# Patient Record
Sex: MALE | Race: WHITE | Employment: OTHER | URBAN - METROPOLITAN AREA
[De-identification: names, ages, dates, MRNs, and addresses within clinical notes are randomized per-mention and may not be internally consistent; named-entity substitution may affect disease eponyms.]

---

## 2018-07-11 ENCOUNTER — HOSPITAL ENCOUNTER (OUTPATIENT)
Dept: NUCLEAR MEDICINE | Age: 83
Discharge: HOME | End: 2018-07-13
Payer: MEDICARE

## 2018-07-11 ENCOUNTER — HOSPITAL ENCOUNTER (OUTPATIENT)
Dept: NON INVASIVE DIAGNOSTICS | Age: 83
Discharge: HOME | End: 2018-07-11
Payer: MEDICARE

## 2018-07-11 LAB
LV EF: 63 %
LVEF MODALITY: NORMAL

## 2018-07-11 PROCEDURE — 3430000000 HC RX DIAGNOSTIC RADIOPHARMACEUTICAL: Performed by: RADIOLOGY

## 2018-07-11 PROCEDURE — 78452 HT MUSCLE IMAGE SPECT MULT: CPT

## 2018-07-11 PROCEDURE — 93017 CV STRESS TEST TRACING ONLY: CPT

## 2018-07-11 PROCEDURE — A9500 TC99M SESTAMIBI: HCPCS | Performed by: RADIOLOGY

## 2018-07-11 PROCEDURE — 6360000002 HC RX W HCPCS: Performed by: INTERNAL MEDICINE

## 2018-07-11 RX ADMIN — Medication 10 MILLICURIE: at 12:09

## 2018-07-11 RX ADMIN — REGADENOSON 0.4 MG: 0.08 INJECTION, SOLUTION INTRAVENOUS at 13:15

## 2018-07-11 RX ADMIN — Medication 35 MILLICURIE: at 14:23

## 2018-08-30 ENCOUNTER — HOSPITAL ENCOUNTER (OUTPATIENT)
Age: 83
Discharge: HOME | End: 2018-08-30
Payer: MEDICARE

## 2018-08-30 LAB — PROSTATE SPECIFIC ANTIGEN: 3.44 NG/ML (ref 0–4)

## 2018-08-30 PROCEDURE — 36415 COLL VENOUS BLD VENIPUNCTURE: CPT

## 2018-08-30 PROCEDURE — G0103 PSA SCREENING: HCPCS

## 2018-09-10 ENCOUNTER — HOSPITAL ENCOUNTER (OUTPATIENT)
Age: 83
Discharge: HOME | End: 2018-09-12
Payer: MEDICARE

## 2018-09-10 PROCEDURE — 88112 CYTOPATH CELL ENHANCE TECH: CPT

## 2018-12-20 ENCOUNTER — HOSPITAL ENCOUNTER (OUTPATIENT)
Age: 83
Discharge: HOME | End: 2018-12-20
Payer: MEDICARE

## 2018-12-20 LAB — PROSTATE SPECIFIC ANTIGEN: 3.91 NG/ML (ref 0–4)

## 2018-12-20 PROCEDURE — 84403 ASSAY OF TOTAL TESTOSTERONE: CPT

## 2018-12-20 PROCEDURE — G0103 PSA SCREENING: HCPCS

## 2018-12-20 PROCEDURE — 36415 COLL VENOUS BLD VENIPUNCTURE: CPT

## 2018-12-20 PROCEDURE — 84270 ASSAY OF SEX HORMONE GLOBUL: CPT

## 2018-12-22 LAB
SEX HORMONE BINDING GLOBULIN: 52 NMOL/L (ref 11–80)
TESTOSTERONE FREE-NONMALE: 81.1 PG/ML (ref 47–244)
TESTOSTERONE TOTAL: 519 NG/DL (ref 220–1000)

## 2019-02-27 ENCOUNTER — HOSPITAL ENCOUNTER (OUTPATIENT)
Age: 84
Discharge: HOME | End: 2019-02-27
Payer: MEDICARE

## 2019-02-27 PROCEDURE — 84154 ASSAY OF PSA FREE: CPT

## 2019-02-27 PROCEDURE — 84153 ASSAY OF PSA TOTAL: CPT

## 2019-03-01 LAB
PROSTATE SPECIFIC ANTIGEN FREE: 1.2 NG/ML
PROSTATE SPECIFIC ANTIGEN PERCENT FREE: 31 %
PROSTATE SPECIFIC ANTIGEN: 3.9 NG/ML (ref 0–4)

## 2019-03-11 ENCOUNTER — HOSPITAL ENCOUNTER (OUTPATIENT)
Age: 84
Discharge: HOME | End: 2019-03-13
Payer: MEDICARE

## 2019-03-11 PROCEDURE — 88112 CYTOPATH CELL ENHANCE TECH: CPT

## 2019-09-12 ENCOUNTER — HOSPITAL ENCOUNTER (OUTPATIENT)
Age: 84
Discharge: HOME | End: 2019-09-12
Payer: MEDICARE

## 2019-09-12 LAB — PROSTATE SPECIFIC ANTIGEN: 3.43 NG/ML (ref 0–4)

## 2019-09-12 PROCEDURE — 88112 CYTOPATH CELL ENHANCE TECH: CPT

## 2019-09-12 PROCEDURE — 36415 COLL VENOUS BLD VENIPUNCTURE: CPT

## 2019-09-12 PROCEDURE — G0103 PSA SCREENING: HCPCS

## 2020-05-27 ENCOUNTER — PREP FOR PROCEDURE (OUTPATIENT)
Dept: UROLOGY | Age: 85
End: 2020-05-27

## 2020-05-27 RX ORDER — SODIUM CHLORIDE 0.9 % (FLUSH) 0.9 %
10 SYRINGE (ML) INJECTION PRN
Status: CANCELLED | OUTPATIENT
Start: 2020-05-27

## 2020-05-27 RX ORDER — SODIUM CHLORIDE 0.9 % (FLUSH) 0.9 %
10 SYRINGE (ML) INJECTION EVERY 12 HOURS SCHEDULED
Status: CANCELLED | OUTPATIENT
Start: 2020-05-27

## 2020-05-27 RX ORDER — SODIUM CHLORIDE 9 MG/ML
INJECTION, SOLUTION INTRAVENOUS CONTINUOUS
Status: CANCELLED | OUTPATIENT
Start: 2020-05-27

## 2020-05-29 ENCOUNTER — HOSPITAL ENCOUNTER (OUTPATIENT)
Age: 85
Discharge: HOME | End: 2020-05-31
Payer: MEDICARE

## 2020-05-29 ENCOUNTER — HOSPITAL ENCOUNTER (OUTPATIENT)
Dept: PREADMISSION TESTING | Age: 85
Discharge: HOME | End: 2020-05-29
Payer: MEDICARE

## 2020-05-29 VITALS
OXYGEN SATURATION: 95 % | RESPIRATION RATE: 18 BRPM | WEIGHT: 193.44 LBS | HEART RATE: 68 BPM | TEMPERATURE: 98.8 F | DIASTOLIC BLOOD PRESSURE: 72 MMHG | HEIGHT: 69 IN | SYSTOLIC BLOOD PRESSURE: 149 MMHG | BODY MASS INDEX: 28.65 KG/M2

## 2020-05-29 LAB
ANION GAP SERPL CALCULATED.3IONS-SCNC: 15 MMOL/L (ref 7–16)
BUN BLDV-MCNC: 28 MG/DL (ref 8–23)
CALCIUM SERPL-MCNC: 9.3 MG/DL (ref 8.6–10.2)
CHLORIDE BLD-SCNC: 97 MMOL/L (ref 98–107)
CO2: 24 MMOL/L (ref 22–29)
CREAT SERPL-MCNC: 1.8 MG/DL (ref 0.7–1.2)
EKG ATRIAL RATE: 58 BPM
EKG Q-T INTERVAL: 434 MS
EKG QRS DURATION: 132 MS
EKG QTC CALCULATION (BAZETT): 426 MS
EKG R AXIS: -62 DEGREES
EKG T AXIS: -11 DEGREES
EKG VENTRICULAR RATE: 58 BPM
GFR AFRICAN AMERICAN: 43
GFR NON-AFRICAN AMERICAN: 36 ML/MIN/1.73
GLUCOSE BLD-MCNC: 195 MG/DL (ref 74–99)
HCT VFR BLD CALC: 33.1 % (ref 37–54)
HEMOGLOBIN: 11.5 G/DL (ref 12.5–16.5)
MCH RBC QN AUTO: 29 PG (ref 26–35)
MCHC RBC AUTO-ENTMCNC: 34.7 % (ref 32–34.5)
MCV RBC AUTO: 83.4 FL (ref 80–99.9)
PDW BLD-RTO: 13 FL (ref 11.5–15)
PLATELET # BLD: 232 E9/L (ref 130–450)
PMV BLD AUTO: 9 FL (ref 7–12)
POTASSIUM REFLEX MAGNESIUM: 4 MMOL/L (ref 3.5–5)
RBC # BLD: 3.97 E12/L (ref 3.8–5.8)
SODIUM BLD-SCNC: 136 MMOL/L (ref 132–146)
WBC # BLD: 7.3 E9/L (ref 4.5–11.5)

## 2020-05-29 PROCEDURE — 80048 BASIC METABOLIC PNL TOTAL CA: CPT

## 2020-05-29 PROCEDURE — U0003 INFECTIOUS AGENT DETECTION BY NUCLEIC ACID (DNA OR RNA); SEVERE ACUTE RESPIRATORY SYNDROME CORONAVIRUS 2 (SARS-COV-2) (CORONAVIRUS DISEASE [COVID-19]), AMPLIFIED PROBE TECHNIQUE, MAKING USE OF HIGH THROUGHPUT TECHNOLOGIES AS DESCRIBED BY CMS-2020-01-R: HCPCS

## 2020-05-29 PROCEDURE — 36415 COLL VENOUS BLD VENIPUNCTURE: CPT

## 2020-05-29 PROCEDURE — 93005 ELECTROCARDIOGRAM TRACING: CPT | Performed by: ANESTHESIOLOGY

## 2020-05-29 PROCEDURE — 93010 ELECTROCARDIOGRAM REPORT: CPT | Performed by: INTERNAL MEDICINE

## 2020-05-29 PROCEDURE — 85027 COMPLETE CBC AUTOMATED: CPT

## 2020-05-29 RX ORDER — M-VIT,TX,IRON,MINS/CALC/FOLIC 27MG-0.4MG
1 TABLET ORAL DAILY
COMMUNITY

## 2020-05-29 RX ORDER — ASPIRIN 81 MG/1
81 TABLET ORAL DAILY
COMMUNITY
End: 2020-08-07

## 2020-05-29 RX ORDER — AMLODIPINE BESYLATE 5 MG/1
2.5 TABLET ORAL DAILY
Status: ON HOLD | COMMUNITY
End: 2022-08-09 | Stop reason: HOSPADM

## 2020-05-29 RX ORDER — TORSEMIDE 10 MG/1
10 TABLET ORAL DAILY
COMMUNITY
End: 2022-08-09

## 2020-05-29 RX ORDER — GABAPENTIN 100 MG/1
100 CAPSULE ORAL EVERY OTHER DAY
Status: ON HOLD | COMMUNITY
End: 2022-08-09 | Stop reason: HOSPADM

## 2020-05-29 NOTE — PROGRESS NOTES
Prelim EKG reviewed with Dr. Maribel Jacobs, no further orders received. BMP faxed to Dr.Dan Joshua Blinks, confirmation received.

## 2020-05-31 LAB
SARS-COV-2: NOT DETECTED
SOURCE: NORMAL

## 2020-06-02 ENCOUNTER — ANESTHESIA EVENT (OUTPATIENT)
Dept: OPERATING ROOM | Age: 85
End: 2020-06-02
Payer: MEDICARE

## 2020-06-02 ENCOUNTER — ANESTHESIA (OUTPATIENT)
Dept: OPERATING ROOM | Age: 85
End: 2020-06-02
Payer: MEDICARE

## 2020-06-02 ENCOUNTER — HOSPITAL ENCOUNTER (OUTPATIENT)
Dept: GENERAL RADIOLOGY | Age: 85
Discharge: HOME | End: 2020-06-04
Payer: MEDICARE

## 2020-06-02 ENCOUNTER — HOSPITAL ENCOUNTER (OUTPATIENT)
Age: 85
Setting detail: OUTPATIENT SURGERY
Discharge: HOME | End: 2020-06-02
Attending: UROLOGY | Admitting: UROLOGY
Payer: MEDICARE

## 2020-06-02 VITALS
DIASTOLIC BLOOD PRESSURE: 73 MMHG | SYSTOLIC BLOOD PRESSURE: 150 MMHG | TEMPERATURE: 98.5 F | HEART RATE: 76 BPM | RESPIRATION RATE: 16 BRPM | OXYGEN SATURATION: 95 %

## 2020-06-02 VITALS
DIASTOLIC BLOOD PRESSURE: 70 MMHG | OXYGEN SATURATION: 97 % | SYSTOLIC BLOOD PRESSURE: 127 MMHG | RESPIRATION RATE: 16 BRPM | TEMPERATURE: 97.7 F

## 2020-06-02 LAB — METER GLUCOSE: 72 MG/DL (ref 74–99)

## 2020-06-02 PROCEDURE — 2709999900 HC NON-CHARGEABLE SUPPLY: Performed by: UROLOGY

## 2020-06-02 PROCEDURE — C1758 CATHETER, URETERAL: HCPCS | Performed by: UROLOGY

## 2020-06-02 PROCEDURE — 6370000000 HC RX 637 (ALT 250 FOR IP): Performed by: UROLOGY

## 2020-06-02 PROCEDURE — 74400 UROGRAPHY IV +-KUB TOMOG: CPT

## 2020-06-02 PROCEDURE — 82962 GLUCOSE BLOOD TEST: CPT

## 2020-06-02 PROCEDURE — 6360000002 HC RX W HCPCS: Performed by: NURSE PRACTITIONER

## 2020-06-02 PROCEDURE — 3700000000 HC ANESTHESIA ATTENDED CARE: Performed by: UROLOGY

## 2020-06-02 PROCEDURE — 51798 US URINE CAPACITY MEASURE: CPT

## 2020-06-02 PROCEDURE — 3700000001 HC ADD 15 MINUTES (ANESTHESIA): Performed by: UROLOGY

## 2020-06-02 PROCEDURE — 88305 TISSUE EXAM BY PATHOLOGIST: CPT

## 2020-06-02 PROCEDURE — 2580000003 HC RX 258: Performed by: NURSE ANESTHETIST, CERTIFIED REGISTERED

## 2020-06-02 PROCEDURE — 7100000010 HC PHASE II RECOVERY - FIRST 15 MIN: Performed by: UROLOGY

## 2020-06-02 PROCEDURE — 51702 INSERT TEMP BLADDER CATH: CPT

## 2020-06-02 PROCEDURE — 6360000002 HC RX W HCPCS: Performed by: NURSE ANESTHETIST, CERTIFIED REGISTERED

## 2020-06-02 PROCEDURE — 6360000004 HC RX CONTRAST MEDICATION: Performed by: UROLOGY

## 2020-06-02 PROCEDURE — 7100000011 HC PHASE II RECOVERY - ADDTL 15 MIN: Performed by: UROLOGY

## 2020-06-02 PROCEDURE — 3600000002 HC SURGERY LEVEL 2 BASE: Performed by: UROLOGY

## 2020-06-02 PROCEDURE — 3600000012 HC SURGERY LEVEL 2 ADDTL 15MIN: Performed by: UROLOGY

## 2020-06-02 RX ORDER — SODIUM CHLORIDE 9 MG/ML
INJECTION, SOLUTION INTRAVENOUS CONTINUOUS PRN
Status: DISCONTINUED | OUTPATIENT
Start: 2020-06-02 | End: 2020-06-02 | Stop reason: SDUPTHER

## 2020-06-02 RX ORDER — PHENAZOPYRIDINE HYDROCHLORIDE 100 MG/1
100 TABLET, FILM COATED ORAL 3 TIMES DAILY PRN
Qty: 30 TABLET | Refills: 0 | Status: SHIPPED | OUTPATIENT
Start: 2020-06-02 | End: 2020-06-12

## 2020-06-02 RX ORDER — PROPOFOL 10 MG/ML
INJECTION, EMULSION INTRAVENOUS CONTINUOUS PRN
Status: DISCONTINUED | OUTPATIENT
Start: 2020-06-02 | End: 2020-06-02 | Stop reason: SDUPTHER

## 2020-06-02 RX ORDER — SODIUM CHLORIDE 0.9 % (FLUSH) 0.9 %
10 SYRINGE (ML) INJECTION EVERY 12 HOURS SCHEDULED
Status: DISCONTINUED | OUTPATIENT
Start: 2020-06-02 | End: 2020-06-02 | Stop reason: HOSPADM

## 2020-06-02 RX ORDER — PROPOFOL 10 MG/ML
INJECTION, EMULSION INTRAVENOUS PRN
Status: DISCONTINUED | OUTPATIENT
Start: 2020-06-02 | End: 2020-06-02 | Stop reason: SDUPTHER

## 2020-06-02 RX ORDER — CEFAZOLIN SODIUM 2 G/50ML
2 SOLUTION INTRAVENOUS
Status: COMPLETED | OUTPATIENT
Start: 2020-06-02 | End: 2020-06-02

## 2020-06-02 RX ORDER — IBUPROFEN 600 MG/1
600 TABLET ORAL EVERY 8 HOURS PRN
Status: DISCONTINUED | OUTPATIENT
Start: 2020-06-02 | End: 2020-06-02 | Stop reason: HOSPADM

## 2020-06-02 RX ORDER — SODIUM CHLORIDE 9 MG/ML
INJECTION, SOLUTION INTRAVENOUS CONTINUOUS
Status: DISCONTINUED | OUTPATIENT
Start: 2020-06-02 | End: 2020-06-02 | Stop reason: HOSPADM

## 2020-06-02 RX ORDER — ONDANSETRON 2 MG/ML
INJECTION INTRAMUSCULAR; INTRAVENOUS PRN
Status: DISCONTINUED | OUTPATIENT
Start: 2020-06-02 | End: 2020-06-02 | Stop reason: SDUPTHER

## 2020-06-02 RX ORDER — IBUPROFEN 600 MG/1
600 TABLET ORAL EVERY 8 HOURS PRN
Qty: 12 TABLET | Refills: 0 | Status: SHIPPED | OUTPATIENT
Start: 2020-06-02 | End: 2020-08-07

## 2020-06-02 RX ORDER — SODIUM CHLORIDE 0.9 % (FLUSH) 0.9 %
10 SYRINGE (ML) INJECTION PRN
Status: DISCONTINUED | OUTPATIENT
Start: 2020-06-02 | End: 2020-06-02 | Stop reason: HOSPADM

## 2020-06-02 RX ORDER — PROMETHAZINE HYDROCHLORIDE 25 MG/ML
INJECTION, SOLUTION INTRAMUSCULAR; INTRAVENOUS PRN
Status: DISCONTINUED | OUTPATIENT
Start: 2020-06-02 | End: 2020-06-02 | Stop reason: SDUPTHER

## 2020-06-02 RX ORDER — CEFAZOLIN SODIUM 2 G/50ML
SOLUTION INTRAVENOUS
Status: DISCONTINUED
Start: 2020-06-02 | End: 2020-06-02 | Stop reason: HOSPADM

## 2020-06-02 RX ORDER — DEXAMETHASONE SODIUM PHOSPHATE 10 MG/ML
INJECTION, SOLUTION INTRAMUSCULAR; INTRAVENOUS PRN
Status: DISCONTINUED | OUTPATIENT
Start: 2020-06-02 | End: 2020-06-02 | Stop reason: SDUPTHER

## 2020-06-02 RX ORDER — PHENAZOPYRIDINE HYDROCHLORIDE 100 MG/1
100 TABLET, FILM COATED ORAL 3 TIMES DAILY PRN
Status: DISCONTINUED | OUTPATIENT
Start: 2020-06-02 | End: 2020-06-02 | Stop reason: HOSPADM

## 2020-06-02 RX ORDER — ONDANSETRON 2 MG/ML
4 INJECTION INTRAMUSCULAR; INTRAVENOUS EVERY 6 HOURS PRN
Status: DISCONTINUED | OUTPATIENT
Start: 2020-06-02 | End: 2020-06-02 | Stop reason: HOSPADM

## 2020-06-02 RX ORDER — LIDOCAINE HYDROCHLORIDE 20 MG/ML
INJECTION, SOLUTION INTRAVENOUS PRN
Status: DISCONTINUED | OUTPATIENT
Start: 2020-06-02 | End: 2020-06-02 | Stop reason: SDUPTHER

## 2020-06-02 RX ADMIN — PROPOFOL 20 MG: 10 INJECTION, EMULSION INTRAVENOUS at 13:41

## 2020-06-02 RX ADMIN — DEXAMETHASONE SODIUM PHOSPHATE 10 MG: 10 INJECTION, SOLUTION INTRAMUSCULAR; INTRAVENOUS at 13:40

## 2020-06-02 RX ADMIN — PROMETHAZINE HYDROCHLORIDE 12.5 MG: 25 INJECTION INTRAMUSCULAR; INTRAVENOUS at 13:30

## 2020-06-02 RX ADMIN — PROPOFOL 30 MG: 10 INJECTION, EMULSION INTRAVENOUS at 13:38

## 2020-06-02 RX ADMIN — SODIUM CHLORIDE: 9 INJECTION, SOLUTION INTRAVENOUS at 12:55

## 2020-06-02 RX ADMIN — PHENAZOPYRIDINE HYDROCHLORIDE 100 MG: 100 TABLET ORAL at 16:17

## 2020-06-02 RX ADMIN — LIDOCAINE HYDROCHLORIDE 100 MG: 20 INJECTION, SOLUTION INTRAVENOUS at 13:38

## 2020-06-02 RX ADMIN — CEFAZOLIN SODIUM 2 G: 2 SOLUTION INTRAVENOUS at 13:35

## 2020-06-02 RX ADMIN — PROPOFOL 180 MCG/KG/MIN: 10 INJECTION, EMULSION INTRAVENOUS at 13:38

## 2020-06-02 RX ADMIN — PROPOFOL 150 MCG/KG/MIN: 10 INJECTION, EMULSION INTRAVENOUS at 13:41

## 2020-06-02 RX ADMIN — PROPOFOL 30 MG: 10 INJECTION, EMULSION INTRAVENOUS at 13:39

## 2020-06-02 RX ADMIN — ONDANSETRON 4 MG: 2 INJECTION INTRAMUSCULAR; INTRAVENOUS at 13:51

## 2020-06-02 RX ADMIN — PROPOFOL 20 MG: 10 INJECTION, EMULSION INTRAVENOUS at 13:40

## 2020-06-02 ASSESSMENT — PULMONARY FUNCTION TESTS
PIF_VALUE: 1
PIF_VALUE: 0
PIF_VALUE: 0
PIF_VALUE: 1
PIF_VALUE: 0
PIF_VALUE: 1
PIF_VALUE: 0
PIF_VALUE: 1
PIF_VALUE: 0
PIF_VALUE: 1
PIF_VALUE: 0
PIF_VALUE: 1
PIF_VALUE: 1
PIF_VALUE: 0
PIF_VALUE: 1
PIF_VALUE: 0
PIF_VALUE: 1
PIF_VALUE: 1

## 2020-06-02 ASSESSMENT — PAIN - FUNCTIONAL ASSESSMENT: PAIN_FUNCTIONAL_ASSESSMENT: 0-10

## 2020-06-02 NOTE — PROGRESS NOTES
6/2/20 [facility] dr joiner paged regarding pt unable to urinate and is uncomfortable. kmo rn

## 2020-06-02 NOTE — ANESTHESIA PRE PROCEDURE
this encounter. Current Outpatient Medications   Medication Sig Dispense Refill    amLODIPine (NORVASC) 5 MG tablet Take 5 mg by mouth daily      torsemide (DEMADEX) 10 MG tablet Take 10 mg by mouth daily      METFORMIN HCL ER PO Take 1,000 mg by mouth 2 times daily      gabapentin (NEURONTIN) 100 MG capsule Take 100 mg by mouth 2 times daily.  aspirin 81 MG EC tablet Take 81 mg by mouth daily      SITagliptin (JANUVIA) 100 MG tablet Take 100 mg by mouth daily      Cyanocobalamin (VITAMIN B12 PO) Take by mouth daily      zinc 50 MG CAPS Take by mouth daily      Multiple Vitamins-Minerals (THERAPEUTIC MULTIVITAMIN-MINERALS) tablet Take 1 tablet by mouth daily      Cholecalciferol (VITAMIN D3 PO) Take by mouth daily      Omega-3 Fatty Acids (FISH OIL PO) Take by mouth daily      Bitter Melon 10 % POWD by Does not apply route daily      labetalol (NORMODYNE) 200 MG tablet Take 1 tablet by mouth every 12 hours for 90 days. 180 tablet 3    tamsulosin (FLOMAX) 0.4 MG capsule Take 0.4 mg by mouth 2 times daily       glimepiride (AMARYL) 4 MG tablet Take 4 mg by mouth every morning (before breakfast).          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       Past Medical History:        Diagnosis Date    Diabetes mellitus ([address]     H pylori ulcer 8/22/2012    Hyperlipidemia     Hypertension     PONV (postoperative nausea and vomiting)     PUD (peptic ulcer disease) 7/26/2012    Urinary frequency        Past Surgical History:        Procedure Laterality Date    APPENDECTOMY      CATARACT REMOVAL WITH IMPLANT Left 12/17/15    COLONOSCOPY  7/206/2012    CYSTOSCOPY [id number], [id number], BEART, [id number]     Type & Screen (If Applicable):  No results found for: LABABO, LABRH    Drug/Infectious Status (If Applicable):  No results found for: HIV, HEPCAB    COVID-19 Screening (If Applicable):   Lab Results   Component Value Date    COVID19 Not Detected 05/29/2020         Anesthesia Evaluation  Patient summary reviewed   history of anesthetic complications: PONV. Airway: Mallampati: IV  TM distance: >3 FB   Neck ROM: full  Mouth opening: > = 3 FB Dental:          Pulmonary: breath sounds clear to auscultation                            ROS comment: Former Smoker. Cardiovascular:    (+) hypertension:, angina:, hyperlipidemia        Rhythm: regular  Rate: normal                    Neuro/Psych:   Negative Neuro/Psych ROS              GI/Hepatic/Renal:   (+) PUD (H Pylori.),          ROS comment: Urinary Frequency. .   Endo/Other:    (+) DiabetesType II DM, , .                 Abdominal:           Vascular: negative vascular ROS. Anesthesia Plan      MAC     ASA 3       Induction: intravenous. BIS  MIPS: Postoperative opioids intended. Anesthetic plan and risks discussed with patient. Plan discussed with CRNA.     Attending anesthesiologist reviewed and agrees with Shannan Maynard MD   6/2/2020

## 2020-06-02 NOTE — ANESTHESIA POSTPROCEDURE EVALUATION
Department of Anesthesiology  Postprocedure Note    Patient: Rik Krishnan  MRN: [id number]  YOB: 1933  Date of evaluation: 6/2/2020  Time:  2:45 PM     Procedure Summary     Date:  06/02/20 Room / Location:  [facility]  [address] [facility], operated by [facility]    Anesthesia Start:  1330 Anesthesia Stop:  9022    Procedure:  CYSTOSCOPY RETROGRADE PYELOGRAM BOTOX  INJECTION (100 UNITS) (N/A Perineum) Diagnosis:  (OVERACTIVE BLADDER)    Surgeon:  Flo Sanchez MD Responsible Provider:  Dereje Da Silva MD    Anesthesia Type:  MAC ASA Status:  3          Anesthesia Type: MAC    Gus Phase I: Gus Score: 10    Gus Phase II:      Last vitals: Reviewed and per EMR flowsheets.        Anesthesia Post Evaluation    Patient location during evaluation: PACU  Patient participation: complete - patient participated  Level of consciousness: awake  Pain score: 0  Airway patency: patent  Nausea & Vomiting: no nausea  Complications: no  Cardiovascular status: blood pressure returned to baseline  Respiratory status: acceptable  Hydration status: euvolemic

## 2020-06-02 NOTE — PROGRESS NOTES
6/2/20 4365 reviewed discharge instructions with pt. Pt verbalized understanding, signed in agreement and given copy.  Updated pt daughter erika on toure instructions and follow up for toure removal. kmo rn

## 2020-06-02 NOTE — BRIEF OP NOTE
Brief Postoperative Note      Patient: Daniel Baptiste  YOB: 1933  MRN: [id number]    Date of Procedure: 6/2/2020    Pre-Op Diagnosis: OVERACTIVE BLADDER, bladder cancer    Post-Op Diagnosis: recurrent bladder cancer       Procedure(s):  CYSTOSCOPY RETROGRADE PYELOGRAM BOTOX  INJECTION (100 UNITS), maribel    Surgeon(s):  Gianni Felix MD    Assistant:  * No surgical staff found *    Anesthesia: Monitor Anesthesia Care    Estimated Blood Loss (mL): Minimal    Complications: None    Specimens:   ID Type Source Tests Collected by Time Destination   A : BLADDER TUMOR BIOPSY Tissue Tissue SURGICAL PATHOLOGY, AFB STAIN Gianni Felix MD 6/2/2020 1402        Implants:  * No implants in log *      Drains: * No LDAs found *    Findings: tumor    Electronically signed by Gianni Felix MD on 6/2/2020 at 2:37 PM

## 2020-06-02 NOTE — H&P
or injury, retention, persistent overactive voiding habits, possible need for subsequent Botox injections etc. were discussed in detail and he elected to proceed.   His PSA is due to be repeated in the fall

## 2020-06-06 ENCOUNTER — HOSPITAL ENCOUNTER (EMERGENCY)
Age: 85
Discharge: HOME | End: 2020-06-06
Attending: EMERGENCY MEDICINE
Payer: MEDICARE

## 2020-06-06 VITALS
HEART RATE: 70 BPM | RESPIRATION RATE: 16 BRPM | WEIGHT: 190 LBS | BODY MASS INDEX: 28.14 KG/M2 | TEMPERATURE: 96.9 F | HEIGHT: 69 IN | OXYGEN SATURATION: 97 %

## 2020-06-06 LAB
BACTERIA: NORMAL /HPF
BILIRUBIN URINE: ABNORMAL
BLOOD, URINE: ABNORMAL
CLARITY: ABNORMAL
COLOR: ABNORMAL
EPITHELIAL CELLS, UA: NORMAL /HPF
GLUCOSE URINE: NEGATIVE MG/DL
KETONES, URINE: NEGATIVE MG/DL
LEUKOCYTE ESTERASE, URINE: NEGATIVE
NITRITE, URINE: POSITIVE
PH UA: 7 (ref 5–9)
PROTEIN UA: 100 MG/DL
RBC UA: >20 /HPF (ref 0–2)
SPECIFIC GRAVITY UA: 1.01 (ref 1–1.03)
UROBILINOGEN, URINE: 1 E.U./DL
WBC UA: NORMAL /HPF (ref 0–5)

## 2020-06-06 PROCEDURE — 87088 URINE BACTERIA CULTURE: CPT

## 2020-06-06 PROCEDURE — 81001 URINALYSIS AUTO W/SCOPE: CPT

## 2020-06-06 PROCEDURE — 99283 EMERGENCY DEPT VISIT LOW MDM: CPT

## 2020-06-06 ASSESSMENT — PAIN DESCRIPTION - PAIN TYPE: TYPE: ACUTE PAIN

## 2020-06-06 ASSESSMENT — PAIN DESCRIPTION - LOCATION: LOCATION: FLANK

## 2020-06-06 ASSESSMENT — PAIN SCALES - GENERAL: PAINLEVEL_OUTOF10: 10

## 2020-06-06 ASSESSMENT — PAIN DESCRIPTION - FREQUENCY: FREQUENCY: CONTINUOUS

## 2020-06-06 ASSESSMENT — PAIN DESCRIPTION - PROGRESSION: CLINICAL_PROGRESSION: NOT CHANGED

## 2020-06-06 ASSESSMENT — PAIN DESCRIPTION - DESCRIPTORS: DESCRIPTORS: ACHING;BURNING

## 2020-06-07 ASSESSMENT — ENCOUNTER SYMPTOMS: ABDOMINAL PAIN: 1

## 2020-06-07 NOTE — ED PROVIDER NOTES
/HPF    Epithelial Cells, UA NONE SEEN /HPF    Bacteria, UA NONE SEEN None Seen /HPF       RADIOLOGY:  Interpreted by Radiologist.  No orders to display             ------------------------- NURSING NOTES AND VITALS REVIEWED ---------------------------   The nursing notes within the ED encounter and vital signs as below have been reviewed by myself. Pulse 70   Temp 96.9 °F (36.1 °C) (Temporal)   Resp 16   Ht 5' 9\" (1.753 m)   Wt 190 lb (86.2 kg)   SpO2 97%   BMI 28.06 kg/m²   Oxygen Saturation Interpretation: Normal    The patients available past medical records and past encounters were reviewed. ------------------------------ ED COURSE/MEDICAL DECISION MAKING----------------------  Medications - No data to display          Medical Decision Making:   The patient is a 77-year-old male presenting to the emergency department with a chief complaint of urinary retention. The patient's son did obtain  urology consultation prior to my evaluation of the patient. Urology consultation with Dr. Ani Novak on the patient's son private cell phone. Itz Lao He did verbal order for Stephens placement prior to discharge. The Stephens was placed prior urology request and the patient was discharged. Urine was discontinued but was sent to the lab, no WBCs, no bacteria, no systemic signs of infection. Patient will follow-up with urology. Critical Care: Please note that the withdrawal or failure to initiate urgent interventions for this patient would likely result in a life threatening deterioration or permanent disability. Accordingly this patient received 0 minutes of critical care time, excluding separately billable procedures. This patient's ED course included: History, physical examination, reevaluation prior to disposition,     This patient has remained hemodynamically stable during their ED course. Counseling:    The emergency provider has spoken with the patient and patients son and discussed todays

## 2020-06-07 NOTE — ED NOTES
Dr Ifeanyi Fernandez asked who the Dr was and insisted a toure be inserted into the pt, explained to him that Dr Matt Casey was the attending and she was the only doctor at this time for the entire dept. Dr Ifeanyi Fernandez then called urologist on cell phone and handed this nurse personal cell phone to take verbal order for toure insert. Dr Karla Cunha gave verbal order for 25 F coude catheter to be inserted.      Negrito Franklin RN  06/06/20 1355

## 2020-06-09 LAB — URINE CULTURE, ROUTINE: NORMAL

## 2020-06-15 ENCOUNTER — HOSPITAL ENCOUNTER (OUTPATIENT)
Age: 85
Discharge: HOME | End: 2020-06-17
Payer: MEDICARE

## 2020-06-15 PROCEDURE — 87088 URINE BACTERIA CULTURE: CPT

## 2020-06-15 PROCEDURE — 87186 SC STD MICRODIL/AGAR DIL: CPT

## 2020-06-18 LAB
ORGANISM: ABNORMAL
URINE CULTURE, ROUTINE: ABNORMAL

## 2020-08-05 ENCOUNTER — HOSPITAL ENCOUNTER (OUTPATIENT)
Age: 85
Discharge: HOME | End: 2020-08-07
Payer: MEDICARE

## 2020-08-05 PROCEDURE — U0003 INFECTIOUS AGENT DETECTION BY NUCLEIC ACID (DNA OR RNA); SEVERE ACUTE RESPIRATORY SYNDROME CORONAVIRUS 2 (SARS-COV-2) (CORONAVIRUS DISEASE [COVID-19]), AMPLIFIED PROBE TECHNIQUE, MAKING USE OF HIGH THROUGHPUT TECHNOLOGIES AS DESCRIBED BY CMS-2020-01-R: HCPCS

## 2020-08-07 ENCOUNTER — HOSPITAL ENCOUNTER (OUTPATIENT)
Dept: CARDIAC CATH/INVASIVE PROCEDURES | Age: 85
Discharge: HOME | End: 2020-08-07
Payer: MEDICARE

## 2020-08-07 VITALS
SYSTOLIC BLOOD PRESSURE: 156 MMHG | WEIGHT: 186 LBS | RESPIRATION RATE: 16 BRPM | HEART RATE: 62 BPM | TEMPERATURE: 98.5 F | BODY MASS INDEX: 25.19 KG/M2 | HEIGHT: 72 IN | DIASTOLIC BLOOD PRESSURE: 82 MMHG

## 2020-08-07 LAB
ABO/RH: NORMAL
ANION GAP SERPL CALCULATED.3IONS-SCNC: 14 MMOL/L (ref 7–16)
ANTIBODY SCREEN: NORMAL
BASOPHILS ABSOLUTE: 0.05 E9/L (ref 0–0.2)
BASOPHILS RELATIVE PERCENT: 0.6 % (ref 0–2)
BUN BLDV-MCNC: 22 MG/DL (ref 8–23)
CALCIUM SERPL-MCNC: 10 MG/DL (ref 8.6–10.2)
CHLORIDE BLD-SCNC: 94 MMOL/L (ref 98–107)
CO2: 28 MMOL/L (ref 22–29)
CREAT SERPL-MCNC: 1.5 MG/DL (ref 0.7–1.2)
EOSINOPHILS ABSOLUTE: 0.13 E9/L (ref 0.05–0.5)
EOSINOPHILS RELATIVE PERCENT: 1.6 % (ref 0–6)
GFR AFRICAN AMERICAN: 54
GFR NON-AFRICAN AMERICAN: 44 ML/MIN/1.73
GLUCOSE BLD-MCNC: 165 MG/DL (ref 74–99)
HCT VFR BLD CALC: 34.4 % (ref 37–54)
HEMOGLOBIN: 11.7 G/DL (ref 12.5–16.5)
IMMATURE GRANULOCYTES #: 0.04 E9/L
IMMATURE GRANULOCYTES %: 0.5 % (ref 0–5)
INR BLD: 1.1
LYMPHOCYTES ABSOLUTE: 1.43 E9/L (ref 1.5–4)
LYMPHOCYTES RELATIVE PERCENT: 17.8 % (ref 20–42)
MCH RBC QN AUTO: 27.9 PG (ref 26–35)
MCHC RBC AUTO-ENTMCNC: 34 % (ref 32–34.5)
MCV RBC AUTO: 82.1 FL (ref 80–99.9)
MONOCYTES ABSOLUTE: 0.64 E9/L (ref 0.1–0.95)
MONOCYTES RELATIVE PERCENT: 8 % (ref 2–12)
NEUTROPHILS ABSOLUTE: 5.74 E9/L (ref 1.8–7.3)
NEUTROPHILS RELATIVE PERCENT: 71.5 % (ref 43–80)
PDW BLD-RTO: 14.1 FL (ref 11.5–15)
PLATELET # BLD: 253 E9/L (ref 130–450)
PMV BLD AUTO: 8.8 FL (ref 7–12)
POTASSIUM SERPL-SCNC: 4 MMOL/L (ref 3.5–5)
PROTHROMBIN TIME: 12 SEC (ref 9.3–12.4)
RBC # BLD: 4.19 E12/L (ref 3.8–5.8)
SARS-COV-2: NOT DETECTED
SODIUM BLD-SCNC: 136 MMOL/L (ref 132–146)
SOURCE: NORMAL
WBC # BLD: 8 E9/L (ref 4.5–11.5)

## 2020-08-07 PROCEDURE — C1894 INTRO/SHEATH, NON-LASER: HCPCS

## 2020-08-07 PROCEDURE — 93458 L HRT ARTERY/VENTRICLE ANGIO: CPT | Performed by: INTERNAL MEDICINE

## 2020-08-07 PROCEDURE — 6360000002 HC RX W HCPCS

## 2020-08-07 PROCEDURE — 86850 RBC ANTIBODY SCREEN: CPT

## 2020-08-07 PROCEDURE — 86901 BLOOD TYPING SEROLOGIC RH(D): CPT

## 2020-08-07 PROCEDURE — 2709999900 HC NON-CHARGEABLE SUPPLY

## 2020-08-07 PROCEDURE — C1769 GUIDE WIRE: HCPCS

## 2020-08-07 PROCEDURE — 86900 BLOOD TYPING SEROLOGIC ABO: CPT

## 2020-08-07 PROCEDURE — 80048 BASIC METABOLIC PNL TOTAL CA: CPT

## 2020-08-07 PROCEDURE — 36415 COLL VENOUS BLD VENIPUNCTURE: CPT

## 2020-08-07 PROCEDURE — 85025 COMPLETE CBC W/AUTO DIFF WBC: CPT

## 2020-08-07 PROCEDURE — 2500000003 HC RX 250 WO HCPCS

## 2020-08-07 PROCEDURE — 2500000003 HC RX 250 WO HCPCS: Performed by: INTERNAL MEDICINE

## 2020-08-07 PROCEDURE — 85610 PROTHROMBIN TIME: CPT

## 2020-08-07 RX ORDER — LABETALOL HYDROCHLORIDE 5 MG/ML
5 INJECTION, SOLUTION INTRAVENOUS ONCE
Status: COMPLETED | OUTPATIENT
Start: 2020-08-07 | End: 2020-08-07

## 2020-08-07 RX ORDER — BETHANECHOL CHLORIDE 25 MG/1
25 TABLET ORAL 3 TIMES DAILY
COMMUNITY
End: 2021-05-12 | Stop reason: ALTCHOICE

## 2020-08-07 RX ORDER — MAGNESIUM GLUCONATE 27 MG(500)
500 TABLET ORAL DAILY
COMMUNITY
End: 2022-08-09

## 2020-08-07 RX ORDER — LISINOPRIL AND HYDROCHLOROTHIAZIDE 20; 12.5 MG/1; MG/1
1 TABLET ORAL DAILY
Status: ON HOLD | COMMUNITY
End: 2022-08-09 | Stop reason: HOSPADM

## 2020-08-07 RX ADMIN — LABETALOL HYDROCHLORIDE 5 MG: 5 INJECTION INTRAVENOUS at 17:20

## 2020-08-07 RX ADMIN — LABETALOL HYDROCHLORIDE 5 MG: 5 INJECTION INTRAVENOUS at 16:25

## 2020-08-07 NOTE — H&P
History and Physical      CHIEF COMPLAINT:  Here for cardiac catheterization / PCI    History of Present Illness:  Misty Holstein is a 80y.o. year-old male presents for cardiac catheterization / PCI following a history of recurrent chest discomfort increasing in frequency over past 2 weeks despite negative stress test approximately 1 moth ago        Past Medical History:   Diagnosis Date    [address] mellitus ([facility] [address]     H pylori ulcer 8/22/2012    Hyperlipidemia     Hypertension     PONV (postoperative nausea and vomiting)     PUD (peptic ulcer disease) 7/26/2012    Urinary frequency          Past Surgical History:   Procedure Laterality Date    APPENDECTOMY      CATARACT REMOVAL WITH IMPLANT Left 12/17/15    COLONOSCOPY  7/206/2012    CYSTOSCOPY      multiple    CYSTOSCOPY N/A 6/2/2020    CYSTOSCOPY RETROGRADE PYELOGRAM BOTOX  INJECTION (100 UNITS) performed by Bridgett Closs, MD at [facility]  7/26/2012    peptic ulcer disease    EYE SURGERY Bilateral     cataracts    HERNIA REPAIR  more 10 yrs    left     WI COLONOSCOPY FLX DX W/COLLJ SPEC WHEN PFRMD  7/26/2012         WI EGD TRANSORAL BIOPSY SINGLE/MULTIPLE  7/26/2012            Medications Prior to Admission:    Prior to Admission medications    Medication Sig Start Date End Date Taking? Authorizing Provider   ibuprofen (ADVIL;MOTRIN) 600 MG tablet Take 1 tablet by mouth every 8 hours as needed for Pain 6/2/20   Bridgett Closs, MD   amLODIPine (NORVASC) 5 MG tablet Take 5 mg by mouth daily    Historical Provider, MD   torsemide (DEMADEX) 10 MG tablet Take 10 mg by mouth daily    Historical Provider, MD   METFORMIN HCL ER PO Take 1,000 mg by mouth 2 times daily    Historical Provider, MD   gabapentin (NEURONTIN) 100 MG capsule Take 100 mg by mouth 2 times daily.     Historical Provider, MD   aspirin 81 MG EC tablet Take 81 mg by mouth daily    Historical Provider, MD   SITagliptin (JANUVIA) 100 MG tablet Take 100 mg by mouth daily    Historical Provider, MD   Cyanocobalamin (VITAMIN B12 PO) Take by mouth daily    Historical Provider, MD   zinc 50 MG CAPS Take by mouth daily    Historical Provider, MD   Multiple Vitamins-Minerals (THERAPEUTIC MULTIVITAMIN-MINERALS) tablet Take 1 tablet by mouth daily    Historical Provider, MD   Cholecalciferol (VITAMIN D3 PO) Take by mouth daily    Historical Provider, MD   Omega-3 Fatty Acids (FISH OIL PO) Take by mouth daily    Historical Provider, MD   Bitter Melon 10 % POWD by Does not apply route daily    Historical Provider, MD   labetalol (NORMODYNE) 200 MG tablet Take 1 tablet by mouth every 12 hours for 90 days. 1/27/15 6/2/20  Nick Carroll DO   tamsulosin (FLOMAX) 0.4 MG capsule Take 0.4 mg by mouth 2 times daily     Historical Provider, MD   glimepiride (AMARYL) 4 MG tablet Take 4 mg by mouth every morning (before breakfast). Historical Provider, MD       Allergies:    Patient has no known allergies. Social History:    reports that he quit smoking about 10 years ago. He has never used smokeless tobacco. He reports current alcohol use. He reports that he does not use drugs. Family History:   family history is not on file. REVIEW OF SYSTEMS:  As above in the HPI, otherwise negative    PHYSICAL EXAM:    Vitals: There were no vitals taken for this visit. General:  Awake, alert, oriented X 3. Well developed, well nourished, well groomed. No apparent distress. HEENT:  Normocephalic, atraumatic. Pupils equal, round, reactive to light. No scleral icterus. No conjunctival injection. Normal lips, teeth, and gums. No nasal discharge. Neck:  Supple No palpable cervical or supraclavicular nodes. Carotids brisk in upstroke , without carotid bruits. No abnormal JVP at 45°. Thyroid not palpable. Chest: Even excursion. Heart:  Regular rate Regular rhythm, S1> S2 no murmurs, gallops, or rubs. PMI 5th intercostal space midclavicular line.   Lungs: CTA bilaterally, bilat symmetrical expansion, no wheeze, rales, or rhonchi. No decreased tactile fremitus. Abdomen: Bowel sounds present, soft, nontender, no masses, no organomegaly, no peritoneal signs  Extremities:  No clubbing, cyanosis, No edema PT present 1+  , DP present 1+   R  present 2+ bilaterally. Skin: Warm and dry, no open lesions or rash  Neuro:  Cranial nerves 2-12 intact, no focal sensory or motor deficits  Breast: deferred  Rectal: deferred  Genitalia:  deferred    LABS:  CBC:   Lab Results   Component Value Date    WBC 7.3 05/29/2020    RBC 3.97 05/29/2020    HGB 11.5 05/29/2020    HCT 33.1 05/29/2020    MCV 83.4 05/29/2020    MCH 29.0 05/29/2020    MCHC 34.7 05/29/2020    RDW 13.0 05/29/2020     05/29/2020    MPV 9.0 05/29/2020     BMP:    Lab Results   Component Value Date     05/29/2020    K 4.0 05/29/2020    CL 97 05/29/2020    CO2 24 05/29/2020    BUN 28 05/29/2020    CREATININE 1.8 05/29/2020    CALCIUM 9.3 05/29/2020    GFRAA 43 05/29/2020    LABGLOM 36 05/29/2020    GLUCOSE 195 05/29/2020     PT/INR:    Lab Results   Component Value Date    PROTIME 12.7 02/05/2013    INR 1.2 02/05/2013         ASSESSMENT:      Patient Active Problem List   Diagnosis    PUD (peptic ulcer disease)    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 I have answered all questions posed to me by the patient. SCAI-AUC  Indication 3; Score 8.     Raquel Engel, DO FACP,FACC,FSCAI  8/7/2020

## 2020-08-10 NOTE — PROCEDURES
[address]                  [address] [facility]  [facility]                            CARDIAC CATHETERIZATION    PATIENT NAME: Ferny Lyle             :        10/27/1933  MED REC NO:   [id number]                            ROOM:  ACCOUNT NO:   [de-identified]                           ADMIT DATE: 2020  PROVIDER:     Ashley Fan DO    DATE OF PROCEDURE:  2020    SCAI INDICATION:  3, score of 8. PROCEDURE:  Antony left heart catheterization. PRESSURES:  /75, mean 107; /18. SUMMARY OF INJECTIONS:  II.  Selective coronary arteriogram.  a. Right coronary artery - preponderant. A large dominant vessel with  mild luminal wall irregularities noted. The distal vessel was large and  widely patent. b.  Left coronary artery. 1.  Left main trunk - normal.  2.  Left anterior descending, 10% to 15% narrowing noted immediately  beyond the bifurcation of the first large diagonal branch. The distal  vessel was a moderate luminal caliber and widely patent. 3.  Circumflex, nondominant. A slender small caliber vessel without  areas of critical stenosis or spontaneous coronary artery spasm. III. Left ventricular angiogram:  Left ventricular cavity size is upper  limits of normal with normal global systolic wall motion. Estimated  left ventricular ejection fraction 60% - 65%. Note is made of mild  mitral regurgitation. CONCLUSIONS:  1. Noncritical coronary atherosclerosis. 2.  Normal left ventricular systolic function with mildly abnormal  diastolic dysfunction. 3.  Mild mitral regurgitation. CONSCIOUS SEDATION:  Initiation 1518, completion 1532. FLUOROSCOPY TIME:  4.1 minutes. CONTRAST UTILIZED:  60 mL.     EBL: <10 cc    KARINA REEDER DO [facility]    D: 2020 19:49:16       T: 2020 20:21:53     [email or web address]  Job#: [id number]     Doc#: [id number]    CC:

## 2020-08-21 ENCOUNTER — HOSPITAL ENCOUNTER (OUTPATIENT)
Age: 85
Discharge: HOME | End: 2020-08-23
Payer: MEDICARE

## 2020-08-21 ENCOUNTER — HOSPITAL ENCOUNTER (OUTPATIENT)
Dept: GENERAL RADIOLOGY | Age: 85
Discharge: HOME | End: 2020-08-23
Payer: MEDICARE

## 2020-08-21 PROCEDURE — 71046 X-RAY EXAM CHEST 2 VIEWS: CPT

## 2020-09-15 ENCOUNTER — HOSPITAL ENCOUNTER (OUTPATIENT)
Age: 85
Discharge: HOME | End: 2020-09-15
Payer: MEDICARE

## 2020-09-15 LAB — PROSTATE SPECIFIC ANTIGEN: 2.99 NG/ML (ref 0–4)

## 2020-09-15 PROCEDURE — 36415 COLL VENOUS BLD VENIPUNCTURE: CPT

## 2020-09-15 PROCEDURE — G0103 PSA SCREENING: HCPCS

## 2020-09-28 ENCOUNTER — HOSPITAL ENCOUNTER (OUTPATIENT)
Age: 85
Discharge: HOME | End: 2020-09-30
Payer: MEDICARE

## 2020-09-28 PROCEDURE — 88112 CYTOPATH CELL ENHANCE TECH: CPT

## 2020-10-19 ENCOUNTER — HOSPITAL ENCOUNTER (OUTPATIENT)
Age: 85
Discharge: HOME | End: 2020-10-21

## 2020-10-19 PROCEDURE — 88342 IMHCHEM/IMCYTCHM 1ST ANTB: CPT

## 2020-10-19 PROCEDURE — 88305 TISSUE EXAM BY PATHOLOGIST: CPT

## 2021-01-21 ENCOUNTER — IMMUNIZATION (OUTPATIENT)
Dept: PRIMARY CARE CLINIC | Age: 86
End: 2021-01-21
Payer: MEDICARE

## 2021-01-21 PROCEDURE — 0011A COVID-19, MODERNA VACCINE 100MCG/0.5ML DOSE: CPT | Performed by: NURSE PRACTITIONER

## 2021-01-21 PROCEDURE — 91301 COVID-19, MODERNA VACCINE 100MCG/0.5ML DOSE: CPT | Performed by: NURSE PRACTITIONER

## 2021-02-18 ENCOUNTER — IMMUNIZATION (OUTPATIENT)
Dept: PRIMARY CARE CLINIC | Age: 86
End: 2021-02-18
Payer: MEDICARE

## 2021-02-18 PROCEDURE — 0012A COVID-19, MODERNA VACCINE 100MCG/0.5ML DOSE: CPT | Performed by: NURSE PRACTITIONER

## 2021-02-18 PROCEDURE — 91301 COVID-19, MODERNA VACCINE 100MCG/0.5ML DOSE: CPT | Performed by: NURSE PRACTITIONER

## 2021-04-19 ENCOUNTER — TELEPHONE (OUTPATIENT)
Dept: CARDIAC REHAB | Age: 86
End: 2021-04-19

## 2021-04-19 NOTE — TELEPHONE ENCOUNTER
Left voicemail with patient's daughter in regards to scheduling his Initial Pulmonary Rehab Phase III consult. Left department contact number ([phone]) for her to reach back at her earliest convenience.

## 2021-04-21 ENCOUNTER — HOSPITAL ENCOUNTER (OUTPATIENT)
Dept: CARDIAC REHAB | Age: 86
Discharge: HOME | End: 2021-04-21

## 2021-04-21 ASSESSMENT — PATIENT HEALTH QUESTIONNAIRE - PHQ9: SUM OF ALL RESPONSES TO PHQ QUESTIONS 1-9: 3

## 2021-04-27 VITALS
BODY MASS INDEX: 27.4 KG/M2 | HEART RATE: 68 BPM | DIASTOLIC BLOOD PRESSURE: 68 MMHG | HEIGHT: 69 IN | SYSTOLIC BLOOD PRESSURE: 142 MMHG | RESPIRATION RATE: 22 BRPM | OXYGEN SATURATION: 97 % | WEIGHT: 185 LBS

## 2021-05-24 ENCOUNTER — HOSPITAL ENCOUNTER (OUTPATIENT)
Age: 86
Discharge: HOME | End: 2021-05-24

## 2021-05-24 PROCEDURE — 9900000038 HC CARDIAC REHAB PHASE 3 - MONTHLY

## 2021-05-26 ENCOUNTER — TELEPHONE (OUTPATIENT)
Dept: CARDIAC REHAB | Age: 86
End: 2021-05-26

## 2021-05-26 NOTE — TELEPHONE ENCOUNTER
5/26/2021 1602 Nutrition: Spoke with patient on this date by phone regarding appointment for 5/27/2021 at 10:00 am. Will remain available.  Electronically signed by Ethan Mario RD, LD on 5/26/21 at 4:03 PM EDT

## 2021-05-27 ENCOUNTER — HOSPITAL ENCOUNTER (OUTPATIENT)
Dept: CARDIAC REHAB | Age: 86
Discharge: HOME | End: 2021-05-27

## 2021-05-27 VITALS — WEIGHT: 189 LBS | HEIGHT: 69 IN | BODY MASS INDEX: 27.99 KG/M2

## 2021-05-27 NOTE — PROGRESS NOTES
grey tea ( Alternate:frozen  pancakes with lite syrup with blackberries )  AM snack: snack  Lunch: 12:30 - 1:00 pm, take out, few roast beef sandwiches from [employer] and 1 regular beer   PM snack: time? At home, 1 fruit serving  Dinner: time?, at home,  1 cup homemade beef or chicken soup * , beverage, has nightly  Evening snack: none    Likes fish- eats very little meat, likes  chicken , fish, fruits, vegetables, grain, eats eggs    Frequency of Eating at [employer] during week: once a month    Frequency of Eating at [place] during week: special occasion- likes [employer] and [employer]     Frequency and Type of Caffeine Consumption on a daily basis: 1 - 2 cups regular coffee     Water consumption on a daily basis: 5 to 7 glasses( approximately 40 to 56 ounces)    Frequency of Pop/Soda consumed:none    Ht Readings from Last 1 Encounters:   05/27/21 5' 9\" (1.753 m)     Wt Readings from Last 3 Encounters:   05/27/21 189 lb (85.7 kg)   05/12/21 190 lb (86.2 kg)   04/27/21 185 lb (83.9 kg)     Body mass index is 27.91 kg/m². Waist circumference:deferred  Body Fat %:deferred    Diagnosis:  Overweight related to cardiac dysfunction and insufficient PO intake as evident by patient comments and BMI 27.91. Intervention:    RD 1:1 initial assessment and education. Meet estimated needs. Lose weight. Adequate hydration. Increased knowledge. Improved compliance. Follow individualized diet plan. Education provided:Medical Nutrition Therapy Heart Healthy Consistent Carbohydrate information, My Plate, 3 day food log, making healthy food choices, serving size, dietary resolutions ( rate your plate not available)     Interdisciplinary Treatment Plan (ITP) note: Patient is a phase III participant and ITP not applicable. Monitoring/Evaluation:  RD will monitor PO intake and weight data as available.      The goals set by the patient are to try to lose 1 to 2 pounds every 2 weeks to achieve usual body weight, have protein serving at breakfast, eat breakfast, lunch, dinner and evening snack, eat more balanced meals and complete 3 day food log. The patient will be followed on June 24, 2021      Debby Cedeno M.A.,R.TRINA., L.D. Contact information: [phone].

## 2021-06-04 ENCOUNTER — HOSPITAL ENCOUNTER (OUTPATIENT)
Age: 86
Discharge: HOME | End: 2021-06-04

## 2021-06-04 PROCEDURE — 9900000038 HC CARDIAC REHAB PHASE 3 - MONTHLY

## 2021-06-23 ENCOUNTER — TELEPHONE (OUTPATIENT)
Dept: CARDIAC REHAB | Age: 86
End: 2021-06-23

## 2021-06-23 NOTE — TELEPHONE ENCOUNTER
6/23/2021 12:40 pm Nutrition: Spoke with patient on this date by phone regarding appointment for 6/24/2021 at 10:00 am. Will remain available.  Electronically signed by Monserrat Gaines RD, LD on 6/23/21 at 12:41 PM EDT

## 2021-09-13 ENCOUNTER — HOSPITAL ENCOUNTER (OUTPATIENT)
Age: 86
Discharge: HOME | End: 2021-09-13
Payer: MEDICARE

## 2021-09-13 LAB — PROSTATE SPECIFIC ANTIGEN: 2.76 NG/ML (ref 0–4)

## 2021-09-13 PROCEDURE — 36415 COLL VENOUS BLD VENIPUNCTURE: CPT

## 2021-09-13 PROCEDURE — 84153 ASSAY OF PSA TOTAL: CPT

## 2022-01-01 ENCOUNTER — APPOINTMENT (OUTPATIENT)
Dept: NEUROLOGY | Age: 87
DRG: 242 | End: 2022-01-01
Attending: INTERNAL MEDICINE
Payer: MEDICARE

## 2022-01-01 ENCOUNTER — APPOINTMENT (OUTPATIENT)
Dept: CT IMAGING | Age: 87
DRG: 242 | End: 2022-01-01
Attending: INTERNAL MEDICINE
Payer: MEDICARE

## 2022-01-01 ENCOUNTER — APPOINTMENT (OUTPATIENT)
Dept: GENERAL RADIOLOGY | Age: 87
DRG: 242 | End: 2022-01-01
Attending: INTERNAL MEDICINE
Payer: MEDICARE

## 2022-01-01 ENCOUNTER — HOSPITAL ENCOUNTER (INPATIENT)
Age: 87
LOS: 18 days | Discharge: HOSPICE | DRG: 242 | End: 2022-12-12
Attending: INTERNAL MEDICINE | Admitting: FAMILY MEDICINE
Payer: MEDICARE

## 2022-01-01 ENCOUNTER — APPOINTMENT (OUTPATIENT)
Dept: CARDIAC CATH/INVASIVE PROCEDURES | Age: 87
DRG: 242 | End: 2022-01-01
Attending: INTERNAL MEDICINE
Payer: MEDICARE

## 2022-01-01 VITALS
WEIGHT: 186.29 LBS | RESPIRATION RATE: 18 BRPM | HEART RATE: 89 BPM | BODY MASS INDEX: 27.59 KG/M2 | SYSTOLIC BLOOD PRESSURE: 125 MMHG | DIASTOLIC BLOOD PRESSURE: 74 MMHG | HEIGHT: 69 IN | OXYGEN SATURATION: 81 % | TEMPERATURE: 99.7 F

## 2022-01-01 LAB
ADENOVIRUS BY PCR: NOT DETECTED
ADENOVIRUS BY PCR: NOT DETECTED
ALBUMIN SERPL-MCNC: 3 G/DL (ref 3.5–5.2)
ALBUMIN SERPL-MCNC: 3.1 G/DL (ref 3.5–5.2)
ALBUMIN SERPL-MCNC: 3.1 G/DL (ref 3.5–5.2)
ALBUMIN SERPL-MCNC: 3.2 G/DL (ref 3.5–5.2)
ALBUMIN SERPL-MCNC: 3.2 G/DL (ref 3.5–5.2)
ALP BLD-CCNC: 108 U/L (ref 40–129)
ALP BLD-CCNC: 109 U/L (ref 40–129)
ALP BLD-CCNC: 115 U/L (ref 40–129)
ALP BLD-CCNC: 119 U/L (ref 40–129)
ALP BLD-CCNC: 91 U/L (ref 40–129)
ALP BLD-CCNC: 91 U/L (ref 40–129)
ALP BLD-CCNC: 97 U/L (ref 40–129)
ALP BLD-CCNC: 99 U/L (ref 40–129)
ALT SERPL-CCNC: 13 U/L (ref 0–40)
ALT SERPL-CCNC: 5 U/L (ref 0–40)
ALT SERPL-CCNC: 5 U/L (ref 0–40)
ALT SERPL-CCNC: 6 U/L (ref 0–40)
ALT SERPL-CCNC: <5 U/L (ref 0–40)
AMMONIA: 24 UMOL/L (ref 16–60)
AMMONIA: <10 UMOL/L (ref 16–60)
ANION GAP SERPL CALCULATED.3IONS-SCNC: 11 MMOL/L (ref 7–16)
ANION GAP SERPL CALCULATED.3IONS-SCNC: 11 MMOL/L (ref 7–16)
ANION GAP SERPL CALCULATED.3IONS-SCNC: 12 MMOL/L (ref 7–16)
ANION GAP SERPL CALCULATED.3IONS-SCNC: 13 MMOL/L (ref 7–16)
ANION GAP SERPL CALCULATED.3IONS-SCNC: 14 MMOL/L (ref 7–16)
ANION GAP SERPL CALCULATED.3IONS-SCNC: 15 MMOL/L (ref 7–16)
ANION GAP SERPL CALCULATED.3IONS-SCNC: 15 MMOL/L (ref 7–16)
ANION GAP SERPL CALCULATED.3IONS-SCNC: 16 MMOL/L (ref 7–16)
ANION GAP SERPL CALCULATED.3IONS-SCNC: 18 MMOL/L (ref 7–16)
ANISOCYTOSIS: ABNORMAL
APTT: 24.6 SEC (ref 24.5–35.1)
APTT: 25.4 SEC (ref 24.5–35.1)
APTT: 26.3 SEC (ref 24.5–35.1)
APTT: 28 SEC (ref 24.5–35.1)
APTT: 29.5 SEC (ref 24.5–35.1)
APTT: 29.6 SEC (ref 24.5–35.1)
APTT: 31.4 SEC (ref 24.5–35.1)
AST SERPL-CCNC: 14 U/L (ref 0–39)
AST SERPL-CCNC: 15 U/L (ref 0–39)
AST SERPL-CCNC: 16 U/L (ref 0–39)
AST SERPL-CCNC: 16 U/L (ref 0–39)
AST SERPL-CCNC: 21 U/L (ref 0–39)
AST SERPL-CCNC: 23 U/L (ref 0–39)
AST SERPL-CCNC: 25 U/L (ref 0–39)
AST SERPL-CCNC: 32 U/L (ref 0–39)
B.E.: -1.7 MMOL/L (ref -3–3)
BACTERIA: ABNORMAL /HPF
BASOPHILS ABSOLUTE: 0.02 E9/L (ref 0–0.2)
BASOPHILS ABSOLUTE: 0.03 E9/L (ref 0–0.2)
BASOPHILS ABSOLUTE: 0.04 E9/L (ref 0–0.2)
BASOPHILS ABSOLUTE: 0.05 E9/L (ref 0–0.2)
BASOPHILS ABSOLUTE: 0.07 E9/L (ref 0–0.2)
BASOPHILS ABSOLUTE: 0.07 E9/L (ref 0–0.2)
BASOPHILS ABSOLUTE: 0.08 E9/L (ref 0–0.2)
BASOPHILS ABSOLUTE: 0.09 E9/L (ref 0–0.2)
BASOPHILS ABSOLUTE: 0.11 E9/L (ref 0–0.2)
BASOPHILS ABSOLUTE: 0.24 E9/L (ref 0–0.2)
BASOPHILS RELATIVE PERCENT: 0.2 % (ref 0–2)
BASOPHILS RELATIVE PERCENT: 0.3 % (ref 0–2)
BASOPHILS RELATIVE PERCENT: 0.3 % (ref 0–2)
BASOPHILS RELATIVE PERCENT: 0.4 % (ref 0–2)
BASOPHILS RELATIVE PERCENT: 0.5 % (ref 0–2)
BASOPHILS RELATIVE PERCENT: 0.6 % (ref 0–2)
BASOPHILS RELATIVE PERCENT: 0.6 % (ref 0–2)
BASOPHILS RELATIVE PERCENT: 0.7 % (ref 0–2)
BASOPHILS RELATIVE PERCENT: 1.8 % (ref 0–2)
BILIRUB SERPL-MCNC: 0.2 MG/DL (ref 0–1.2)
BILIRUB SERPL-MCNC: 0.3 MG/DL (ref 0–1.2)
BILIRUB SERPL-MCNC: 0.3 MG/DL (ref 0–1.2)
BILIRUB SERPL-MCNC: 0.4 MG/DL (ref 0–1.2)
BILIRUB SERPL-MCNC: <0.2 MG/DL (ref 0–1.2)
BILIRUB SERPL-MCNC: <0.2 MG/DL (ref 0–1.2)
BILIRUBIN URINE: NEGATIVE
BLOOD CULTURE, ROUTINE: NORMAL
BLOOD CULTURE, ROUTINE: NORMAL
BLOOD, URINE: ABNORMAL
BORDETELLA PARAPERTUSSIS BY PCR: NOT DETECTED
BORDETELLA PARAPERTUSSIS BY PCR: NOT DETECTED
BORDETELLA PERTUSSIS BY PCR: NOT DETECTED
BORDETELLA PERTUSSIS BY PCR: NOT DETECTED
BUN BLDV-MCNC: 26 MG/DL (ref 6–23)
BUN BLDV-MCNC: 29 MG/DL (ref 6–23)
BUN BLDV-MCNC: 30 MG/DL (ref 6–23)
BUN BLDV-MCNC: 31 MG/DL (ref 6–23)
BUN BLDV-MCNC: 33 MG/DL (ref 6–23)
BUN BLDV-MCNC: 33 MG/DL (ref 6–23)
BUN BLDV-MCNC: 34 MG/DL (ref 6–23)
BUN BLDV-MCNC: 43 MG/DL (ref 6–23)
BUN BLDV-MCNC: 44 MG/DL (ref 6–23)
BUN BLDV-MCNC: 45 MG/DL (ref 6–23)
BUN BLDV-MCNC: 46 MG/DL (ref 6–23)
BUN BLDV-MCNC: 48 MG/DL (ref 6–23)
BUN BLDV-MCNC: 50 MG/DL (ref 6–23)
BUN BLDV-MCNC: 52 MG/DL (ref 6–23)
BUN BLDV-MCNC: 52 MG/DL (ref 6–23)
BUN BLDV-MCNC: 54 MG/DL (ref 6–23)
BUN BLDV-MCNC: 58 MG/DL (ref 6–23)
BUN BLDV-MCNC: 59 MG/DL (ref 6–23)
BUN BLDV-MCNC: 60 MG/DL (ref 6–23)
BUN BLDV-MCNC: 61 MG/DL (ref 6–23)
BUN BLDV-MCNC: 66 MG/DL (ref 6–23)
C-REACTIVE PROTEIN: 0.9 MG/DL (ref 0–0.4)
CALCIUM IONIZED: 1.25 MMOL/L (ref 1.15–1.33)
CALCIUM IONIZED: 1.28 MMOL/L (ref 1.15–1.33)
CALCIUM IONIZED: 1.29 MMOL/L (ref 1.15–1.33)
CALCIUM IONIZED: 1.29 MMOL/L (ref 1.15–1.33)
CALCIUM IONIZED: 1.33 MMOL/L (ref 1.15–1.33)
CALCIUM IONIZED: 1.34 MMOL/L (ref 1.15–1.33)
CALCIUM IONIZED: 1.38 MMOL/L (ref 1.15–1.33)
CALCIUM IONIZED: 1.39 MMOL/L (ref 1.15–1.33)
CALCIUM SERPL-MCNC: 10 MG/DL (ref 8.6–10.2)
CALCIUM SERPL-MCNC: 10.1 MG/DL (ref 8.6–10.2)
CALCIUM SERPL-MCNC: 8.9 MG/DL (ref 8.6–10.2)
CALCIUM SERPL-MCNC: 8.9 MG/DL (ref 8.6–10.2)
CALCIUM SERPL-MCNC: 9 MG/DL (ref 8.6–10.2)
CALCIUM SERPL-MCNC: 9 MG/DL (ref 8.6–10.2)
CALCIUM SERPL-MCNC: 9.1 MG/DL (ref 8.6–10.2)
CALCIUM SERPL-MCNC: 9.2 MG/DL (ref 8.6–10.2)
CALCIUM SERPL-MCNC: 9.2 MG/DL (ref 8.6–10.2)
CALCIUM SERPL-MCNC: 9.3 MG/DL (ref 8.6–10.2)
CALCIUM SERPL-MCNC: 9.5 MG/DL (ref 8.6–10.2)
CALCIUM SERPL-MCNC: 9.5 MG/DL (ref 8.6–10.2)
CALCIUM SERPL-MCNC: 9.6 MG/DL (ref 8.6–10.2)
CALCIUM SERPL-MCNC: 9.8 MG/DL (ref 8.6–10.2)
CHLAMYDOPHILIA PNEUMONIAE BY PCR: NOT DETECTED
CHLAMYDOPHILIA PNEUMONIAE BY PCR: NOT DETECTED
CHLORIDE BLD-SCNC: 100 MMOL/L (ref 98–107)
CHLORIDE BLD-SCNC: 100 MMOL/L (ref 98–107)
CHLORIDE BLD-SCNC: 101 MMOL/L (ref 98–107)
CHLORIDE BLD-SCNC: 101 MMOL/L (ref 98–107)
CHLORIDE BLD-SCNC: 103 MMOL/L (ref 98–107)
CHLORIDE BLD-SCNC: 104 MMOL/L (ref 98–107)
CHLORIDE BLD-SCNC: 105 MMOL/L (ref 98–107)
CHLORIDE BLD-SCNC: 105 MMOL/L (ref 98–107)
CHLORIDE BLD-SCNC: 106 MMOL/L (ref 98–107)
CHLORIDE BLD-SCNC: 108 MMOL/L (ref 98–107)
CHLORIDE BLD-SCNC: 110 MMOL/L (ref 98–107)
CHLORIDE BLD-SCNC: 112 MMOL/L (ref 98–107)
CHLORIDE BLD-SCNC: 113 MMOL/L (ref 98–107)
CHLORIDE BLD-SCNC: 91 MMOL/L (ref 98–107)
CHLORIDE BLD-SCNC: 97 MMOL/L (ref 98–107)
CHLORIDE BLD-SCNC: 98 MMOL/L (ref 98–107)
CHLORIDE BLD-SCNC: 99 MMOL/L (ref 98–107)
CHLORIDE URINE RANDOM: 76 MMOL/L
CLARITY: ABNORMAL
CLARITY: CLEAR
CLARITY: CLEAR
CO2: 17 MMOL/L (ref 22–29)
CO2: 19 MMOL/L (ref 22–29)
CO2: 20 MMOL/L (ref 22–29)
CO2: 21 MMOL/L (ref 22–29)
CO2: 21 MMOL/L (ref 22–29)
CO2: 22 MMOL/L (ref 22–29)
CO2: 23 MMOL/L (ref 22–29)
CO2: 25 MMOL/L (ref 22–29)
CO2: 25 MMOL/L (ref 22–29)
COHB: 0.5 % (ref 0–1.5)
COLOR: YELLOW
CORONAVIRUS 229E BY PCR: NOT DETECTED
CORONAVIRUS 229E BY PCR: NOT DETECTED
CORONAVIRUS HKU1 BY PCR: NOT DETECTED
CORONAVIRUS HKU1 BY PCR: NOT DETECTED
CORONAVIRUS NL63 BY PCR: NOT DETECTED
CORONAVIRUS NL63 BY PCR: NOT DETECTED
CORONAVIRUS OC43 BY PCR: NOT DETECTED
CORONAVIRUS OC43 BY PCR: NOT DETECTED
CREAT SERPL-MCNC: 1.7 MG/DL (ref 0.7–1.2)
CREAT SERPL-MCNC: 1.8 MG/DL (ref 0.7–1.2)
CREAT SERPL-MCNC: 1.8 MG/DL (ref 0.7–1.2)
CREAT SERPL-MCNC: 1.9 MG/DL (ref 0.7–1.2)
CREAT SERPL-MCNC: 1.9 MG/DL (ref 0.7–1.2)
CREAT SERPL-MCNC: 2 MG/DL (ref 0.7–1.2)
CREAT SERPL-MCNC: 2.1 MG/DL (ref 0.7–1.2)
CREAT SERPL-MCNC: 2.2 MG/DL (ref 0.7–1.2)
CREAT SERPL-MCNC: 2.2 MG/DL (ref 0.7–1.2)
CREAT SERPL-MCNC: 2.3 MG/DL (ref 0.7–1.2)
CREAT SERPL-MCNC: 2.3 MG/DL (ref 0.7–1.2)
CREATININE URINE: 62 MG/DL (ref 40–278)
CRITICAL: ABNORMAL
CULTURE, BLOOD 2: NORMAL
CULTURE, BLOOD 2: NORMAL
DATE ANALYZED: ABNORMAL
DATE OF COLLECTION: ABNORMAL
EKG ATRIAL RATE: 100 BPM
EKG ATRIAL RATE: 110 BPM
EKG ATRIAL RATE: 202 BPM
EKG ATRIAL RATE: 38 BPM
EKG ATRIAL RATE: 70 BPM
EKG ATRIAL RATE: 83 BPM
EKG Q-T INTERVAL: 428 MS
EKG Q-T INTERVAL: 430 MS
EKG Q-T INTERVAL: 444 MS
EKG Q-T INTERVAL: 450 MS
EKG Q-T INTERVAL: 472 MS
EKG Q-T INTERVAL: 480 MS
EKG QRS DURATION: 124 MS
EKG QRS DURATION: 132 MS
EKG QRS DURATION: 134 MS
EKG QRS DURATION: 148 MS
EKG QRS DURATION: 150 MS
EKG QRS DURATION: 160 MS
EKG QTC CALCULATION (BAZETT): 450 MS
EKG QTC CALCULATION (BAZETT): 479 MS
EKG QTC CALCULATION (BAZETT): 493 MS
EKG QTC CALCULATION (BAZETT): 509 MS
EKG QTC CALCULATION (BAZETT): 514 MS
EKG QTC CALCULATION (BAZETT): 518 MS
EKG R AXIS: -59 DEGREES
EKG R AXIS: -67 DEGREES
EKG R AXIS: -72 DEGREES
EKG R AXIS: -74 DEGREES
EKG R AXIS: -75 DEGREES
EKG R AXIS: -81 DEGREES
EKG T AXIS: 131 DEGREES
EKG T AXIS: 135 DEGREES
EKG T AXIS: 57 DEGREES
EKG T AXIS: 66 DEGREES
EKG T AXIS: 71 DEGREES
EKG T AXIS: 75 DEGREES
EKG VENTRICULAR RATE: 60 BPM
EKG VENTRICULAR RATE: 70 BPM
EKG VENTRICULAR RATE: 80 BPM
EKG VENTRICULAR RATE: 86 BPM
EOSINOPHILS ABSOLUTE: 0 E9/L (ref 0.05–0.5)
EOSINOPHILS ABSOLUTE: 0.01 E9/L (ref 0.05–0.5)
EOSINOPHILS ABSOLUTE: 0.02 E9/L (ref 0.05–0.5)
EOSINOPHILS ABSOLUTE: 0.03 E9/L (ref 0.05–0.5)
EOSINOPHILS ABSOLUTE: 0.04 E9/L (ref 0.05–0.5)
EOSINOPHILS ABSOLUTE: 0.12 E9/L (ref 0.05–0.5)
EOSINOPHILS RELATIVE PERCENT: 0 % (ref 0–6)
EOSINOPHILS RELATIVE PERCENT: 0.1 % (ref 0–6)
EOSINOPHILS RELATIVE PERCENT: 0.2 % (ref 0–6)
EOSINOPHILS RELATIVE PERCENT: 0.3 % (ref 0–6)
EOSINOPHILS RELATIVE PERCENT: 0.4 % (ref 0–6)
EOSINOPHILS RELATIVE PERCENT: 0.9 % (ref 0–6)
GFR SERPL CREATININE-BSD FRML MDRD: 26 ML/MIN/1.73
GFR SERPL CREATININE-BSD FRML MDRD: 26 ML/MIN/1.73
GFR SERPL CREATININE-BSD FRML MDRD: 28 ML/MIN/1.73
GFR SERPL CREATININE-BSD FRML MDRD: 28 ML/MIN/1.73
GFR SERPL CREATININE-BSD FRML MDRD: 30 ML/MIN/1.73
GFR SERPL CREATININE-BSD FRML MDRD: 31 ML/MIN/1.73
GFR SERPL CREATININE-BSD FRML MDRD: 33 ML/MIN/1.73
GFR SERPL CREATININE-BSD FRML MDRD: 33 ML/MIN/1.73
GFR SERPL CREATININE-BSD FRML MDRD: 36 ML/MIN/1.73
GFR SERPL CREATININE-BSD FRML MDRD: 36 ML/MIN/1.73
GFR SERPL CREATININE-BSD FRML MDRD: 38 ML/MIN/1.73
GLUCOSE BLD-MCNC: 108 MG/DL (ref 74–99)
GLUCOSE BLD-MCNC: 121 MG/DL (ref 74–99)
GLUCOSE BLD-MCNC: 143 MG/DL (ref 74–99)
GLUCOSE BLD-MCNC: 149 MG/DL (ref 74–99)
GLUCOSE BLD-MCNC: 150 MG/DL (ref 74–99)
GLUCOSE BLD-MCNC: 153 MG/DL (ref 74–99)
GLUCOSE BLD-MCNC: 193 MG/DL (ref 74–99)
GLUCOSE BLD-MCNC: 197 MG/DL (ref 74–99)
GLUCOSE BLD-MCNC: 206 MG/DL (ref 74–99)
GLUCOSE BLD-MCNC: 209 MG/DL (ref 74–99)
GLUCOSE BLD-MCNC: 209 MG/DL (ref 74–99)
GLUCOSE BLD-MCNC: 214 MG/DL (ref 74–99)
GLUCOSE BLD-MCNC: 227 MG/DL (ref 74–99)
GLUCOSE BLD-MCNC: 228 MG/DL (ref 74–99)
GLUCOSE BLD-MCNC: 230 MG/DL (ref 74–99)
GLUCOSE BLD-MCNC: 234 MG/DL (ref 74–99)
GLUCOSE BLD-MCNC: 244 MG/DL (ref 74–99)
GLUCOSE BLD-MCNC: 260 MG/DL (ref 74–99)
GLUCOSE BLD-MCNC: 300 MG/DL (ref 74–99)
GLUCOSE BLD-MCNC: 97 MG/DL (ref 74–99)
GLUCOSE BLD-MCNC: 97 MG/DL (ref 74–99)
GLUCOSE URINE: 500 MG/DL
GLUCOSE URINE: NEGATIVE MG/DL
GLUCOSE URINE: NEGATIVE MG/DL
HCO3: 21.1 MMOL/L (ref 22–26)
HCT VFR BLD CALC: 21.6 % (ref 37–54)
HCT VFR BLD CALC: 29.6 % (ref 37–54)
HCT VFR BLD CALC: 29.7 % (ref 37–54)
HCT VFR BLD CALC: 30.3 % (ref 37–54)
HCT VFR BLD CALC: 30.4 % (ref 37–54)
HCT VFR BLD CALC: 31 % (ref 37–54)
HCT VFR BLD CALC: 31.1 % (ref 37–54)
HCT VFR BLD CALC: 31.4 % (ref 37–54)
HCT VFR BLD CALC: 31.7 % (ref 37–54)
HCT VFR BLD CALC: 31.7 % (ref 37–54)
HCT VFR BLD CALC: 31.8 % (ref 37–54)
HCT VFR BLD CALC: 31.9 % (ref 37–54)
HCT VFR BLD CALC: 32.2 % (ref 37–54)
HCT VFR BLD CALC: 32.2 % (ref 37–54)
HCT VFR BLD CALC: 32.4 % (ref 37–54)
HCT VFR BLD CALC: 32.9 % (ref 37–54)
HCT VFR BLD CALC: 33.6 % (ref 37–54)
HCT VFR BLD CALC: 33.8 % (ref 37–54)
HCT VFR BLD CALC: 33.9 % (ref 37–54)
HCT VFR BLD CALC: 35.1 % (ref 37–54)
HCT VFR BLD CALC: 38.5 % (ref 37–54)
HEMOGLOBIN: 10.1 G/DL (ref 12.5–16.5)
HEMOGLOBIN: 10.2 G/DL (ref 12.5–16.5)
HEMOGLOBIN: 10.3 G/DL (ref 12.5–16.5)
HEMOGLOBIN: 10.5 G/DL (ref 12.5–16.5)
HEMOGLOBIN: 10.6 G/DL (ref 12.5–16.5)
HEMOGLOBIN: 10.7 G/DL (ref 12.5–16.5)
HEMOGLOBIN: 10.9 G/DL (ref 12.5–16.5)
HEMOGLOBIN: 11 G/DL (ref 12.5–16.5)
HEMOGLOBIN: 12.1 G/DL (ref 12.5–16.5)
HEMOGLOBIN: 7.1 G/DL (ref 12.5–16.5)
HEMOGLOBIN: 9.8 G/DL (ref 12.5–16.5)
HEMOGLOBIN: 9.8 G/DL (ref 12.5–16.5)
HEMOGLOBIN: 9.9 G/DL (ref 12.5–16.5)
HHB: 2.9 % (ref 0–5)
HUMAN METAPNEUMOVIRUS BY PCR: NOT DETECTED
HUMAN METAPNEUMOVIRUS BY PCR: NOT DETECTED
HUMAN RHINOVIRUS/ENTEROVIRUS BY PCR: NOT DETECTED
HUMAN RHINOVIRUS/ENTEROVIRUS BY PCR: NOT DETECTED
IMMATURE GRANULOCYTES #: 0.02 E9/L
IMMATURE GRANULOCYTES #: 0.03 E9/L
IMMATURE GRANULOCYTES #: 0.04 E9/L
IMMATURE GRANULOCYTES #: 0.05 E9/L
IMMATURE GRANULOCYTES #: 0.06 E9/L
IMMATURE GRANULOCYTES #: 0.07 E9/L
IMMATURE GRANULOCYTES #: 0.08 E9/L
IMMATURE GRANULOCYTES #: 0.08 E9/L
IMMATURE GRANULOCYTES #: 0.12 E9/L
IMMATURE GRANULOCYTES #: 0.15 E9/L
IMMATURE GRANULOCYTES #: 0.25 E9/L
IMMATURE GRANULOCYTES %: 0.2 % (ref 0–5)
IMMATURE GRANULOCYTES %: 0.3 % (ref 0–5)
IMMATURE GRANULOCYTES %: 0.3 % (ref 0–5)
IMMATURE GRANULOCYTES %: 0.4 % (ref 0–5)
IMMATURE GRANULOCYTES %: 0.5 % (ref 0–5)
IMMATURE GRANULOCYTES %: 0.6 % (ref 0–5)
IMMATURE GRANULOCYTES %: 0.7 % (ref 0–5)
IMMATURE GRANULOCYTES %: 0.7 % (ref 0–5)
IMMATURE GRANULOCYTES %: 0.8 % (ref 0–5)
IMMATURE GRANULOCYTES %: 0.8 % (ref 0–5)
IMMATURE GRANULOCYTES %: 1.3 % (ref 0–5)
INFLUENZA A BY PCR: NOT DETECTED
INFLUENZA A BY PCR: NOT DETECTED
INFLUENZA B BY PCR: NOT DETECTED
INFLUENZA B BY PCR: NOT DETECTED
INR BLD: 1.2
INR BLD: 1.3
INR BLD: 1.4
KEPPRA: 33 UG/ML (ref 10–40)
KETONES, URINE: NEGATIVE MG/DL
LAB: ABNORMAL
LEUKOCYTE ESTERASE, URINE: NEGATIVE
LYME, EIA: 0.08 LIV (ref 0–1.2)
LYMPHOCYTES ABSOLUTE: 0.88 E9/L (ref 1.5–4)
LYMPHOCYTES ABSOLUTE: 0.89 E9/L (ref 1.5–4)
LYMPHOCYTES ABSOLUTE: 0.91 E9/L (ref 1.5–4)
LYMPHOCYTES ABSOLUTE: 1 E9/L (ref 1.5–4)
LYMPHOCYTES ABSOLUTE: 1.05 E9/L (ref 1.5–4)
LYMPHOCYTES ABSOLUTE: 1.08 E9/L (ref 1.5–4)
LYMPHOCYTES ABSOLUTE: 1.14 E9/L (ref 1.5–4)
LYMPHOCYTES ABSOLUTE: 1.17 E9/L (ref 1.5–4)
LYMPHOCYTES ABSOLUTE: 1.22 E9/L (ref 1.5–4)
LYMPHOCYTES ABSOLUTE: 1.23 E9/L (ref 1.5–4)
LYMPHOCYTES ABSOLUTE: 1.26 E9/L (ref 1.5–4)
LYMPHOCYTES ABSOLUTE: 1.29 E9/L (ref 1.5–4)
LYMPHOCYTES ABSOLUTE: 1.29 E9/L (ref 1.5–4)
LYMPHOCYTES ABSOLUTE: 1.31 E9/L (ref 1.5–4)
LYMPHOCYTES ABSOLUTE: 1.32 E9/L (ref 1.5–4)
LYMPHOCYTES ABSOLUTE: 1.38 E9/L (ref 1.5–4)
LYMPHOCYTES ABSOLUTE: 1.39 E9/L (ref 1.5–4)
LYMPHOCYTES ABSOLUTE: 1.43 E9/L (ref 1.5–4)
LYMPHOCYTES ABSOLUTE: 1.45 E9/L (ref 1.5–4)
LYMPHOCYTES RELATIVE PERCENT: 10.1 % (ref 20–42)
LYMPHOCYTES RELATIVE PERCENT: 10.3 % (ref 20–42)
LYMPHOCYTES RELATIVE PERCENT: 10.7 % (ref 20–42)
LYMPHOCYTES RELATIVE PERCENT: 11 % (ref 20–42)
LYMPHOCYTES RELATIVE PERCENT: 12.1 % (ref 20–42)
LYMPHOCYTES RELATIVE PERCENT: 12.7 % (ref 20–42)
LYMPHOCYTES RELATIVE PERCENT: 12.9 % (ref 20–42)
LYMPHOCYTES RELATIVE PERCENT: 13.4 % (ref 20–42)
LYMPHOCYTES RELATIVE PERCENT: 13.9 % (ref 20–42)
LYMPHOCYTES RELATIVE PERCENT: 15.2 % (ref 20–42)
LYMPHOCYTES RELATIVE PERCENT: 6.5 % (ref 20–42)
LYMPHOCYTES RELATIVE PERCENT: 7.1 % (ref 20–42)
LYMPHOCYTES RELATIVE PERCENT: 7.3 % (ref 20–42)
LYMPHOCYTES RELATIVE PERCENT: 8.3 % (ref 20–42)
LYMPHOCYTES RELATIVE PERCENT: 8.7 % (ref 20–42)
LYMPHOCYTES RELATIVE PERCENT: 9.2 % (ref 20–42)
LYMPHOCYTES RELATIVE PERCENT: 9.4 % (ref 20–42)
LYMPHOCYTES RELATIVE PERCENT: 9.5 % (ref 20–42)
LYMPHOCYTES RELATIVE PERCENT: 9.7 % (ref 20–42)
Lab: ABNORMAL
MAGNESIUM: 1.8 MG/DL (ref 1.6–2.6)
MAGNESIUM: 1.9 MG/DL (ref 1.6–2.6)
MAGNESIUM: 1.9 MG/DL (ref 1.6–2.6)
MAGNESIUM: 2 MG/DL (ref 1.6–2.6)
MAGNESIUM: 2.1 MG/DL (ref 1.6–2.6)
MAGNESIUM: 2.2 MG/DL (ref 1.6–2.6)
MAGNESIUM: 2.3 MG/DL (ref 1.6–2.6)
MAGNESIUM: 2.4 MG/DL (ref 1.6–2.6)
MAGNESIUM: 2.4 MG/DL (ref 1.6–2.6)
MAGNESIUM: 2.5 MG/DL (ref 1.6–2.6)
MCH RBC QN AUTO: 27 PG (ref 26–35)
MCH RBC QN AUTO: 27.2 PG (ref 26–35)
MCH RBC QN AUTO: 27.3 PG (ref 26–35)
MCH RBC QN AUTO: 27.3 PG (ref 26–35)
MCH RBC QN AUTO: 27.4 PG (ref 26–35)
MCH RBC QN AUTO: 27.4 PG (ref 26–35)
MCH RBC QN AUTO: 27.5 PG (ref 26–35)
MCH RBC QN AUTO: 27.6 PG (ref 26–35)
MCH RBC QN AUTO: 27.6 PG (ref 26–35)
MCH RBC QN AUTO: 27.7 PG (ref 26–35)
MCH RBC QN AUTO: 27.7 PG (ref 26–35)
MCH RBC QN AUTO: 27.8 PG (ref 26–35)
MCH RBC QN AUTO: 27.9 PG (ref 26–35)
MCH RBC QN AUTO: 28 PG (ref 26–35)
MCH RBC QN AUTO: 28.1 PG (ref 26–35)
MCH RBC QN AUTO: 28.1 PG (ref 26–35)
MCH RBC QN AUTO: 28.2 PG (ref 26–35)
MCH RBC QN AUTO: 28.3 PG (ref 26–35)
MCHC RBC AUTO-ENTMCNC: 30.5 % (ref 32–34.5)
MCHC RBC AUTO-ENTMCNC: 31 % (ref 32–34.5)
MCHC RBC AUTO-ENTMCNC: 31.4 % (ref 32–34.5)
MCHC RBC AUTO-ENTMCNC: 31.5 % (ref 32–34.5)
MCHC RBC AUTO-ENTMCNC: 31.6 % (ref 32–34.5)
MCHC RBC AUTO-ENTMCNC: 32.5 % (ref 32–34.5)
MCHC RBC AUTO-ENTMCNC: 32.5 % (ref 32–34.5)
MCHC RBC AUTO-ENTMCNC: 32.8 % (ref 32–34.5)
MCHC RBC AUTO-ENTMCNC: 32.9 % (ref 32–34.5)
MCHC RBC AUTO-ENTMCNC: 33 % (ref 32–34.5)
MCHC RBC AUTO-ENTMCNC: 33 % (ref 32–34.5)
MCHC RBC AUTO-ENTMCNC: 33.1 % (ref 32–34.5)
MCHC RBC AUTO-ENTMCNC: 33.2 % (ref 32–34.5)
MCHC RBC AUTO-ENTMCNC: 33.3 % (ref 32–34.5)
MCHC RBC AUTO-ENTMCNC: 33.7 % (ref 32–34.5)
MCV RBC AUTO: 82.7 FL (ref 80–99.9)
MCV RBC AUTO: 82.8 FL (ref 80–99.9)
MCV RBC AUTO: 83.2 FL (ref 80–99.9)
MCV RBC AUTO: 83.3 FL (ref 80–99.9)
MCV RBC AUTO: 84.1 FL (ref 80–99.9)
MCV RBC AUTO: 84.2 FL (ref 80–99.9)
MCV RBC AUTO: 84.3 FL (ref 80–99.9)
MCV RBC AUTO: 84.3 FL (ref 80–99.9)
MCV RBC AUTO: 84.4 FL (ref 80–99.9)
MCV RBC AUTO: 84.4 FL (ref 80–99.9)
MCV RBC AUTO: 84.6 FL (ref 80–99.9)
MCV RBC AUTO: 85 FL (ref 80–99.9)
MCV RBC AUTO: 85.1 FL (ref 80–99.9)
MCV RBC AUTO: 85.2 FL (ref 80–99.9)
MCV RBC AUTO: 85.5 FL (ref 80–99.9)
MCV RBC AUTO: 85.5 FL (ref 80–99.9)
MCV RBC AUTO: 86.9 FL (ref 80–99.9)
MCV RBC AUTO: 87.1 FL (ref 80–99.9)
MCV RBC AUTO: 88.3 FL (ref 80–99.9)
MCV RBC AUTO: 89.3 FL (ref 80–99.9)
METER GLUCOSE: 106 MG/DL (ref 74–99)
METER GLUCOSE: 110 MG/DL (ref 74–99)
METER GLUCOSE: 111 MG/DL (ref 74–99)
METER GLUCOSE: 118 MG/DL (ref 74–99)
METER GLUCOSE: 121 MG/DL (ref 74–99)
METER GLUCOSE: 123 MG/DL (ref 74–99)
METER GLUCOSE: 126 MG/DL (ref 74–99)
METER GLUCOSE: 139 MG/DL (ref 74–99)
METER GLUCOSE: 140 MG/DL (ref 74–99)
METER GLUCOSE: 146 MG/DL (ref 74–99)
METER GLUCOSE: 149 MG/DL (ref 74–99)
METER GLUCOSE: 149 MG/DL (ref 74–99)
METER GLUCOSE: 150 MG/DL (ref 74–99)
METER GLUCOSE: 151 MG/DL (ref 74–99)
METER GLUCOSE: 153 MG/DL (ref 74–99)
METER GLUCOSE: 153 MG/DL (ref 74–99)
METER GLUCOSE: 155 MG/DL (ref 74–99)
METER GLUCOSE: 156 MG/DL (ref 74–99)
METER GLUCOSE: 163 MG/DL (ref 74–99)
METER GLUCOSE: 166 MG/DL (ref 74–99)
METER GLUCOSE: 167 MG/DL (ref 74–99)
METER GLUCOSE: 169 MG/DL (ref 74–99)
METER GLUCOSE: 172 MG/DL (ref 74–99)
METER GLUCOSE: 172 MG/DL (ref 74–99)
METER GLUCOSE: 177 MG/DL (ref 74–99)
METER GLUCOSE: 178 MG/DL (ref 74–99)
METER GLUCOSE: 179 MG/DL (ref 74–99)
METER GLUCOSE: 180 MG/DL (ref 74–99)
METER GLUCOSE: 190 MG/DL (ref 74–99)
METER GLUCOSE: 191 MG/DL (ref 74–99)
METER GLUCOSE: 193 MG/DL (ref 74–99)
METER GLUCOSE: 196 MG/DL (ref 74–99)
METER GLUCOSE: 202 MG/DL (ref 74–99)
METER GLUCOSE: 203 MG/DL (ref 74–99)
METER GLUCOSE: 205 MG/DL (ref 74–99)
METER GLUCOSE: 205 MG/DL (ref 74–99)
METER GLUCOSE: 208 MG/DL (ref 74–99)
METER GLUCOSE: 208 MG/DL (ref 74–99)
METER GLUCOSE: 209 MG/DL (ref 74–99)
METER GLUCOSE: 211 MG/DL (ref 74–99)
METER GLUCOSE: 218 MG/DL (ref 74–99)
METER GLUCOSE: 218 MG/DL (ref 74–99)
METER GLUCOSE: 219 MG/DL (ref 74–99)
METER GLUCOSE: 220 MG/DL (ref 74–99)
METER GLUCOSE: 222 MG/DL (ref 74–99)
METER GLUCOSE: 229 MG/DL (ref 74–99)
METER GLUCOSE: 229 MG/DL (ref 74–99)
METER GLUCOSE: 230 MG/DL (ref 74–99)
METER GLUCOSE: 232 MG/DL (ref 74–99)
METER GLUCOSE: 233 MG/DL (ref 74–99)
METER GLUCOSE: 239 MG/DL (ref 74–99)
METER GLUCOSE: 239 MG/DL (ref 74–99)
METER GLUCOSE: 240 MG/DL (ref 74–99)
METER GLUCOSE: 241 MG/DL (ref 74–99)
METER GLUCOSE: 244 MG/DL (ref 74–99)
METER GLUCOSE: 248 MG/DL (ref 74–99)
METER GLUCOSE: 251 MG/DL (ref 74–99)
METER GLUCOSE: 251 MG/DL (ref 74–99)
METER GLUCOSE: 252 MG/DL (ref 74–99)
METER GLUCOSE: 254 MG/DL (ref 74–99)
METER GLUCOSE: 261 MG/DL (ref 74–99)
METER GLUCOSE: 263 MG/DL (ref 74–99)
METER GLUCOSE: 264 MG/DL (ref 74–99)
METER GLUCOSE: 270 MG/DL (ref 74–99)
METER GLUCOSE: 273 MG/DL (ref 74–99)
METER GLUCOSE: 273 MG/DL (ref 74–99)
METER GLUCOSE: 276 MG/DL (ref 74–99)
METER GLUCOSE: 276 MG/DL (ref 74–99)
METER GLUCOSE: 277 MG/DL (ref 74–99)
METER GLUCOSE: 281 MG/DL (ref 74–99)
METER GLUCOSE: 281 MG/DL (ref 74–99)
METER GLUCOSE: 285 MG/DL (ref 74–99)
METER GLUCOSE: 289 MG/DL (ref 74–99)
METER GLUCOSE: 292 MG/DL (ref 74–99)
METER GLUCOSE: 300 MG/DL (ref 74–99)
METER GLUCOSE: 308 MG/DL (ref 74–99)
METER GLUCOSE: 310 MG/DL (ref 74–99)
METER GLUCOSE: 316 MG/DL (ref 74–99)
METER GLUCOSE: 318 MG/DL (ref 74–99)
METER GLUCOSE: 321 MG/DL (ref 74–99)
METER GLUCOSE: 321 MG/DL (ref 74–99)
METER GLUCOSE: 362 MG/DL (ref 74–99)
METER GLUCOSE: 81 MG/DL (ref 74–99)
METER GLUCOSE: 81 MG/DL (ref 74–99)
METER GLUCOSE: 91 MG/DL (ref 74–99)
METER GLUCOSE: 95 MG/DL (ref 74–99)
METHB: 0.2 % (ref 0–1.5)
MODE: ABNORMAL
MONOCYTES ABSOLUTE: 0.59 E9/L (ref 0.1–0.95)
MONOCYTES ABSOLUTE: 0.69 E9/L (ref 0.1–0.95)
MONOCYTES ABSOLUTE: 0.72 E9/L (ref 0.1–0.95)
MONOCYTES ABSOLUTE: 0.81 E9/L (ref 0.1–0.95)
MONOCYTES ABSOLUTE: 0.82 E9/L (ref 0.1–0.95)
MONOCYTES ABSOLUTE: 0.86 E9/L (ref 0.1–0.95)
MONOCYTES ABSOLUTE: 0.88 E9/L (ref 0.1–0.95)
MONOCYTES ABSOLUTE: 0.91 E9/L (ref 0.1–0.95)
MONOCYTES ABSOLUTE: 0.93 E9/L (ref 0.1–0.95)
MONOCYTES ABSOLUTE: 0.93 E9/L (ref 0.1–0.95)
MONOCYTES ABSOLUTE: 0.98 E9/L (ref 0.1–0.95)
MONOCYTES ABSOLUTE: 1.05 E9/L (ref 0.1–0.95)
MONOCYTES ABSOLUTE: 1.06 E9/L (ref 0.1–0.95)
MONOCYTES ABSOLUTE: 1.12 E9/L (ref 0.1–0.95)
MONOCYTES ABSOLUTE: 1.13 E9/L (ref 0.1–0.95)
MONOCYTES ABSOLUTE: 1.19 E9/L (ref 0.1–0.95)
MONOCYTES ABSOLUTE: 1.3 E9/L (ref 0.1–0.95)
MONOCYTES ABSOLUTE: 1.59 E9/L (ref 0.1–0.95)
MONOCYTES ABSOLUTE: 1.99 E9/L (ref 0.1–0.95)
MONOCYTES RELATIVE PERCENT: 10 % (ref 2–12)
MONOCYTES RELATIVE PERCENT: 10.1 % (ref 2–12)
MONOCYTES RELATIVE PERCENT: 10.2 % (ref 2–12)
MONOCYTES RELATIVE PERCENT: 10.9 % (ref 2–12)
MONOCYTES RELATIVE PERCENT: 11.3 % (ref 2–12)
MONOCYTES RELATIVE PERCENT: 11.5 % (ref 2–12)
MONOCYTES RELATIVE PERCENT: 11.6 % (ref 2–12)
MONOCYTES RELATIVE PERCENT: 4.8 % (ref 2–12)
MONOCYTES RELATIVE PERCENT: 6.4 % (ref 2–12)
MONOCYTES RELATIVE PERCENT: 6.9 % (ref 2–12)
MONOCYTES RELATIVE PERCENT: 7.1 % (ref 2–12)
MONOCYTES RELATIVE PERCENT: 7.1 % (ref 2–12)
MONOCYTES RELATIVE PERCENT: 7.5 % (ref 2–12)
MONOCYTES RELATIVE PERCENT: 7.9 % (ref 2–12)
MONOCYTES RELATIVE PERCENT: 8 % (ref 2–12)
MONOCYTES RELATIVE PERCENT: 8.1 % (ref 2–12)
MONOCYTES RELATIVE PERCENT: 8.3 % (ref 2–12)
MONOCYTES RELATIVE PERCENT: 8.7 % (ref 2–12)
MONOCYTES RELATIVE PERCENT: 9.3 % (ref 2–12)
MRSA CULTURE ONLY: NORMAL
MYCOPLASMA PNEUMONIAE BY PCR: NOT DETECTED
MYCOPLASMA PNEUMONIAE BY PCR: NOT DETECTED
NEUTROPHILS ABSOLUTE: 10.14 E9/L (ref 1.8–7.3)
NEUTROPHILS ABSOLUTE: 10.74 E9/L (ref 1.8–7.3)
NEUTROPHILS ABSOLUTE: 11.21 E9/L (ref 1.8–7.3)
NEUTROPHILS ABSOLUTE: 12.05 E9/L (ref 1.8–7.3)
NEUTROPHILS ABSOLUTE: 16.02 E9/L (ref 1.8–7.3)
NEUTROPHILS ABSOLUTE: 16.7 E9/L (ref 1.8–7.3)
NEUTROPHILS ABSOLUTE: 6.68 E9/L (ref 1.8–7.3)
NEUTROPHILS ABSOLUTE: 7.05 E9/L (ref 1.8–7.3)
NEUTROPHILS ABSOLUTE: 7.18 E9/L (ref 1.8–7.3)
NEUTROPHILS ABSOLUTE: 7.31 E9/L (ref 1.8–7.3)
NEUTROPHILS ABSOLUTE: 7.47 E9/L (ref 1.8–7.3)
NEUTROPHILS ABSOLUTE: 7.6 E9/L (ref 1.8–7.3)
NEUTROPHILS ABSOLUTE: 7.87 E9/L (ref 1.8–7.3)
NEUTROPHILS ABSOLUTE: 8 E9/L (ref 1.8–7.3)
NEUTROPHILS ABSOLUTE: 8.52 E9/L (ref 1.8–7.3)
NEUTROPHILS ABSOLUTE: 8.71 E9/L (ref 1.8–7.3)
NEUTROPHILS ABSOLUTE: 9.42 E9/L (ref 1.8–7.3)
NEUTROPHILS ABSOLUTE: 9.48 E9/L (ref 1.8–7.3)
NEUTROPHILS ABSOLUTE: 9.98 E9/L (ref 1.8–7.3)
NEUTROPHILS RELATIVE PERCENT: 73.3 % (ref 43–80)
NEUTROPHILS RELATIVE PERCENT: 73.6 % (ref 43–80)
NEUTROPHILS RELATIVE PERCENT: 74.8 % (ref 43–80)
NEUTROPHILS RELATIVE PERCENT: 76.5 % (ref 43–80)
NEUTROPHILS RELATIVE PERCENT: 78 % (ref 43–80)
NEUTROPHILS RELATIVE PERCENT: 78 % (ref 43–80)
NEUTROPHILS RELATIVE PERCENT: 78.4 % (ref 43–80)
NEUTROPHILS RELATIVE PERCENT: 78.4 % (ref 43–80)
NEUTROPHILS RELATIVE PERCENT: 79.2 % (ref 43–80)
NEUTROPHILS RELATIVE PERCENT: 79.6 % (ref 43–80)
NEUTROPHILS RELATIVE PERCENT: 79.9 % (ref 43–80)
NEUTROPHILS RELATIVE PERCENT: 80.7 % (ref 43–80)
NEUTROPHILS RELATIVE PERCENT: 81.1 % (ref 43–80)
NEUTROPHILS RELATIVE PERCENT: 82 % (ref 43–80)
NEUTROPHILS RELATIVE PERCENT: 82.3 % (ref 43–80)
NEUTROPHILS RELATIVE PERCENT: 82.5 % (ref 43–80)
NEUTROPHILS RELATIVE PERCENT: 82.6 % (ref 43–80)
NEUTROPHILS RELATIVE PERCENT: 83.6 % (ref 43–80)
NEUTROPHILS RELATIVE PERCENT: 87.3 % (ref 43–80)
NITRITE, URINE: NEGATIVE
NUCLEATED RED BLOOD CELLS: 2.8 /100 WBC
O2 SATURATION: 97.1 % (ref 92–98.5)
O2HB: 96.4 % (ref 94–97)
OPERATOR ID: 1741
PARAINFLUENZA VIRUS 1 BY PCR: NOT DETECTED
PARAINFLUENZA VIRUS 1 BY PCR: NOT DETECTED
PARAINFLUENZA VIRUS 2 BY PCR: NOT DETECTED
PARAINFLUENZA VIRUS 2 BY PCR: NOT DETECTED
PARAINFLUENZA VIRUS 3 BY PCR: NOT DETECTED
PARAINFLUENZA VIRUS 3 BY PCR: NOT DETECTED
PARAINFLUENZA VIRUS 4 BY PCR: NOT DETECTED
PARAINFLUENZA VIRUS 4 BY PCR: NOT DETECTED
PATIENT TEMP: 37 C
PCO2: 29.5 MMHG (ref 35–45)
PDW BLD-RTO: 13.5 FL (ref 11.5–15)
PDW BLD-RTO: 13.7 FL (ref 11.5–15)
PDW BLD-RTO: 13.8 FL (ref 11.5–15)
PDW BLD-RTO: 13.9 FL (ref 11.5–15)
PDW BLD-RTO: 13.9 FL (ref 11.5–15)
PDW BLD-RTO: 14.1 FL (ref 11.5–15)
PDW BLD-RTO: 14.2 FL (ref 11.5–15)
PDW BLD-RTO: 14.3 FL (ref 11.5–15)
PDW BLD-RTO: 14.3 FL (ref 11.5–15)
PDW BLD-RTO: 14.4 FL (ref 11.5–15)
PDW BLD-RTO: 14.4 FL (ref 11.5–15)
PDW BLD-RTO: 14.6 FL (ref 11.5–15)
PH BLOOD GAS: 7.47 (ref 7.35–7.45)
PH UA: 5.5 (ref 5–9)
PH UA: 5.5 (ref 5–9)
PH UA: 6 (ref 5–9)
PHOSPHORUS: 2.4 MG/DL (ref 2.5–4.5)
PHOSPHORUS: 2.6 MG/DL (ref 2.5–4.5)
PHOSPHORUS: 2.7 MG/DL (ref 2.5–4.5)
PHOSPHORUS: 2.8 MG/DL (ref 2.5–4.5)
PHOSPHORUS: 2.8 MG/DL (ref 2.5–4.5)
PHOSPHORUS: 2.9 MG/DL (ref 2.5–4.5)
PHOSPHORUS: 3.1 MG/DL (ref 2.5–4.5)
PHOSPHORUS: 3.1 MG/DL (ref 2.5–4.5)
PHOSPHORUS: 3.4 MG/DL (ref 2.5–4.5)
PHOSPHORUS: 3.6 MG/DL (ref 2.5–4.5)
PHOSPHORUS: 3.7 MG/DL (ref 2.5–4.5)
PHOSPHORUS: 4.1 MG/DL (ref 2.5–4.5)
PHOSPHORUS: 5.2 MG/DL (ref 2.5–4.5)
PLATELET # BLD: 189 E9/L (ref 130–450)
PLATELET # BLD: 198 E9/L (ref 130–450)
PLATELET # BLD: 223 E9/L (ref 130–450)
PLATELET # BLD: 226 E9/L (ref 130–450)
PLATELET # BLD: 234 E9/L (ref 130–450)
PLATELET # BLD: 239 E9/L (ref 130–450)
PLATELET # BLD: 247 E9/L (ref 130–450)
PLATELET # BLD: 247 E9/L (ref 130–450)
PLATELET # BLD: 264 E9/L (ref 130–450)
PLATELET # BLD: 283 E9/L (ref 130–450)
PLATELET # BLD: 310 E9/L (ref 130–450)
PLATELET # BLD: 358 E9/L (ref 130–450)
PLATELET # BLD: 380 E9/L (ref 130–450)
PLATELET # BLD: 416 E9/L (ref 130–450)
PLATELET # BLD: 455 E9/L (ref 130–450)
PLATELET # BLD: 485 E9/L (ref 130–450)
PLATELET # BLD: 487 E9/L (ref 130–450)
PLATELET # BLD: 500 E9/L (ref 130–450)
PLATELET # BLD: 516 E9/L (ref 130–450)
PLATELET # BLD: 561 E9/L (ref 130–450)
PMV BLD AUTO: 10.1 FL (ref 7–12)
PMV BLD AUTO: 8.5 FL (ref 7–12)
PMV BLD AUTO: 8.7 FL (ref 7–12)
PMV BLD AUTO: 8.8 FL (ref 7–12)
PMV BLD AUTO: 8.9 FL (ref 7–12)
PMV BLD AUTO: 9 FL (ref 7–12)
PMV BLD AUTO: 9.1 FL (ref 7–12)
PMV BLD AUTO: 9.1 FL (ref 7–12)
PMV BLD AUTO: 9.2 FL (ref 7–12)
PMV BLD AUTO: 9.3 FL (ref 7–12)
PMV BLD AUTO: 9.5 FL (ref 7–12)
PMV BLD AUTO: 9.5 FL (ref 7–12)
PMV BLD AUTO: 9.8 FL (ref 7–12)
PO2: 93.6 MMHG (ref 75–100)
POLYCHROMASIA: ABNORMAL
POTASSIUM SERPL-SCNC: 3.7 MMOL/L (ref 3.5–5)
POTASSIUM SERPL-SCNC: 4 MMOL/L (ref 3.5–5)
POTASSIUM SERPL-SCNC: 4.1 MMOL/L (ref 3.5–5)
POTASSIUM SERPL-SCNC: 4.2 MMOL/L (ref 3.5–5)
POTASSIUM SERPL-SCNC: 4.3 MMOL/L (ref 3.5–5)
POTASSIUM SERPL-SCNC: 4.4 MMOL/L (ref 3.5–5)
POTASSIUM SERPL-SCNC: 4.5 MMOL/L (ref 3.5–5)
POTASSIUM SERPL-SCNC: 4.5 MMOL/L (ref 3.5–5)
POTASSIUM SERPL-SCNC: 4.6 MMOL/L (ref 3.5–5)
POTASSIUM SERPL-SCNC: 4.9 MMOL/L (ref 3.5–5)
POTASSIUM, UR: 25.3 MMOL/L
PROCALCITONIN: 0.27 NG/ML (ref 0–0.08)
PROTEIN UA: 100 MG/DL
PROTEIN UA: 100 MG/DL
PROTEIN UA: >=300 MG/DL
PROTHROMBIN TIME: 12.6 SEC (ref 9.3–12.4)
PROTHROMBIN TIME: 12.6 SEC (ref 9.3–12.4)
PROTHROMBIN TIME: 12.7 SEC (ref 9.3–12.4)
PROTHROMBIN TIME: 12.8 SEC (ref 9.3–12.4)
PROTHROMBIN TIME: 12.9 SEC (ref 9.3–12.4)
PROTHROMBIN TIME: 13 SEC (ref 9.3–12.4)
PROTHROMBIN TIME: 13.2 SEC (ref 9.3–12.4)
PROTHROMBIN TIME: 13.2 SEC (ref 9.3–12.4)
PROTHROMBIN TIME: 13.3 SEC (ref 9.3–12.4)
PROTHROMBIN TIME: 13.3 SEC (ref 9.3–12.4)
PROTHROMBIN TIME: 13.4 SEC (ref 9.3–12.4)
PROTHROMBIN TIME: 13.4 SEC (ref 9.3–12.4)
PROTHROMBIN TIME: 13.5 SEC (ref 9.3–12.4)
PROTHROMBIN TIME: 14.5 SEC (ref 9.3–12.4)
PROTHROMBIN TIME: 14.6 SEC (ref 9.3–12.4)
PROTHROMBIN TIME: 14.6 SEC (ref 9.3–12.4)
PROTHROMBIN TIME: 15.5 SEC (ref 9.3–12.4)
RBC # BLD: 2.56 E12/L (ref 3.8–5.8)
RBC # BLD: 3.5 E12/L (ref 3.8–5.8)
RBC # BLD: 3.56 E12/L (ref 3.8–5.8)
RBC # BLD: 3.59 E12/L (ref 3.8–5.8)
RBC # BLD: 3.6 E12/L (ref 3.8–5.8)
RBC # BLD: 3.66 E12/L (ref 3.8–5.8)
RBC # BLD: 3.66 E12/L (ref 3.8–5.8)
RBC # BLD: 3.69 E12/L (ref 3.8–5.8)
RBC # BLD: 3.72 E12/L (ref 3.8–5.8)
RBC # BLD: 3.76 E12/L (ref 3.8–5.8)
RBC # BLD: 3.78 E12/L (ref 3.8–5.8)
RBC # BLD: 3.81 E12/L (ref 3.8–5.8)
RBC # BLD: 3.83 E12/L (ref 3.8–5.8)
RBC # BLD: 3.84 E12/L (ref 3.8–5.8)
RBC # BLD: 3.85 E12/L (ref 3.8–5.8)
RBC # BLD: 3.91 E12/L (ref 3.8–5.8)
RBC # BLD: 3.93 E12/L (ref 3.8–5.8)
RBC # BLD: 3.93 E12/L (ref 3.8–5.8)
RBC # BLD: 4.01 E12/L (ref 3.8–5.8)
RBC # BLD: 4.43 E12/L (ref 3.8–5.8)
RBC # BLD: NORMAL 10*6/UL
RBC # BLD: NORMAL 10*6/UL
RBC UA: ABNORMAL /HPF (ref 0–2)
REASON FOR REJECTION: NORMAL
REJECTED TEST: NORMAL
RESPIRATORY SYNCYTIAL VIRUS BY PCR: NOT DETECTED
RESPIRATORY SYNCYTIAL VIRUS BY PCR: NOT DETECTED
SARS-COV-2, PCR: NOT DETECTED
SARS-COV-2, PCR: NOT DETECTED
SEDIMENTATION RATE, ERYTHROCYTE: 21 MM/HR (ref 0–15)
SODIUM BLD-SCNC: 127 MMOL/L (ref 132–146)
SODIUM BLD-SCNC: 132 MMOL/L (ref 132–146)
SODIUM BLD-SCNC: 133 MMOL/L (ref 132–146)
SODIUM BLD-SCNC: 133 MMOL/L (ref 132–146)
SODIUM BLD-SCNC: 134 MMOL/L (ref 132–146)
SODIUM BLD-SCNC: 135 MMOL/L (ref 132–146)
SODIUM BLD-SCNC: 137 MMOL/L (ref 132–146)
SODIUM BLD-SCNC: 138 MMOL/L (ref 132–146)
SODIUM BLD-SCNC: 139 MMOL/L (ref 132–146)
SODIUM BLD-SCNC: 140 MMOL/L (ref 132–146)
SODIUM BLD-SCNC: 144 MMOL/L (ref 132–146)
SODIUM BLD-SCNC: 145 MMOL/L (ref 132–146)
SODIUM BLD-SCNC: 150 MMOL/L (ref 132–146)
SODIUM BLD-SCNC: 152 MMOL/L (ref 132–146)
SODIUM URINE: 85 MMOL/L
SOURCE, BLOOD GAS: ABNORMAL
SPECIFIC GRAVITY UA: 1.01 (ref 1–1.03)
SPECIFIC GRAVITY UA: 1.02 (ref 1–1.03)
SPECIFIC GRAVITY UA: 1.02 (ref 1–1.03)
THB: 11.5 G/DL (ref 11.5–16.5)
TIME ANALYZED: 1334
TOTAL PROTEIN: 6.2 G/DL (ref 6.4–8.3)
TOTAL PROTEIN: 6.3 G/DL (ref 6.4–8.3)
TOTAL PROTEIN: 6.3 G/DL (ref 6.4–8.3)
TOTAL PROTEIN: 6.4 G/DL (ref 6.4–8.3)
TOTAL PROTEIN: 6.4 G/DL (ref 6.4–8.3)
TOTAL PROTEIN: 6.7 G/DL (ref 6.4–8.3)
TOTAL PROTEIN: 6.8 G/DL (ref 6.4–8.3)
TOTAL PROTEIN: 7 G/DL (ref 6.4–8.3)
TROPONIN, HIGH SENSITIVITY: 56 NG/L (ref 0–11)
TROPONIN, HIGH SENSITIVITY: 62 NG/L (ref 0–11)
TSH SERPL DL<=0.05 MIU/L-ACNC: 3.97 UIU/ML (ref 0.27–4.2)
UREA NITROGEN, UR: 825 MG/DL (ref 800–1666)
UROBILINOGEN, URINE: 0.2 E.U./DL
VALPROIC ACID LEVEL: 51 MCG/ML (ref 50–100)
WBC # BLD: 10.2 E9/L (ref 4.5–11.5)
WBC # BLD: 10.3 E9/L (ref 4.5–11.5)
WBC # BLD: 10.9 E9/L (ref 4.5–11.5)
WBC # BLD: 11.2 E9/L (ref 4.5–11.5)
WBC # BLD: 11.4 E9/L (ref 4.5–11.5)
WBC # BLD: 11.9 E9/L (ref 4.5–11.5)
WBC # BLD: 12.2 E9/L (ref 4.5–11.5)
WBC # BLD: 12.3 E9/L (ref 4.5–11.5)
WBC # BLD: 12.3 E9/L (ref 4.5–11.5)
WBC # BLD: 13.5 E9/L (ref 4.5–11.5)
WBC # BLD: 13.6 E9/L (ref 4.5–11.5)
WBC # BLD: 14.9 E9/L (ref 4.5–11.5)
WBC # BLD: 19.7 E9/L (ref 4.5–11.5)
WBC # BLD: 19.9 E9/L (ref 4.5–11.5)
WBC # BLD: 8.9 E9/L (ref 4.5–11.5)
WBC # BLD: 9.1 E9/L (ref 4.5–11.5)
WBC # BLD: 9.4 E9/L (ref 4.5–11.5)
WBC # BLD: 9.6 E9/L (ref 4.5–11.5)
WBC # BLD: 9.8 E9/L (ref 4.5–11.5)
WBC # BLD: 9.9 E9/L (ref 4.5–11.5)
WBC UA: ABNORMAL /HPF (ref 0–5)

## 2022-01-01 PROCEDURE — 71045 X-RAY EXAM CHEST 1 VIEW: CPT

## 2022-01-01 PROCEDURE — A4216 STERILE WATER/SALINE, 10 ML: HCPCS

## 2022-01-01 PROCEDURE — 2580000003 HC RX 258: Performed by: STUDENT IN AN ORGANIZED HEALTH CARE EDUCATION/TRAINING PROGRAM

## 2022-01-01 PROCEDURE — 6370000000 HC RX 637 (ALT 250 FOR IP): Performed by: INTERNAL MEDICINE

## 2022-01-01 PROCEDURE — 83735 ASSAY OF MAGNESIUM: CPT

## 2022-01-01 PROCEDURE — 82330 ASSAY OF CALCIUM: CPT

## 2022-01-01 PROCEDURE — 74018 RADEX ABDOMEN 1 VIEW: CPT

## 2022-01-01 PROCEDURE — 6370000000 HC RX 637 (ALT 250 FOR IP)

## 2022-01-01 PROCEDURE — 87081 CULTURE SCREEN ONLY: CPT

## 2022-01-01 PROCEDURE — 82962 GLUCOSE BLOOD TEST: CPT

## 2022-01-01 PROCEDURE — 84100 ASSAY OF PHOSPHORUS: CPT

## 2022-01-01 PROCEDURE — 82805 BLOOD GASES W/O2 SATURATION: CPT

## 2022-01-01 PROCEDURE — 2000000000 HC ICU R&B

## 2022-01-01 PROCEDURE — 02HK3JZ INSERTION OF PACEMAKER LEAD INTO RIGHT VENTRICLE, PERCUTANEOUS APPROACH: ICD-10-PCS | Performed by: STUDENT IN AN ORGANIZED HEALTH CARE EDUCATION/TRAINING PROGRAM

## 2022-01-01 PROCEDURE — 99291 CRITICAL CARE FIRST HOUR: CPT | Performed by: INTERNAL MEDICINE

## 2022-01-01 PROCEDURE — 6370000000 HC RX 637 (ALT 250 FOR IP): Performed by: SURGERY

## 2022-01-01 PROCEDURE — 2580000003 HC RX 258: Performed by: INTERNAL MEDICINE

## 2022-01-01 PROCEDURE — 93005 ELECTROCARDIOGRAM TRACING: CPT | Performed by: STUDENT IN AN ORGANIZED HEALTH CARE EDUCATION/TRAINING PROGRAM

## 2022-01-01 PROCEDURE — 94640 AIRWAY INHALATION TREATMENT: CPT

## 2022-01-01 PROCEDURE — 2500000003 HC RX 250 WO HCPCS

## 2022-01-01 PROCEDURE — 6370000000 HC RX 637 (ALT 250 FOR IP): Performed by: PSYCHIATRY & NEUROLOGY

## 2022-01-01 PROCEDURE — 82570 ASSAY OF URINE CREATININE: CPT

## 2022-01-01 PROCEDURE — 93280 PM DEVICE PROGR EVAL DUAL: CPT | Performed by: INTERNAL MEDICINE

## 2022-01-01 PROCEDURE — 85610 PROTHROMBIN TIME: CPT

## 2022-01-01 PROCEDURE — 2580000003 HC RX 258

## 2022-01-01 PROCEDURE — 80048 BASIC METABOLIC PNL TOTAL CA: CPT

## 2022-01-01 PROCEDURE — 6370000000 HC RX 637 (ALT 250 FOR IP): Performed by: NEUROLOGICAL SURGERY

## 2022-01-01 PROCEDURE — 2580000003 HC RX 258: Performed by: NEUROLOGICAL SURGERY

## 2022-01-01 PROCEDURE — S5553 INSULIN LONG ACTING 5 U: HCPCS | Performed by: INTERNAL MEDICINE

## 2022-01-01 PROCEDURE — 80053 COMPREHEN METABOLIC PANEL: CPT

## 2022-01-01 PROCEDURE — 6370000000 HC RX 637 (ALT 250 FOR IP): Performed by: STUDENT IN AN ORGANIZED HEALTH CARE EDUCATION/TRAINING PROGRAM

## 2022-01-01 PROCEDURE — 85025 COMPLETE CBC W/AUTO DIFF WBC: CPT

## 2022-01-01 PROCEDURE — 99233 SBSQ HOSP IP/OBS HIGH 50: CPT | Performed by: INTERNAL MEDICINE

## 2022-01-01 PROCEDURE — 99291 CRITICAL CARE FIRST HOUR: CPT | Performed by: SURGERY

## 2022-01-01 PROCEDURE — 2500000003 HC RX 250 WO HCPCS: Performed by: INTERNAL MEDICINE

## 2022-01-01 PROCEDURE — 36415 COLL VENOUS BLD VENIPUNCTURE: CPT

## 2022-01-01 PROCEDURE — 6360000002 HC RX W HCPCS: Performed by: NEUROLOGICAL SURGERY

## 2022-01-01 PROCEDURE — 6360000002 HC RX W HCPCS: Performed by: INTERNAL MEDICINE

## 2022-01-01 PROCEDURE — 81001 URINALYSIS AUTO W/SCOPE: CPT

## 2022-01-01 PROCEDURE — 82140 ASSAY OF AMMONIA: CPT

## 2022-01-01 PROCEDURE — 99233 SBSQ HOSP IP/OBS HIGH 50: CPT | Performed by: PHYSICIAN ASSISTANT

## 2022-01-01 PROCEDURE — 6360000002 HC RX W HCPCS: Performed by: PSYCHIATRY & NEUROLOGY

## 2022-01-01 PROCEDURE — 93005 ELECTROCARDIOGRAM TRACING: CPT

## 2022-01-01 PROCEDURE — C9254 INJECTION, LACOSAMIDE: HCPCS | Performed by: PSYCHIATRY & NEUROLOGY

## 2022-01-01 PROCEDURE — 97530 THERAPEUTIC ACTIVITIES: CPT

## 2022-01-01 PROCEDURE — 3700000001 HC ADD 15 MINUTES (ANESTHESIA): Performed by: NEUROLOGICAL SURGERY

## 2022-01-01 PROCEDURE — 99222 1ST HOSP IP/OBS MODERATE 55: CPT | Performed by: NURSE PRACTITIONER

## 2022-01-01 PROCEDURE — 6360000002 HC RX W HCPCS

## 2022-01-01 PROCEDURE — 33208 INSRT HEART PM ATRIAL & VENT: CPT | Performed by: STUDENT IN AN ORGANIZED HEALTH CARE EDUCATION/TRAINING PROGRAM

## 2022-01-01 PROCEDURE — C1729 CATH, DRAINAGE: HCPCS | Performed by: NEUROLOGICAL SURGERY

## 2022-01-01 PROCEDURE — 70450 CT HEAD/BRAIN W/O DYE: CPT

## 2022-01-01 PROCEDURE — 2700000000 HC OXYGEN THERAPY PER DAY

## 2022-01-01 PROCEDURE — 6360000002 HC RX W HCPCS: Performed by: STUDENT IN AN ORGANIZED HEALTH CARE EDUCATION/TRAINING PROGRAM

## 2022-01-01 PROCEDURE — C9113 INJ PANTOPRAZOLE SODIUM, VIA: HCPCS

## 2022-01-01 PROCEDURE — C1894 INTRO/SHEATH, NON-LASER: HCPCS

## 2022-01-01 PROCEDURE — 95714 VEEG EA 12-26 HR UNMNTR: CPT

## 2022-01-01 PROCEDURE — 2709999900 HC NON-CHARGEABLE SUPPLY

## 2022-01-01 PROCEDURE — 99233 SBSQ HOSP IP/OBS HIGH 50: CPT | Performed by: NURSE PRACTITIONER

## 2022-01-01 PROCEDURE — 95819 EEG AWAKE AND ASLEEP: CPT

## 2022-01-01 PROCEDURE — 3700000000 HC ANESTHESIA ATTENDED CARE: Performed by: NEUROLOGICAL SURGERY

## 2022-01-01 PROCEDURE — 3600000015 HC SURGERY LEVEL 5 ADDTL 15MIN: Performed by: NEUROLOGICAL SURGERY

## 2022-01-01 PROCEDURE — 02H63JZ INSERTION OF PACEMAKER LEAD INTO RIGHT ATRIUM, PERCUTANEOUS APPROACH: ICD-10-PCS | Performed by: STUDENT IN AN ORGANIZED HEALTH CARE EDUCATION/TRAINING PROGRAM

## 2022-01-01 PROCEDURE — C9113 INJ PANTOPRAZOLE SODIUM, VIA: HCPCS | Performed by: NEUROLOGICAL SURGERY

## 2022-01-01 PROCEDURE — 93010 ELECTROCARDIOGRAM REPORT: CPT | Performed by: INTERNAL MEDICINE

## 2022-01-01 PROCEDURE — 2500000003 HC RX 250 WO HCPCS: Performed by: STUDENT IN AN ORGANIZED HEALTH CARE EDUCATION/TRAINING PROGRAM

## 2022-01-01 PROCEDURE — 2709999900 HC NON-CHARGEABLE SUPPLY: Performed by: NEUROLOGICAL SURGERY

## 2022-01-01 PROCEDURE — C9254 INJECTION, LACOSAMIDE: HCPCS | Performed by: INTERNAL MEDICINE

## 2022-01-01 PROCEDURE — 87040 BLOOD CULTURE FOR BACTERIA: CPT

## 2022-01-01 PROCEDURE — C9254 INJECTION, LACOSAMIDE: HCPCS | Performed by: NEUROLOGICAL SURGERY

## 2022-01-01 PROCEDURE — S5553 INSULIN LONG ACTING 5 U: HCPCS | Performed by: NEUROLOGICAL SURGERY

## 2022-01-01 PROCEDURE — 84484 ASSAY OF TROPONIN QUANT: CPT

## 2022-01-01 PROCEDURE — 86140 C-REACTIVE PROTEIN: CPT

## 2022-01-01 PROCEDURE — 6370000000 HC RX 637 (ALT 250 FOR IP): Performed by: FAMILY MEDICINE

## 2022-01-01 PROCEDURE — 97535 SELF CARE MNGMENT TRAINING: CPT

## 2022-01-01 PROCEDURE — 95816 EEG AWAKE AND DROWSY: CPT

## 2022-01-01 PROCEDURE — 2580000003 HC RX 258: Performed by: PSYCHIATRY & NEUROLOGY

## 2022-01-01 PROCEDURE — S5553 INSULIN LONG ACTING 5 U: HCPCS | Performed by: STUDENT IN AN ORGANIZED HEALTH CARE EDUCATION/TRAINING PROGRAM

## 2022-01-01 PROCEDURE — 94664 DEMO&/EVAL PT USE INHALER: CPT

## 2022-01-01 PROCEDURE — 2500000003 HC RX 250 WO HCPCS: Performed by: PSYCHIATRY & NEUROLOGY

## 2022-01-01 PROCEDURE — 93005 ELECTROCARDIOGRAM TRACING: CPT | Performed by: NURSE PRACTITIONER

## 2022-01-01 PROCEDURE — 85730 THROMBOPLASTIN TIME PARTIAL: CPT

## 2022-01-01 PROCEDURE — 2720000010 HC SURG SUPPLY STERILE: Performed by: NEUROLOGICAL SURGERY

## 2022-01-01 PROCEDURE — 97164 PT RE-EVAL EST PLAN CARE: CPT

## 2022-01-01 PROCEDURE — 95720 EEG PHY/QHP EA INCR W/VEEG: CPT | Performed by: PSYCHIATRY & NEUROLOGY

## 2022-01-01 PROCEDURE — 80164 ASSAY DIPROPYLACETIC ACD TOT: CPT

## 2022-01-01 PROCEDURE — 92523 SPEECH SOUND LANG COMPREHEN: CPT | Performed by: SPEECH-LANGUAGE PATHOLOGIST

## 2022-01-01 PROCEDURE — 86618 LYME DISEASE ANTIBODY: CPT

## 2022-01-01 PROCEDURE — 95718 EEG PHYS/QHP 2-12 HR W/VEEG: CPT | Performed by: PSYCHIATRY & NEUROLOGY

## 2022-01-01 PROCEDURE — S5553 INSULIN LONG ACTING 5 U: HCPCS

## 2022-01-01 PROCEDURE — 84300 ASSAY OF URINE SODIUM: CPT

## 2022-01-01 PROCEDURE — C1769 GUIDE WIRE: HCPCS

## 2022-01-01 PROCEDURE — 99024 POSTOP FOLLOW-UP VISIT: CPT | Performed by: STUDENT IN AN ORGANIZED HEALTH CARE EDUCATION/TRAINING PROGRAM

## 2022-01-01 PROCEDURE — 99291 CRITICAL CARE FIRST HOUR: CPT | Performed by: STUDENT IN AN ORGANIZED HEALTH CARE EDUCATION/TRAINING PROGRAM

## 2022-01-01 PROCEDURE — 92507 TX SP LANG VOICE COMM INDIV: CPT | Performed by: SPEECH-LANGUAGE PATHOLOGIST

## 2022-01-01 PROCEDURE — 85027 COMPLETE CBC AUTOMATED: CPT

## 2022-01-01 PROCEDURE — C1785 PMKR, DUAL, RATE-RESP: HCPCS

## 2022-01-01 PROCEDURE — 0202U NFCT DS 22 TRGT SARS-COV-2: CPT

## 2022-01-01 PROCEDURE — 97162 PT EVAL MOD COMPLEX 30 MIN: CPT

## 2022-01-01 PROCEDURE — 3700000000 HC ANESTHESIA ATTENDED CARE

## 2022-01-01 PROCEDURE — 2500000003 HC RX 250 WO HCPCS: Performed by: NEUROLOGICAL SURGERY

## 2022-01-01 PROCEDURE — C1713 ANCHOR/SCREW BN/BN,TIS/BN: HCPCS | Performed by: NEUROLOGICAL SURGERY

## 2022-01-01 PROCEDURE — C1898 LEAD, PMKR, OTHER THAN TRANS: HCPCS

## 2022-01-01 PROCEDURE — 99223 1ST HOSP IP/OBS HIGH 75: CPT | Performed by: PSYCHIATRY & NEUROLOGY

## 2022-01-01 PROCEDURE — 33208 INSRT HEART PM ATRIAL & VENT: CPT

## 2022-01-01 PROCEDURE — 84443 ASSAY THYROID STIM HORMONE: CPT

## 2022-01-01 PROCEDURE — 009400Z DRAINAGE OF INTRACRANIAL SUBDURAL SPACE WITH DRAINAGE DEVICE, OPEN APPROACH: ICD-10-PCS | Performed by: NEUROLOGICAL SURGERY

## 2022-01-01 PROCEDURE — 80177 DRUG SCRN QUAN LEVETIRACETAM: CPT

## 2022-01-01 PROCEDURE — 85014 HEMATOCRIT: CPT

## 2022-01-01 PROCEDURE — 84540 ASSAY OF URINE/UREA-N: CPT

## 2022-01-01 PROCEDURE — 7100000000 HC PACU RECOVERY - FIRST 15 MIN

## 2022-01-01 PROCEDURE — 85651 RBC SED RATE NONAUTOMATED: CPT

## 2022-01-01 PROCEDURE — C1889 IMPLANT/INSERT DEVICE, NOC: HCPCS

## 2022-01-01 PROCEDURE — 84145 PROCALCITONIN (PCT): CPT

## 2022-01-01 PROCEDURE — 51702 INSERT TEMP BLADDER CATH: CPT

## 2022-01-01 PROCEDURE — 97166 OT EVAL MOD COMPLEX 45 MIN: CPT

## 2022-01-01 PROCEDURE — A4216 STERILE WATER/SALINE, 10 ML: HCPCS | Performed by: NEUROLOGICAL SURGERY

## 2022-01-01 PROCEDURE — 93308 TTE F-UP OR LMTD: CPT

## 2022-01-01 PROCEDURE — 84133 ASSAY OF URINE POTASSIUM: CPT

## 2022-01-01 PROCEDURE — 82436 ASSAY OF URINE CHLORIDE: CPT

## 2022-01-01 PROCEDURE — 3700000001 HC ADD 15 MINUTES (ANESTHESIA)

## 2022-01-01 PROCEDURE — 99232 SBSQ HOSP IP/OBS MODERATE 35: CPT | Performed by: CLINICAL NURSE SPECIALIST

## 2022-01-01 PROCEDURE — 51701 INSERT BLADDER CATHETER: CPT

## 2022-01-01 PROCEDURE — 99233 SBSQ HOSP IP/OBS HIGH 50: CPT | Performed by: CLINICAL NURSE SPECIALIST

## 2022-01-01 PROCEDURE — 85018 HEMOGLOBIN: CPT

## 2022-01-01 PROCEDURE — 99232 SBSQ HOSP IP/OBS MODERATE 35: CPT | Performed by: NURSE PRACTITIONER

## 2022-01-01 PROCEDURE — 0JH606Z INSERTION OF PACEMAKER, DUAL CHAMBER INTO CHEST SUBCUTANEOUS TISSUE AND FASCIA, OPEN APPROACH: ICD-10-PCS | Performed by: STUDENT IN AN ORGANIZED HEALTH CARE EDUCATION/TRAINING PROGRAM

## 2022-01-01 PROCEDURE — 99232 SBSQ HOSP IP/OBS MODERATE 35: CPT | Performed by: PHYSICIAN ASSISTANT

## 2022-01-01 PROCEDURE — 7100000001 HC PACU RECOVERY - ADDTL 15 MIN

## 2022-01-01 PROCEDURE — 51798 US URINE CAPACITY MEASURE: CPT

## 2022-01-01 PROCEDURE — 6360000002 HC RX W HCPCS: Performed by: NURSE PRACTITIONER

## 2022-01-01 PROCEDURE — 92610 EVALUATE SWALLOWING FUNCTION: CPT | Performed by: SPEECH-LANGUAGE PATHOLOGIST

## 2022-01-01 PROCEDURE — 3600000005 HC SURGERY LEVEL 5 BASE: Performed by: NEUROLOGICAL SURGERY

## 2022-01-01 DEVICE — LOW PROFILE BURR HOLE COVER, W/TAB, 14MM
Type: IMPLANTABLE DEVICE | Site: SKULL | Status: FUNCTIONAL
Brand: UNIVERSAL NEURO 2

## 2022-01-01 DEVICE — SCREW, AXS, SELF-TAPPING
Type: IMPLANTABLE DEVICE | Site: SKULL | Status: FUNCTIONAL
Brand: UNIVERSAL NEURO 3

## 2022-01-01 RX ORDER — INSULIN GLARGINE-YFGN 100 [IU]/ML
13 INJECTION, SOLUTION SUBCUTANEOUS DAILY
Status: DISCONTINUED | OUTPATIENT
Start: 2022-01-01 | End: 2022-01-01

## 2022-01-01 RX ORDER — ACETAMINOPHEN 325 MG/1
650 TABLET ORAL EVERY 6 HOURS PRN
Status: DISCONTINUED | OUTPATIENT
Start: 2022-01-01 | End: 2022-01-01 | Stop reason: HOSPADM

## 2022-01-01 RX ORDER — SODIUM CHLORIDE 0.9 % (FLUSH) 0.9 %
5-40 SYRINGE (ML) INJECTION EVERY 12 HOURS SCHEDULED
Status: DISCONTINUED | OUTPATIENT
Start: 2022-01-01 | End: 2022-01-01

## 2022-01-01 RX ORDER — POLYETHYLENE GLYCOL 3350 17 G/17G
17 POWDER, FOR SOLUTION ORAL DAILY PRN
Status: DISCONTINUED | OUTPATIENT
Start: 2022-01-01 | End: 2022-01-01

## 2022-01-01 RX ORDER — DIVALPROEX SODIUM 125 MG/1
500 CAPSULE, COATED PELLETS ORAL EVERY 12 HOURS SCHEDULED
Status: DISCONTINUED | OUTPATIENT
Start: 2022-01-01 | End: 2022-01-01

## 2022-01-01 RX ORDER — CARVEDILOL 6.25 MG/1
12.5 TABLET ORAL 2 TIMES DAILY WITH MEALS
Status: DISCONTINUED | OUTPATIENT
Start: 2022-01-01 | End: 2022-01-01

## 2022-01-01 RX ORDER — TRAMADOL HYDROCHLORIDE 50 MG/1
50 TABLET ORAL ONCE
Status: DISCONTINUED | OUTPATIENT
Start: 2022-01-01 | End: 2022-01-01

## 2022-01-01 RX ORDER — INSULIN LISPRO 100 [IU]/ML
0-4 INJECTION, SOLUTION INTRAVENOUS; SUBCUTANEOUS NIGHTLY
Status: DISCONTINUED | OUTPATIENT
Start: 2022-01-01 | End: 2022-01-01

## 2022-01-01 RX ORDER — INSULIN GLARGINE-YFGN 100 [IU]/ML
20 INJECTION, SOLUTION SUBCUTANEOUS DAILY
Status: DISCONTINUED | OUTPATIENT
Start: 2022-01-01 | End: 2022-01-01

## 2022-01-01 RX ORDER — LACOSAMIDE 10 MG/ML
200 SOLUTION ORAL 2 TIMES DAILY
Status: DISCONTINUED | OUTPATIENT
Start: 2022-01-01 | End: 2022-01-01

## 2022-01-01 RX ORDER — LACOSAMIDE 10 MG/ML
200 SOLUTION ORAL 2 TIMES DAILY
Status: DISCONTINUED | OUTPATIENT
Start: 2022-01-01 | End: 2022-01-01 | Stop reason: CLARIF

## 2022-01-01 RX ORDER — DOCUSATE SODIUM 100 MG/1
100 CAPSULE, LIQUID FILLED ORAL ONCE
Status: COMPLETED | OUTPATIENT
Start: 2022-01-01 | End: 2022-01-01

## 2022-01-01 RX ORDER — SODIUM CHLORIDE 9 MG/ML
INJECTION, SOLUTION INTRAVENOUS CONTINUOUS
Status: DISCONTINUED | OUTPATIENT
Start: 2022-01-01 | End: 2022-01-01

## 2022-01-01 RX ORDER — POLYETHYLENE GLYCOL 3350 17 G/17G
17 POWDER, FOR SOLUTION ORAL DAILY
Status: DISCONTINUED | OUTPATIENT
Start: 2022-01-01 | End: 2022-01-01 | Stop reason: HOSPADM

## 2022-01-01 RX ORDER — HYDRALAZINE HYDROCHLORIDE 20 MG/ML
10 INJECTION INTRAMUSCULAR; INTRAVENOUS EVERY 10 MIN PRN
Status: DISCONTINUED | OUTPATIENT
Start: 2022-01-01 | End: 2022-01-01

## 2022-01-01 RX ORDER — MIDAZOLAM HYDROCHLORIDE 1 MG/ML
INJECTION INTRAMUSCULAR; INTRAVENOUS
Status: COMPLETED
Start: 2022-01-01 | End: 2022-01-01

## 2022-01-01 RX ORDER — INSULIN LISPRO 100 [IU]/ML
11 INJECTION, SOLUTION INTRAVENOUS; SUBCUTANEOUS
Status: DISCONTINUED | OUTPATIENT
Start: 2022-01-01 | End: 2022-01-01

## 2022-01-01 RX ORDER — INSULIN LISPRO 100 [IU]/ML
15 INJECTION, SOLUTION INTRAVENOUS; SUBCUTANEOUS
Status: DISCONTINUED | OUTPATIENT
Start: 2022-01-01 | End: 2022-01-01

## 2022-01-01 RX ORDER — GABAPENTIN 100 MG/1
100 CAPSULE ORAL 2 TIMES DAILY
Status: DISCONTINUED | OUTPATIENT
Start: 2022-01-01 | End: 2022-01-01

## 2022-01-01 RX ORDER — INSULIN LISPRO 100 [IU]/ML
0-16 INJECTION, SOLUTION INTRAVENOUS; SUBCUTANEOUS EVERY 4 HOURS
Status: DISCONTINUED | OUTPATIENT
Start: 2022-01-01 | End: 2022-01-01

## 2022-01-01 RX ORDER — POTASSIUM CHLORIDE 7.45 MG/ML
10 INJECTION INTRAVENOUS PRN
Status: DISCONTINUED | OUTPATIENT
Start: 2022-01-01 | End: 2022-01-01

## 2022-01-01 RX ORDER — AMLODIPINE BESYLATE 10 MG/1
10 TABLET ORAL DAILY
Status: DISCONTINUED | OUTPATIENT
Start: 2022-01-01 | End: 2022-01-01

## 2022-01-01 RX ORDER — ALLOPURINOL 300 MG/1
300 TABLET ORAL
Status: DISCONTINUED | OUTPATIENT
Start: 2022-01-01 | End: 2022-01-01

## 2022-01-01 RX ORDER — 0.9 % SODIUM CHLORIDE 0.9 %
500 INTRAVENOUS SOLUTION INTRAVENOUS ONCE
Status: COMPLETED | OUTPATIENT
Start: 2022-01-01 | End: 2022-01-01

## 2022-01-01 RX ORDER — DEXTROSE MONOHYDRATE 100 MG/ML
INJECTION, SOLUTION INTRAVENOUS CONTINUOUS PRN
Status: DISCONTINUED | OUTPATIENT
Start: 2022-01-01 | End: 2022-01-01

## 2022-01-01 RX ORDER — INSULIN LISPRO 100 [IU]/ML
0-8 INJECTION, SOLUTION INTRAVENOUS; SUBCUTANEOUS
Status: DISCONTINUED | OUTPATIENT
Start: 2022-01-01 | End: 2022-01-01

## 2022-01-01 RX ORDER — LISINOPRIL AND HYDROCHLOROTHIAZIDE 20; 12.5 MG/1; MG/1
1 TABLET ORAL DAILY
COMMUNITY
Start: 2022-01-01

## 2022-01-01 RX ORDER — LISINOPRIL AND HYDROCHLOROTHIAZIDE 20; 12.5 MG/1; MG/1
1 TABLET ORAL DAILY
Status: DISCONTINUED | OUTPATIENT
Start: 2022-01-01 | End: 2022-01-01 | Stop reason: CLARIF

## 2022-01-01 RX ORDER — BACITRACIN ZINC 500 [USP'U]/G
OINTMENT TOPICAL PRN
Status: DISCONTINUED | OUTPATIENT
Start: 2022-01-01 | End: 2022-01-01 | Stop reason: ALTCHOICE

## 2022-01-01 RX ORDER — ACETAMINOPHEN 325 MG/1
650 TABLET ORAL EVERY 4 HOURS PRN
Status: DISCONTINUED | OUTPATIENT
Start: 2022-01-01 | End: 2022-01-01

## 2022-01-01 RX ORDER — LEVETIRACETAM 100 MG/ML
500 SOLUTION ORAL 2 TIMES DAILY
Status: DISCONTINUED | OUTPATIENT
Start: 2022-01-01 | End: 2022-01-01

## 2022-01-01 RX ORDER — METOPROLOL SUCCINATE 50 MG/1
25 TABLET, EXTENDED RELEASE ORAL NIGHTLY
Status: DISCONTINUED | OUTPATIENT
Start: 2022-01-01 | End: 2022-01-01

## 2022-01-01 RX ORDER — SUCRALFATE 1 G/1
1 TABLET ORAL EVERY 6 HOURS SCHEDULED
Status: DISCONTINUED | OUTPATIENT
Start: 2022-01-01 | End: 2022-01-01

## 2022-01-01 RX ORDER — NIFEDIPINE 30 MG/1
30 TABLET, EXTENDED RELEASE ORAL ONCE
Status: COMPLETED | OUTPATIENT
Start: 2022-01-01 | End: 2022-01-01

## 2022-01-01 RX ORDER — SODIUM CHLORIDE, SODIUM LACTATE, POTASSIUM CHLORIDE, CALCIUM CHLORIDE 600; 310; 30; 20 MG/100ML; MG/100ML; MG/100ML; MG/100ML
INJECTION, SOLUTION INTRAVENOUS CONTINUOUS PRN
Status: COMPLETED | OUTPATIENT
Start: 2022-01-01 | End: 2022-01-01

## 2022-01-01 RX ORDER — INSULIN GLARGINE-YFGN 100 [IU]/ML
25 INJECTION, SOLUTION SUBCUTANEOUS 2 TIMES DAILY
Status: DISCONTINUED | OUTPATIENT
Start: 2022-01-01 | End: 2022-01-01

## 2022-01-01 RX ORDER — LEVETIRACETAM 500 MG/1
500 TABLET ORAL 2 TIMES DAILY
Status: DISCONTINUED | OUTPATIENT
Start: 2022-01-01 | End: 2022-01-01 | Stop reason: SDUPTHER

## 2022-01-01 RX ORDER — INSULIN GLARGINE-YFGN 100 [IU]/ML
15 INJECTION, SOLUTION SUBCUTANEOUS 2 TIMES DAILY
Status: DISCONTINUED | OUTPATIENT
Start: 2022-01-01 | End: 2022-01-01

## 2022-01-01 RX ORDER — BISACODYL 10 MG
10 SUPPOSITORY, RECTAL RECTAL DAILY
Status: DISCONTINUED | OUTPATIENT
Start: 2022-01-01 | End: 2022-01-01 | Stop reason: HOSPADM

## 2022-01-01 RX ORDER — SODIUM CHLORIDE 0.9 % (FLUSH) 0.9 %
5-40 SYRINGE (ML) INJECTION PRN
Status: DISCONTINUED | OUTPATIENT
Start: 2022-01-01 | End: 2022-01-01

## 2022-01-01 RX ORDER — HYDRALAZINE HYDROCHLORIDE 20 MG/ML
10 INJECTION INTRAMUSCULAR; INTRAVENOUS EVERY 4 HOURS PRN
Status: DISCONTINUED | OUTPATIENT
Start: 2022-01-01 | End: 2022-01-01

## 2022-01-01 RX ORDER — INSULIN LISPRO 100 [IU]/ML
0-16 INJECTION, SOLUTION INTRAVENOUS; SUBCUTANEOUS EVERY 6 HOURS
Status: DISCONTINUED | OUTPATIENT
Start: 2022-01-01 | End: 2022-01-01

## 2022-01-01 RX ORDER — INSULIN GLARGINE-YFGN 100 [IU]/ML
15 INJECTION, SOLUTION SUBCUTANEOUS DAILY
Status: DISCONTINUED | OUTPATIENT
Start: 2022-01-01 | End: 2022-01-01

## 2022-01-01 RX ORDER — IPRATROPIUM BROMIDE AND ALBUTEROL SULFATE 2.5; .5 MG/3ML; MG/3ML
1 SOLUTION RESPIRATORY (INHALATION) EVERY 4 HOURS PRN
Status: DISCONTINUED | OUTPATIENT
Start: 2022-01-01 | End: 2022-01-01

## 2022-01-01 RX ORDER — INSULIN GLARGINE-YFGN 100 [IU]/ML
32 INJECTION, SOLUTION SUBCUTANEOUS DAILY
Status: DISCONTINUED | OUTPATIENT
Start: 2022-01-01 | End: 2022-01-01

## 2022-01-01 RX ORDER — ARFORMOTEROL TARTRATE 15 UG/2ML
15 SOLUTION RESPIRATORY (INHALATION) 2 TIMES DAILY
Status: DISCONTINUED | OUTPATIENT
Start: 2022-01-01 | End: 2022-01-01

## 2022-01-01 RX ORDER — HYDRALAZINE HYDROCHLORIDE 20 MG/ML
10 INJECTION INTRAMUSCULAR; INTRAVENOUS EVERY 30 MIN PRN
Status: DISCONTINUED | OUTPATIENT
Start: 2022-01-01 | End: 2022-01-01

## 2022-01-01 RX ORDER — FINASTERIDE 5 MG/1
5 TABLET, FILM COATED ORAL DAILY
Status: DISCONTINUED | OUTPATIENT
Start: 2022-01-01 | End: 2022-01-01

## 2022-01-01 RX ORDER — GLYCOPYRROLATE 0.2 MG/ML
0.2 INJECTION INTRAMUSCULAR; INTRAVENOUS EVERY 4 HOURS PRN
Status: DISCONTINUED | OUTPATIENT
Start: 2022-01-01 | End: 2022-01-01 | Stop reason: HOSPADM

## 2022-01-01 RX ORDER — LACOSAMIDE 10 MG/ML
150 SOLUTION ORAL 2 TIMES DAILY
Status: DISCONTINUED | OUTPATIENT
Start: 2022-01-01 | End: 2022-01-01

## 2022-01-01 RX ORDER — LACOSAMIDE 100 MG/1
200 TABLET ORAL 2 TIMES DAILY
Status: DISCONTINUED | OUTPATIENT
Start: 2022-01-01 | End: 2022-01-01 | Stop reason: SDUPTHER

## 2022-01-01 RX ORDER — POLYVINYL ALCOHOL 14 MG/ML
1 SOLUTION/ DROPS OPHTHALMIC PRN
Status: DISCONTINUED | OUTPATIENT
Start: 2022-01-01 | End: 2022-01-01 | Stop reason: HOSPADM

## 2022-01-01 RX ORDER — HEPARIN SODIUM 10000 [USP'U]/100ML
5-30 INJECTION, SOLUTION INTRAVENOUS CONTINUOUS
Status: DISCONTINUED | OUTPATIENT
Start: 2022-01-01 | End: 2022-01-01

## 2022-01-01 RX ORDER — INSULIN LISPRO 100 [IU]/ML
0-4 INJECTION, SOLUTION INTRAVENOUS; SUBCUTANEOUS EVERY 4 HOURS
Status: DISCONTINUED | OUTPATIENT
Start: 2022-01-01 | End: 2022-01-01

## 2022-01-01 RX ORDER — LABETALOL HYDROCHLORIDE 5 MG/ML
10 INJECTION, SOLUTION INTRAVENOUS ONCE
Status: DISCONTINUED | OUTPATIENT
Start: 2022-01-01 | End: 2022-01-01

## 2022-01-01 RX ORDER — ONDANSETRON 2 MG/ML
4 INJECTION INTRAMUSCULAR; INTRAVENOUS EVERY 6 HOURS PRN
Status: DISCONTINUED | OUTPATIENT
Start: 2022-01-01 | End: 2022-01-01

## 2022-01-01 RX ORDER — MAGNESIUM HYDROXIDE/ALUMINUM HYDROXICE/SIMETHICONE 120; 1200; 1200 MG/30ML; MG/30ML; MG/30ML
30 SUSPENSION ORAL EVERY 6 HOURS PRN
Status: DISCONTINUED | OUTPATIENT
Start: 2022-01-01 | End: 2022-01-01

## 2022-01-01 RX ORDER — INSULIN LISPRO 100 [IU]/ML
0-4 INJECTION, SOLUTION INTRAVENOUS; SUBCUTANEOUS
Status: DISCONTINUED | OUTPATIENT
Start: 2022-01-01 | End: 2022-01-01

## 2022-01-01 RX ORDER — AMLODIPINE BESYLATE 5 MG/1
5 TABLET ORAL DAILY
Status: DISCONTINUED | OUTPATIENT
Start: 2022-01-01 | End: 2022-01-01

## 2022-01-01 RX ORDER — INSULIN LISPRO 100 [IU]/ML
3 INJECTION, SOLUTION INTRAVENOUS; SUBCUTANEOUS
Status: DISCONTINUED | OUTPATIENT
Start: 2022-01-01 | End: 2022-01-01

## 2022-01-01 RX ORDER — LABETALOL HYDROCHLORIDE 5 MG/ML
10 INJECTION, SOLUTION INTRAVENOUS
Status: DISCONTINUED | OUTPATIENT
Start: 2022-01-01 | End: 2022-01-01

## 2022-01-01 RX ORDER — LACTULOSE 10 G/15ML
20 SOLUTION ORAL 3 TIMES DAILY
Status: DISCONTINUED | OUTPATIENT
Start: 2022-01-01 | End: 2022-01-01

## 2022-01-01 RX ORDER — MORPHINE SULFATE 2 MG/ML
2 INJECTION, SOLUTION INTRAMUSCULAR; INTRAVENOUS
Status: DISCONTINUED | OUTPATIENT
Start: 2022-01-01 | End: 2022-01-01 | Stop reason: HOSPADM

## 2022-01-01 RX ORDER — INSULIN LISPRO 100 [IU]/ML
0-8 INJECTION, SOLUTION INTRAVENOUS; SUBCUTANEOUS EVERY 4 HOURS
Status: DISCONTINUED | OUTPATIENT
Start: 2022-01-01 | End: 2022-01-01

## 2022-01-01 RX ORDER — LIDOCAINE HYDROCHLORIDE AND EPINEPHRINE 10; 10 MG/ML; UG/ML
INJECTION, SOLUTION INFILTRATION; PERINEURAL PRN
Status: DISCONTINUED | OUTPATIENT
Start: 2022-01-01 | End: 2022-01-01 | Stop reason: ALTCHOICE

## 2022-01-01 RX ORDER — SODIUM CHLORIDE 9 MG/ML
INJECTION, SOLUTION INTRAVENOUS PRN
Status: DISCONTINUED | OUTPATIENT
Start: 2022-01-01 | End: 2022-01-01 | Stop reason: SDUPTHER

## 2022-01-01 RX ORDER — LABETALOL HYDROCHLORIDE 5 MG/ML
20 INJECTION, SOLUTION INTRAVENOUS EVERY 10 MIN PRN
Status: DISCONTINUED | OUTPATIENT
Start: 2022-01-01 | End: 2022-01-01

## 2022-01-01 RX ORDER — REPAGLINIDE 1 MG/1
1 TABLET ORAL 2 TIMES DAILY WITH MEALS
COMMUNITY
Start: 2022-01-01

## 2022-01-01 RX ORDER — LISINOPRIL 20 MG/1
20 TABLET ORAL DAILY
Status: DISCONTINUED | OUTPATIENT
Start: 2022-01-01 | End: 2022-01-01

## 2022-01-01 RX ORDER — TRAMADOL HYDROCHLORIDE 50 MG/1
25 TABLET ORAL ONCE
Status: COMPLETED | OUTPATIENT
Start: 2022-01-01 | End: 2022-01-01

## 2022-01-01 RX ORDER — INSULIN GLARGINE-YFGN 100 [IU]/ML
30 INJECTION, SOLUTION SUBCUTANEOUS DAILY
Status: DISCONTINUED | OUTPATIENT
Start: 2022-01-01 | End: 2022-01-01

## 2022-01-01 RX ORDER — LABETALOL HYDROCHLORIDE 5 MG/ML
10 INJECTION, SOLUTION INTRAVENOUS EVERY 10 MIN PRN
Status: DISCONTINUED | OUTPATIENT
Start: 2022-01-01 | End: 2022-01-01

## 2022-01-01 RX ORDER — LIDOCAINE HYDROCHLORIDE 20 MG/ML
JELLY TOPICAL ONCE
Status: DISCONTINUED | OUTPATIENT
Start: 2022-01-01 | End: 2022-01-01 | Stop reason: HOSPADM

## 2022-01-01 RX ORDER — MAGNESIUM SULFATE IN WATER 40 MG/ML
2000 INJECTION, SOLUTION INTRAVENOUS ONCE
Status: COMPLETED | OUTPATIENT
Start: 2022-01-01 | End: 2022-01-01

## 2022-01-01 RX ORDER — ACETAMINOPHEN 325 MG/1
650 TABLET ORAL EVERY 6 HOURS PRN
Status: DISCONTINUED | OUTPATIENT
Start: 2022-01-01 | End: 2022-01-01 | Stop reason: SDUPTHER

## 2022-01-01 RX ORDER — MIDAZOLAM HYDROCHLORIDE 2 MG/2ML
0.5 INJECTION, SOLUTION INTRAMUSCULAR; INTRAVENOUS EVERY 4 HOURS PRN
Status: DISCONTINUED | OUTPATIENT
Start: 2022-01-01 | End: 2022-01-01

## 2022-01-01 RX ORDER — HEPARIN SODIUM 1000 [USP'U]/ML
4000 INJECTION, SOLUTION INTRAVENOUS; SUBCUTANEOUS ONCE
Status: DISCONTINUED | OUTPATIENT
Start: 2022-01-01 | End: 2022-01-01

## 2022-01-01 RX ORDER — GUAIFENESIN 400 MG/1
400 TABLET ORAL 3 TIMES DAILY
Status: DISCONTINUED | OUTPATIENT
Start: 2022-01-01 | End: 2022-01-01 | Stop reason: HOSPADM

## 2022-01-01 RX ORDER — INSULIN LISPRO 100 [IU]/ML
0-16 INJECTION, SOLUTION INTRAVENOUS; SUBCUTANEOUS
Status: DISCONTINUED | OUTPATIENT
Start: 2022-01-01 | End: 2022-01-01

## 2022-01-01 RX ORDER — SODIUM CHLORIDE 9 MG/ML
INJECTION, SOLUTION INTRAVENOUS PRN
Status: DISCONTINUED | OUTPATIENT
Start: 2022-01-01 | End: 2022-01-01

## 2022-01-01 RX ORDER — LABETALOL HYDROCHLORIDE 5 MG/ML
5 INJECTION, SOLUTION INTRAVENOUS EVERY 4 HOURS PRN
Status: DISCONTINUED | OUTPATIENT
Start: 2022-01-01 | End: 2022-01-01

## 2022-01-01 RX ORDER — ONDANSETRON 4 MG/1
4 TABLET, ORALLY DISINTEGRATING ORAL EVERY 8 HOURS PRN
Status: DISCONTINUED | OUTPATIENT
Start: 2022-01-01 | End: 2022-01-01 | Stop reason: SDUPTHER

## 2022-01-01 RX ORDER — ACETAMINOPHEN 650 MG/1
650 SUPPOSITORY RECTAL EVERY 6 HOURS PRN
Status: DISCONTINUED | OUTPATIENT
Start: 2022-01-01 | End: 2022-01-01 | Stop reason: HOSPADM

## 2022-01-01 RX ORDER — HYDROCHLOROTHIAZIDE 12.5 MG/1
12.5 TABLET ORAL DAILY
Status: DISCONTINUED | OUTPATIENT
Start: 2022-01-01 | End: 2022-01-01

## 2022-01-01 RX ORDER — HEPARIN SODIUM 1000 [USP'U]/ML
4000 INJECTION, SOLUTION INTRAVENOUS; SUBCUTANEOUS PRN
Status: DISCONTINUED | OUTPATIENT
Start: 2022-01-01 | End: 2022-01-01

## 2022-01-01 RX ORDER — MAGNESIUM SULFATE 1 G/100ML
1000 INJECTION INTRAVENOUS ONCE
Status: COMPLETED | OUTPATIENT
Start: 2022-01-01 | End: 2022-01-01

## 2022-01-01 RX ORDER — INSULIN GLARGINE-YFGN 100 [IU]/ML
5 INJECTION, SOLUTION SUBCUTANEOUS DAILY
Status: DISCONTINUED | OUTPATIENT
Start: 2022-01-01 | End: 2022-01-01

## 2022-01-01 RX ORDER — DIVALPROEX SODIUM 125 MG/1
250 CAPSULE, COATED PELLETS ORAL EVERY 12 HOURS
Status: DISCONTINUED | OUTPATIENT
Start: 2022-01-01 | End: 2022-01-01

## 2022-01-01 RX ORDER — INSULIN LISPRO 100 [IU]/ML
8 INJECTION, SOLUTION INTRAVENOUS; SUBCUTANEOUS
Status: DISCONTINUED | OUTPATIENT
Start: 2022-01-01 | End: 2022-01-01

## 2022-01-01 RX ORDER — POTASSIUM CHLORIDE 20 MEQ/1
40 TABLET, EXTENDED RELEASE ORAL PRN
Status: DISCONTINUED | OUTPATIENT
Start: 2022-01-01 | End: 2022-01-01

## 2022-01-01 RX ORDER — MIDAZOLAM HYDROCHLORIDE 2 MG/2ML
0.5 INJECTION, SOLUTION INTRAMUSCULAR; INTRAVENOUS ONCE
Status: COMPLETED | OUTPATIENT
Start: 2022-01-01 | End: 2022-01-01

## 2022-01-01 RX ORDER — OXYMETAZOLINE HYDROCHLORIDE 0.05 G/100ML
2 SPRAY NASAL ONCE
Status: DISPENSED | OUTPATIENT
Start: 2022-01-01 | End: 2022-01-01

## 2022-01-01 RX ORDER — CARVEDILOL 6.25 MG/1
6.25 TABLET ORAL 2 TIMES DAILY WITH MEALS
Status: DISCONTINUED | OUTPATIENT
Start: 2022-01-01 | End: 2022-01-01

## 2022-01-01 RX ORDER — LIDOCAINE 4 G/G
1 PATCH TOPICAL DAILY
Status: DISCONTINUED | OUTPATIENT
Start: 2022-01-01 | End: 2022-01-01 | Stop reason: HOSPADM

## 2022-01-01 RX ORDER — LORAZEPAM 2 MG/ML
1 INJECTION INTRAMUSCULAR
Status: DISCONTINUED | OUTPATIENT
Start: 2022-01-01 | End: 2022-01-01 | Stop reason: HOSPADM

## 2022-01-01 RX ORDER — NIFEDIPINE 30 MG/1
60 TABLET, EXTENDED RELEASE ORAL DAILY
Status: DISCONTINUED | OUTPATIENT
Start: 2022-01-01 | End: 2022-01-01

## 2022-01-01 RX ORDER — NIFEDIPINE 30 MG/1
30 TABLET, EXTENDED RELEASE ORAL DAILY
Status: DISCONTINUED | OUTPATIENT
Start: 2022-01-01 | End: 2022-01-01

## 2022-01-01 RX ORDER — PANTOPRAZOLE SODIUM 40 MG/1
40 TABLET, DELAYED RELEASE ORAL
Status: DISCONTINUED | OUTPATIENT
Start: 2022-01-01 | End: 2022-01-01

## 2022-01-01 RX ORDER — HEPARIN SODIUM 10000 [USP'U]/ML
5000 INJECTION, SOLUTION INTRAVENOUS; SUBCUTANEOUS EVERY 8 HOURS SCHEDULED
Status: DISCONTINUED | OUTPATIENT
Start: 2022-01-01 | End: 2022-01-01

## 2022-01-01 RX ORDER — ONDANSETRON 2 MG/ML
4 INJECTION INTRAMUSCULAR; INTRAVENOUS EVERY 6 HOURS PRN
Status: DISCONTINUED | OUTPATIENT
Start: 2022-01-01 | End: 2022-01-01 | Stop reason: SDUPTHER

## 2022-01-01 RX ORDER — ACETAMINOPHEN 650 MG/1
650 SUPPOSITORY RECTAL EVERY 6 HOURS PRN
Status: DISCONTINUED | OUTPATIENT
Start: 2022-01-01 | End: 2022-01-01 | Stop reason: SDUPTHER

## 2022-01-01 RX ORDER — MIDAZOLAM HYDROCHLORIDE 2 MG/2ML
0.5 INJECTION, SOLUTION INTRAMUSCULAR; INTRAVENOUS EVERY 4 HOURS
Status: DISCONTINUED | OUTPATIENT
Start: 2022-01-01 | End: 2022-01-01

## 2022-01-01 RX ORDER — TAMSULOSIN HYDROCHLORIDE 0.4 MG/1
0.4 CAPSULE ORAL NIGHTLY
Status: DISCONTINUED | OUTPATIENT
Start: 2022-01-01 | End: 2022-01-01

## 2022-01-01 RX ORDER — LABETALOL HYDROCHLORIDE 5 MG/ML
10 INJECTION, SOLUTION INTRAVENOUS EVERY 4 HOURS PRN
Status: DISCONTINUED | OUTPATIENT
Start: 2022-01-01 | End: 2022-01-01

## 2022-01-01 RX ORDER — HEPARIN SODIUM 1000 [USP'U]/ML
2000 INJECTION, SOLUTION INTRAVENOUS; SUBCUTANEOUS PRN
Status: DISCONTINUED | OUTPATIENT
Start: 2022-01-01 | End: 2022-01-01

## 2022-01-01 RX ORDER — LEVETIRACETAM 5 MG/ML
500 INJECTION INTRAVASCULAR EVERY 12 HOURS
Status: DISCONTINUED | OUTPATIENT
Start: 2022-01-01 | End: 2022-01-01

## 2022-01-01 RX ORDER — IPRATROPIUM BROMIDE AND ALBUTEROL SULFATE 2.5; .5 MG/3ML; MG/3ML
1 SOLUTION RESPIRATORY (INHALATION)
Status: DISCONTINUED | OUTPATIENT
Start: 2022-01-01 | End: 2022-01-01

## 2022-01-01 RX ORDER — INSULIN LISPRO 100 [IU]/ML
12 INJECTION, SOLUTION INTRAVENOUS; SUBCUTANEOUS
Status: DISCONTINUED | OUTPATIENT
Start: 2022-01-01 | End: 2022-01-01

## 2022-01-01 RX ORDER — SODIUM CHLORIDE 9 MG/ML
50 INJECTION, SOLUTION INTRAVENOUS ONCE
Status: COMPLETED | OUTPATIENT
Start: 2022-01-01 | End: 2022-01-01

## 2022-01-01 RX ORDER — INSULIN GLARGINE-YFGN 100 [IU]/ML
35 INJECTION, SOLUTION SUBCUTANEOUS DAILY
Status: DISCONTINUED | OUTPATIENT
Start: 2022-01-01 | End: 2022-01-01

## 2022-01-01 RX ORDER — SENNA AND DOCUSATE SODIUM 50; 8.6 MG/1; MG/1
2 TABLET, FILM COATED ORAL DAILY
Status: DISCONTINUED | OUTPATIENT
Start: 2022-01-01 | End: 2022-01-01

## 2022-01-01 RX ORDER — INSULIN GLARGINE-YFGN 100 [IU]/ML
28 INJECTION, SOLUTION SUBCUTANEOUS DAILY
Status: DISCONTINUED | OUTPATIENT
Start: 2022-01-01 | End: 2022-01-01

## 2022-01-01 RX ORDER — ONDANSETRON 4 MG/1
4 TABLET, ORALLY DISINTEGRATING ORAL EVERY 8 HOURS PRN
Status: DISCONTINUED | OUTPATIENT
Start: 2022-01-01 | End: 2022-01-01

## 2022-01-01 RX ADMIN — SODIUM CHLORIDE, PRESERVATIVE FREE 10 ML: 5 INJECTION INTRAVENOUS at 20:34

## 2022-01-01 RX ADMIN — FINASTERIDE 5 MG: 5 TABLET, FILM COATED ORAL at 08:54

## 2022-01-01 RX ADMIN — GUAIFENESIN 400 MG: 400 TABLET ORAL at 15:43

## 2022-01-01 RX ADMIN — LACOSAMIDE 200 MG: 10 SOLUTION ORAL at 21:52

## 2022-01-01 RX ADMIN — SODIUM CHLORIDE, PRESERVATIVE FREE 10 ML: 5 INJECTION INTRAVENOUS at 08:17

## 2022-01-01 RX ADMIN — INSULIN LISPRO 1 UNITS: 100 INJECTION, SOLUTION INTRAVENOUS; SUBCUTANEOUS at 21:01

## 2022-01-01 RX ADMIN — SODIUM CHLORIDE, PRESERVATIVE FREE 10 ML: 5 INJECTION INTRAVENOUS at 23:35

## 2022-01-01 RX ADMIN — SODIUM CHLORIDE, PRESERVATIVE FREE 10 ML: 5 INJECTION INTRAVENOUS at 23:12

## 2022-01-01 RX ADMIN — GUAIFENESIN 400 MG: 400 TABLET ORAL at 09:01

## 2022-01-01 RX ADMIN — GUAIFENESIN 400 MG: 400 TABLET ORAL at 15:18

## 2022-01-01 RX ADMIN — VALPROATE SODIUM 500 MG: 100 INJECTION, SOLUTION INTRAVENOUS at 10:54

## 2022-01-01 RX ADMIN — GABAPENTIN 100 MG: 100 CAPSULE ORAL at 08:28

## 2022-01-01 RX ADMIN — GABAPENTIN 100 MG: 100 CAPSULE ORAL at 22:28

## 2022-01-01 RX ADMIN — LEVETIRACETAM 500 MG: 5 INJECTION INTRAVENOUS at 12:09

## 2022-01-01 RX ADMIN — SODIUM CHLORIDE, PRESERVATIVE FREE 10 ML: 5 INJECTION INTRAVENOUS at 05:15

## 2022-01-01 RX ADMIN — Medication 500 MG: at 08:40

## 2022-01-01 RX ADMIN — HYDRALAZINE HYDROCHLORIDE 10 MG: 20 INJECTION INTRAMUSCULAR; INTRAVENOUS at 20:57

## 2022-01-01 RX ADMIN — LEVETIRACETAM 500 MG: 5 INJECTION INTRAVENOUS at 11:12

## 2022-01-01 RX ADMIN — ARFORMOTEROL TARTRATE 15 MCG: 15 SOLUTION RESPIRATORY (INHALATION) at 22:37

## 2022-01-01 RX ADMIN — GUAIFENESIN 400 MG: 400 TABLET ORAL at 15:00

## 2022-01-01 RX ADMIN — LISINOPRIL 20 MG: 20 TABLET ORAL at 18:00

## 2022-01-01 RX ADMIN — NIFEDIPINE 30 MG: 30 TABLET, EXTENDED RELEASE ORAL at 09:11

## 2022-01-01 RX ADMIN — LABETALOL HYDROCHLORIDE 10 MG: 5 INJECTION INTRAVENOUS at 05:21

## 2022-01-01 RX ADMIN — ARFORMOTEROL TARTRATE 15 MCG: 15 SOLUTION RESPIRATORY (INHALATION) at 08:44

## 2022-01-01 RX ADMIN — NIFEDIPINE 30 MG: 30 TABLET, EXTENDED RELEASE ORAL at 16:02

## 2022-01-01 RX ADMIN — LABETALOL HYDROCHLORIDE 5 MG: 5 INJECTION INTRAVENOUS at 20:55

## 2022-01-01 RX ADMIN — ARFORMOTEROL TARTRATE 15 MCG: 15 SOLUTION RESPIRATORY (INHALATION) at 19:47

## 2022-01-01 RX ADMIN — POLYETHYLENE GLYCOL 3350 17 G: 17 POWDER, FOR SOLUTION ORAL at 09:01

## 2022-01-01 RX ADMIN — HEPARIN SODIUM 5000 UNITS: 10000 INJECTION INTRAVENOUS; SUBCUTANEOUS at 05:54

## 2022-01-01 RX ADMIN — LABETALOL HYDROCHLORIDE 20 MG: 5 INJECTION INTRAVENOUS at 07:03

## 2022-01-01 RX ADMIN — LEVETIRACETAM 500 MG: 500 TABLET, FILM COATED ORAL at 22:13

## 2022-01-01 RX ADMIN — LACOSAMIDE 200 MG: 10 SOLUTION ORAL at 08:12

## 2022-01-01 RX ADMIN — INSULIN GLARGINE-YFGN 15 UNITS: 100 INJECTION, SOLUTION SUBCUTANEOUS at 09:29

## 2022-01-01 RX ADMIN — LEVETIRACETAM 500 MG: 5 INJECTION INTRAVENOUS at 23:20

## 2022-01-01 RX ADMIN — INSULIN LISPRO 1 UNITS: 100 INJECTION, SOLUTION INTRAVENOUS; SUBCUTANEOUS at 23:26

## 2022-01-01 RX ADMIN — GUAIFENESIN 400 MG: 400 TABLET ORAL at 20:44

## 2022-01-01 RX ADMIN — LISINOPRIL 20 MG: 20 TABLET ORAL at 08:20

## 2022-01-01 RX ADMIN — LISINOPRIL 20 MG: 20 TABLET ORAL at 08:23

## 2022-01-01 RX ADMIN — FINASTERIDE 5 MG: 5 TABLET, FILM COATED ORAL at 08:06

## 2022-01-01 RX ADMIN — LEVETIRACETAM 500 MG: 100 SOLUTION ORAL at 08:40

## 2022-01-01 RX ADMIN — SODIUM CHLORIDE, PRESERVATIVE FREE 10 ML: 5 INJECTION INTRAVENOUS at 08:47

## 2022-01-01 RX ADMIN — DOCUSATE SODIUM 50 MG AND SENNOSIDES 8.6 MG 2 TABLET: 8.6; 5 TABLET, FILM COATED ORAL at 08:20

## 2022-01-01 RX ADMIN — CARVEDILOL 12.5 MG: 6.25 TABLET, FILM COATED ORAL at 15:48

## 2022-01-01 RX ADMIN — GUAIFENESIN 400 MG: 400 TABLET ORAL at 22:28

## 2022-01-01 RX ADMIN — DOCUSATE SODIUM 50 MG AND SENNOSIDES 8.6 MG 2 TABLET: 8.6; 5 TABLET, FILM COATED ORAL at 15:45

## 2022-01-01 RX ADMIN — CEFAZOLIN 1000 MG: 1 INJECTION, POWDER, FOR SOLUTION INTRAMUSCULAR; INTRAVENOUS at 15:59

## 2022-01-01 RX ADMIN — GABAPENTIN 100 MG: 100 CAPSULE ORAL at 21:10

## 2022-01-01 RX ADMIN — INSULIN GLARGINE-YFGN 13 UNITS: 100 INJECTION, SOLUTION SUBCUTANEOUS at 08:19

## 2022-01-01 RX ADMIN — INSULIN GLARGINE-YFGN 13 UNITS: 100 INJECTION, SOLUTION SUBCUTANEOUS at 08:13

## 2022-01-01 RX ADMIN — INSULIN GLARGINE-YFGN 25 UNITS: 100 INJECTION, SOLUTION SUBCUTANEOUS at 08:57

## 2022-01-01 RX ADMIN — SODIUM CHLORIDE 40 MG: 9 INJECTION, SOLUTION INTRAMUSCULAR; INTRAVENOUS; SUBCUTANEOUS at 22:36

## 2022-01-01 RX ADMIN — INSULIN LISPRO 2 UNITS: 100 INJECTION, SOLUTION INTRAVENOUS; SUBCUTANEOUS at 14:24

## 2022-01-01 RX ADMIN — SODIUM CHLORIDE, PRESERVATIVE FREE 40 MG: 5 INJECTION INTRAVENOUS at 08:22

## 2022-01-01 RX ADMIN — LISINOPRIL 20 MG: 20 TABLET ORAL at 09:24

## 2022-01-01 RX ADMIN — HYDRALAZINE HYDROCHLORIDE 10 MG: 20 INJECTION INTRAMUSCULAR; INTRAVENOUS at 13:20

## 2022-01-01 RX ADMIN — GUAIFENESIN 400 MG: 400 TABLET ORAL at 10:36

## 2022-01-01 RX ADMIN — INSULIN LISPRO 1 UNITS: 100 INJECTION, SOLUTION INTRAVENOUS; SUBCUTANEOUS at 18:40

## 2022-01-01 RX ADMIN — HYDRALAZINE HYDROCHLORIDE 10 MG: 20 INJECTION INTRAMUSCULAR; INTRAVENOUS at 19:04

## 2022-01-01 RX ADMIN — GUAIFENESIN 400 MG: 400 TABLET ORAL at 20:42

## 2022-01-01 RX ADMIN — SODIUM CHLORIDE, PRESERVATIVE FREE 10 ML: 5 INJECTION INTRAVENOUS at 09:47

## 2022-01-01 RX ADMIN — INSULIN GLARGINE-YFGN 25 UNITS: 100 INJECTION, SOLUTION SUBCUTANEOUS at 20:20

## 2022-01-01 RX ADMIN — TAMSULOSIN HYDROCHLORIDE 0.4 MG: 0.4 CAPSULE ORAL at 21:28

## 2022-01-01 RX ADMIN — LEVETIRACETAM 500 MG: 5 INJECTION INTRAVENOUS at 23:28

## 2022-01-01 RX ADMIN — SODIUM CHLORIDE, PRESERVATIVE FREE 10 ML: 5 INJECTION INTRAVENOUS at 07:42

## 2022-01-01 RX ADMIN — SODIUM CHLORIDE, PRESERVATIVE FREE 10 ML: 5 INJECTION INTRAVENOUS at 20:19

## 2022-01-01 RX ADMIN — GABAPENTIN 100 MG: 100 CAPSULE ORAL at 08:30

## 2022-01-01 RX ADMIN — POLYETHYLENE GLYCOL 3350 17 G: 17 POWDER, FOR SOLUTION ORAL at 08:05

## 2022-01-01 RX ADMIN — LORAZEPAM 1 MG: 2 INJECTION INTRAMUSCULAR; INTRAVENOUS at 13:27

## 2022-01-01 RX ADMIN — POLYETHYLENE GLYCOL 3350 17 G: 17 POWDER, FOR SOLUTION ORAL at 09:14

## 2022-01-01 RX ADMIN — ARFORMOTEROL TARTRATE 15 MCG: 15 SOLUTION RESPIRATORY (INHALATION) at 21:11

## 2022-01-01 RX ADMIN — ARFORMOTEROL TARTRATE 15 MCG: 15 SOLUTION RESPIRATORY (INHALATION) at 20:04

## 2022-01-01 RX ADMIN — GABAPENTIN 100 MG: 100 CAPSULE ORAL at 08:06

## 2022-01-01 RX ADMIN — INSULIN LISPRO 1 UNITS: 100 INJECTION, SOLUTION INTRAVENOUS; SUBCUTANEOUS at 04:28

## 2022-01-01 RX ADMIN — SODIUM CHLORIDE, PRESERVATIVE FREE 10 ML: 5 INJECTION INTRAVENOUS at 09:26

## 2022-01-01 RX ADMIN — ACETAMINOPHEN 650 MG: 325 TABLET, FILM COATED ORAL at 16:02

## 2022-01-01 RX ADMIN — HYDROCHLOROTHIAZIDE 12.5 MG: 12.5 TABLET ORAL at 08:20

## 2022-01-01 RX ADMIN — LISINOPRIL 20 MG: 20 TABLET ORAL at 09:06

## 2022-01-01 RX ADMIN — LACTULOSE 20 G: 20 SOLUTION ORAL at 20:33

## 2022-01-01 RX ADMIN — LACOSAMIDE 200 MG: 10 SOLUTION ORAL at 08:33

## 2022-01-01 RX ADMIN — GABAPENTIN 100 MG: 100 CAPSULE ORAL at 10:42

## 2022-01-01 RX ADMIN — Medication 10 MG: at 10:22

## 2022-01-01 RX ADMIN — HYDRALAZINE HYDROCHLORIDE 10 MG: 20 INJECTION INTRAMUSCULAR; INTRAVENOUS at 07:06

## 2022-01-01 RX ADMIN — LABETALOL HYDROCHLORIDE 10 MG: 5 INJECTION INTRAVENOUS at 10:08

## 2022-01-01 RX ADMIN — SODIUM CHLORIDE, PRESERVATIVE FREE 10 ML: 5 INJECTION INTRAVENOUS at 08:09

## 2022-01-01 RX ADMIN — ARFORMOTEROL TARTRATE 15 MCG: 15 SOLUTION RESPIRATORY (INHALATION) at 20:20

## 2022-01-01 RX ADMIN — LACOSAMIDE 200 MG: 10 SOLUTION ORAL at 20:32

## 2022-01-01 RX ADMIN — INSULIN LISPRO 12 UNITS: 100 INJECTION, SOLUTION INTRAVENOUS; SUBCUTANEOUS at 12:08

## 2022-01-01 RX ADMIN — LEVETIRACETAM 500 MG: 5 INJECTION INTRAVENOUS at 23:12

## 2022-01-01 RX ADMIN — TAMSULOSIN HYDROCHLORIDE 0.4 MG: 0.4 CAPSULE ORAL at 22:35

## 2022-01-01 RX ADMIN — NIFEDIPINE 60 MG: 30 TABLET, EXTENDED RELEASE ORAL at 09:23

## 2022-01-01 RX ADMIN — HYDRALAZINE HYDROCHLORIDE 10 MG: 20 INJECTION INTRAMUSCULAR; INTRAVENOUS at 16:07

## 2022-01-01 RX ADMIN — HYDROCHLOROTHIAZIDE 12.5 MG: 12.5 TABLET ORAL at 08:04

## 2022-01-01 RX ADMIN — INSULIN LISPRO 12 UNITS: 100 INJECTION, SOLUTION INTRAVENOUS; SUBCUTANEOUS at 11:30

## 2022-01-01 RX ADMIN — NIFEDIPINE 30 MG: 30 TABLET, EXTENDED RELEASE ORAL at 08:14

## 2022-01-01 RX ADMIN — POLYETHYLENE GLYCOL 3350 17 G: 17 POWDER, FOR SOLUTION ORAL at 10:22

## 2022-01-01 RX ADMIN — DIVALPROEX SODIUM 500 MG: 125 CAPSULE ORAL at 14:29

## 2022-01-01 RX ADMIN — INSULIN LISPRO 4 UNITS: 100 INJECTION, SOLUTION INTRAVENOUS; SUBCUTANEOUS at 18:28

## 2022-01-01 RX ADMIN — INSULIN LISPRO 4 UNITS: 100 INJECTION, SOLUTION INTRAVENOUS; SUBCUTANEOUS at 16:35

## 2022-01-01 RX ADMIN — GUAIFENESIN 400 MG: 400 TABLET ORAL at 21:10

## 2022-01-01 RX ADMIN — ARFORMOTEROL TARTRATE 15 MCG: 15 SOLUTION RESPIRATORY (INHALATION) at 08:28

## 2022-01-01 RX ADMIN — HYDROCHLOROTHIAZIDE 12.5 MG: 12.5 TABLET ORAL at 08:54

## 2022-01-01 RX ADMIN — GUAIFENESIN 400 MG: 400 TABLET ORAL at 08:20

## 2022-01-01 RX ADMIN — INSULIN LISPRO 3 UNITS: 100 INJECTION, SOLUTION INTRAVENOUS; SUBCUTANEOUS at 18:54

## 2022-01-01 RX ADMIN — LACOSAMIDE 100 MG: 10 INJECTION, SOLUTION INTRAVENOUS at 21:01

## 2022-01-01 RX ADMIN — ARFORMOTEROL TARTRATE 15 MCG: 15 SOLUTION RESPIRATORY (INHALATION) at 20:19

## 2022-01-01 RX ADMIN — APIXABAN 2.5 MG: 2.5 TABLET, FILM COATED ORAL at 20:29

## 2022-01-01 RX ADMIN — LACTULOSE 20 G: 20 SOLUTION ORAL at 08:39

## 2022-01-01 RX ADMIN — ARFORMOTEROL TARTRATE 15 MCG: 15 SOLUTION RESPIRATORY (INHALATION) at 21:26

## 2022-01-01 RX ADMIN — LACOSAMIDE 200 MG: 10 INJECTION INTRAVENOUS at 21:08

## 2022-01-01 RX ADMIN — IPRATROPIUM BROMIDE AND ALBUTEROL SULFATE 1 AMPULE: 2.5; .5 SOLUTION RESPIRATORY (INHALATION) at 09:46

## 2022-01-01 RX ADMIN — GUAIFENESIN 400 MG: 400 TABLET ORAL at 16:18

## 2022-01-01 RX ADMIN — SODIUM CHLORIDE, PRESERVATIVE FREE 10 ML: 5 INJECTION INTRAVENOUS at 20:26

## 2022-01-01 RX ADMIN — ARFORMOTEROL TARTRATE 15 MCG: 15 SOLUTION RESPIRATORY (INHALATION) at 20:24

## 2022-01-01 RX ADMIN — INSULIN LISPRO 8 UNITS: 100 INJECTION, SOLUTION INTRAVENOUS; SUBCUTANEOUS at 03:58

## 2022-01-01 RX ADMIN — AMLODIPINE BESYLATE 10 MG: 10 TABLET ORAL at 08:19

## 2022-01-01 RX ADMIN — IPRATROPIUM BROMIDE AND ALBUTEROL SULFATE 1 AMPULE: 2.5; .5 SOLUTION RESPIRATORY (INHALATION) at 08:44

## 2022-01-01 RX ADMIN — PANTOPRAZOLE SODIUM 40 MG: 40 TABLET, DELAYED RELEASE ORAL at 09:24

## 2022-01-01 RX ADMIN — INSULIN LISPRO 8 UNITS: 100 INJECTION, SOLUTION INTRAVENOUS; SUBCUTANEOUS at 00:02

## 2022-01-01 RX ADMIN — LEVETIRACETAM 500 MG: 500 TABLET, FILM COATED ORAL at 14:29

## 2022-01-01 RX ADMIN — LACOSAMIDE 200 MG: 100 TABLET, FILM COATED ORAL at 20:45

## 2022-01-01 RX ADMIN — GUAIFENESIN 400 MG: 400 TABLET ORAL at 20:54

## 2022-01-01 RX ADMIN — LABETALOL HYDROCHLORIDE 10 MG: 5 INJECTION INTRAVENOUS at 01:09

## 2022-01-01 RX ADMIN — GABAPENTIN 100 MG: 100 CAPSULE ORAL at 20:15

## 2022-01-01 RX ADMIN — AMLODIPINE BESYLATE 5 MG: 5 TABLET ORAL at 12:28

## 2022-01-01 RX ADMIN — SODIUM CHLORIDE, PRESERVATIVE FREE 10 ML: 5 INJECTION INTRAVENOUS at 20:30

## 2022-01-01 RX ADMIN — ARFORMOTEROL TARTRATE 15 MCG: 15 SOLUTION RESPIRATORY (INHALATION) at 21:46

## 2022-01-01 RX ADMIN — Medication 10 MG: at 08:07

## 2022-01-01 RX ADMIN — INSULIN LISPRO 8 UNITS: 100 INJECTION, SOLUTION INTRAVENOUS; SUBCUTANEOUS at 14:22

## 2022-01-01 RX ADMIN — LACTULOSE 20 G: 20 SOLUTION ORAL at 15:00

## 2022-01-01 RX ADMIN — GUAIFENESIN 400 MG: 400 TABLET ORAL at 20:26

## 2022-01-01 RX ADMIN — INSULIN GLARGINE-YFGN 15 UNITS: 100 INJECTION, SOLUTION SUBCUTANEOUS at 09:28

## 2022-01-01 RX ADMIN — Medication 10 MG: at 08:40

## 2022-01-01 RX ADMIN — HYDROCHLOROTHIAZIDE 12.5 MG: 12.5 TABLET ORAL at 10:36

## 2022-01-01 RX ADMIN — HYDROCHLOROTHIAZIDE 12.5 MG: 12.5 TABLET ORAL at 14:29

## 2022-01-01 RX ADMIN — GUAIFENESIN 400 MG: 400 TABLET ORAL at 20:33

## 2022-01-01 RX ADMIN — GUAIFENESIN 400 MG: 400 TABLET ORAL at 16:07

## 2022-01-01 RX ADMIN — GABAPENTIN 100 MG: 100 CAPSULE ORAL at 20:42

## 2022-01-01 RX ADMIN — INSULIN LISPRO 3 UNITS: 100 INJECTION, SOLUTION INTRAVENOUS; SUBCUTANEOUS at 08:20

## 2022-01-01 RX ADMIN — SODIUM CHLORIDE: 9 INJECTION, SOLUTION INTRAVENOUS at 15:12

## 2022-01-01 RX ADMIN — ARFORMOTEROL TARTRATE 15 MCG: 15 SOLUTION RESPIRATORY (INHALATION) at 08:17

## 2022-01-01 RX ADMIN — LEVETIRACETAM 500 MG: 500 TABLET, FILM COATED ORAL at 21:00

## 2022-01-01 RX ADMIN — ACETAMINOPHEN 650 MG: 325 TABLET, FILM COATED ORAL at 21:59

## 2022-01-01 RX ADMIN — ARFORMOTEROL TARTRATE 15 MCG: 15 SOLUTION RESPIRATORY (INHALATION) at 08:04

## 2022-01-01 RX ADMIN — DIVALPROEX SODIUM 250 MG: 125 CAPSULE ORAL at 08:05

## 2022-01-01 RX ADMIN — INSULIN LISPRO 2 UNITS: 100 INJECTION, SOLUTION INTRAVENOUS; SUBCUTANEOUS at 09:28

## 2022-01-01 RX ADMIN — SODIUM CHLORIDE, PRESERVATIVE FREE 10 ML: 5 INJECTION INTRAVENOUS at 22:11

## 2022-01-01 RX ADMIN — LACOSAMIDE 200 MG: 100 TABLET, FILM COATED ORAL at 22:17

## 2022-01-01 RX ADMIN — NIFEDIPINE 30 MG: 30 TABLET, EXTENDED RELEASE ORAL at 07:33

## 2022-01-01 RX ADMIN — TAMSULOSIN HYDROCHLORIDE 0.4 MG: 0.4 CAPSULE ORAL at 22:28

## 2022-01-01 RX ADMIN — ARFORMOTEROL TARTRATE 15 MCG: 15 SOLUTION RESPIRATORY (INHALATION) at 10:03

## 2022-01-01 RX ADMIN — GABAPENTIN 100 MG: 100 CAPSULE ORAL at 20:46

## 2022-01-01 RX ADMIN — LACOSAMIDE 200 MG: 10 INJECTION INTRAVENOUS at 21:43

## 2022-01-01 RX ADMIN — TAMSULOSIN HYDROCHLORIDE 0.4 MG: 0.4 CAPSULE ORAL at 20:46

## 2022-01-01 RX ADMIN — LABETALOL HYDROCHLORIDE 10 MG: 5 INJECTION INTRAVENOUS at 23:05

## 2022-01-01 RX ADMIN — DIVALPROEX SODIUM 250 MG: 125 CAPSULE ORAL at 23:13

## 2022-01-01 RX ADMIN — INSULIN LISPRO 1 UNITS: 100 INJECTION, SOLUTION INTRAVENOUS; SUBCUTANEOUS at 18:14

## 2022-01-01 RX ADMIN — GUAIFENESIN 400 MG: 400 TABLET ORAL at 08:56

## 2022-01-01 RX ADMIN — LACTULOSE 20 G: 20 SOLUTION ORAL at 08:19

## 2022-01-01 RX ADMIN — LABETALOL HYDROCHLORIDE 10 MG: 5 INJECTION INTRAVENOUS at 00:12

## 2022-01-01 RX ADMIN — GUAIFENESIN 400 MG: 400 TABLET ORAL at 11:19

## 2022-01-01 RX ADMIN — HYDROCHLOROTHIAZIDE 12.5 MG: 12.5 TABLET ORAL at 08:26

## 2022-01-01 RX ADMIN — DOCUSATE SODIUM 50 MG AND SENNOSIDES 8.6 MG 2 TABLET: 8.6; 5 TABLET, FILM COATED ORAL at 08:30

## 2022-01-01 RX ADMIN — HYDRALAZINE HYDROCHLORIDE 10 MG: 20 INJECTION INTRAMUSCULAR; INTRAVENOUS at 17:10

## 2022-01-01 RX ADMIN — ARFORMOTEROL TARTRATE 15 MCG: 15 SOLUTION RESPIRATORY (INHALATION) at 09:59

## 2022-01-01 RX ADMIN — ARFORMOTEROL TARTRATE 15 MCG: 15 SOLUTION RESPIRATORY (INHALATION) at 19:43

## 2022-01-01 RX ADMIN — LEVETIRACETAM 500 MG: 5 INJECTION INTRAVENOUS at 00:17

## 2022-01-01 RX ADMIN — TAMSULOSIN HYDROCHLORIDE 0.4 MG: 0.4 CAPSULE ORAL at 21:04

## 2022-01-01 RX ADMIN — CEFAZOLIN 1000 MG: 1 INJECTION, POWDER, FOR SOLUTION INTRAMUSCULAR; INTRAVENOUS at 16:23

## 2022-01-01 RX ADMIN — IPRATROPIUM BROMIDE AND ALBUTEROL SULFATE 1 AMPULE: 2.5; .5 SOLUTION RESPIRATORY (INHALATION) at 19:43

## 2022-01-01 RX ADMIN — DOCUSATE SODIUM 50 MG AND SENNOSIDES 8.6 MG 2 TABLET: 8.6; 5 TABLET, FILM COATED ORAL at 09:01

## 2022-01-01 RX ADMIN — INSULIN LISPRO 4 UNITS: 100 INJECTION, SOLUTION INTRAVENOUS; SUBCUTANEOUS at 20:29

## 2022-01-01 RX ADMIN — LACOSAMIDE 200 MG: 100 TABLET, FILM COATED ORAL at 21:00

## 2022-01-01 RX ADMIN — DIVALPROEX SODIUM 500 MG: 125 CAPSULE ORAL at 21:00

## 2022-01-01 RX ADMIN — CEFAZOLIN 1000 MG: 1 INJECTION, POWDER, FOR SOLUTION INTRAMUSCULAR; INTRAVENOUS at 16:09

## 2022-01-01 RX ADMIN — GABAPENTIN 100 MG: 100 CAPSULE ORAL at 20:57

## 2022-01-01 RX ADMIN — SUCRALFATE 1 G: 1 TABLET ORAL at 06:20

## 2022-01-01 RX ADMIN — LABETALOL HYDROCHLORIDE 20 MG: 5 INJECTION INTRAVENOUS at 03:09

## 2022-01-01 RX ADMIN — Medication 10 MG: at 08:21

## 2022-01-01 RX ADMIN — ACETAMINOPHEN 650 MG: 325 TABLET, FILM COATED ORAL at 07:32

## 2022-01-01 RX ADMIN — HYDRALAZINE HYDROCHLORIDE 10 MG: 20 INJECTION INTRAMUSCULAR; INTRAVENOUS at 08:18

## 2022-01-01 RX ADMIN — NIFEDIPINE 30 MG: 30 TABLET, EXTENDED RELEASE ORAL at 08:26

## 2022-01-01 RX ADMIN — LACTULOSE 20 G: 20 SOLUTION ORAL at 15:18

## 2022-01-01 RX ADMIN — GUAIFENESIN 400 MG: 400 TABLET ORAL at 17:27

## 2022-01-01 RX ADMIN — IPRATROPIUM BROMIDE AND ALBUTEROL SULFATE 1 AMPULE: 2.5; .5 SOLUTION RESPIRATORY (INHALATION) at 12:25

## 2022-01-01 RX ADMIN — CARVEDILOL 12.5 MG: 6.25 TABLET, FILM COATED ORAL at 16:31

## 2022-01-01 RX ADMIN — IPRATROPIUM BROMIDE AND ALBUTEROL SULFATE 1 AMPULE: 2.5; .5 SOLUTION RESPIRATORY (INHALATION) at 20:19

## 2022-01-01 RX ADMIN — LACTULOSE 20 G: 20 SOLUTION ORAL at 20:29

## 2022-01-01 RX ADMIN — GUAIFENESIN 400 MG: 400 TABLET ORAL at 09:11

## 2022-01-01 RX ADMIN — CARVEDILOL 12.5 MG: 6.25 TABLET, FILM COATED ORAL at 08:39

## 2022-01-01 RX ADMIN — LISINOPRIL 20 MG: 20 TABLET ORAL at 14:29

## 2022-01-01 RX ADMIN — IPRATROPIUM BROMIDE AND ALBUTEROL SULFATE 1 AMPULE: 2.5; .5 SOLUTION RESPIRATORY (INHALATION) at 20:04

## 2022-01-01 RX ADMIN — LACOSAMIDE 200 MG: 10 INJECTION INTRAVENOUS at 22:42

## 2022-01-01 RX ADMIN — TAMSULOSIN HYDROCHLORIDE 0.4 MG: 0.4 CAPSULE ORAL at 20:57

## 2022-01-01 RX ADMIN — HYDRALAZINE HYDROCHLORIDE 10 MG: 20 INJECTION INTRAMUSCULAR; INTRAVENOUS at 17:15

## 2022-01-01 RX ADMIN — GUAIFENESIN 400 MG: 400 TABLET ORAL at 20:18

## 2022-01-01 RX ADMIN — GUAIFENESIN 400 MG: 400 TABLET ORAL at 08:03

## 2022-01-01 RX ADMIN — ARFORMOTEROL TARTRATE 15 MCG: 15 SOLUTION RESPIRATORY (INHALATION) at 23:07

## 2022-01-01 RX ADMIN — SODIUM CHLORIDE, PRESERVATIVE FREE 40 MG: 5 INJECTION INTRAVENOUS at 09:11

## 2022-01-01 RX ADMIN — ARFORMOTEROL TARTRATE 15 MCG: 15 SOLUTION RESPIRATORY (INHALATION) at 09:23

## 2022-01-01 RX ADMIN — CEFAZOLIN 1000 MG: 1 INJECTION, POWDER, FOR SOLUTION INTRAMUSCULAR; INTRAVENOUS at 16:31

## 2022-01-01 RX ADMIN — HYDRALAZINE HYDROCHLORIDE 10 MG: 20 INJECTION INTRAMUSCULAR; INTRAVENOUS at 12:24

## 2022-01-01 RX ADMIN — LEVETIRACETAM 500 MG: 100 SOLUTION ORAL at 08:19

## 2022-01-01 RX ADMIN — PANTOPRAZOLE SODIUM 40 MG: 40 TABLET, DELAYED RELEASE ORAL at 08:30

## 2022-01-01 RX ADMIN — AMLODIPINE BESYLATE 10 MG: 10 TABLET ORAL at 13:04

## 2022-01-01 RX ADMIN — Medication 10 MG: at 08:54

## 2022-01-01 RX ADMIN — INSULIN LISPRO 8 UNITS: 100 INJECTION, SOLUTION INTRAVENOUS; SUBCUTANEOUS at 12:08

## 2022-01-01 RX ADMIN — HYDROCHLOROTHIAZIDE 12.5 MG: 12.5 TABLET ORAL at 08:07

## 2022-01-01 RX ADMIN — LACOSAMIDE 200 MG: 10 SOLUTION ORAL at 08:38

## 2022-01-01 RX ADMIN — SODIUM CHLORIDE, PRESERVATIVE FREE 40 MG: 5 INJECTION INTRAVENOUS at 08:03

## 2022-01-01 RX ADMIN — LISINOPRIL 20 MG: 20 TABLET ORAL at 09:03

## 2022-01-01 RX ADMIN — GUAIFENESIN 400 MG: 400 TABLET ORAL at 22:14

## 2022-01-01 RX ADMIN — GABAPENTIN 100 MG: 100 CAPSULE ORAL at 10:36

## 2022-01-01 RX ADMIN — SODIUM CHLORIDE, PRESERVATIVE FREE 10 ML: 5 INJECTION INTRAVENOUS at 20:15

## 2022-01-01 RX ADMIN — GUAIFENESIN 400 MG: 400 TABLET ORAL at 20:57

## 2022-01-01 RX ADMIN — SODIUM CHLORIDE, PRESERVATIVE FREE 10 ML: 5 INJECTION INTRAVENOUS at 09:12

## 2022-01-01 RX ADMIN — CARVEDILOL 12.5 MG: 6.25 TABLET, FILM COATED ORAL at 08:54

## 2022-01-01 RX ADMIN — GLYCOPYRROLATE 0.2 MG: 0.2 INJECTION, SOLUTION INTRAMUSCULAR; INTRAVENOUS at 16:49

## 2022-01-01 RX ADMIN — Medication 500 MG: at 14:06

## 2022-01-01 RX ADMIN — SODIUM CHLORIDE, PRESERVATIVE FREE 10 ML: 5 INJECTION INTRAVENOUS at 09:00

## 2022-01-01 RX ADMIN — GUAIFENESIN 400 MG: 400 TABLET ORAL at 08:40

## 2022-01-01 RX ADMIN — LACOSAMIDE 200 MG: 100 TABLET, FILM COATED ORAL at 09:15

## 2022-01-01 RX ADMIN — NIFEDIPINE 60 MG: 30 TABLET, EXTENDED RELEASE ORAL at 09:01

## 2022-01-01 RX ADMIN — HYDROCHLOROTHIAZIDE 12.5 MG: 12.5 TABLET ORAL at 09:10

## 2022-01-01 RX ADMIN — VALPROATE SODIUM 500 MG: 100 INJECTION, SOLUTION INTRAVENOUS at 10:10

## 2022-01-01 RX ADMIN — CEFAZOLIN 1000 MG: 1 INJECTION, POWDER, FOR SOLUTION INTRAMUSCULAR; INTRAVENOUS at 11:19

## 2022-01-01 RX ADMIN — INSULIN LISPRO 8 UNITS: 100 INJECTION, SOLUTION INTRAVENOUS; SUBCUTANEOUS at 20:34

## 2022-01-01 RX ADMIN — LEVETIRACETAM 500 MG: 5 INJECTION INTRAVENOUS at 23:38

## 2022-01-01 RX ADMIN — INSULIN LISPRO 8 UNITS: 100 INJECTION, SOLUTION INTRAVENOUS; SUBCUTANEOUS at 18:54

## 2022-01-01 RX ADMIN — HYDROCHLOROTHIAZIDE 12.5 MG: 12.5 TABLET ORAL at 09:01

## 2022-01-01 RX ADMIN — GUAIFENESIN 400 MG: 400 TABLET ORAL at 15:59

## 2022-01-01 RX ADMIN — LEVETIRACETAM 500 MG: 500 TABLET, FILM COATED ORAL at 20:46

## 2022-01-01 RX ADMIN — ACETAMINOPHEN 650 MG: 325 TABLET, FILM COATED ORAL at 11:20

## 2022-01-01 RX ADMIN — TAMSULOSIN HYDROCHLORIDE 0.4 MG: 0.4 CAPSULE ORAL at 21:00

## 2022-01-01 RX ADMIN — SODIUM CHLORIDE 2.5 MG/HR: 9 INJECTION, SOLUTION INTRAVENOUS at 08:49

## 2022-01-01 RX ADMIN — ARFORMOTEROL TARTRATE 15 MCG: 15 SOLUTION RESPIRATORY (INHALATION) at 08:16

## 2022-01-01 RX ADMIN — INSULIN LISPRO 6 UNITS: 100 INJECTION, SOLUTION INTRAVENOUS; SUBCUTANEOUS at 09:26

## 2022-01-01 RX ADMIN — HYDROCHLOROTHIAZIDE 12.5 MG: 12.5 TABLET ORAL at 08:30

## 2022-01-01 RX ADMIN — LACOSAMIDE 200 MG: 10 INJECTION INTRAVENOUS at 09:16

## 2022-01-01 RX ADMIN — TAMSULOSIN HYDROCHLORIDE 0.4 MG: 0.4 CAPSULE ORAL at 20:45

## 2022-01-01 RX ADMIN — INSULIN LISPRO 4 UNITS: 100 INJECTION, SOLUTION INTRAVENOUS; SUBCUTANEOUS at 01:13

## 2022-01-01 RX ADMIN — Medication 500 MG: at 20:33

## 2022-01-01 RX ADMIN — POTASSIUM BICARBONATE 40 MEQ: 782 TABLET, EFFERVESCENT ORAL at 09:58

## 2022-01-01 RX ADMIN — AMLODIPINE BESYLATE 5 MG: 5 TABLET ORAL at 20:29

## 2022-01-01 RX ADMIN — INSULIN GLARGINE-YFGN 15 UNITS: 100 INJECTION, SOLUTION SUBCUTANEOUS at 09:02

## 2022-01-01 RX ADMIN — GABAPENTIN 100 MG: 100 CAPSULE ORAL at 22:35

## 2022-01-01 RX ADMIN — GABAPENTIN 100 MG: 100 CAPSULE ORAL at 09:24

## 2022-01-01 RX ADMIN — CEFAZOLIN 1000 MG: 1 INJECTION, POWDER, FOR SOLUTION INTRAMUSCULAR; INTRAVENOUS at 03:22

## 2022-01-01 RX ADMIN — LISINOPRIL 20 MG: 20 TABLET ORAL at 08:07

## 2022-01-01 RX ADMIN — INSULIN GLARGINE-YFGN 5 UNITS: 100 INJECTION, SOLUTION SUBCUTANEOUS at 08:15

## 2022-01-01 RX ADMIN — TAMSULOSIN HYDROCHLORIDE 0.4 MG: 0.4 CAPSULE ORAL at 20:29

## 2022-01-01 RX ADMIN — FINASTERIDE 5 MG: 5 TABLET, FILM COATED ORAL at 13:04

## 2022-01-01 RX ADMIN — GABAPENTIN 100 MG: 100 CAPSULE ORAL at 22:14

## 2022-01-01 RX ADMIN — DIVALPROEX SODIUM 500 MG: 125 CAPSULE ORAL at 09:01

## 2022-01-01 RX ADMIN — SODIUM CHLORIDE, PRESERVATIVE FREE 10 ML: 5 INJECTION INTRAVENOUS at 23:15

## 2022-01-01 RX ADMIN — LACTULOSE 20 G: 20 SOLUTION ORAL at 14:06

## 2022-01-01 RX ADMIN — LABETALOL HYDROCHLORIDE 20 MG: 5 INJECTION INTRAVENOUS at 20:40

## 2022-01-01 RX ADMIN — INSULIN LISPRO 1 UNITS: 100 INJECTION, SOLUTION INTRAVENOUS; SUBCUTANEOUS at 12:01

## 2022-01-01 RX ADMIN — GUAIFENESIN 400 MG: 400 TABLET ORAL at 18:09

## 2022-01-01 RX ADMIN — SODIUM CHLORIDE, PRESERVATIVE FREE 10 ML: 5 INJECTION INTRAVENOUS at 20:18

## 2022-01-01 RX ADMIN — ARFORMOTEROL TARTRATE 15 MCG: 15 SOLUTION RESPIRATORY (INHALATION) at 07:34

## 2022-01-01 RX ADMIN — TAMSULOSIN HYDROCHLORIDE 0.4 MG: 0.4 CAPSULE ORAL at 21:37

## 2022-01-01 RX ADMIN — HYDRALAZINE HYDROCHLORIDE 10 MG: 20 INJECTION INTRAMUSCULAR; INTRAVENOUS at 01:22

## 2022-01-01 RX ADMIN — DIVALPROEX SODIUM 500 MG: 125 CAPSULE ORAL at 20:45

## 2022-01-01 RX ADMIN — CEFAZOLIN 1000 MG: 1 INJECTION, POWDER, FOR SOLUTION INTRAMUSCULAR; INTRAVENOUS at 04:15

## 2022-01-01 RX ADMIN — POLYETHYLENE GLYCOL 3350 17 G: 17 POWDER, FOR SOLUTION ORAL at 09:13

## 2022-01-01 RX ADMIN — NIFEDIPINE 30 MG: 30 TABLET, EXTENDED RELEASE ORAL at 21:55

## 2022-01-01 RX ADMIN — HYDROCHLOROTHIAZIDE 12.5 MG: 12.5 TABLET ORAL at 10:41

## 2022-01-01 RX ADMIN — ACETAMINOPHEN 650 MG: 325 TABLET, FILM COATED ORAL at 17:39

## 2022-01-01 RX ADMIN — LACOSAMIDE 200 MG: 10 INJECTION INTRAVENOUS at 09:21

## 2022-01-01 RX ADMIN — SODIUM CHLORIDE, PRESERVATIVE FREE 40 MG: 5 INJECTION INTRAVENOUS at 09:26

## 2022-01-01 RX ADMIN — SODIUM CHLORIDE, PRESERVATIVE FREE 10 ML: 5 INJECTION INTRAVENOUS at 09:13

## 2022-01-01 RX ADMIN — CARVEDILOL 12.5 MG: 6.25 TABLET, FILM COATED ORAL at 17:20

## 2022-01-01 RX ADMIN — SODIUM CHLORIDE, PRESERVATIVE FREE 10 ML: 5 INJECTION INTRAVENOUS at 21:00

## 2022-01-01 RX ADMIN — SODIUM CHLORIDE: 9 INJECTION, SOLUTION INTRAVENOUS at 05:28

## 2022-01-01 RX ADMIN — LISINOPRIL 20 MG: 20 TABLET ORAL at 09:11

## 2022-01-01 RX ADMIN — INSULIN GLARGINE-YFGN 15 UNITS: 100 INJECTION, SOLUTION SUBCUTANEOUS at 20:29

## 2022-01-01 RX ADMIN — LISINOPRIL 20 MG: 20 TABLET ORAL at 08:30

## 2022-01-01 RX ADMIN — LACOSAMIDE 200 MG: 10 INJECTION, SOLUTION INTRAVENOUS at 17:39

## 2022-01-01 RX ADMIN — CEFAZOLIN 1000 MG: 1 INJECTION, POWDER, FOR SOLUTION INTRAMUSCULAR; INTRAVENOUS at 18:59

## 2022-01-01 RX ADMIN — DOCUSATE SODIUM 50 MG AND SENNOSIDES 8.6 MG 2 TABLET: 8.6; 5 TABLET, FILM COATED ORAL at 12:47

## 2022-01-01 RX ADMIN — HYDRALAZINE HYDROCHLORIDE 10 MG: 20 INJECTION INTRAMUSCULAR; INTRAVENOUS at 12:00

## 2022-01-01 RX ADMIN — ARFORMOTEROL TARTRATE 15 MCG: 15 SOLUTION RESPIRATORY (INHALATION) at 10:09

## 2022-01-01 RX ADMIN — GABAPENTIN 100 MG: 100 CAPSULE ORAL at 20:29

## 2022-01-01 RX ADMIN — ONDANSETRON 4 MG: 2 INJECTION INTRAMUSCULAR; INTRAVENOUS at 13:09

## 2022-01-01 RX ADMIN — LEVETIRACETAM 500 MG: 100 SOLUTION ORAL at 20:16

## 2022-01-01 RX ADMIN — ISOPROTERENOL HYDROCHLORIDE 2 MCG/MIN: 0.2 INJECTION, SOLUTION INTRAMUSCULAR; INTRAVENOUS at 16:23

## 2022-01-01 RX ADMIN — DOCUSATE SODIUM 50 MG AND SENNOSIDES 8.6 MG 2 TABLET: 8.6; 5 TABLET, FILM COATED ORAL at 08:54

## 2022-01-01 RX ADMIN — LACOSAMIDE 200 MG: 10 INJECTION INTRAVENOUS at 08:48

## 2022-01-01 RX ADMIN — GUAIFENESIN 400 MG: 400 TABLET ORAL at 22:35

## 2022-01-01 RX ADMIN — CEFAZOLIN 1000 MG: 1 INJECTION, POWDER, FOR SOLUTION INTRAMUSCULAR; INTRAVENOUS at 04:14

## 2022-01-01 RX ADMIN — SODIUM CHLORIDE, PRESERVATIVE FREE 40 MG: 5 INJECTION INTRAVENOUS at 08:17

## 2022-01-01 RX ADMIN — INSULIN GLARGINE-YFGN 35 UNITS: 100 INJECTION, SOLUTION SUBCUTANEOUS at 10:39

## 2022-01-01 RX ADMIN — TAMSULOSIN HYDROCHLORIDE 0.4 MG: 0.4 CAPSULE ORAL at 22:17

## 2022-01-01 RX ADMIN — ARFORMOTEROL TARTRATE 15 MCG: 15 SOLUTION RESPIRATORY (INHALATION) at 08:46

## 2022-01-01 RX ADMIN — INSULIN LISPRO 2 UNITS: 100 INJECTION, SOLUTION INTRAVENOUS; SUBCUTANEOUS at 13:20

## 2022-01-01 RX ADMIN — HYDROCHLOROTHIAZIDE 12.5 MG: 12.5 TABLET ORAL at 08:14

## 2022-01-01 RX ADMIN — INSULIN LISPRO 4 UNITS: 100 INJECTION, SOLUTION INTRAVENOUS; SUBCUTANEOUS at 06:49

## 2022-01-01 RX ADMIN — LEVETIRACETAM 500 MG: 5 INJECTION INTRAVENOUS at 11:50

## 2022-01-01 RX ADMIN — POLYETHYLENE GLYCOL 3350 17 G: 17 POWDER, FOR SOLUTION ORAL at 08:23

## 2022-01-01 RX ADMIN — POLYETHYLENE GLYCOL 3350 17 G: 17 POWDER, FOR SOLUTION ORAL at 08:53

## 2022-01-01 RX ADMIN — NIFEDIPINE 60 MG: 30 TABLET, EXTENDED RELEASE ORAL at 10:36

## 2022-01-01 RX ADMIN — GABAPENTIN 100 MG: 100 CAPSULE ORAL at 20:26

## 2022-01-01 RX ADMIN — LEVETIRACETAM 500 MG: 5 INJECTION INTRAVENOUS at 10:33

## 2022-01-01 RX ADMIN — GUAIFENESIN 400 MG: 400 TABLET ORAL at 20:56

## 2022-01-01 RX ADMIN — POLYETHYLENE GLYCOL 3350 17 G: 17 POWDER, FOR SOLUTION ORAL at 08:04

## 2022-01-01 RX ADMIN — POLYETHYLENE GLYCOL 3350 17 G: 17 POWDER, FOR SOLUTION ORAL at 10:37

## 2022-01-01 RX ADMIN — POLYETHYLENE GLYCOL 3350 17 G: 17 POWDER, FOR SOLUTION ORAL at 15:39

## 2022-01-01 RX ADMIN — IPRATROPIUM BROMIDE AND ALBUTEROL SULFATE 1 AMPULE: 2.5; .5 SOLUTION RESPIRATORY (INHALATION) at 09:59

## 2022-01-01 RX ADMIN — LACOSAMIDE 200 MG: 100 TABLET, FILM COATED ORAL at 14:29

## 2022-01-01 RX ADMIN — GUAIFENESIN 400 MG: 400 TABLET ORAL at 08:23

## 2022-01-01 RX ADMIN — CEFAZOLIN 1000 MG: 1 INJECTION, POWDER, FOR SOLUTION INTRAMUSCULAR; INTRAVENOUS at 06:30

## 2022-01-01 RX ADMIN — CEFAZOLIN 1000 MG: 1 INJECTION, POWDER, FOR SOLUTION INTRAMUSCULAR; INTRAVENOUS at 03:59

## 2022-01-01 RX ADMIN — INSULIN LISPRO 3 UNITS: 100 INJECTION, SOLUTION INTRAVENOUS; SUBCUTANEOUS at 11:28

## 2022-01-01 RX ADMIN — INSULIN LISPRO 12 UNITS: 100 INJECTION, SOLUTION INTRAVENOUS; SUBCUTANEOUS at 12:09

## 2022-01-01 RX ADMIN — MAGNESIUM SULFATE HEPTAHYDRATE 2000 MG: 40 INJECTION, SOLUTION INTRAVENOUS at 08:46

## 2022-01-01 RX ADMIN — CEFAZOLIN 1000 MG: 1 INJECTION, POWDER, FOR SOLUTION INTRAMUSCULAR; INTRAVENOUS at 04:29

## 2022-01-01 RX ADMIN — GABAPENTIN 100 MG: 100 CAPSULE ORAL at 10:22

## 2022-01-01 RX ADMIN — DIVALPROEX SODIUM 500 MG: 125 CAPSULE ORAL at 09:14

## 2022-01-01 RX ADMIN — SODIUM CHLORIDE, PRESERVATIVE FREE 10 ML: 5 INJECTION INTRAVENOUS at 21:38

## 2022-01-01 RX ADMIN — GABAPENTIN 100 MG: 100 CAPSULE ORAL at 21:28

## 2022-01-01 RX ADMIN — HYDROCHLOROTHIAZIDE 12.5 MG: 12.5 TABLET ORAL at 09:23

## 2022-01-01 RX ADMIN — ARFORMOTEROL TARTRATE 15 MCG: 15 SOLUTION RESPIRATORY (INHALATION) at 09:34

## 2022-01-01 RX ADMIN — LABETALOL HYDROCHLORIDE 10 MG: 5 INJECTION INTRAVENOUS at 16:33

## 2022-01-01 RX ADMIN — FINASTERIDE 5 MG: 5 TABLET, FILM COATED ORAL at 08:21

## 2022-01-01 RX ADMIN — HYDRALAZINE HYDROCHLORIDE 10 MG: 20 INJECTION INTRAMUSCULAR; INTRAVENOUS at 23:11

## 2022-01-01 RX ADMIN — INSULIN LISPRO 8 UNITS: 100 INJECTION, SOLUTION INTRAVENOUS; SUBCUTANEOUS at 08:30

## 2022-01-01 RX ADMIN — DEXTROSE MONOHYDRATE 1000 MG: 50 INJECTION, SOLUTION INTRAVENOUS at 00:47

## 2022-01-01 RX ADMIN — SODIUM CHLORIDE, PRESERVATIVE FREE 10 ML: 5 INJECTION INTRAVENOUS at 21:28

## 2022-01-01 RX ADMIN — ARFORMOTEROL TARTRATE 15 MCG: 15 SOLUTION RESPIRATORY (INHALATION) at 14:09

## 2022-01-01 RX ADMIN — GUAIFENESIN 400 MG: 400 TABLET ORAL at 20:46

## 2022-01-01 RX ADMIN — MORPHINE SULFATE 2 MG: 2 INJECTION, SOLUTION INTRAMUSCULAR; INTRAVENOUS at 14:08

## 2022-01-01 RX ADMIN — AMLODIPINE BESYLATE 10 MG: 10 TABLET ORAL at 08:54

## 2022-01-01 RX ADMIN — ALLOPURINOL 300 MG: 300 TABLET ORAL at 08:04

## 2022-01-01 RX ADMIN — NIFEDIPINE 30 MG: 30 TABLET, EXTENDED RELEASE ORAL at 08:04

## 2022-01-01 RX ADMIN — INSULIN LISPRO 2 UNITS: 100 INJECTION, SOLUTION INTRAVENOUS; SUBCUTANEOUS at 08:17

## 2022-01-01 RX ADMIN — HYDROCHLOROTHIAZIDE 12.5 MG: 12.5 TABLET ORAL at 07:33

## 2022-01-01 RX ADMIN — INSULIN LISPRO 12 UNITS: 100 INJECTION, SOLUTION INTRAVENOUS; SUBCUTANEOUS at 23:43

## 2022-01-01 RX ADMIN — SODIUM CHLORIDE, PRESERVATIVE FREE 10 ML: 5 INJECTION INTRAVENOUS at 10:38

## 2022-01-01 RX ADMIN — NIFEDIPINE 60 MG: 30 TABLET, EXTENDED RELEASE ORAL at 09:17

## 2022-01-01 RX ADMIN — GABAPENTIN 100 MG: 100 CAPSULE ORAL at 08:03

## 2022-01-01 RX ADMIN — HYDRALAZINE HYDROCHLORIDE 10 MG: 20 INJECTION INTRAMUSCULAR; INTRAVENOUS at 06:10

## 2022-01-01 RX ADMIN — ARFORMOTEROL TARTRATE 15 MCG: 15 SOLUTION RESPIRATORY (INHALATION) at 08:50

## 2022-01-01 RX ADMIN — LACOSAMIDE 200 MG: 10 INJECTION INTRAVENOUS at 23:07

## 2022-01-01 RX ADMIN — DIVALPROEX SODIUM 500 MG: 125 CAPSULE ORAL at 21:35

## 2022-01-01 RX ADMIN — GUAIFENESIN 400 MG: 400 TABLET ORAL at 10:22

## 2022-01-01 RX ADMIN — ONDANSETRON 4 MG: 2 INJECTION INTRAMUSCULAR; INTRAVENOUS at 20:47

## 2022-01-01 RX ADMIN — SODIUM CHLORIDE 5 MG/HR: 9 INJECTION, SOLUTION INTRAVENOUS at 19:59

## 2022-01-01 RX ADMIN — PANTOPRAZOLE SODIUM 40 MG: 40 TABLET, DELAYED RELEASE ORAL at 10:38

## 2022-01-01 RX ADMIN — DOCUSATE SODIUM 50 MG AND SENNOSIDES 8.6 MG 2 TABLET: 8.6; 5 TABLET, FILM COATED ORAL at 09:15

## 2022-01-01 RX ADMIN — LACOSAMIDE 100 MG: 10 INJECTION, SOLUTION INTRAVENOUS at 14:58

## 2022-01-01 RX ADMIN — INSULIN LISPRO 11 UNITS: 100 INJECTION, SOLUTION INTRAVENOUS; SUBCUTANEOUS at 13:29

## 2022-01-01 RX ADMIN — DIVALPROEX SODIUM 500 MG: 125 CAPSULE ORAL at 08:29

## 2022-01-01 RX ADMIN — LEVETIRACETAM 500 MG: 5 INJECTION INTRAVENOUS at 23:27

## 2022-01-01 RX ADMIN — INSULIN GLARGINE-YFGN 15 UNITS: 100 INJECTION, SOLUTION SUBCUTANEOUS at 10:22

## 2022-01-01 RX ADMIN — PROTHROMBIN, COAGULATION FACTOR VII HUMAN, COAGULATION FACTOR IX HUMAN, COAGULATION FACTOR X HUMAN, PROTEIN C, PROTEIN S HUMAN, AND WATER 2000 UNITS: KIT at 23:34

## 2022-01-01 RX ADMIN — INSULIN LISPRO 1 UNITS: 100 INJECTION, SOLUTION INTRAVENOUS; SUBCUTANEOUS at 12:46

## 2022-01-01 RX ADMIN — HYDRALAZINE HYDROCHLORIDE 10 MG: 20 INJECTION INTRAMUSCULAR; INTRAVENOUS at 10:22

## 2022-01-01 RX ADMIN — DIVALPROEX SODIUM 500 MG: 125 CAPSULE ORAL at 22:27

## 2022-01-01 RX ADMIN — LEVETIRACETAM 500 MG: 500 TABLET, FILM COATED ORAL at 09:15

## 2022-01-01 RX ADMIN — HYDRALAZINE HYDROCHLORIDE 10 MG: 20 INJECTION INTRAMUSCULAR; INTRAVENOUS at 07:11

## 2022-01-01 RX ADMIN — LISINOPRIL 20 MG: 20 TABLET ORAL at 10:36

## 2022-01-01 RX ADMIN — GUAIFENESIN 400 MG: 400 TABLET ORAL at 15:08

## 2022-01-01 RX ADMIN — GABAPENTIN 100 MG: 100 CAPSULE ORAL at 21:35

## 2022-01-01 RX ADMIN — ARFORMOTEROL TARTRATE 15 MCG: 15 SOLUTION RESPIRATORY (INHALATION) at 19:59

## 2022-01-01 RX ADMIN — INSULIN LISPRO 4 UNITS: 100 INJECTION, SOLUTION INTRAVENOUS; SUBCUTANEOUS at 23:58

## 2022-01-01 RX ADMIN — ACETAMINOPHEN 650 MG: 325 TABLET ORAL at 20:36

## 2022-01-01 RX ADMIN — LACOSAMIDE 200 MG: 10 SOLUTION ORAL at 20:12

## 2022-01-01 RX ADMIN — DIVALPROEX SODIUM 500 MG: 125 CAPSULE ORAL at 20:42

## 2022-01-01 RX ADMIN — CARVEDILOL 12.5 MG: 6.25 TABLET, FILM COATED ORAL at 08:20

## 2022-01-01 RX ADMIN — HYDROCHLOROTHIAZIDE 12.5 MG: 12.5 TABLET ORAL at 10:22

## 2022-01-01 RX ADMIN — INSULIN LISPRO 3 UNITS: 100 INJECTION, SOLUTION INTRAVENOUS; SUBCUTANEOUS at 11:26

## 2022-01-01 RX ADMIN — LEVETIRACETAM 500 MG: 5 INJECTION INTRAVENOUS at 11:24

## 2022-01-01 RX ADMIN — LISINOPRIL 20 MG: 20 TABLET ORAL at 10:22

## 2022-01-01 RX ADMIN — SUCRALFATE 1 G: 1 TABLET ORAL at 17:50

## 2022-01-01 RX ADMIN — HYDRALAZINE HYDROCHLORIDE 10 MG: 20 INJECTION INTRAMUSCULAR; INTRAVENOUS at 00:54

## 2022-01-01 RX ADMIN — SUCRALFATE 1 G: 1 TABLET ORAL at 05:44

## 2022-01-01 RX ADMIN — GUAIFENESIN 400 MG: 400 TABLET ORAL at 14:29

## 2022-01-01 RX ADMIN — POLYETHYLENE GLYCOL 3350 17 G: 17 POWDER, FOR SOLUTION ORAL at 09:10

## 2022-01-01 RX ADMIN — LISINOPRIL 20 MG: 20 TABLET ORAL at 09:51

## 2022-01-01 RX ADMIN — ISOPROTERENOL HYDROCHLORIDE 2 MCG/MIN: 0.2 INJECTION, SOLUTION INTRAMUSCULAR; INTRAVENOUS at 00:57

## 2022-01-01 RX ADMIN — ALLOPURINOL 300 MG: 300 TABLET ORAL at 09:01

## 2022-01-01 RX ADMIN — GABAPENTIN 100 MG: 100 CAPSULE ORAL at 08:14

## 2022-01-01 RX ADMIN — LEVETIRACETAM 500 MG: 5 INJECTION INTRAVENOUS at 11:55

## 2022-01-01 RX ADMIN — ARFORMOTEROL TARTRATE 15 MCG: 15 SOLUTION RESPIRATORY (INHALATION) at 21:07

## 2022-01-01 RX ADMIN — Medication 500 MG: at 17:20

## 2022-01-01 RX ADMIN — LISINOPRIL 20 MG: 20 TABLET ORAL at 08:39

## 2022-01-01 RX ADMIN — ARFORMOTEROL TARTRATE 15 MCG: 15 SOLUTION RESPIRATORY (INHALATION) at 09:52

## 2022-01-01 RX ADMIN — LEVETIRACETAM 500 MG: 5 INJECTION INTRAVENOUS at 12:36

## 2022-01-01 RX ADMIN — TAMSULOSIN HYDROCHLORIDE 0.4 MG: 0.4 CAPSULE ORAL at 21:55

## 2022-01-01 RX ADMIN — SODIUM CHLORIDE, PRESERVATIVE FREE 10 ML: 5 INJECTION INTRAVENOUS at 07:41

## 2022-01-01 RX ADMIN — INSULIN LISPRO 8 UNITS: 100 INJECTION, SOLUTION INTRAVENOUS; SUBCUTANEOUS at 00:34

## 2022-01-01 RX ADMIN — LEVETIRACETAM 500 MG: 100 SOLUTION ORAL at 22:00

## 2022-01-01 RX ADMIN — ALLOPURINOL 300 MG: 300 TABLET ORAL at 10:36

## 2022-01-01 RX ADMIN — DIVALPROEX SODIUM 500 MG: 125 CAPSULE ORAL at 09:25

## 2022-01-01 RX ADMIN — GUAIFENESIN 400 MG: 400 TABLET ORAL at 21:35

## 2022-01-01 RX ADMIN — DOCUSATE SODIUM 100 MG: 100 CAPSULE, LIQUID FILLED ORAL at 16:23

## 2022-01-01 RX ADMIN — GABAPENTIN 100 MG: 100 CAPSULE ORAL at 14:29

## 2022-01-01 RX ADMIN — LISINOPRIL 20 MG: 20 TABLET ORAL at 20:29

## 2022-01-01 RX ADMIN — INSULIN LISPRO 4 UNITS: 100 INJECTION, SOLUTION INTRAVENOUS; SUBCUTANEOUS at 09:11

## 2022-01-01 RX ADMIN — SUCRALFATE 1 G: 1 TABLET ORAL at 11:21

## 2022-01-01 RX ADMIN — LISINOPRIL 20 MG: 20 TABLET ORAL at 08:14

## 2022-01-01 RX ADMIN — INSULIN GLARGINE-YFGN 28 UNITS: 100 INJECTION, SOLUTION SUBCUTANEOUS at 10:48

## 2022-01-01 RX ADMIN — INSULIN LISPRO 8 UNITS: 100 INJECTION, SOLUTION INTRAVENOUS; SUBCUTANEOUS at 08:57

## 2022-01-01 RX ADMIN — INSULIN GLARGINE-YFGN 25 UNITS: 100 INJECTION, SOLUTION SUBCUTANEOUS at 08:39

## 2022-01-01 RX ADMIN — DOCUSATE SODIUM 50 MG AND SENNOSIDES 8.6 MG 2 TABLET: 8.6; 5 TABLET, FILM COATED ORAL at 08:06

## 2022-01-01 RX ADMIN — GUAIFENESIN 400 MG: 400 TABLET ORAL at 14:06

## 2022-01-01 RX ADMIN — SODIUM CHLORIDE 500 ML: 9 INJECTION, SOLUTION INTRAVENOUS at 04:10

## 2022-01-01 RX ADMIN — GUAIFENESIN 400 MG: 400 TABLET ORAL at 08:06

## 2022-01-01 RX ADMIN — LEVETIRACETAM 500 MG: 5 INJECTION INTRAVENOUS at 22:48

## 2022-01-01 RX ADMIN — AMLODIPINE BESYLATE 10 MG: 10 TABLET ORAL at 08:07

## 2022-01-01 RX ADMIN — INSULIN LISPRO 16 UNITS: 100 INJECTION, SOLUTION INTRAVENOUS; SUBCUTANEOUS at 12:33

## 2022-01-01 RX ADMIN — LABETALOL HYDROCHLORIDE 10 MG: 5 INJECTION INTRAVENOUS at 18:25

## 2022-01-01 RX ADMIN — INSULIN LISPRO 4 UNITS: 100 INJECTION, SOLUTION INTRAVENOUS; SUBCUTANEOUS at 04:13

## 2022-01-01 RX ADMIN — SODIUM CHLORIDE, PRESERVATIVE FREE 10 ML: 5 INJECTION INTRAVENOUS at 08:58

## 2022-01-01 RX ADMIN — INSULIN GLARGINE-YFGN 15 UNITS: 100 INJECTION, SOLUTION SUBCUTANEOUS at 08:17

## 2022-01-01 RX ADMIN — LACOSAMIDE 200 MG: 100 TABLET, FILM COATED ORAL at 10:22

## 2022-01-01 RX ADMIN — INSULIN GLARGINE-YFGN 28 UNITS: 100 INJECTION, SOLUTION SUBCUTANEOUS at 09:11

## 2022-01-01 RX ADMIN — HYDRALAZINE HYDROCHLORIDE 10 MG: 20 INJECTION INTRAMUSCULAR; INTRAVENOUS at 07:42

## 2022-01-01 RX ADMIN — LABETALOL HYDROCHLORIDE 20 MG: 5 INJECTION INTRAVENOUS at 18:06

## 2022-01-01 RX ADMIN — LABETALOL HYDROCHLORIDE 20 MG: 5 INJECTION INTRAVENOUS at 17:39

## 2022-01-01 RX ADMIN — LISINOPRIL 20 MG: 20 TABLET ORAL at 07:32

## 2022-01-01 RX ADMIN — LABETALOL HYDROCHLORIDE 5 MG: 5 INJECTION INTRAVENOUS at 13:30

## 2022-01-01 RX ADMIN — AMLODIPINE BESYLATE 10 MG: 10 TABLET ORAL at 08:40

## 2022-01-01 RX ADMIN — SUCRALFATE 1 G: 1 TABLET ORAL at 00:28

## 2022-01-01 RX ADMIN — INSULIN LISPRO 8 UNITS: 100 INJECTION, SOLUTION INTRAVENOUS; SUBCUTANEOUS at 17:36

## 2022-01-01 RX ADMIN — ARFORMOTEROL TARTRATE 15 MCG: 15 SOLUTION RESPIRATORY (INHALATION) at 09:46

## 2022-01-01 RX ADMIN — SODIUM CHLORIDE, PRESERVATIVE FREE 10 ML: 5 INJECTION INTRAVENOUS at 20:45

## 2022-01-01 RX ADMIN — LORAZEPAM 1 MG: 2 INJECTION INTRAMUSCULAR; INTRAVENOUS at 14:14

## 2022-01-01 RX ADMIN — MORPHINE SULFATE 2 MG: 2 INJECTION, SOLUTION INTRAMUSCULAR; INTRAVENOUS at 12:47

## 2022-01-01 RX ADMIN — INSULIN GLARGINE-YFGN 15 UNITS: 100 INJECTION, SOLUTION SUBCUTANEOUS at 08:38

## 2022-01-01 RX ADMIN — SODIUM CHLORIDE 50 ML: 9 INJECTION, SOLUTION INTRAVENOUS at 00:07

## 2022-01-01 RX ADMIN — LABETALOL HYDROCHLORIDE 5 MG: 5 INJECTION INTRAVENOUS at 21:21

## 2022-01-01 RX ADMIN — MORPHINE SULFATE 2 MG: 2 INJECTION, SOLUTION INTRAMUSCULAR; INTRAVENOUS at 14:30

## 2022-01-01 RX ADMIN — HYDRALAZINE HYDROCHLORIDE 10 MG: 20 INJECTION INTRAMUSCULAR; INTRAVENOUS at 23:05

## 2022-01-01 RX ADMIN — GUAIFENESIN 400 MG: 400 TABLET ORAL at 21:28

## 2022-01-01 RX ADMIN — GUAIFENESIN 400 MG: 400 TABLET ORAL at 15:39

## 2022-01-01 RX ADMIN — GABAPENTIN 100 MG: 100 CAPSULE ORAL at 21:00

## 2022-01-01 RX ADMIN — MIDAZOLAM 0.5 MG: 1 INJECTION INTRAMUSCULAR; INTRAVENOUS at 09:19

## 2022-01-01 RX ADMIN — SODIUM CHLORIDE, PRESERVATIVE FREE 40 MG: 5 INJECTION INTRAVENOUS at 08:49

## 2022-01-01 RX ADMIN — INSULIN LISPRO 11 UNITS: 100 INJECTION, SOLUTION INTRAVENOUS; SUBCUTANEOUS at 15:19

## 2022-01-01 RX ADMIN — ALLOPURINOL 300 MG: 300 TABLET ORAL at 14:29

## 2022-01-01 RX ADMIN — LEVETIRACETAM 500 MG: 100 SOLUTION ORAL at 20:33

## 2022-01-01 RX ADMIN — LISINOPRIL 20 MG: 20 TABLET ORAL at 10:41

## 2022-01-01 RX ADMIN — LACOSAMIDE 200 MG: 10 SOLUTION ORAL at 09:03

## 2022-01-01 RX ADMIN — DIVALPROEX SODIUM 500 MG: 125 CAPSULE ORAL at 09:12

## 2022-01-01 RX ADMIN — CARVEDILOL 6.25 MG: 6.25 TABLET, FILM COATED ORAL at 18:47

## 2022-01-01 RX ADMIN — CEFAZOLIN 1000 MG: 1 INJECTION, POWDER, FOR SOLUTION INTRAMUSCULAR; INTRAVENOUS at 20:00

## 2022-01-01 RX ADMIN — MIDAZOLAM HYDROCHLORIDE 0.5 MG: 2 INJECTION, SOLUTION INTRAMUSCULAR; INTRAVENOUS at 09:19

## 2022-01-01 RX ADMIN — INSULIN LISPRO 4 UNITS: 100 INJECTION, SOLUTION INTRAVENOUS; SUBCUTANEOUS at 13:29

## 2022-01-01 RX ADMIN — INSULIN LISPRO 4 UNITS: 100 INJECTION, SOLUTION INTRAVENOUS; SUBCUTANEOUS at 15:52

## 2022-01-01 RX ADMIN — IPRATROPIUM BROMIDE AND ALBUTEROL SULFATE 1 AMPULE: 2.5; .5 SOLUTION RESPIRATORY (INHALATION) at 08:28

## 2022-01-01 RX ADMIN — ARFORMOTEROL TARTRATE 15 MCG: 15 SOLUTION RESPIRATORY (INHALATION) at 20:01

## 2022-01-01 RX ADMIN — IPRATROPIUM BROMIDE AND ALBUTEROL SULFATE 1 AMPULE: 2.5; .5 SOLUTION RESPIRATORY (INHALATION) at 21:46

## 2022-01-01 RX ADMIN — SODIUM CHLORIDE, PRESERVATIVE FREE 10 ML: 5 INJECTION INTRAVENOUS at 20:16

## 2022-01-01 RX ADMIN — NIFEDIPINE 60 MG: 30 TABLET, EXTENDED RELEASE ORAL at 14:30

## 2022-01-01 RX ADMIN — LACOSAMIDE 200 MG: 10 INJECTION INTRAVENOUS at 08:22

## 2022-01-01 RX ADMIN — INSULIN GLARGINE-YFGN 15 UNITS: 100 INJECTION, SOLUTION SUBCUTANEOUS at 10:14

## 2022-01-01 RX ADMIN — GABAPENTIN 100 MG: 100 CAPSULE ORAL at 20:54

## 2022-01-01 RX ADMIN — LACOSAMIDE 200 MG: 10 INJECTION INTRAVENOUS at 10:18

## 2022-01-01 RX ADMIN — MORPHINE SULFATE 2 MG: 2 INJECTION, SOLUTION INTRAMUSCULAR; INTRAVENOUS at 13:09

## 2022-01-01 RX ADMIN — CEFAZOLIN 1000 MG: 1 INJECTION, POWDER, FOR SOLUTION INTRAMUSCULAR; INTRAVENOUS at 03:43

## 2022-01-01 RX ADMIN — POLYETHYLENE GLYCOL 3350 17 G: 17 POWDER, FOR SOLUTION ORAL at 12:33

## 2022-01-01 RX ADMIN — LABETALOL HYDROCHLORIDE 20 MG: 5 INJECTION INTRAVENOUS at 06:06

## 2022-01-01 RX ADMIN — HYDRALAZINE HYDROCHLORIDE 10 MG: 20 INJECTION INTRAMUSCULAR; INTRAVENOUS at 16:13

## 2022-01-01 RX ADMIN — GUAIFENESIN 400 MG: 400 TABLET ORAL at 09:16

## 2022-01-01 RX ADMIN — TRAMADOL HYDROCHLORIDE 25 MG: 50 TABLET, COATED ORAL at 00:30

## 2022-01-01 RX ADMIN — LACOSAMIDE 200 MG: 10 INJECTION INTRAVENOUS at 20:56

## 2022-01-01 RX ADMIN — LACOSAMIDE 200 MG: 10 INJECTION INTRAVENOUS at 09:29

## 2022-01-01 RX ADMIN — GABAPENTIN 100 MG: 100 CAPSULE ORAL at 09:01

## 2022-01-01 RX ADMIN — HYDROCHLOROTHIAZIDE 12.5 MG: 12.5 TABLET ORAL at 20:29

## 2022-01-01 RX ADMIN — SODIUM CHLORIDE, PRESERVATIVE FREE 40 MG: 5 INJECTION INTRAVENOUS at 18:03

## 2022-01-01 RX ADMIN — GABAPENTIN 100 MG: 100 CAPSULE ORAL at 09:14

## 2022-01-01 RX ADMIN — INSULIN LISPRO 4 UNITS: 100 INJECTION, SOLUTION INTRAVENOUS; SUBCUTANEOUS at 08:38

## 2022-01-01 RX ADMIN — CEFAZOLIN 1000 MG: 1 INJECTION, POWDER, FOR SOLUTION INTRAMUSCULAR; INTRAVENOUS at 12:05

## 2022-01-01 RX ADMIN — CARVEDILOL 12.5 MG: 6.25 TABLET, FILM COATED ORAL at 08:06

## 2022-01-01 RX ADMIN — GUAIFENESIN 400 MG: 400 TABLET ORAL at 09:23

## 2022-01-01 RX ADMIN — HYDRALAZINE HYDROCHLORIDE 10 MG: 20 INJECTION INTRAMUSCULAR; INTRAVENOUS at 02:05

## 2022-01-01 RX ADMIN — IPRATROPIUM BROMIDE AND ALBUTEROL SULFATE 1 AMPULE: 2.5; .5 SOLUTION RESPIRATORY (INHALATION) at 08:17

## 2022-01-01 RX ADMIN — GUAIFENESIN 400 MG: 400 TABLET ORAL at 08:14

## 2022-01-01 RX ADMIN — DIVALPROEX SODIUM 500 MG: 125 CAPSULE ORAL at 22:36

## 2022-01-01 RX ADMIN — LABETALOL HYDROCHLORIDE 20 MG: 5 INJECTION INTRAVENOUS at 12:53

## 2022-01-01 RX ADMIN — GUAIFENESIN 400 MG: 400 TABLET ORAL at 15:45

## 2022-01-01 RX ADMIN — LACOSAMIDE 200 MG: 10 SOLUTION ORAL at 21:59

## 2022-01-01 RX ADMIN — LEVETIRACETAM 500 MG: 100 SOLUTION ORAL at 08:54

## 2022-01-01 RX ADMIN — POLYETHYLENE GLYCOL 3350 17 G: 17 POWDER, FOR SOLUTION ORAL at 08:29

## 2022-01-01 RX ADMIN — NIFEDIPINE 30 MG: 30 TABLET, EXTENDED RELEASE ORAL at 10:42

## 2022-01-01 RX ADMIN — SODIUM CHLORIDE 40 MG: 9 INJECTION, SOLUTION INTRAMUSCULAR; INTRAVENOUS; SUBCUTANEOUS at 09:00

## 2022-01-01 RX ADMIN — CEFAZOLIN 1000 MG: 1 INJECTION, POWDER, FOR SOLUTION INTRAMUSCULAR; INTRAVENOUS at 03:44

## 2022-01-01 RX ADMIN — LISINOPRIL 20 MG: 20 TABLET ORAL at 08:03

## 2022-01-01 RX ADMIN — LACTULOSE 20 G: 20 SOLUTION ORAL at 08:53

## 2022-01-01 RX ADMIN — LEVETIRACETAM 500 MG: 5 INJECTION INTRAVENOUS at 11:21

## 2022-01-01 RX ADMIN — LEVETIRACETAM 500 MG: 5 INJECTION INTRAVENOUS at 13:37

## 2022-01-01 RX ADMIN — ACETAMINOPHEN 650 MG: 325 TABLET, FILM COATED ORAL at 03:22

## 2022-01-01 RX ADMIN — HYDRALAZINE HYDROCHLORIDE 10 MG: 20 INJECTION INTRAMUSCULAR; INTRAVENOUS at 00:34

## 2022-01-01 RX ADMIN — SODIUM CHLORIDE, PRESERVATIVE FREE 10 ML: 5 INJECTION INTRAVENOUS at 08:22

## 2022-01-01 RX ADMIN — HYDROCHLOROTHIAZIDE 12.5 MG: 12.5 TABLET ORAL at 08:39

## 2022-01-01 RX ADMIN — HYDRALAZINE HYDROCHLORIDE 10 MG: 20 INJECTION INTRAMUSCULAR; INTRAVENOUS at 20:15

## 2022-01-01 RX ADMIN — IPRATROPIUM BROMIDE AND ALBUTEROL SULFATE 1 AMPULE: 2.5; .5 SOLUTION RESPIRATORY (INHALATION) at 10:09

## 2022-01-01 RX ADMIN — ARFORMOTEROL TARTRATE 15 MCG: 15 SOLUTION RESPIRATORY (INHALATION) at 20:37

## 2022-01-01 RX ADMIN — LEVETIRACETAM 500 MG: 100 SOLUTION ORAL at 20:29

## 2022-01-01 RX ADMIN — VALPROATE SODIUM 500 MG: 100 INJECTION, SOLUTION INTRAVENOUS at 21:05

## 2022-01-01 RX ADMIN — CEFAZOLIN 1000 MG: 1 INJECTION, POWDER, FOR SOLUTION INTRAMUSCULAR; INTRAVENOUS at 15:46

## 2022-01-01 RX ADMIN — NIFEDIPINE 60 MG: 30 TABLET, EXTENDED RELEASE ORAL at 08:30

## 2022-01-01 RX ADMIN — DIVALPROEX SODIUM 500 MG: 125 CAPSULE ORAL at 22:12

## 2022-01-01 RX ADMIN — GUAIFENESIN 400 MG: 400 TABLET ORAL at 08:41

## 2022-01-01 RX ADMIN — ALLOPURINOL 300 MG: 300 TABLET ORAL at 08:56

## 2022-01-01 RX ADMIN — SODIUM CHLORIDE, PRESERVATIVE FREE 10 ML: 5 INJECTION INTRAVENOUS at 21:03

## 2022-01-01 RX ADMIN — LORAZEPAM 1 MG: 2 INJECTION INTRAMUSCULAR; INTRAVENOUS at 12:59

## 2022-01-01 RX ADMIN — INSULIN GLARGINE-YFGN 5 UNITS: 100 INJECTION, SOLUTION SUBCUTANEOUS at 13:17

## 2022-01-01 RX ADMIN — LEVETIRACETAM 500 MG: 500 TABLET, FILM COATED ORAL at 10:22

## 2022-01-01 RX ADMIN — MAGNESIUM SULFATE HEPTAHYDRATE 1000 MG: 1 INJECTION, SOLUTION INTRAVENOUS at 06:59

## 2022-01-01 RX ADMIN — HYDROCHLOROTHIAZIDE 12.5 MG: 12.5 TABLET ORAL at 09:15

## 2022-01-01 RX ADMIN — GUAIFENESIN 400 MG: 400 TABLET ORAL at 21:00

## 2022-01-01 RX ADMIN — INSULIN LISPRO 1 UNITS: 100 INJECTION, SOLUTION INTRAVENOUS; SUBCUTANEOUS at 00:30

## 2022-01-01 RX ADMIN — LACOSAMIDE 100 MG: 10 INJECTION, SOLUTION INTRAVENOUS at 08:33

## 2022-01-01 RX ADMIN — SODIUM CHLORIDE, PRESERVATIVE FREE 40 MG: 5 INJECTION INTRAVENOUS at 07:33

## 2022-01-01 RX ADMIN — Medication 10 ML: at 06:30

## 2022-01-01 RX ADMIN — ACETAMINOPHEN 650 MG: 325 TABLET, FILM COATED ORAL at 12:28

## 2022-01-01 RX ADMIN — INSULIN LISPRO 3 UNITS: 100 INJECTION, SOLUTION INTRAVENOUS; SUBCUTANEOUS at 18:39

## 2022-01-01 RX ADMIN — DOCUSATE SODIUM 50 MG AND SENNOSIDES 8.6 MG 2 TABLET: 8.6; 5 TABLET, FILM COATED ORAL at 08:41

## 2022-01-01 RX ADMIN — LEVETIRACETAM 500 MG: 5 INJECTION INTRAVENOUS at 22:57

## 2022-01-01 RX ADMIN — ALLOPURINOL 300 MG: 300 TABLET ORAL at 08:05

## 2022-01-01 RX ADMIN — HYDRALAZINE HYDROCHLORIDE 10 MG: 20 INJECTION INTRAMUSCULAR; INTRAVENOUS at 11:35

## 2022-01-01 RX ADMIN — APIXABAN 2.5 MG: 2.5 TABLET, FILM COATED ORAL at 08:29

## 2022-01-01 RX ADMIN — DOCUSATE SODIUM 50 MG AND SENNOSIDES 8.6 MG 2 TABLET: 8.6; 5 TABLET, FILM COATED ORAL at 14:29

## 2022-01-01 RX ADMIN — DOCUSATE SODIUM 50 MG AND SENNOSIDES 8.6 MG 2 TABLET: 8.6; 5 TABLET, FILM COATED ORAL at 09:23

## 2022-01-01 RX ADMIN — SODIUM CHLORIDE, PRESERVATIVE FREE 10 ML: 5 INJECTION INTRAVENOUS at 09:48

## 2022-01-01 RX ADMIN — INSULIN LISPRO 4 UNITS: 100 INJECTION, SOLUTION INTRAVENOUS; SUBCUTANEOUS at 03:24

## 2022-01-01 RX ADMIN — MORPHINE SULFATE 2 MG: 2 INJECTION, SOLUTION INTRAMUSCULAR; INTRAVENOUS at 17:30

## 2022-01-01 RX ADMIN — INSULIN LISPRO 1 UNITS: 100 INJECTION, SOLUTION INTRAVENOUS; SUBCUTANEOUS at 08:20

## 2022-01-01 RX ADMIN — ALLOPURINOL 300 MG: 300 TABLET ORAL at 09:11

## 2022-01-01 RX ADMIN — INSULIN LISPRO 11 UNITS: 100 INJECTION, SOLUTION INTRAVENOUS; SUBCUTANEOUS at 18:09

## 2022-01-01 RX ADMIN — HYDROCHLOROTHIAZIDE 12.5 MG: 12.5 TABLET ORAL at 18:00

## 2022-01-01 RX ADMIN — TAMSULOSIN HYDROCHLORIDE 0.4 MG: 0.4 CAPSULE ORAL at 20:26

## 2022-01-01 RX ADMIN — GUAIFENESIN 400 MG: 400 TABLET ORAL at 10:41

## 2022-01-01 RX ADMIN — ARFORMOTEROL TARTRATE 15 MCG: 15 SOLUTION RESPIRATORY (INHALATION) at 09:33

## 2022-01-01 RX ADMIN — ACETAMINOPHEN 650 MG: 325 TABLET, FILM COATED ORAL at 12:08

## 2022-01-01 RX ADMIN — LEVETIRACETAM 500 MG: 100 SOLUTION ORAL at 08:05

## 2022-01-01 RX ADMIN — POLYETHYLENE GLYCOL 3350 17 G: 17 POWDER, FOR SOLUTION ORAL at 08:40

## 2022-01-01 RX ADMIN — GABAPENTIN 100 MG: 100 CAPSULE ORAL at 09:11

## 2022-01-01 RX ADMIN — FINASTERIDE 5 MG: 5 TABLET, FILM COATED ORAL at 08:39

## 2022-01-01 RX ADMIN — GABAPENTIN 100 MG: 100 CAPSULE ORAL at 08:23

## 2022-01-01 RX ADMIN — ARFORMOTEROL TARTRATE 15 MCG: 15 SOLUTION RESPIRATORY (INHALATION) at 20:15

## 2022-01-01 RX ADMIN — ISOPROTERENOL HYDROCHLORIDE 2 MCG/MIN: 0.2 INJECTION, SOLUTION INTRAMUSCULAR; INTRAVENOUS at 09:25

## 2022-01-01 ASSESSMENT — PAIN DESCRIPTION - ORIENTATION
ORIENTATION: MID
ORIENTATION: LOWER;MID;LEFT;RIGHT
ORIENTATION: ANTERIOR;RIGHT;LEFT
ORIENTATION: RIGHT
ORIENTATION: POSTERIOR
ORIENTATION: LEFT

## 2022-01-01 ASSESSMENT — PAIN DESCRIPTION - FREQUENCY
FREQUENCY: INTERMITTENT
FREQUENCY: INTERMITTENT
FREQUENCY: CONTINUOUS

## 2022-01-01 ASSESSMENT — PAIN SCALES - GENERAL
PAINLEVEL_OUTOF10: 0
PAINLEVEL_OUTOF10: 3
PAINLEVEL_OUTOF10: 0
PAINLEVEL_OUTOF10: 8
PAINLEVEL_OUTOF10: 0
PAINLEVEL_OUTOF10: 5
PAINLEVEL_OUTOF10: 0
PAINLEVEL_OUTOF10: 7
PAINLEVEL_OUTOF10: 0
PAINLEVEL_OUTOF10: 2
PAINLEVEL_OUTOF10: 2
PAINLEVEL_OUTOF10: 0
PAINLEVEL_OUTOF10: 3
PAINLEVEL_OUTOF10: 0
PAINLEVEL_OUTOF10: 4
PAINLEVEL_OUTOF10: 0
PAINLEVEL_OUTOF10: 10
PAINLEVEL_OUTOF10: 0
PAINLEVEL_OUTOF10: 8

## 2022-01-01 ASSESSMENT — PAIN DESCRIPTION - DESCRIPTORS
DESCRIPTORS: DISCOMFORT
DESCRIPTORS: ACHING
DESCRIPTORS: ACHING;DISCOMFORT;SHARP
DESCRIPTORS: DISCOMFORT
DESCRIPTORS: OTHER (COMMENT)
DESCRIPTORS: ACHING
DESCRIPTORS: CRAMPING

## 2022-01-01 ASSESSMENT — PAIN DESCRIPTION - LOCATION
LOCATION: CHEST
LOCATION: RIB CAGE
LOCATION: HEAD
LOCATION: HEAD
LOCATION: CHEST
LOCATION: HEAD;RIB CAGE
LOCATION: ABDOMEN;HEAD
LOCATION: CHEST
LOCATION: RIB CAGE

## 2022-01-01 ASSESSMENT — PAIN DESCRIPTION - PAIN TYPE
TYPE: CHRONIC PAIN
TYPE: ACUTE PAIN

## 2022-01-01 ASSESSMENT — PAIN - FUNCTIONAL ASSESSMENT
PAIN_FUNCTIONAL_ASSESSMENT: ACTIVITIES ARE NOT PREVENTED
PAIN_FUNCTIONAL_ASSESSMENT: PREVENTS OR INTERFERES SOME ACTIVE ACTIVITIES AND ADLS
PAIN_FUNCTIONAL_ASSESSMENT: ACTIVITIES ARE NOT PREVENTED
PAIN_FUNCTIONAL_ASSESSMENT: ACTIVITIES ARE NOT PREVENTED

## 2022-01-01 ASSESSMENT — PAIN DESCRIPTION - ONSET
ONSET: ON-GOING
ONSET: OTHER (COMMENT)

## 2022-06-04 ENCOUNTER — HOSPITAL ENCOUNTER (OUTPATIENT)
Age: 87
Discharge: HOME | End: 2022-06-04
Payer: MEDICARE

## 2022-06-04 LAB
ANION GAP SERPL CALCULATED.3IONS-SCNC: 14 MMOL/L (ref 7–16)
BUN BLDV-MCNC: 40 MG/DL (ref 6–23)
CALCIUM SERPL-MCNC: 9.3 MG/DL (ref 8.6–10.2)
CHLORIDE BLD-SCNC: 96 MMOL/L (ref 98–107)
CO2: 24 MMOL/L (ref 22–29)
CREAT SERPL-MCNC: 2.2 MG/DL (ref 0.7–1.2)
FERRITIN: 24 NG/ML
GFR AFRICAN AMERICAN: 34
GFR NON-AFRICAN AMERICAN: 28 ML/MIN/1.73
GLUCOSE BLD-MCNC: 340 MG/DL (ref 74–99)
IRON SATURATION: 11 % (ref 20–55)
IRON: 39 MCG/DL (ref 59–158)
MAGNESIUM: 1.9 MG/DL (ref 1.6–2.6)
POTASSIUM SERPL-SCNC: 4.9 MMOL/L (ref 3.5–5)
SODIUM BLD-SCNC: 134 MMOL/L (ref 132–146)
TOTAL IRON BINDING CAPACITY: 350 MCG/DL (ref 250–450)

## 2022-06-04 PROCEDURE — 82728 ASSAY OF FERRITIN: CPT

## 2022-06-04 PROCEDURE — 83735 ASSAY OF MAGNESIUM: CPT

## 2022-06-04 PROCEDURE — 36415 COLL VENOUS BLD VENIPUNCTURE: CPT

## 2022-06-04 PROCEDURE — 83550 IRON BINDING TEST: CPT

## 2022-06-04 PROCEDURE — 83540 ASSAY OF IRON: CPT

## 2022-06-04 PROCEDURE — 80048 BASIC METABOLIC PNL TOTAL CA: CPT

## 2022-06-13 DIAGNOSIS — D63.1 ANEMIA, CHRONIC RENAL FAILURE, STAGE 4 (SEVERE) (HCC): ICD-10-CM

## 2022-06-13 DIAGNOSIS — N18.4 ANEMIA, CHRONIC RENAL FAILURE, STAGE 4 (SEVERE) (HCC): ICD-10-CM

## 2022-06-13 RX ORDER — SODIUM CHLORIDE 9 MG/ML
5-250 INJECTION, SOLUTION INTRAVENOUS PRN
Status: CANCELLED | OUTPATIENT
Start: 2022-06-14

## 2022-06-13 RX ORDER — DIPHENHYDRAMINE HYDROCHLORIDE 50 MG/ML
50 INJECTION INTRAMUSCULAR; INTRAVENOUS
Status: CANCELLED | OUTPATIENT
Start: 2022-06-14

## 2022-06-13 RX ORDER — SODIUM CHLORIDE 0.9 % (FLUSH) 0.9 %
5-40 SYRINGE (ML) INJECTION PRN
Status: CANCELLED | OUTPATIENT
Start: 2022-06-14

## 2022-06-13 RX ORDER — SODIUM FERRIC GLUCONATE COMPLEX IN SUCROSE 12.5 MG/ML
125 INJECTION INTRAVENOUS ONCE
Status: CANCELLED
Start: 2022-06-14 | End: 2022-06-14

## 2022-06-13 RX ORDER — HEPARIN SODIUM (PORCINE) LOCK FLUSH IV SOLN 100 UNIT/ML 100 UNIT/ML
500 SOLUTION INTRAVENOUS PRN
Status: CANCELLED | OUTPATIENT
Start: 2022-06-14

## 2022-06-13 RX ORDER — ALBUTEROL SULFATE 90 UG/1
4 AEROSOL, METERED RESPIRATORY (INHALATION) PRN
Status: CANCELLED | OUTPATIENT
Start: 2022-06-14

## 2022-06-13 RX ORDER — ACETAMINOPHEN 325 MG/1
650 TABLET ORAL
Status: CANCELLED | OUTPATIENT
Start: 2022-06-14

## 2022-06-13 RX ORDER — SODIUM CHLORIDE 9 MG/ML
INJECTION, SOLUTION INTRAVENOUS CONTINUOUS
Status: CANCELLED | OUTPATIENT
Start: 2022-06-14

## 2022-06-13 RX ORDER — ONDANSETRON 2 MG/ML
8 INJECTION INTRAMUSCULAR; INTRAVENOUS
Status: CANCELLED | OUTPATIENT
Start: 2022-06-14

## 2022-06-13 RX ORDER — EPINEPHRINE 1 MG/ML
0.3 INJECTION, SOLUTION, CONCENTRATE INTRAVENOUS PRN
Status: CANCELLED | OUTPATIENT
Start: 2022-06-14

## 2022-07-01 ENCOUNTER — HOSPITAL ENCOUNTER (OUTPATIENT)
Age: 87
Discharge: HOME | End: 2022-07-01
Payer: MEDICARE

## 2022-07-01 LAB
ANION GAP SERPL CALCULATED.3IONS-SCNC: 13 MMOL/L (ref 7–16)
BUN BLDV-MCNC: 68 MG/DL (ref 6–23)
CALCIUM SERPL-MCNC: 9.8 MG/DL (ref 8.6–10.2)
CHLORIDE BLD-SCNC: 98 MMOL/L (ref 98–107)
CO2: 26 MMOL/L (ref 22–29)
CREAT SERPL-MCNC: 2.6 MG/DL (ref 0.7–1.2)
GFR AFRICAN AMERICAN: 28
GFR NON-AFRICAN AMERICAN: 23 ML/MIN/1.73
GLUCOSE BLD-MCNC: 120 MG/DL (ref 74–99)
POTASSIUM SERPL-SCNC: 4.6 MMOL/L (ref 3.5–5)
SODIUM BLD-SCNC: 137 MMOL/L (ref 132–146)

## 2022-07-01 PROCEDURE — 36415 COLL VENOUS BLD VENIPUNCTURE: CPT

## 2022-07-01 PROCEDURE — 80048 BASIC METABOLIC PNL TOTAL CA: CPT

## 2022-07-13 ENCOUNTER — HOSPITAL ENCOUNTER (OUTPATIENT)
Dept: INFUSION THERAPY | Age: 87
Setting detail: INFUSION SERIES
Discharge: HOME | End: 2022-07-13
Payer: MEDICARE

## 2022-07-13 VITALS
DIASTOLIC BLOOD PRESSURE: 63 MMHG | RESPIRATION RATE: 24 BRPM | OXYGEN SATURATION: 100 % | HEART RATE: 70 BPM | TEMPERATURE: 98 F | SYSTOLIC BLOOD PRESSURE: 143 MMHG

## 2022-07-13 DIAGNOSIS — D63.1 ANEMIA, CHRONIC RENAL FAILURE, STAGE 4 (SEVERE) (HCC): Primary | ICD-10-CM

## 2022-07-13 DIAGNOSIS — N18.4 ANEMIA, CHRONIC RENAL FAILURE, STAGE 4 (SEVERE) (HCC): Primary | ICD-10-CM

## 2022-07-13 PROCEDURE — 96365 THER/PROPH/DIAG IV INF INIT: CPT

## 2022-07-13 PROCEDURE — 2580000003 HC RX 258: Performed by: INTERNAL MEDICINE

## 2022-07-13 PROCEDURE — 6360000002 HC RX W HCPCS: Performed by: INTERNAL MEDICINE

## 2022-07-13 RX ORDER — SODIUM CHLORIDE 9 MG/ML
5-250 INJECTION, SOLUTION INTRAVENOUS PRN
Status: CANCELLED | OUTPATIENT
Start: 2022-07-20

## 2022-07-13 RX ORDER — SODIUM FERRIC GLUCONATE COMPLEX IN SUCROSE 12.5 MG/ML
125 INJECTION INTRAVENOUS ONCE
Status: CANCELLED
Start: 2022-07-20 | End: 2022-07-20

## 2022-07-13 RX ORDER — ONDANSETRON 2 MG/ML
8 INJECTION INTRAMUSCULAR; INTRAVENOUS
Status: CANCELLED | OUTPATIENT
Start: 2022-07-20

## 2022-07-13 RX ORDER — SODIUM CHLORIDE 9 MG/ML
INJECTION, SOLUTION INTRAVENOUS CONTINUOUS
Status: CANCELLED | OUTPATIENT
Start: 2022-07-20

## 2022-07-13 RX ORDER — HEPARIN SODIUM (PORCINE) LOCK FLUSH IV SOLN 100 UNIT/ML 100 UNIT/ML
500 SOLUTION INTRAVENOUS PRN
Status: CANCELLED | OUTPATIENT
Start: 2022-07-20

## 2022-07-13 RX ORDER — SODIUM CHLORIDE 0.9 % (FLUSH) 0.9 %
5-40 SYRINGE (ML) INJECTION PRN
Status: CANCELLED | OUTPATIENT
Start: 2022-07-20

## 2022-07-13 RX ORDER — DIPHENHYDRAMINE HYDROCHLORIDE 50 MG/ML
50 INJECTION INTRAMUSCULAR; INTRAVENOUS
Status: CANCELLED | OUTPATIENT
Start: 2022-07-20

## 2022-07-13 RX ORDER — ALBUTEROL SULFATE 90 UG/1
4 AEROSOL, METERED RESPIRATORY (INHALATION) PRN
Status: CANCELLED | OUTPATIENT
Start: 2022-07-20

## 2022-07-13 RX ORDER — EPINEPHRINE 1 MG/ML
0.3 INJECTION, SOLUTION, CONCENTRATE INTRAVENOUS PRN
Status: CANCELLED | OUTPATIENT
Start: 2022-07-20

## 2022-07-13 RX ORDER — ACETAMINOPHEN 325 MG/1
650 TABLET ORAL
Status: CANCELLED | OUTPATIENT
Start: 2022-07-20

## 2022-07-13 RX ORDER — SODIUM CHLORIDE 0.9 % (FLUSH) 0.9 %
5-40 SYRINGE (ML) INJECTION PRN
Status: DISCONTINUED | OUTPATIENT
Start: 2022-07-13 | End: 2022-07-14 | Stop reason: HOSPADM

## 2022-07-13 RX ADMIN — Medication 10 ML: at 10:03

## 2022-07-13 RX ADMIN — SODIUM CHLORIDE 25 MG: 9 INJECTION, SOLUTION INTRAVENOUS at 10:15

## 2022-07-13 RX ADMIN — Medication 10 ML: at 12:09

## 2022-07-13 RX ADMIN — SODIUM CHLORIDE 100 MG: 9 INJECTION, SOLUTION INTRAVENOUS at 11:03

## 2022-07-19 RX ORDER — HEPARIN SODIUM (PORCINE) LOCK FLUSH IV SOLN 100 UNIT/ML 100 UNIT/ML
500 SOLUTION INTRAVENOUS PRN
Status: CANCELLED | OUTPATIENT
Start: 2022-07-19

## 2022-07-19 RX ORDER — ACETAMINOPHEN 325 MG/1
650 TABLET ORAL
Status: CANCELLED | OUTPATIENT
Start: 2022-07-19

## 2022-07-19 RX ORDER — DIPHENHYDRAMINE HYDROCHLORIDE 50 MG/ML
50 INJECTION INTRAMUSCULAR; INTRAVENOUS
Status: CANCELLED | OUTPATIENT
Start: 2022-07-19

## 2022-07-19 RX ORDER — SODIUM CHLORIDE 9 MG/ML
INJECTION, SOLUTION INTRAVENOUS CONTINUOUS
Status: CANCELLED | OUTPATIENT
Start: 2022-07-19

## 2022-07-19 RX ORDER — ALBUTEROL SULFATE 90 UG/1
4 AEROSOL, METERED RESPIRATORY (INHALATION) PRN
Status: CANCELLED | OUTPATIENT
Start: 2022-07-19

## 2022-07-19 RX ORDER — EPINEPHRINE 1 MG/ML
0.3 INJECTION, SOLUTION, CONCENTRATE INTRAVENOUS PRN
Status: CANCELLED | OUTPATIENT
Start: 2022-07-19

## 2022-07-19 RX ORDER — ONDANSETRON 2 MG/ML
8 INJECTION INTRAMUSCULAR; INTRAVENOUS
Status: CANCELLED | OUTPATIENT
Start: 2022-07-19

## 2022-07-20 ENCOUNTER — HOSPITAL ENCOUNTER (OUTPATIENT)
Dept: INFUSION THERAPY | Age: 87
Setting detail: INFUSION SERIES
Discharge: HOME | End: 2022-07-20
Payer: MEDICARE

## 2022-07-20 VITALS
RESPIRATION RATE: 20 BRPM | HEART RATE: 69 BPM | TEMPERATURE: 97.9 F | SYSTOLIC BLOOD PRESSURE: 137 MMHG | OXYGEN SATURATION: 99 % | DIASTOLIC BLOOD PRESSURE: 61 MMHG

## 2022-07-20 DIAGNOSIS — N18.4 ANEMIA, CHRONIC RENAL FAILURE, STAGE 4 (SEVERE) (HCC): Primary | ICD-10-CM

## 2022-07-20 DIAGNOSIS — D63.1 ANEMIA, CHRONIC RENAL FAILURE, STAGE 4 (SEVERE) (HCC): Primary | ICD-10-CM

## 2022-07-20 PROCEDURE — 6360000002 HC RX W HCPCS: Performed by: INTERNAL MEDICINE

## 2022-07-20 PROCEDURE — 2580000003 HC RX 258: Performed by: INTERNAL MEDICINE

## 2022-07-20 PROCEDURE — 96365 THER/PROPH/DIAG IV INF INIT: CPT

## 2022-07-20 RX ORDER — ONDANSETRON 2 MG/ML
8 INJECTION INTRAMUSCULAR; INTRAVENOUS
Status: CANCELLED | OUTPATIENT
Start: 2022-07-27

## 2022-07-20 RX ORDER — DIPHENHYDRAMINE HYDROCHLORIDE 50 MG/ML
50 INJECTION INTRAMUSCULAR; INTRAVENOUS
Status: CANCELLED | OUTPATIENT
Start: 2022-07-27

## 2022-07-20 RX ORDER — HEPARIN SODIUM (PORCINE) LOCK FLUSH IV SOLN 100 UNIT/ML 100 UNIT/ML
500 SOLUTION INTRAVENOUS PRN
Status: CANCELLED | OUTPATIENT
Start: 2022-07-27

## 2022-07-20 RX ORDER — SODIUM CHLORIDE 9 MG/ML
5-250 INJECTION, SOLUTION INTRAVENOUS PRN
Status: CANCELLED | OUTPATIENT
Start: 2022-07-27

## 2022-07-20 RX ORDER — SODIUM CHLORIDE 0.9 % (FLUSH) 0.9 %
5-40 SYRINGE (ML) INJECTION PRN
Status: DISCONTINUED | OUTPATIENT
Start: 2022-07-20 | End: 2022-07-21 | Stop reason: HOSPADM

## 2022-07-20 RX ORDER — SODIUM CHLORIDE 9 MG/ML
5-250 INJECTION, SOLUTION INTRAVENOUS PRN
Status: DISCONTINUED | OUTPATIENT
Start: 2022-07-20 | End: 2022-07-21 | Stop reason: HOSPADM

## 2022-07-20 RX ORDER — SODIUM CHLORIDE 0.9 % (FLUSH) 0.9 %
5-40 SYRINGE (ML) INJECTION PRN
Status: CANCELLED | OUTPATIENT
Start: 2022-07-27

## 2022-07-20 RX ORDER — EPINEPHRINE 1 MG/ML
0.3 INJECTION, SOLUTION, CONCENTRATE INTRAVENOUS PRN
Status: CANCELLED | OUTPATIENT
Start: 2022-07-27

## 2022-07-20 RX ORDER — ACETAMINOPHEN 325 MG/1
650 TABLET ORAL
Status: CANCELLED | OUTPATIENT
Start: 2022-07-27

## 2022-07-20 RX ORDER — ALBUTEROL SULFATE 90 UG/1
4 AEROSOL, METERED RESPIRATORY (INHALATION) PRN
Status: CANCELLED | OUTPATIENT
Start: 2022-07-27

## 2022-07-20 RX ORDER — SODIUM CHLORIDE 9 MG/ML
INJECTION, SOLUTION INTRAVENOUS CONTINUOUS
Status: CANCELLED | OUTPATIENT
Start: 2022-07-27

## 2022-07-20 RX ADMIN — Medication 10 ML: at 10:54

## 2022-07-20 RX ADMIN — Medication 10 ML: at 09:54

## 2022-07-20 RX ADMIN — SODIUM CHLORIDE 125 MG: 9 INJECTION, SOLUTION INTRAVENOUS at 09:54

## 2022-07-22 ENCOUNTER — HOSPITAL ENCOUNTER (OUTPATIENT)
Age: 87
Discharge: HOME | End: 2022-07-22
Payer: MEDICARE

## 2022-07-22 LAB
ANION GAP SERPL CALCULATED.3IONS-SCNC: 11 MMOL/L (ref 7–16)
BUN BLDV-MCNC: 58 MG/DL (ref 6–23)
CALCIUM SERPL-MCNC: 9.6 MG/DL (ref 8.6–10.2)
CHLORIDE BLD-SCNC: 96 MMOL/L (ref 98–107)
CO2: 28 MMOL/L (ref 22–29)
CREAT SERPL-MCNC: 2.6 MG/DL (ref 0.7–1.2)
GFR AFRICAN AMERICAN: 28
GFR NON-AFRICAN AMERICAN: 23 ML/MIN/1.73
GLUCOSE BLD-MCNC: 124 MG/DL (ref 74–99)
POTASSIUM SERPL-SCNC: 4.3 MMOL/L (ref 3.5–5)
SODIUM BLD-SCNC: 135 MMOL/L (ref 132–146)

## 2022-07-22 PROCEDURE — 80048 BASIC METABOLIC PNL TOTAL CA: CPT

## 2022-07-22 PROCEDURE — 36415 COLL VENOUS BLD VENIPUNCTURE: CPT

## 2022-07-27 ENCOUNTER — HOSPITAL ENCOUNTER (OUTPATIENT)
Dept: INFUSION THERAPY | Age: 87
Setting detail: INFUSION SERIES
Discharge: HOME | End: 2022-07-27
Payer: MEDICARE

## 2022-07-27 VITALS
DIASTOLIC BLOOD PRESSURE: 76 MMHG | TEMPERATURE: 97.9 F | RESPIRATION RATE: 20 BRPM | OXYGEN SATURATION: 99 % | HEART RATE: 70 BPM | SYSTOLIC BLOOD PRESSURE: 176 MMHG

## 2022-07-27 DIAGNOSIS — D63.1 ANEMIA, CHRONIC RENAL FAILURE, STAGE 4 (SEVERE) (HCC): Primary | ICD-10-CM

## 2022-07-27 DIAGNOSIS — N18.4 ANEMIA, CHRONIC RENAL FAILURE, STAGE 4 (SEVERE) (HCC): Primary | ICD-10-CM

## 2022-07-27 PROCEDURE — 2580000003 HC RX 258: Performed by: INTERNAL MEDICINE

## 2022-07-27 PROCEDURE — 6360000002 HC RX W HCPCS: Performed by: INTERNAL MEDICINE

## 2022-07-27 PROCEDURE — 96361 HYDRATE IV INFUSION ADD-ON: CPT

## 2022-07-27 PROCEDURE — 96365 THER/PROPH/DIAG IV INF INIT: CPT

## 2022-07-27 RX ORDER — SODIUM CHLORIDE 9 MG/ML
5-250 INJECTION, SOLUTION INTRAVENOUS PRN
Status: CANCELLED | OUTPATIENT
Start: 2022-08-03

## 2022-07-27 RX ORDER — ALBUTEROL SULFATE 90 UG/1
4 AEROSOL, METERED RESPIRATORY (INHALATION) PRN
Status: CANCELLED | OUTPATIENT
Start: 2022-08-03

## 2022-07-27 RX ORDER — ACETAMINOPHEN 325 MG/1
650 TABLET ORAL
Status: CANCELLED | OUTPATIENT
Start: 2022-08-03

## 2022-07-27 RX ORDER — DIPHENHYDRAMINE HYDROCHLORIDE 50 MG/ML
50 INJECTION INTRAMUSCULAR; INTRAVENOUS
Status: CANCELLED | OUTPATIENT
Start: 2022-08-03

## 2022-07-27 RX ORDER — ONDANSETRON 2 MG/ML
8 INJECTION INTRAMUSCULAR; INTRAVENOUS
Status: CANCELLED | OUTPATIENT
Start: 2022-08-03

## 2022-07-27 RX ORDER — SODIUM CHLORIDE 0.9 % (FLUSH) 0.9 %
5-40 SYRINGE (ML) INJECTION PRN
Status: CANCELLED | OUTPATIENT
Start: 2022-08-03

## 2022-07-27 RX ORDER — EPINEPHRINE 1 MG/ML
0.3 INJECTION, SOLUTION, CONCENTRATE INTRAVENOUS PRN
Status: CANCELLED | OUTPATIENT
Start: 2022-08-03

## 2022-07-27 RX ORDER — SODIUM CHLORIDE 0.9 % (FLUSH) 0.9 %
5-40 SYRINGE (ML) INJECTION PRN
Status: DISCONTINUED | OUTPATIENT
Start: 2022-07-27 | End: 2022-07-28 | Stop reason: HOSPADM

## 2022-07-27 RX ORDER — HEPARIN SODIUM (PORCINE) LOCK FLUSH IV SOLN 100 UNIT/ML 100 UNIT/ML
500 SOLUTION INTRAVENOUS PRN
Status: CANCELLED | OUTPATIENT
Start: 2022-08-03

## 2022-07-27 RX ORDER — SODIUM CHLORIDE 9 MG/ML
INJECTION, SOLUTION INTRAVENOUS CONTINUOUS
Status: CANCELLED | OUTPATIENT
Start: 2022-08-03

## 2022-07-27 RX ORDER — SODIUM CHLORIDE 9 MG/ML
5-250 INJECTION, SOLUTION INTRAVENOUS PRN
Status: DISCONTINUED | OUTPATIENT
Start: 2022-07-27 | End: 2022-07-28 | Stop reason: HOSPADM

## 2022-07-27 RX ADMIN — SODIUM CHLORIDE 125 MG: 9 INJECTION, SOLUTION INTRAVENOUS at 10:02

## 2022-07-27 RX ADMIN — Medication 10 ML: at 11:03

## 2022-07-27 RX ADMIN — SODIUM CHLORIDE 20 ML/HR: 9 INJECTION, SOLUTION INTRAVENOUS at 09:07

## 2022-07-27 RX ADMIN — Medication 10 ML: at 09:04

## 2022-08-02 ENCOUNTER — HOSPITAL ENCOUNTER (INPATIENT)
Age: 87
LOS: 7 days | Discharge: HOME | DRG: 871 | End: 2022-08-09
Attending: EMERGENCY MEDICINE | Admitting: INTERNAL MEDICINE
Payer: MEDICARE

## 2022-08-02 ENCOUNTER — APPOINTMENT (OUTPATIENT)
Dept: GENERAL RADIOLOGY | Age: 87
DRG: 871 | End: 2022-08-02
Payer: MEDICARE

## 2022-08-02 DIAGNOSIS — E87.20 METABOLIC ACIDOSIS: ICD-10-CM

## 2022-08-02 DIAGNOSIS — J18.9 PNEUMONIA OF LEFT LUNG DUE TO INFECTIOUS ORGANISM, UNSPECIFIED PART OF LUNG: Primary | ICD-10-CM

## 2022-08-02 DIAGNOSIS — E86.0 DEHYDRATION: ICD-10-CM

## 2022-08-02 DIAGNOSIS — J96.01 ACUTE RESPIRATORY FAILURE WITH HYPOXIA (HCC): ICD-10-CM

## 2022-08-02 PROBLEM — A41.9 SEPSIS (HCC): Status: ACTIVE | Noted: 2022-08-02

## 2022-08-02 LAB
ADENOVIRUS BY PCR: NOT DETECTED
ALBUMIN SERPL-MCNC: 4.2 G/DL (ref 3.5–5.2)
ALP BLD-CCNC: 87 U/L (ref 40–129)
ALT SERPL-CCNC: 22 U/L (ref 0–40)
ANION GAP SERPL CALCULATED.3IONS-SCNC: 17 MMOL/L (ref 7–16)
ANISOCYTOSIS: ABNORMAL
AST SERPL-CCNC: 18 U/L (ref 0–39)
BACTERIA: ABNORMAL /HPF
BASOPHILS ABSOLUTE: 0 E9/L (ref 0–0.2)
BASOPHILS RELATIVE PERCENT: 0.1 % (ref 0–2)
BILIRUB SERPL-MCNC: 0.6 MG/DL (ref 0–1.2)
BILIRUBIN URINE: NEGATIVE
BLOOD, URINE: NEGATIVE
BORDETELLA PARAPERTUSSIS BY PCR: NOT DETECTED
BORDETELLA PERTUSSIS BY PCR: NOT DETECTED
BUN BLDV-MCNC: 51 MG/DL (ref 6–23)
CALCIUM SERPL-MCNC: 9 MG/DL (ref 8.6–10.2)
CHLAMYDOPHILIA PNEUMONIAE BY PCR: NOT DETECTED
CHLORIDE BLD-SCNC: 96 MMOL/L (ref 98–107)
CLARITY: CLEAR
CO2: 21 MMOL/L (ref 22–29)
COARSE CASTS, UA: ABNORMAL /LPF (ref 0–2)
COLOR: YELLOW
CORONAVIRUS 229E BY PCR: NOT DETECTED
CORONAVIRUS HKU1 BY PCR: NOT DETECTED
CORONAVIRUS NL63 BY PCR: NOT DETECTED
CORONAVIRUS OC43 BY PCR: NOT DETECTED
CREAT SERPL-MCNC: 2.8 MG/DL (ref 0.7–1.2)
EKG ATRIAL RATE: 87 BPM
EKG Q-T INTERVAL: 440 MS
EKG QRS DURATION: 132 MS
EKG QTC CALCULATION (BAZETT): 484 MS
EKG R AXIS: -73 DEGREES
EKG T AXIS: -50 DEGREES
EKG VENTRICULAR RATE: 73 BPM
EOSINOPHILS ABSOLUTE: 0 E9/L (ref 0.05–0.5)
EOSINOPHILS RELATIVE PERCENT: 0 % (ref 0–6)
EPITHELIAL CELLS, UA: ABNORMAL /HPF
GFR AFRICAN AMERICAN: 26
GFR NON-AFRICAN AMERICAN: 21 ML/MIN/1.73
GLUCOSE BLD-MCNC: 234 MG/DL (ref 74–99)
GLUCOSE URINE: NEGATIVE MG/DL
HBA1C MFR BLD: 8.5 % (ref 4–5.6)
HCT VFR BLD CALC: 34.2 % (ref 37–54)
HEMOGLOBIN: 11.3 G/DL (ref 12.5–16.5)
HUMAN METAPNEUMOVIRUS BY PCR: NOT DETECTED
HUMAN RHINOVIRUS/ENTEROVIRUS BY PCR: NOT DETECTED
HYALINE CASTS: ABNORMAL /LPF (ref 0–2)
INFLUENZA A BY PCR: NOT DETECTED
INFLUENZA B BY PCR: NOT DETECTED
KETONES, URINE: ABNORMAL MG/DL
LACTIC ACID, SEPSIS: 3.5 MMOL/L (ref 0.5–1.9)
LACTIC ACID, SEPSIS: 3.5 MMOL/L (ref 0.5–1.9)
LEUKOCYTE ESTERASE, URINE: NEGATIVE
LV EF: 74 %
LVEF MODALITY: NORMAL
LYMPHOCYTES ABSOLUTE: 0.43 E9/L (ref 1.5–4)
LYMPHOCYTES RELATIVE PERCENT: 4.3 % (ref 20–42)
MCH RBC QN AUTO: 27.1 PG (ref 26–35)
MCHC RBC AUTO-ENTMCNC: 33 % (ref 32–34.5)
MCV RBC AUTO: 82 FL (ref 80–99.9)
METER GLUCOSE: 125 MG/DL (ref 74–99)
METER GLUCOSE: 143 MG/DL (ref 74–99)
METER GLUCOSE: 147 MG/DL (ref 74–99)
MONOCYTES ABSOLUTE: 0.11 E9/L (ref 0.1–0.95)
MONOCYTES RELATIVE PERCENT: 0.9 % (ref 2–12)
MYCOPLASMA PNEUMONIAE BY PCR: NOT DETECTED
NEUTROPHILS ABSOLUTE: 10.26 E9/L (ref 1.8–7.3)
NEUTROPHILS RELATIVE PERCENT: 94.8 % (ref 43–80)
NITRITE, URINE: NEGATIVE
OVALOCYTES: ABNORMAL
PARAINFLUENZA VIRUS 1 BY PCR: NOT DETECTED
PARAINFLUENZA VIRUS 2 BY PCR: NOT DETECTED
PARAINFLUENZA VIRUS 3 BY PCR: DETECTED
PARAINFLUENZA VIRUS 4 BY PCR: NOT DETECTED
PDW BLD-RTO: 16.4 FL (ref 11.5–15)
PH UA: 5 (ref 5–9)
PLATELET # BLD: 191 E9/L (ref 130–450)
PMV BLD AUTO: 9.5 FL (ref 7–12)
POIKILOCYTES: ABNORMAL
POLYCHROMASIA: ABNORMAL
POTASSIUM REFLEX MAGNESIUM: 3.7 MMOL/L (ref 3.5–5)
PRO-BNP: 8347 PG/ML (ref 0–450)
PROCALCITONIN: 27.03 NG/ML (ref 0–0.08)
PROTEIN UA: 100 MG/DL
RBC # BLD: 4.17 E12/L (ref 3.8–5.8)
RBC UA: ABNORMAL /HPF (ref 0–2)
RESPIRATORY SYNCYTIAL VIRUS BY PCR: NOT DETECTED
SARS-COV-2, NAAT: NOT DETECTED
SARS-COV-2, PCR: NOT DETECTED
SODIUM BLD-SCNC: 134 MMOL/L (ref 132–146)
SPECIFIC GRAVITY UA: 1.02 (ref 1–1.03)
TOTAL PROTEIN: 6.9 G/DL (ref 6.4–8.3)
TROPONIN, HIGH SENSITIVITY: 81 NG/L (ref 0–11)
TROPONIN, HIGH SENSITIVITY: 89 NG/L (ref 0–11)
TROPONIN, HIGH SENSITIVITY: 97 NG/L (ref 0–11)
TROPONIN, HIGH SENSITIVITY: 98 NG/L (ref 0–11)
UROBILINOGEN, URINE: 0.2 E.U./DL
WBC # BLD: 10.8 E9/L (ref 4.5–11.5)
WBC UA: ABNORMAL /HPF (ref 0–5)

## 2022-08-02 PROCEDURE — 2060000000 HC ICU INTERMEDIATE R&B

## 2022-08-02 PROCEDURE — 87635 SARS-COV-2 COVID-19 AMP PRB: CPT

## 2022-08-02 PROCEDURE — 2580000003 HC RX 258: Performed by: STUDENT IN AN ORGANIZED HEALTH CARE EDUCATION/TRAINING PROGRAM

## 2022-08-02 PROCEDURE — 85025 COMPLETE CBC W/AUTO DIFF WBC: CPT

## 2022-08-02 PROCEDURE — 83880 ASSAY OF NATRIURETIC PEPTIDE: CPT

## 2022-08-02 PROCEDURE — 84484 ASSAY OF TROPONIN QUANT: CPT

## 2022-08-02 PROCEDURE — 6370000000 HC RX 637 (ALT 250 FOR IP): Performed by: INTERNAL MEDICINE

## 2022-08-02 PROCEDURE — 99285 EMERGENCY DEPT VISIT HI MDM: CPT

## 2022-08-02 PROCEDURE — 6360000002 HC RX W HCPCS: Performed by: INTERNAL MEDICINE

## 2022-08-02 PROCEDURE — 96375 TX/PRO/DX INJ NEW DRUG ADDON: CPT

## 2022-08-02 PROCEDURE — 36415 COLL VENOUS BLD VENIPUNCTURE: CPT

## 2022-08-02 PROCEDURE — 0202U NFCT DS 22 TRGT SARS-COV-2: CPT

## 2022-08-02 PROCEDURE — 6360000002 HC RX W HCPCS: Performed by: STUDENT IN AN ORGANIZED HEALTH CARE EDUCATION/TRAINING PROGRAM

## 2022-08-02 PROCEDURE — 84145 PROCALCITONIN (PCT): CPT

## 2022-08-02 PROCEDURE — 82962 GLUCOSE BLOOD TEST: CPT

## 2022-08-02 PROCEDURE — 71045 X-RAY EXAM CHEST 1 VIEW: CPT

## 2022-08-02 PROCEDURE — 80053 COMPREHEN METABOLIC PANEL: CPT

## 2022-08-02 PROCEDURE — 81001 URINALYSIS AUTO W/SCOPE: CPT

## 2022-08-02 PROCEDURE — 94640 AIRWAY INHALATION TREATMENT: CPT

## 2022-08-02 PROCEDURE — 93308 TTE F-UP OR LMTD: CPT

## 2022-08-02 PROCEDURE — 83036 HEMOGLOBIN GLYCOSYLATED A1C: CPT

## 2022-08-02 PROCEDURE — 87040 BLOOD CULTURE FOR BACTERIA: CPT

## 2022-08-02 PROCEDURE — 96365 THER/PROPH/DIAG IV INF INIT: CPT

## 2022-08-02 PROCEDURE — 2500000003 HC RX 250 WO HCPCS: Performed by: STUDENT IN AN ORGANIZED HEALTH CARE EDUCATION/TRAINING PROGRAM

## 2022-08-02 PROCEDURE — 6370000000 HC RX 637 (ALT 250 FOR IP): Performed by: STUDENT IN AN ORGANIZED HEALTH CARE EDUCATION/TRAINING PROGRAM

## 2022-08-02 PROCEDURE — 93005 ELECTROCARDIOGRAM TRACING: CPT | Performed by: STUDENT IN AN ORGANIZED HEALTH CARE EDUCATION/TRAINING PROGRAM

## 2022-08-02 PROCEDURE — 87449 NOS EACH ORGANISM AG IA: CPT

## 2022-08-02 PROCEDURE — 83605 ASSAY OF LACTIC ACID: CPT

## 2022-08-02 RX ORDER — DEXTROSE MONOHYDRATE 100 MG/ML
INJECTION, SOLUTION INTRAVENOUS CONTINUOUS PRN
Status: DISCONTINUED | OUTPATIENT
Start: 2022-08-02 | End: 2022-08-09 | Stop reason: HOSPADM

## 2022-08-02 RX ORDER — ALLOPURINOL 300 MG/1
300 TABLET ORAL EVERY OTHER DAY
Status: ON HOLD | COMMUNITY
Start: 2022-05-03 | End: 2022-08-09 | Stop reason: HOSPADM

## 2022-08-02 RX ORDER — ALBUTEROL SULFATE 2.5 MG/3ML
2.5 SOLUTION RESPIRATORY (INHALATION) EVERY 6 HOURS PRN
Status: DISCONTINUED | OUTPATIENT
Start: 2022-08-02 | End: 2022-08-09 | Stop reason: HOSPADM

## 2022-08-02 RX ORDER — IPRATROPIUM BROMIDE AND ALBUTEROL SULFATE 2.5; .5 MG/3ML; MG/3ML
1 SOLUTION RESPIRATORY (INHALATION)
Status: DISCONTINUED | OUTPATIENT
Start: 2022-08-02 | End: 2022-08-09 | Stop reason: HOSPADM

## 2022-08-02 RX ORDER — REPAGLINIDE 1 MG/1
1 TABLET ORAL 2 TIMES DAILY
COMMUNITY
Start: 2022-07-30 | End: 2022-08-09

## 2022-08-02 RX ORDER — ASCORBIC ACID 500 MG
1000 TABLET ORAL DAILY
Status: DISCONTINUED | OUTPATIENT
Start: 2022-08-02 | End: 2022-08-09 | Stop reason: HOSPADM

## 2022-08-02 RX ORDER — SODIUM CHLORIDE AND POTASSIUM CHLORIDE .9; .15 G/100ML; G/100ML
SOLUTION INTRAVENOUS CONTINUOUS
Status: DISCONTINUED | OUTPATIENT
Start: 2022-08-02 | End: 2022-08-03

## 2022-08-02 RX ORDER — AMLODIPINE BESYLATE 5 MG/1
5 TABLET ORAL DAILY
Status: DISCONTINUED | OUTPATIENT
Start: 2022-08-02 | End: 2022-08-09 | Stop reason: HOSPADM

## 2022-08-02 RX ORDER — ARFORMOTEROL TARTRATE 15 UG/2ML
15 SOLUTION RESPIRATORY (INHALATION) 2 TIMES DAILY
Status: DISCONTINUED | OUTPATIENT
Start: 2022-08-02 | End: 2022-08-09 | Stop reason: HOSPADM

## 2022-08-02 RX ORDER — LISINOPRIL AND HYDROCHLOROTHIAZIDE 25; 20 MG/1; MG/1
0.5 TABLET ORAL DAILY
Status: ON HOLD | COMMUNITY
Start: 2022-06-26 | End: 2022-08-09 | Stop reason: HOSPADM

## 2022-08-02 RX ORDER — GABAPENTIN 100 MG/1
100 CAPSULE ORAL 2 TIMES DAILY
Status: DISCONTINUED | OUTPATIENT
Start: 2022-08-02 | End: 2022-08-09 | Stop reason: HOSPADM

## 2022-08-02 RX ORDER — AZITHROMYCIN 250 MG/1
500 TABLET, FILM COATED ORAL DAILY
Status: DISCONTINUED | OUTPATIENT
Start: 2022-08-02 | End: 2022-08-04 | Stop reason: SDUPTHER

## 2022-08-02 RX ORDER — ALLOPURINOL 100 MG/1
100 TABLET ORAL DAILY
Status: DISCONTINUED | OUTPATIENT
Start: 2022-08-02 | End: 2022-08-09 | Stop reason: HOSPADM

## 2022-08-02 RX ORDER — INSULIN LISPRO 100 [IU]/ML
0-4 INJECTION, SOLUTION INTRAVENOUS; SUBCUTANEOUS NIGHTLY
Status: DISCONTINUED | OUTPATIENT
Start: 2022-08-02 | End: 2022-08-06

## 2022-08-02 RX ORDER — MAGNESIUM OXIDE 400 MG/1
400 TABLET ORAL DAILY
COMMUNITY
Start: 2022-05-19

## 2022-08-02 RX ORDER — ONDANSETRON 2 MG/ML
4 INJECTION INTRAMUSCULAR; INTRAVENOUS EVERY 6 HOURS PRN
Status: DISCONTINUED | OUTPATIENT
Start: 2022-08-02 | End: 2022-08-09 | Stop reason: HOSPADM

## 2022-08-02 RX ORDER — METOPROLOL SUCCINATE 25 MG/1
50 TABLET, EXTENDED RELEASE ORAL DAILY
Status: DISCONTINUED | OUTPATIENT
Start: 2022-08-02 | End: 2022-08-09 | Stop reason: HOSPADM

## 2022-08-02 RX ORDER — METOPROLOL SUCCINATE 25 MG/1
25 TABLET, EXTENDED RELEASE ORAL 2 TIMES DAILY
Status: ON HOLD | COMMUNITY
Start: 2022-07-26 | End: 2022-08-09 | Stop reason: SDUPTHER

## 2022-08-02 RX ORDER — TAMSULOSIN HYDROCHLORIDE 0.4 MG/1
0.4 CAPSULE ORAL 2 TIMES DAILY
Status: DISCONTINUED | OUTPATIENT
Start: 2022-08-02 | End: 2022-08-09 | Stop reason: HOSPADM

## 2022-08-02 RX ORDER — BUDESONIDE 0.5 MG/2ML
500 INHALANT ORAL 2 TIMES DAILY
Status: DISCONTINUED | OUTPATIENT
Start: 2022-08-02 | End: 2022-08-09 | Stop reason: HOSPADM

## 2022-08-02 RX ORDER — INSULIN LISPRO 100 [IU]/ML
0-8 INJECTION, SOLUTION INTRAVENOUS; SUBCUTANEOUS
Status: DISCONTINUED | OUTPATIENT
Start: 2022-08-02 | End: 2022-08-06

## 2022-08-02 RX ORDER — ZINC SULFATE 50(220)MG
50 CAPSULE ORAL DAILY
Status: DISCONTINUED | OUTPATIENT
Start: 2022-08-02 | End: 2022-08-09 | Stop reason: HOSPADM

## 2022-08-02 RX ORDER — ACETAMINOPHEN 325 MG/1
650 TABLET ORAL ONCE
Status: COMPLETED | OUTPATIENT
Start: 2022-08-02 | End: 2022-08-02

## 2022-08-02 RX ADMIN — AZITHROMYCIN MONOHYDRATE 500 MG: 250 TABLET ORAL at 14:14

## 2022-08-02 RX ADMIN — ALLOPURINOL 100 MG: 100 TABLET ORAL at 14:15

## 2022-08-02 RX ADMIN — DOXYCYCLINE 100 MG: 100 INJECTION, POWDER, LYOPHILIZED, FOR SOLUTION INTRAVENOUS at 08:47

## 2022-08-02 RX ADMIN — ZINC SULFATE 220 MG (50 MG) CAPSULE 50 MG: CAPSULE at 14:14

## 2022-08-02 RX ADMIN — ALBUTEROL SULFATE 2.5 MG: 2.5 SOLUTION RESPIRATORY (INHALATION) at 21:31

## 2022-08-02 RX ADMIN — APIXABAN 2.5 MG: 5 TABLET, FILM COATED ORAL at 20:42

## 2022-08-02 RX ADMIN — OXYCODONE HYDROCHLORIDE AND ACETAMINOPHEN 1000 MG: 500 TABLET ORAL at 14:15

## 2022-08-02 RX ADMIN — CEFTRIAXONE SODIUM 1000 MG: 1 INJECTION, POWDER, FOR SOLUTION INTRAMUSCULAR; INTRAVENOUS at 08:52

## 2022-08-02 RX ADMIN — APIXABAN 2.5 MG: 5 TABLET, FILM COATED ORAL at 14:13

## 2022-08-02 RX ADMIN — AMLODIPINE BESYLATE 5 MG: 5 TABLET ORAL at 14:15

## 2022-08-02 RX ADMIN — ACETAMINOPHEN 650 MG: 325 TABLET ORAL at 07:44

## 2022-08-02 RX ADMIN — POTASSIUM CHLORIDE AND SODIUM CHLORIDE: 900; 150 INJECTION, SOLUTION INTRAVENOUS at 17:09

## 2022-08-02 ASSESSMENT — ENCOUNTER SYMPTOMS
COUGH: 1
NAUSEA: 0
BACK PAIN: 0
SORE THROAT: 0
DIARRHEA: 0
ABDOMINAL PAIN: 0
SHORTNESS OF BREATH: 1
VOMITING: 0
RHINORRHEA: 0

## 2022-08-02 NOTE — ED PROVIDER NOTES
[address] [facility]   ED Provider Note  Department of Emergency Medicine     ED Room: 1068/7285-03      Written by: Vilma Morales DO  Patient Name: Maribel Stevens  Attending Provider: Eliana Aaron DO  Admit Date: 2022  7:29 AM  MRN: [id number]    : 10/27/1933        Chief Complaint   Patient presents with    Concern For COVID-19    Shortness of Breath     85% RA at home, has been in contact with COVID .     - Chief complaint    HPI   Maribel Stevens is a 80 y.o. male presenting to the ED for evaluation of Concern For COVID-19 and Shortness of Breath (85% RA at home, has been in contact with COVID . )      History obtained from patient and his daughter. Patient has a past medical history of PUD, CKD, HTN, HLD, persistent atrial fibrillation on Eliquis. Patient is presenting for evaluation of cough, shortness of breath, fever, fatigue, decreased appetite, and concern for COVID-19 pneumonia. They state that they were recently in contact with COVID-positive relatives. Patient lives with his daughter; she states that she takes care of him at home and is a retired RN; she states that when he was short of breath today she checked his pulse ox and it was 85% on room air. Patient states that he feels some chest tightness and dyspnea on exertion but it is improved when he rests. He has also been having significant coughing. He does not usually require supplemental oxygen. Patient daughter also states he had a fever over 102 °F, was given DayQuil this morning that has 325 mg acetaminophen in it. Patient's complaints are severe in severity, and persistent. Nothing in particular makes it better. Exertion makes it worse. Coughing makes it worse.     Denies any neck pain or stiffness, sore throat, chest pain or palpitations, abdominal pain, nausea or vomiting, diarrhea, black or bloody stools, urinary symptoms, numbness or tingling anywhere, lower extremity edema or No rash. Neurological:      General: No focal deficit present. Mental Status: He is alert and oriented to person, place, and time. Psychiatric:         Mood and Affect: Mood normal.         Behavior: Behavior normal.        Procedures       MDM     Amount and/or Complexity of Data Reviewed  Clinical lab tests: reviewed  Tests in the radiology section of CPT®: reviewed  Decide to obtain previous medical records or to obtain history from someone other than the patient: yes         ED Course as of 08/02/22 2133   Tue Aug 02, 2022   0825 SARS-CoV-2, NAAT: Not Detected [VG]   3351 Call placed to Dr. Huong Cooper for admission. [VG]      ED Course User Index  [VG] Tay Tim DO         Medical Decision Making: This is a 80 y.o. male presenting for evaluation of fatigue, SOB, fever, hypoxia, and concern for possible covid19 exposure. On arrival patient is ill-appearing but is alert and answers questions appropriately. Vitals are notable for fever 102.8F, spO2 88% ORA, improved to 94% on 2L. Labs and imaging ordered. Patient seen and examined. Exam is notable for b/l wheezes, rhonchi. Labs reviewed, rapid covid negative; LA 3.5; procal 27.03, troponin 81, repeat 89 slowly trending upward. AG 17. Renal function appears near baseline. BNP >8,000, no baseline history available though patient does not appear clinically fluid-overloaded. Blood and urine cultures were obtained. Resp viral panel ordered, later came back positive for parainfluenza 3. CXR showed significant left-sided infiltrates; patient treated with rocephin and doxy. Decision made to admit this patient for further evaluation and management of the above findings. He is amenable to this plan. Discussed case with Dr. Huong Cooper who agrees to admit this patient. See ED COURSE for additional MDM. EKG reviewed and interpreted by me: This EKG is signed by emergency department physician.     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 , . Comparison: similar as compared to previous EKG      Labs & imaging were reviewed and interpreted, see RESULTS. I have personally reviewed all laboratory and imaging results for this patient. I have discussed this patient with my attending, who has seen the patient and agrees with this disposition. Patient was seen and evaluated by myself and my attending Marian Blanchard DO. Assessment and Plan discussed with attending provider, please see attestation for final plan of care.           --------------------------------------------- PAST HISTORY ---------------------------------------------  Past Medical History:  has a past medical history of A-fib ([facility] [address] Diabetes mellitus ([facility] [address] H pylori ulcer, Hyperlipidemia, Hypertension, PONV (postoperative nausea and vomiting), PUD (peptic ulcer disease), and Urinary frequency. Past Surgical History:  has a past surgical history that includes Appendectomy; hernia repair (more 10 yrs); pr egd transoral biopsy single/multiple (7/26/2012); pr colonoscopy flx dx w/collj spec when pfrmd (7/26/2012); Endoscopy, colon, diagnostic (7/26/2012); Colonoscopy (7/206/2012); Cataract removal with implant (Left, 12/17/15); Cystoscopy; eye surgery (Bilateral); and Cystoscopy (N/A, 6/2/2020). Social History:  reports that he quit smoking about 12 years ago. His smoking use included cigarettes. He started smoking about 72 years ago. He has a 60.00 pack-year smoking history. He has never used smokeless tobacco. He reports current alcohol use. He reports that he does not use drugs. Family History: family history is not on file. Unless otherwise noted, family history is non contributory. The patients home medications have been reviewed.     Allergies: Patient has no known SEEN None Seen /HPF   Lactate, Sepsis   Result Value Ref Range    Lactic Acid, Sepsis 3.5 (HH) 0.5 - 1.9 mmol/L   Troponin   Result Value Ref Range    Troponin, High Sensitivity 89 (H) 0 - 11 ng/L   EKG 12 Lead   Result Value Ref Range    Ventricular Rate 73 BPM    Atrial Rate 87 BPM    QRS Duration 132 ms    Q-T Interval 440 ms    QTc Calculation (Bazett) 484 ms    R Axis -73 degrees    T Axis -50 degrees       RADIOLOGY:  XR CHEST PORTABLE   Final Result   Significant left-sided infiltrates               Interpreted by the radiologist unless otherwise specified.      ------------------------- NURSING NOTES AND VITALS REVIEWED ---------------------------  Date / Time Roomed:  8/2/2022  7:29 AM  ED Bed Assignment:  [id number]    The nursing notes within the ED encounter and vital signs as below have been reviewed by myself. Patient Vitals for the past 24 hrs:   BP Temp Temp src Pulse Resp SpO2 Height Weight   08/02/22 1254 (!) 114/55 98.4 °F (36.9 °C) Oral 81 18 96 % 5' 9\" (1.753 m) 180 lb 11.2 oz (82 kg)   08/02/22 1132 92/65 98.6 °F (37 °C) Oral 75 18 95 % -- --   08/02/22 0932 -- -- -- -- 19 -- -- --   08/02/22 0905 -- 98.8 °F (37.1 °C) Oral -- -- -- -- --   08/02/22 0736 123/61 -- -- 87 -- 91 % -- --   08/02/22 0723 -- (!) 102.8 °F (39.3 °C) Oral -- -- (!) 88 % -- --   08/02/22 0722 -- -- -- 72 -- -- -- --       Oxygen Saturation Interpretation: Abnormal and Improved after treatment    The patients available past medical records and past encounters were reviewed. ------------------------------------------ PROGRESS NOTES ------------------------------------------  Re-evaluation(s):  Please see ED course    Counseling:  I have spoken with the patient and his daughter  and discussed todays results, in addition to providing specific details for the plan of care and counseling regarding the diagnosis and prognosis.   Their questions are answered at this time and they are agreeable with the plan of admission.    --------------------------------- ADDITIONAL PROVIDER NOTES ---------------------------------  Consultations:  Please see ED course    This patient's ED course included: a personal history and physicial examination, re-evaluation prior to disposition, multiple bedside re-evaluations, IV medications, cardiac monitoring, continuous pulse oximetry, and complex medical decision making and emergency management    This patient has been closely monitored during their ED course. Diagnosis:  1. Pneumonia of left lung due to infectious organism, unspecified part of lung    2. Acute respiratory failure with hypoxia (HCC)    3. Dehydration    4. Metabolic acidosis        Disposition:  Patient's disposition: Admit to telemetry  Patient's condition is serious. Zuly Villarreal D.O. PGY-3     Resident Physician     Emergency Medicine      8/2/2022 1:38 PM      NOTE: This report was transcribed using voice recognition software.  Every effort was made to ensure accuracy; however, inadvertent computerized transcription errors may be present             Zuly Villarreal DO  Resident  08/02/22 6869
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
## 2022-08-03 LAB
ANION GAP SERPL CALCULATED.3IONS-SCNC: 13 MMOL/L (ref 7–16)
BASOPHILS ABSOLUTE: 0 E9/L (ref 0–0.2)
BASOPHILS RELATIVE PERCENT: 0 % (ref 0–2)
BUN BLDV-MCNC: 61 MG/DL (ref 6–23)
CALCIUM SERPL-MCNC: 8.9 MG/DL (ref 8.6–10.2)
CHLORIDE BLD-SCNC: 97 MMOL/L (ref 98–107)
CO2: 25 MMOL/L (ref 22–29)
CREAT SERPL-MCNC: 3.1 MG/DL (ref 0.7–1.2)
CREATININE URINE: 90 MG/DL (ref 40–278)
EOSINOPHIL, URINE: 0 % (ref 0–1)
EOSINOPHILS ABSOLUTE: 0 E9/L (ref 0.05–0.5)
EOSINOPHILS RELATIVE PERCENT: 0 % (ref 0–6)
GFR AFRICAN AMERICAN: 23
GFR NON-AFRICAN AMERICAN: 19 ML/MIN/1.73
GLUCOSE BLD-MCNC: 38 MG/DL (ref 74–99)
HCT VFR BLD CALC: 30.3 % (ref 37–54)
HEMOGLOBIN: 9.7 G/DL (ref 12.5–16.5)
HYPOCHROMIA: ABNORMAL
L. PNEUMOPHILA SEROGP 1 UR AG: NORMAL
LYMPHOCYTES ABSOLUTE: 1.57 E9/L (ref 1.5–4)
LYMPHOCYTES RELATIVE PERCENT: 9 % (ref 20–42)
MAGNESIUM: 1.6 MG/DL (ref 1.6–2.6)
MCH RBC QN AUTO: 27 PG (ref 26–35)
MCHC RBC AUTO-ENTMCNC: 32 % (ref 32–34.5)
MCV RBC AUTO: 84.4 FL (ref 80–99.9)
METER GLUCOSE: 191 MG/DL (ref 74–99)
METER GLUCOSE: 242 MG/DL (ref 74–99)
METER GLUCOSE: 324 MG/DL (ref 74–99)
METER GLUCOSE: 44 MG/DL (ref 74–99)
METER GLUCOSE: 96 MG/DL (ref 74–99)
METER GLUCOSE: <40 MG/DL (ref 74–99)
MONOCYTES ABSOLUTE: 1.04 E9/L (ref 0.1–0.95)
MONOCYTES RELATIVE PERCENT: 6 % (ref 2–12)
NEUTROPHILS ABSOLUTE: 14.79 E9/L (ref 1.8–7.3)
NEUTROPHILS RELATIVE PERCENT: 85 % (ref 43–80)
PDW BLD-RTO: 16.3 FL (ref 11.5–15)
PHOSPHORUS: 2.9 MG/DL (ref 2.5–4.5)
PLATELET # BLD: 187 E9/L (ref 130–450)
PMV BLD AUTO: 9.9 FL (ref 7–12)
POLYCHROMASIA: ABNORMAL
POTASSIUM SERPL-SCNC: 3.4 MMOL/L (ref 3.5–5)
RBC # BLD: 3.59 E12/L (ref 3.8–5.8)
SODIUM BLD-SCNC: 135 MMOL/L (ref 132–146)
SODIUM URINE: 46 MMOL/L
STREP PNEUMONIAE ANTIGEN, URINE: NORMAL
UREA NITROGEN, UR: 611 MG/DL (ref 800–1666)
WBC # BLD: 17.4 E9/L (ref 4.5–11.5)

## 2022-08-03 PROCEDURE — 97161 PT EVAL LOW COMPLEX 20 MIN: CPT | Performed by: PHYSICAL THERAPIST

## 2022-08-03 PROCEDURE — 97165 OT EVAL LOW COMPLEX 30 MIN: CPT | Performed by: OCCUPATIONAL THERAPIST

## 2022-08-03 PROCEDURE — 93005 ELECTROCARDIOGRAM TRACING: CPT | Performed by: INTERNAL MEDICINE

## 2022-08-03 PROCEDURE — 6360000002 HC RX W HCPCS: Performed by: INTERNAL MEDICINE

## 2022-08-03 PROCEDURE — 36415 COLL VENOUS BLD VENIPUNCTURE: CPT

## 2022-08-03 PROCEDURE — 6370000000 HC RX 637 (ALT 250 FOR IP): Performed by: INTERNAL MEDICINE

## 2022-08-03 PROCEDURE — 83735 ASSAY OF MAGNESIUM: CPT

## 2022-08-03 PROCEDURE — 82962 GLUCOSE BLOOD TEST: CPT

## 2022-08-03 PROCEDURE — 85025 COMPLETE CBC W/AUTO DIFF WBC: CPT

## 2022-08-03 PROCEDURE — 97530 THERAPEUTIC ACTIVITIES: CPT | Performed by: OCCUPATIONAL THERAPIST

## 2022-08-03 PROCEDURE — 82570 ASSAY OF URINE CREATININE: CPT

## 2022-08-03 PROCEDURE — 94640 AIRWAY INHALATION TREATMENT: CPT

## 2022-08-03 PROCEDURE — 84300 ASSAY OF URINE SODIUM: CPT

## 2022-08-03 PROCEDURE — 84100 ASSAY OF PHOSPHORUS: CPT

## 2022-08-03 PROCEDURE — 87205 SMEAR GRAM STAIN: CPT

## 2022-08-03 PROCEDURE — 2700000000 HC OXYGEN THERAPY PER DAY

## 2022-08-03 PROCEDURE — 2500000003 HC RX 250 WO HCPCS: Performed by: INTERNAL MEDICINE

## 2022-08-03 PROCEDURE — 2060000000 HC ICU INTERMEDIATE R&B

## 2022-08-03 PROCEDURE — 84540 ASSAY OF URINE/UREA-N: CPT

## 2022-08-03 PROCEDURE — 80048 BASIC METABOLIC PNL TOTAL CA: CPT

## 2022-08-03 PROCEDURE — 2580000003 HC RX 258: Performed by: INTERNAL MEDICINE

## 2022-08-03 RX ORDER — SODIUM CHLORIDE AND POTASSIUM CHLORIDE .9; .15 G/100ML; G/100ML
SOLUTION INTRAVENOUS CONTINUOUS
Status: DISCONTINUED | OUTPATIENT
Start: 2022-08-03 | End: 2022-08-06

## 2022-08-03 RX ORDER — DEXTROSE, SODIUM CHLORIDE, AND POTASSIUM CHLORIDE 5; .9; .15 G/100ML; G/100ML; G/100ML
INJECTION INTRAVENOUS CONTINUOUS
Status: DISCONTINUED | OUTPATIENT
Start: 2022-08-03 | End: 2022-08-03

## 2022-08-03 RX ADMIN — ZINC SULFATE 220 MG (50 MG) CAPSULE 50 MG: CAPSULE at 09:35

## 2022-08-03 RX ADMIN — CEFTRIAXONE SODIUM 1000 MG: 1 INJECTION, POWDER, FOR SOLUTION INTRAMUSCULAR; INTRAVENOUS at 09:38

## 2022-08-03 RX ADMIN — INSULIN LISPRO 6 UNITS: 100 INJECTION, SOLUTION INTRAVENOUS; SUBCUTANEOUS at 17:05

## 2022-08-03 RX ADMIN — OXYCODONE HYDROCHLORIDE AND ACETAMINOPHEN 1000 MG: 500 TABLET ORAL at 09:33

## 2022-08-03 RX ADMIN — IPRATROPIUM BROMIDE AND ALBUTEROL SULFATE 1 AMPULE: .5; 2.5 SOLUTION RESPIRATORY (INHALATION) at 14:30

## 2022-08-03 RX ADMIN — AMLODIPINE BESYLATE 5 MG: 5 TABLET ORAL at 09:33

## 2022-08-03 RX ADMIN — TAMSULOSIN HYDROCHLORIDE 0.4 MG: 0.4 CAPSULE ORAL at 20:02

## 2022-08-03 RX ADMIN — ALLOPURINOL 100 MG: 100 TABLET ORAL at 09:43

## 2022-08-03 RX ADMIN — ARFORMOTEROL TARTRATE 15 MCG: 15 SOLUTION RESPIRATORY (INHALATION) at 18:13

## 2022-08-03 RX ADMIN — APIXABAN 2.5 MG: 5 TABLET, FILM COATED ORAL at 20:02

## 2022-08-03 RX ADMIN — METOPROLOL SUCCINATE 50 MG: 25 TABLET, EXTENDED RELEASE ORAL at 09:35

## 2022-08-03 RX ADMIN — POTASSIUM CHLORIDE, DEXTROSE MONOHYDRATE AND SODIUM CHLORIDE: 150; 5; 900 INJECTION, SOLUTION INTRAVENOUS at 09:12

## 2022-08-03 RX ADMIN — ARFORMOTEROL TARTRATE 15 MCG: 15 SOLUTION RESPIRATORY (INHALATION) at 07:19

## 2022-08-03 RX ADMIN — GABAPENTIN 100 MG: 100 CAPSULE ORAL at 20:02

## 2022-08-03 RX ADMIN — IPRATROPIUM BROMIDE AND ALBUTEROL SULFATE 1 AMPULE: .5; 2.5 SOLUTION RESPIRATORY (INHALATION) at 18:13

## 2022-08-03 RX ADMIN — IPRATROPIUM BROMIDE AND ALBUTEROL SULFATE 1 AMPULE: .5; 2.5 SOLUTION RESPIRATORY (INHALATION) at 07:19

## 2022-08-03 RX ADMIN — BUDESONIDE 500 MCG: 0.5 INHALANT RESPIRATORY (INHALATION) at 18:13

## 2022-08-03 RX ADMIN — TAMSULOSIN HYDROCHLORIDE 0.4 MG: 0.4 CAPSULE ORAL at 09:34

## 2022-08-03 RX ADMIN — BUDESONIDE 500 MCG: 0.5 INHALANT RESPIRATORY (INHALATION) at 07:19

## 2022-08-03 RX ADMIN — GABAPENTIN 100 MG: 100 CAPSULE ORAL at 09:33

## 2022-08-03 RX ADMIN — SODIUM CHLORIDE AND POTASSIUM CHLORIDE: 9; 1.49 INJECTION, SOLUTION INTRAVENOUS at 17:30

## 2022-08-03 RX ADMIN — IPRATROPIUM BROMIDE AND ALBUTEROL SULFATE 1 AMPULE: .5; 2.5 SOLUTION RESPIRATORY (INHALATION) at 10:24

## 2022-08-03 RX ADMIN — Medication 16 G: at 06:48

## 2022-08-03 RX ADMIN — Medication 16 G: at 06:29

## 2022-08-03 RX ADMIN — AZITHROMYCIN MONOHYDRATE 500 MG: 250 TABLET ORAL at 09:35

## 2022-08-03 RX ADMIN — APIXABAN 2.5 MG: 5 TABLET, FILM COATED ORAL at 09:33

## 2022-08-03 NOTE — PROGRESS NOTES
OK per Dr. Cira Perkins to switch patient back to 0.9NS w/ 20KCL being that patients sugar is now 342. Patient is eating and drinking.  6 Units given for dinner time meal.

## 2022-08-03 NOTE — PROGRESS NOTES
excoriations. Denies bruising. Hematologic/Lymphatic:  Denies bruising or bleeding. Physical Exam:  I/O this shift:  In: 1415.5 [P.O.:360; I.V.:959.5; IV Piggyback:96]  Out: -     Intake/Output Summary (Last 24 hours) at 8/3/2022 1136  Last data filed at 8/3/2022 0948  Gross per 24 hour   Intake 1655.48 ml   Output 650 ml   Net 1005.48 ml   I/O last 3 completed shifts: In: 240 [P.O.:240]  Out: 650 [Urine:650]  Patient Vitals for the past 96 hrs (Last 3 readings):   Weight   08/03/22 0040 181 lb 2 oz (82.2 kg)   08/02/22 1254 180 lb 11.2 oz (82 kg)     Vital Signs:   Blood pressure (!) 154/65, pulse 98, temperature 97.8 °F (36.6 °C), temperature source Oral, resp. rate 18, height 5' 9\" (1.753 m), weight 181 lb 2 oz (82.2 kg), SpO2 98 %. General appearance:  Alert, responsive, oriented to person, place, and time. More awake and alert, comfortable appearing, no distress. Head:  Normocephalic. No masses, lesions or tenderness. Eyes:  PERRLA. EOMI. Sclera clear. ENT:  Ears normal. Mucosa normal.  Neck:    Supple. Trachea midline. No thyromegaly. No JVD. No bruits. Heart:    Rhythm regular. Rate controlled. S1 and S2. Systolic murmur. Lungs:    Symmetrical.  Air exchange has improved throughout, rhonchi are scattered but improved from yesterday's exam in the left lung fields, right lung fields are clear. No wheezing or rales. Abdomen:   Soft. Non-tender. Non-distended. Bowel sounds positive. No organomegaly or masses. No pain on palpation. Extremities:    Peripheral pulses present. No peripheral edema. No ulcers. No cyanosis. No clubbing. Neurologic:    Alert x 3. No significant pitting focal deficit. Cranial nerves grossly intact. No focal weakness. Psych:   Behavior is normal. Mood appears normal. Speech is not rapid and/or pressured. Musculoskeletal:   No unilateral joint edema, erythema or warmth. Gait not assessed.   Integumentary:  No rashes  Skin normal color and texture.   Genitalia/Breast:  Deferred    Medication:  Scheduled Meds:   amLODIPine  5 mg Oral Daily    apixaban  2.5 mg Oral BID    allopurinol  100 mg Oral Daily    gabapentin  100 mg Oral BID    metoprolol succinate  50 mg Oral Daily    tamsulosin  0.4 mg Oral BID    insulin lispro  0-8 Units SubCUTAneous TID WC    insulin lispro  0-4 Units SubCUTAneous Nightly    cefTRIAXone (ROCEPHIN) IV  1,000 mg IntraVENous Q24H    azithromycin  500 mg Oral Daily    Arformoterol Tartrate  15 mcg Nebulization BID    ipratropium-albuterol  1 ampule Inhalation Q4H WA    budesonide  500 mcg Nebulization BID    ascorbic acid  1,000 mg Oral Daily    zinc sulfate  50 mg Oral Daily     Continuous Infusions:   dextrose 5% and 0.9% NaCl with KCl 20 mEq 60 mL/hr at 08/03/22 0913    dextrose         Objective Data:  CBC with Differential:    Lab Results   Component Value Date/Time    WBC 17.4 08/03/2022 04:58 AM    RBC 3.59 08/03/2022 04:58 AM    HGB 9.7 08/03/2022 04:58 AM    HCT 30.3 08/03/2022 04:58 AM     08/03/2022 04:58 AM    MCV 84.4 08/03/2022 04:58 AM    MCH 27.0 08/03/2022 04:58 AM    MCHC 32.0 08/03/2022 04:58 AM    RDW 16.3 08/03/2022 04:58 AM    LYMPHOPCT 9.0 08/03/2022 04:58 AM    MONOPCT 6.0 08/03/2022 04:58 AM    BASOPCT 0.0 08/03/2022 04:58 AM    MONOSABS 1.04 08/03/2022 04:58 AM    LYMPHSABS 1.57 08/03/2022 04:58 AM    EOSABS 0.00 08/03/2022 04:58 AM    BASOSABS 0.00 08/03/2022 04:58 AM     BMP:    Lab Results   Component Value Date/Time     08/03/2022 04:58 AM    K 3.4 08/03/2022 04:58 AM    K 3.7 08/02/2022 07:38 AM    CL 97 08/03/2022 04:58 AM    CO2 25 08/03/2022 04:58 AM    BUN 61 08/03/2022 04:58 AM    LABALBU 4.2 08/02/2022 07:38 AM    CREATININE 3.1 08/03/2022 04:58 AM    CALCIUM 8.9 08/03/2022 04:58 AM    GFRAA 23 08/03/2022 04:58 AM    LABGLOM 19 08/03/2022 04:58 AM    GLUCOSE 38 08/03/2022 04:58 AM       Assessment:  Acute respiratory failure with hypoxia secondary to extensive left-sided pneumonia  Atypical left-sided pneumonia with infectious work-up positive for both parainfluenza as well as streptococcal pneumonia  Acute exacerbation of COPD secondary to pneumonia   Acute on chronic renal failure with mild metabolic acidosis  Paroxysmal atrial fibrillation with variable ventricular response on Eliquis  Hypoglycemia with underlying non-insulin-dependent diabetes mellitus type 2  History of rectosigmoid arteriovenous vascular malformation  Anemia of chronic disease    Plan:   Rafi he is doing better overall. He is on very minimal nasal cannula oxygen and we will wean this today if able. He understands importance of increasing activity, incentive spirometer usage as well. Infectious work-up yielded both parainfluenza and streptococcal pneumonia positivity. He is on adequate therapy at this time. We will continue with respiratory supportive measures and wean steroids as condition allows. Creatinine did trend upward somewhat, fluids are being administered and we will assess for response. Urine indices and eosinophils will be obtained as well. Hypoglycemia developed is being addressed with glucose containing fluids we will monitor and adjust therapy accordingly, sliding scale coverage only for now. Chronic morbidities, laboratory values and vital signs are being monitored and addressed accordingly. PT, OT and  following for discharge planning purposes. Patient's son will be updated by Dr. Karo Whatley today via telephone. Continue current therapy. See orders for further plan of care. More than 50% of my  time was spent at the bedside counseling/coordinating care with the patient and/or family with face to face contact. This time was spent reviewing notes and laboratory data as well as instructing and counseling the patient.  Time I spent with the family or surrogate(s) is included only if the patient was incapable of providing the necessary information or participating in medical decisions. I also discussed the differential diagnosis and all of the proposed management plans with the patient and individuals accompanying the patient. Rafi requires this high level of physician care and nursing on the IMC/Telemetry unit due the complexity of decision management and chance of rapid decline or death. Continued cardiac monitoring and higher level of nursing are required. I am readily available for any further decision-making and intervention.      Grazyna Benjamin, KELLY - CNP  8/3/2022  11:36 AM

## 2022-08-03 NOTE — PROGRESS NOTES
Physician Progress Note      Jeancarlos Cline  ADALBERTO #:                  [id number]  :                       10/27/1933  ADMIT DATE:       2022 7:29 AM  DISCH DATE:  RESPONDING  PROVIDER #:        Roverto Arthur DO        QUERY TEXT:    Stage of Chronic Kidney Disease: Please provide further specificity, if known. Clinical indicators include: ckd, bnp, bun, creatinine, gfr, brain natriuretic   peptide, pro-bnp, chronic kidney disease, probnp, acute on chronic renal   failure  Options provided:  -- Chronic kidney disease stage 1  -- Chronic kidney disease stage 2  -- Chronic kidney disease stage 3  -- Chronic kidney disease stage 3a  -- Chronic kidney disease stage 3b  -- Chronic kidney disease stage 4  -- Chronic kidney disease stage 5  -- Chronic kidney disease stage 5, requiring dialysis  -- End stage renal disease  -- Other - I will add my own diagnosis  -- Disagree - Not applicable / Not valid  -- Disagree - Clinically Unable to determine / Unknown        PROVIDER RESPONSE TEXT:    The patient has chronic kidney disease stage 3. QUERY TEXT:    Type of Encephalopathy: Please provide further specificity, if known. Clinical indicators include: sepsis, chronic kidney disease, metabolic   acidosis, fever, encephalopathy, alcohol use, infectious, hypoglycemia  Options provided:  -- Anoxic/hypoxic encephalopathy  -- Metabolic encephalopathy  -- Toxic encephalopathy  -- Hepatic encephalopathy  -- Hypertensive encephalopathy  -- Other - I will add my own diagnosis  -- Disagree - Not applicable / Not valid  -- Disagree - Clinically Unable to determine / Unknown        PROVIDER RESPONSE TEXT:    The patient has toxic encephalopathy.       Electronically signed by:  Flor Wesley 8/3/2022 11:47 AM

## 2022-08-03 NOTE — PROGRESS NOTES
Glucose checked again 15 minutes after last low reading. Glucose increased to 96. [facility] RN notified.

## 2022-08-03 NOTE — PROGRESS NOTES
Physical Therapy    Physical Therapy Initial Evaluation/Plan of Care    Room #:  0947/0833-10  Patient Name: Narendra Richard  YOB: 1933  MRN: [id number]    Date of Service: 8/3/2022     Tentative placement recommendation: Home Health Physical Therapy  or Home  Equipment recommendation: None      Evaluating Physical Therapist: Gordo Cervantes Physical Therapist      Specific Provider Orders/Date/Referring Provider :   08/02/22 1230    PT eval and treat  Start:  08/02/22 1230,   End:  08/02/22 1230,   ONE TIME,   Standing Count:  1 Occurrences,   R         Selene Colon,      Admitting Diagnosis:   Sepsis ([facility] [address] [A41.9]    Admitted with cough, shortness of breath, fever, fatigue, decreased appetite    Surgery: none  Visit Diagnoses         Codes    Pneumonia of left lung due to infectious organism, unspecified part of lung    -  Primary J18.9    Acute respiratory failure with hypoxia ([address]     J96.01    Dehydration     Z25.6    Metabolic acidosis     J70.5            Patient Active Problem List   Diagnosis    PUD (peptic ulcer disease)    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    Esophagitis-grade 1    AP (angina pectoris) ([address]    Systolic hypertension    Combined forms of age-related cataract of left eye    Anemia, chronic renal failure, stage 4 (severe) ([address]    Sepsis secondary to CAP [facility])        ASSESSMENT of Current Deficits Patient exhibits decreased balance and endurance impairing functional mobility, transfers, gait , gait distance, and tolerance to activity. Patient is a new oxygen user. Patient is able to ambulate within room while maintaining o2 levels >96% while on 1.5L o2 via nasal canula, but oxygen drops to 92-93% during more challenging exercises such as single leg stance activities. Patient requires skilled physical therapy to address concerns listed above to increase safety and independence at discharge.      PHYSICAL THERAPY  PLAN OF CARE       Physical therapy plan of care is established based on physician order,  patient diagnosis and clinical assessment    Current Treatment Recommendations:  -Standing Balance: Perform strengthening exercises in standing to promote motor control with or without upper extremity support   -Transfers: Provide instruction on proper hand and foot position for adequate transfer of weight onto lower extremities and use of gait device if needed and Cues for hand placement, technique and safety. Provide stabilization to prevent fall   -Gait: Gait training and Standing activities to improve: base of support, weight shift, weight bearing    -Endurance: Utilize Supervised activities to increase level of endurance to allow for safe functional mobility including transfers and gait   -Stairs: Stair training with instruction on proper technique and hand placement on rail    PT long term treatment goals are located in below grid    Patient and or family understand(s) diagnosis, prognosis, and plan of care. Frequency of treatments: Patient will be seen  3-5 times/week.          Prior Level of Function: Patient ambulated independently   Rehab Potential: good  for baseline    Past medical history:   Past Medical History:   Diagnosis Date    A-fib ([facility] [address]     Diabetes mellitus ([facility] [address]     H pylori ulcer 8/22/2012    Hyperlipidemia     Hypertension     PONV (postoperative nausea and vomiting)     PUD (peptic ulcer disease) 7/26/2012    Urinary frequency      Past Surgical History:   Procedure Laterality Date    APPENDECTOMY      CATARACT EXTRACTION W/  INTRAOCULAR LENS IMPLANT Left 12/17/15    COLONOSCOPY  7/206/2012    CYSTOSCOPY      multiple    CYSTOSCOPY N/A 6/2/2020    CYSTOSCOPY RETROGRADE PYELOGRAM BOTOX  INJECTION (100 UNITS) performed by Trevin Addison MD at [address] [facility], [place], DIAGNOSTIC  7/26/2012    peptic ulcer disease    EYE SURGERY Bilateral     cataracts    HERNIA REPAIR  more 10 yrs    left     ND COLONOSCOPY FLX DX W/COLLJ SPEC WHEN PFRMD  7/26/2012         OH EGD TRANSORAL BIOPSY SINGLE/MULTIPLE  7/26/2012            SUBJECTIVE:    Precautions: Activity as tolerated, falls, alarm, O2, and contact isolation     Social history: Patient lives with daughter in a ranch home  with 3 steps  to enter with [employer] in shower grab bars    Equipment owned: None    [address]   How much difficulty turning over in bed?: None  How much difficulty sitting down on / standing up from a chair with arms?: A Little  How much difficulty moving from lying on back to sitting on side of bed?: None  How much help from another person moving to and from a bed to a chair?: A Little  How much help from another person needed to walk in hospital room?: A Little  How much help from another person for climbing 3-5 steps with a railing?: A Little  AM-PAC Inpatient Mobility Raw Score : 20  AM-PAC Inpatient T-Scale Score : 47.67  Mobility Inpatient CMS 0-100% Score: 35.83  Mobility Inpatient CMS G-Code Modifier : [address] cleared patient for PT evaluation. The admitting diagnosis and active problem list as listed above have been reviewed prior to the initiation of this evaluation. OBJECTIVE;   Initial Evaluation  Date: 08/03/2022 Treatment Date:     Short Term/ Long Term   Goals   Was pt agreeable to Eval/treatment? Yes  To be met in 3 days   Pain level None stated      Bed Mobility    Rolling: Not assessed patient in chair    Supine to sit: Not assessed patient in chair    Sit to supine: Not assessed patient in chair    Scooting: Not assessed patient in chair    Rolling: Independent    Supine to sit:  Independent    Sit to supine: Independent    Scooting: Independent     Transfers Sit to stand: Supervision    Sit to stand: Independent    Ambulation     25 feet using  IV pole with Minimal assist of 1   for IV pole, o2 line management, o2 saturation monitoring  Ambulation distance limited to within room distances due to patient's isolation status     150 feet using  no device with Supervision     Stair negotiation: ascended and descended   Not assessed   Due to patient's isolation status     3 steps with supervision to aid in community ambulation     ROM Within functional limits    Increase range of motion 10% of affected joints    Strength BUE:  refer to OT eval  RLE:  4/5  LLE:  4/5  Increase strength in affected mm groups by 1/3 grade   Balance Sitting EOB:  not assessed patient in chair   Dynamic Standing:  good -  Sitting EOB:  good   Dynamic Standing: good      Patient is Alert & Oriented x person, place, time, and situation and follows directions    Sensation:  Patient  denies numbness/tingling   Edema:  none noted   Endurance: fair      Vitals:  1.5 liters nasal cannula   Blood Pressure at rest  Blood Pressure during session    Heart Rate at rest 86 Heart Rate during session 98   SPO2 at rest 99%  SPO2 during session 93%     Patient education  Patient educated on role of Physical Therapy, risks of immobility, safety and plan of care,  importance of mobility while in hospital , purse lip breathing, and ankle pumps, quad set and glut set for edema control, blood clot prevention     Patient response to education:   Pt verbalized understanding Pt demonstrated skill Pt requires further education in this area   Yes Partial Yes      Treatment:  Patient practiced and was instructed/facilitated in the following treatment: Patient performed sit to stand x3, ambulated within the room, performed both seated and standing exercises. Therapeutic Exercises:  ankle pumps, long arc quad, and marching  x 20 reps. At end of session, patient in chair with daughter present call light and phone within reach,  all lines and tubes intact, nursing notified. Patient would benefit from continued skilled Physical Therapy to improve functional independence and quality of life.          Patient's/ family goals   home    Time in  0915  Time out  0935    Total Treatment Time  0 minutes    Evaluation time includes thorough review of current medical information, gathering information on past medical history/social history and prior level of function, completion of standardized testing/informal observation of tasks, assessment of data, and development of Plan of care and goals.      CPT codes:  Low Complexity PT evaluation (95864)  No treatment    Marcello Arteaga, Student Physical Therapist
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
## 2022-08-03 NOTE — CARE COORDINATION
8/3/2022 1615 CM attempted to meet with pt for transition of care needs. Pt not answering phone, went to his room and pt was sleeping. CM will do full assessment when pt available. CM to follow.  Bijal Amaya RN

## 2022-08-03 NOTE — PROGRESS NOTES
Unit was notified by lab of pts glucose of 38. Glucose was rechecked and increased to 44. 16g of glucose tablets given twice and both glucose checks resulted in LOW RR. Dr. Edy Garcia notified see orders.

## 2022-08-03 NOTE — PLAN OF CARE
Problem: Discharge Planning  Goal: Discharge to home or other facility with appropriate resources  Outcome: Progressing  Flowsheets (Taken 8/2/2022 1254 by Pavel Horton RN)  Discharge to home or other facility with appropriate resources:   Identify barriers to discharge with patient and caregiver   Arrange for needed discharge resources and transportation as appropriate   Refer to discharge planning if patient needs post-hospital services based on physician order or complex needs related to functional status, cognitive ability or social support system   Identify discharge learning needs (meds, wound care, etc)     Problem: Safety - Adult  Goal: Free from fall injury  Outcome: Progressing     Problem: ABCDS Injury Assessment  Goal: Absence of physical injury  Outcome: Progressing

## 2022-08-04 ENCOUNTER — APPOINTMENT (OUTPATIENT)
Dept: ULTRASOUND IMAGING | Age: 87
DRG: 871 | End: 2022-08-04
Payer: MEDICARE

## 2022-08-04 ENCOUNTER — APPOINTMENT (OUTPATIENT)
Dept: GENERAL RADIOLOGY | Age: 87
DRG: 871 | End: 2022-08-04
Payer: MEDICARE

## 2022-08-04 PROBLEM — J18.9 PNEUMONIA OF LEFT LUNG DUE TO INFECTIOUS ORGANISM: Status: ACTIVE | Noted: 2022-08-04

## 2022-08-04 LAB
ANION GAP SERPL CALCULATED.3IONS-SCNC: 10 MMOL/L (ref 7–16)
BASOPHILS ABSOLUTE: 0.02 E9/L (ref 0–0.2)
BASOPHILS RELATIVE PERCENT: 0.2 % (ref 0–2)
BUN BLDV-MCNC: 54 MG/DL (ref 6–23)
CALCIUM SERPL-MCNC: 8.7 MG/DL (ref 8.6–10.2)
CHLORIDE BLD-SCNC: 99 MMOL/L (ref 98–107)
CO2: 24 MMOL/L (ref 22–29)
CREAT SERPL-MCNC: 2.7 MG/DL (ref 0.7–1.2)
CREATININE URINE: 80 MG/DL (ref 40–278)
EKG ATRIAL RATE: 102 BPM
EKG Q-T INTERVAL: 404 MS
EKG QRS DURATION: 138 MS
EKG QTC CALCULATION (BAZETT): 499 MS
EKG R AXIS: -75 DEGREES
EKG T AXIS: 1 DEGREES
EKG VENTRICULAR RATE: 92 BPM
EOSINOPHILS ABSOLUTE: 0.01 E9/L (ref 0.05–0.5)
EOSINOPHILS RELATIVE PERCENT: 0.1 % (ref 0–6)
GFR AFRICAN AMERICAN: 27
GFR NON-AFRICAN AMERICAN: 22 ML/MIN/1.73
GLUCOSE BLD-MCNC: 112 MG/DL (ref 74–99)
HCT VFR BLD CALC: 27.7 % (ref 37–54)
HEMOGLOBIN: 9 G/DL (ref 12.5–16.5)
IMMATURE GRANULOCYTES #: 0.15 E9/L
IMMATURE GRANULOCYTES %: 1.2 % (ref 0–5)
LYMPHOCYTES ABSOLUTE: 0.84 E9/L (ref 1.5–4)
LYMPHOCYTES RELATIVE PERCENT: 6.9 % (ref 20–42)
MCH RBC QN AUTO: 27 PG (ref 26–35)
MCHC RBC AUTO-ENTMCNC: 32.5 % (ref 32–34.5)
MCV RBC AUTO: 83.2 FL (ref 80–99.9)
METER GLUCOSE: 114 MG/DL (ref 74–99)
METER GLUCOSE: 187 MG/DL (ref 74–99)
METER GLUCOSE: 284 MG/DL (ref 74–99)
METER GLUCOSE: 345 MG/DL (ref 74–99)
MONOCYTES ABSOLUTE: 0.75 E9/L (ref 0.1–0.95)
MONOCYTES RELATIVE PERCENT: 6.1 % (ref 2–12)
NEUTROPHILS ABSOLUTE: 10.44 E9/L (ref 1.8–7.3)
NEUTROPHILS RELATIVE PERCENT: 85.5 % (ref 43–80)
PDW BLD-RTO: 16.5 FL (ref 11.5–15)
PLATELET # BLD: 186 E9/L (ref 130–450)
PMV BLD AUTO: 9.8 FL (ref 7–12)
POTASSIUM SERPL-SCNC: 3.9 MMOL/L (ref 3.5–5)
PROTEIN PROTEIN: 78 MG/DL (ref 0–12)
RBC # BLD: 3.33 E12/L (ref 3.8–5.8)
SODIUM BLD-SCNC: 133 MMOL/L (ref 132–146)
WBC # BLD: 12.2 E9/L (ref 4.5–11.5)

## 2022-08-04 PROCEDURE — 6370000000 HC RX 637 (ALT 250 FOR IP): Performed by: INTERNAL MEDICINE

## 2022-08-04 PROCEDURE — 82962 GLUCOSE BLOOD TEST: CPT

## 2022-08-04 PROCEDURE — 6360000002 HC RX W HCPCS: Performed by: INTERNAL MEDICINE

## 2022-08-04 PROCEDURE — 97110 THERAPEUTIC EXERCISES: CPT

## 2022-08-04 PROCEDURE — 85025 COMPLETE CBC W/AUTO DIFF WBC: CPT

## 2022-08-04 PROCEDURE — 97530 THERAPEUTIC ACTIVITIES: CPT

## 2022-08-04 PROCEDURE — 2700000000 HC OXYGEN THERAPY PER DAY

## 2022-08-04 PROCEDURE — 76770 US EXAM ABDO BACK WALL COMP: CPT

## 2022-08-04 PROCEDURE — 80048 BASIC METABOLIC PNL TOTAL CA: CPT

## 2022-08-04 PROCEDURE — 82570 ASSAY OF URINE CREATININE: CPT

## 2022-08-04 PROCEDURE — 71045 X-RAY EXAM CHEST 1 VIEW: CPT

## 2022-08-04 PROCEDURE — 36415 COLL VENOUS BLD VENIPUNCTURE: CPT

## 2022-08-04 PROCEDURE — 84166 PROTEIN E-PHORESIS/URINE/CSF: CPT

## 2022-08-04 PROCEDURE — 2580000003 HC RX 258: Performed by: INTERNAL MEDICINE

## 2022-08-04 PROCEDURE — 94640 AIRWAY INHALATION TREATMENT: CPT

## 2022-08-04 PROCEDURE — 86618 LYME DISEASE ANTIBODY: CPT

## 2022-08-04 PROCEDURE — 84156 ASSAY OF PROTEIN URINE: CPT

## 2022-08-04 PROCEDURE — 2060000000 HC ICU INTERMEDIATE R&B

## 2022-08-04 PROCEDURE — 99222 1ST HOSP IP/OBS MODERATE 55: CPT | Performed by: INTERNAL MEDICINE

## 2022-08-04 RX ORDER — DOXYCYCLINE HYCLATE 100 MG/1
100 CAPSULE ORAL EVERY 12 HOURS SCHEDULED
Status: DISCONTINUED | OUTPATIENT
Start: 2022-08-04 | End: 2022-08-06

## 2022-08-04 RX ORDER — FUROSEMIDE 10 MG/ML
20 INJECTION INTRAMUSCULAR; INTRAVENOUS ONCE
Status: COMPLETED | OUTPATIENT
Start: 2022-08-04 | End: 2022-08-04

## 2022-08-04 RX ORDER — PANTOPRAZOLE SODIUM 40 MG/1
40 TABLET, DELAYED RELEASE ORAL
Status: DISCONTINUED | OUTPATIENT
Start: 2022-08-05 | End: 2022-08-09 | Stop reason: HOSPADM

## 2022-08-04 RX ADMIN — INSULIN LISPRO 6 UNITS: 100 INJECTION, SOLUTION INTRAVENOUS; SUBCUTANEOUS at 17:00

## 2022-08-04 RX ADMIN — ONDANSETRON 4 MG: 2 INJECTION INTRAMUSCULAR; INTRAVENOUS at 21:01

## 2022-08-04 RX ADMIN — APIXABAN 2.5 MG: 5 TABLET, FILM COATED ORAL at 21:00

## 2022-08-04 RX ADMIN — METOPROLOL SUCCINATE 50 MG: 25 TABLET, EXTENDED RELEASE ORAL at 08:17

## 2022-08-04 RX ADMIN — TAMSULOSIN HYDROCHLORIDE 0.4 MG: 0.4 CAPSULE ORAL at 08:18

## 2022-08-04 RX ADMIN — ARFORMOTEROL TARTRATE 15 MCG: 15 SOLUTION RESPIRATORY (INHALATION) at 18:22

## 2022-08-04 RX ADMIN — FUROSEMIDE 20 MG: 10 INJECTION, SOLUTION INTRAMUSCULAR; INTRAVENOUS at 21:01

## 2022-08-04 RX ADMIN — AMLODIPINE BESYLATE 5 MG: 5 TABLET ORAL at 08:18

## 2022-08-04 RX ADMIN — IPRATROPIUM BROMIDE AND ALBUTEROL SULFATE 1 AMPULE: .5; 2.5 SOLUTION RESPIRATORY (INHALATION) at 14:30

## 2022-08-04 RX ADMIN — ALLOPURINOL 100 MG: 100 TABLET ORAL at 08:18

## 2022-08-04 RX ADMIN — GABAPENTIN 100 MG: 100 CAPSULE ORAL at 08:18

## 2022-08-04 RX ADMIN — DOXYCYCLINE HYCLATE 100 MG: 100 CAPSULE ORAL at 21:00

## 2022-08-04 RX ADMIN — IPRATROPIUM BROMIDE AND ALBUTEROL SULFATE 1 AMPULE: .5; 2.5 SOLUTION RESPIRATORY (INHALATION) at 07:04

## 2022-08-04 RX ADMIN — CEFTRIAXONE SODIUM 1000 MG: 1 INJECTION, POWDER, FOR SOLUTION INTRAMUSCULAR; INTRAVENOUS at 08:18

## 2022-08-04 RX ADMIN — AZITHROMYCIN MONOHYDRATE 500 MG: 250 TABLET ORAL at 08:18

## 2022-08-04 RX ADMIN — APIXABAN 2.5 MG: 5 TABLET, FILM COATED ORAL at 08:18

## 2022-08-04 RX ADMIN — OXYCODONE HYDROCHLORIDE AND ACETAMINOPHEN 1000 MG: 500 TABLET ORAL at 08:18

## 2022-08-04 RX ADMIN — ZINC SULFATE 220 MG (50 MG) CAPSULE 50 MG: CAPSULE at 08:18

## 2022-08-04 RX ADMIN — IPRATROPIUM BROMIDE AND ALBUTEROL SULFATE 1 AMPULE: .5; 2.5 SOLUTION RESPIRATORY (INHALATION) at 18:22

## 2022-08-04 RX ADMIN — GABAPENTIN 100 MG: 100 CAPSULE ORAL at 21:00

## 2022-08-04 RX ADMIN — TAMSULOSIN HYDROCHLORIDE 0.4 MG: 0.4 CAPSULE ORAL at 21:01

## 2022-08-04 RX ADMIN — SODIUM CHLORIDE AND POTASSIUM CHLORIDE: 9; 1.49 INJECTION, SOLUTION INTRAVENOUS at 10:49

## 2022-08-04 RX ADMIN — ARFORMOTEROL TARTRATE 15 MCG: 15 SOLUTION RESPIRATORY (INHALATION) at 07:04

## 2022-08-04 RX ADMIN — BUDESONIDE 500 MCG: 0.5 INHALANT RESPIRATORY (INHALATION) at 07:04

## 2022-08-04 RX ADMIN — DOXYCYCLINE HYCLATE 100 MG: 100 CAPSULE ORAL at 11:50

## 2022-08-04 RX ADMIN — INSULIN LISPRO 2 UNITS: 100 INJECTION, SOLUTION INTRAVENOUS; SUBCUTANEOUS at 12:02

## 2022-08-04 RX ADMIN — BUDESONIDE 500 MCG: 0.5 INHALANT RESPIRATORY (INHALATION) at 18:22

## 2022-08-04 NOTE — PROGRESS NOTES
Internal Medicine Progress Note    [facility]    Nikita Braun. Jose Morales., F.BEAN.[email or web address].I. Ion Joshua D.O., BYRON Cabrera D.O. Dori Garcia, MSN, APRN, NP-C  Chrisburkejesus Dewittman. Pepe Negrete, MSN, APRN-CNP     Primary Care Physician: She Connor MD   Admitting Physician:  Milagros Pathak DO  Admission date and time: 8/2/2022  7:29 AM    Room:  0432/9298-28  Admitting diagnosis: Sepsis [facility]) [A41.9]    Patient Name: Gretchen Bourgeois  MRN: [id number]    Date of Service: 8/4/2022     Subjective:  Aracelis Lemus is a 80 y.o. male who was seen and examined today,8/4/2022, at the bedside. Feels tired today and is sleeping a lot. Daughter, Adina Sheth, present at the bedside. States she found a tick on the patient's left side and the area surrounding where the tick was attached is erythematous. Denies pain at the site. Still exertional shortness of breath but not resting. No family present during my examination. Review of System:   Constitutional:   Denies fever or chills, weight loss or gain, positive for malaise and fatigue   HEENT:   Denies ear pain, sore throat, sinus or eye problems. Cardiovascular:   Denies any chest pain, irregular heartbeats, or palpitations. Respiratory:   Admits to very mild shortness of breath with exertion, occasional cough and congestion with brown thick sputum production, hemoptysis, or wheezing. Gastrointestinal:   Denies nausea, vomiting, diarrhea, or constipation. Denies any abdominal pain. Genitourinary:    Denies any urgency, frequency, hematuria. Voiding  without difficulty. Extremities:   Denies lower extremity swelling, edema or cyanosis. Neurology:    Denies any headache or focal neurological deficits, but of for mild generalized weakness and fatigue without focal component. Psch:   Denies being anxious or depressed. Musculoskeletal:    Denies  myalgias, joint complaints or back pain.    Integumentary:   Denies any rashes, ulcers, or excoriations. Denies bruising. Erythematous area left lateral back-lower. Hematologic/Lymphatic:  Denies bruising or bleeding. Physical Exam:  No intake/output data recorded. Intake/Output Summary (Last 24 hours) at 8/4/2022 1427  Last data filed at 8/4/2022 0539  Gross per 24 hour   Intake 496.49 ml   Output 500 ml   Net -3.51 ml   I/O last 3 completed shifts: In: 2032 [P.O.:480; I.V.:1456; IV Piggyback:96]  Out: 7971 [NWJ:3900]  Patient Vitals for the past 96 hrs (Last 3 readings):   Weight   08/04/22 0027 192 lb 7 oz (87.3 kg)   08/03/22 0040 181 lb 2 oz (82.2 kg)   08/02/22 1254 180 lb 11.2 oz (82 kg)     Vital Signs:   Blood pressure (!) 125/52, pulse 83, temperature 97.5 °F (36.4 °C), resp. rate 20, height 5' 9\" (1.753 m), weight 192 lb 7 oz (87.3 kg), SpO2 93 %. General appearance:  Alert, responsive, oriented to person, place, and time. No acute distress. Head:  Normocephalic. No masses, lesions or tenderness. Eyes:  PERRLA. EOMI. Sclera clear. Impaired vision. ENT:  Ears normal. Mucosa normal.  Impaired hearing. Neck:    Supple. Trachea midline. No thyromegaly. No JVD. No bruits. Heart:    Rhythm regular. Rate controlled. S1 and S2. Systolic murmur. Lungs:    Symmetrical.  Air exchange has improved throughout, rhonchi are scattered. Fine bibasilar crackles. No wheezing  Abdomen:   Soft. Non-tender. Non-distended. Bowel sounds positive. No organomegaly or masses. No pain on palpation. Extremities:    Peripheral pulses present. No peripheral edema. No ulcers. No cyanosis. No clubbing. Neurologic:    Alert x 3. No significant pitting focal deficit. Cranial nerves grossly intact. No focal weakness. Psych:   Behavior is normal. Mood appears normal. Speech is not rapid and/or pressured. Musculoskeletal:   No unilateral joint edema, erythema or warmth. Gait not assessed.   Integumentary:  No rashes  Skin normal color and texture.   Genitalia/Breast:  Deferred    Medication:  Scheduled Meds:   doxycycline hyclate  100 mg Oral 2 times per day    amLODIPine  5 mg Oral Daily    apixaban  2.5 mg Oral BID    allopurinol  100 mg Oral Daily    gabapentin  100 mg Oral BID    metoprolol succinate  50 mg Oral Daily    tamsulosin  0.4 mg Oral BID    insulin lispro  0-8 Units SubCUTAneous TID WC    insulin lispro  0-4 Units SubCUTAneous Nightly    cefTRIAXone (ROCEPHIN) IV  1,000 mg IntraVENous Q24H    Arformoterol Tartrate  15 mcg Nebulization BID    ipratropium-albuterol  1 ampule Inhalation Q4H WA    budesonide  500 mcg Nebulization BID    ascorbic acid  1,000 mg Oral Daily    zinc sulfate  50 mg Oral Daily     Continuous Infusions:   0.9% NaCl with KCl 20 mEq 60 mL/hr at 08/04/22 1049    dextrose         Objective Data:  CBC with Differential:    Lab Results   Component Value Date/Time    WBC 12.2 08/04/2022 05:27 AM    RBC 3.33 08/04/2022 05:27 AM    HGB 9.0 08/04/2022 05:27 AM    HCT 27.7 08/04/2022 05:27 AM     08/04/2022 05:27 AM    MCV 83.2 08/04/2022 05:27 AM    MCH 27.0 08/04/2022 05:27 AM    MCHC 32.5 08/04/2022 05:27 AM    RDW 16.5 08/04/2022 05:27 AM    LYMPHOPCT 6.9 08/04/2022 05:27 AM    MONOPCT 6.1 08/04/2022 05:27 AM    BASOPCT 0.2 08/04/2022 05:27 AM    MONOSABS 0.75 08/04/2022 05:27 AM    LYMPHSABS 0.84 08/04/2022 05:27 AM    EOSABS 0.01 08/04/2022 05:27 AM    BASOSABS 0.02 08/04/2022 05:27 AM     BMP:    Lab Results   Component Value Date/Time     08/04/2022 05:27 AM    K 3.9 08/04/2022 05:27 AM    K 3.7 08/02/2022 07:38 AM    CL 99 08/04/2022 05:27 AM    CO2 24 08/04/2022 05:27 AM    BUN 54 08/04/2022 05:27 AM    LABALBU 4.2 08/02/2022 07:38 AM    CREATININE 2.7 08/04/2022 05:27 AM    CALCIUM 8.7 08/04/2022 05:27 AM    GFRAA 27 08/04/2022 05:27 AM    LABGLOM 22 08/04/2022 05:27 AM    GLUCOSE 112 08/04/2022 05:27 AM       Assessment:  Acute respiratory failure with hypoxia secondary to extensive left-sided pneumonia  Atypical left-sided pneumonia with infectious work-up positive for both parainfluenza as well as streptococcal pneumonia  Acute exacerbation of COPD secondary to pneumonia   Acute on chronic renal failure with mild metabolic acidosis  Chronic kidney disease stage 4  Elevated troponin  Paroxysmal atrial fibrillation with variable ventricular response on Eliquis  Hypoglycemia with underlying non-insulin-dependent diabetes mellitus type 2  Tick bite  History of rectosigmoid arteriovenous vascular malformation  Anemia of chronic disease    Plan:    A tick was found attached to the patient's left lateral side. We will initiate the patient on doxycycline and evaluate for Lyme's disease. Continue antibiotic therapy. Continue nebulized breathing treatments  Eliquis for VTE prophylaxis  Protonix for GI prophylaxis  Consult cardiology for elevated troponin and nephrology for CKD  Patient has been weaned down to 1 L/min but is currently on 2. We will continue to wean as tolerated. Continue supportive oxygen maintain oxygen saturations greater than 90%  Increase activity as tolerated  PT/OT to evaluate and treat  Encouraged use of incentive spirometer  Creatinine has trended down slightly from 3.1-2.7. Leukocytosis improving. Continue to monitor. Chronic morbidities, laboratory values and vital signs are being monitored and addressed accordingly.  following for discharge planning purposes. Continue current therapy. See orders for further plan of care. More than 50% of my  time was spent at the bedside counseling/coordinating care with the patient and/or family with face to face contact. This time was spent reviewing notes and laboratory data as well as instructing and counseling the patient. Time I spent with the family or surrogate(s) is included only if the patient was incapable of providing the necessary information or participating in medical decisions.  I also discussed the differential diagnosis and all of the proposed management plans with the patient and individuals accompanying the patientRadha Mckee requires this high level of physician care and nursing on the IMC/Telemetry unit due the complexity of decision management and chance of rapid decline or death. Continued cardiac monitoring and higher level of nursing are required. I am readily available for any further decision-making and intervention. The patient was seen, examined and then discussed with Dr. Juan Pablo Muñiz. KELLY Cali CNP  8/4/2022  2:27 PM        I saw and evaluated the patient. I agree with the findings and the plan of care as documented in Benito Valero NP-C's  note.     Helga Coulter DO, D.ORadha, FACOI  3:14 PM  8/4/2022

## 2022-08-04 NOTE — ACP (ADVANCE CARE PLANNING)
Advance Care Planning   Healthcare Decision Maker:    Primary Decision Maker: Paula [facility] [phone]

## 2022-08-04 NOTE — CONSULTS
Inpatient CARDIOLOGY Consultation        Reason for Consult:  Elevated Toponin    Date of Consultation: 8/4/2022    Patient previously known to Dr. Hero Kevin: 80year old with history of PAF, mild CAD by heart cath 8/202, HTN presented to ER for \"hypoxia noted on Pulse -Ox' and SOB. Had some cough. Admitted for resp. failure, Pneumonia, BIMAL. Cardiology consulted for elevated Troponin. He denied any chest pain or heart racing. Had SOB, cough. No hemoptysis. No Orthoptera. No palpitations or dizzy or syncope. No N/V/D. No hematuria. Seen Dr Varun Cardona about 6 months ago. States complaint with his medications at home. No family at bed side. REVIEW OF SYSTEMS:   Review of rest of 12 systems negative except as mentioned in HPI. Constitutional: Denies night sweats  Eyes: Denies visual changes or drainage  ENT: Denies headaches or hearing loss. No sore throat. No epistaxis   Cardiovascular: Denies chest pain, pressure or palpitations. No lower extremity swelling. No orthopnea or PND. Respiratory: Denies hemoptysis   Gastrointestinal: Denies nausea, vomiting, hematemesis or melena    Genitourinary: Denies urgency, dysuria or hematuria. Musculoskeletal: Denies gait disturbance, weakness   Integumentary: Denies rash or pruritis   Neurological: Denies dizziness, headaches or seizures. No numbness or tingling  Psychiatric: Denies anxiety or depression. Endocrine: Denies temperature intolerance. No recent weight change. Hematologic/Lymphatic: Denies abnormal bruising or bleeding. Please note: past medical records were reviewed per electronic medical record (EMR) - see detailed reports under Past Medical/ Surgical History.        Past Medical History:    Past Medical History:   Diagnosis Date    A-fib ([facility] [address]     Diabetes mellitus ([facility] [address]     H pylori ulcer 8/22/2012 Hyperlipidemia     Hypertension     CAD - mild  2020    PUD (peptic ulcer disease) 2012    Urinary frequency        Past Surgical History:    Past Surgical History:   Procedure Laterality Date    APPENDECTOMY      CATARACT EXTRACTION W/  INTRAOCULAR LENS IMPLANT Left 12/17/15    COLONOSCOPY      CYSTOSCOPY      multiple    CYSTOSCOPY N/A 2020    CYSTOSCOPY RETROGRADE PYELOGRAM BOTOX  INJECTION (100 UNITS) performed by Luis Alfredo Delong MD at [address] [facility], [place], DIAGNOSTIC  2012    peptic ulcer disease    EYE SURGERY Bilateral     cataracts    HERNIA REPAIR  more 10 yrs    left     MS COLONOSCOPY FLX DX W/COLLJ SPEC WHEN PFRMD  2012         MS EGD TRANSORAL BIOPSY SINGLE/MULTIPLE  2012              Allergies:    Patient has no known allergies. Social History:    Social History     Socioeconomic History    Marital status:      Spouse name: Not on file    Number of children: Not on file    Years of education: Not on file    Highest education level: Not on file   Occupational History    Occupation: / mill/ inspection   Tobacco Use    Smoking status: Former     Packs/day: 1.00     Years: 60.00     Pack years: 60.00     Types: Cigarettes     Start date: 1950     Quit date: 2010     Years since quittin.0    Smokeless tobacco: Never   Substance and Sexual Activity    Alcohol use: Yes     Comment: 12 glass  wine  everyday    Drug use: No    Sexual activity: Not on file   Other Topics Concern    Not on file   Social History Narrative    Not on file     Social Determinants of Health     Financial Resource Strain: Not on file   Food Insecurity: Not on file   Transportation Needs: Not on file   Physical Activity: Not on file   Stress: Not on file   Social Connections: Not on file   Intimate Partner Violence: Not on file   Housing Stability: Not on file       Family History:   No family history on file.       Medications Prior to admit: Reviewed  Prior to Admission medications    Medication Sig Start Date End Date Taking? Authorizing Provider   magnesium oxide (MAG-OX) 400 MG tablet Take 400 mg by mouth in the morning. 5/19/22  Yes Historical Provider, MD   metoprolol succinate (TOPROL XL) 25 MG extended release tablet Take 25 mg by mouth in the morning and 25 mg before bedtime. 7/26/22  Yes Historical Provider, MD   allopurinol (ZYLOPRIM) 300 MG tablet Take 300 mg by mouth every other day M-W-F 5/3/22  Yes Historical Provider, MD   lisinopril-hydroCHLOROthiazide (PRINZIDE;ZESTORETIC) 20-25 MG per tablet Take 0.5 tablets by mouth in the morning. 6/26/22  Yes Historical Provider, MD   repaglinide (PRANDIN) 1 MG tablet Take 1 mg by mouth in the morning and 1 mg before bedtime.  7/30/22   Historical Provider, MD   colchicine-probenecid 0.5-500 MG per tablet Take 1 tablet by mouth 2 times daily   Patient not taking: Reported on 8/2/2022 4/24/21   Historical Provider, MD   allopurinol (ZYLOPRIM) 100 MG tablet Take 100 mg by mouth daily   Patient not taking: No sig reported 3/24/21   Historical Provider, MD   iron polysaccharide complex-B12-folic acid (FERREX-FORTE) 150-0.025-1 MG CAPS capsule Take 1 capsule by mouth  Patient not taking: Reported on 8/2/2022    Historical Provider, MD   metoprolol succinate (TOPROL XL) 50 MG extended release tablet TAKE 1 TABLET BY MOUTH ONCE DAILY REPLACES LABETALOL  Patient not taking: No sig reported 4/8/21   Historical Provider, MD   SITagliptin (JANUVIA) 100 MG tablet Take 50 mg by mouth in the morning and at bedtime    Historical Provider, MD   umeclidinium-vilanterol (ANORO ELLIPTA) 62.5-25 MCG/INH AEPB inhaler Inhale 1 puff into the lungs daily   Patient not taking: Reported on 8/2/2022 9/29/20   Historical Provider, MD   albuterol sulfate HFA (PROVENTIL HFA) 108 (90 Base) MCG/ACT inhaler Inhale 2 puffs into the lungs every 6 hours as needed for Wheezing  Patient taking differently: Inhale 1 puff into the lungs as needed for Wheezing 5/12/21   Jay Dyer MD   apixaban (ELIQUIS) 2.5 MG TABS tablet Take 2.5 mg by mouth 2 times daily    Historical Provider, MD   lisinopril-hydroCHLOROthiazide (PRINZIDE;ZESTORETIC) 20-12.5 MG per tablet Take 1 tablet by mouth daily  Patient not taking: Reported on 8/2/2022    Historical Provider, MD   magnesium gluconate (MAGONATE) 500 MG tablet Take 500 mg by mouth daily  Patient not taking: Reported on 8/2/2022    Historical Provider, MD   amLODIPine (NORVASC) 5 MG tablet Take 2.5 mg by mouth in the morning. Historical Provider, MD   torsemide (DEMADEX) 10 MG tablet Take 10 mg by mouth in the morning. M-W-F. Historical Provider, MD   METFORMIN HCL ER PO Take 1,000 mg by mouth 2 times daily  Patient not taking: Reported on 8/2/2022    Historical Provider, MD   gabapentin (NEURONTIN) 100 MG capsule Take 100 mg by mouth every other day. M-W-F    Historical Provider, MD   zinc 50 MG CAPS Take by mouth daily  Patient not taking: Reported on 8/2/2022    Historical Provider, MD   Multiple Vitamins-Minerals (THERAPEUTIC MULTIVITAMIN-MINERALS) tablet Take 1 tablet by mouth daily  Patient not taking: Reported on 8/2/2022    Historical Provider, MD   Cholecalciferol (VITAMIN D3 PO) Take by mouth daily  Patient not taking: Reported on 8/2/2022    Historical Provider, MD   tamsulosin (FLOMAX) 0.4 MG capsule Take 0.4 mg by mouth at bedtime    Historical Provider, MD   glimepiride (AMARYL) 4 MG tablet Take 4 mg by mouth in the morning and at bedtime    Historical Provider, MD         DATA:      ECG - Reviewed - A Fib, RBBB, LAFB    Tele strips: Reviewed    Diagnostic:      Intake/Output Summary (Last 24 hours) at 8/4/2022 1558  Last data filed at 8/4/2022 0730  Gross per 24 hour   Intake 796.49 ml   Output 500 ml   Net 296.49 ml       IMAGING STUDIES: Reviewed    [facility] 8/7/2020    CONCLUSIONS:  1. Noncritical coronary atherosclerosis. 10-15% mid LAD  2.   Normal left ventricular systolic function with mildly abnormal  diastolic dysfunction. 3.  Mild mitral regurgitation. LABS:      Cardiac enzymes:No results for input(s): CKTOTAL, CKMB, CKMBINDEX in the last 72 hours. Invalid input(s): TROPONIN  CBC:   Recent Labs     08/03/22 0458 08/04/22 0527   WBC 17.4* 12.2*   HGB 9.7* 9.0*   HCT 30.3* 27.7*    186     BMP:   Recent Labs     08/03/22 0458 08/04/22 0527    133   K 3.4* 3.9   CO2 25 24   BUN 61* 54*   CREATININE 3.1* 2.7*   LABGLOM 19 22   CALCIUM 8.9 8.7     Mag:   Recent Labs     08/03/22 0458   MG 1.6     Phos:   Recent Labs     08/03/22 0458   PHOS 2.9     TSH: No results for input(s): TSH in the last 72 hours. HgA1c:   Lab Results   Component Value Date    LABA1C 8.5 (H) 08/02/2022     No results found for: EAG  proBNP:   Recent Labs     08/02/22  0738   PROBNP 8,347*     PT/INR: No results for input(s): PROTIME, INR in the last 72 hours. APTT:No results for input(s): APTT in the last 72 hours.   FASTING LIPID PANEL:No results found for: CHOL, HDL, LDLDIRECT, LDLCALC, TRIG  LIVER PROFILE:  Recent Labs     08/02/22  0738   AST 18   ALT 22   LABALBU 4.2       Current Inpatient Medications:   doxycycline hyclate  100 mg Oral 2 times per day    [START ON 8/5/2022] pantoprazole  40 mg Oral QAM AC    amLODIPine  5 mg Oral Daily    apixaban  2.5 mg Oral BID    allopurinol  100 mg Oral Daily    gabapentin  100 mg Oral BID    metoprolol succinate  50 mg Oral Daily    tamsulosin  0.4 mg Oral BID    insulin lispro  0-8 Units SubCUTAneous TID WC    insulin lispro  0-4 Units SubCUTAneous Nightly    cefTRIAXone (ROCEPHIN) IV  1,000 mg IntraVENous Q24H    Arformoterol Tartrate  15 mcg Nebulization BID    ipratropium-albuterol  1 ampule Inhalation Q4H WA    budesonide  500 mcg Nebulization BID    ascorbic acid  1,000 mg Oral Daily    zinc sulfate  50 mg Oral Daily       IV Infusions (if any):   0.9% NaCl with KCl 20 mEq 60 mL/hr at 08/04/22 1049    dextrose         PHYSICAL EXAM: BP (!) 125/52   Pulse 83   Temp 97.5 °F (36.4 °C) (Infrared)   Resp 20   Ht 5' 9\" (1.753 m)   Wt 192 lb 7 oz (87.3 kg)   SpO2 93%   BMI 28.42 kg/m²     CONST:  Well developed, appears stated age. Awake, alert and no apparent distress. HEENT:   Head- Normocephalic  Eyes- Conjunctivae pink, no icterus  Throat- Oral mucosa moist  Neck-  No stridor, no jugular venous distention. No carotid bruit. CHEST: Chest symmetrical and non-tender to palpation. No accessory muscle use  RESPIRATORY: Lung sounds - Few rhonchi  CARDIOVASCULAR:     Heart Inspection-  Heart Palpation- No thrills. Heart Ausculation- Irregularly irregular rate and rhythm, 6-4/7 systolic murmur. No s3 or rub   EXT: +lower extremity edema. Distal pulses palpable, no cyanosis   ABDOMEN: Soft, non-tender to light palpation. Bowel sounds present. No abdominal bruit  MS: Good muscle strength    : Deferred  RECTAL: Deferred  SKIN: Warm and dry   NEURO / PSYCH: Oriented to person, place; Good mood and affect. IMPRESSION / RECOMMENDATIONS:    Acute respiratory failure with hypoxia - Per Dr Yadira Santa    Acute on Chronic  HFpEF - 2D Echo pending - Start low dose Diuretics; Monitor daily Wts, I/Os. BP and renal Function    Elevated Troponin - Likely from demand ischemia and CKD - No CP, EKG nonischemic, Had minimal CAD on [facility] 8/2020 - Continue BB, Not on Statin due to minimal CAD and age. PAF  - Rates controlled on BB, On adjusted dose Eliquis for [address]    Atypical left-sided pneumonia - Per Dr Yadira Santa    Acute exacerbation of COPD     CKD, stage 4 - Monitor renal Fn, Nephrology consulted     Anemia of chronic disease - Monitor H/H    RBBB, LAFB - Chronic                No family at bed side    He will f/u with Dr Sánchez Izquierdo after discharge.   Above recommendations discussed with him      Electronically signed by Bennett Dowd MD on 8/4/2022 at 3:58 PM  [facility]) Cardiology

## 2022-08-04 NOTE — CARE COORDINATION
8/4/2022 1325 COVID Negative 8/5/2022. CM met with pt at the bedside for transition of care needs. Pt resides with his daughter Donnie Goel in a 2 story home. Pt is independent with ADL's and still drives. Pt's daughter does cook the meals. Pt doesn't own any medical equipment, no history of SNF/HHC. Pt is wearing O2 here but doesn't wear any home O2. Pt's plan is to return home and doesn't anticipate any needs. PCP is Dr Sandra Moura and uses [address] [facility] Pt's daughter will provide transportation on D/C. CM will follow for possible Home O2.   Gonzalo Blanc RN

## 2022-08-04 NOTE — PROGRESS NOTES
Physical Therapy    Physical Therapy Treatment Note/Plan of Care    Room #:  6901/9773-38  Patient Name: Christa Churchill  YOB: 1933  MRN: [id number]    Date of Service: 8/4/2022     Tentative placement recommendation: Home Health Physical Therapy  or Home  Equipment recommendation: None      Evaluating Physical Therapist: Gordo Jackson Physical Therapist      Specific Provider Orders/Date/Referring Provider :   08/02/22 1230    PT eval and treat  Start:  08/02/22 1230,   End:  08/02/22 1230,   ONE TIME,   Standing Count:  1 Occurrences,   R         Buffy Tyson DO     Admitting Diagnosis:   Sepsis ([facility] [address] [A41.9]    Admitted with cough, shortness of breath, fever, fatigue, decreased appetite    Surgery: none  Visit Diagnoses         Codes    Pneumonia of left lung due to infectious organism, unspecified part of lung    -  Primary J18.9    Acute respiratory failure with hypoxia ([address]     J96.01    Dehydration     G40.2    Metabolic acidosis     V18.7            Patient Active Problem List   Diagnosis    PUD (peptic ulcer disease)    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 Shortness of breathing with exertion on 1 liter NC; maintaining o2 levels with all activities. Patient may benefit from a cane due to being mildly unsteady upon standing and gait, patient did use iv pole for assistance. Patient requires skilled physical therapy to address concerns listed above to increase safety and independence at discharge.      PHYSICAL THERAPY  PLAN OF CARE       Physical therapy plan of care is established based on physician order,  patient diagnosis and clinical assessment    Current Treatment Recommendations:  -Standing Balance: Perform strengthening exercises in standing to promote motor control with or without upper extremity support   -Transfers: Provide instruction on proper hand and foot position for adequate transfer of weight onto lower extremities and use of gait device if needed and Cues for hand placement, technique and safety. Provide stabilization to prevent fall   -Gait: Gait training and Standing activities to improve: base of support, weight shift, weight bearing    -Endurance: Utilize Supervised activities to increase level of endurance to allow for safe functional mobility including transfers and gait   -Stairs: Stair training with instruction on proper technique and hand placement on rail    PT long term treatment goals are located in below grid    Patient and or family understand(s) diagnosis, prognosis, and plan of care. Frequency of treatments: Patient will be seen  3-5 times/week.          Prior Level of Function: Patient ambulated independently   Rehab Potential: good  for baseline    Past medical history:   Past Medical History:   Diagnosis Date    A-fib ([facility] [address]     Diabetes mellitus ([facility] [address]     H pylori ulcer 8/22/2012    Hyperlipidemia     Hypertension     PONV (postoperative nausea and vomiting)     PUD (peptic ulcer disease) 7/26/2012    Urinary frequency      Past Surgical History:   Procedure Laterality Date    APPENDECTOMY      CATARACT EXTRACTION W/  INTRAOCULAR LENS IMPLANT Left 12/17/15    COLONOSCOPY  7/206/2012    CYSTOSCOPY      multiple    CYSTOSCOPY N/A 6/2/2020    CYSTOSCOPY RETROGRADE PYELOGRAM BOTOX  INJECTION (100 UNITS) performed by Sonia Victor MD at [address] [facility], [place], DIAGNOSTIC  7/26/2012    peptic ulcer disease    EYE SURGERY Bilateral     cataracts    HERNIA REPAIR  more 10 yrs    left     IA COLONOSCOPY FLX DX IV pole, o2 line management, o2 saturation monitoring  Ambulation distance limited to within room distances due to patient's isolation status  25 feet using  IV pole with Minimal assist of 1    mildly unsteady  and cues for upright posture, safety, and pursed lip breathing       150 feet using  no device with Supervision     Stair negotiation: ascended and descended   Not assessed   Due to patient's isolation status  not assessed     3 steps with supervision to aid in community ambulation     ROM Within functional limits    Increase range of motion 10% of affected joints    Strength BUE:  refer to OT eval  RLE:  4/5  LLE:  4/5  Increase strength in affected mm groups by 1/3 grade   Balance Sitting EOB:  not assessed patient in chair   Dynamic Standing:  good - Sitting EOB: not assessed   Dynamic Standing: good minus    Sitting EOB:  good   Dynamic Standing: good      Patient is Alert & Oriented x person, place, time, and situation and follows directions    Sensation:  Patient  denies numbness/tingling   Edema:  none noted   Endurance: fair      Vitals:  1 liters nasal cannula   Blood Pressure at rest  Blood Pressure during session    Heart Rate at rest  Heart Rate during session    SPO2 at rest 94%  SPO2 during session 90-96%     Patient education  Patient educated on role of Physical Therapy, risks of immobility, safety and plan of care,  importance of mobility while in hospital , purse lip breathing, and ankle pumps, quad set and glut set for edema control, blood clot prevention     Patient response to education:   Pt verbalized understanding Pt demonstrated skill Pt requires further education in this area   Yes Partial Yes      Treatment:  Patient practiced and was instructed/facilitated in the following treatment: Patient in chair performed seated exercise. Multiple sit to stands, ambulated within the room. Educated and performed pursed lip breathing.      Patient is very hard of hearing, patient will node but will still not fully understand what you are asking due to being hard of hearing, ask patient to repeat back to activities to understand fully. Therapeutic Exercises:  ankle pumps, long arc quad, and marching  x 12-15 reps. At end of session, patient in chair with daughter present call light and phone within reach,  all lines and tubes intact, nursing notified. Patient would benefit from continued skilled Physical Therapy to improve functional independence and quality of life.          Patient's/ family goals   home    Time in  26  Time out  1024    Total Treatment Time  25 minutes    CPT codes:    Therapeutic activities (50774)   16 minutes  1 unit(s)  Therapeutic exercises (40450)   9 minutes  1 unit(s)    Armandina Buerger, PTA [facility]#[id number]

## 2022-08-04 NOTE — ACP (ADVANCE CARE PLANNING)
Advance Care Planning   Healthcare Decision Maker:    Primary Decision Maker: Toby Mccoy - Child - [phone]

## 2022-08-04 NOTE — CONSULTS
Nephrology Consult  [facility]  Hetal Silverman. Daisy BRUNSON    CC:   ckd 4    HPI:   the pt is an 79 yo male who was admitted 8/2/22 with sob and pox of 82. He was brought in by his daughter who he lives with. He was found to have left sided strep pneumonia and parainfluenza. Covid was negative. He also has decreased appetite , chills, and lethargy. He has a pmh of ckd with a baseline cr of  2.6 and he sees dr Kateryna Boston in the office. On admission his cr was 2.8. yesterday it was 3.1 and today 2.7, hgb 11.3>9.7>9, wbc 17>12.2, plt 186, hco3 24, fena <1. He has been hemodynamically stable. He was started on doxy and rocpehin. He was febrile on 8/2 and 8/3. He was started on ivf today. He also has a pmh of afib on oac, avm, copd, dm, pud, htn, hyperlipidemia. His daughter Nasir Siegel is in the room. He is hard of hearing and still has no appetite.      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    Esophagitis-grade 1    AP (angina pectoris) (HCC)    Systolic hypertension    Combined forms of age-related cataract of left eye    Anemia, chronic renal failure, stage 4 (severe) (HCC)    Sepsis secondary to CAP (HCC)    Pneumonia of left lung due to infectious organism       Meds:     doxycycline hyclate  100 mg Oral 2 times per day    [START ON 8/5/2022] pantoprazole  40 mg Oral QAM AC    amLODIPine  5 mg Oral Daily    apixaban  2.5 mg Oral BID    allopurinol  100 mg Oral Daily    gabapentin  100 mg Oral BID    metoprolol succinate  50 mg Oral Daily    tamsulosin  0.4 mg Oral BID    insulin lispro  0-8 Units SubCUTAneous TID     insulin lispro  0-4 Units SubCUTAneous Nightly    cefTRIAXone (ROCEPHIN) IV  1,000 mg IntraVENous Q24H    Arformoterol Tartrate  15 mcg Nebulization BID    ipratropium-albuterol  1 ampule Inhalation Q4H WA    budesonide  500 mcg Nebulization BID    ascorbic acid  1,000 mg Oral Daily    zinc sulfate  50 mg Oral Daily        0.9% NaCl with KCl 20 mEq 60 mL/hr at 08/04/22 1049    dextrose         Meds prn:     glucose, dextrose bolus **OR** dextrose bolus, glucagon (rDNA), dextrose, albuterol, ondansetron    Meds prior to admission:     No current facility-administered medications on file prior to encounter. Current Outpatient Medications on File Prior to Encounter   Medication Sig Dispense Refill    magnesium oxide (MAG-OX) 400 MG tablet Take 400 mg by mouth in the morning. metoprolol succinate (TOPROL XL) 25 MG extended release tablet Take 25 mg by mouth in the morning and 25 mg before bedtime. allopurinol (ZYLOPRIM) 300 MG tablet Take 300 mg by mouth every other day M-W-F      lisinopril-hydroCHLOROthiazide (PRINZIDE;ZESTORETIC) 20-25 MG per tablet Take 0.5 tablets by mouth in the morning. repaglinide (PRANDIN) 1 MG tablet Take 1 mg by mouth in the morning and 1 mg before bedtime.       colchicine-probenecid 0.5-500 MG per tablet Take 1 tablet by mouth 2 times daily  (Patient not taking: Reported on 8/2/2022)      allopurinol (ZYLOPRIM) 100 MG tablet Take 100 mg by mouth daily  (Patient not taking: No sig reported)      iron polysaccharide complex-B12-folic acid (FERREX-FORTE) 150-0.025-1 MG CAPS capsule Take 1 capsule by mouth (Patient not taking: Reported on 8/2/2022)      metoprolol succinate (TOPROL XL) 50 MG extended release tablet TAKE 1 TABLET BY MOUTH ONCE DAILY REPLACES LABETALOL (Patient not taking: No sig reported)      SITagliptin (JANUVIA) 100 MG tablet Take 50 mg by mouth in the morning and at bedtime      umeclidinium-vilanterol (ANORO ELLIPTA) 62.5-25 MCG/INH AEPB inhaler Inhale 1 puff into the lungs daily (Patient not taking: Reported on 8/2/2022)      albuterol sulfate HFA (PROVENTIL HFA) 108 (90 Base) MCG/ACT inhaler Inhale 2 puffs into the lungs every 6 hours as needed for Wheezing (Patient taking differently: Inhale 1 puff into the lungs as needed for Wheezing) 1 Inhaler 12    apixaban (ELIQUIS) 2.5 MG TABS tablet Take 2.5 mg by mouth 2 times daily      lisinopril-hydroCHLOROthiazide (PRINZIDE;ZESTORETIC) 20-12.5 MG per tablet Take 1 tablet by mouth daily (Patient not taking: Reported on 8/2/2022)      magnesium gluconate (MAGONATE) 500 MG tablet Take 500 mg by mouth daily (Patient not taking: Reported on 8/2/2022)      amLODIPine (NORVASC) 5 MG tablet Take 2.5 mg by mouth in the morning. torsemide (DEMADEX) 10 MG tablet Take 10 mg by mouth in the morning. M-W-F.      METFORMIN HCL ER PO Take 1,000 mg by mouth 2 times daily (Patient not taking: Reported on 8/2/2022)      gabapentin (NEURONTIN) 100 MG capsule Take 100 mg by mouth every other day. M-W-F      zinc 50 MG CAPS Take by mouth daily (Patient not taking: Reported on 8/2/2022)      Multiple Vitamins-Minerals (THERAPEUTIC MULTIVITAMIN-MINERALS) tablet Take 1 tablet by mouth daily (Patient not taking: Reported on 8/2/2022)      Cholecalciferol (VITAMIN D3 PO) Take by mouth daily (Patient not taking: Reported on 8/2/2022)      tamsulosin (FLOMAX) 0.4 MG capsule Take 0.4 mg by mouth at bedtime      glimepiride (AMARYL) 4 MG tablet Take 4 mg by mouth in the morning and at bedtime         Allergies:    Patient has no known allergies. Social History:     reports that he quit smoking about 12 years ago. His smoking use included cigarettes. He started smoking about 72 years ago. He has a 60.00 pack-year smoking history. He has never used smokeless tobacco. He reports current alcohol use. He reports that he does not use drugs. Family History:     No family history on file.     ROS:     General: fever chills  Heent: no nasal congestion, sore throat   Resp: sob cough  Cardiac: no cp , le edema, palpitations  Gi: no nausea, vomiting, melena, abd pain, hematemesis  Gu: no hematuria, dysuria   Neruo: no numbness, weakness, headache, blurry vision   Endocrine:  no h/o dm  Derm: no rash , petechia  Heme: no epistaxis, bruising  All other sx negative     Physical Exam:      Patient Vitals for the past 24 hrs:   BP Temp Temp src Pulse Resp SpO2 Weight   08/04/22 0730 (!) 125/52 97.5 °F (36.4 °C) Infrared 83 20 93 % --   08/04/22 0530 (!) 134/56 97.3 °F (36.3 °C) Oral 100 18 95 % --   08/04/22 0027 -- -- -- -- -- -- 192 lb 7 oz (87.3 kg)   08/03/22 1945 (!) 124/96 99.7 °F (37.6 °C) Oral 80 17 96 % --         Intake/Output Summary (Last 24 hours) at 8/4/2022 1724  Last data filed at 8/4/2022 0730  Gross per 24 hour   Intake 796.49 ml   Output 500 ml   Net 296.49 ml       Constitutional: Patient in no acute distress   Head: normocephalic, atraumatic   Neck: supple, no jvd  Cardiovascular: regular rate and rhythm, no murmurs, gallops, or rubs   Respiratory: rhonchi  Gastrointestinal: soft, nontender, nondistended, no hepatosplenomegaly  Ext: no edema  Neuro: awake alert  Skin: dry, no rash   Back: nontender    Data:    Recent Labs     08/02/22  0738 08/03/22 0458 08/04/22 0527   WBC 10.8 17.4* 12.2*   HGB 11.3* 9.7* 9.0*   HCT 34.2* 30.3* 27.7*   MCV 82.0 84.4 83.2    187 186       Recent Labs     08/02/22  0738 08/03/22 0458 08/04/22 0527    135 133   K 3.7 3.4* 3.9   CL 96* 97* 99   CO2 21* 25 24   CREATININE 2.8* 3.1* 2.7*   BUN 51* 61* 54*   LABGLOM 21 19 22   GLUCOSE 234* 38* 112*   CALCIUM 9.0 8.9 8.7   PHOS  --  2.9  --    MG  --  1.6  --        No results found for: VITD25    No results found for: PTH    Recent Labs     08/02/22  0738   ALT 22   AST 18   ALKPHOS 87   BILITOT 0.6       Recent Labs     08/02/22  0738   LABALBU 4.2       Ferritin   Date Value Ref Range Status   06/04/2022 24 ng/mL Final     Comment:     FERRITIN Reference Ranges:  Adult Males   20 - 60 years:    30 - 400 ng/mL  Adult females 16 - 61 years:    15 - 150 ng/mL  Adults greater than 60 years:   no established reference range  Pediatrics:                     no established reference range       Iron   Date Value Ref Range Status   06/04/2022 39 (L) 59 - 158 mcg/dL Final     TIBC   Date Value Ref Range Status   06/04/2022 350 250 - 450 mcg/dL Final       No results found for: Tariq Nic    No results found for: FOLATE      Lab Results   Component Value Date/Time    COLORU Yellow 08/02/2022 11:48 AM    NITRU Negative 08/02/2022 11:48 AM    GLUCOSEU Negative 08/02/2022 11:48 AM    KETUA TRACE 08/02/2022 11:48 AM    UROBILINOGEN 0.2 08/02/2022 11:48 AM    BILIRUBINUR Negative 08/02/2022 11:48 AM       No results found for: DEVON, CREURRAN, MACREATRATIO, OSMOU    No components found for: URIC    No results found for: LIPIDPAN      Assessment and Plan:    Ckd 4  Cr 2.8>3.1>2.7  Gfr 27  Baseline 2.6 on 7/22/22  Underlying dm and htn  Check pth p vit d  Check p/c ratio    2. Htn  Follow on outpt norvasc and bb    3.afib  On oac and bb    4. Resp failure  Copd/strep pneumonia/parainfluenza virus  On doxy and rocephin    5. Anemia  H/o pud/avms  Check fe b12 fol spep upep  Start ricky    6. Tick bite  On doxy    Edita Burroughs.  Lina Castleman, MD

## 2022-08-05 LAB
ANION GAP SERPL CALCULATED.3IONS-SCNC: 11 MMOL/L (ref 7–16)
BASOPHILS ABSOLUTE: 0.02 E9/L (ref 0–0.2)
BASOPHILS RELATIVE PERCENT: 0.2 % (ref 0–2)
BUN BLDV-MCNC: 49 MG/DL (ref 6–23)
CALCIUM SERPL-MCNC: 8.9 MG/DL (ref 8.6–10.2)
CHLORIDE BLD-SCNC: 100 MMOL/L (ref 98–107)
CO2: 21 MMOL/L (ref 22–29)
CREAT SERPL-MCNC: 2.4 MG/DL (ref 0.7–1.2)
EOSINOPHILS ABSOLUTE: 0.02 E9/L (ref 0.05–0.5)
EOSINOPHILS RELATIVE PERCENT: 0.2 % (ref 0–6)
FERRITIN: 374 NG/ML
FOLATE: >20 NG/ML (ref 4.8–24.2)
GFR AFRICAN AMERICAN: 31
GFR NON-AFRICAN AMERICAN: 26 ML/MIN/1.73
GLUCOSE BLD-MCNC: 163 MG/DL (ref 74–99)
HCT VFR BLD CALC: 26.4 % (ref 37–54)
HEMOGLOBIN: 8.4 G/DL (ref 12.5–16.5)
IMMATURE GRANULOCYTES #: 0.1 E9/L
IMMATURE GRANULOCYTES %: 0.8 % (ref 0–5)
IRON SATURATION: 9 % (ref 20–55)
IRON: 16 MCG/DL (ref 59–158)
LYMPHOCYTES ABSOLUTE: 0.99 E9/L (ref 1.5–4)
LYMPHOCYTES RELATIVE PERCENT: 8.2 % (ref 20–42)
MCH RBC QN AUTO: 26.6 PG (ref 26–35)
MCHC RBC AUTO-ENTMCNC: 31.8 % (ref 32–34.5)
MCV RBC AUTO: 83.5 FL (ref 80–99.9)
METER GLUCOSE: 162 MG/DL (ref 74–99)
METER GLUCOSE: 287 MG/DL (ref 74–99)
METER GLUCOSE: 300 MG/DL (ref 74–99)
METER GLUCOSE: 357 MG/DL (ref 74–99)
MONOCYTES ABSOLUTE: 0.61 E9/L (ref 0.1–0.95)
MONOCYTES RELATIVE PERCENT: 5 % (ref 2–12)
NEUTROPHILS ABSOLUTE: 10.36 E9/L (ref 1.8–7.3)
NEUTROPHILS RELATIVE PERCENT: 85.6 % (ref 43–80)
PARATHYROID HORMONE INTACT: 112 PG/ML (ref 15–65)
PDW BLD-RTO: 16.3 FL (ref 11.5–15)
PHOSPHORUS: 2.8 MG/DL (ref 2.5–4.5)
PLATELET # BLD: 194 E9/L (ref 130–450)
PMV BLD AUTO: 9.7 FL (ref 7–12)
POTASSIUM SERPL-SCNC: 4.1 MMOL/L (ref 3.5–5)
PROCALCITONIN: 11.84 NG/ML (ref 0–0.08)
PROSTATE SPECIFIC ANTIGEN: 7.46 NG/ML (ref 0–4)
RBC # BLD: 3.16 E12/L (ref 3.8–5.8)
SODIUM BLD-SCNC: 132 MMOL/L (ref 132–146)
TOTAL IRON BINDING CAPACITY: 182 MCG/DL (ref 250–450)
VITAMIN B-12: 1257 PG/ML (ref 211–946)
WBC # BLD: 12.1 E9/L (ref 4.5–11.5)

## 2022-08-05 PROCEDURE — 6360000002 HC RX W HCPCS: Performed by: INTERNAL MEDICINE

## 2022-08-05 PROCEDURE — 82728 ASSAY OF FERRITIN: CPT

## 2022-08-05 PROCEDURE — 36415 COLL VENOUS BLD VENIPUNCTURE: CPT

## 2022-08-05 PROCEDURE — 80048 BASIC METABOLIC PNL TOTAL CA: CPT

## 2022-08-05 PROCEDURE — 6370000000 HC RX 637 (ALT 250 FOR IP): Performed by: INTERNAL MEDICINE

## 2022-08-05 PROCEDURE — 2580000003 HC RX 258: Performed by: INTERNAL MEDICINE

## 2022-08-05 PROCEDURE — 2060000000 HC ICU INTERMEDIATE R&B

## 2022-08-05 PROCEDURE — 83540 ASSAY OF IRON: CPT

## 2022-08-05 PROCEDURE — 2700000000 HC OXYGEN THERAPY PER DAY

## 2022-08-05 PROCEDURE — 85025 COMPLETE CBC W/AUTO DIFF WBC: CPT

## 2022-08-05 PROCEDURE — 83550 IRON BINDING TEST: CPT

## 2022-08-05 PROCEDURE — 84165 PROTEIN E-PHORESIS SERUM: CPT

## 2022-08-05 PROCEDURE — 6370000000 HC RX 637 (ALT 250 FOR IP): Performed by: NURSE PRACTITIONER

## 2022-08-05 PROCEDURE — 99233 SBSQ HOSP IP/OBS HIGH 50: CPT | Performed by: INTERNAL MEDICINE

## 2022-08-05 PROCEDURE — 51798 US URINE CAPACITY MEASURE: CPT

## 2022-08-05 PROCEDURE — 82607 VITAMIN B-12: CPT

## 2022-08-05 PROCEDURE — 94640 AIRWAY INHALATION TREATMENT: CPT

## 2022-08-05 PROCEDURE — 84100 ASSAY OF PHOSPHORUS: CPT

## 2022-08-05 PROCEDURE — 84145 PROCALCITONIN (PCT): CPT

## 2022-08-05 PROCEDURE — G0103 PSA SCREENING: HCPCS

## 2022-08-05 PROCEDURE — 82746 ASSAY OF FOLIC ACID SERUM: CPT

## 2022-08-05 PROCEDURE — 97530 THERAPEUTIC ACTIVITIES: CPT

## 2022-08-05 PROCEDURE — 97110 THERAPEUTIC EXERCISES: CPT

## 2022-08-05 PROCEDURE — 82962 GLUCOSE BLOOD TEST: CPT

## 2022-08-05 PROCEDURE — 83970 ASSAY OF PARATHORMONE: CPT

## 2022-08-05 RX ADMIN — SODIUM CHLORIDE AND POTASSIUM CHLORIDE: 9; 1.49 INJECTION, SOLUTION INTRAVENOUS at 19:48

## 2022-08-05 RX ADMIN — EPOETIN ALFA-EPBX 3000 UNITS: 3000 INJECTION, SOLUTION INTRAVENOUS; SUBCUTANEOUS at 08:04

## 2022-08-05 RX ADMIN — GABAPENTIN 100 MG: 100 CAPSULE ORAL at 08:06

## 2022-08-05 RX ADMIN — BUDESONIDE 500 MCG: 0.5 INHALANT RESPIRATORY (INHALATION) at 18:24

## 2022-08-05 RX ADMIN — IPRATROPIUM BROMIDE AND ALBUTEROL SULFATE 1 AMPULE: .5; 2.5 SOLUTION RESPIRATORY (INHALATION) at 18:24

## 2022-08-05 RX ADMIN — METOPROLOL SUCCINATE 50 MG: 25 TABLET, EXTENDED RELEASE ORAL at 08:07

## 2022-08-05 RX ADMIN — IPRATROPIUM BROMIDE AND ALBUTEROL SULFATE 1 AMPULE: .5; 2.5 SOLUTION RESPIRATORY (INHALATION) at 14:39

## 2022-08-05 RX ADMIN — CEFTRIAXONE SODIUM 1000 MG: 1 INJECTION, POWDER, FOR SOLUTION INTRAMUSCULAR; INTRAVENOUS at 08:11

## 2022-08-05 RX ADMIN — ARFORMOTEROL TARTRATE 15 MCG: 15 SOLUTION RESPIRATORY (INHALATION) at 05:06

## 2022-08-05 RX ADMIN — IPRATROPIUM BROMIDE AND ALBUTEROL SULFATE 1 AMPULE: .5; 2.5 SOLUTION RESPIRATORY (INHALATION) at 05:06

## 2022-08-05 RX ADMIN — TAMSULOSIN HYDROCHLORIDE 0.4 MG: 0.4 CAPSULE ORAL at 19:55

## 2022-08-05 RX ADMIN — APIXABAN 2.5 MG: 5 TABLET, FILM COATED ORAL at 19:55

## 2022-08-05 RX ADMIN — OXYCODONE HYDROCHLORIDE AND ACETAMINOPHEN 1000 MG: 500 TABLET ORAL at 08:04

## 2022-08-05 RX ADMIN — BUDESONIDE 500 MCG: 0.5 INHALANT RESPIRATORY (INHALATION) at 05:06

## 2022-08-05 RX ADMIN — SODIUM CHLORIDE AND POTASSIUM CHLORIDE: 9; 1.49 INJECTION, SOLUTION INTRAVENOUS at 03:56

## 2022-08-05 RX ADMIN — INSULIN LISPRO 8 UNITS: 100 INJECTION, SOLUTION INTRAVENOUS; SUBCUTANEOUS at 11:24

## 2022-08-05 RX ADMIN — ALLOPURINOL 100 MG: 100 TABLET ORAL at 08:06

## 2022-08-05 RX ADMIN — APIXABAN 2.5 MG: 5 TABLET, FILM COATED ORAL at 08:05

## 2022-08-05 RX ADMIN — DOXYCYCLINE HYCLATE 100 MG: 100 CAPSULE ORAL at 08:05

## 2022-08-05 RX ADMIN — DOXYCYCLINE HYCLATE 100 MG: 100 CAPSULE ORAL at 19:55

## 2022-08-05 RX ADMIN — ARFORMOTEROL TARTRATE 15 MCG: 15 SOLUTION RESPIRATORY (INHALATION) at 18:24

## 2022-08-05 RX ADMIN — INSULIN LISPRO 6 UNITS: 100 INJECTION, SOLUTION INTRAVENOUS; SUBCUTANEOUS at 16:50

## 2022-08-05 RX ADMIN — TAMSULOSIN HYDROCHLORIDE 0.4 MG: 0.4 CAPSULE ORAL at 08:06

## 2022-08-05 RX ADMIN — IPRATROPIUM BROMIDE AND ALBUTEROL SULFATE 1 AMPULE: .5; 2.5 SOLUTION RESPIRATORY (INHALATION) at 09:24

## 2022-08-05 RX ADMIN — ZINC SULFATE 220 MG (50 MG) CAPSULE 50 MG: CAPSULE at 08:05

## 2022-08-05 RX ADMIN — GABAPENTIN 100 MG: 100 CAPSULE ORAL at 19:55

## 2022-08-05 RX ADMIN — PANTOPRAZOLE SODIUM 40 MG: 40 TABLET, DELAYED RELEASE ORAL at 05:37

## 2022-08-05 RX ADMIN — AMLODIPINE BESYLATE 5 MG: 5 TABLET ORAL at 08:06

## 2022-08-05 NOTE — PROGRESS NOTES
[facility]) Physicians        CARDIOLOGY                 INPATIENT PROGRESS NOTE          PATIENT SEEN IN FOLLOW UP FOR: Elevated Troponin    Hospital Day: Rashi is a 80year old patient known to Dr. Clark Bobo     80year old with history of PAF, mild CAD by heart cath 8/202, HTN presented to ER for \"hypoxia noted on Pulse -Ox' and SOB. Cardiology following for CHF and Elevated Troponin    SUBJECTIVE: Denies any chest pain, breathing better. No palpitations or dizzy. Had some cough; Ambulated in [address] with family and nurse - I reviewed on tele, his heart rates 110-115. ROS: Review of rest of 10 systems negative except as mentioned above    OBJECTIVE: No acute distress. See Assessment     Diagnostics:       Telemetry: A fib with mostly CVR. Echo 8/4/2022 - Dr Elieser Gupta   Summary   Left ventricular size is grossly normal.   Moderate left ventricular concentric hypertrophy noted. Ejection fraction is measured at 74%. No evidence of left ventricular mass or thrombus noted. No regional wall motion abnormalities seen. The left atrium is mildly dilated. Interatrial septum appears intact. Physiologic and/or trace mitral regurgitation is present. No evidence of mitral valve stenosis. Mild thickening of the mitral valve leaflets. The aortic valve appears severely sclerotic. Individual aortic valve leaflets are not clearly visualized. Physiologic and/or trace aortic regurgitation is noted. Moderate aortic stenosis. The aortic valve area is 1.2-1.5 cm2 with a maximum gradient of 15.73 mmHg   and a mean gradient of 8.54 mmHg. RVSP is 25.35 mmHg. Physiologic and/or trace tricuspid regurgitation. No evidence of pericardial effusion. Irregular rhythm--atrial fibrillation   Consider JA    [facility] 8/7/2020   CONCLUSIONS:  1. Noncritical coronary atherosclerosis. 10-15% mid LAD  2.   Normal left ventricular systolic function with mildly abnormal  diastolic dysfunction. 3.  Mild mitral regurgitation. Intake/Output Summary (Last 24 hours) at 8/5/2022 1358  Last data filed at 8/5/2022 1351  Gross per 24 hour   Intake 2834.05 ml   Output 1350 ml   Net 1484.05 ml       Labs:   CBC:   Recent Labs     08/04/22  0527 08/05/22  0509   WBC 12.2* 12.1*   HGB 9.0* 8.4*   HCT 27.7* 26.4*    194     BMP:   Recent Labs     08/04/22  0527 08/05/22  0509    132   K 3.9 4.1   CO2 24 21*   BUN 54* 49*   CREATININE 2.7* 2.4*   LABGLOM 22 26   CALCIUM 8.7 8.9     Mag:   Recent Labs     08/03/22  0458   MG 1.6     Phos:   Recent Labs     08/03/22  0458 08/05/22  0509   PHOS 2.9 2.8     TSH: No results for input(s): TSH in the last 72 hours. HgA1c:     BNP: No results for input(s): BNP in the last 72 hours. PT/INR: No results for input(s): PROTIME, INR in the last 72 hours. APTT:No results for input(s): APTT in the last 72 hours. CARDIAC ENZYMES:No results for input(s): CKTOTAL, CKMB, CKMBINDEX, TROPONINI in the last 72 hours. FASTING LIPID PANEL:No results found for: CHOL, HDL, LDLDIRECT, LDLCALC, TRIG  LIVER PROFILE:No results for input(s): AST, ALT, LABALBU in the last 72 hours.     Current Inpatient Medications:   doxycycline hyclate  100 mg Oral 2 times per day    pantoprazole  40 mg Oral QAM AC    epoetin samantha-epbx  3,000 Units SubCUTAneous Once per day on Mon Wed Fri    amLODIPine  5 mg Oral Daily    apixaban  2.5 mg Oral BID    allopurinol  100 mg Oral Daily    gabapentin  100 mg Oral BID    metoprolol succinate  50 mg Oral Daily    tamsulosin  0.4 mg Oral BID    insulin lispro  0-8 Units SubCUTAneous TID WC    insulin lispro  0-4 Units SubCUTAneous Nightly    cefTRIAXone (ROCEPHIN) IV  1,000 mg IntraVENous Q24H    Arformoterol Tartrate  15 mcg Nebulization BID    ipratropium-albuterol  1 ampule Inhalation Q4H WA    budesonide  500 mcg Nebulization BID    ascorbic acid  1,000 mg Oral Daily    zinc sulfate  50 mg Oral Daily       IV Infusions (if any):   0.9% NaCl with KCl 20 mEq 60 mL/hr at 22 1351    dextrose           PHYSICAL EXAM:     CONSTITUTIONAL:   /75   Pulse 78   Temp 98 °F (36.7 °C) (Infrared)   Resp 18   Ht 5' 9\" (1.753 m)   Wt 195 lb 6 oz (88.6 kg)   SpO2 92%   BMI 28.85 kg/m²   Pulse  Av.3  Min: 78  Max: 86  Systolic (21SHW), ZRY:532 , Min:133 , HBX:331    Diastolic (60ZIF), QVW:58, Min:68, Max:75      CONST:  Well developed, appears stated age. Awake, alert and no apparent distress. HEENT:   Head- Normocephalic  Eyes- Conjunctivae pink, no icterus  Throat- Oral mucosa moist  Neck-  No stridor, no jugular venous distention. No carotid bruit. CHEST: Chest symmetrical. No accessory muscle use  RESPIRATORY: Lung sounds - Few rhonchi  CARDIOVASCULAR:     Heart Palpation- No thrills. Heart Ausculation- Irregularly irregular rate and rhythm, 1/6 systolic murmur. No s3 or rub  EXT: +lower extremity edema. Distal pulses palpable, no cyanosis  ABDOMEN: Soft, non-tender to light palpation. Bowel sounds present. No abdominal bruit  MS: N/A  : Deferred  RECTAL: Deferred  SKIN: Warm and dry  NEURO / PSYCH: Oriented to person, place; Good mood and affect. IMPRESSION / RECOMMENDATIONS:     Acute respiratory failure with hypoxia - Per Dr Trinh Damonr     Elevated Troponin - Troponins 81 to 98; No chest pain. EKG nonischemic; Likely from demand ischemia due to hypoxic respiratory failure, anemia and CKD - Had minimal CAD on [facility] 2020 - Continue BB, Not on Statin due to minimal CAD and advanced age. Acute on Chronic  HFpEF - EF 74%- Continue low dose Diuretics-Lasix 20mg; Monitor daily Wts, I/Os. BP and renal Function     Atypical left-sided pneumonia - Per Dr Trinh Damonr    PAF  - Rates controlled on BB, On adjusted dose Eliquis for [address]   IF NEEDED for rate control, increase BB and discontinue Amlodipine or change Amlodipine to cardizem     Aortic stenosis - Likely paradoxical low gradient Moderate stenosis, peak gradient 16 mmHg, dimensionless index 0.36    CKD, stage 4 - Monitor renal Fn, Nephrology following Cr  2.8 -->3.1 -->2.7 --->2.4 today     Anemia of chronic disease - Monitor H/H, 9.0 --> 8.4     RBBB, LAFB - Chronic                  No family at bed side at the time of my evaluation. Cardiology will see as needed during weekend      He will f/u with Dr Kathy Cline after discharge.   Above recommendations discussed with him        Electronically signed by Samreen Hathaway MD on 8/5/2022 at 1:58 PM  [facility]) Cardiology

## 2022-08-05 NOTE — PROGRESS NOTES
Internal Medicine Progress Note    [facility]    Marty Sellers. Sweta Camarena, ELBAARadhaCRadhaORadhaIRadha Bautista D.O., PABLOOALIREZA Perez, MSN, APRN, NP-C  Randy Hobson. Doc Leung, MSN, APRN-CNP     Primary Care Physician: Bing Burgos MD   Admitting Physician:  Flavia Melvin DO  Admission date and time: 8/2/2022  7:29 AM    Room:  [facility]0077-  Admitting diagnosis: Sepsis [facility]) [A41.9]    Patient Name: Gricelda Badillo  MRN: [id number]    Date of Service: 8/5/2022     Subjective:  Lubna Zaman is a 80 y.o. male who was seen and examined today,8/5/2022, at the bedside. Feels better today. Admits to cough and clear to yellowish sputum production. No fever or chills. Using incentive spirometer. Daughter present and states that the patient has been resistant to getting out of bed and increasing activity. No family present during my examination. Review of System:   Constitutional:   Denies fever or chills, weight loss or gain, positive for malaise and fatigue - improved  HEENT:   Denies ear pain, sore throat, sinus or eye problems. Cardiovascular:   Denies any chest pain, irregular heartbeats, or palpitations. Respiratory:   Admits to very mild shortness of breath with exertion, occasional cough and congestion with clear to yellowish sputum production, no hemoptysis, or wheezing. Gastrointestinal:   Denies nausea, vomiting, diarrhea, or constipation. Denies any abdominal pain. Genitourinary:    Denies any urgency, admit to frequency- Does not feels as though he completley empties his bladder as he sometimes has to urinate 20 minutes after urinating,   Extremities:   Denies lower extremity swelling, edema or cyanosis. Neurology:    Denies any headache or focal neurological deficits, but of for mild generalized weakness and fatigue without focal component. Psch:   Denies being anxious or depressed.   Musculoskeletal:    Denies  myalgias, joint complaints or back pain. Integumentary:   Denies any rashes, ulcers, or excoriations. Denies bruising. Erythematous area left lateral back-lower. Hematologic/Lymphatic:  Denies bruising or bleeding. Physical Exam:  I/O this shift:  In: -   Out: 150 [Urine:150]    Intake/Output Summary (Last 24 hours) at 8/5/2022 1012  Last data filed at 8/5/2022 0920  Gross per 24 hour   Intake 2041.32 ml   Output 1050 ml   Net 991.32 ml   I/O last 3 completed shifts: In: 2341.3 [P.O.:300; I.V.:2041.3]  Out: 1400 [Urine:1400]  Patient Vitals for the past 96 hrs (Last 3 readings):   Weight   08/05/22 0013 195 lb 6 oz (88.6 kg)   08/04/22 0027 192 lb 7 oz (87.3 kg)   08/03/22 0040 181 lb 2 oz (82.2 kg)     Vital Signs:   Blood pressure 138/75, pulse 78, temperature 98 °F (36.7 °C), resp. rate 18, height 5' 9\" (1.753 m), weight 195 lb 6 oz (88.6 kg), SpO2 92 %. General appearance:  Alert, responsive, oriented to person, place, and time. No acute distress. Head:  Normocephalic. No masses, lesions or tenderness. Eyes:  PERRLA. EOMI. Sclera clear. Impaired vision. ENT:  Ears normal. Mucosa normal.  Impaired hearing. Neck:    Supple. Trachea midline. No thyromegaly. No JVD. No bruits. Heart:    Rhythm regular. Rate controlled. S1 and S2. Systolic murmur. Lungs:    Symmetrical.  Air exchange has improved throughout, rhonchi are scattered. Fine bibasilar crackles. No wheezing  Abdomen:   Soft. Non-tender. Non-distended. Bowel sounds positive. No organomegaly or masses. No pain on palpation. Extremities:    Peripheral pulses present. No peripheral edema. No ulcers. No cyanosis. No clubbing. Neurologic:    Alert x 3. No significant pitting focal deficit. Cranial nerves grossly intact. No focal weakness. Psych:   Behavior is normal. Mood appears normal. Speech is not rapid and/or pressured. Musculoskeletal:   No unilateral joint edema, erythema or warmth. Gait not assessed.   Integumentary:  No rashes  Skin normal color and texture.   Genitalia/Breast:  Deferred    Medication:  Scheduled Meds:   doxycycline hyclate  100 mg Oral 2 times per day    pantoprazole  40 mg Oral QAM AC    epoetin samantha-epbx  3,000 Units SubCUTAneous Once per day on Mon Wed Fri    amLODIPine  5 mg Oral Daily    apixaban  2.5 mg Oral BID    allopurinol  100 mg Oral Daily    gabapentin  100 mg Oral BID    metoprolol succinate  50 mg Oral Daily    tamsulosin  0.4 mg Oral BID    insulin lispro  0-8 Units SubCUTAneous TID WC    insulin lispro  0-4 Units SubCUTAneous Nightly    cefTRIAXone (ROCEPHIN) IV  1,000 mg IntraVENous Q24H    Arformoterol Tartrate  15 mcg Nebulization BID    ipratropium-albuterol  1 ampule Inhalation Q4H WA    budesonide  500 mcg Nebulization BID    ascorbic acid  1,000 mg Oral Daily    zinc sulfate  50 mg Oral Daily     Continuous Infusions:   0.9% NaCl with KCl 20 mEq 60 mL/hr at 08/05/22 0358    dextrose         Objective Data:  CBC with Differential:    Lab Results   Component Value Date/Time    WBC 12.1 08/05/2022 05:09 AM    RBC 3.16 08/05/2022 05:09 AM    HGB 8.4 08/05/2022 05:09 AM    HCT 26.4 08/05/2022 05:09 AM     08/05/2022 05:09 AM    MCV 83.5 08/05/2022 05:09 AM    MCH 26.6 08/05/2022 05:09 AM    MCHC 31.8 08/05/2022 05:09 AM    RDW 16.3 08/05/2022 05:09 AM    LYMPHOPCT 8.2 08/05/2022 05:09 AM    MONOPCT 5.0 08/05/2022 05:09 AM    BASOPCT 0.2 08/05/2022 05:09 AM    MONOSABS 0.61 08/05/2022 05:09 AM    LYMPHSABS 0.99 08/05/2022 05:09 AM    EOSABS 0.02 08/05/2022 05:09 AM    BASOSABS 0.02 08/05/2022 05:09 AM     BMP:    Lab Results   Component Value Date/Time     08/05/2022 05:09 AM    K 4.1 08/05/2022 05:09 AM    K 3.7 08/02/2022 07:38 AM     08/05/2022 05:09 AM    CO2 21 08/05/2022 05:09 AM    BUN 49 08/05/2022 05:09 AM    LABALBU 4.2 08/02/2022 07:38 AM    CREATININE 2.4 08/05/2022 05:09 AM    CALCIUM 8.9 08/05/2022 05:09 AM    GFRAA 31 08/05/2022 05:09 AM    LABGLOM 26 08/05/2022 05:09 AM GLUCOSE 163 08/05/2022 05:09 AM       Assessment:  Acute respiratory failure with hypoxia secondary to extensive left-sided pneumonia  Atypical left-sided pneumonia with infectious work-up positive for both parainfluenza as well as streptococcal pneumonia  Acute exacerbation of COPD secondary to pneumonia   Acute on chronic renal failure with mild metabolic acidosis  Chronic kidney disease stage 4  Moderate aortic stenosis. Elevated troponin  Paroxysmal atrial fibrillation with variable ventricular response on Eliquis  Hypoglycemia with underlying non-insulin-dependent diabetes mellitus type 2  Tick bite  History of rectosigmoid arteriovenous vascular malformation  Anemia of chronic disease    Plan:    A tick was found attached to the patient's left lateral side and removed this admission- Lyme test pending- Continue doxycycline  Continue antibiotic therapy. Continue nebulized breathing treatments  Eliquis for VTE prophylaxis  Protonix for GI prophylaxis  Cardiology consulted-recommendations reviewed. Elevate troponin likely from demand ischemia and chronic kidney disease. nephrology is following and recommendations were reviewed. Multiple lab results pending including vitamin B12 and folate, iron and TIBC and ferritin patient has been   weaned down to 1 L/min . We will continue to wean as tolerated. Continue supportive oxygen maintain oxygen saturations greater than 90%  Increase activity as tolerated. Discussed with patient that he needs to get out of bed and ambulate more frequently during the daytime. PT/OT to evaluate and treat  Encouraged use of incentive spirometer  Leukocytosis improving. Hemoglobin has trended downward. Patient was initially 11.3 at time of admission and is now 8.4. We will obtain stool for occult blood. Continue to monitor and treat accordingly. Await vitamin B12 and folate as well as iron and TIBC study results.  Continue Retacrit   Chronic morbidities, laboratory values and

## 2022-08-05 NOTE — PROGRESS NOTES
Patient Vitals for the past 24 hrs:   BP Temp Temp src Pulse Resp SpO2 Weight   08/06/22 1130 (!) 171/98 98.7 °F (37.1 °C) -- 96 24 92 % --   08/06/22 1115 (!) 170/88 98 °F (36.7 °C) Infrared 99 25 90 % --   08/06/22 0945 -- 98 °F (36.7 °C) Infrared 96 26 (!) 88 % --   08/06/22 0915 -- 98 °F (36.7 °C) -- (!) 113 24 (!) 81 % --   08/06/22 0900 (!) 141/87 98 °F (36.7 °C) -- (!) 115 22 98 % --   08/06/22 0830 (!) 149/80 98 °F (36.7 °C) -- (!) 122 26 94 % --   08/06/22 0745 (!) 173/84 98.8 °F (37.1 °C) Infrared (!) 118 25 92 % --   08/06/22 0001 (!) 140/83 98.4 °F (36.9 °C) Oral 97 22 93 % 196 lb 9 oz (89.2 kg)   08/05/22 1945 139/83 99.1 °F (37.3 °C) Oral (!) 106 20 95 % --   08/05/22 1715 130/70 98 °F (36.7 °C) Infrared 80 20 94 % --   @      Intake/Output Summary (Last 24 hours) at 8/6/2022 1158  Last data filed at 8/6/2022 0945  Gross per 24 hour   Intake 1958.89 ml   Output 2201 ml   Net -242.11 ml         Wt Readings from Last 3 Encounters:   08/06/22 196 lb 9 oz (89.2 kg)   06/24/21 185 lb 14.4 oz (84.3 kg)   05/27/21 189 lb (85.7 kg)       Constitutional:  Pt is in no acute distress  Head: normocephalic, atraumatic  Neck: no JVD  Cardiovascular: regular rate and rhythm, no murmurs, gallops, or rubs  Respiratory:  No rales, rhochi, or wheezes  Gastrointestinal:  Soft, nontender, nondistended, bowel sounds x 4  Ext: no edema  Skin: dry, no rash  Neuro: aaox3    MEDS (scheduled):    polyethylene glycol  17 g Oral Daily    docusate sodium  100 mg Oral BID    methylPREDNISolone  40 mg IntraVENous Q8H    acetylcysteine  4 mL Inhalation BID    doxycycline hyclate  100 mg Oral 2 times per day    pantoprazole  40 mg Oral QAM AC    epoetin samantha-epbx  3,000 Units SubCUTAneous Once per day on Mon Wed Fri    amLODIPine  5 mg Oral Daily    apixaban  2.5 mg Oral BID    allopurinol  100 mg Oral Daily    gabapentin  100 mg Oral BID    metoprolol succinate  50 mg Oral Daily    tamsulosin  0.4 mg Oral BID    insulin lispro 0-8 Units SubCUTAneous TID WC    insulin lispro  0-4 Units SubCUTAneous Nightly    cefTRIAXone (ROCEPHIN) IV  1,000 mg IntraVENous Q24H    Arformoterol Tartrate  15 mcg Nebulization BID    ipratropium-albuterol  1 ampule Inhalation Q4H WA    budesonide  500 mcg Nebulization BID    ascorbic acid  1,000 mg Oral Daily    zinc sulfate  50 mg Oral Daily       MEDS (infusions):   0.9% NaCl with KCl 20 mEq Stopped (08/06/22 0548)    dextrose         MEDS (prn):  glucose, dextrose bolus **OR** dextrose bolus, glucagon (rDNA), dextrose, albuterol, ondansetron    DATA:    Recent Labs     08/04/22 0527 08/05/22  0509 08/06/22  0631   WBC 12.2* 12.1* 11.0   HGB 9.0* 8.4* 9.5*   HCT 27.7* 26.4* 29.7*   MCV 83.2 83.5 83.7    194 236     Recent Labs     08/04/22 0527 08/05/22  0509 08/06/22  0631    132 132   K 3.9 4.1 4.5   CL 99 100 100   CO2 24 21* 18*   BUN 54* 49* 43*   CREATININE 2.7* 2.4* 2.2*   LABGLOM 22 26 28   GLUCOSE 112* 163* 221*   CALCIUM 8.7 8.9 9.3   MG  --   --  1.8   PHOS  --  2.8 2.5       Lab Results   Component Value Date    LABALBU 4.2 08/02/2022     No results found for: TSH    Iron Studies  Lab Results   Component Value Date    IRON 16 (L) 08/05/2022    TIBC 182 (L) 08/05/2022    FERRITIN 374 08/05/2022     Vitamin B-12   Date Value Ref Range Status   08/05/2022 1257 (H) 211 - 946 pg/mL Final     Folate   Date Value Ref Range Status   08/05/2022 >20.0 4.8 - 24.2 ng/mL Final       No results found for: VITD25  PTH   Date Value Ref Range Status   08/05/2022 112 (H) 15 - 65 pg/mL Final       No components found for: URIC    Lab Results   Component Value Date/Time    COLORU Yellow 08/02/2022 11:48 AM    NITRU Negative 08/02/2022 11:48 AM    GLUCOSEU Negative 08/02/2022 11:48 AM    KETUA TRACE 08/02/2022 11:48 AM    UROBILINOGEN 0.2 08/02/2022 11:48 AM    BILIRUBINUR Negative 08/02/2022 11:48 AM       No results found for: LIPIDPAN      IMPRESSION/RECOMMENDATIONS:      Ckd 4  Cr 2. 8>3.1>2.7>2.4>2.2  Gfr 27  Baseline 2.6 on 7/22/22  Underlying dm and htn  Pth 112  Vit d p  p/c ratio 0.98 non nephrotic range proteinuria  PLAN:  Follow labs  Off the IVF and 1 dose furosemide this AM     2. Htn with CKD I-IV  BP goal <130/80-BP above goal  PLAN:  1. Will add an additional 25mg metoprolol succinate at [facility]  2. Follow BP     3.afib  On oac and bb     4. Resp failure  Copd/strep pneumonia/parainfluenza virus  On doxy and rocephin     5. Anemia in CKD   HgB goal 10-12  H/o pud/avms  PLAN:  Await  fe b12 fol spep upep  2. Continue ricky  3. Follow H/H  4. Transfuse for HgB <7     6. Tick bite  On doxy    7. Sec HPTH of Renal Origin     PLAN:  Follow Ca++ and PO4  Await Vit D    DENISE Koehler MD

## 2022-08-05 NOTE — PLAN OF CARE
Problem: Discharge Planning  Goal: Discharge to home or other facility with appropriate resources  8/5/2022 0134 by Mireya Gaffney RN  Outcome: Progressing  8/4/2022 1533 by Yahaira Nguyen RN  Outcome: Progressing     Problem: Safety - Adult  Goal: Free from fall injury  8/5/2022 0134 by Mireya Gaffney RN  Outcome: Progressing  8/4/2022 1533 by Yahaira Nguyen RN  Outcome: Progressing     Problem: ABCDS Injury Assessment  Goal: Absence of physical injury  8/5/2022 0134 by Mireya Gaffney RN  Outcome: Progressing  8/4/2022 1533 by Yahaira Nguyen RN  Outcome: Progressing

## 2022-08-05 NOTE — PROGRESS NOTES
[facility]  Nephrology Attending Progress Note  Sindy Aguilar. MD Reyna        SUBJECTIVE:     8/4: the pt is an 81 yo male who was admitted 8/2/22 with sob and pox of 82. He was brought in by his daughter who he lives with. He was found to have left sided strep pneumonia and parainfluenza. Covid was negative. He also has decreased appetite , chills, and lethargy. He has a pmh of ckd with a baseline cr of  2.6 and he sees dr Kory Calderon in the office. On admission his cr was 2.8. yesterday it was 3.1 and today 2.7, hgb 11.3>9.7>9, wbc 17>12.2, plt 186, hco3 24, fena <1. He has been hemodynamically stable. He was started on doxy and rocpehin. He was febrile on 8/2 and 8/3. He was started on ivf today. He also has a pmh of afib on oac, avm, copd, dm, pud, htn, hyperlipidemia. His daughter Edris Osgood is in the room. He is hard of hearing and still has no appetite. 8/5: pt seen in room. Hard of hearing. Very lethargic today, no cp or sob today. 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    Esophagitis-grade 1    AP (angina pectoris) (HCC)    Systolic hypertension    Combined forms of age-related cataract of left eye    Anemia, chronic renal failure, stage 4 (severe) (HCC)    Sepsis secondary to CAP ([address]    Pneumonia of left lung due to infectious organism        PAST MEDICAL HISTORY:    Past Medical History:   Diagnosis Date    A-fib ([address]     Diabetes mellitus ([address]     H pylori ulcer 8/22/2012    Hyperlipidemia     Hypertension     PONV (postoperative nausea and vomiting)     PUD (peptic ulcer disease) 7/26/2012    Urinary frequency        DIET:    ADULT DIET;  Regular; 4 carb choices (60 gm/meal)     PHYSICAL EXAM:     Patient Vitals for the past 24 hrs:   BP Temp Temp src Pulse Resp SpO2 Weight   08/05/22 0800 138/75 98 °F (36.7 °C) Infrared 78 18 92 % -- 08/05/22 0400 133/68 98.7 °F (37.1 °C) Infrared 83 16 92 % --   08/05/22 0013 -- -- -- -- -- -- 195 lb 6 oz (88.6 kg)   08/04/22 2045 139/68 98.5 °F (36.9 °C) Infrared 86 18 93 % --   @      Intake/Output Summary (Last 24 hours) at 8/5/2022 1541  Last data filed at 8/5/2022 1351  Gross per 24 hour   Intake 2834.05 ml   Output 1350 ml   Net 1484.05 ml         Wt Readings from Last 3 Encounters:   08/05/22 195 lb 6 oz (88.6 kg)   06/24/21 185 lb 14.4 oz (84.3 kg)   05/27/21 189 lb (85.7 kg)       Constitutional:  Pt is in no acute distress  Head: normocephalic, atraumatic  Neck: no JVD  Cardiovascular: regular rate and rhythm, no murmurs, gallops, or rubs  Respiratory:  No rales, rhochi, or wheezes  Gastrointestinal:  Soft, nontender, nondistended, bowel sounds x 4  Ext: no edema  Skin: dry, no rash  Neuro: aaox3    MEDS (scheduled):    doxycycline hyclate  100 mg Oral 2 times per day    pantoprazole  40 mg Oral QAM AC    epoetin samantha-epbx  3,000 Units SubCUTAneous Once per day on Mon Wed Fri    amLODIPine  5 mg Oral Daily    apixaban  2.5 mg Oral BID    allopurinol  100 mg Oral Daily    gabapentin  100 mg Oral BID    metoprolol succinate  50 mg Oral Daily    tamsulosin  0.4 mg Oral BID    insulin lispro  0-8 Units SubCUTAneous TID     insulin lispro  0-4 Units SubCUTAneous Nightly    cefTRIAXone (ROCEPHIN) IV  1,000 mg IntraVENous Q24H    Arformoterol Tartrate  15 mcg Nebulization BID    ipratropium-albuterol  1 ampule Inhalation Q4H WA    budesonide  500 mcg Nebulization BID    ascorbic acid  1,000 mg Oral Daily    zinc sulfate  50 mg Oral Daily       MEDS (infusions):   0.9% NaCl with KCl 20 mEq 60 mL/hr at 08/05/22 1351    dextrose         MEDS (prn):  glucose, dextrose bolus **OR** dextrose bolus, glucagon (rDNA), dextrose, albuterol, ondansetron    DATA:    Recent Labs     08/03/22  0458 08/04/22  0527 08/05/22  0509   WBC 17.4* 12.2* 12.1*   HGB 9.7* 9.0* 8.4*   HCT 30.3* 27.7* 26.4*   MCV 84.4 83.2 83.5

## 2022-08-05 NOTE — PROGRESS NOTES
Physical Therapy    Physical Therapy Treatment Note/Plan of Care    Room #:  1349/3280-94  Patient Name: Carley Barkley  YOB: 1933  MRN: [id number]    Date of Service: 8/5/2022     Tentative placement recommendation: Home Health Physical Therapy  or Home  Equipment recommendation: None      Evaluating Physical Therapist: Kajal Beltran, Student Physical Therapist      Specific Provider Orders/Date/Referring Provider :   08/02/22 1230    PT eval and treat  Start:  08/02/22 1230,   End:  08/02/22 1230,   ONE TIME,   Standing Count:  1 Occurrences,   R         Blima Dry, DO     Admitting Diagnosis:   Sepsis ([address] [A41.9]    Admitted with cough, shortness of breath, fever, fatigue, decreased appetite    Surgery: none  Visit Diagnoses         Codes    Pneumonia of left lung due to infectious organism, unspecified part of lung    -  Primary J18.9    Acute respiratory failure with hypoxia ([address]     J96.01    Dehydration     I24.1    Metabolic acidosis     J22.6            Patient Active Problem List   Diagnosis    PUD (peptic ulcer disease)    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 Pt cotto of hearing. Patient needing to be increased to 2L NC during gait took ~2 minutes standing rest break and performing pursed lip breathing to recover to 91%. Frequent rest breaks needed d/t shortness of breath with exertion.  Performed steps with minimal assist cues needed for sequencing, mildly unsteady with one rail (holding onto nikita). Patient requires skilled physical therapy to address concerns listed above to increase safety and independence at discharge. PHYSICAL THERAPY  PLAN OF CARE       Physical therapy plan of care is established based on physician order,  patient diagnosis and clinical assessment    Current Treatment Recommendations:  -Standing Balance: Perform strengthening exercises in standing to promote motor control with or without upper extremity support   -Transfers: Provide instruction on proper hand and foot position for adequate transfer of weight onto lower extremities and use of gait device if needed and Cues for hand placement, technique and safety. Provide stabilization to prevent fall   -Gait: Gait training and Standing activities to improve: base of support, weight shift, weight bearing    -Endurance: Utilize Supervised activities to increase level of endurance to allow for safe functional mobility including transfers and gait   -Stairs: Stair training with instruction on proper technique and hand placement on rail    PT long term treatment goals are located in below grid    Patient and or family understand(s) diagnosis, prognosis, and plan of care. Frequency of treatments: Patient will be seen  3-5 times/week.          Prior Level of Function: Patient ambulated independently   Rehab Potential: good  for baseline    Past medical history:   Past Medical History:   Diagnosis Date    A-fib ([facility]     Diabetes mellitus ([facility]     H pylori ulcer 8/22/2012    Hyperlipidemia     Hypertension     PONV (postoperative nausea and vomiting)     PUD (peptic ulcer disease) 7/26/2012    Urinary frequency      Past Surgical History:   Procedure Laterality Date    APPENDECTOMY      CATARACT EXTRACTION W/  INTRAOCULAR LENS IMPLANT Left 12/17/15    COLONOSCOPY  7/206/2012    CYSTOSCOPY      multiple    CYSTOSCOPY N/A 6/2/2020    CYSTOSCOPY RETROGRADE PYELOGRAM BOTOX  INJECTION (100 UNITS) performed by Peggy Devlin MD at [address] [facility], COLON, DIAGNOSTIC  7/26/2012    peptic ulcer disease    EYE SURGERY Bilateral     cataracts    HERNIA REPAIR  more 10 yrs    left     WA COLONOSCOPY FLX DX W/COLLJ SPEC WHEN PFRMD  7/26/2012         [place] EGD TRANSORAL BIOPSY SINGLE/MULTIPLE  7/26/2012            SUBJECTIVE:    Precautions: Activity as tolerated, falls, alarm, O2, and contact isolation     Social history: Patient lives with daughter in a ranch home  with 3 steps  to enter with [employer] in shower grab bars    Equipment owned: None    [facility]   How much difficulty turning over in bed?: None  How much difficulty sitting down on / standing up from a chair with arms?: A Little  How much difficulty moving from lying on back to sitting on side of bed?: None  How much help from another person moving to and from a bed to a chair?: A Little  How much help from another person needed to walk in hospital room?: A Little  How much help from another person for climbing 3-5 steps with a railing?: A Little  AM-PAC Inpatient Mobility Raw Score : 20  AM-PAC Inpatient T-Scale Score : 47.67  Mobility Inpatient CMS 0-100% Score: 35.83  Mobility Inpatient CMS G-Code Modifier : [facility] cleared patient for PT treatment. OBJECTIVE;   Initial Evaluation  Date: 08/03/2022 Treatment Date:    8/5/2022   Short Term/ Long Term   Goals   Was pt agreeable to Eval/treatment? Yes Yes  To be met in 3 days   Pain level None stated  None stated    Bed Mobility    Rolling: Not assessed patient in chair    Supine to sit: Not assessed patient in chair    Sit to supine: Not assessed patient in chair    Scooting: Not assessed patient in chair   Rolling: Not assessed patient in chair   Supine to sit: Not assessed patient in chair   Sit to supine: Not assessed patient in chair   Scooting: Not assessed patient in chair    Rolling: Independent    Supine to sit:  Independent    Sit to supine: Independent Scooting: Independent     Transfers Sit to stand: Supervision   Sit to stand: Supervision     Sit to stand: Independent    Ambulation     25 feet using  IV pole with Minimal assist of 1   for IV pole, o2 line management, o2 saturation monitoring  Ambulation distance limited to within room distances due to patient's isolation status  2x75 feet using  HHA to ND intermittently with Minimal assist of 1    mildly unsteady  and cues for upright posture, safety, and pursed lip breathing       150 feet using  no device with Supervision     Stair negotiation: ascended and descended   Not assessed   Due to patient's isolation status  4 steps on wooden block ascend/descend with minimal assist one hand rail (ScaleIO)      3 steps with supervision to aid in community ambulation     ROM Within functional limits    Increase range of motion 10% of affected joints    Strength BUE:  refer to OT eval  RLE:  4/5  LLE:  4/5  Increase strength in affected mm groups by 1/3 grade   Balance Sitting EOB:  not assessed patient in chair   Dynamic Standing:  good - Sitting EOB: not assessed   Dynamic Standing: good minus    Sitting EOB:  good   Dynamic Standing: good      Patient is Alert & Oriented x person, place, time, and situation and follows directions    Sensation:  Patient  denies numbness/tingling   Edema:  none noted   Endurance: fair      Vitals:  1.5-2 liters nasal cannula   Blood Pressure at rest  Blood Pressure during session    Heart Rate at rest  Heart Rate during session    SPO2 at rest %  SPO2 during session 87-91% gait      Patient education  Patient educated on role of Physical Therapy, risks of immobility, safety and plan of care,  importance of mobility while in hospital , purse lip breathing, and ankle pumps, quad set and glut set for edema control, blood clot prevention     Patient response to education:   Pt verbalized understanding Pt demonstrated skill Pt requires further education in this area   Yes Partial Yes Treatment:  Patient practiced and was instructed/facilitated in the following treatment: Patient in chair. Stood multiple times doffed underwear and donned pants. Ambulated into hallway and back to room. Performed steps on wooden block. Performed standing exercise. Max cues for pursed lip breathing. Therapeutic Exercises:  marching and hip abduction/adduction   x 8-10 reps. Shortness of breath cues for pursed lip breathing. At end of session, patient in chair with daughter present call light and phone within reach,  all lines and tubes intact, nursing notified. Patient would benefit from continued skilled Physical Therapy to improve functional independence and quality of life.          Patient's/ family goals   home    Time in [address]  Time out  1116    Total Treatment Time  33 minutes    CPT codes:    Therapeutic activities (21524)   24 minutes  1 unit(s)  Therapeutic exercises (21605)   9 minutes  1 unit(s)    Haydee Roa PTA [facility]#[id number]

## 2022-08-05 NOTE — CARE COORDINATION
8/5/2022 0955 CASSANDRA Note:   COVID Negative 8/5/2022. CM met with pt at the bedside for transition of care needs. Pt's plan is to return home with his daughter. He is independent with ADL's and still drives, his daughter does all the cooking. Pt is currently down to 1 liter of O2 and nursing will attempt to wean off. Pt does not have Home O2. Pt is in the process of a 24 hour urine. Pt states feeling better today. Pt's daughter will provide transportation on d/c. CM will follow.   Mami Green RN

## 2022-08-05 NOTE — PLAN OF CARE
Problem: Discharge Planning  Goal: Discharge to home or other facility with appropriate resources  8/5/2022 1353 by Machelle Rock RN  Outcome: Progressing  8/5/2022 0134 by Marcia Henriquez RN  Outcome: Progressing     Problem: Safety - Adult  Goal: Free from fall injury  8/5/2022 1353 by Machelle oRck RN  Outcome: Progressing  8/5/2022 0134 by Marcia Henriquez RN  Outcome: Progressing     Problem: ABCDS Injury Assessment  Goal: Absence of physical injury  8/5/2022 1353 by Machelle Rock RN  Outcome: Progressing  8/5/2022 0134 by Marcia Henriquez RN  Outcome: Progressing

## 2022-08-06 ENCOUNTER — APPOINTMENT (OUTPATIENT)
Dept: GENERAL RADIOLOGY | Age: 87
DRG: 871 | End: 2022-08-06
Payer: MEDICARE

## 2022-08-06 LAB
ANION GAP SERPL CALCULATED.3IONS-SCNC: 14 MMOL/L (ref 7–16)
BASOPHILS ABSOLUTE: 0.02 E9/L (ref 0–0.2)
BASOPHILS RELATIVE PERCENT: 0.2 % (ref 0–2)
BUN BLDV-MCNC: 43 MG/DL (ref 6–23)
CALCIUM SERPL-MCNC: 9.3 MG/DL (ref 8.6–10.2)
CHLORIDE BLD-SCNC: 100 MMOL/L (ref 98–107)
CO2: 18 MMOL/L (ref 22–29)
CREAT SERPL-MCNC: 2.2 MG/DL (ref 0.7–1.2)
EOSINOPHILS ABSOLUTE: 0.02 E9/L (ref 0.05–0.5)
EOSINOPHILS RELATIVE PERCENT: 0.2 % (ref 0–6)
GFR AFRICAN AMERICAN: 34
GFR NON-AFRICAN AMERICAN: 28 ML/MIN/1.73
GLUCOSE BLD-MCNC: 221 MG/DL (ref 74–99)
HCT VFR BLD CALC: 29.7 % (ref 37–54)
HEMOGLOBIN: 9.5 G/DL (ref 12.5–16.5)
IMMATURE GRANULOCYTES #: 0.07 E9/L
IMMATURE GRANULOCYTES %: 0.6 % (ref 0–5)
LYMPHOCYTES ABSOLUTE: 0.89 E9/L (ref 1.5–4)
LYMPHOCYTES RELATIVE PERCENT: 8.1 % (ref 20–42)
MAGNESIUM: 1.8 MG/DL (ref 1.6–2.6)
MCH RBC QN AUTO: 26.8 PG (ref 26–35)
MCHC RBC AUTO-ENTMCNC: 32 % (ref 32–34.5)
MCV RBC AUTO: 83.7 FL (ref 80–99.9)
METER GLUCOSE: 216 MG/DL (ref 74–99)
METER GLUCOSE: 340 MG/DL (ref 74–99)
METER GLUCOSE: 411 MG/DL (ref 74–99)
METER GLUCOSE: 412 MG/DL (ref 74–99)
MONOCYTES ABSOLUTE: 0.78 E9/L (ref 0.1–0.95)
MONOCYTES RELATIVE PERCENT: 7.1 % (ref 2–12)
NEUTROPHILS ABSOLUTE: 9.18 E9/L (ref 1.8–7.3)
NEUTROPHILS RELATIVE PERCENT: 83.8 % (ref 43–80)
PDW BLD-RTO: 16.7 FL (ref 11.5–15)
PHOSPHORUS: 2.5 MG/DL (ref 2.5–4.5)
PLATELET # BLD: 236 E9/L (ref 130–450)
PMV BLD AUTO: 9.3 FL (ref 7–12)
POTASSIUM SERPL-SCNC: 4.5 MMOL/L (ref 3.5–5)
RBC # BLD: 3.55 E12/L (ref 3.8–5.8)
SARS-COV-2, NAAT: NOT DETECTED
SODIUM BLD-SCNC: 132 MMOL/L (ref 132–146)
VITAMIN D 25-HYDROXY: 70 NG/ML (ref 30–100)
WBC # BLD: 11 E9/L (ref 4.5–11.5)

## 2022-08-06 PROCEDURE — 2700000000 HC OXYGEN THERAPY PER DAY

## 2022-08-06 PROCEDURE — 85025 COMPLETE CBC W/AUTO DIFF WBC: CPT

## 2022-08-06 PROCEDURE — 6360000002 HC RX W HCPCS: Performed by: INTERNAL MEDICINE

## 2022-08-06 PROCEDURE — 6370000000 HC RX 637 (ALT 250 FOR IP): Performed by: INTERNAL MEDICINE

## 2022-08-06 PROCEDURE — 71045 X-RAY EXAM CHEST 1 VIEW: CPT

## 2022-08-06 PROCEDURE — 2500000003 HC RX 250 WO HCPCS: Performed by: INTERNAL MEDICINE

## 2022-08-06 PROCEDURE — 36415 COLL VENOUS BLD VENIPUNCTURE: CPT

## 2022-08-06 PROCEDURE — 82306 VITAMIN D 25 HYDROXY: CPT

## 2022-08-06 PROCEDURE — 2060000000 HC ICU INTERMEDIATE R&B

## 2022-08-06 PROCEDURE — 87635 SARS-COV-2 COVID-19 AMP PRB: CPT

## 2022-08-06 PROCEDURE — 94640 AIRWAY INHALATION TREATMENT: CPT

## 2022-08-06 PROCEDURE — 83735 ASSAY OF MAGNESIUM: CPT

## 2022-08-06 PROCEDURE — 84165 PROTEIN E-PHORESIS SERUM: CPT

## 2022-08-06 PROCEDURE — 84100 ASSAY OF PHOSPHORUS: CPT

## 2022-08-06 PROCEDURE — 2580000003 HC RX 258: Performed by: INTERNAL MEDICINE

## 2022-08-06 PROCEDURE — 6370000000 HC RX 637 (ALT 250 FOR IP): Performed by: NURSE PRACTITIONER

## 2022-08-06 PROCEDURE — 80048 BASIC METABOLIC PNL TOTAL CA: CPT

## 2022-08-06 PROCEDURE — 86334 IMMUNOFIX E-PHORESIS SERUM: CPT

## 2022-08-06 PROCEDURE — 6360000002 HC RX W HCPCS: Performed by: NURSE PRACTITIONER

## 2022-08-06 PROCEDURE — 82962 GLUCOSE BLOOD TEST: CPT

## 2022-08-06 RX ORDER — POLYETHYLENE GLYCOL 3350 17 G/17G
17 POWDER, FOR SOLUTION ORAL DAILY
Status: DISCONTINUED | OUTPATIENT
Start: 2022-08-06 | End: 2022-08-09 | Stop reason: HOSPADM

## 2022-08-06 RX ORDER — METHYLPREDNISOLONE SODIUM SUCCINATE 40 MG/ML
40 INJECTION, POWDER, LYOPHILIZED, FOR SOLUTION INTRAMUSCULAR; INTRAVENOUS EVERY 8 HOURS
Status: DISCONTINUED | OUTPATIENT
Start: 2022-08-06 | End: 2022-08-07

## 2022-08-06 RX ORDER — METOPROLOL SUCCINATE 25 MG/1
25 TABLET, EXTENDED RELEASE ORAL NIGHTLY
Status: DISCONTINUED | OUTPATIENT
Start: 2022-08-06 | End: 2022-08-09 | Stop reason: HOSPADM

## 2022-08-06 RX ORDER — METOPROLOL TARTRATE 5 MG/5ML
5 INJECTION INTRAVENOUS EVERY 10 MIN PRN
Status: COMPLETED | OUTPATIENT
Start: 2022-08-06 | End: 2022-08-06

## 2022-08-06 RX ORDER — GUAIFENESIN 600 MG/1
600 TABLET, EXTENDED RELEASE ORAL 2 TIMES DAILY
Status: DISCONTINUED | OUTPATIENT
Start: 2022-08-06 | End: 2022-08-06 | Stop reason: ALTCHOICE

## 2022-08-06 RX ORDER — METHYLPREDNISOLONE SODIUM SUCCINATE 40 MG/ML
40 INJECTION, POWDER, LYOPHILIZED, FOR SOLUTION INTRAMUSCULAR; INTRAVENOUS ONCE
Status: COMPLETED | OUTPATIENT
Start: 2022-08-06 | End: 2022-08-06

## 2022-08-06 RX ORDER — SODIUM CHLORIDE, SODIUM LACTATE, POTASSIUM CHLORIDE, CALCIUM CHLORIDE 600; 310; 30; 20 MG/100ML; MG/100ML; MG/100ML; MG/100ML
INJECTION, SOLUTION INTRAVENOUS CONTINUOUS
Status: DISCONTINUED | OUTPATIENT
Start: 2022-08-06 | End: 2022-08-06

## 2022-08-06 RX ORDER — INSULIN LISPRO 100 [IU]/ML
0-16 INJECTION, SOLUTION INTRAVENOUS; SUBCUTANEOUS
Status: DISCONTINUED | OUTPATIENT
Start: 2022-08-06 | End: 2022-08-07

## 2022-08-06 RX ORDER — INSULIN LISPRO 100 [IU]/ML
4 INJECTION, SOLUTION INTRAVENOUS; SUBCUTANEOUS ONCE
Status: COMPLETED | OUTPATIENT
Start: 2022-08-06 | End: 2022-08-06

## 2022-08-06 RX ORDER — DOCUSATE SODIUM 100 MG/1
100 CAPSULE, LIQUID FILLED ORAL 2 TIMES DAILY
Status: DISCONTINUED | OUTPATIENT
Start: 2022-08-06 | End: 2022-08-09 | Stop reason: HOSPADM

## 2022-08-06 RX ORDER — FUROSEMIDE 10 MG/ML
40 INJECTION INTRAMUSCULAR; INTRAVENOUS ONCE
Status: COMPLETED | OUTPATIENT
Start: 2022-08-06 | End: 2022-08-06

## 2022-08-06 RX ORDER — INSULIN LISPRO 100 [IU]/ML
0-4 INJECTION, SOLUTION INTRAVENOUS; SUBCUTANEOUS NIGHTLY
Status: DISCONTINUED | OUTPATIENT
Start: 2022-08-06 | End: 2022-08-07

## 2022-08-06 RX ORDER — ACETYLCYSTEINE 100 MG/ML
4 SOLUTION ORAL; RESPIRATORY (INHALATION) 2 TIMES DAILY
Status: DISCONTINUED | OUTPATIENT
Start: 2022-08-06 | End: 2022-08-09 | Stop reason: HOSPADM

## 2022-08-06 RX ADMIN — BUDESONIDE 500 MCG: 0.5 INHALANT RESPIRATORY (INHALATION) at 05:28

## 2022-08-06 RX ADMIN — METHYLPREDNISOLONE SODIUM SUCCINATE 40 MG: 40 INJECTION, POWDER, FOR SOLUTION INTRAMUSCULAR; INTRAVENOUS at 14:04

## 2022-08-06 RX ADMIN — INSULIN LISPRO 8 UNITS: 100 INJECTION, SOLUTION INTRAVENOUS; SUBCUTANEOUS at 16:49

## 2022-08-06 RX ADMIN — METHYLPREDNISOLONE SODIUM SUCCINATE 40 MG: 40 INJECTION, POWDER, FOR SOLUTION INTRAMUSCULAR; INTRAVENOUS at 20:42

## 2022-08-06 RX ADMIN — IPRATROPIUM BROMIDE AND ALBUTEROL SULFATE 1 AMPULE: .5; 2.5 SOLUTION RESPIRATORY (INHALATION) at 08:51

## 2022-08-06 RX ADMIN — METOPROLOL TARTRATE 5 MG: 5 INJECTION, SOLUTION INTRAVENOUS at 08:31

## 2022-08-06 RX ADMIN — INSULIN LISPRO 4 UNITS: 100 INJECTION, SOLUTION INTRAVENOUS; SUBCUTANEOUS at 20:54

## 2022-08-06 RX ADMIN — DOXYCYCLINE HYCLATE 100 MG: 100 CAPSULE ORAL at 08:09

## 2022-08-06 RX ADMIN — METOPROLOL SUCCINATE 25 MG: 25 TABLET, EXTENDED RELEASE ORAL at 20:41

## 2022-08-06 RX ADMIN — METOPROLOL TARTRATE 5 MG: 5 INJECTION, SOLUTION INTRAVENOUS at 09:28

## 2022-08-06 RX ADMIN — TAMSULOSIN HYDROCHLORIDE 0.4 MG: 0.4 CAPSULE ORAL at 20:42

## 2022-08-06 RX ADMIN — ALLOPURINOL 100 MG: 100 TABLET ORAL at 11:27

## 2022-08-06 RX ADMIN — IPRATROPIUM BROMIDE AND ALBUTEROL SULFATE 1 AMPULE: .5; 2.5 SOLUTION RESPIRATORY (INHALATION) at 05:28

## 2022-08-06 RX ADMIN — APIXABAN 2.5 MG: 5 TABLET, FILM COATED ORAL at 20:42

## 2022-08-06 RX ADMIN — BUDESONIDE 500 MCG: 0.5 INHALANT RESPIRATORY (INHALATION) at 17:57

## 2022-08-06 RX ADMIN — ACETYLCYSTEINE 400 MG: 100 SOLUTION ORAL; RESPIRATORY (INHALATION) at 17:57

## 2022-08-06 RX ADMIN — IPRATROPIUM BROMIDE AND ALBUTEROL SULFATE 1 AMPULE: .5; 2.5 SOLUTION RESPIRATORY (INHALATION) at 14:48

## 2022-08-06 RX ADMIN — GABAPENTIN 100 MG: 100 CAPSULE ORAL at 20:42

## 2022-08-06 RX ADMIN — DOCUSATE SODIUM 100 MG: 100 CAPSULE, LIQUID FILLED ORAL at 09:27

## 2022-08-06 RX ADMIN — METHYLPREDNISOLONE SODIUM SUCCINATE 40 MG: 40 INJECTION, POWDER, FOR SOLUTION INTRAMUSCULAR; INTRAVENOUS at 06:39

## 2022-08-06 RX ADMIN — FUROSEMIDE 40 MG: 10 INJECTION, SOLUTION INTRAMUSCULAR; INTRAVENOUS at 06:38

## 2022-08-06 RX ADMIN — TAMSULOSIN HYDROCHLORIDE 0.4 MG: 0.4 CAPSULE ORAL at 09:28

## 2022-08-06 RX ADMIN — GABAPENTIN 100 MG: 100 CAPSULE ORAL at 08:08

## 2022-08-06 RX ADMIN — APIXABAN 2.5 MG: 5 TABLET, FILM COATED ORAL at 08:08

## 2022-08-06 RX ADMIN — IPRATROPIUM BROMIDE AND ALBUTEROL SULFATE 1 AMPULE: .5; 2.5 SOLUTION RESPIRATORY (INHALATION) at 17:57

## 2022-08-06 RX ADMIN — ZINC SULFATE 220 MG (50 MG) CAPSULE 50 MG: CAPSULE at 09:41

## 2022-08-06 RX ADMIN — DOCUSATE SODIUM 100 MG: 100 CAPSULE, LIQUID FILLED ORAL at 20:41

## 2022-08-06 RX ADMIN — INSULIN LISPRO 6 UNITS: 100 INJECTION, SOLUTION INTRAVENOUS; SUBCUTANEOUS at 12:36

## 2022-08-06 RX ADMIN — DOXYCYCLINE 100 MG: 100 INJECTION, POWDER, LYOPHILIZED, FOR SOLUTION INTRAVENOUS at 20:47

## 2022-08-06 RX ADMIN — ARFORMOTEROL TARTRATE 15 MCG: 15 SOLUTION RESPIRATORY (INHALATION) at 17:57

## 2022-08-06 RX ADMIN — ARFORMOTEROL TARTRATE 15 MCG: 15 SOLUTION RESPIRATORY (INHALATION) at 05:28

## 2022-08-06 RX ADMIN — AMLODIPINE BESYLATE 5 MG: 5 TABLET ORAL at 08:09

## 2022-08-06 RX ADMIN — INSULIN LISPRO 4 UNITS: 100 INJECTION, SOLUTION INTRAVENOUS; SUBCUTANEOUS at 21:33

## 2022-08-06 RX ADMIN — METOPROLOL SUCCINATE 50 MG: 25 TABLET, EXTENDED RELEASE ORAL at 08:07

## 2022-08-06 RX ADMIN — CEFTRIAXONE SODIUM 1000 MG: 1 INJECTION, POWDER, FOR SOLUTION INTRAMUSCULAR; INTRAVENOUS at 09:40

## 2022-08-06 RX ADMIN — PANTOPRAZOLE SODIUM 40 MG: 40 TABLET, DELAYED RELEASE ORAL at 05:50

## 2022-08-06 NOTE — PROGRESS NOTES
Internal Medicine Progress Note    [facility]    Adela Blumeric. Con Druze., F.A.C.O.I. Jaz Thomas D.O., PABLOOVLADISLAV Jaramillo D.O. Tomi Cheek, MSN, APRN, NP-C  Nikos Durbin. Ansley Mccormack, MSN, APRN-CNP     Primary Care Physician: Luis Fernando Dockery MD   Admitting Physician:  Rom Lara DO  Admission date and time: 8/2/2022  7:29 AM    Room:  [facility]-  Admitting diagnosis: Sepsis [facility]) [A41.9]    Patient Name: Shaggy Garcia  MRN: [id number]    Date of Service: 8/6/2022     Subjective:  Nataly Slade is a 80 y.o. male who was seen and examined today,8/6/2022, at the bedside. Patient did have overnight issues. He developed shortness of breath and wheezing. Since that time patient was given diuretic therapy and breathing treatment. Patient is time examination states he does feel better. He does continue to admit to exertional dyspnea. States resting dyspnea is much improved. Patient mitts to brown sputum production. Occasional cough. Patient states he was having issue with constipation but did have a bowel movement earlier today. The patient's son,  Brent Miner, and daughter, Marisa(EMMANUELLE) are present during my examination. Review of System:   Constitutional:   Denies fever or chills, weight loss or gain, positive for malaise and fatigue - improved  HEENT:   Denies ear pain, sore throat, sinus or eye problems. Cardiovascular:   Denies any chest pain, irregular heartbeats, or palpitations. Respiratory:   See subjective. No hemoptysis. Admits to wheezing. Gastrointestinal:   Denies nausea, vomiting, diarrhea, constipation resolved. Had bowel movement earlier today. .  Denies any abdominal pain. Genitourinary:    Patient does admit to urgency and frequency. Known history of BPH. Extremities:   Denies lower extremity swelling, edema or cyanosis.    Neurology:    Denies any headache or focal neurological deficits, admits to mild generalized weakness and fatigue without focal component. Psch:   Denies being anxious or depressed. Musculoskeletal:    Denies  myalgias, joint complaints or back pain. Integumentary:   Denies any rashes, ulcers, or excoriations. Denies bruising. Erythematous area left lateral back-lower due to recent tick bite  Hematologic/Lymphatic:  Denies bruising or bleeding. Physical Exam:  I/O this shift:  In: 180 [P.O.:180]  Out: 601 [Urine:600; Stool:1]    Intake/Output Summary (Last 24 hours) at 8/6/2022 1509  Last data filed at 8/6/2022 0945  Gross per 24 hour   Intake 1286.16 ml   Output 1901 ml   Net -614.84 ml   I/O last 3 completed shifts: In: 3940.2 [P.O.:440; I.V.:3500.2]  Out: 2475 [Urine:2475]  Patient Vitals for the past 96 hrs (Last 3 readings):   Weight   08/06/22 0001 196 lb 9 oz (89.2 kg)   08/05/22 0013 195 lb 6 oz (88.6 kg)   08/04/22 0027 192 lb 7 oz (87.3 kg)     Vital Signs:   Blood pressure (!) 171/98, pulse 96, temperature 98.7 °F (37.1 °C), temperature source Infrared, resp. rate 24, height 5' 9\" (1.753 m), weight 196 lb 9 oz (89.2 kg), SpO2 92 %. General appearance:  Alert, responsive, oriented to person, place, and time. No acute distress. Head:  Normocephalic. No masses, lesions or tenderness. Eyes:  PERRLA. EOMI. Sclera clear. Impaired vision. ENT:  Ears normal. Mucosa normal.  Impaired hearing. Neck:    Supple. Trachea midline. No thyromegaly. No JVD. No bruits. Heart:    Rhythm regular. Rate controlled. S1 and S2. Systolic murmur. Lungs:    Diminished throughout. Expiratory wheeze and rhonchi noted. Fine bibasilar crackles. Abdomen:   Soft. Non-tender. Non-distended. Bowel sounds positive. No organomegaly or masses. No pain on palpation. Extremities:    Peripheral pulses present. No peripheral edema. No ulcers. No cyanosis. No clubbing. Neurologic:    Alert x 3. No significant pitting focal deficit. Cranial nerves grossly intact. No focal weakness.   Psych:   Behavior is normal. Mood appears normal. Speech is not rapid and/or pressured. Musculoskeletal:   No unilateral joint edema, erythema or warmth. Gait not assessed. Integumentary:  No rashes  Skin normal color and texture.   Genitalia/Breast:  Deferred    Medication:  Scheduled Meds:   polyethylene glycol  17 g Oral Daily    docusate sodium  100 mg Oral BID    methylPREDNISolone  40 mg IntraVENous Q8H    acetylcysteine  4 mL Inhalation BID    metoprolol succinate  25 mg Oral Nightly    [START ON 8/7/2022] cefTRIAXone (ROCEPHIN) IV  2,000 mg IntraVENous Q24H    doxycycline (VIBRAMYCIN) IV  100 mg IntraVENous Q12H    pantoprazole  40 mg Oral QAM AC    epoetin samantha-epbx  3,000 Units SubCUTAneous Once per day on Mon Wed Fri    amLODIPine  5 mg Oral Daily    apixaban  2.5 mg Oral BID    allopurinol  100 mg Oral Daily    gabapentin  100 mg Oral BID    metoprolol succinate  50 mg Oral Daily    tamsulosin  0.4 mg Oral BID    insulin lispro  0-8 Units SubCUTAneous TID WC    insulin lispro  0-4 Units SubCUTAneous Nightly    Arformoterol Tartrate  15 mcg Nebulization BID    ipratropium-albuterol  1 ampule Inhalation Q4H WA    budesonide  500 mcg Nebulization BID    ascorbic acid  1,000 mg Oral Daily    zinc sulfate  50 mg Oral Daily     Continuous Infusions:   dextrose         Objective Data:  CBC with Differential:    Lab Results   Component Value Date/Time    WBC 11.0 08/06/2022 06:31 AM    RBC 3.55 08/06/2022 06:31 AM    HGB 9.5 08/06/2022 06:31 AM    HCT 29.7 08/06/2022 06:31 AM     08/06/2022 06:31 AM    MCV 83.7 08/06/2022 06:31 AM    MCH 26.8 08/06/2022 06:31 AM    MCHC 32.0 08/06/2022 06:31 AM    RDW 16.7 08/06/2022 06:31 AM    LYMPHOPCT 8.1 08/06/2022 06:31 AM    MONOPCT 7.1 08/06/2022 06:31 AM    BASOPCT 0.2 08/06/2022 06:31 AM    MONOSABS 0.78 08/06/2022 06:31 AM    LYMPHSABS 0.89 08/06/2022 06:31 AM    EOSABS 0.02 08/06/2022 06:31 AM    BASOSABS 0.02 08/06/2022 06:31 AM     BMP:    Lab Results   Component Value Date/Time     08/06/2022 06:31 AM    K 4.5 08/06/2022 06:31 AM    K 3.7 08/02/2022 07:38 AM     08/06/2022 06:31 AM    CO2 18 08/06/2022 06:31 AM    BUN 43 08/06/2022 06:31 AM    LABALBU 4.2 08/02/2022 07:38 AM    CREATININE 2.2 08/06/2022 06:31 AM    CALCIUM 9.3 08/06/2022 06:31 AM    GFRAA 34 08/06/2022 06:31 AM    LABGLOM 28 08/06/2022 06:31 AM    GLUCOSE 221 08/06/2022 06:31 AM       Assessment:  Acute respiratory failure with hypoxia secondary to extensive left-sided pneumonia  Atypical left-sided pneumonia with infectious work-up positive for both parainfluenza as well as streptococcal pneumonia  Acute exacerbation of COPD secondary to pneumonia   Acute on chronic renal failure with mild metabolic acidosis  Chronic kidney disease stage 4  Moderate aortic stenosis. Elevated troponin  Paroxysmal atrial fibrillation with variable ventricular response on Eliquis  Hypoglycemia with underlying non-insulin-dependent diabetes mellitus type 2  Tick bite  History of rectosigmoid arteriovenous vascular malformation  Anemia of chronic disease    Plan:    Dyspnea overnight- CXR revealed patchy airspace disease seen within the lung fields bilaterally worsened within the left upper and midlung in the interval.  No definite pleural effusion. Dense opacification now present within the left upper and midlung. Patient was given diuretic therapy and diuresed well. Adjustment was made in the patient's steroid therapy and nebulized breathing treatments. Patient was feeling improved at time of examination. Pulmonology was consulted. Await recommendations. Antibiotics as prescribed. A tick was found attached to the patient's left lateral side and removed this admission- Lyme test pending- Continue doxycycline  Continue antibiotic therapy. Continue nebulized breathing treatments  Eliquis for VTE prophylaxis  Protonix for GI prophylaxis  Cardiology following.   Elevate troponin likely from demand ischemia and chronic kidney disease. nephrology is following and recommendations were reviewed. Vitamin B12 level was found to be 1257, folate greater than 20, Iron level was 16. Patient's daughter present states patient is for iron transfusion next week  Oxygen demand has increased. therapyPatient did desaturate down to approximately 81% on 4 L/min and was therefore titrated up to 6 L/min with adequate oxygen saturation. . Continue supportive oxygen maintain oxygen saturations greater than 90%  Increase activity as tolerated. Discussed with patient that he needs to get out of bed and ambulate more frequently during the daytime. PT/OT to evaluate and treat  Encouraged use of incentive spirometer  Chronic morbidities, laboratory values and vital signs are being monitored and addressed accordingly.  following for discharge planning purposes. Continue current therapy. See orders for further plan of care. Greater than 40 minutes of critical care time was spent with the patient. This includes chart review, , and discussion with those consultants involved in the patient's care. More than 50% of my  time was spent at the bedside counseling/coordinating care with the patient and/or family with face to face contact. This time was spent reviewing notes and laboratory data as well as instructing and counseling the patient. Time I spent with the family or surrogate(s) is included only if the patient was incapable of providing the necessary information or participating in medical decisions. I also discussed the differential diagnosis and all of the proposed management plans with the patient and individuals accompanying the patient. Rafi requires this high level of physician care and nursing on the [facility] unit due the complexity of decision management and chance of rapid decline or death. Continued cardiac monitoring and higher level of nursing are required.  I am readily available for any further decision-making and intervention. The patient was seen, examined and then discussed with Dr. Sha Avelar. Marsha Verdin, KELLY - CNP  8/6/2022  3:09 PM        I saw and evaluated the patient. I agree with the findings and the plan of care as documented in Marsha Verdin NP-C's  note.     Ghulam Gifford DO, D.O., FACOI  3:37 PM  8/6/2022

## 2022-08-06 NOTE — CONSULTS
INPATIENT CONSULTATION RECORD FOR  8/6/2022      [facility]  [address]. [facility] [facility]  [phone]        REASON FOR CONSULTATION:      Incomplete emptying, Elevated PSA history of bladder cancer    HISTORY OF PRESENT ILLNESS:      The patient is a 80 y.o. male patient who presents with Shortness of breath. Pt is well known to Dr. Pita Dooley. He does have a history of bladder cancer. He most recently had Botox in 2020 which did cause urinary retention. He has been voiding well. He denies any gross hematuria. His PSA is elevated at this time but has been stable in the past.   He is due to have another PSA drawn in September. His PVR was 277cc  Denies any discomfort. No fevers or chills. Family is at bedside. Past Medical History:   Diagnosis Date    A-fib ([facility] [address]     Diabetes mellitus ([facility] [address]     H pylori ulcer 8/22/2012    Hyperlipidemia     Hypertension     PONV (postoperative nausea and vomiting)     PUD (peptic ulcer disease) 7/26/2012    Urinary frequency          Past Surgical History:   Procedure Laterality Date    APPENDECTOMY      CATARACT EXTRACTION W/  INTRAOCULAR LENS IMPLANT Left 12/17/15    COLONOSCOPY  7/206/2012    CYSTOSCOPY      multiple    CYSTOSCOPY N/A 6/2/2020    CYSTOSCOPY RETROGRADE PYELOGRAM BOTOX  INJECTION (100 UNITS) performed by Trevin Addison MD at [address] [facility] [place], DIAGNOSTIC  7/26/2012    peptic ulcer disease    EYE SURGERY Bilateral     cataracts    HERNIA REPAIR  more 10 yrs    left     [place] COLONOSCOPY FLX DX W/COLLJ SPEC WHEN PFRMD  7/26/2012         [place] EGD TRANSORAL BIOPSY SINGLE/MULTIPLE  7/26/2012            Medications Prior to Admission:    Medications Prior to Admission: magnesium oxide (MAG-OX) 400 MG tablet, Take 400 mg by mouth in the morning. metoprolol succinate (TOPROL XL) 25 MG extended release tablet, Take 25 mg by mouth in the morning and 25 mg before bedtime.   allopurinol (ZYLOPRIM) 300 MG tablet, Take 300 mg by mouth every other day M-W-F  lisinopril-hydroCHLOROthiazide (PRINZIDE;ZESTORETIC) 20-25 MG per tablet, Take 0.5 tablets by mouth in the morning. repaglinide (PRANDIN) 1 MG tablet, Take 1 mg by mouth in the morning and 1 mg before bedtime. colchicine-probenecid 0.5-500 MG per tablet, Take 1 tablet by mouth 2 times daily  (Patient not taking: Reported on 8/2/2022)  allopurinol (ZYLOPRIM) 100 MG tablet, Take 100 mg by mouth daily  (Patient not taking: No sig reported)  iron polysaccharide complex-B12-folic acid (FERREX-FORTE) 150-0.025-1 MG CAPS capsule, Take 1 capsule by mouth (Patient not taking: Reported on 8/2/2022)  metoprolol succinate (TOPROL XL) 50 MG extended release tablet, TAKE 1 TABLET BY MOUTH ONCE DAILY REPLACES LABETALOL (Patient not taking: No sig reported)  SITagliptin (JANUVIA) 100 MG tablet, Take 50 mg by mouth in the morning and at bedtime  umeclidinium-vilanterol (ANORO ELLIPTA) 62.5-25 MCG/INH AEPB inhaler, Inhale 1 puff into the lungs daily  (Patient not taking: Reported on 8/2/2022)  albuterol sulfate HFA (PROVENTIL HFA) 108 (90 Base) MCG/ACT inhaler, Inhale 2 puffs into the lungs every 6 hours as needed for Wheezing (Patient taking differently: Inhale 1 puff into the lungs as needed for Wheezing)  apixaban (ELIQUIS) 2.5 MG TABS tablet, Take 2.5 mg by mouth 2 times daily  lisinopril-hydroCHLOROthiazide (PRINZIDE;ZESTORETIC) 20-12.5 MG per tablet, Take 1 tablet by mouth daily (Patient not taking: Reported on 8/2/2022)  magnesium gluconate (MAGONATE) 500 MG tablet, Take 500 mg by mouth daily (Patient not taking: Reported on 8/2/2022)  amLODIPine (NORVASC) 5 MG tablet, Take 2.5 mg by mouth in the morning. torsemide (DEMADEX) 10 MG tablet, Take 10 mg by mouth in the morning. M-W-F.  METFORMIN HCL ER PO, Take 1,000 mg by mouth 2 times daily (Patient not taking: Reported on 8/2/2022)  gabapentin (NEURONTIN) 100 MG capsule, Take 100 mg by mouth every other day.  M-W-F  zinc 50 MG CAPS, Take by mouth daily (Patient not taking: Reported on 8/2/2022)  Multiple Vitamins-Minerals (THERAPEUTIC MULTIVITAMIN-MINERALS) tablet, Take 1 tablet by mouth daily (Patient not taking: Reported on 8/2/2022)  Cholecalciferol (VITAMIN D3 PO), Take by mouth daily (Patient not taking: Reported on 8/2/2022)  tamsulosin (FLOMAX) 0.4 MG capsule, Take 0.4 mg by mouth at bedtime  glimepiride (AMARYL) 4 MG tablet, Take 4 mg by mouth in the morning and at bedtime    Allergies:    Patient has no known allergies. Social History:    reports that he quit smoking about 12 years ago. His smoking use included cigarettes. He started smoking about 72 years ago. He has a 60.00 pack-year smoking history. He has never used smokeless tobacco. He reports current alcohol use. He reports that he does not use drugs. Family History:   Non-contributory to this Urological problem  family history is not on file. REVIEW OF SYSTEMS:  Respiratory: negative for cough and hemoptysis  Cardiovascular: negative for chest pain and dyspnea  Gastrointestinal: negative for abdominal pain, diarrhea, nausea and vomiting  Derm: negative for rash and skin lesion(s)  Neurological: negative for seizures and tremors  Endocrine: negative for diabetic symptoms including polydipsia and polyuria  : As above in the HPI, otherwise negative  All other systems negative    PHYSICAL EXAM:    Vitals:  BP (!) 149/80   Pulse (!) 122   Temp 98 °F (36.7 °C)   Resp 26   Ht 5' 9\" (1.753 m)   Wt 196 lb 9 oz (89.2 kg)   SpO2 94%   BMI 29.03 kg/m²     General:  Awake, alert, oriented X 3. Well developed, well nourished, well groomed. No apparent distress. HEENT:  Normocephalic, atraumatic. Pupils equal, round. No scleral icterus. No conjunctival injection. Normal lips, teeth, and gums. No nasal discharge. Neck:  Supple, no masses. Heart:  RRR  Lungs:  No audible wheezing. Respirations symmetric and non-labored.   Abdomen:  soft, nontender, no masses, no organomegaly, no peritoneal signs  Extremities:  No clubbing, cyanosis, or edema  Skin:  Warm and dry, no open lesions or rashes  Neuro:  Cranial nerves 2-12 intact, no focal deficits  Rectal: deferred  Genitalia:  deferred    LABS:    Lab Results   Component Value Date    WBC 11.0 08/06/2022    HGB 9.5 (L) 08/06/2022    HCT 29.7 (L) 08/06/2022    MCV 83.7 08/06/2022     08/06/2022       Lab Results   Component Value Date    CREATININE 2.2 (H) 08/06/2022       Lab Results   Component Value Date    PSA 7.46 (H) 08/05/2022    PSA 2.76 09/13/2021    PSA 2.99 09/15/2020       Lab Results   Component Value Date    LABURIN >100,000 CFU/ml 06/15/2020       Lab Results   Component Value Date    BC 24 Hours no growth 08/02/2022       Lab Results   Component Value Date    BLOODCULT2 24 Hours no growth 08/02/2022       ASSESSMENT:   81 y/o M with history of bladder cancer, incomplete emptying, elevated psa    PLAN:    I would recommend against check PSA again in the hospital.   We will plan to monitor PSA as an outpatient. His SCr is at baseline and will defer to Nephrology. Urine for culture. For his incomplete bladder emptying if he is not uncomfortable I would not place a toure catheter. I reviewed his PSA and Bladder scan with the patient and family. We will hold on any intervention at this time.    He is scheduled to see Dr. Haylie June next month with a PSA prior  If any issues please feel free to call    MD Beverley Vogt MD,   9:09 AM  8/6/2022

## 2022-08-07 LAB
ANION GAP SERPL CALCULATED.3IONS-SCNC: 14 MMOL/L (ref 7–16)
BASOPHILS ABSOLUTE: 0.01 E9/L (ref 0–0.2)
BASOPHILS RELATIVE PERCENT: 0.1 % (ref 0–2)
BLOOD CULTURE, ROUTINE: NORMAL
BUN BLDV-MCNC: 56 MG/DL (ref 6–23)
CALCIUM SERPL-MCNC: 9.7 MG/DL (ref 8.6–10.2)
CHLORIDE BLD-SCNC: 100 MMOL/L (ref 98–107)
CO2: 20 MMOL/L (ref 22–29)
CREAT SERPL-MCNC: 2.2 MG/DL (ref 0.7–1.2)
CULTURE, BLOOD 2: NORMAL
EOSINOPHILS ABSOLUTE: 0 E9/L (ref 0.05–0.5)
EOSINOPHILS RELATIVE PERCENT: 0 % (ref 0–6)
GFR AFRICAN AMERICAN: 34
GFR NON-AFRICAN AMERICAN: 28 ML/MIN/1.73
GLUCOSE BLD-MCNC: 390 MG/DL (ref 74–99)
HCT VFR BLD CALC: 28.6 % (ref 37–54)
HEMOGLOBIN: 9.3 G/DL (ref 12.5–16.5)
IMMATURE GRANULOCYTES #: 0.09 E9/L
IMMATURE GRANULOCYTES %: 1 % (ref 0–5)
LYMPHOCYTES ABSOLUTE: 0.66 E9/L (ref 1.5–4)
LYMPHOCYTES RELATIVE PERCENT: 7.5 % (ref 20–42)
MAGNESIUM: 1.9 MG/DL (ref 1.6–2.6)
MCH RBC QN AUTO: 26.5 PG (ref 26–35)
MCHC RBC AUTO-ENTMCNC: 32.5 % (ref 32–34.5)
MCV RBC AUTO: 81.5 FL (ref 80–99.9)
METER GLUCOSE: 325 MG/DL (ref 74–99)
METER GLUCOSE: 367 MG/DL (ref 74–99)
METER GLUCOSE: 390 MG/DL (ref 74–99)
METER GLUCOSE: 445 MG/DL (ref 74–99)
METER GLUCOSE: 454 MG/DL (ref 74–99)
METER GLUCOSE: >500 MG/DL (ref 74–99)
MONOCYTES ABSOLUTE: 0.3 E9/L (ref 0.1–0.95)
MONOCYTES RELATIVE PERCENT: 3.4 % (ref 2–12)
NEUTROPHILS ABSOLUTE: 7.69 E9/L (ref 1.8–7.3)
NEUTROPHILS RELATIVE PERCENT: 88 % (ref 43–80)
PDW BLD-RTO: 16.5 FL (ref 11.5–15)
PHOSPHORUS: 4 MG/DL (ref 2.5–4.5)
PLATELET # BLD: 262 E9/L (ref 130–450)
PMV BLD AUTO: 9.7 FL (ref 7–12)
POTASSIUM SERPL-SCNC: 4.3 MMOL/L (ref 3.5–5)
RBC # BLD: 3.51 E12/L (ref 3.8–5.8)
SODIUM BLD-SCNC: 134 MMOL/L (ref 132–146)
WBC # BLD: 8.8 E9/L (ref 4.5–11.5)

## 2022-08-07 PROCEDURE — 80048 BASIC METABOLIC PNL TOTAL CA: CPT

## 2022-08-07 PROCEDURE — 94640 AIRWAY INHALATION TREATMENT: CPT

## 2022-08-07 PROCEDURE — 2060000000 HC ICU INTERMEDIATE R&B

## 2022-08-07 PROCEDURE — 6370000000 HC RX 637 (ALT 250 FOR IP): Performed by: INTERNAL MEDICINE

## 2022-08-07 PROCEDURE — 85025 COMPLETE CBC W/AUTO DIFF WBC: CPT

## 2022-08-07 PROCEDURE — 84100 ASSAY OF PHOSPHORUS: CPT

## 2022-08-07 PROCEDURE — 2500000003 HC RX 250 WO HCPCS: Performed by: INTERNAL MEDICINE

## 2022-08-07 PROCEDURE — 83735 ASSAY OF MAGNESIUM: CPT

## 2022-08-07 PROCEDURE — 6360000002 HC RX W HCPCS: Performed by: INTERNAL MEDICINE

## 2022-08-07 PROCEDURE — 2700000000 HC OXYGEN THERAPY PER DAY

## 2022-08-07 PROCEDURE — 82962 GLUCOSE BLOOD TEST: CPT

## 2022-08-07 PROCEDURE — 36415 COLL VENOUS BLD VENIPUNCTURE: CPT

## 2022-08-07 PROCEDURE — 6370000000 HC RX 637 (ALT 250 FOR IP): Performed by: NURSE PRACTITIONER

## 2022-08-07 PROCEDURE — 2580000003 HC RX 258: Performed by: INTERNAL MEDICINE

## 2022-08-07 RX ORDER — DOXYCYCLINE HYCLATE 100 MG/1
100 CAPSULE ORAL EVERY 12 HOURS SCHEDULED
Status: DISCONTINUED | OUTPATIENT
Start: 2022-08-07 | End: 2022-08-09 | Stop reason: HOSPADM

## 2022-08-07 RX ORDER — METHYLPREDNISOLONE SODIUM SUCCINATE 40 MG/ML
40 INJECTION, POWDER, LYOPHILIZED, FOR SOLUTION INTRAMUSCULAR; INTRAVENOUS EVERY 12 HOURS
Status: DISCONTINUED | OUTPATIENT
Start: 2022-08-08 | End: 2022-08-08

## 2022-08-07 RX ADMIN — INSULIN LISPRO 16 UNITS: 100 INJECTION, SOLUTION INTRAVENOUS; SUBCUTANEOUS at 16:38

## 2022-08-07 RX ADMIN — AMLODIPINE BESYLATE 5 MG: 5 TABLET ORAL at 08:13

## 2022-08-07 RX ADMIN — ARFORMOTEROL TARTRATE 15 MCG: 15 SOLUTION RESPIRATORY (INHALATION) at 18:04

## 2022-08-07 RX ADMIN — ALLOPURINOL 100 MG: 100 TABLET ORAL at 08:13

## 2022-08-07 RX ADMIN — APIXABAN 2.5 MG: 5 TABLET, FILM COATED ORAL at 08:13

## 2022-08-07 RX ADMIN — GABAPENTIN 100 MG: 100 CAPSULE ORAL at 21:57

## 2022-08-07 RX ADMIN — ACETYLCYSTEINE 400 MG: 100 SOLUTION ORAL; RESPIRATORY (INHALATION) at 18:04

## 2022-08-07 RX ADMIN — DOCUSATE SODIUM 100 MG: 100 CAPSULE, LIQUID FILLED ORAL at 08:22

## 2022-08-07 RX ADMIN — APIXABAN 2.5 MG: 5 TABLET, FILM COATED ORAL at 21:57

## 2022-08-07 RX ADMIN — INSULIN HUMAN 4 UNITS: 100 INJECTION, SOLUTION PARENTERAL at 22:00

## 2022-08-07 RX ADMIN — METOPROLOL SUCCINATE 50 MG: 25 TABLET, EXTENDED RELEASE ORAL at 08:12

## 2022-08-07 RX ADMIN — DOXYCYCLINE 100 MG: 100 CAPSULE ORAL at 21:56

## 2022-08-07 RX ADMIN — GABAPENTIN 100 MG: 100 CAPSULE ORAL at 08:22

## 2022-08-07 RX ADMIN — TAMSULOSIN HYDROCHLORIDE 0.4 MG: 0.4 CAPSULE ORAL at 08:13

## 2022-08-07 RX ADMIN — IPRATROPIUM BROMIDE AND ALBUTEROL SULFATE 1 AMPULE: .5; 2.5 SOLUTION RESPIRATORY (INHALATION) at 09:47

## 2022-08-07 RX ADMIN — POLYETHYLENE GLYCOL 3350 17 G: 17 POWDER, FOR SOLUTION ORAL at 08:14

## 2022-08-07 RX ADMIN — ARFORMOTEROL TARTRATE 15 MCG: 15 SOLUTION RESPIRATORY (INHALATION) at 06:29

## 2022-08-07 RX ADMIN — IPRATROPIUM BROMIDE AND ALBUTEROL SULFATE 1 AMPULE: .5; 2.5 SOLUTION RESPIRATORY (INHALATION) at 18:04

## 2022-08-07 RX ADMIN — DOXYCYCLINE 100 MG: 100 INJECTION, POWDER, LYOPHILIZED, FOR SOLUTION INTRAVENOUS at 08:25

## 2022-08-07 RX ADMIN — WATER 2000 MG: 1 INJECTION INTRAMUSCULAR; INTRAVENOUS; SUBCUTANEOUS at 08:12

## 2022-08-07 RX ADMIN — DOCUSATE SODIUM 100 MG: 100 CAPSULE, LIQUID FILLED ORAL at 21:56

## 2022-08-07 RX ADMIN — BUDESONIDE 500 MCG: 0.5 INHALANT RESPIRATORY (INHALATION) at 06:29

## 2022-08-07 RX ADMIN — ACETYLCYSTEINE 400 MG: 100 SOLUTION ORAL; RESPIRATORY (INHALATION) at 06:29

## 2022-08-07 RX ADMIN — METHYLPREDNISOLONE SODIUM SUCCINATE 40 MG: 40 INJECTION, POWDER, FOR SOLUTION INTRAMUSCULAR; INTRAVENOUS at 13:42

## 2022-08-07 RX ADMIN — OXYCODONE HYDROCHLORIDE AND ACETAMINOPHEN 1000 MG: 500 TABLET ORAL at 08:12

## 2022-08-07 RX ADMIN — TAMSULOSIN HYDROCHLORIDE 0.4 MG: 0.4 CAPSULE ORAL at 21:56

## 2022-08-07 RX ADMIN — INSULIN LISPRO 16 UNITS: 100 INJECTION, SOLUTION INTRAVENOUS; SUBCUTANEOUS at 08:14

## 2022-08-07 RX ADMIN — INSULIN HUMAN 5 UNITS: 100 INJECTION, SOLUTION PARENTERAL at 13:44

## 2022-08-07 RX ADMIN — METHYLPREDNISOLONE SODIUM SUCCINATE 40 MG: 40 INJECTION, POWDER, FOR SOLUTION INTRAMUSCULAR; INTRAVENOUS at 06:11

## 2022-08-07 RX ADMIN — IPRATROPIUM BROMIDE AND ALBUTEROL SULFATE 1 AMPULE: .5; 2.5 SOLUTION RESPIRATORY (INHALATION) at 06:29

## 2022-08-07 RX ADMIN — PANTOPRAZOLE SODIUM 40 MG: 40 TABLET, DELAYED RELEASE ORAL at 06:11

## 2022-08-07 RX ADMIN — METOPROLOL SUCCINATE 25 MG: 25 TABLET, EXTENDED RELEASE ORAL at 21:57

## 2022-08-07 RX ADMIN — IPRATROPIUM BROMIDE AND ALBUTEROL SULFATE 1 AMPULE: .5; 2.5 SOLUTION RESPIRATORY (INHALATION) at 13:54

## 2022-08-07 RX ADMIN — ZINC SULFATE 220 MG (50 MG) CAPSULE 50 MG: CAPSULE at 08:13

## 2022-08-07 RX ADMIN — INSULIN LISPRO 16 UNITS: 100 INJECTION, SOLUTION INTRAVENOUS; SUBCUTANEOUS at 11:32

## 2022-08-07 RX ADMIN — INSULIN HUMAN 16 UNITS: 100 INJECTION, SOLUTION PARENTERAL at 18:12

## 2022-08-07 RX ADMIN — BUDESONIDE 500 MCG: 0.5 INHALANT RESPIRATORY (INHALATION) at 18:04

## 2022-08-07 NOTE — PROGRESS NOTES
PULMONARY/ CRITICAL CARE MEDICINE FOLLOW UP    HPI  80year old person with PMH of atrial fibrillation, diabetes mellitus, hyperlipidemia, peptic ulcer disease and as described below admitted to hospital for management of acute hypoxemic respiratory failure secondary to pneumococcal and parainfluenza pneumonia. Mr Brent Miner feels a little better today, no cough, no sputum production, minimal dyspnea. He remains hypoxemic and requiring oxygen supplementation with 3L NC. He is receiving ceftriaxone and doxycycline. Feeling better, on room air     BP (!) 149/75   Pulse 90   Temp 97.1 °F (36.2 °C) (Infrared)   Resp 16   Ht 5' 9\" (1.753 m)   Wt 194 lb 6.4 oz (88.2 kg)   SpO2 95%   BMI 28.71 kg/m²   General: Awake,riented to place, time and person  HEENT: No head lesions, PERRL, EOMI, mouth without lesions, no nasal lesions, no cervical adenopathy palpated  Respiratory: Lungs with decreased breath sounds bilaterally, left base with inspiratory squeaks,  no accessory muscle use  CV: Regular rate, no murmurs, no JVD, no leg edema  Abdomen: Soft, non tender, + bowel sounds, no lesions  Skin: Hydrated, adequate turgor, no rash, capillary refill <2 seconds  Extremities: Muscular strength 4/5 in 4 limbs, moves 4 limbs spontaneously, distal pulses present  Neurology: Awake and alert, follows commands, moves 4 limbs on command and spontaneously, neck is supple, no meningitic signs present.       A/P:  1) Acute hypoxemic respiratory failure secondary to pneumococcal and parainfluenza pneumonia  --Oxygen supplementation to maintain sats > 89%  --Antibiotic regimen: Ceftriaxone and doxycycline X 7 days  --Cultures reviewed  --Continue with bronchodilators  --Receiving steroids as per primary team, recommend stopping soon   --DVT prophylaxis - Anticoagulated with apixaban         Rest of supportive care as per primary team     CKD IV  --Being managed by nephrology     Hypertension  --On antihypertensives Hyperlipidemia  --Continue statin      Atrial fibrillation  --Anticoagulated with apixaban    MEDICATIONS:   SITagliptin  100 mg Oral Daily    polyethylene glycol  17 g Oral Daily    docusate sodium  100 mg Oral BID    methylPREDNISolone  40 mg IntraVENous Q8H    acetylcysteine  4 mL Inhalation BID    metoprolol succinate  25 mg Oral Nightly    cefTRIAXone (ROCEPHIN) IV  2,000 mg IntraVENous Q24H    doxycycline (VIBRAMYCIN) IV  100 mg IntraVENous Q12H    insulin lispro  0-16 Units SubCUTAneous TID WC    insulin lispro  0-4 Units SubCUTAneous Nightly    pantoprazole  40 mg Oral QAM AC    epoetin samantha-epbx  3,000 Units SubCUTAneous Once per day on Mon Wed Fri    amLODIPine  5 mg Oral Daily    apixaban  2.5 mg Oral BID    allopurinol  100 mg Oral Daily    gabapentin  100 mg Oral BID    metoprolol succinate  50 mg Oral Daily    tamsulosin  0.4 mg Oral BID    Arformoterol Tartrate  15 mcg Nebulization BID    ipratropium-albuterol  1 ampule Inhalation Q4H WA    budesonide  500 mcg Nebulization BID    ascorbic acid  1,000 mg Oral Daily    zinc sulfate  50 mg Oral Daily      dextrose       glucose, dextrose bolus **OR** dextrose bolus, glucagon (rDNA), dextrose, albuterol, ondansetron    OBJECTIVE:  Vitals:    08/07/22 0800   BP: (!) 149/75   Pulse: 90   Resp: 16   Temp: 97.1 °F (36.2 °C)   SpO2: 95%        O2 Flow Rate (L/min): 3 L/min  O2 Device: None (Room air)        LABS:  WBC   Date Value Ref Range Status   08/07/2022 8.8 4.5 - 11.5 E9/L Final   08/06/2022 11.0 4.5 - 11.5 E9/L Final   08/05/2022 12.1 (H) 4.5 - 11.5 E9/L Final     Hemoglobin   Date Value Ref Range Status   08/07/2022 9.3 (L) 12.5 - 16.5 g/dL Final   08/06/2022 9.5 (L) 12.5 - 16.5 g/dL Final   08/05/2022 8.4 (L) 12.5 - 16.5 g/dL Final     Hematocrit   Date Value Ref Range Status   08/07/2022 28.6 (L) 37.0 - 54.0 % Final   08/06/2022 29.7 (L) 37.0 - 54.0 % Final   08/05/2022 26.4 (L) 37.0 - 54.0 % Final     MCV   Date Value Ref Range Status 08/07/2022 81.5 80.0 - 99.9 fL Final   08/06/2022 83.7 80.0 - 99.9 fL Final   08/05/2022 83.5 80.0 - 99.9 fL Final     Platelets   Date Value Ref Range Status   08/07/2022 262 130 - 450 E9/L Final   08/06/2022 236 130 - 450 E9/L Final   08/05/2022 194 130 - 450 E9/L Final     Sodium   Date Value Ref Range Status   08/07/2022 134 132 - 146 mmol/L Final   08/06/2022 132 132 - 146 mmol/L Final   08/05/2022 132 132 - 146 mmol/L Final     Potassium   Date Value Ref Range Status   08/07/2022 4.3 3.5 - 5.0 mmol/L Final   08/06/2022 4.5 3.5 - 5.0 mmol/L Final   08/05/2022 4.1 3.5 - 5.0 mmol/L Final     Potassium reflex Magnesium   Date Value Ref Range Status   08/02/2022 3.7 3.5 - 5.0 mmol/L Final   05/29/2020 4.0 3.5 - 5.0 mmol/L Final     Chloride   Date Value Ref Range Status   08/07/2022 100 98 - 107 mmol/L Final   08/06/2022 100 98 - 107 mmol/L Final   08/05/2022 100 98 - 107 mmol/L Final     CO2   Date Value Ref Range Status   08/07/2022 20 (L) 22 - 29 mmol/L Final   08/06/2022 18 (L) 22 - 29 mmol/L Final   08/05/2022 21 (L) 22 - 29 mmol/L Final     BUN   Date Value Ref Range Status   08/07/2022 56 (H) 6 - 23 mg/dL Final   08/06/2022 43 (H) 6 - 23 mg/dL Final   08/05/2022 49 (H) 6 - 23 mg/dL Final     Creatinine   Date Value Ref Range Status   08/07/2022 2.2 (H) 0.7 - 1.2 mg/dL Final   08/06/2022 2.2 (H) 0.7 - 1.2 mg/dL Final   08/05/2022 2.4 (H) 0.7 - 1.2 mg/dL Final     Glucose   Date Value Ref Range Status   08/07/2022 390 (H) 74 - 99 mg/dL Final   08/06/2022 221 (H) 74 - 99 mg/dL Final   08/05/2022 163 (H) 74 - 99 mg/dL Final     Calcium   Date Value Ref Range Status   08/07/2022 9.7 8.6 - 10.2 mg/dL Final   08/06/2022 9.3 8.6 - 10.2 mg/dL Final   08/05/2022 8.9 8.6 - 10.2 mg/dL Final     Total Protein   Date Value Ref Range Status   08/02/2022 6.9 6.4 - 8.3 g/dL Final     Albumin   Date Value Ref Range Status   08/02/2022 4.2 3.5 - 5.2 g/dL Final     Total Bilirubin   Date Value Ref Range Status   08/02/2022 0.6 0.0 - 1.2 mg/dL Final     Alkaline Phosphatase   Date Value Ref Range Status   08/02/2022 87 40 - 129 U/L Final     AST   Date Value Ref Range Status   08/02/2022 18 0 - 39 U/L Final     ALT   Date Value Ref Range Status   08/02/2022 22 0 - 40 U/L Final     GFR Non-   Date Value Ref Range Status   08/07/2022 28 >=60 mL/min/1.73 Final     Comment:     Chronic Kidney Disease: less than 60 ml/min/1.73 sq.m. Kidney Failure: less than 15 ml/min/1.73 sq.m. Results valid for patients 18 years and older. 08/06/2022 28 >=60 mL/min/1.73 Final     Comment:     Chronic Kidney Disease: less than 60 ml/min/1.73 sq.m. Kidney Failure: less than 15 ml/min/1.73 sq.m. Results valid for patients 18 years and older. 08/05/2022 26 >=60 mL/min/1.73 Final     Comment:     Chronic Kidney Disease: less than 60 ml/min/1.73 sq.m. Kidney Failure: less than 15 ml/min/1.73 sq.m. Results valid for patients 18 years and older. GFR    Date Value Ref Range Status   08/07/2022 34  Final   08/06/2022 34  Final   08/05/2022 31  Final     Magnesium   Date Value Ref Range Status   08/07/2022 1.9 1.6 - 2.6 mg/dL Final   08/06/2022 1.8 1.6 - 2.6 mg/dL Final   08/03/2022 1.6 1.6 - 2.6 mg/dL Final     Phosphorus   Date Value Ref Range Status   08/07/2022 4.0 2.5 - 4.5 mg/dL Final   08/06/2022 2.5 2.5 - 4.5 mg/dL Final   08/05/2022 2.8 2.5 - 4.5 mg/dL Final     No results for input(s): PH, PO2, PCO2, HCO3, BE, O2SAT in the last 72 hours. RADIOLOGY:  XR CHEST PORTABLE   Final Result   Worsening seen of the airspace disease within the lung fields bilaterally. Dense opacification now present within the left upper and mid lung. US RETROPERITONEAL COMPLETE   Final Result   1. Medical renal disease is suggested, without obstruction   2. Enlarged prostate   3.  Small right renal angiomyolipoma redemonstrated      RECOMMENDATIONS:   Unavailable         XR CHEST PORTABLE   Final Result   Slight improvement of left lung infiltrates. XR CHEST PORTABLE   Final Result   Significant left-sided infiltrates           PROBLEM LIST:  Principal Problem:    Sepsis secondary to CAP [facility])  Active Problems:    Pneumonia of left lung due to infectious organism  Resolved Problems:    * No resolved hospital problems.  *      Georgia Figueroa MD  Pulmonary and Critical Care Medicine

## 2022-08-07 NOTE — PROGRESS NOTES
[facility]  Nephrology Attending Progress Note        SUBJECTIVE:   From the Dr. Luan Dorsey note 8/4: the pt is an 81 yo male who was admitted 8/2/22 with sob and pox of 82. He was brought in by his daughter who he lives with. He was found to have left sided strep pneumonia and parainfluenza. Covid was negative. He also has decreased appetite , chills, and lethargy. He has a pmh of ckd with a baseline cr of  2.6 and he sees dr Elijah Hamlin in the office. On admission his cr was 2.8. yesterday it was 3.1 and today 2.7, hgb 11.3>9.7>9, wbc 17>12.2, plt 186, hco3 24, fena <1. He has been hemodynamically stable. He was started on doxy and rocpehin. He was febrile on 8/2 and 8/3. He was started on ivf today. He also has a pmh of afib on oac, avm, copd, dm, pud, htn, hyperlipidemia. His daughter Jovanny Kowalski is in the room. He is hard of hearing and still has no appetite. 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    Esophagitis-grade 1    AP (angina pectoris) (HCC)    Systolic hypertension    Combined forms of age-related cataract of left eye    Anemia, chronic renal failure, stage 4 (severe) (HCC)    Sepsis secondary to CAP ([address]    Pneumonia of left lung due to infectious organism        PAST MEDICAL HISTORY:    Past Medical History:   Diagnosis Date    A-fib ([address]     Diabetes mellitus ([address]     H pylori ulcer 8/22/2012    Hyperlipidemia     Hypertension     PONV (postoperative nausea and vomiting)     PUD (peptic ulcer disease) 7/26/2012    Urinary frequency        DIET:    ADULT DIET;  Regular; 4 carb choices (60 gm/meal)     PHYSICAL EXAM:     Patient Vitals for the past 24 hrs:   BP Temp Temp src Pulse Resp SpO2 Weight   08/07/22 0800 (!) 149/75 97.1 °F (36.2 °C) Infrared 90 16 95 % --   08/07/22 0600 -- -- -- -- -- -- 194 lb 6.4 oz (88.2 kg)   08/06/22 2315 121/78 96.8 °F (36 °C) Infrared 75 16 99 % --   08/06/22 2030 (!) 124/57 96.9 °F (36.1 °C) Infrared 90 20 98 % --   08/06/22 1545 (!) 146/70 98 °F (36.7 °C) -- 75 21 96 % --   @      Intake/Output Summary (Last 24 hours) at 8/7/2022 1406  Last data filed at 8/7/2022 1345  Gross per 24 hour   Intake 360 ml   Output 2095 ml   Net -1735 ml         Wt Readings from Last 3 Encounters:   08/07/22 194 lb 6.4 oz (88.2 kg)   06/24/21 185 lb 14.4 oz (84.3 kg)   05/27/21 189 lb (85.7 kg)       Constitutional:  Pt is in no acute distress  Head: normocephalic, atraumatic  Neck: no JVD  Cardiovascular: regular rate and rhythm, no murmurs, gallops, or rubs  Respiratory:  No rales, rhochi, or wheezes  Gastrointestinal:  Soft, nontender, nondistended, bowel sounds x 4  Ext: no edema  Skin: dry, no rash  Neuro: aaox3    MEDS (scheduled):     SITagliptin  100 mg Oral Daily    polyethylene glycol  17 g Oral Daily    docusate sodium  100 mg Oral BID    methylPREDNISolone  40 mg IntraVENous Q8H    acetylcysteine  4 mL Inhalation BID    metoprolol succinate  25 mg Oral Nightly    cefTRIAXone (ROCEPHIN) IV  2,000 mg IntraVENous Q24H    doxycycline (VIBRAMYCIN) IV  100 mg IntraVENous Q12H    insulin lispro  0-16 Units SubCUTAneous TID WC    insulin lispro  0-4 Units SubCUTAneous Nightly    pantoprazole  40 mg Oral QAM AC    epoetin samantha-epbx  3,000 Units SubCUTAneous Once per day on Mon Wed Fri    amLODIPine  5 mg Oral Daily    apixaban  2.5 mg Oral BID    allopurinol  100 mg Oral Daily    gabapentin  100 mg Oral BID    metoprolol succinate  50 mg Oral Daily    tamsulosin  0.4 mg Oral BID    Arformoterol Tartrate  15 mcg Nebulization BID    ipratropium-albuterol  1 ampule Inhalation Q4H WA    budesonide  500 mcg Nebulization BID    ascorbic acid  1,000 mg Oral Daily    zinc sulfate  50 mg Oral Daily       MEDS (infusions):   dextrose         MEDS (prn):  glucose, dextrose bolus **OR** dextrose bolus, glucagon (rDNA), dextrose, albuterol, ondansetron    DATA:    Recent Labs     08/05/22  0509 08/06/22 0631 08/07/22  0552   WBC 12.1* 11.0 8.8   HGB 8.4* 9.5* 9.3*   HCT 26.4* 29.7* 28.6*   MCV 83.5 83.7 81.5    236 262     Recent Labs     08/05/22  0509 08/06/22  0631 08/07/22  0552    132 134   K 4.1 4.5 4.3    100 100   CO2 21* 18* 20*   BUN 49* 43* 56*   CREATININE 2.4* 2.2* 2.2*   LABGLOM 26 28 28   GLUCOSE 163* 221* 390*   CALCIUM 8.9 9.3 9.7   MG  --  1.8 1.9   PHOS 2.8 2.5 4.0       Lab Results   Component Value Date    LABALBU 4.2 08/02/2022     No results found for: TSH    Iron Studies  Lab Results   Component Value Date    IRON 16 (L) 08/05/2022    TIBC 182 (L) 08/05/2022    FERRITIN 374 08/05/2022     Vitamin B-12   Date Value Ref Range Status   08/05/2022 1257 (H) 211 - 946 pg/mL Final     Folate   Date Value Ref Range Status   08/05/2022 >20.0 4.8 - 24.2 ng/mL Final       Vit D, 25-Hydroxy   Date Value Ref Range Status   08/06/2022 70 30 - 100 ng/mL Final     Comment:     <20 ng/mL. ........... [employer] [employer] Deficient  20-30 ng/mL. ......... [employer] [employer] Insufficient   ng/mL. ........ [employer] [employer] Sufficient  >100 ng/mL. .......... [employer] [employer] Toxic       PTH   Date Value Ref Range Status   08/05/2022 112 (H) 15 - 65 pg/mL Final       No components found for: URIC    Lab Results   Component Value Date/Time    COLORU Yellow 08/02/2022 11:48 AM    NITRU Negative 08/02/2022 11:48 AM    GLUCOSEU Negative 08/02/2022 11:48 AM    KETUA TRACE 08/02/2022 11:48 AM    UROBILINOGEN 0.2 08/02/2022 11:48 AM    BILIRUBINUR Negative 08/02/2022 11:48 AM       No results found for: LIPIDPAN      IMPRESSION/RECOMMENDATIONS:      Ckd 4  Cr 2.8>3.1>2.7>2.4>2.2  Gfr 27  Baseline 2.6 on 7/22/22  Underlying dm and htn  Pth 112  Vit d p  p/c ratio 0.98 non nephrotic range proteinuria  PLAN:  Follow labs     2.  Htn with CKD I-IV  BP goal <130/80-BP improved with the additional metoprolol succinate 25mg at HS  PLAN:  Follow BP     3.afib  On oac and bb     4. Resp failure  Copd/strep pneumonia/parainfluenza virus  On doxy and rocephin     5. Anemia in CKD   HgB goal 10-12  H/o pud/avms  B12 1257, folate >20  Iron sat 9%, ferritin 374 up from 24 on 6/4/22  PLAN:  Await  spep upep  2. Continue ricky  3. Follow H/H  4. Transfuse for HgB <7  5. Start oral Fe++-once the acute infection resolved would consider IV Fe++     6. Tick bite  On doxy    7.  Sec HPTH of Renal Origin     Vit D 70  PLAN:  Follow Ca++ and PO4      DEINSE Koehler MD

## 2022-08-07 NOTE — PROGRESS NOTES
Internal Medicine Progress Note    [facility]    Marty Sellers. Sweta Camarena, PABLOORadhaIRadha Bautista D.O., PABLOOALIREZA Perez, MSN, APRN, NP-C  Randy Hobson. Doc Leung, MSN, APRN-CNP     Primary Care Physician: Bing Burgos MD   Admitting Physician:  Flavia Melvin DO  Admission date and time: 8/2/2022  7:29 AM    Room:  271/8041-87  Admitting diagnosis: Sepsis [facility]) [A41.9]    Patient Name: Gricelda Badillo  MRN: [id number]    Date of Service: 8/7/2022     Subjective:  Lubna Zaman is a 80 y.o. male who was seen and examined today,8/7/2022, at the bedside. The patient was sitting up in a chair today. States he feels much better compared to yesterday. He has been weaned off of oxygen. States he does have an intermittent cough with no sputum production. Denies any fever or chills. States he ate better today. Denies any shortness of breath. Daughter present. States she was apprised of how well the patient looks today and feels that he is doing much better overall. Review of System:   Constitutional:   Denies fever or chills, weight loss or gain, denies fatigue today. HEENT:   Denies ear pain, sore throat, sinus or eye problems. Cardiovascular:   Denies any chest pain, irregular heartbeats, or palpitations. Respiratory:   Denies shortness of breath. Has been weaned off of oxygen. See subjective no hemoptysis. Gastrointestinal:   Denies nausea, vomiting, diarrhea, constipation resolved. Had bowel movement earlier today. Denies any abdominal pain. Genitourinary:    Patient does admit to urgency and frequency. Known history of BPH. Extremities:   Denies lower extremity swelling, edema or cyanosis. Neurology:    Denies any headache or focal neurological deficits, admits to mild generalized weakness and fatigue without focal component. Psch:   Denies being anxious or depressed.   Musculoskeletal:    Denies myalgias, joint complaints or back pain. Integumentary:   Denies any rashes, ulcers, or excoriations. Denies bruising. Hematologic/Lymphatic:  Denies bruising or bleeding. Physical Exam:  I/O this shift:  In: 180 [P.O.:180]  Out: 250 [Urine:250]    Intake/Output Summary (Last 24 hours) at 8/7/2022 1308  Last data filed at 8/7/2022 1251  Gross per 24 hour   Intake 360 ml   Output 1570 ml   Net -1210 ml   I/O last 3 completed shifts: In: 1266.2 [P.O.:360; I.V.:906.2]  Out: 2821 [Urine:2820; Stool:1]  Patient Vitals for the past 96 hrs (Last 3 readings):   Weight   08/07/22 0600 194 lb 6.4 oz (88.2 kg)   08/06/22 0001 196 lb 9 oz (89.2 kg)   08/05/22 0013 195 lb 6 oz (88.6 kg)     Vital Signs:   Blood pressure (!) 149/75, pulse 90, temperature 97.1 °F (36.2 °C), temperature source Infrared, resp. rate 16, height 5' 9\" (1.753 m), weight 194 lb 6.4 oz (88.2 kg), SpO2 95 %. General appearance:  Alert, responsive, oriented to person, place, and time. No acute distress. Head:  Normocephalic. No masses, lesions or tenderness. Eyes:  PERRLA. EOMI. Sclera clear. Impaired vision. ENT:  Ears normal. Mucosa normal.  Impaired hearing. Neck:    Supple. Trachea midline. No thyromegaly. No JVD. No bruits. Heart:    Rhythm regular. Rate controlled. S1 and S2. Systolic murmur. Lungs:    Diminished throughout. Improved expiratory wheeze. No rhonchi. Decreased rales in the bases. Abdomen:   Soft. Non-tender. Non-distended. Bowel sounds positive. No organomegaly or masses. No pain on palpation. Extremities:    Peripheral pulses present. No peripheral edema. No ulcers. No cyanosis. No clubbing. Neurologic:    Alert x 3. No significant pitting focal deficit. Cranial nerves grossly intact. No focal weakness. Psych:   Behavior is normal. Mood appears normal. Speech is not rapid and/or pressured. Musculoskeletal:   No unilateral joint edema, erythema or warmth. Gait not assessed.   Integumentary:  No rashes Skin normal color and texture. Genitalia/Breast:  Deferred    Medication:  Scheduled Meds:    SITagliptin  100 mg Oral Daily    insulin regular  5 Units SubCUTAneous Once    polyethylene glycol  17 g Oral Daily    docusate sodium  100 mg Oral BID    methylPREDNISolone  40 mg IntraVENous Q8H    acetylcysteine  4 mL Inhalation BID    metoprolol succinate  25 mg Oral Nightly    cefTRIAXone (ROCEPHIN) IV  2,000 mg IntraVENous Q24H    doxycycline (VIBRAMYCIN) IV  100 mg IntraVENous Q12H    insulin lispro  0-16 Units SubCUTAneous TID WC    insulin lispro  0-4 Units SubCUTAneous Nightly    pantoprazole  40 mg Oral QAM AC    epoetin samantha-epbx  3,000 Units SubCUTAneous Once per day on Mon Wed Fri    amLODIPine  5 mg Oral Daily    apixaban  2.5 mg Oral BID    allopurinol  100 mg Oral Daily    gabapentin  100 mg Oral BID    metoprolol succinate  50 mg Oral Daily    tamsulosin  0.4 mg Oral BID    Arformoterol Tartrate  15 mcg Nebulization BID    ipratropium-albuterol  1 ampule Inhalation Q4H WA    budesonide  500 mcg Nebulization BID    ascorbic acid  1,000 mg Oral Daily    zinc sulfate  50 mg Oral Daily     Continuous Infusions:   dextrose         Objective Data:  CBC with Differential:    Lab Results   Component Value Date/Time    WBC 8.8 08/07/2022 05:52 AM    RBC 3.51 08/07/2022 05:52 AM    HGB 9.3 08/07/2022 05:52 AM    HCT 28.6 08/07/2022 05:52 AM     08/07/2022 05:52 AM    MCV 81.5 08/07/2022 05:52 AM    MCH 26.5 08/07/2022 05:52 AM    MCHC 32.5 08/07/2022 05:52 AM    RDW 16.5 08/07/2022 05:52 AM    LYMPHOPCT 7.5 08/07/2022 05:52 AM    MONOPCT 3.4 08/07/2022 05:52 AM    BASOPCT 0.1 08/07/2022 05:52 AM    MONOSABS 0.30 08/07/2022 05:52 AM    LYMPHSABS 0.66 08/07/2022 05:52 AM    EOSABS 0.00 08/07/2022 05:52 AM    BASOSABS 0.01 08/07/2022 05:52 AM     BMP:    Lab Results   Component Value Date/Time     08/07/2022 05:52 AM    K 4.3 08/07/2022 05:52 AM    K 3.7 08/02/2022 07:38 AM     08/07/2022 05:52 AM CO2 20 08/07/2022 05:52 AM    BUN 56 08/07/2022 05:52 AM    LABALBU 4.2 08/02/2022 07:38 AM    CREATININE 2.2 08/07/2022 05:52 AM    CALCIUM 9.7 08/07/2022 05:52 AM    GFRAA 34 08/07/2022 05:52 AM    LABGLOM 28 08/07/2022 05:52 AM    GLUCOSE 390 08/07/2022 05:52 AM       Assessment:  Acute respiratory failure with hypoxia secondary to extensive left-sided pneumonia  Atypical left-sided pneumonia with infectious work-up positive for both parainfluenza as well as streptococcal pneumonia  Acute exacerbation of COPD secondary to pneumonia   Acute on chronic renal failure with mild metabolic acidosis  Chronic kidney disease stage 4  Moderate aortic stenosis. Elevated troponin  Paroxysmal atrial fibrillation with variable ventricular response on Eliquis  Hypoglycemia with underlying non-insulin-dependent diabetes mellitus type 2  Tick bite  History of rectosigmoid arteriovenous vascular malformation  Anemia of chronic disease    Plan:    Patient showed dramatic improvement overnight. He has been weaned off his oxygen therapy. States he feels much better. Continue breathing treatments and steroids as prescribed. Patient's blood glucose levels have been elevated. Patient's daughter present states patient was on Januvia 100 mg at home. We will reinstate this now. Continue to monitor patient's blood glucose levels and treat accordingly. Pulmonology is following. Today's recommendations pending. Antibiotics as prescribed. A tick was found attached to the patient's left lateral side and removed this admission- Lyme test pending- Continue doxycycline  Continue antibiotic therapy. Continue nebulized breathing treatments  Eliquis for VTE prophylaxis  Protonix for GI prophylaxis  Cardiology following. Elevate troponin likely from demand ischemia and chronic kidney disease. nephrology is following and recommendations were reviewed. Renal function improved. Creatinine now 2.2 down from 3.1 .   Increase activity as tolerated. Discussed with patient that he needs to get out of bed and ambulate more frequently during the daytime. PT/OT to evaluate and treat  Encouraged use of incentive spirometer  Chronic morbidities, laboratory values and vital signs are being monitored and addressed accordingly.  following for discharge planning purposes. Continue current therapy. See orders for further plan of care. Greater than 35 minutes of critical care time was spent with the patient. This includes chart review, , and discussion with those consultants involved in the patient's care. More than 50% of my  time was spent at the bedside counseling/coordinating care with the patient and/or family with face to face contact. This time was spent reviewing notes and laboratory data as well as instructing and counseling the patient. Time I spent with the family or surrogate(s) is included only if the patient was incapable of providing the necessary information or participating in medical decisions. I also discussed the differential diagnosis and all of the proposed management plans with the patient and individuals accompanying the patient. Rafi requires this high level of physician care and nursing on the [facility] unit due the complexity of decision management and chance of rapid decline or death. Continued cardiac monitoring and higher level of nursing are required. I am readily available for any further decision-making and intervention. The patient was seen, examined and then discussed with Dr. Raquel Ram. KELLY Hopson CNP  8/7/2022  1:08 PM        I saw and evaluated the patient. I agree with the findings and the plan of care as documented in Sarah CORDOBAC's  note.     Timur Licona DO, D.ORadha, Christiane Roldan  2:44 PM  8/7/2022

## 2022-08-08 ENCOUNTER — APPOINTMENT (OUTPATIENT)
Dept: GENERAL RADIOLOGY | Age: 87
DRG: 871 | End: 2022-08-08
Payer: MEDICARE

## 2022-08-08 LAB
ANION GAP SERPL CALCULATED.3IONS-SCNC: 12 MMOL/L (ref 7–16)
BASOPHILS ABSOLUTE: 0.01 E9/L (ref 0–0.2)
BASOPHILS RELATIVE PERCENT: 0.1 % (ref 0–2)
BUN BLDV-MCNC: 61 MG/DL (ref 6–23)
CALCIUM SERPL-MCNC: 9.7 MG/DL (ref 8.6–10.2)
CHLORIDE BLD-SCNC: 100 MMOL/L (ref 98–107)
CO2: 20 MMOL/L (ref 22–29)
CREAT SERPL-MCNC: 2.2 MG/DL (ref 0.7–1.2)
EOSINOPHILS ABSOLUTE: 0 E9/L (ref 0.05–0.5)
EOSINOPHILS RELATIVE PERCENT: 0 % (ref 0–6)
GFR AFRICAN AMERICAN: 34
GFR NON-AFRICAN AMERICAN: 28 ML/MIN/1.73
GLUCOSE BLD-MCNC: 142 MG/DL (ref 74–99)
HCT VFR BLD CALC: 29.6 % (ref 37–54)
HEMOGLOBIN: 9.6 G/DL (ref 12.5–16.5)
IMMATURE GRANULOCYTES #: 0.33 E9/L
IMMATURE GRANULOCYTES %: 2.5 % (ref 0–5)
LYME, EIA: 0.09 LIV (ref 0–1.2)
LYMPHOCYTES ABSOLUTE: 0.75 E9/L (ref 1.5–4)
LYMPHOCYTES RELATIVE PERCENT: 5.8 % (ref 20–42)
MAGNESIUM: 1.9 MG/DL (ref 1.6–2.6)
MCH RBC QN AUTO: 26.4 PG (ref 26–35)
MCHC RBC AUTO-ENTMCNC: 32.4 % (ref 32–34.5)
MCV RBC AUTO: 81.3 FL (ref 80–99.9)
METER GLUCOSE: 159 MG/DL (ref 74–99)
METER GLUCOSE: 182 MG/DL (ref 74–99)
METER GLUCOSE: 362 MG/DL (ref 74–99)
METER GLUCOSE: 406 MG/DL (ref 74–99)
METER GLUCOSE: 408 MG/DL (ref 74–99)
METER GLUCOSE: 410 MG/DL (ref 74–99)
MONOCYTES ABSOLUTE: 0.6 E9/L (ref 0.1–0.95)
MONOCYTES RELATIVE PERCENT: 4.6 % (ref 2–12)
NEUTROPHILS ABSOLUTE: 11.34 E9/L (ref 1.8–7.3)
NEUTROPHILS RELATIVE PERCENT: 87 % (ref 43–80)
PDW BLD-RTO: 16 FL (ref 11.5–15)
PHOSPHORUS: 4.4 MG/DL (ref 2.5–4.5)
PLATELET # BLD: 350 E9/L (ref 130–450)
PMV BLD AUTO: 9.6 FL (ref 7–12)
POTASSIUM SERPL-SCNC: 3.6 MMOL/L (ref 3.5–5)
RBC # BLD: 3.64 E12/L (ref 3.8–5.8)
SODIUM BLD-SCNC: 132 MMOL/L (ref 132–146)
WBC # BLD: 13 E9/L (ref 4.5–11.5)

## 2022-08-08 PROCEDURE — 2060000000 HC ICU INTERMEDIATE R&B

## 2022-08-08 PROCEDURE — 6370000000 HC RX 637 (ALT 250 FOR IP): Performed by: NURSE PRACTITIONER

## 2022-08-08 PROCEDURE — 2580000003 HC RX 258: Performed by: INTERNAL MEDICINE

## 2022-08-08 PROCEDURE — 71045 X-RAY EXAM CHEST 1 VIEW: CPT

## 2022-08-08 PROCEDURE — 97110 THERAPEUTIC EXERCISES: CPT

## 2022-08-08 PROCEDURE — 6370000000 HC RX 637 (ALT 250 FOR IP): Performed by: INTERNAL MEDICINE

## 2022-08-08 PROCEDURE — 97530 THERAPEUTIC ACTIVITIES: CPT

## 2022-08-08 PROCEDURE — 94640 AIRWAY INHALATION TREATMENT: CPT

## 2022-08-08 PROCEDURE — 6360000002 HC RX W HCPCS: Performed by: INTERNAL MEDICINE

## 2022-08-08 PROCEDURE — 80048 BASIC METABOLIC PNL TOTAL CA: CPT

## 2022-08-08 PROCEDURE — 83735 ASSAY OF MAGNESIUM: CPT

## 2022-08-08 PROCEDURE — 36415 COLL VENOUS BLD VENIPUNCTURE: CPT

## 2022-08-08 PROCEDURE — 85025 COMPLETE CBC W/AUTO DIFF WBC: CPT

## 2022-08-08 PROCEDURE — 82962 GLUCOSE BLOOD TEST: CPT

## 2022-08-08 PROCEDURE — 84100 ASSAY OF PHOSPHORUS: CPT

## 2022-08-08 RX ORDER — METHYLPREDNISOLONE SODIUM SUCCINATE 40 MG/ML
20 INJECTION, POWDER, LYOPHILIZED, FOR SOLUTION INTRAMUSCULAR; INTRAVENOUS EVERY 12 HOURS
Status: DISCONTINUED | OUTPATIENT
Start: 2022-08-09 | End: 2022-08-09 | Stop reason: HOSPADM

## 2022-08-08 RX ADMIN — OXYCODONE HYDROCHLORIDE AND ACETAMINOPHEN 1000 MG: 500 TABLET ORAL at 08:32

## 2022-08-08 RX ADMIN — WATER 2000 MG: 1 INJECTION INTRAMUSCULAR; INTRAVENOUS; SUBCUTANEOUS at 08:36

## 2022-08-08 RX ADMIN — ARFORMOTEROL TARTRATE 15 MCG: 15 SOLUTION RESPIRATORY (INHALATION) at 06:45

## 2022-08-08 RX ADMIN — ALLOPURINOL 100 MG: 100 TABLET ORAL at 08:32

## 2022-08-08 RX ADMIN — PANTOPRAZOLE SODIUM 40 MG: 40 TABLET, DELAYED RELEASE ORAL at 06:04

## 2022-08-08 RX ADMIN — METOPROLOL SUCCINATE 50 MG: 25 TABLET, EXTENDED RELEASE ORAL at 09:10

## 2022-08-08 RX ADMIN — METHYLPREDNISOLONE SODIUM SUCCINATE 40 MG: 40 INJECTION, POWDER, FOR SOLUTION INTRAMUSCULAR; INTRAVENOUS at 02:29

## 2022-08-08 RX ADMIN — BUDESONIDE 500 MCG: 0.5 INHALANT RESPIRATORY (INHALATION) at 06:45

## 2022-08-08 RX ADMIN — DOXYCYCLINE 100 MG: 100 CAPSULE ORAL at 08:33

## 2022-08-08 RX ADMIN — INSULIN HUMAN 10 UNITS: 100 INJECTION, SOLUTION PARENTERAL at 17:50

## 2022-08-08 RX ADMIN — ARFORMOTEROL TARTRATE 15 MCG: 15 SOLUTION RESPIRATORY (INHALATION) at 18:34

## 2022-08-08 RX ADMIN — GABAPENTIN 100 MG: 100 CAPSULE ORAL at 08:32

## 2022-08-08 RX ADMIN — APIXABAN 2.5 MG: 5 TABLET, FILM COATED ORAL at 08:32

## 2022-08-08 RX ADMIN — DOCUSATE SODIUM 100 MG: 100 CAPSULE, LIQUID FILLED ORAL at 19:33

## 2022-08-08 RX ADMIN — ACETYLCYSTEINE 400 MG: 100 SOLUTION ORAL; RESPIRATORY (INHALATION) at 06:46

## 2022-08-08 RX ADMIN — AMLODIPINE BESYLATE 5 MG: 5 TABLET ORAL at 08:32

## 2022-08-08 RX ADMIN — IPRATROPIUM BROMIDE AND ALBUTEROL SULFATE 1 AMPULE: .5; 2.5 SOLUTION RESPIRATORY (INHALATION) at 14:15

## 2022-08-08 RX ADMIN — IPRATROPIUM BROMIDE AND ALBUTEROL SULFATE 1 AMPULE: .5; 2.5 SOLUTION RESPIRATORY (INHALATION) at 10:03

## 2022-08-08 RX ADMIN — INSULIN HUMAN 16 UNITS: 100 INJECTION, SOLUTION PARENTERAL at 11:51

## 2022-08-08 RX ADMIN — TAMSULOSIN HYDROCHLORIDE 0.4 MG: 0.4 CAPSULE ORAL at 08:32

## 2022-08-08 RX ADMIN — METHYLPREDNISOLONE SODIUM SUCCINATE 40 MG: 40 INJECTION, POWDER, FOR SOLUTION INTRAMUSCULAR; INTRAVENOUS at 15:12

## 2022-08-08 RX ADMIN — APIXABAN 2.5 MG: 5 TABLET, FILM COATED ORAL at 19:33

## 2022-08-08 RX ADMIN — BUDESONIDE 500 MCG: 0.5 INHALANT RESPIRATORY (INHALATION) at 18:34

## 2022-08-08 RX ADMIN — IPRATROPIUM BROMIDE AND ALBUTEROL SULFATE 1 AMPULE: .5; 2.5 SOLUTION RESPIRATORY (INHALATION) at 18:34

## 2022-08-08 RX ADMIN — DOXYCYCLINE 100 MG: 100 CAPSULE ORAL at 19:35

## 2022-08-08 RX ADMIN — TAMSULOSIN HYDROCHLORIDE 0.4 MG: 0.4 CAPSULE ORAL at 19:33

## 2022-08-08 RX ADMIN — DOCUSATE SODIUM 100 MG: 100 CAPSULE, LIQUID FILLED ORAL at 08:32

## 2022-08-08 RX ADMIN — ACETYLCYSTEINE 400 MG: 100 SOLUTION ORAL; RESPIRATORY (INHALATION) at 18:34

## 2022-08-08 RX ADMIN — ZINC SULFATE 220 MG (50 MG) CAPSULE 50 MG: CAPSULE at 08:32

## 2022-08-08 RX ADMIN — IPRATROPIUM BROMIDE AND ALBUTEROL SULFATE 1 AMPULE: .5; 2.5 SOLUTION RESPIRATORY (INHALATION) at 06:45

## 2022-08-08 RX ADMIN — GABAPENTIN 100 MG: 100 CAPSULE ORAL at 19:33

## 2022-08-08 RX ADMIN — EPOETIN ALFA-EPBX 3000 UNITS: 3000 INJECTION, SOLUTION INTRAVENOUS; SUBCUTANEOUS at 12:50

## 2022-08-08 RX ADMIN — INSULIN HUMAN 4 UNITS: 100 INJECTION, SOLUTION PARENTERAL at 19:38

## 2022-08-08 RX ADMIN — METOPROLOL SUCCINATE 25 MG: 25 TABLET, EXTENDED RELEASE ORAL at 19:32
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
## 2022-08-08 NOTE — PROGRESS NOTES
Internal Medicine Progress Note    [facility]    Chandana Lino. Osa Lennox., F.ARadhaCRadhaOVLADISLAV Little D.O., PABLOOVLADISLAV Damon D.O. Matthew Mendoza, MSN, APRN, NP-C  Hannah Mansfield. Tito Gandhi, MSN, APRN-CNP     Primary Care Physician: Jg Archer MD   Admitting Physician:  Marian Blanchard DO  Admission date and time: 8/2/2022  7:29 AM    Room:  [address] [place] [address]  Admitting diagnosis: Sepsis [facility]) [A41.9]    Patient Name: Tonya Cyr  MRN: [id number]    Date of Service: 8/8/2022     Subjective:  Nevaeh Pan is a 80 y.o. male who was seen and examined today,8/8/2022, at the bedside. Standing at the bedside upon entering the room. Observed ambulating an d is a little unsteady on his feet. Denies resting and exertional dyspnea. Appetite is good. No family present. Review of System:   Constitutional:   Denies fever or chills, weight loss or gain, denies fatigue today. HEENT:   Denies ear pain, sore throat, sinus or eye problems. Cardiovascular:   Denies any chest pain, irregular heartbeats, or palpitations. Respiratory:   Denies shortness of breath. See subjective. No hemoptysis. Gastrointestinal:   Denies nausea, vomiting, diarrhea, constipation resolved. Denies any abdominal pain. Genitourinary:    Patient does admit to urgency and frequency. Known history of BPH. Extremities:   Denies lower extremity swelling, edema or cyanosis. Neurology:    Denies any headache or focal neurological deficits, admits to mild generalized weakness and fatigue without focal component. Psch:   Denies being anxious or depressed. Musculoskeletal:    Denies  myalgias, joint complaints or back pain. Integumentary:   Denies any rashes, ulcers, or excoriations. Denies bruising. Hematologic/Lymphatic:  Denies bruising or bleeding. Physical Exam:  No intake/output data recorded.     Intake/Output Summary (Last 24 hours) at 8/8/2022 1034  Last data filed at 8/7/2022 2058  Gross per 24 hour   Intake 420 ml   Output 525 ml   Net -105 ml   I/O last 3 completed shifts: In: 600 [P.O.:600]  Out: 2095 [Urine:2095]  Patient Vitals for the past 96 hrs (Last 3 readings):   Weight   08/08/22 0057 193 lb (87.5 kg)   08/07/22 0600 194 lb 6.4 oz (88.2 kg)   08/06/22 0001 196 lb 9 oz (89.2 kg)     Vital Signs:   Blood pressure 129/77, pulse 77, temperature 98 °F (36.7 °C), temperature source Infrared, resp. rate 18, height 5' 9\" (1.753 m), weight 193 lb (87.5 kg), SpO2 95 %. General appearance:  Alert, responsive, oriented to person, place, and time. No acute distress. Head:  Normocephalic. No masses, lesions or tenderness. Eyes:  PERRLA. EOMI. Sclera clear. Impaired vision. ENT:  Ears normal. Mucosa normal.  Impaired hearing. Neck:    Supple. Trachea midline. No thyromegaly. No JVD. No bruits. Heart:    Rhythm regular. Rate controlled. S1 and S2. Systolic murmur. Lungs:    Diminished throughout. Bibasilar rales, expiratory wheezing. Abdomen:   Soft. Non-tender. Non-distended. Bowel sounds positive. No organomegaly or masses. No pain on palpation. Extremities:    Peripheral pulses present. No peripheral edema. No ulcers. No cyanosis. No clubbing. Neurologic:    Alert x 3. No significant pitting focal deficit. Cranial nerves grossly intact. No focal weakness. Psych:   Behavior is normal. Mood appears normal. Speech is not rapid and/or pressured. Musculoskeletal:   No unilateral joint edema, erythema or warmth. Unsteady gait. Integumentary:  No rashes  Skin normal color and texture. Genitalia/Breast:  Deferred    Medication:  Scheduled Meds:    SITagliptin  100 mg Oral Daily    methylPREDNISolone  40 mg IntraVENous Q12H    doxycycline hyclate  100 mg Oral 2 times per day    insulin regular  0-4 Units SubCUTAneous Nightly    insulin regular  0-16 Units SubCUTAneous TID WC    polyethylene glycol  17 g Oral Daily    docusate sodium  100 mg Oral BID acetylcysteine  4 mL Inhalation BID    metoprolol succinate  25 mg Oral Nightly    cefTRIAXone (ROCEPHIN) IV  2,000 mg IntraVENous Q24H    pantoprazole  40 mg Oral QAM AC    epoetin samantha-epbx  3,000 Units SubCUTAneous Once per day on Mon Wed Fri    amLODIPine  5 mg Oral Daily    apixaban  2.5 mg Oral BID    allopurinol  100 mg Oral Daily    gabapentin  100 mg Oral BID    metoprolol succinate  50 mg Oral Daily    tamsulosin  0.4 mg Oral BID    Arformoterol Tartrate  15 mcg Nebulization BID    ipratropium-albuterol  1 ampule Inhalation Q4H WA    budesonide  500 mcg Nebulization BID    ascorbic acid  1,000 mg Oral Daily    zinc sulfate  50 mg Oral Daily     Continuous Infusions:   dextrose         Objective Data:  CBC with Differential:    Lab Results   Component Value Date/Time    WBC 13.0 08/08/2022 04:43 AM    RBC 3.64 08/08/2022 04:43 AM    HGB 9.6 08/08/2022 04:43 AM    HCT 29.6 08/08/2022 04:43 AM     08/08/2022 04:43 AM    MCV 81.3 08/08/2022 04:43 AM    MCH 26.4 08/08/2022 04:43 AM    MCHC 32.4 08/08/2022 04:43 AM    RDW 16.0 08/08/2022 04:43 AM    LYMPHOPCT 5.8 08/08/2022 04:43 AM    MONOPCT 4.6 08/08/2022 04:43 AM    BASOPCT 0.1 08/08/2022 04:43 AM    MONOSABS 0.60 08/08/2022 04:43 AM    LYMPHSABS 0.75 08/08/2022 04:43 AM    EOSABS 0.00 08/08/2022 04:43 AM    BASOSABS 0.01 08/08/2022 04:43 AM     BMP:    Lab Results   Component Value Date/Time     08/08/2022 04:43 AM    K 3.6 08/08/2022 04:43 AM    K 3.7 08/02/2022 07:38 AM     08/08/2022 04:43 AM    CO2 20 08/08/2022 04:43 AM    BUN 61 08/08/2022 04:43 AM    LABALBU 4.2 08/02/2022 07:38 AM    CREATININE 2.2 08/08/2022 04:43 AM    CALCIUM 9.7 08/08/2022 04:43 AM    GFRAA 34 08/08/2022 04:43 AM    LABGLOM 28 08/08/2022 04:43 AM    GLUCOSE 142 08/08/2022 04:43 AM       Assessment:  Acute respiratory failure with hypoxia secondary to extensive left-sided pneumonia  Atypical left-sided pneumonia with infectious work-up positive for both parainfluenza as well as streptococcal pneumonia  Acute exacerbation of COPD secondary to pneumonia   Acute on chronic renal failure with mild metabolic acidosis  Chronic kidney disease stage 4  Moderate aortic stenosis. Elevated troponin  Paroxysmal atrial fibrillation with variable ventricular response on Eliquis  Hypoglycemia with underlying non-insulin-dependent diabetes mellitus type 2  Tick bite  History of rectosigmoid arteriovenous vascular malformation  Anemia of chronic disease    Plan:  Called daughter Ying Velez and discussed current patient status and results. Patient continues to improve. No respiratory complaint. Continue breathing treatments and steroids as prescribed. Patient's blood glucose levels improved after adjustment in treatment regimen including decreasing frequency of steroid administration. Continue to monitor patient's blood glucose levels and treat accordingly. Pulmonology recommendations reviewed  Antibiotics as prescribed. A tick was found attached to the patient's left lateral side and removed this admission- Lyme test pending- Continue doxycycline  Continue nebulized breathing treatments  Eliquis for VTE prophylaxis  Protonix for GI prophylaxis  Cardiology following. Elevate troponin likely from demand ischemia and chronic kidney disease. nephrology is following and recommendations were reviewed. Renal function improved. Creatinine now 2.2 (stable)down from 3.1 . Increase activity as tolerated. Discussed with patient that he needs to get out of bed and ambulate more frequently during the daytime. PT/OT to evaluate and treat  Encouraged use of incentive spirometer  Chronic morbidities, laboratory values and vital signs are being monitored and addressed accordingly.  following for discharge planning purposes. Continue current therapy. See orders for further plan of care. Chest x-ray revealed improvement. Tentative discharge in 24 to 48 hours. More than 50% of my  time was spent at the bedside counseling/coordinating care with the patient and/or family with face to face contact. This time was spent reviewing notes and laboratory data as well as instructing and counseling the patient. Time I spent with the family or surrogate(s) is included only if the patient was incapable of providing the necessary information or participating in medical decisions. I also discussed the differential diagnosis and all of the proposed management plans with the patient and individuals accompanying the patientRadha Mckee requires this high level of physician care and nursing on the [facility] unit due the complexity of decision management and chance of rapid decline or death. Continued cardiac monitoring and higher level of nursing are required. I am readily available for any further decision-making and intervention. The patient was seen, examined and then discussed with Dr. Genaro Joya. Lore Kayser, APRN - CNP  8/8/2022  10:34 AM        I saw and evaluated the patient. I agree with the findings and the plan of care as documented in Lore Kayser NP-C's  note.     Justin Araiza DO, D.O., [facility]  10:53 AM  8/8/2022

## 2022-08-08 NOTE — CARE COORDINATION
8-8-Cm note: ( Covid angie 8-5) met with pt this morning , he is sitting up in the chair, on room air, pt plans on going home with his dtr who he lives with and she cares for him, pt on room air now, PT/OT working with him. Pt denies any needs for homegoing, dtr will provide transport home.  Electronically signed by Wesley Iqbal RN on 8/8/2022 at 11:44 AM

## 2022-08-08 NOTE — PROGRESS NOTES
scan in 4-6 weeks to ensure resolution of the infiltrates    Due to clinical improvement, I will sign off for the moment. Please call intensivist on call if the patient's condition were to worsen.    Thank you for allowing us to participate in the care of Mr Vignesh Solomon of supportive care as per primary team     CKD IV  --Being managed by nephrology     Hypertension  --On antihypertensives     Hyperlipidemia  --Continue statin      Atrial fibrillation  --Anticoagulated with apixaban    MEDICATIONS:   SITagliptin  100 mg Oral Daily    methylPREDNISolone  40 mg IntraVENous Q12H    doxycycline hyclate  100 mg Oral 2 times per day    insulin regular  0-4 Units SubCUTAneous Nightly    insulin regular  0-16 Units SubCUTAneous TID WC    polyethylene glycol  17 g Oral Daily    docusate sodium  100 mg Oral BID    acetylcysteine  4 mL Inhalation BID    metoprolol succinate  25 mg Oral Nightly    cefTRIAXone (ROCEPHIN) IV  2,000 mg IntraVENous Q24H    pantoprazole  40 mg Oral QAM AC    epoetin samantha-epbx  3,000 Units SubCUTAneous Once per day on Mon Wed Fri    amLODIPine  5 mg Oral Daily    apixaban  2.5 mg Oral BID    allopurinol  100 mg Oral Daily    gabapentin  100 mg Oral BID    metoprolol succinate  50 mg Oral Daily    tamsulosin  0.4 mg Oral BID    Arformoterol Tartrate  15 mcg Nebulization BID    ipratropium-albuterol  1 ampule Inhalation Q4H WA    budesonide  500 mcg Nebulization BID    ascorbic acid  1,000 mg Oral Daily    zinc sulfate  50 mg Oral Daily      dextrose       glucose, dextrose bolus **OR** dextrose bolus, glucagon (rDNA), dextrose, albuterol, ondansetron    OBJECTIVE:  Vitals:    08/08/22 1428   BP: 130/67   Pulse: 77   Resp: 16   Temp: 97.1 °F (36.2 °C)   SpO2: 100%        O2 Flow Rate (L/min): 0 L/min  O2 Device: None (Room air)        LABS:  WBC   Date Value Ref Range Status   08/08/2022 13.0 (H) 4.5 - 11.5 E9/L Final   08/07/2022 8.8 4.5 - 11.5 E9/L Final   08/06/2022 11.0 4.5 - 11.5 E9/L Final     Hemoglobin   Date Value Ref Range Status   08/08/2022 9.6 (L) 12.5 - 16.5 g/dL Final   08/07/2022 9.3 (L) 12.5 - 16.5 g/dL Final   08/06/2022 9.5 (L) 12.5 - 16.5 g/dL Final     Hematocrit   Date Value Ref Range Status   08/08/2022 29.6 (L) 37.0 - 54.0 % Final   08/07/2022 28.6 (L) 37.0 - 54.0 % Final   08/06/2022 29.7 (L) 37.0 - 54.0 % Final     MCV   Date Value Ref Range Status   08/08/2022 81.3 80.0 - 99.9 fL Final   08/07/2022 81.5 80.0 - 99.9 fL Final   08/06/2022 83.7 80.0 - 99.9 fL Final     Platelets   Date Value Ref Range Status   08/08/2022 350 130 - 450 E9/L Final   08/07/2022 262 130 - 450 E9/L Final   08/06/2022 236 130 - 450 E9/L Final     Sodium   Date Value Ref Range Status   08/08/2022 132 132 - 146 mmol/L Final   08/07/2022 134 132 - 146 mmol/L Final   08/06/2022 132 132 - 146 mmol/L Final     Potassium   Date Value Ref Range Status   08/08/2022 3.6 3.5 - 5.0 mmol/L Final   08/07/2022 4.3 3.5 - 5.0 mmol/L Final   08/06/2022 4.5 3.5 - 5.0 mmol/L Final     Potassium reflex Magnesium   Date Value Ref Range Status   08/02/2022 3.7 3.5 - 5.0 mmol/L Final   05/29/2020 4.0 3.5 - 5.0 mmol/L Final     Chloride   Date Value Ref Range Status   08/08/2022 100 98 - 107 mmol/L Final   08/07/2022 100 98 - 107 mmol/L Final   08/06/2022 100 98 - 107 mmol/L Final     CO2   Date Value Ref Range Status   08/08/2022 20 (L) 22 - 29 mmol/L Final   08/07/2022 20 (L) 22 - 29 mmol/L Final   08/06/2022 18 (L) 22 - 29 mmol/L Final     BUN   Date Value Ref Range Status   08/08/2022 61 (H) 6 - 23 mg/dL Final   08/07/2022 56 (H) 6 - 23 mg/dL Final   08/06/2022 43 (H) 6 - 23 mg/dL Final     Creatinine   Date Value Ref Range Status   08/08/2022 2.2 (H) 0.7 - 1.2 mg/dL Final   08/07/2022 2.2 (H) 0.7 - 1.2 mg/dL Final   08/06/2022 2.2 (H) 0.7 - 1.2 mg/dL Final     Glucose   Date Value Ref Range Status   08/08/2022 142 (H) 74 - 99 mg/dL Final   08/07/2022 390 (H) 74 - 99 mg/dL Final   08/06/2022 221 (H) 74 - 99 mg/dL Final Calcium   Date Value Ref Range Status   08/08/2022 9.7 8.6 - 10.2 mg/dL Final   08/07/2022 9.7 8.6 - 10.2 mg/dL Final   08/06/2022 9.3 8.6 - 10.2 mg/dL Final     Total Protein   Date Value Ref Range Status   08/02/2022 6.9 6.4 - 8.3 g/dL Final     Albumin   Date Value Ref Range Status   08/02/2022 4.2 3.5 - 5.2 g/dL Final     Total Bilirubin   Date Value Ref Range Status   08/02/2022 0.6 0.0 - 1.2 mg/dL Final     Alkaline Phosphatase   Date Value Ref Range Status   08/02/2022 87 40 - 129 U/L Final     AST   Date Value Ref Range Status   08/02/2022 18 0 - 39 U/L Final     ALT   Date Value Ref Range Status   08/02/2022 22 0 - 40 U/L Final     GFR Non-   Date Value Ref Range Status   08/08/2022 28 >=60 mL/min/1.73 Final     Comment:     Chronic Kidney Disease: less than 60 ml/min/1.73 sq.m. Kidney Failure: less than 15 ml/min/1.73 sq.m. Results valid for patients 18 years and older. 08/07/2022 28 >=60 mL/min/1.73 Final     Comment:     Chronic Kidney Disease: less than 60 ml/min/1.73 sq.m. Kidney Failure: less than 15 ml/min/1.73 sq.m. Results valid for patients 18 years and older. 08/06/2022 28 >=60 mL/min/1.73 Final     Comment:     Chronic Kidney Disease: less than 60 ml/min/1.73 sq.m. Kidney Failure: less than 15 ml/min/1.73 sq.m. Results valid for patients 18 years and older. GFR    Date Value Ref Range Status   08/08/2022 34  Final   08/07/2022 34  Final   08/06/2022 34  Final     Magnesium   Date Value Ref Range Status   08/08/2022 1.9 1.6 - 2.6 mg/dL Final   08/07/2022 1.9 1.6 - 2.6 mg/dL Final   08/06/2022 1.8 1.6 - 2.6 mg/dL Final     Phosphorus   Date Value Ref Range Status   08/08/2022 4.4 2.5 - 4.5 mg/dL Final   08/07/2022 4.0 2.5 - 4.5 mg/dL Final   08/06/2022 2.5 2.5 - 4.5 mg/dL Final     No results for input(s): PH, PO2, PCO2, HCO3, BE, O2SAT in the last 72 hours.     RADIOLOGY:  XR CHEST PORTABLE   Final Result   Bilateral interstitial predominant pulmonary opacifications have improved   from prior of improving airspace disease or edema without pleural effusion. XR CHEST PORTABLE   Final Result   Worsening seen of the airspace disease within the lung fields bilaterally. Dense opacification now present within the left upper and mid lung. US RETROPERITONEAL COMPLETE   Final Result   1. Medical renal disease is suggested, without obstruction   2. Enlarged prostate   3. Small right renal angiomyolipoma redemonstrated      RECOMMENDATIONS:   Unavailable         XR CHEST PORTABLE   Final Result   Slight improvement of left lung infiltrates. XR CHEST PORTABLE   Final Result   Significant left-sided infiltrates           PROBLEM LIST:  Principal Problem:    Sepsis secondary to CAP [facility])  Active Problems:    Pneumonia of left lung due to infectious organism  Resolved Problems:    * No resolved hospital problems.  *      Rhiannon Grullon MD  Pulmonary and Critical Care Medicine

## 2022-08-08 NOTE — PROGRESS NOTES
Physical Therapy    Physical Therapy Treatment Note/Plan of Care    Room #:  4066/0808-31  Patient Name: Paulo De León  YOB: 1933  MRN: [id number]    Date of Service: 8/8/2022     Tentative placement recommendation: Home Health Physical Therapy  or Home  Equipment recommendation: None      Evaluating Physical Therapist: Nicole Alonso, Student Physical Therapist      Specific Provider Orders/Date/Referring Provider :   08/02/22 1230    PT eval and treat  Start:  08/02/22 1230,   End:  08/02/22 1230,   ONE TIME,   Standing Count:  1 Occurrences,   R         Trell Quails, DO     Admitting Diagnosis:   Sepsis ([address] [A41.9]    Admitted with cough, shortness of breath, fever, fatigue, decreased appetite    Surgery: none  Visit Diagnoses         Codes    Pneumonia of left lung due to infectious organism, unspecified part of lung    -  Primary J18.9    Acute respiratory failure with hypoxia ([address]     J96.01    Dehydration     P66.0    Metabolic acidosis     N95.2            Patient Active Problem List   Diagnosis    PUD (peptic ulcer disease)    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 Patient on room air this session. Patient able to maintain spo2 above 90% throughout session; with slight shortness of breath noted during gait. Patient is mildly unsteady during gait and would benefit with a use of a cane for increased stability, patient ambulated with HHA.  Patient requires skilled physical therapy to address concerns listed above to increase safety and independence at discharge. PHYSICAL THERAPY  PLAN OF CARE       Physical therapy plan of care is established based on physician order,  patient diagnosis and clinical assessment    Current Treatment Recommendations:  -Standing Balance: Perform strengthening exercises in standing to promote motor control with or without upper extremity support   -Transfers: Provide instruction on proper hand and foot position for adequate transfer of weight onto lower extremities and use of gait device if needed and Cues for hand placement, technique and safety. Provide stabilization to prevent fall   -Gait: Gait training and Standing activities to improve: base of support, weight shift, weight bearing    -Endurance: Utilize Supervised activities to increase level of endurance to allow for safe functional mobility including transfers and gait   -Stairs: Stair training with instruction on proper technique and hand placement on rail    PT long term treatment goals are located in below grid    Patient and or family understand(s) diagnosis, prognosis, and plan of care. Frequency of treatments: Patient will be seen  3-5 times/week.          Prior Level of Function: Patient ambulated independently   Rehab Potential: good  for baseline    Past medical history:   Past Medical History:   Diagnosis Date    A-fib ([facility]     Diabetes mellitus ([facility]     H pylori ulcer 8/22/2012    Hyperlipidemia     Hypertension     PONV (postoperative nausea and vomiting)     PUD (peptic ulcer disease) 7/26/2012    Urinary frequency      Past Surgical History:   Procedure Laterality Date    APPENDECTOMY      CATARACT EXTRACTION W/  INTRAOCULAR LENS IMPLANT Left 12/17/15    COLONOSCOPY  7/206/2012    CYSTOSCOPY      multiple    CYSTOSCOPY N/A 6/2/2020    CYSTOSCOPY RETROGRADE PYELOGRAM BOTOX  INJECTION (100 UNITS) performed by Phani Tenorio MD at [address] [facility], [place], DIAGNOSTIC  7/26/2012    peptic ulcer disease    EYE SURGERY Bilateral     cataracts    HERNIA REPAIR  more 10 yrs    left     DC COLONOSCOPY FLX DX W/COLLJ SPEC WHEN PFRMD  7/26/2012         DC EGD TRANSORAL BIOPSY SINGLE/MULTIPLE  7/26/2012            SUBJECTIVE:    Precautions: Activity as tolerated, falls, alarm, O2, and contact isolation     Social history: Patient lives with daughter in a ranch home  with 3 steps  to enter with [employer] in shower grab bars    Equipment owned: None    [address]   How much difficulty turning over in bed?: None  How much difficulty sitting down on / standing up from a chair with arms?: A Little  How much difficulty moving from lying on back to sitting on side of bed?: None  How much help from another person moving to and from a bed to a chair?: A Little  How much help from another person needed to walk in hospital room?: A Little  How much help from another person for climbing 3-5 steps with a railing?: A Little  AM-PAC Inpatient Mobility Raw Score : 20  AM-PAC Inpatient T-Scale Score : 47.67  Mobility Inpatient CMS 0-100% Score: 35.83  Mobility Inpatient CMS G-Code Modifier : 8011 CoScale cleared patient for PT treatment. OBJECTIVE;   Initial Evaluation  Date: 08/03/2022 Treatment Date:    8/8/2022   Short Term/ Long Term   Goals   Was pt agreeable to Eval/treatment? Yes Yes  To be met in 3 days   Pain level None stated  None stated    Bed Mobility    Rolling: Not assessed patient in chair    Supine to sit: Not assessed patient in chair    Sit to supine: Not assessed patient in chair    Scooting: Not assessed patient in chair   Rolling: Supervision    Supine to sit: Supervision    Sit to supine: Not assessed patient in chair   Scooting: Supervision     Rolling: Independent    Supine to sit:  Independent    Sit to supine: Independent    Scooting: Independent     Transfers Sit to stand: Supervision   Sit to stand: Supervision     Sit to stand: Independent Ambulation     25 feet using  IV pole with Minimal assist of 1   for IV pole, o2 line management, o2 saturation monitoring  Ambulation distance limited to within room distances due to patient's isolation status  2x75 feet using  HHA  intermittently with Minimal assist of 1    mildly unsteady  and cues for upright posture, safety, and pursed lip breathing       150 feet using  no device with Supervision     Stair negotiation: ascended and descended   Not assessed   Due to patient's isolation status  not assessed       3 steps with supervision to aid in community ambulation     ROM Within functional limits    Increase range of motion 10% of affected joints    Strength BUE:  refer to OT eval  RLE:  4/5  LLE:  4/5  Increase strength in affected mm groups by 1/3 grade   Balance Sitting EOB:  not assessed patient in chair   Dynamic Standing:  good - Sitting EOB: goodminus   Dynamic Standing: good minus    Sitting EOB:  good   Dynamic Standing: good      Patient is Alert & Oriented x person, place, time, and situation and follows directions    Sensation:  Patient  denies numbness/tingling   Edema:  none noted   Endurance: fair      Vitals: room air   Blood Pressure at rest  Blood Pressure during session    Heart Rate at rest  Heart Rate during session    SPO2 at rest %  SPO2 during session %      Patient education  Patient educated on role of Physical Therapy, risks of immobility, safety and plan of care,  importance of mobility while in hospital , purse lip breathing, and ankle pumps, quad set and glut set for edema control, blood clot prevention     Patient response to education:   Pt verbalized understanding Pt demonstrated skill Pt requires further education in this area   Yes Partial Yes      Treatment:  Patient practiced and was instructed/facilitated in the following treatment: transferred to edge of bed. Static stood for ~5 minutes, slightly unsteady intermittently HHA, patient held onto NP.  Ambulated into hallway with seated rest break and back to room. Patient performed standing exercise and standing reaching activities with UE support. Therapeutic Exercises: standing marching and hip abduction/adduction   x 10-12 reps. Shortness of breath cues for pursed lip breathing. At end of session, patient in chair with     call light and phone within reach,  all lines and tubes intact, nursing notified. Patient would benefit from continued skilled Physical Therapy to improve functional independence and quality of life.          Patient's/ family goals   home    Time in 36  Time out  916    Total Treatment Time  23 minutes    CPT codes:    Therapeutic activities (10713)   15 minutes  1 unit(s)  Therapeutic exercises (32009)   8 minutes  1 unit(s)    Carmen Montoya PTA PTI#[id number]

## 2022-08-08 NOTE — PLAN OF CARE
Problem: Discharge Planning  Goal: Discharge to home or other facility with appropriate resources  8/8/2022 1123 by Rex Sun RN  Outcome: Progressing  8/8/2022 0250 by Lalo Almonte RN  Outcome: Progressing     Problem: Safety - Adult  Goal: Free from fall injury  8/8/2022 1123 by Rex Sun RN  Outcome: Progressing  8/8/2022 0250 by Lalo Almonte RN  Outcome: Progressing     Problem: ABCDS Injury Assessment  Goal: Absence of physical injury  8/8/2022 1123 by Rex Sun RN  Outcome: Progressing  8/8/2022 0250 by Lalo Almonte RN  Outcome: Progressing

## 2022-08-08 NOTE — PROGRESS NOTES
[facility]  Nephrology Attending Progress Note        SUBJECTIVE:   From the Dr. La Miller note 8/4: the pt is an 79 yo male who was admitted 8/2/22 with sob and pox of 82. He was brought in by his daughter who he lives with. He was found to have left sided strep pneumonia and parainfluenza. Covid was negative. He also has decreased appetite , chills, and lethargy. He has a pmh of ckd with a baseline cr of  2.6 and he sees dr Clifton Trimble in the office. On admission his cr was 2.8. yesterday it was 3.1 and today 2.7, hgb 11.3>9.7>9, wbc 17>12.2, plt 186, hco3 24, fena <1. He has been hemodynamically stable. He was started on doxy and rocpehin. He was febrile on 8/2 and 8/3. He was started on ivf today. He also has a pmh of afib on oac, avm, copd, dm, pud, htn, hyperlipidemia. His daughter Annabelle Brown is in the room. He is hard of hearing and still has no appetite. 8/8/22- seated in chair, no distress. Feeling better today. 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    Esophagitis-grade 1    AP (angina pectoris) ([facility])    Systolic hypertension    Combined forms of age-related cataract of left eye    Anemia, chronic renal failure, stage 4 (severe) ([facility])    Sepsis secondary to CAP ([address]    Pneumonia of left lung due to infectious organism        PAST MEDICAL HISTORY:    Past Medical History:   Diagnosis Date    A-fib ([address]     Diabetes mellitus ([facility]     H pylori ulcer 8/22/2012    Hyperlipidemia     Hypertension     PONV (postoperative nausea and vomiting)     PUD (peptic ulcer disease) 7/26/2012    Urinary frequency        DIET:    ADULT DIET;  Regular; 4 carb choices (60 gm/meal)     PHYSICAL EXAM:     Patient Vitals for the past 24 hrs:   BP Temp Temp src Pulse Resp SpO2 Weight   08/08/22 0815 129/77 98 °F (36.7 °C) Infrared 77 18 96 % --   08/08/22 0646 -- -- -- -- -- 95 % -- 08/08/22 0230 (!) 122/58 98.2 °F (36.8 °C) Infrared 78 16 94 % --   08/08/22 0057 -- -- -- -- -- -- 193 lb (87.5 kg)   08/07/22 2145 117/78 97.1 °F (36.2 °C) Infrared 75 18 97 % --   08/07/22 1700 135/71 98.1 °F (36.7 °C) Oral 84 16 96 % --   @      Intake/Output Summary (Last 24 hours) at 8/8/2022 1227  Last data filed at 8/7/2022 2058  Gross per 24 hour   Intake 420 ml   Output 525 ml   Net -105 ml         Wt Readings from Last 3 Encounters:   08/08/22 193 lb (87.5 kg)   06/24/21 185 lb 14.4 oz (84.3 kg)   05/27/21 189 lb (85.7 kg)       Constitutional:  Pt is in no acute distress  Head: normocephalic, atraumatic  Neck: no JVD  Cardiovascular: S1 S2 no S3 or rub  Respiratory:  clear upper, diminished in bases  Gastrointestinal:  Soft, nontender, nondistended, BS+  Ext: no edema  Skin: dry, no rash  Neuro: aaox3    MEDS (scheduled):     SITagliptin  100 mg Oral Daily    methylPREDNISolone  40 mg IntraVENous Q12H    doxycycline hyclate  100 mg Oral 2 times per day    insulin regular  0-4 Units SubCUTAneous Nightly    insulin regular  0-16 Units SubCUTAneous TID WC    polyethylene glycol  17 g Oral Daily    docusate sodium  100 mg Oral BID    acetylcysteine  4 mL Inhalation BID    metoprolol succinate  25 mg Oral Nightly    cefTRIAXone (ROCEPHIN) IV  2,000 mg IntraVENous Q24H    pantoprazole  40 mg Oral QAM AC    epoetin samantha-epbx  3,000 Units SubCUTAneous Once per day on Mon Wed Fri    amLODIPine  5 mg Oral Daily    apixaban  2.5 mg Oral BID    allopurinol  100 mg Oral Daily    gabapentin  100 mg Oral BID    metoprolol succinate  50 mg Oral Daily    tamsulosin  0.4 mg Oral BID    Arformoterol Tartrate  15 mcg Nebulization BID    ipratropium-albuterol  1 ampule Inhalation Q4H WA    budesonide  500 mcg Nebulization BID    ascorbic acid  1,000 mg Oral Daily    zinc sulfate  50 mg Oral Daily       MEDS (infusions):   dextrose         MEDS (prn):  glucose, dextrose bolus **OR** dextrose bolus, glucagon (rDNA), dextrose, albuterol, ondansetron    DATA:    Recent Labs     08/06/22  0631 08/07/22  0552 08/08/22  0443   WBC 11.0 8.8 13.0*   HGB 9.5* 9.3* 9.6*   HCT 29.7* 28.6* 29.6*   MCV 83.7 81.5 81.3    262 350     Recent Labs     08/06/22  0631 08/07/22  0552 08/08/22  0443    134 132   K 4.5 4.3 3.6    100 100   CO2 18* 20* 20*   BUN 43* 56* 61*   CREATININE 2.2* 2.2* 2.2*   LABGLOM 28 28 28   GLUCOSE 221* 390* 142*   CALCIUM 9.3 9.7 9.7   MG 1.8 1.9 1.9   PHOS 2.5 4.0 4.4       Lab Results   Component Value Date    LABALBU 4.2 08/02/2022     No results found for: TSH    Iron Studies  Lab Results   Component Value Date    IRON 16 (L) 08/05/2022    TIBC 182 (L) 08/05/2022    FERRITIN 374 08/05/2022     Vitamin B-12   Date Value Ref Range Status   08/05/2022 1257 (H) 211 - 946 pg/mL Final     Folate   Date Value Ref Range Status   08/05/2022 >20.0 4.8 - 24.2 ng/mL Final       Vit D, 25-Hydroxy   Date Value Ref Range Status   08/06/2022 70 30 - 100 ng/mL Final     Comment:     <20 ng/mL. ........... Jericho Crape Deficient  20-30 ng/mL. ......... Varsha Crape Insufficient   ng/mL. ........ Varsha Crape Sufficient  >100 ng/mL. .......... Jericho Crape Toxic       PTH   Date Value Ref Range Status   08/05/2022 112 (H) 15 - 65 pg/mL Final       No components found for: URIC    Lab Results   Component Value Date/Time    COLORU Yellow 08/02/2022 11:48 AM    NITRU Negative 08/02/2022 11:48 AM    GLUCOSEU Negative 08/02/2022 11:48 AM    KETUA TRACE 08/02/2022 11:48 AM    UROBILINOGEN 0.2 08/02/2022 11:48 AM    BILIRUBINUR Negative 08/02/2022 11:48 AM       No results found for: LIPIDPAN      IMPRESSION/RECOMMENDATIONS:      Ckd 4  Cr 2.8>3.1>2.7>2.4>2. 2  Baseline 2.6 on 7/22/22  Underlying DM and HTN  Pth 112  Vit d p  p/c ratio 0.98 non nephrotic range proteinuria  He is at baseline   Follow labs     2. Htn with CKD I-IV  BP goal <130/80-BP improved with the additional metoprolol succinate 25mg at HS  Follow BP     3.afib  On oac and bb     4.  Resp failure  Copd/strep

## 2022-08-09 VITALS
BODY MASS INDEX: 28.82 KG/M2 | RESPIRATION RATE: 20 BRPM | HEIGHT: 69 IN | HEART RATE: 60 BPM | TEMPERATURE: 98 F | WEIGHT: 194.56 LBS | OXYGEN SATURATION: 96 % | DIASTOLIC BLOOD PRESSURE: 71 MMHG | SYSTOLIC BLOOD PRESSURE: 146 MMHG

## 2022-08-09 LAB
ADDENDUM ELECTROPHORESIS URINE RANDOM: NORMAL
ALBUMIN SERPL-MCNC: 2.5 G/DL (ref 3.5–4.7)
ALPHA-1-GLOBULIN: 0.6 G/DL (ref 0.2–0.4)
ALPHA-2-GLOBULIN: 1.2 G/DL (ref 0.5–1)
ANION GAP SERPL CALCULATED.3IONS-SCNC: 12 MMOL/L (ref 7–16)
BASOPHILS ABSOLUTE: 0.03 E9/L (ref 0–0.2)
BASOPHILS RELATIVE PERCENT: 0.3 % (ref 0–2)
BETA GLOBULIN: 1 G/DL (ref 0.8–1.3)
BUN BLDV-MCNC: 64 MG/DL (ref 6–23)
CALCIUM SERPL-MCNC: 9.5 MG/DL (ref 8.6–10.2)
CHLORIDE BLD-SCNC: 102 MMOL/L (ref 98–107)
CO2: 22 MMOL/L (ref 22–29)
CREAT SERPL-MCNC: 2.4 MG/DL (ref 0.7–1.2)
ELECTROPHORESIS: ABNORMAL
EOSINOPHILS ABSOLUTE: 0.03 E9/L (ref 0.05–0.5)
EOSINOPHILS RELATIVE PERCENT: 0.3 % (ref 0–6)
GAMMA GLOBULIN: 0.8 G/DL (ref 0.7–1.6)
GFR AFRICAN AMERICAN: 31
GFR NON-AFRICAN AMERICAN: 26 ML/MIN/1.73
GLUCOSE BLD-MCNC: 149 MG/DL (ref 74–99)
HCT VFR BLD CALC: 30.5 % (ref 37–54)
HEMOGLOBIN: 9.9 G/DL (ref 12.5–16.5)
IMMATURE GRANULOCYTES #: 0.3 E9/L
IMMATURE GRANULOCYTES %: 2.8 % (ref 0–5)
IMMUNOFIXATION URINE: NORMAL
LYMPHOCYTES ABSOLUTE: 1.28 E9/L (ref 1.5–4)
LYMPHOCYTES RELATIVE PERCENT: 11.8 % (ref 20–42)
MAGNESIUM: 1.7 MG/DL (ref 1.6–2.6)
MCH RBC QN AUTO: 26.7 PG (ref 26–35)
MCHC RBC AUTO-ENTMCNC: 32.5 % (ref 32–34.5)
MCV RBC AUTO: 82.2 FL (ref 80–99.9)
METER GLUCOSE: 145 MG/DL (ref 74–99)
METER GLUCOSE: 343 MG/DL (ref 74–99)
MONOCYTES ABSOLUTE: 0.88 E9/L (ref 0.1–0.95)
MONOCYTES RELATIVE PERCENT: 8.1 % (ref 2–12)
NEUTROPHILS ABSOLUTE: 8.36 E9/L (ref 1.8–7.3)
NEUTROPHILS RELATIVE PERCENT: 76.7 % (ref 43–80)
PDW BLD-RTO: 16.7 FL (ref 11.5–15)
PHOSPHORUS: 4.6 MG/DL (ref 2.5–4.5)
PLATELET # BLD: 397 E9/L (ref 130–450)
PMV BLD AUTO: 9.3 FL (ref 7–12)
POTASSIUM SERPL-SCNC: 4.4 MMOL/L (ref 3.5–5)
RBC # BLD: 3.71 E12/L (ref 3.8–5.8)
SODIUM BLD-SCNC: 136 MMOL/L (ref 132–146)
TOTAL PROTEIN: 6.1 G/DL (ref 6.4–8.3)
WBC # BLD: 10.9 E9/L (ref 4.5–11.5)

## 2022-08-09 PROCEDURE — 6360000002 HC RX W HCPCS: Performed by: INTERNAL MEDICINE

## 2022-08-09 PROCEDURE — 36415 COLL VENOUS BLD VENIPUNCTURE: CPT

## 2022-08-09 PROCEDURE — 94640 AIRWAY INHALATION TREATMENT: CPT

## 2022-08-09 PROCEDURE — 85025 COMPLETE CBC W/AUTO DIFF WBC: CPT

## 2022-08-09 PROCEDURE — 6370000000 HC RX 637 (ALT 250 FOR IP): Performed by: INTERNAL MEDICINE

## 2022-08-09 PROCEDURE — 2580000003 HC RX 258: Performed by: INTERNAL MEDICINE

## 2022-08-09 PROCEDURE — 6370000000 HC RX 637 (ALT 250 FOR IP): Performed by: NURSE PRACTITIONER

## 2022-08-09 PROCEDURE — 83735 ASSAY OF MAGNESIUM: CPT

## 2022-08-09 PROCEDURE — 82962 GLUCOSE BLOOD TEST: CPT

## 2022-08-09 PROCEDURE — 84100 ASSAY OF PHOSPHORUS: CPT

## 2022-08-09 PROCEDURE — 80048 BASIC METABOLIC PNL TOTAL CA: CPT

## 2022-08-09 RX ORDER — METOPROLOL SUCCINATE 25 MG/1
25 TABLET, EXTENDED RELEASE ORAL NIGHTLY
Qty: 30 TABLET | Refills: 0
Start: 2022-08-09

## 2022-08-09 RX ORDER — POLYETHYLENE GLYCOL 3350 17 G/17G
17 POWDER, FOR SOLUTION ORAL DAILY
Qty: 527 G | Refills: 0 | COMMUNITY
Start: 2022-08-10 | End: 2022-09-09

## 2022-08-09 RX ORDER — PREDNISONE 10 MG/1
TABLET ORAL
Qty: 11 TABLET | Refills: 0 | Status: SHIPPED | OUTPATIENT
Start: 2022-08-09 | End: 2022-08-19

## 2022-08-09 RX ORDER — PANTOPRAZOLE SODIUM 40 MG/1
40 TABLET, DELAYED RELEASE ORAL
Qty: 30 TABLET | Refills: 0 | Status: SHIPPED | OUTPATIENT
Start: 2022-08-10

## 2022-08-09 RX ORDER — AMLODIPINE BESYLATE 5 MG/1
5 TABLET ORAL DAILY
Qty: 30 TABLET | Refills: 0 | Status: SHIPPED | OUTPATIENT
Start: 2022-08-10

## 2022-08-09 RX ORDER — DOXYCYCLINE HYCLATE 100 MG/1
100 CAPSULE ORAL EVERY 12 HOURS SCHEDULED
Qty: 10 CAPSULE | Refills: 0 | Status: SHIPPED | OUTPATIENT
Start: 2022-08-09 | End: 2022-08-14

## 2022-08-09 RX ORDER — GABAPENTIN 100 MG/1
100 CAPSULE ORAL 2 TIMES DAILY
Qty: 60 CAPSULE | Refills: 0 | Status: SHIPPED | OUTPATIENT
Start: 2022-08-09 | End: 2022-09-08

## 2022-08-09 RX ORDER — GUAIFENESIN 600 MG/1
600 TABLET, EXTENDED RELEASE ORAL 2 TIMES DAILY
Qty: 30 TABLET | Refills: 0 | Status: SHIPPED | OUTPATIENT
Start: 2022-08-09 | End: 2022-08-24

## 2022-08-09 RX ORDER — PSEUDOEPHEDRINE HCL 30 MG
100 TABLET ORAL 2 TIMES DAILY
Qty: 60 CAPSULE | Refills: 0 | COMMUNITY
Start: 2022-08-09

## 2022-08-09 RX ORDER — TAMSULOSIN HYDROCHLORIDE 0.4 MG/1
0.4 CAPSULE ORAL 2 TIMES DAILY
Qty: 60 CAPSULE | Refills: 0 | Status: SHIPPED | OUTPATIENT
Start: 2022-08-09

## 2022-08-09 RX ADMIN — BUDESONIDE 500 MCG: 0.5 INHALANT RESPIRATORY (INHALATION) at 06:06

## 2022-08-09 RX ADMIN — PANTOPRAZOLE SODIUM 40 MG: 40 TABLET, DELAYED RELEASE ORAL at 05:34

## 2022-08-09 RX ADMIN — ALLOPURINOL 100 MG: 100 TABLET ORAL at 08:00

## 2022-08-09 RX ADMIN — METOPROLOL SUCCINATE 50 MG: 25 TABLET, EXTENDED RELEASE ORAL at 08:00

## 2022-08-09 RX ADMIN — IPRATROPIUM BROMIDE AND ALBUTEROL SULFATE 1 AMPULE: .5; 2.5 SOLUTION RESPIRATORY (INHALATION) at 10:13

## 2022-08-09 RX ADMIN — ZINC SULFATE 220 MG (50 MG) CAPSULE 50 MG: CAPSULE at 08:00

## 2022-08-09 RX ADMIN — INSULIN HUMAN 12 UNITS: 100 INJECTION, SOLUTION PARENTERAL at 13:09

## 2022-08-09 RX ADMIN — ARFORMOTEROL TARTRATE 15 MCG: 15 SOLUTION RESPIRATORY (INHALATION) at 06:06

## 2022-08-09 RX ADMIN — METHYLPREDNISOLONE SODIUM SUCCINATE 20 MG: 40 INJECTION, POWDER, FOR SOLUTION INTRAMUSCULAR; INTRAVENOUS at 02:00

## 2022-08-09 RX ADMIN — OXYCODONE HYDROCHLORIDE AND ACETAMINOPHEN 1000 MG: 500 TABLET ORAL at 08:00

## 2022-08-09 RX ADMIN — TAMSULOSIN HYDROCHLORIDE 0.4 MG: 0.4 CAPSULE ORAL at 08:00

## 2022-08-09 RX ADMIN — POLYETHYLENE GLYCOL 3350 17 G: 17 POWDER, FOR SOLUTION ORAL at 08:02

## 2022-08-09 RX ADMIN — AMLODIPINE BESYLATE 5 MG: 5 TABLET ORAL at 08:00

## 2022-08-09 RX ADMIN — DOCUSATE SODIUM 100 MG: 100 CAPSULE, LIQUID FILLED ORAL at 08:00

## 2022-08-09 RX ADMIN — METHYLPREDNISOLONE SODIUM SUCCINATE 20 MG: 40 INJECTION, POWDER, FOR SOLUTION INTRAMUSCULAR; INTRAVENOUS at 13:06

## 2022-08-09 RX ADMIN — IPRATROPIUM BROMIDE AND ALBUTEROL SULFATE 1 AMPULE: .5; 2.5 SOLUTION RESPIRATORY (INHALATION) at 14:04

## 2022-08-09 RX ADMIN — GABAPENTIN 100 MG: 100 CAPSULE ORAL at 08:00

## 2022-08-09 RX ADMIN — DOXYCYCLINE 100 MG: 100 CAPSULE ORAL at 08:00

## 2022-08-09 RX ADMIN — APIXABAN 2.5 MG: 5 TABLET, FILM COATED ORAL at 08:00

## 2022-08-09 RX ADMIN — WATER 2000 MG: 1 INJECTION INTRAMUSCULAR; INTRAVENOUS; SUBCUTANEOUS at 08:00

## 2022-08-09 RX ADMIN — IPRATROPIUM BROMIDE AND ALBUTEROL SULFATE 1 AMPULE: .5; 2.5 SOLUTION RESPIRATORY (INHALATION) at 06:06

## 2022-08-09 RX ADMIN — ACETYLCYSTEINE 400 MG: 100 SOLUTION ORAL; RESPIRATORY (INHALATION) at 06:07
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
## 2022-08-09 NOTE — DISCHARGE INSTRUCTIONS
epoetin samantha  Pronunciation: e WALDO e tin AL fa  Brand: Epogen, Procrit, Retacrit  What is the most important information I should know about epoetin samantha? This medicine can cause serious side effects, including heart attack or stroke. Epoetin samantha may also speed up tumor growth, or shorten remission or survival time in some people. Talk with your doctor about the risks and benefits of using epoetin samantha. You should not use this medicine if you have uncontrolled high blood pressure, or if you have ever had pure red cell aplasia (PRCA, a type of anemia) causedby using epoetin samantha or darbepoetin samantha. Call your doctor at once if you have signs of a blood clot: sudden numbness or weakness, problems with vision or speech, chest pain,trouble breathing, pain or cold feeling in an arm or leg. What is epoetin samantha? Epoetin samantha is a man-made form of a protein that helps your body produce red blood cells. This protein may be reduced when you have kidney failure or use certain medications. When fewer red blood cells are produced, you can develop acondition called anemia. Epoetin samantha is used to treat anemia caused by chemotherapy in adults andchildren at least 11years old. Epoetin samantha is also used to treat anemia caused by chronic kidney disease inadults and children at least 2 month old. Epoetin samantha is also used to treat anemia in adults taking zidovudine to treatHIV (human immunodeficiency virus). Epoetin samantha is also used to reduce the need for red blood cell transfusions inadults having certain types of surgery. Epoetin samantha may also be used for purposes not listed in this medication guide. What should I discuss with my healthcare provider before using epoetin samantha?   You should not use this medication if you are allergic to epoetin samantha ordarbepoetin samantha, or if:  you have untreated or uncontrolled high blood pressure;  you have had pure red cell aplasia (PRCA, a type of anemia) after using darbepoetin samantha or epoetin samantha; or  you use an epoetin samantha multi-dose vial and you are pregnant or breastfeeding. Do not use epoetin samantha from a multi-dose vial when giving medicine to a baby. The multi-dose vial contains an ingredient that can cause serious sideeffects or death in very young infants or premature babies. Epoetin samantha may speed up tumor growth, or shorten remission or survival time in some people with certain types of cancer. Talk with your doctor about the risks and benefits of using epoetin samantha. Tell your doctor if you have ever had:  heart disease, high blood pressure;  a heart attack, stroke, or blood clot;  a seizure disorder;  phenylketonuria (PKU); or  kidney disease (or if you are on dialysis). It is not known whether this medicine will harm an unborn baby. Tell yourdoctor if you are pregnant or plan to become pregnant. Do not breastfeed while using this medicine, and for at least 2 months after your last dose. Do not use epoetin samantha from a multi-dose vial if you are pregnant or breastfeeding. Epoetin samantha is made from donated human plasma and may contain viruses or other infectious agents. Donated plasma is tested and treated to reduce the risk of contamination, but there is still a small possibility it could transmitdisease. Ask your doctor about any possible risk. How should I use epoetin samantha? Follow all directions on your prescription label and read all medication guides or instruction sheets. Your doctor may occasionally change your dose. Use themedicine exactly as directed. Epoetin samantha is injected under the skin, or as an infusion into a vein. A healthcare provider may teach you how to properly use the medication byyourself. Read and carefully follow any Instructions for Use provided with your medicine. Do not use epoetin samantha if you don't understand all instructions for properuse. Ask your doctor or pharmacist if you have questions.   Prepare your injection only when you are reaction (hives, sweating, rapid pulse, wheezing, trouble breathing, severe dizziness or fainting, swelling in your face or throat) or a severe skin reaction (fever, sore throat, burning eyes, skin pain, red or purple skin rash withblistering and peeling). Epoetin samantha can cause serious side effects, including heart attack or stroke. Seek emergency medical help if you have:  heart attack symptoms --chest pain or pressure, pain spreading to your jaw or shoulder, nausea, sweating;  signs of a blood clot --pain, swelling, warmth, redness, cold feeling, or pale appearance of an arm or leg; or  signs of a stroke --sudden numbness or weakness (especially on one side of the body), sudden severe headache, slurred speech, problems with vision or balance. Call your doctor at once if you have:  unusual tiredness;  a seizure (convulsions);  high blood sugar --increased thirst, increased urination, dry mouth, fruity breath odor;  low potassium --leg cramps, constipation, irregular heartbeats, fluttering in your chest, increased thirst or urination, numbness or tingling, muscle weakness or limp feeling; or  increased blood pressure --severe headache, blurred vision, pounding in your neck or ears, anxiety, nosebleed. Common side effects may include:  fever, chills, cough, feeling short of breath;  low potassium, low white blood cells;  blood vessel blockage;  high blood sugar;  joint pain, bone pain, muscle pain or spasm;  itching or rash;  mouth pain, trouble swallowing;  nausea, vomiting;  headache, dizziness;  trouble sleeping;  depressed mood;  weight loss; or  pain or redness where the medicine was injected. This is not a complete list of side effects and others may occur. Call your doctor for medical advice about side effects. You may report side effects toFDA at 2-522-ZDX-0035. What other drugs will affect epoetin samatnha?   Other drugs may affect epoetin samantha, including prescription and over-the-counter medicines, vitamins, and herbal products. Tell your doctorabout all your current medicines and any medicine you start or stop using. Where can I get more information? Your pharmacist can provide more information about epoetin samantha. Remember, keep this and all other medicines out of the reach of children, never share your medicines with others, and use this medication only for the indication prescribed. Every effort has been made to ensure that the information provided by [address] [place] [address]  is accurate, up-to-date, and complete, but no guarantee is made to that effect. Drug information contained herein may be time sensitive. [facility] information has been compiled for use by healthcare practitioners and consumers in the [place] and therefore [facility] does not warrant that uses outside of the [place] are appropriate, unless specifically indicated otherwise. [facility]'s drug information does not endorse drugs, diagnose patients or recommend therapy. [facility]'s drug information is an informational resource designed to assist licensed healthcare practitioners in caring for their patients and/or to serve consumers viewing this service as a supplement to, and not a substitute for, the expertise, skill, knowledge and judgment of healthcare practitioners. The absence of a warning for a given drug or drug combination in no way should be construed to indicate that the drug or drug combination is safe, effective or appropriate for any given patient. [facility] does not assume any responsibility for any aspect of healthcare administered with the aid of information [facility] provides. The information contained herein is not intended to cover all possible uses, directions, precautions, warnings, drug interactions, allergic reactions, or adverse effects. If you have questions about the drugs you are taking, check with yourdoctor, nurse or pharmacist.  Copyright 7104-5340 Leeann [address] [place] [address] Avenue: 12.01.  Revision date:11/23/2020. Care instructions adapted under license by [facility] ([facility]). If you have questions about a medical condition or this instruction, always ask your healthcare professional. [address] any warranty or liability for your use of this information.

## 2022-08-09 NOTE — PROGRESS NOTES
CLINICAL PHARMACY NOTE: MEDS TO BEDS    Total # of Prescriptions Filled: 7   The following medications were delivered to the patient:  Januvia 100 mg  Amlodipine 5 mg  Doxycycline hyclate 100 mg capsules  Pantoprazole 40 mg  Tamsulosin 0.4 mg  Gabapentin 100 mg   Prednisone 10 mg    Additional Documentation:   Mucinex not covered, cheaper over the counter.  Patient aware

## 2022-08-09 NOTE — PROGRESS NOTES
[facility]  Nephrology Attending Progress Note        SUBJECTIVE:   From the Dr. Lina Castleman note 8/4: the pt is an 79 yo male who was admitted 8/2/22 with sob and pox of 82. He was brought in by his daughter who he lives with. He was found to have left sided strep pneumonia and parainfluenza. Covid was negative. He also has decreased appetite , chills, and lethargy. He has a pmh of ckd with a baseline cr of  2.6 and he sees dr Familia Barrera in the office. On admission his cr was 2.8. yesterday it was 3.1 and today 2.7, hgb 11.3>9.7>9, wbc 17>12.2, plt 186, hco3 24, fena <1. He has been hemodynamically stable. He was started on doxy and rocpehin. He was febrile on 8/2 and 8/3. He was started on ivf today. He also has a pmh of afib on oac, avm, copd, dm, pud, htn, hyperlipidemia. His daughter Ruba Flores is in the room. He is hard of hearing and still has no appetite. 8/9/22- in bed today, some SOB but better overall. He is hoping to go home today. 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    Esophagitis-grade 1    AP (angina pectoris) (HCC)    Systolic hypertension    Combined forms of age-related cataract of left eye    Anemia, chronic renal failure, stage 4 (severe) (HCC)    Sepsis secondary to CAP ([address]    Pneumonia of left lung due to infectious organism        PAST MEDICAL HISTORY:    Past Medical History:   Diagnosis Date    A-fib ([address]     Diabetes mellitus ([address]     H pylori ulcer 8/22/2012    Hyperlipidemia     Hypertension     PONV (postoperative nausea and vomiting)     PUD (peptic ulcer disease) 7/26/2012    Urinary frequency        DIET:    ADULT DIET;  Regular; 4 carb choices (60 gm/meal)     PHYSICAL EXAM:     Patient Vitals for the past 24 hrs:   BP Temp Temp src Pulse Resp SpO2 Weight   08/09/22 0739 (!) 147/70 -- -- 66 16 100 % --   08/09/22 0600 -- -- -- -- -- -- 194 lb 9 oz (88.3 kg)   08/09/22 0105 -- -- -- -- -- -- 194 lb 9 oz (88.3 kg)   08/09/22 0015 (!) 143/66 97.5 °F (36.4 °C) Infrared 63 16 100 % --   08/08/22 1930 (!) 151/66 97.3 °F (36.3 °C) Infrared 65 16 100 % --   08/08/22 1428 130/67 97.1 °F (36.2 °C) Infrared 77 16 100 % --   @      Intake/Output Summary (Last 24 hours) at 8/9/2022 1035  Last data filed at 8/8/2022 1809  Gross per 24 hour   Intake 780 ml   Output 275 ml   Net 505 ml         Wt Readings from Last 3 Encounters:   08/09/22 194 lb 9 oz (88.3 kg)   06/24/21 185 lb 14.4 oz (84.3 kg)   05/27/21 189 lb (85.7 kg)       Constitutional:  Pt is in no acute distress  Head: normocephalic, atraumatic, [place]  Neck: no JVD  Cardiovascular: S1 S2 no S3 or rub  Respiratory:  clear upper, diminished in bases  Gastrointestinal:  Soft, nontender, nondistended, BS+  Ext: no edema  Skin: dry, no rash  Neuro: aaox3    MEDS (scheduled):    methylPREDNISolone  20 mg IntraVENous Q12H    SITagliptin  100 mg Oral Daily    doxycycline hyclate  100 mg Oral 2 times per day    insulin regular  0-4 Units SubCUTAneous Nightly    insulin regular  0-16 Units SubCUTAneous TID WC    polyethylene glycol  17 g Oral Daily    docusate sodium  100 mg Oral BID    acetylcysteine  4 mL Inhalation BID    metoprolol succinate  25 mg Oral Nightly    pantoprazole  40 mg Oral QAM AC    epoetin samantha-epbx  3,000 Units SubCUTAneous Once per day on Mon Wed Fri    amLODIPine  5 mg Oral Daily    apixaban  2.5 mg Oral BID    allopurinol  100 mg Oral Daily    gabapentin  100 mg Oral BID    metoprolol succinate  50 mg Oral Daily    tamsulosin  0.4 mg Oral BID    Arformoterol Tartrate  15 mcg Nebulization BID    ipratropium-albuterol  1 ampule Inhalation Q4H WA    budesonide  500 mcg Nebulization BID    ascorbic acid  1,000 mg Oral Daily    zinc sulfate  50 mg Oral Daily       MEDS (infusions):   dextrose         MEDS (prn):  glucose, dextrose bolus **OR** dextrose bolus, glucagon (rDNA), dextrose, albuterol, ondansetron    DATA:    Recent Labs     08/07/22  0552 08/08/22  0443 08/09/22  0502   WBC 8.8 13.0* 10.9   HGB 9.3* 9.6* 9.9*   HCT 28.6* 29.6* 30.5*   MCV 81.5 81.3 82.2    350 397     Recent Labs     08/07/22  0552 08/08/22 0443 08/09/22  0502    132 136   K 4.3 3.6 4.4    100 102   CO2 20* 20* 22   BUN 56* 61* 64*   CREATININE 2.2* 2.2* 2.4*   LABGLOM 28 28 26   GLUCOSE 390* 142* 149*   CALCIUM 9.7 9.7 9.5   MG 1.9 1.9 1.7   PHOS 4.0 4.4 4.6*       Lab Results   Component Value Date    LABALBU 4.2 08/02/2022     No results found for: TSH    Iron Studies  Lab Results   Component Value Date    IRON 16 (L) 08/05/2022    TIBC 182 (L) 08/05/2022    FERRITIN 374 08/05/2022     Vitamin B-12   Date Value Ref Range Status   08/05/2022 1257 (H) 211 - 946 pg/mL Final     Folate   Date Value Ref Range Status   08/05/2022 >20.0 4.8 - 24.2 ng/mL Final       Vit D, 25-Hydroxy   Date Value Ref Range Status   08/06/2022 70 30 - 100 ng/mL Final     Comment:     <20 ng/mL. ........... Wilmar De La Cruz Deficient  20-30 ng/mL. ......... Wilmar De La Cruz Insufficient   ng/mL. ........ Wilmar De La Cruz Sufficient  >100 ng/mL. .......... Wilmar De La Cruz Toxic       PTH   Date Value Ref Range Status   08/05/2022 112 (H) 15 - 65 pg/mL Final       No components found for: URIC    Lab Results   Component Value Date/Time    COLORU Yellow 08/02/2022 11:48 AM    NITRU Negative 08/02/2022 11:48 AM    GLUCOSEU Negative 08/02/2022 11:48 AM    KETUA TRACE 08/02/2022 11:48 AM    UROBILINOGEN 0.2 08/02/2022 11:48 AM    BILIRUBINUR Negative 08/02/2022 11:48 AM       No results found for: Concetta Saint      IMPRESSION/RECOMMENDATIONS:      Ckd 4  Cr 2.8>3.1>2.7>2.4>2.2>2.4  Baseline 2.6 on 7/22/22  Underlying DM and HTN  Pth 112  Vit d p  p/c ratio 0.98 non nephrotic range proteinuria  He is stable at baseline   Follow labs     2. Htn with CKD I-IV  BP goal <130/80-BP near goal with the additional metoprolol succinate 25mg at HS  Follow BP     3.afib  On oac and bb     4.  Resp failure  Copd/strep pneumonia/parainfluenza virus  On doxy last dose 8/14 and rocephin 8/9 last dose     5. Anemia in CKD   HgB below goal 10-12  H/o pud/avms  B12 1257, folate >20  Iron sat 9%, ferritin 374 up from 24 on 6/4/22  Await  SPEP and UPEP  Continue LISSETTE  Transfuse for HgB <7  Start oral Fe++-once the acute infection resolved would consider IV Fe++     6. Tick bite  On doxy  Lyme negative    7. Sec HPTH of Renal Origin   - stable for CKD 4  Vit D 70  Follow Ca++ and PO4      KELLY Gregory - CNP    Patient seen and examined. Chart reviewed. I had a face to face encounter with the patient. Agree with exam.    Agree with  formulation, assessment and plan as outlined above and directed by me. Addition and corrections were done as deemed appropriate. My exam and plan include:     Continue current treatment as outlined above.            Lyle Cabrales MD  Nephrology        Electronically signed by Lyle Cabrales MD on 8/10/2022 at 4:00 PM

## 2022-08-09 NOTE — CARE COORDINATION
8-9-Cm note:  pt is discharged, he is awaiting his dtr to transport him home, pt denies any needs for homegoing.  Electronically signed by Sherren Muck, RN on 8/9/2022 at 3:08 PM

## 2022-08-17 ENCOUNTER — HOSPITAL ENCOUNTER (OUTPATIENT)
Dept: INFUSION THERAPY | Age: 87
Setting detail: INFUSION SERIES
Discharge: HOME | End: 2022-08-17
Payer: MEDICARE

## 2022-08-17 VITALS
TEMPERATURE: 98 F | HEART RATE: 55 BPM | SYSTOLIC BLOOD PRESSURE: 154 MMHG | RESPIRATION RATE: 24 BRPM | DIASTOLIC BLOOD PRESSURE: 63 MMHG | OXYGEN SATURATION: 97 %

## 2022-08-17 DIAGNOSIS — N18.4 ANEMIA, CHRONIC RENAL FAILURE, STAGE 4 (SEVERE) (HCC): Primary | ICD-10-CM

## 2022-08-17 DIAGNOSIS — D63.1 ANEMIA, CHRONIC RENAL FAILURE, STAGE 4 (SEVERE) (HCC): Primary | ICD-10-CM

## 2022-08-17 PROCEDURE — 6360000002 HC RX W HCPCS: Performed by: INTERNAL MEDICINE

## 2022-08-17 PROCEDURE — 2580000003 HC RX 258: Performed by: INTERNAL MEDICINE

## 2022-08-17 PROCEDURE — 96365 THER/PROPH/DIAG IV INF INIT: CPT

## 2022-08-17 RX ORDER — SODIUM CHLORIDE 0.9 % (FLUSH) 0.9 %
5-40 SYRINGE (ML) INJECTION PRN
Status: DISCONTINUED | OUTPATIENT
Start: 2022-08-17 | End: 2022-08-18 | Stop reason: HOSPADM

## 2022-08-17 RX ORDER — ACETAMINOPHEN 325 MG/1
650 TABLET ORAL
Status: CANCELLED | OUTPATIENT
Start: 2022-08-24

## 2022-08-17 RX ORDER — ALBUTEROL SULFATE 90 UG/1
4 AEROSOL, METERED RESPIRATORY (INHALATION) PRN
Status: CANCELLED | OUTPATIENT
Start: 2022-08-24

## 2022-08-17 RX ORDER — SODIUM CHLORIDE 0.9 % (FLUSH) 0.9 %
5-40 SYRINGE (ML) INJECTION PRN
Status: CANCELLED | OUTPATIENT
Start: 2022-08-24

## 2022-08-17 RX ORDER — ONDANSETRON 2 MG/ML
8 INJECTION INTRAMUSCULAR; INTRAVENOUS
Status: CANCELLED | OUTPATIENT
Start: 2022-08-24

## 2022-08-17 RX ORDER — SODIUM CHLORIDE 9 MG/ML
5-250 INJECTION, SOLUTION INTRAVENOUS PRN
Status: CANCELLED | OUTPATIENT
Start: 2022-08-24

## 2022-08-17 RX ORDER — EPINEPHRINE 1 MG/ML
0.3 INJECTION, SOLUTION, CONCENTRATE INTRAVENOUS PRN
Status: CANCELLED | OUTPATIENT
Start: 2022-08-24

## 2022-08-17 RX ORDER — DIPHENHYDRAMINE HYDROCHLORIDE 50 MG/ML
50 INJECTION INTRAMUSCULAR; INTRAVENOUS
Status: CANCELLED | OUTPATIENT
Start: 2022-08-24

## 2022-08-17 RX ORDER — HEPARIN SODIUM (PORCINE) LOCK FLUSH IV SOLN 100 UNIT/ML 100 UNIT/ML
500 SOLUTION INTRAVENOUS PRN
Status: CANCELLED | OUTPATIENT
Start: 2022-08-24

## 2022-08-17 RX ORDER — SODIUM CHLORIDE 9 MG/ML
INJECTION, SOLUTION INTRAVENOUS CONTINUOUS
Status: CANCELLED | OUTPATIENT
Start: 2022-08-24

## 2022-08-17 RX ADMIN — SODIUM CHLORIDE, PRESERVATIVE FREE 10 ML: 5 INJECTION INTRAVENOUS at 11:29

## 2022-08-17 RX ADMIN — SODIUM CHLORIDE, PRESERVATIVE FREE 10 ML: 5 INJECTION INTRAVENOUS at 12:53

## 2022-08-17 RX ADMIN — SODIUM CHLORIDE 125 MG: 9 INJECTION, SOLUTION INTRAVENOUS at 11:47

## 2022-08-24 ENCOUNTER — HOSPITAL ENCOUNTER (OUTPATIENT)
Dept: INFUSION THERAPY | Age: 87
Setting detail: INFUSION SERIES
Discharge: HOME | End: 2022-08-24
Payer: MEDICARE

## 2022-08-24 VITALS
TEMPERATURE: 97.4 F | SYSTOLIC BLOOD PRESSURE: 135 MMHG | HEART RATE: 55 BPM | DIASTOLIC BLOOD PRESSURE: 58 MMHG | RESPIRATION RATE: 20 BRPM | OXYGEN SATURATION: 100 %

## 2022-08-24 DIAGNOSIS — D63.1 ANEMIA, CHRONIC RENAL FAILURE, STAGE 4 (SEVERE) (HCC): Primary | ICD-10-CM

## 2022-08-24 DIAGNOSIS — N18.4 ANEMIA, CHRONIC RENAL FAILURE, STAGE 4 (SEVERE) (HCC): Primary | ICD-10-CM

## 2022-08-24 PROCEDURE — 96365 THER/PROPH/DIAG IV INF INIT: CPT

## 2022-08-24 PROCEDURE — 6360000002 HC RX W HCPCS: Performed by: INTERNAL MEDICINE

## 2022-08-24 PROCEDURE — 2580000003 HC RX 258: Performed by: INTERNAL MEDICINE

## 2022-08-24 RX ORDER — EPINEPHRINE 1 MG/ML
0.3 INJECTION, SOLUTION, CONCENTRATE INTRAVENOUS PRN
Status: CANCELLED | OUTPATIENT
Start: 2022-08-31

## 2022-08-24 RX ORDER — SODIUM CHLORIDE 0.9 % (FLUSH) 0.9 %
5-40 SYRINGE (ML) INJECTION PRN
Status: CANCELLED | OUTPATIENT
Start: 2022-08-31

## 2022-08-24 RX ORDER — ONDANSETRON 2 MG/ML
8 INJECTION INTRAMUSCULAR; INTRAVENOUS
Status: CANCELLED | OUTPATIENT
Start: 2022-08-31

## 2022-08-24 RX ORDER — DIPHENHYDRAMINE HYDROCHLORIDE 50 MG/ML
50 INJECTION INTRAMUSCULAR; INTRAVENOUS
Status: CANCELLED | OUTPATIENT
Start: 2022-08-31

## 2022-08-24 RX ORDER — SODIUM CHLORIDE 9 MG/ML
5-250 INJECTION, SOLUTION INTRAVENOUS PRN
Status: CANCELLED | OUTPATIENT
Start: 2022-08-31

## 2022-08-24 RX ORDER — SODIUM CHLORIDE 9 MG/ML
INJECTION, SOLUTION INTRAVENOUS CONTINUOUS
Status: CANCELLED | OUTPATIENT
Start: 2022-08-31

## 2022-08-24 RX ORDER — HEPARIN SODIUM (PORCINE) LOCK FLUSH IV SOLN 100 UNIT/ML 100 UNIT/ML
500 SOLUTION INTRAVENOUS PRN
Status: CANCELLED | OUTPATIENT
Start: 2022-08-31

## 2022-08-24 RX ORDER — ACETAMINOPHEN 325 MG/1
650 TABLET ORAL
Status: CANCELLED | OUTPATIENT
Start: 2022-08-31

## 2022-08-24 RX ORDER — ALBUTEROL SULFATE 90 UG/1
4 AEROSOL, METERED RESPIRATORY (INHALATION) PRN
Status: CANCELLED | OUTPATIENT
Start: 2022-08-31

## 2022-08-24 RX ORDER — SODIUM CHLORIDE 0.9 % (FLUSH) 0.9 %
5-40 SYRINGE (ML) INJECTION PRN
Status: DISCONTINUED | OUTPATIENT
Start: 2022-08-24 | End: 2022-08-25 | Stop reason: HOSPADM

## 2022-08-24 RX ADMIN — SODIUM CHLORIDE 125 MG: 9 INJECTION, SOLUTION INTRAVENOUS at 11:25

## 2022-08-24 RX ADMIN — Medication 10 ML: at 12:25

## 2022-08-24 RX ADMIN — Medication 10 ML: at 10:52

## 2022-08-31 ENCOUNTER — HOSPITAL ENCOUNTER (OUTPATIENT)
Dept: INFUSION THERAPY | Age: 87
Setting detail: INFUSION SERIES
Discharge: HOME | End: 2022-08-31
Payer: MEDICARE

## 2022-08-31 VITALS
RESPIRATION RATE: 20 BRPM | OXYGEN SATURATION: 100 % | SYSTOLIC BLOOD PRESSURE: 152 MMHG | DIASTOLIC BLOOD PRESSURE: 72 MMHG | TEMPERATURE: 97.5 F | HEART RATE: 82 BPM

## 2022-08-31 DIAGNOSIS — D63.1 ANEMIA, CHRONIC RENAL FAILURE, STAGE 4 (SEVERE) (HCC): Primary | ICD-10-CM

## 2022-08-31 DIAGNOSIS — N18.4 ANEMIA, CHRONIC RENAL FAILURE, STAGE 4 (SEVERE) (HCC): Primary | ICD-10-CM

## 2022-08-31 PROCEDURE — 96365 THER/PROPH/DIAG IV INF INIT: CPT

## 2022-08-31 PROCEDURE — 2580000003 HC RX 258: Performed by: INTERNAL MEDICINE

## 2022-08-31 PROCEDURE — 6360000002 HC RX W HCPCS: Performed by: INTERNAL MEDICINE

## 2022-08-31 RX ORDER — SODIUM CHLORIDE 9 MG/ML
INJECTION, SOLUTION INTRAVENOUS CONTINUOUS
OUTPATIENT
Start: 2022-09-07

## 2022-08-31 RX ORDER — EPINEPHRINE 1 MG/ML
0.3 INJECTION, SOLUTION, CONCENTRATE INTRAVENOUS PRN
OUTPATIENT
Start: 2022-09-07

## 2022-08-31 RX ORDER — HEPARIN SODIUM (PORCINE) LOCK FLUSH IV SOLN 100 UNIT/ML 100 UNIT/ML
500 SOLUTION INTRAVENOUS PRN
OUTPATIENT
Start: 2022-09-07

## 2022-08-31 RX ORDER — SODIUM CHLORIDE 0.9 % (FLUSH) 0.9 %
5-40 SYRINGE (ML) INJECTION PRN
OUTPATIENT
Start: 2022-09-07

## 2022-08-31 RX ORDER — ACETAMINOPHEN 325 MG/1
650 TABLET ORAL
OUTPATIENT
Start: 2022-09-07

## 2022-08-31 RX ORDER — ALBUTEROL SULFATE 90 UG/1
4 AEROSOL, METERED RESPIRATORY (INHALATION) PRN
OUTPATIENT
Start: 2022-09-07

## 2022-08-31 RX ORDER — SODIUM CHLORIDE 9 MG/ML
5-250 INJECTION, SOLUTION INTRAVENOUS PRN
OUTPATIENT
Start: 2022-09-07

## 2022-08-31 RX ORDER — ONDANSETRON 2 MG/ML
8 INJECTION INTRAMUSCULAR; INTRAVENOUS
OUTPATIENT
Start: 2022-09-07

## 2022-08-31 RX ORDER — SODIUM CHLORIDE 0.9 % (FLUSH) 0.9 %
5-40 SYRINGE (ML) INJECTION PRN
Status: DISCONTINUED | OUTPATIENT
Start: 2022-08-31 | End: 2022-09-01 | Stop reason: HOSPADM

## 2022-08-31 RX ORDER — DIPHENHYDRAMINE HYDROCHLORIDE 50 MG/ML
50 INJECTION INTRAMUSCULAR; INTRAVENOUS
OUTPATIENT
Start: 2022-09-07

## 2022-08-31 RX ADMIN — Medication 10 ML: at 10:27

## 2022-08-31 RX ADMIN — Medication 10 ML: at 11:48

## 2022-08-31 RX ADMIN — SODIUM CHLORIDE 125 MG: 9 INJECTION, SOLUTION INTRAVENOUS at 10:48

## 2022-08-31 NOTE — DISCHARGE INSTRUCTIONS
need frequent medical tests. What happens if I miss a dose? Since sodium ferric gluconate complex is given during dialysis, this medicinedoes not have a separate dosing schedule. What happens if I overdose? Seek emergency medical attention or call the Poison Help line at [phone]. What should I avoid while receiving sodium ferric gluconate complex? Avoid getting up too fast from a sitting or lying position, or you may feeldizzy. What are the possible side effects of sodium ferric gluconate complex? Get emergency medical help if you have signs of an allergic reaction: hives, sweating, vomiting; severe lower back pain; wheezing, difficultbreathing; swelling of your face, lips, tongue, or throat. Some side effects may occur within 30 minutes after an injection. Tell yourcaregiver if you feel dizzy, nauseated, light-headed, itchy, or sweaty. Call your doctor at once if you have:  a light-headed feeling, like you might pass out;  swelling, rapid weight gain;  feeling very weak or tired;  shortness of breath;  severe pain in your chest, back, sides, or groin; or  flushing (sudden warmth, redness, or tingly feeling). Common side effects may include:  bruising or skin discoloration where an injection was given;  nausea, vomiting, diarrhea;  headache, dizziness;  fast heartbeats;  chest pain, trouble breathing;  pain, leg cramps;  increased blood pressure; or  abnormal blood tests. This is not a complete list of side effects and others may occur. Call your doctor for medical advice about side effects. You may report side effects toFDA at [phone]. What other drugs will affect sodium ferric gluconate complex? Tell your doctor about all your other medicines, especially iron supplements. Sodium ferric gluconate complex can make it harder for your body to absorb ironsupplements you take by mouth.   Other drugs may affect sodium ferric gluconate complex, including prescription and over-the-counter 2.01. Revision date:12/16/2019. Care instructions adapted under license by [facility] ([facility]). If you have questions about a medical condition or this instruction, always ask your healthcare professional. [address] any warranty or liability for your use of this information.

## 2022-09-16 ENCOUNTER — HOSPITAL ENCOUNTER (OUTPATIENT)
Age: 87
Discharge: HOME | End: 2022-09-16
Payer: MEDICARE

## 2022-09-16 LAB
ANION GAP SERPL CALCULATED.3IONS-SCNC: 10 MMOL/L (ref 7–16)
BASOPHILS ABSOLUTE: 0.08 E9/L (ref 0–0.2)
BASOPHILS RELATIVE PERCENT: 1 % (ref 0–2)
BUN BLDV-MCNC: 40 MG/DL (ref 6–23)
CALCIUM SERPL-MCNC: 9.2 MG/DL (ref 8.6–10.2)
CHLORIDE BLD-SCNC: 94 MMOL/L (ref 98–107)
CO2: 28 MMOL/L (ref 22–29)
CREAT SERPL-MCNC: 2.5 MG/DL (ref 0.7–1.2)
EOSINOPHILS ABSOLUTE: 0.17 E9/L (ref 0.05–0.5)
EOSINOPHILS RELATIVE PERCENT: 2.2 % (ref 0–6)
GFR AFRICAN AMERICAN: 30
GFR NON-AFRICAN AMERICAN: 24 ML/MIN/1.73
GLUCOSE BLD-MCNC: 234 MG/DL (ref 74–99)
HCT VFR BLD CALC: 35.8 % (ref 37–54)
HEMOGLOBIN: 11.5 G/DL (ref 12.5–16.5)
IMMATURE GRANULOCYTES #: 0.01 E9/L
IMMATURE GRANULOCYTES %: 0.1 % (ref 0–5)
LYMPHOCYTES ABSOLUTE: 1.39 E9/L (ref 1.5–4)
LYMPHOCYTES RELATIVE PERCENT: 17.8 % (ref 20–42)
MCH RBC QN AUTO: 26.9 PG (ref 26–35)
MCHC RBC AUTO-ENTMCNC: 32.1 % (ref 32–34.5)
MCV RBC AUTO: 83.8 FL (ref 80–99.9)
MONOCYTES ABSOLUTE: 0.6 E9/L (ref 0.1–0.95)
MONOCYTES RELATIVE PERCENT: 7.7 % (ref 2–12)
NEUTROPHILS ABSOLUTE: 5.56 E9/L (ref 1.8–7.3)
NEUTROPHILS RELATIVE PERCENT: 71.2 % (ref 43–80)
PDW BLD-RTO: 16.3 FL (ref 11.5–15)
PLATELET # BLD: 200 E9/L (ref 130–450)
PMV BLD AUTO: 8.8 FL (ref 7–12)
POTASSIUM SERPL-SCNC: 3.9 MMOL/L (ref 3.5–5)
PROSTATE SPECIFIC ANTIGEN: 3.76 NG/ML (ref 0–4)
RBC # BLD: 4.27 E12/L (ref 3.8–5.8)
SODIUM BLD-SCNC: 132 MMOL/L (ref 132–146)
WBC # BLD: 7.8 E9/L (ref 4.5–11.5)

## 2022-09-16 PROCEDURE — 80048 BASIC METABOLIC PNL TOTAL CA: CPT

## 2022-09-16 PROCEDURE — 36415 COLL VENOUS BLD VENIPUNCTURE: CPT

## 2022-09-16 PROCEDURE — 85025 COMPLETE CBC W/AUTO DIFF WBC: CPT

## 2022-09-16 PROCEDURE — 84153 ASSAY OF PSA TOTAL: CPT

## 2022-11-24 ENCOUNTER — HOSPITAL ENCOUNTER (EMERGENCY)
Age: 87
Discharge: ANOTHER HOSPITAL | End: 2022-11-24
Attending: EMERGENCY MEDICINE
Payer: MEDICARE

## 2022-11-24 ENCOUNTER — APPOINTMENT (OUTPATIENT)
Dept: CT IMAGING | Age: 87
End: 2022-11-24
Payer: MEDICARE

## 2022-11-24 ENCOUNTER — APPOINTMENT (OUTPATIENT)
Dept: GENERAL RADIOLOGY | Age: 87
End: 2022-11-24
Payer: MEDICARE

## 2022-11-24 VITALS
WEIGHT: 185 LBS | DIASTOLIC BLOOD PRESSURE: 80 MMHG | SYSTOLIC BLOOD PRESSURE: 170 MMHG | OXYGEN SATURATION: 99 % | TEMPERATURE: 98 F | BODY MASS INDEX: 27.32 KG/M2 | HEART RATE: 78 BPM | RESPIRATION RATE: 13 BRPM

## 2022-11-24 DIAGNOSIS — R07.9 CHEST PAIN, UNSPECIFIED TYPE: ICD-10-CM

## 2022-11-24 DIAGNOSIS — T14.8XXA ABRASION: ICD-10-CM

## 2022-11-24 DIAGNOSIS — R55 SYNCOPE AND COLLAPSE: ICD-10-CM

## 2022-11-24 DIAGNOSIS — I49.5 SICK SINUS SYNDROME (HCC): Primary | ICD-10-CM

## 2022-11-24 PROBLEM — I48.91 ATRIAL FIBRILLATION (HCC): Status: ACTIVE | Noted: 2022-01-01

## 2022-11-24 PROBLEM — I48.91 A-FIB (HCC): Status: ACTIVE | Noted: 2022-11-24

## 2022-11-24 LAB
ALBUMIN SERPL-MCNC: 4.2 G/DL (ref 3.5–5.2)
ALP BLD-CCNC: 130 U/L (ref 40–129)
ALT SERPL-CCNC: 39 U/L (ref 0–40)
ANION GAP SERPL CALCULATED.3IONS-SCNC: 11 MMOL/L (ref 7–16)
AST SERPL-CCNC: 30 U/L (ref 0–39)
BASOPHILS ABSOLUTE: 0.06 E9/L (ref 0–0.2)
BASOPHILS RELATIVE PERCENT: 0.5 % (ref 0–2)
BILIRUB SERPL-MCNC: 0.4 MG/DL (ref 0–1.2)
BUN BLDV-MCNC: 29 MG/DL (ref 6–23)
CALCIUM SERPL-MCNC: 9.3 MG/DL (ref 8.6–10.2)
CHLORIDE BLD-SCNC: 95 MMOL/L (ref 98–107)
CO2: 25 MMOL/L (ref 22–29)
CREAT SERPL-MCNC: 2.1 MG/DL (ref 0.7–1.2)
EOSINOPHILS ABSOLUTE: 0.14 E9/L (ref 0.05–0.5)
EOSINOPHILS RELATIVE PERCENT: 1.3 % (ref 0–6)
GFR SERPL CREATININE-BSD FRML MDRD: 30 ML/MIN/1.73
GLUCOSE BLD-MCNC: 268 MG/DL (ref 74–99)
HCT VFR BLD CALC: 37.6 % (ref 37–54)
HEMOGLOBIN: 12.2 G/DL (ref 12.5–16.5)
IMMATURE GRANULOCYTES #: 0.1 E9/L
IMMATURE GRANULOCYTES %: 0.9 % (ref 0–5)
LYMPHOCYTES ABSOLUTE: 1.52 E9/L (ref 1.5–4)
LYMPHOCYTES RELATIVE PERCENT: 13.9 % (ref 20–42)
MAGNESIUM: 1.8 MG/DL (ref 1.6–2.6)
MCH RBC QN AUTO: 27.4 PG (ref 26–35)
MCHC RBC AUTO-ENTMCNC: 32.4 % (ref 32–34.5)
MCV RBC AUTO: 84.5 FL (ref 80–99.9)
MONOCYTES ABSOLUTE: 0.56 E9/L (ref 0.1–0.95)
MONOCYTES RELATIVE PERCENT: 5.1 % (ref 2–12)
NEUTROPHILS ABSOLUTE: 8.55 E9/L (ref 1.8–7.3)
NEUTROPHILS RELATIVE PERCENT: 78.3 % (ref 43–80)
PDW BLD-RTO: 14.2 FL (ref 11.5–15)
PLATELET # BLD: 233 E9/L (ref 130–450)
PMV BLD AUTO: 9.2 FL (ref 7–12)
POTASSIUM REFLEX MAGNESIUM: 4.5 MMOL/L (ref 3.5–5)
RBC # BLD: 4.45 E12/L (ref 3.8–5.8)
SODIUM BLD-SCNC: 131 MMOL/L (ref 132–146)
TOTAL PROTEIN: 6.9 G/DL (ref 6.4–8.3)
TROPONIN, HIGH SENSITIVITY: 43 NG/L (ref 0–11)
TROPONIN, HIGH SENSITIVITY: 45 NG/L (ref 0–11)
WBC # BLD: 10.9 E9/L (ref 4.5–11.5)

## 2022-11-24 PROCEDURE — 71045 X-RAY EXAM CHEST 1 VIEW: CPT

## 2022-11-24 PROCEDURE — 36415 COLL VENOUS BLD VENIPUNCTURE: CPT

## 2022-11-24 PROCEDURE — 70450 CT HEAD/BRAIN W/O DYE: CPT

## 2022-11-24 PROCEDURE — 2500000003 HC RX 250 WO HCPCS

## 2022-11-24 PROCEDURE — 80053 COMPREHEN METABOLIC PANEL: CPT

## 2022-11-24 PROCEDURE — 72125 CT NECK SPINE W/O DYE: CPT

## 2022-11-24 PROCEDURE — 2580000003 HC RX 258

## 2022-11-24 PROCEDURE — 6360000002 HC RX W HCPCS

## 2022-11-24 PROCEDURE — 85025 COMPLETE CBC W/AUTO DIFF WBC: CPT

## 2022-11-24 PROCEDURE — 84484 ASSAY OF TROPONIN QUANT: CPT

## 2022-11-24 PROCEDURE — 96375 TX/PRO/DX INJ NEW DRUG ADDON: CPT

## 2022-11-24 PROCEDURE — 99285 EMERGENCY DEPT VISIT HI MDM: CPT

## 2022-11-24 PROCEDURE — 83735 ASSAY OF MAGNESIUM: CPT

## 2022-11-24 PROCEDURE — 96374 THER/PROPH/DIAG INJ IV PUSH: CPT

## 2022-11-24 RX ORDER — FENTANYL CITRATE 50 UG/ML
50 INJECTION, SOLUTION INTRAMUSCULAR; INTRAVENOUS ONCE
Status: COMPLETED | OUTPATIENT
Start: 2022-11-24 | End: 2022-11-24

## 2022-11-24 RX ORDER — MORPHINE SULFATE 4 MG/ML
4 INJECTION, SOLUTION INTRAMUSCULAR; INTRAVENOUS
Status: COMPLETED | OUTPATIENT
Start: 2022-11-24 | End: 2022-11-24

## 2022-11-24 RX ADMIN — FENTANYL CITRATE 50 MCG: 50 INJECTION, SOLUTION INTRAMUSCULAR; INTRAVENOUS at 09:43

## 2022-11-24 RX ADMIN — MORPHINE SULFATE 4 MG: 4 INJECTION, SOLUTION INTRAMUSCULAR; INTRAVENOUS at 11:55

## 2022-11-24 RX ADMIN — ISOPROTERENOL HYDROCHLORIDE 5 MCG/MIN: 0.2 INJECTION, SOLUTION INTRAMUSCULAR; INTRAVENOUS at 11:35

## 2022-11-24 RX ADMIN — MORPHINE SULFATE 4 MG: 4 INJECTION, SOLUTION INTRAMUSCULAR; INTRAVENOUS at 10:38

## 2022-11-24 ASSESSMENT — PAIN SCALES - GENERAL
PAINLEVEL_OUTOF10: 5
PAINLEVEL_OUTOF10: 10

## 2022-11-24 ASSESSMENT — ENCOUNTER SYMPTOMS
ABDOMINAL PAIN: 0
COLOR CHANGE: 0
COUGH: 0
SINUS PAIN: 0
CONSTIPATION: 0
SORE THROAT: 0
DIARRHEA: 0
NAUSEA: 0
EYE DISCHARGE: 0
SHORTNESS OF BREATH: 1
VOMITING: 0

## 2022-11-24 ASSESSMENT — PAIN DESCRIPTION - PAIN TYPE: TYPE: ACUTE PAIN

## 2022-11-24 ASSESSMENT — PAIN DESCRIPTION - LOCATION
LOCATION: CHEST
LOCATION: CHEST

## 2022-11-24 ASSESSMENT — PAIN - FUNCTIONAL ASSESSMENT: PAIN_FUNCTIONAL_ASSESSMENT: 0-10

## 2022-11-24 NOTE — ED PROVIDER NOTES
Alejandro Montes is an 80-year-old male with a history of hypertension and atrial fibrillation on Eliquis and metoprolol. Patient is a 80 y.o. male presents with a chief complaint of CP + LOC  This has been occurring for just this morning. Patient states that it gets better with nothing. Patient states that it gets worse with nothing. Patient states that it is severe in severity. Patient states it was acute in onset. He notes that about 1 hour prior to arrival he started having chest pain, across his chest that feels like a severe \"hard\" pain on both sides that is constant with no worsening or relieving factors associated with feeling short of breath. Patient reports that he was walking into his bedroom from the bathroom when he had a syncopal episode and hit his head. He states he called his daughter because his head was bleeding and she came over and was present to witness a second syncopal episode, and proceeded to call the ambulance. In route he was given 324 mg of aspirin without any improvement in his symptoms. Patient denies ever experiencing this pain before. He reports a mild headache, but denies any neck pain, pain in either arm, abdominal pain, nausea, vomiting, diarrhea, dysuria, hematuria, leg swelling, rashes, fevers, chills, cough. Patient also denies any blurry or double vision, numbness or tingling, but does admit to experiencing dizziness. Review of Systems   Constitutional:  Negative for chills and fever. HENT:  Negative for congestion, sinus pain and sore throat. Eyes:  Negative for discharge and visual disturbance. Respiratory:  Positive for shortness of breath. Negative for cough. Cardiovascular:  Positive for chest pain. Negative for leg swelling. Gastrointestinal:  Negative for abdominal pain, constipation, diarrhea, nausea and vomiting. Endocrine: Negative for polyuria. Genitourinary:  Negative for difficulty urinating, dysuria, frequency and hematuria. Musculoskeletal:  Negative for arthralgias and joint swelling. Skin:  Negative for color change and rash. Neurological:  Positive for dizziness, syncope and headaches. Negative for weakness, light-headedness and numbness. All other systems reviewed and are negative. Physical Exam  Constitutional:       General: He is not in acute distress. Appearance: Normal appearance. HENT:      Head: Normocephalic. Mouth/Throat:      Mouth: Mucous membranes are moist.      Pharynx: Oropharynx is clear. Eyes:      Extraocular Movements: Extraocular movements intact. Conjunctiva/sclera: Conjunctivae normal.      Pupils: Pupils are equal, round, and reactive to light. Cardiovascular:      Rate and Rhythm: Bradycardia present. Rhythm irregular. Pulses: Normal pulses. Heart sounds: Normal heart sounds. Pulmonary:      Effort: Pulmonary effort is normal.      Breath sounds: Normal breath sounds. No decreased breath sounds or wheezing. Abdominal:      General: Abdomen is flat. Palpations: Abdomen is soft. Musculoskeletal:         General: No swelling. Normal range of motion. Cervical back: Normal range of motion and neck supple. Skin:     General: Skin is warm and dry. Findings: Abrasion (Posterior head) present. Neurological:      General: No focal deficit present. Mental Status: He is alert and oriented to person, place, and time. Psychiatric:         Mood and Affect: Mood normal.         Behavior: Behavior normal.        Procedures     MDM  Number of Diagnoses or Management Options  Abrasion  Chest pain, unspecified type  Sick sinus syndrome (HCC)  Syncope and collapse  Diagnosis management comments: Pennie Szymanski is an 80 y.o. male brought to ED via EMS for syncope and chest pain. In ED, patient is in A. fib with slow ventricular response, having long pauses and bradycardic down to 30s. Patient's blood pressure stable throughout course in ED.   He was given IV fentanyl without any relief of pain, and 2 doses of morphine for pain with some relief. CBC unremarkable. CMP revealing creatinine of 2.1 and alkaline phosphatase of 130. Initial troponin of 45, repeat of 43. CT head unremarkable; CT C spine with degenerative changes but otherwise unremarkable; CXR revealing congestive changes. Spoke with Dr. Mandy Lopez, cardiology, who reports patient should be transferred to [address] [place] [address] for electrophysiology evaluation. Spoke with Dr. Briseida Fagan, electrophysiology, who states he will consult on patient, and advises to start on isoproterenol infusion and thus admit to ICU at [address] [place] [address]. Spoke with nurse practitioner for Dr. Jacky Menjivar who agreed to admit patient to medicine, and spoke with Dr. Rose Gonzales who agreed to accept patient to ICU. Plan discussed with family, they are agreeable to plan. On isoproterenol infusion, patient's heart rate did improved to 60s. Respiratory rate subsequently increased to infusion titrated down. Patient was stable upon transfer. ED Course as of 11/24/22 1622   Thu Nov 24, 2022   0921   ATTENDING PROVIDER ATTESTATION:     I have personally performed and/or participated in the history, exam, medical decision making, and procedures and agree with all pertinent clinical information unless otherwise noted. I have also reviewed and agree with the past medical, family and social history unless otherwise noted. I have discussed this patient in detail with the resident and provided the instruction and education regarding the evidence-based evaluation and treatment of could be and chest pain. Any EKG that may have been performed has been personally reviewed by me and I agree with the documentation as noted by the resident. History: Patient presents to the ED for evaluation. Patient had a syncopal episode at home. He was also complaining some chest pressure and shortness of breath.   The daughter is a CRNA noticed that his heart rate was really low. She did check his blood pressure and it was okay. Patient is on Eliquis. Patient complaining of mild headache. Lightheadedness with standing. My findings: Randall Styles is a 80 y.o. male whom is in no distress. Physical exam reveals patient lying in bed comfortably. Patient is bradycardic with irregularity. There are pauses present on the monitor. Lungs clear to auscultation. No accessory muscle or conversational dyspnea. No tachypnea. Abdomen soft nontender. No pulsatile mass appreciated. No diaphoresis or pallor. No pretibial edema. My plan: Symptomatic and supportive care. Appropriate labs and imaging as well as cardiology consultation    Electronically signed by Juliano Hernandez DO on 11/24/22 at 9:22 AM EST      [MS]   1688 EKG: This EKG is signed by emergency department physician. 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 Korina Restrepo spoke with Dr. Stacy French, cardiology, who noted it would be appropriate to transfer patient to [address] [place] [address] for electrophysiology evaluation. [CP]   9205 Spoke with Dr. Munira Oropeza. Patient's son. Discussed case. [MS]   1 Spoke with NP for Dr. Donald Caldwell who agreed to admit patient. [CP]   80 Spoke with Dr. Kasandra Severs, who stated he will evaluate and follow pt at [facility]; he advised to place pt on isoproterenol infusion and admit to ICU. Also advised to keep pacing pads on patient in case of decompensation and maintain bed rest.  [CP]   1106   Critical care:    Please note that the withdrawal or failure to initiate urgent interventions for this patient would likely result in a life threatening deterioration or permanent disability.   Systems at risk for deterioration include: Sick sinus syndrome requiring multiple consults including cardiology and electrophysiology. Patient started on isoproterenol drip and placed on pacer pads. Patient is going to be transferred to [facility] for higher level of care. Accordingly this patient received 47 minutes of critical care time, excluding separately billable procedures. [MS]   1110 Patient resting in bed comfortably. Has no complaints at this time. [MS]   [phone] Spoke with Dr. All Phillips (intensivist at [facility]). Discussed case. He will provide consultation   [MS]   1150 Nursing presenting rhythm strips. Pt had roughly 4 second pause in beats on two strips. Otherwise, Afib with SVR. Unchanged from EKG from initial presentation. [CP]   Martell Chung with Dr. Rosalina Rodriguez giving update on patient and plan. [CP]   1216 Patient's heart rate is improving on the isoproterenol drip. [MS]      ED Course User Index  [CP] Josefina Beckford DO  [MS] Hardeep Bonds,       --------------------------------------------- PAST HISTORY ---------------------------------------------  Past Medical History:  has a past medical history of A-fib ([facility] Diabetes mellitus ([facility] H pylori ulcer, Hyperlipidemia, Hypertension, PONV (postoperative nausea and vomiting), PUD (peptic ulcer disease), and Urinary frequency. Past Surgical History:  has a past surgical history that includes Appendectomy; hernia repair (more 10 yrs); pr egd transoral biopsy single/multiple (7/26/2012); pr colonoscopy flx dx w/collj spec when pfrmd (7/26/2012); Endoscopy, colon, diagnostic (7/26/2012); Colonoscopy (7/206/2012); Cataract removal with implant (Left, 12/17/15); Cystoscopy; eye surgery (Bilateral); and Cystoscopy (N/A, 6/2/2020). Social History:  reports that he quit smoking about 12 years ago. His smoking use included cigarettes. He started smoking about 72 years ago. He has a 60.00 pack-year smoking history.  He has never used smokeless tobacco. He reports current alcohol use. He reports that he does not use drugs. Family History: family history is not on file. The patients home medications have been reviewed. Allergies: Patient has no known allergies.     -------------------------------------------------- RESULTS -------------------------------------------------    Lab  Results for orders placed or performed during the hospital encounter of 11/24/22   CBC with Auto Differential   Result Value Ref Range    WBC 10.9 4.5 - 11.5 E9/L    RBC 4.45 3.80 - 5.80 E12/L    Hemoglobin 12.2 (L) 12.5 - 16.5 g/dL    Hematocrit 37.6 37.0 - 54.0 %    MCV 84.5 80.0 - 99.9 fL    MCH 27.4 26.0 - 35.0 pg    MCHC 32.4 32.0 - 34.5 %    RDW 14.2 11.5 - 15.0 fL    Platelets 065 145 - 059 E9/L    MPV 9.2 7.0 - 12.0 fL    Neutrophils % 78.3 43.0 - 80.0 %    Immature Granulocytes % 0.9 0.0 - 5.0 %    Lymphocytes % 13.9 (L) 20.0 - 42.0 %    Monocytes % 5.1 2.0 - 12.0 %    Eosinophils % 1.3 0.0 - 6.0 %    Basophils % 0.5 0.0 - 2.0 %    Neutrophils Absolute 8.55 (H) 1.80 - 7.30 E9/L    Immature Granulocytes # 0.10 E9/L    Lymphocytes Absolute 1.52 1.50 - 4.00 E9/L    Monocytes Absolute 0.56 0.10 - 0.95 E9/L    Eosinophils Absolute 0.14 0.05 - 0.50 E9/L    Basophils Absolute 0.06 0.00 - 0.20 E9/L   Comprehensive Metabolic Panel w/ Reflex to MG   Result Value Ref Range    Sodium 131 (L) 132 - 146 mmol/L    Potassium reflex Magnesium 4.5 3.5 - 5.0 mmol/L    Chloride 95 (L) 98 - 107 mmol/L    CO2 25 22 - 29 mmol/L    Anion Gap 11 7 - 16 mmol/L    Glucose 268 (H) 74 - 99 mg/dL    BUN 29 (H) 6 - 23 mg/dL    Creatinine 2.1 (H) 0.7 - 1.2 mg/dL    Est, Glom Filt Rate 30 >=60 mL/min/1.73    Calcium 9.3 8.6 - 10.2 mg/dL    Total Protein 6.9 6.4 - 8.3 g/dL    Albumin 4.2 3.5 - 5.2 g/dL    Total Bilirubin 0.4 0.0 - 1.2 mg/dL    Alkaline Phosphatase 130 (H) 40 - 129 U/L    ALT 39 0 - 40 U/L    AST 30 0 - 39 U/L   Magnesium   Result Value Ref Range    Magnesium 1.8 1.6 - 2.6 mg/dL Troponin   Result Value Ref Range    Troponin, High Sensitivity 45 (H) 0 - 11 ng/L   Troponin   Result Value Ref Range    Troponin, High Sensitivity 43 (H) 0 - 11 ng/L       Radiology  CT Head W/O Contrast   Final Result   No acute intracranial abnormality. CT CSpine W/O Contrast   Final Result   No acute abnormality of the cervical spine. Multilevel degenerative changes of the cervical spine visualized most   prominent at C3-C4 and C5-C6. Circumferential opacification visualized in the sphenoid sinus. XR CHEST PORTABLE   Final Result   Congestive changes demonstrate no change.             ------------------------- NURSING NOTES AND VITALS REVIEWED ---------------------------  Date / Time Roomed:  11/24/2022  8:58 AM  ED Bed Assignment:  EBONI/EBONI    The nursing notes within the ED encounter and vital signs as below have been reviewed.    Patient Vitals for the past 24 hrs:   BP Temp Pulse Resp SpO2 Weight   11/24/22 1231 -- -- 78 13 99 % --   11/24/22 1230 -- -- 83 23 97 % --   11/24/22 1229 -- -- 84 26 99 % --   11/24/22 1228 -- -- 65 26 98 % --   11/24/22 1227 -- -- 72 21 97 % --   11/24/22 1226 -- -- 74 20 97 % --   11/24/22 1225 -- -- 78 22 98 % --   11/24/22 1224 -- -- 77 12 99 % --   11/24/22 1203 -- -- 89 (!) 9 99 % --   11/24/22 1202 -- -- 89 17 99 % --   11/24/22 1201 -- -- 77 (!) 6 99 % --   11/24/22 1200 -- -- 73 19 99 % --   11/24/22 1159 -- -- 66 13 98 % --   11/24/22 1158 -- -- 66 11 98 % --   11/24/22 1157 -- -- 66 16 98 % --   11/24/22 1156 -- -- 67 11 96 % --   11/24/22 1150 -- -- 62 22 100 % --   11/24/22 1149 -- -- 63 22 100 % --   11/24/22 1148 -- -- 74 10 99 % --   11/24/22 1147 -- -- 71 (!) 7 99 % --   11/24/22 1146 -- -- 74 15 99 % --   11/24/22 1145 -- -- 67 17 97 % --   11/24/22 1144 -- -- 61 17 98 % --   11/24/22 1143 (!) 170/80 -- 63 19 98 % --   11/24/22 1142 -- -- 70 16 98 % --   11/24/22 1141 -- -- 73 11 98 % --   11/24/22 1051 -- -- 57 22 99 % --   11/24/22 1050 -- -- (!) 43 17 93 % --   11/24/22 1049 -- -- (!) 48 14 97 % --   11/24/22 1048 -- -- (!) 32 15 96 % --   11/24/22 1047 -- -- 67 17 95 % --   11/24/22 1046 -- -- 52 17 97 % --   11/24/22 1045 -- -- 50 15 94 % --   11/24/22 1028 (!) 180/80 -- -- -- -- --   11/24/22 1001 (!) 170/60 -- (!) 46 21 91 % --   11/24/22 1000 -- -- (!) 48 14 95 % --   11/24/22 0959 -- -- (!) 38 12 91 % --   11/24/22 0958 -- -- (!) 37 13 92 % --   11/24/22 0941 -- -- 75 18 96 % --   11/24/22 0940 -- -- (!) 39 25 97 % --   11/24/22 0936 -- -- (!) 37 14 97 % --   11/24/22 0935 -- -- (!) 43 14 98 % --   11/24/22 0934 -- -- (!) 48 17 98 % --   11/24/22 0933 -- -- 52 14 98 % --   11/24/22 0932 -- -- 67 15 97 % --   11/24/22 0931 -- -- 61 23 96 % --   11/24/22 0930 -- -- (!) 46 17 96 % --   11/24/22 0929 -- -- 58 14 96 % --   11/24/22 0928 -- -- (!) 46 13 98 % --   11/24/22 0927 -- -- 60 15 99 % --   11/24/22 0926 -- -- 73 24 99 % --   11/24/22 0925 -- -- 66 16 97 % --   11/24/22 0924 -- -- 70 22 98 % --   11/24/22 0923 (!) 160/90 -- -- -- -- --   11/24/22 0917 (!) 160/70 -- -- -- -- --   11/24/22 0910 -- 98 °F (36.7 °C) -- -- -- --   11/24/22 0905 -- -- 67 18 94 % 185 lb (83.9 kg)       Oxygen Saturation Interpretation: Normal      ------------------------------------------ PROGRESS NOTES ------------------------------------------  Re-evaluation(s):  Time: 1004. Patients symptoms show no change  Repeat physical examination is not changed    Time: 1205. Patients symptoms show no change  Repeat physical examination is improved    I have spoken with the patient and daughter  and discussed todays results, in addition to providing specific details for the plan of care and counseling regarding the diagnosis and prognosis. Their questions are answered at this time and they are agreeable with the plan. I have discussed the risks and benefits of transfer and they wish to proceed with the transfer.       --------------------------------- ADDITIONAL PROVIDER NOTES ---------------------------------  Consultations:  Spoke with Dr. Sharyle Banco (EP). Discussed case. They will provide consultation. Spoke with Dr. Michelle Richard (Intensivist) and NP for Dr. Ada Jeff (Medicine). Discussed case. They will admit this patient. Reason for transfer: Sick Sinus Syndrome/Afib with SVR. This patient's ED course included: a personal history and physicial examination, multiple bedside re-evaluations, IV medications, cardiac monitoring, continuous pulse oximetry, and complex medical decision making and emergency management    This patient has remained hemodynamically stable during their ED course. Please note that the withdrawal or failure to initiate urgent interventions for this patient would likely result in a life threatening deterioration or permanent disability. Accordingly this patient received 47 minutes of critical care time, excluding separately billable procedures. Clinical Impression  1. Sick sinus syndrome ([address]    2. Syncope and collapse    3. Chest pain, unspecified type    4. Abrasion          Disposition  Patient's disposition: Transfer to [facility]. Transferred by: ALS. Patient's condition is stable. Patient was seen and evaluated by myself and my attending Robinson Grace DO. Assessment and Plan discussed with attending provider, please see attestation for final plan of care. This note was done using dictation software and there may be some grammatical errors associated with this.     Alberteen Aase, DO Alberteen Aase, DO  Resident  11/24/22 2574

## 2022-11-24 NOTE — PROGRESS NOTES
Patient admitted to room [phone] from [address] [place] [address],[address] ED. Patient oriented to room, call light, bed rails, lights and bathroom. Patient instructed about the schedule of the day including: vital sign frequency, lab draws, possible tests, frequency of MD and staff rounds, including RN/MD rounding together at bedside, daily weights, and I &O's. Patient instructed on the bed alarm in place, patient aware of placement and reason. Bed side telemetry in place, patient aware of placement and reason. Bed locked, in lowest position, side rails up 3/4, call light within reach. Patient has a urinal within reach. Patient denies any unmet needs at this time.

## 2022-11-24 NOTE — ED NOTES
Pt on monitor, ekg done, labs sent,  at bedside, [address] in place     Nicole Check, [facility]  11/24/22 3972

## 2022-11-24 NOTE — PROGRESS NOTES
Report received from Ezra Olivas at [address] [facility],[address]. Patient to be transported here to room 4422.

## 2022-11-25 ENCOUNTER — ANESTHESIA (OUTPATIENT)
Dept: CARDIAC CATH/INVASIVE PROCEDURES | Age: 87
End: 2022-11-25
Payer: MEDICARE

## 2022-11-25 ENCOUNTER — ANESTHESIA EVENT (OUTPATIENT)
Dept: CARDIAC CATH/INVASIVE PROCEDURES | Age: 87
End: 2022-11-25
Payer: MEDICARE

## 2022-11-25 PROBLEM — I44.2 THIRD DEGREE HEART BLOCK (HCC): Status: ACTIVE | Noted: 2022-11-25

## 2022-11-25 PROCEDURE — 2580000003 HC RX 258: Performed by: STUDENT IN AN ORGANIZED HEALTH CARE EDUCATION/TRAINING PROGRAM

## 2022-11-25 PROCEDURE — 2580000003 HC RX 258: Performed by: ANESTHESIOLOGIST ASSISTANT

## 2022-11-25 PROCEDURE — 6360000002 HC RX W HCPCS: Performed by: STUDENT IN AN ORGANIZED HEALTH CARE EDUCATION/TRAINING PROGRAM

## 2022-11-25 PROCEDURE — 6360000002 HC RX W HCPCS: Performed by: ANESTHESIOLOGIST ASSISTANT

## 2022-11-25 PROCEDURE — 6370000000 HC RX 637 (ALT 250 FOR IP): Performed by: INTERNAL MEDICINE

## 2022-11-25 PROCEDURE — 2500000003 HC RX 250 WO HCPCS: Performed by: ANESTHESIOLOGIST ASSISTANT

## 2022-11-25 RX ORDER — SODIUM CHLORIDE 9 MG/ML
INJECTION, SOLUTION INTRAVENOUS CONTINUOUS PRN
Status: DISCONTINUED | OUTPATIENT
Start: 2022-11-25 | End: 2022-11-25 | Stop reason: SDUPTHER

## 2022-11-25 RX ORDER — PROPOFOL 10 MG/ML
INJECTION, EMULSION INTRAVENOUS PRN
Status: DISCONTINUED | OUTPATIENT
Start: 2022-11-25 | End: 2022-11-25 | Stop reason: SDUPTHER

## 2022-11-25 RX ORDER — LIDOCAINE HYDROCHLORIDE 20 MG/ML
INJECTION, SOLUTION INFILTRATION; PERINEURAL PRN
Status: DISCONTINUED | OUTPATIENT
Start: 2022-11-25 | End: 2022-11-25 | Stop reason: SDUPTHER

## 2022-11-25 RX ADMIN — SODIUM CHLORIDE: 9 INJECTION, SOLUTION INTRAVENOUS at 13:54

## 2022-11-25 RX ADMIN — INSULIN LISPRO 4 UNITS: 100 INJECTION, SOLUTION INTRAVENOUS; SUBCUTANEOUS at 15:01

## 2022-11-25 RX ADMIN — PROPOFOL 10 MG: 10 INJECTION, EMULSION INTRAVENOUS at 14:17

## 2022-11-25 RX ADMIN — PROPOFOL 20 MCG/KG/MIN: 10 INJECTION, EMULSION INTRAVENOUS at 14:18

## 2022-11-25 RX ADMIN — LIDOCAINE HYDROCHLORIDE 50 MG: 20 INJECTION, SOLUTION INFILTRATION; PERINEURAL at 14:17

## 2022-11-25 RX ADMIN — CEFAZOLIN 2000 MG: 2 INJECTION, POWDER, FOR SOLUTION INTRAMUSCULAR; INTRAVENOUS at 14:14

## 2022-11-25 ASSESSMENT — LIFESTYLE VARIABLES: SMOKING_STATUS: 0

## 2022-11-25 NOTE — PROGRESS NOTES
Patient has returned from PACU. Patient's BP elevated and medicated as ordered. Patient complaining of a headache. Patient's family at bed side. Assisted patient with use of the urinal. Patient has an immobilizer on the left upper extremity.

## 2022-11-25 NOTE — PROGRESS NOTES
[facility]  Internal Medicine Residency Program  Consult Note  MICU    Patient:  Loyd Schneider 80 y.o. male MRN: [id number]     Date of Service: 11/24/2022    Hospital Day: 1      Chief complaint: Syncope  History of Present Illness   The patient is a 80 y.o. male with past medical history atrial fibrillation on Eliquis and Toprol, hypertension, diabetes mellitus, and CKD stage IV who was transferred to [facility] MICU from [address] [place] [address]. The patient presented to [place] via EMS due to complaints of syncope episode. The patient mentioned that he has been having increased dizziness for the past 1 week. He has had intermittent dizzy episodes for years but they got worse in last 1 week. Today in the morning when he woke up he walked from his bedroom to the washroom and suddenly passed out. He denied any prodrome before passing out. He denied any warning signs before it. He denied any bowel or bladder incontinence during the syncope episode. He was only passed out for a couple of seconds before he regained consciousness. He denied any jerking movements as well as biting of his tongue. When he fell down to the floor he hit his head and started bleeding. He called his daughter as he was bleeding. While daughter arrived the patient had another syncope episode that lasted for a few seconds as well. The patient also complained about chest pain and shortness of breath during this episode. He denied any radiation of the chest pain, any diaphoresis, nausea or vomiting. EMS was called by daughter. When the EMS arrived an EKG was done, it showed complete heart block as well as bradycardia. In the [address] [place] [address] ED the patient blood pressure was stable, but was having A. fib with slow ventricular response, heart blocks as well as bradycardia. He was given IV fentanyl without any relief of pain and 2 doses of morphine for some pain relief. CBC was unremarkable. CMP revealed creatinine of 2.1 which is a baseline and alkaline phosphatase of 130. Initial troponin 45 and repeat of 43. CT head unremarkable, CT C-spine with degenerative changes but otherwise unremarkable, CXR revealing congestive changes. Dr. Rosa Mane was consulted who recommended that the patient would be transferred to the [facility] for electrophysiology evaluation. Dr. Cecelia Augustin was consulted for EP, who advised to start the patient on isoproterenol infusion. The infusion improved the heart rate to 60s. Past Medical History:      Diagnosis Date    A-fib ([facility] [address]     Diabetes mellitus ([facility] [address]     H pylori ulcer 8/22/2012    Hyperlipidemia     Hypertension     PONV (postoperative nausea and vomiting)     PUD (peptic ulcer disease) 7/26/2012    Urinary frequency        Past Surgical History:        Procedure Laterality Date    APPENDECTOMY      CATARACT REMOVAL WITH IMPLANT Left 12/17/15    COLONOSCOPY  7/206/2012    CYSTOSCOPY      multiple    CYSTOSCOPY N/A 6/2/2020    CYSTOSCOPY RETROGRADE PYELOGRAM BOTOX  INJECTION (100 UNITS) performed by Kumar Stoddard MD at [address] [facility], [place], DIAGNOSTIC  7/26/2012    peptic ulcer disease    EYE SURGERY Bilateral     cataracts    HERNIA REPAIR  more 10 yrs    left     RI COLONOSCOPY FLX DX W/COLLJ SPEC WHEN PFRMD  7/26/2012         RI EGD TRANSORAL BIOPSY SINGLE/MULTIPLE  7/26/2012            Medications Prior to Admission:    Prior to Admission medications    Medication Sig Start Date End Date Taking?  Authorizing Provider   repaglinide (PRANDIN) 1 MG tablet Take 1 mg by mouth 2 times daily (with meals) 11/3/22   Historical Provider, MD   lisinopril-hydroCHLOROthiazide (PRINZIDE;ZESTORETIC) 20-12.5 MG per tablet Take 1 tablet by mouth daily At [facility] 11/3/22   Historical Provider, MD   dextran 70-hypromellose (TEARS NATURALE) 0.1-0.3 % SOLN opthalmic solution Place 1 drop into both eyes as needed    Historical Provider, MD   gabapentin (NEURONTIN) 100 MG capsule Take 1 capsule by mouth in the morning and 1 capsule before bedtime. Do all this for 30 days. 8/9/22 11/24/22  Siomara Linder DO   SITagliptin (JANUVIA) 100 MG tablet Take 1 tablet by mouth in the morning. Patient taking differently: Take 50 mg by mouth 2 times daily (with meals) 8/10/22   Siomara Linder DO   metoprolol succinate (TOPROL XL) 25 MG extended release tablet Take 1 tablet by mouth at bedtime 8/9/22   Siomara Linder, DO   amLODIPine (NORVASC) 5 MG tablet Take 1 tablet by mouth in the morning. 8/10/22   Siomara Linder, DO   epoetin samantha-epbx (RETACRIT) 3000 UNIT/ML SOLN injection Inject 1 mL into the skin three times a week  Patient not taking: Reported on 11/24/2022 8/10/22   Siomara Linder DO   docusate sodium (COLACE, DULCOLAX) 100 MG CAPS Take 100 mg by mouth in the morning and 100 mg before bedtime. 8/9/22   Siomara Linder, DO   tamsulosin (FLOMAX) 0.4 MG capsule Take 1 capsule by mouth in the morning and 1 capsule before bedtime. Patient taking differently: Take 0.4 mg by mouth nightly 8/9/22   Siomara Linder DO   pantoprazole (PROTONIX) 40 MG tablet Take 1 tablet by mouth every morning (before breakfast)  Patient not taking: Reported on 11/24/2022 8/10/22   Siomara Linder DO   magnesium oxide (MAG-OX) 400 MG tablet Take 400 mg by mouth in the morning.  5/19/22   Historical Provider, MD   allopurinol (ZYLOPRIM) 100 MG tablet Take 300 mg by mouth three times a week M W F 3/24/21   Historical Provider, MD   metoprolol succinate (TOPROL XL) 50 MG extended release tablet 25 mg 4/8/21   Historical Provider, MD   umeclidinium-vilanterol (ANORO ELLIPTA) 62.5-25 MCG/INH AEPB inhaler Inhale 1 puff into the lungs daily   Patient not taking: Reported on 11/24/2022 9/29/20   Historical Provider, MD   albuterol sulfate HFA (PROVENTIL HFA) 108 (90 Base) MCG/ACT inhaler Inhale 2 puffs into the lungs every 6 hours as needed for Wheezing  Patient not taking: Reported on 11/24/2022 5/12/21   Andre Akers MD colchicine-probenecid 0.5-500 MG per tablet Take 1 tablet by mouth 2 times daily   Patient not taking: Reported on 8/2/2022 4/24/21 8/9/22  Historical Provider, MD   apixaban (ELIQUIS) 2.5 MG TABS tablet Take 2.5 mg by mouth 2 times daily    Historical Provider, MD   magnesium gluconate (MAGONATE) 500 MG tablet Take 500 mg by mouth daily  Patient not taking: Reported on 8/2/2022 8/9/22  Historical Provider, MD   Multiple Vitamins-Minerals (THERAPEUTIC MULTIVITAMIN-MINERALS) tablet Take 1 tablet by mouth daily  Patient not taking: Reported on 11/24/2022    Historical Provider, MD   Cholecalciferol (VITAMIN D3 PO) Take by mouth daily  Patient not taking: No sig reported    Historical Provider, MD   torsemide (DEMADEX) 10 MG tablet Take 10 mg by mouth in the morning. M-W-F.  8/9/22  Historical Provider, MD   zinc 50 MG CAPS Take by mouth daily  Patient not taking: Reported on 8/2/2022 8/9/22  Historical Provider, MD   glimepiride (AMARYL) 4 MG tablet Take 4 mg by mouth in the morning and at bedtime    Historical Provider, MD       Allergies:  Patient has no known allergies. Social History:   TOBACCO:   reports that he quit smoking about 12 years ago. His smoking use included cigarettes. He started smoking about 72 years ago. He has a 60.00 pack-year smoking history. He has never used smokeless tobacco.  ETOH:   reports current alcohol use. OCCUPATION:      Family History:   No family history on file. REVIEW OF SYSTEMS:    Constitutional: No fever, no chills, no change in weight; good appetite  HEENT: No blurred vision, no ear problems, no sore throat, no rhinorrhea. Respiratory: No cough, no sputum production, positive pleuritic chest pain, shortness of breath. Cardiology: No angina, no dyspnea on exertion, no paroxysmal nocturnal dyspnea, no orthopnea, no palpitation, no leg swelling. Gastroenterology: No dysphagia, no reflux; no abdominal pain, no nausea or vomiting; no constipation or diarrhea.  No Lab Results   Component Value Date/Time     11/24/2022 05:31 PM    K 4.0 11/24/2022 05:31 PM    K 4.5 11/24/2022 09:08 AM    CL 99 11/24/2022 05:31 PM    CO2 23 11/24/2022 05:31 PM    BUN 29 11/24/2022 05:31 PM       Imaging Studies:     CT Head W/O Contrast    Result Date: 11/24/2022  EXAMINATION: CT OF THE HEAD WITHOUT CONTRAST  11/24/2022 9:38 am TECHNIQUE: CT of the head was performed without the administration of intravenous contrast. Automated exposure control, iterative reconstruction, and/or weight based adjustment of the mA/kV was utilized to reduce the radiation dose to as low as reasonably achievable. COMPARISON: None. HISTORY: ORDERING SYSTEM PROVIDED HISTORY: LOC + fall + strike to head; concern for bleed TECHNOLOGIST PROVIDED HISTORY: Reason for exam:->LOC + fall + strike to head; concern for bleed Has a \"code stroke\" or \"stroke alert\" been called? ->No Decision Support Exception - unselect if not a suspected or confirmed emergency medical condition->Emergency Medical Condition (MA) FINDINGS: BRAIN/VENTRICLES: No evidence of parenchymal hemorrhages or contusions. No evidence of intra or extra-axial fluid collection is seen. Scattered areas of low attenuation are visualized in the periventricular and subcortical white matter suggestive of chronic microvascular disease. No evidence of acute territorial infarct is seen. Prominence of the ventricles and sulci is visualized suggestive of chronic atrophic brain changes. No evidence of intracranial mass or mass effect, no evidence of midline shift is seen. No evidence of sellar or parasellar mass is visualized. ORBITS: The visualized portion of the orbits demonstrate no acute abnormality. SINUSES: The visualized paranasal sinuses and mastoid air cells demonstrate no acute abnormality. SOFT TISSUES/SKULL:  No acute abnormality of the visualized skull or soft tissues. No acute intracranial abnormality.      CT CSpine W/O Contrast    Result Date: 11/24/2022  EXAMINATION: CT OF THE CERVICAL SPINE WITHOUT CONTRAST 11/24/2022 9:38 am TECHNIQUE: CT of the cervical spine was performed without the administration of intravenous contrast. Multiplanar reformatted images are provided for review. Automated exposure control, iterative reconstruction, and/or weight based adjustment of the mA/kV was utilized to reduce the radiation dose to as low as reasonably achievable. COMPARISON: None. HISTORY: ORDERING SYSTEM PROVIDED HISTORY: LOC + Fall + head strike; Concern for fx TECHNOLOGIST PROVIDED HISTORY: Reason for exam:->LOC + Fall + head strike; Concern for fx Decision Support Exception - unselect if not a suspected or confirmed emergency medical condition->Emergency Medical Condition (MA) FINDINGS: Straightening of the lumbar the columns of the cervical spine is present. No evidence of spondylolisthesis is seen. Multilevel degenerative endplate changes visualized, no evidence of compression deformity of the cervical vertebral bodies. No evidence of fracture or traumatic subluxation is seen. Decreased intervertebral disc height visualized most prominent at C5-C6 and C3-C4. Multilevel degenerative disc osteophyte complex, uncovertebral disease and hypertrophic changes in the facet joints is seen. Calcification visualized within the spinal canal superimposed over the falciform ligament at the level of the C5 vertebral body. Otherwise abnormal density visualized in the cervical spinal canal. The neck soft tissues are unremarkable. Circumferential opacification visualized in the sphenoid sinus. Limited evaluation of the upper lung fields is unremarkable bilaterally. No acute abnormality of the cervical spine. Multilevel degenerative changes of the cervical spine visualized most prominent at C3-C4 and C5-C6. Circumferential opacification visualized in the sphenoid sinus.      XR CHEST PORTABLE    Result Date: 11/24/2022  EXAMINATION: ONE XRAY VIEW OF THE CHEST 11/24/2022 9:30 am COMPARISON: 08/08/2022 HISTORY: ORDERING SYSTEM PROVIDED HISTORY: CP + SOB TECHNOLOGIST PROVIDED HISTORY: Reason for exam:->CP + SOB FINDINGS: Poor inspiratory effort. The cardiomediastinal silhouette is slightly prominent but demonstrates no significant change in comparison to the prior study. Prominence of the bronchovascular interstitial lung markings is visualized bilaterally with scattered areas of patchy airspace opacification seen. Linea subsegmental atelectatic streaks are visualized. The costophrenic angles are clear with no evidence of pleural effusion seen. No evidence of pneumothorax or parenchymal lung mass. The osseous structures are without acute process. Congestive changes demonstrate no change. .XR CHEST PORTABLE   Performed: 11/24/22     Impression: Congestive changes demonstrate no change. Resident's Assessment and Plan     Pranay Chappell is a 80 y.o. male who was admitted to the ICU due to concerns of syncope. Neurology  The patient is alert and oriented x3. Cardiology  Syncope episode 2/2 complete heart block  Continue isoproterenol drip. Wean as possible. EP consulted. No plans to do transvenous pacer today. Plans to do a permanent pacemaker tomorrow. N.p.o. at midnight. Follow Lyme serology, TSH, ischemia work-up. Monitor on telemetry. Cardiology consulted, follow recommendations. 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 sinus syndrome versus Lyme disease carditis versus ischemic (less likely)  Follow Lyme serology, TSH and ischemic work-up. Cardiology consulted, follow recommendations. Patient to get permanent pacemaker per electrophysiology. Nonspecific chest pain 2/2 pleuritis versus rib fracture versus contusion  Patient has tenderness on bilateral lower ribs. Follow ischemia work-up. Follow-up Lyme work-up, to rule out Lyme pleuritis. Give lidocaine patch if needed.     History of atrial fibrillation  The patient is on metoprolol and Eliquis at home.  Patient's [id number] score is 4. Continue on Eliquis per electrophysiology. Hold metoprolol for now. History of hypertension  Continue home amlodipine and lisinopril. Monitor blood pressure. Pulmonology  Pruritus? Renal  History of CKD stage IV  Continue home lisinopril. Follow BMP daily. GI  History of non-insulin-dependent diabetes mellitus  Start the patient on low-dose sliding scale, titrate as needed. POCT ACHS. PT/OT evaluation: Not indicated at this time. DVT prophylaxis/ GI prophylaxis: On Eliquis 2.5 mg twice daily/Protonix  Disposition: Continue ICU care. Jone Sicard, MD, PGY-1   Attending physician: Dr. Norm Christianson  Department of Pulmonary, Critical Care and Sleep Medicine  [facility]  Department of Internal Medicine      During multidisciplinary team rounds Michael Fang is a 80 y.o. male was seen, examined and discussed. This is confirmation that I have personally seen and examined the patient and that the key elements of the encounter were performed by me (> 85 % time). The medications & laboratory data was discussed and adjusted where necessary. The radiographic images were reviewed or with radiologist or consultant if felt dis-concordant with the exam or history. The above findings were corroborated, plans confirmed and changes made if needed. Family is updated at the bedside as available. Key issues of the case were discussed among consultants. Critical Care time is documented if appropriate.       Melissa Best DO, FACP, FCCP, [facility],

## 2022-11-25 NOTE — DISCHARGE INSTRUCTIONS
_______________________________________________________________________________  [facility] Pacemaker Instructions    Medications: no medication changes from Dr Kyler Michelle. Incision Care:  Priscille Carroll Aquacel dressing: located over your incision. Remove in 1 week (Friday 12/2/22). Surgical glue: located under the brown dressings and over your incision. This glue is there to prevent infection. Do NOT scrub, itch, pick, or remove the glue as this could lead to infection. The glue will fall off on its own over the next 6 weeks. Daily monitoring: check incision sites on chest daily. Notify the office at [phone] if you develop any redness, swelling, drainage, warmth, or fever greater than 100 degrees. Bathing:  No soaking in a bathtub, hot tub, or pool for 6 weeks. You may shower, but do not scrub at the incision site as it has surgical glue covering it, which is there to prevent infection. Activity:  You may resume normal activity with left arm restrictions. Arm restrictions:  Arm immobilizer: Wear the arm immobilizer at all times for 48 hours except to shower, toilet, and eat. After 48 hours, wear the arm immobilizer at night for the next 4 weeks. After 4 weeks you do not need to wear the arm immobilizer anymore. The immobilizer should be loose, not tight in order to avoid restriction of blood flow. Avoid pulling yourself up with your arm, pushing or stretching motions. Do not raise left elbow higher than shoulder height and do not lift anything weighing more than 3 pounds for 6 weeks. Do not do any activities such as golfing, vacuuming, or mowing the lawn for 6 weeks. Prevent any hard blows to the pacemaker. Driving: You may drive if device function is stable at time of device check in 14 days, if you feel up to it. Start with local/short trips to familiar places. Avoid highway/ high-speed driving for the first few days after you resume driving.   DO NOT drive until you have stopped taking prescription pain medications. What else do I need to know about my pacemaker? Do not carry a cellular phone in a pocket within six inches of the pacemaker as this can affect the operation of the unit. Hold the cell phone on the opposite ear as the pacemaker insertion site. Do not walk through airport security systems as these devices can detect the metal in your pacemaker and possibly alarm. The new full body scanners are safe for both pacemakers. Keep your pacemaker ID card with you at all times and ask security to clear you with a hand search only if a full body scanner is not available. Do not let security use a hand-held wand. Avoid passing through metal detectors (for example in shopping malls and schools). If you must pass through, walk quickly through. These detectors can interfere with the pacemaker function. Do not touch the spark plug or distributor on running car or  - turn engine off first.  Avoid holding magnets near your pacemaker. Special Instructions:  Let your dentist, doctor, or medical specialist know that you have a pacemaker so precautions can be taken to protect the device. Your device is considered to be MRI conditional; meaning that under certain circumstances, MRI exams may be performed after 6 weeks post implantation  Your pacemaker may set off a metal detector, such as those used in airports and courtrooms. Let security know that you have a pacemaker & show them your card. Remote Monitor: Many of these monitors have a delay of 3-6 months currently, in some cases an jesus alberto can be used with newer smartphones to provide monitoring services. Please discuss this with the device clinic at your follow up appointment in 2 weeks. ID Card: You will have a temporary ID card until a permanent card is sent to you by the device company. The permanent card will look like a 's license or credit card and should arrive within 8 weeks.   Carry your ID card with you at all times    Follow Up:  Device clinic: Call  [phone] to schedule an incision check and brief pacemaker evaluation in 2 weeks. Dr Chino Baker: You will be contacted within the next 5 business days to schedule an appointment with Dr Chino Baker office in 3 months. If you are not contacted within 5 business days, please call to schedule appointment ([phone]). [facility]  [address] [place] [address] [facility]  [phone]   _______________________________________________________________________________

## 2022-11-25 NOTE — ANESTHESIA PRE PROCEDURE
Department of Anesthesiology  Preprocedure Note       Name:  Wang Davey   Age:  80 y.o.  :  10/27/1933                                          MRN:  [id number]         Date:  2022      Surgeon: * Surgery not found *    Procedure:     Medications prior to admission:   Prior to Admission medications    Medication Sig Start Date End Date Taking? Authorizing Provider   repaglinide (PRANDIN) 1 MG tablet Take 1 mg by mouth 2 times daily (with meals) 11/3/22   Historical Provider, MD   lisinopril-hydroCHLOROthiazide (PRINZIDE;ZESTORETIC) 20-12.5 MG per tablet Take 1 tablet by mouth daily At [facility] 11/3/22   Historical Provider, MD   dextran 70-hypromellose (TEARS NATURALE) 0.1-0.3 % SOLN opthalmic solution Place 1 drop into both eyes as needed    Historical Provider, MD   gabapentin (NEURONTIN) 100 MG capsule Take 1 capsule by mouth in the morning and 1 capsule before bedtime. Do all this for 30 days. 22  Arben Beltran, DO   SITagliptin (JANUVIA) 100 MG tablet Take 1 tablet by mouth in the morning. Patient taking differently: Take 50 mg by mouth 2 times daily (with meals) 8/10/22   Arben Beltran DO   metoprolol succinate (TOPROL XL) 25 MG extended release tablet Take 1 tablet by mouth at bedtime 22   Arben Beltran, DO   amLODIPine (NORVASC) 5 MG tablet Take 1 tablet by mouth in the morning. 8/10/22   Arben Beltran DO   epoetin samantha-epbx (RETACRIT) 3000 UNIT/ML SOLN injection Inject 1 mL into the skin three times a week  Patient not taking: Reported on 2022 8/10/22   Arben Beltran DO   docusate sodium (COLACE, DULCOLAX) 100 MG CAPS Take 100 mg by mouth in the morning and 100 mg before bedtime. 22   Arben Beltran DO   tamsulosin (FLOMAX) 0.4 MG capsule Take 1 capsule by mouth in the morning and 1 capsule before bedtime.   Patient taking differently: Take 0.4 mg by mouth nightly 22   Arben Beltran DO   pantoprazole (PROTONIX) 40 MG tablet Take 1 tablet by mouth every morning (before breakfast)  Patient not taking: Reported on 11/24/2022 8/10/22   Angela Rajan DO   magnesium oxide (MAG-OX) 400 MG tablet Take 400 mg by mouth in the morning. 5/19/22   Historical Provider, MD   allopurinol (ZYLOPRIM) 100 MG tablet Take 300 mg by mouth three times a week M W F 3/24/21   Historical Provider, MD   metoprolol succinate (TOPROL XL) 50 MG extended release tablet 25 mg 4/8/21   Historical Provider, MD   umeclidinium-vilanterol (ANORO ELLIPTA) 62.5-25 MCG/INH AEPB inhaler Inhale 1 puff into the lungs daily   Patient not taking: Reported on 11/24/2022 9/29/20   Historical Provider, MD   albuterol sulfate HFA (PROVENTIL HFA) 108 (90 Base) MCG/ACT inhaler Inhale 2 puffs into the lungs every 6 hours as needed for Wheezing  Patient not taking: Reported on 11/24/2022 5/12/21   Staci Teixeira MD   colchicine-probenecid 0.5-500 MG per tablet Take 1 tablet by mouth 2 times daily   Patient not taking: Reported on 8/2/2022 4/24/21 8/9/22  Historical Provider, MD   apixaban (ELIQUIS) 2.5 MG TABS tablet Take 2.5 mg by mouth 2 times daily    Historical Provider, MD   magnesium gluconate (MAGONATE) 500 MG tablet Take 500 mg by mouth daily  Patient not taking: Reported on 8/2/2022 8/9/22  Historical Provider, MD   Multiple Vitamins-Minerals (THERAPEUTIC MULTIVITAMIN-MINERALS) tablet Take 1 tablet by mouth daily  Patient not taking: Reported on 11/24/2022    Historical Provider, MD   Cholecalciferol (VITAMIN D3 PO) Take by mouth daily  Patient not taking: No sig reported    Historical Provider, MD   torsemide (DEMADEX) 10 MG tablet Take 10 mg by mouth in the morning.  M-W-F.  8/9/22  Historical Provider, MD   zinc 50 MG CAPS Take by mouth daily  Patient not taking: Reported on 8/2/2022 8/9/22  Historical Provider, MD   glimepiride (AMARYL) 4 MG tablet Take 4 mg by mouth in the morning and at bedtime    Historical Provider, MD       Current medications:    Current Facility-Administered Medications   Medication Dose Route Frequency Provider Last Rate Last Admin    perflutren lipid microspheres (DEFINITY) injection 1.5 mL  1.5 mL IntraVENous ONCE PRN Irlanda Dross, DO        hydrALAZINE (APRESOLINE) injection 10 mg  10 mg IntraVENous Q4H PRN Chandni L Lopez, DO        insulin lispro (HUMALOG) injection vial 0-4 Units  0-4 Units SubCUTAneous Nightly Chandni L Lopez, DO        insulin lispro (HUMALOG) injection vial 0-8 Units  0-8 Units SubCUTAneous TID WC Chandni L Lopez, DO   6 Units at 11/25/22 0926    isoproterenol (ISUPREL) 1 mg in dextrose 5 % 250 mL infusion  1-10 mcg/min IntraVENous Continuous Popeye Butler MD 30 mL/hr at 11/25/22 0925 2 mcg/min at 11/25/22 0925    sodium chloride flush 0.9 % injection 5-40 mL  5-40 mL IntraVENous 2 times per day Jeremy Gomez MD   10 mL at 11/25/22 0926    sodium chloride flush 0.9 % injection 5-40 mL  5-40 mL IntraVENous PRN Jeremy Gomez MD   10 mL at 11/25/22 0515    polyethylene glycol (GLYCOLAX) packet 17 g  17 g Oral Daily PRN Jeremy Gomez MD        pantoprazole (PROTONIX) 40 mg in sodium chloride (PF) 0.9 % 10 mL injection  40 mg IntraVENous Daily Jeremy Gomez MD   40 mg at 11/25/22 0926    lidocaine 4 % external patch 1 patch  1 patch TransDERmal Daily Jeremy Gomez MD   1 patch at 11/25/22 0925    glucose chewable tablet 16 g  4 tablet Oral PRN Jeremy Gomez MD        dextrose bolus 10% 125 mL  125 mL IntraVENous PRN Jeremy Gomez MD        Or    dextrose bolus 10% 250 mL  250 mL IntraVENous PRN Jeremy Gomez MD        glucagon (rDNA) injection 1 mg  1 mg SubCUTAneous PRN Jeremy Gomez MD        dextrose 10 % infusion   IntraVENous Continuous PRN Jeremy Gomez MD        allopurinol (ZYLOPRIM) tablet 300 mg  300 mg Oral Once per day on Mon Wed Fri Jeremy Gomez MD        amLODIPine (NORVASC) tablet 5 mg  5 mg Oral Daily Jeremy Gomez MD   5 mg at 11/24/22 2029    [Held by provider] apixaban (ELIQUIS) tablet 2.5 mg  2.5 mg Oral BID Jeremy Gomez MD   2.5 mg at 11/24/22 2029    gabapentin (NEURONTIN) capsule 100 mg  100 mg Oral BID Del Fournier MD   100 mg at 11/24/22 2029    tamsulosin (FLOMAX) capsule 0.4 mg  0.4 mg Oral Nightly Del Fournier MD   0.4 mg at 11/24/22 2029    Arformoterol Tartrate (BROVANA) nebulizer solution 15 mcg  15 mcg Nebulization BID Del Fournier MD   15 mcg at 11/25/22 0946    ipratropium-albuterol (DUONEB) nebulizer solution 1 ampule  1 ampule Inhalation Q4H WA Del Fournier MD   1 ampule at 11/25/22 0946    [Held by provider] lisinopril (PRINIVIL;ZESTRIL) tablet 20 mg  20 mg Oral Daily Del Fournier MD   20 mg at 11/24/22 2029    And    [Held by provider] hydroCHLOROthiazide (HYDRODIURIL) tablet 12.5 mg  12.5 mg Oral Daily Del Fournier MD   12.5 mg at 11/24/22 2029    0.9 % sodium chloride infusion   IntraVENous PRN Simone Rich MD        potassium chloride (KLOR-CON M) extended release tablet 40 mEq  40 mEq Oral PRN Simone Rich MD        Or    potassium bicarb-citric acid (EFFER-K) effervescent tablet 40 mEq  40 mEq Oral PRN Simone Rich MD        Or    potassium chloride 10 mEq/100 mL IVPB (Peripheral Line)  10 mEq IntraVENous PRN Simone Rich MD        ondansetron (ZOFRAN-ODT) disintegrating tablet 4 mg  4 mg Oral Q8H PRN Simone Rich MD        acetaminophen (TYLENOL) tablet 650 mg  650 mg Oral Q6H PRN Simone Rich MD        Or    acetaminophen (TYLENOL) suppository 650 mg  650 mg Rectal Q6H PRN Simone Rich MD        aluminum & magnesium hydroxide-simethicone (MAALOX) 200-200-20 MG/5ML suspension 30 mL  30 mL Oral Q6H PRN Simone Rich MD           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    Esophagitis-grade 1 K20.90    AP (angina pectoris) ([address] Y53.6    Systolic hypertension D54    Combined forms of age-related cataract of left eye H25.812    Anemia, chronic renal failure, stage 4 (severe) (HCC) N18.4, D63.1    Sepsis secondary to CAP (HCC) A41.9    Pneumonia of left lung due to infectious organism J18.9    A-fib (HCC) I48.91    Atrial fibrillation (HCC) I48.91       Past Medical History:        Diagnosis Date    [facility] [address] [address]     Diabetes mellitus ([facility] [address]     H pylori ulcer 2012    Hyperlipidemia     Hypertension     PONV (postoperative nausea and vomiting)     PUD (peptic ulcer disease) 2012    Urinary frequency        Past Surgical History:        Procedure Laterality Date    APPENDECTOMY      CATARACT REMOVAL WITH IMPLANT Left 12/17/15    COLONOSCOPY      CYSTOSCOPY      multiple    CYSTOSCOPY N/A 2020    CYSTOSCOPY RETROGRADE PYELOGRAM BOTOX  INJECTION (100 UNITS) performed by Tauna Dancer, MD at [facility]  2012    peptic ulcer disease    EYE SURGERY Bilateral     cataracts    HERNIA REPAIR  more 10 yrs    left     OK COLONOSCOPY FLX DX W/COLLJ SPEC WHEN PFRMD  2012         OK EGD TRANSORAL BIOPSY SINGLE/MULTIPLE  2012            Social History:    Social History     Tobacco Use    Smoking status: Former     Packs/day: 1.00     Years: 60.00     Pack years: 60.00     Types: Cigarettes     Start date: 1950     Quit date: 2010     Years since quittin.3    Smokeless tobacco: Never   Substance Use Topics    Alcohol use: Yes     Comment: 1/2 glass  wine  everyday                                Counseling given: Not Answered      Vital Signs (Current):   Vitals:    22 0900 22 0946 22 0947 22 1000   BP: (!) 176/53   (!) 156/64   Pulse: 66 80 71 76   Resp: 17 27 15 18   Temp:       TempSrc:       SpO2: 98% 98% 98% 97%   Weight:       Height:                                                  BP Readings from Last 3 Encounters:   22 (!) 156/64   22 (!) 170/80   22 (!) 152/72       NPO Status:                                                                                 BMI:   Wt Readings from Last 3 Encounters:   11/25/22 186 lb 1.1 oz (84.4 kg)   11/24/22 185 lb (83.9 kg)   08/09/22 194 lb 9 oz (88.3 kg)     Body mass index is 27.48 kg/m². CBC:   Lab Results   Component Value Date/Time    WBC 8.9 11/25/2022 05:03 AM    RBC 3.66 11/25/2022 05:03 AM    HGB 10.3 11/25/2022 05:03 AM    HCT 31.1 11/25/2022 05:03 AM    MCV 85.0 11/25/2022 05:03 AM    RDW 14.3 11/25/2022 05:03 AM     11/25/2022 05:03 AM       CMP:   Lab Results   Component Value Date/Time     11/25/2022 05:03 AM    K 4.5 11/25/2022 05:03 AM    K 4.5 11/24/2022 09:08 AM    CL 99 11/25/2022 05:03 AM    CO2 25 11/25/2022 05:03 AM    BUN 31 11/25/2022 05:03 AM    CREATININE 2.1 11/25/2022 05:03 AM    GFRAA 30 09/16/2022 08:24 AM    LABGLOM 30 11/25/2022 05:03 AM    GLUCOSE 300 11/25/2022 05:03 AM    PROT 6.9 11/24/2022 09:08 AM    CALCIUM 9.0 11/25/2022 05:03 AM    BILITOT 0.4 11/24/2022 09:08 AM    ALKPHOS 130 11/24/2022 09:08 AM    AST 30 11/24/2022 09:08 AM    ALT 39 11/24/2022 09:08 AM       POC Tests: No results for input(s): POCGLU, POCNA, POCK, POCCL, POCBUN, POCHEMO, POCHCT in the last 72 hours. Coags:   Lab Results   Component Value Date/Time    PROTIME 14.6 11/25/2022 05:06 AM    INR 1.3 11/25/2022 05:06 AM    APTT 31.4 11/25/2022 05:03 AM       HCG (If Applicable): No results found for: PREGTESTUR, PREGSERUM, HCG, HCGQUANT     ABGs: No results found for: PHART, PO2ART, RVQ4EKU, AMQ6UPN, BEART, J6USBIQB     Type & Screen (If Applicable):  No results found for: LABABO, LABRH    Drug/Infectious Status (If Applicable):  No results found for: HIV, HEPCAB    COVID-19 Screening (If Applicable):   Lab Results   Component Value Date/Time    COVID19 Not Detected 11/24/2022 05:31 PM           Anesthesia Evaluation  Patient summary reviewed   history of anesthetic complications: PONV.   Airway: Mallampati: II  TM distance: >3 FB   Neck ROM: full  Mouth opening: > = 3 FB   Dental: normal exam         Pulmonary: breath sounds clear to auscultation      (-) not a current smoker                           Cardiovascular:    (+) hypertension:, angina:, dysrhythmias: atrial fibrillation, hyperlipidemia        Rhythm: irregular  Rate: normal  Echocardiogram reviewed               ROS comment: TTE 11/25/22  Summary  Ejection fraction is visually estimated at 60 to 65%. Mild concentric left ventricular hypertrophy. Normal right ventricular size and function. Mild thickening of the mitral valve leaflets. Mild mitral regurgitation is present. The aortic valve appears moderately sclerotic. Mild to moderate aortic regurgitation is noted. Mild to moderate aortic stenosis with JOÃO of 1.3 to 1.8cm2, peak velocity of 3.1m/s mean gradient of 18mmHg and Dimensionless  index of 0.43 . Mild tricuspid regurgitation. RVSP is 39 mmHg +RAP. Mild pulmonic regurgitation present. 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 Endo/Other:    (+) DiabetesType II DM, , blood dyscrasia: anticoagulation therapy and anemia:., .                 Abdominal:             Vascular: negative vascular ROS. Other Findings:           Anesthesia Plan      MAC     ASA 3       Induction: intravenous. Anesthetic plan and risks discussed with patient. Plan discussed with CRNA.                     Ana Rosa Chan MD   11/25/2022

## 2022-11-25 NOTE — CONSULTS
[address] DEPARTMENT/DIVISION OF CARDIOLOGY  Inpatient consultation Report  PATIENT: Raymond Ellington RECORD NUMBER: [id number]  DATE OF SERVICE:  11/25/2022  ATTENDING ELECTROPHYSIOLOGIST:  Winston Troncoso DO   REFERRING PHYSICIAN: Carl Gunderson DO and Ayanna Vale MD  CHIEF COMPLAINT: AV block, AF    HPI: Flora Aranda is a 80 y.o. male with a history of nonvalvular paroxysmal AF, mild-moderate AS, HTN, CKD-4, DM, GI bleed 2/2 PUD/esophagitis/H. 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 He is managed by Dr. Jacque Sexton with Norvasc 5 mg daily, apixaban 2.5 mg twice daily, lisinopril 20 mg daily, HCTZ 12.5 mg daily, metoprolol XL 50 mg every morning and 25 mg every night, and PPI. In 2012, he was diagnosed with GI bleed due to peptic ulcer disease. Additionally, on his EGD and colonoscopy he was noted to have grade 1 esophagitis, multiple superficial antral/duodenal ulcers, rectosigmoid AVMs, and internal/external hemorrhoids. His home medications at that time included aspirin 81 mg daily. In 2020 or earlier, patient was diagnosed with nonvalvular paroxysmal AF, which was treated with [address] and beta-blocker. On 11/24/22, he presented with chief complaint of syncope, described as sudden loss of consciousness. He reportedly hit his head when he lost consciousness. Additionally hit his chest and complained of chest pain. Elevated troponin, but serial troponins without any significant change. He was diagnosed with AF with third-degree AV block and a ventricular escape rhythm in the 30s. He was treated with isoproterenol. He was then transferred to [facility] for EP evaluation/management per request of his established cardiologist Dr. Jacque Sexton. EP service is now consulted by admitting physician for further evaluation management of AV block and AF.   Patient reports recurrent syncopal events over the last several weeks. He denies any other complaints at this time. Prior cardiac testing:  -ECG (8/3/2022): At AF with ventricular rate of 92 bpm, bifascicular block (RBBB, LAFB, QRS D = 138 ms)  -TTE (2022): AF, LVEF = 74%, moderate concentric LVH, mild LAE, mild-moderate AS (mean gradient 8.5 mmHg, JOÃO = 1.2-1.5 cm2). -LHC (2020): LVEF = 60 to 65%, RCA dominant circulation, and 10-15 % mid LAD stenosis.  -Pharmacologic nuclear stress test (2018): LVEF = 63%, normal perfusion. Past Medical History:   Diagnosis Date    A-fib ([facility]     Diabetes mellitus ([facility]     H pylori ulcer 2012    Hyperlipidemia     Hypertension     PONV (postoperative nausea and vomiting)     PUD (peptic ulcer disease) 2012    Urinary frequency      Past Surgical History:   Procedure Laterality Date    APPENDECTOMY      CATARACT REMOVAL WITH IMPLANT Left 12/17/15    COLONOSCOPY      CYSTOSCOPY      multiple    CYSTOSCOPY N/A 2020    CYSTOSCOPY RETROGRADE PYELOGRAM BOTOX  INJECTION (100 UNITS) performed by Oswaldo Luther MD at [address] [facility] [place], [facility]  2012    peptic ulcer disease    EYE SURGERY Bilateral     cataracts    HERNIA REPAIR  more 10 yrs    left     AR COLONOSCOPY FLX DX W/COLLJ SPEC WHEN PFRMD  2012         AR EGD TRANSORAL BIOPSY SINGLE/MULTIPLE  2012           No family history on file.   There is no family history of sudden cardiac arrest    Social History     Tobacco Use    Smoking status: Former     Packs/day: 1.00     Years: 60.00     Pack years: 60.00     Types: Cigarettes     Start date: 1950     Quit date: 2010     Years since quittin.3    Smokeless tobacco: Never   Substance Use Topics    Alcohol use: Yes     Comment: 1/2 glass  wine  everyday       Current Facility-Administered Medications   Medication Dose Route Frequency Provider Last Rate Last Admin    isoproterenol (ISUPREL) 1 mg in dextrose 5 % 250 mL infusion  1-10 mcg/min IntraVENous Continuous Popeye Butler MD 30 mL/hr at 11/25/22 0057 2 mcg/min at 11/25/22 0057    sodium chloride flush 0.9 % injection 5-40 mL  5-40 mL IntraVENous 2 times per day Eddie Jimenez MD   10 mL at 11/24/22 2030    sodium chloride flush 0.9 % injection 5-40 mL  5-40 mL IntraVENous PRN Eddie Jimenez MD   10 mL at 11/25/22 0515    polyethylene glycol (GLYCOLAX) packet 17 g  17 g Oral Daily PRN Eddie Jimenez MD        pantoprazole (PROTONIX) 40 mg in sodium chloride (PF) 0.9 % 10 mL injection  40 mg IntraVENous Daily Eddie Jimenez MD   40 mg at 11/24/22 1803    lidocaine 4 % external patch 1 patch  1 patch TransDERmal Daily Eddie Jimenez MD   1 patch at 11/24/22 1803    insulin lispro (HUMALOG) injection vial 0-4 Units  0-4 Units SubCUTAneous TID WC Eddie Jimenez MD   1 Units at 11/24/22 1814    insulin lispro (HUMALOG) injection vial 0-4 Units  0-4 Units SubCUTAneous Nightly Eddie Jimenez MD        glucose chewable tablet 16 g  4 tablet Oral PRN Eddie Jimenez MD        dextrose bolus 10% 125 mL  125 mL IntraVENous PRN Eddie Jimenez MD        Or    dextrose bolus 10% 250 mL  250 mL IntraVENous PRN Eddie Jimenez MD        glucagon (rDNA) injection 1 mg  1 mg SubCUTAneous PRN Eddie Jimenez MD        dextrose 10 % infusion   IntraVENous Continuous PRN Eddie Jimenez MD        allopurinol (ZYLOPRIM) tablet 300 mg  300 mg Oral Once per day on Mon Wed Fri Eddie Jimenez MD        amLODIPine (NORVASC) tablet 5 mg  5 mg Oral Daily Eddie Jimenez MD   5 mg at 11/24/22 2029    [Held by provider] apixaban (ELIQUIS) tablet 2.5 mg  2.5 mg Oral BID Eddie Jimenez MD   2.5 mg at 11/24/22 2029    gabapentin (NEURONTIN) capsule 100 mg  100 mg Oral BID Eddie Jimenez MD   100 mg at 11/24/22 2029    [Held by provider] metoprolol succinate (TOPROL XL) extended release tablet 25 mg  25 mg Oral Nightly Eddie Jimenez MD        tamsulosin (FLOMAX) capsule 0.4 mg  0.4 mg Oral Nightly Eddie Jimenez MD   0.4 mg at 11/24/22 2029 Arformoterol Tartrate (BROVANA) nebulizer solution 15 mcg  15 mcg Nebulization BID Liz Burger MD   15 mcg at 11/24/22 2146    ipratropium-albuterol (DUONEB) nebulizer solution 1 ampule  1 ampule Inhalation Q4H WA Liz Burger MD   1 ampule at 11/24/22 2146    lisinopril (PRINIVIL;ZESTRIL) tablet 20 mg  20 mg Oral Daily Liz Burger MD   20 mg at 11/24/22 2029    And    hydroCHLOROthiazide (HYDRODIURIL) tablet 12.5 mg  12.5 mg Oral Daily Liz Burger MD   12.5 mg at 11/24/22 2029    0.9 % sodium chloride infusion   IntraVENous PRN Romulo Gupta MD        potassium chloride (KLOR-CON M) extended release tablet 40 mEq  40 mEq Oral PRN Romulo Gupta MD        Or    potassium bicarb-citric acid (EFFER-K) effervescent tablet 40 mEq  40 mEq Oral PRN Romulo Gupta MD        Or    potassium chloride 10 mEq/100 mL IVPB (Peripheral Line)  10 mEq IntraVENous PRN Romulo Gupta MD        ondansetron (ZOFRAN-ODT) disintegrating tablet 4 mg  4 mg Oral Q8H PRN Romulo Gupta MD        acetaminophen (TYLENOL) tablet 650 mg  650 mg Oral Q6H PRN Romulo Gupta MD        Or    acetaminophen (TYLENOL) suppository 650 mg  650 mg Rectal Q6H PRN Romulo Gupta MD        aluminum & magnesium hydroxide-simethicone (MAALOX) 200-200-20 MG/5ML suspension 30 mL  30 mL Oral Q6H PRN Romulo Gupta MD            No Known Allergies    ROS:   Constitutional: Negative for fever, activity change and appetite change. HENT: Negative for epistaxis. Eyes: Negative for diploplia, blurred vision. Respiratory: Negative for cough, chest tightness, shortness of breath and wheezing. Cardiovascular: pertinent positives in HPI  Gastrointestinal: Negative for abdominal pain and blood in stool. Genitourinary: Negative for hematuria and difficulty urinating. Musculoskeletal: Negative for myalgias and gait problem. Skin: Negative for color change and rash. Neurological: Negative for syncope and light-headedness.    Psychiatric/Behavioral: Negative for confusion and agitation. The patient is not nervous/anxious. Heme: no bleeding disorders, no melena or hematochezia  All other review of systems are negative     PHYSICAL EXAM:  Constitutional   Vitals:    11/25/22 0300 11/25/22 0400 11/25/22 0500 11/25/22 0600   BP: (!) 172/55 (!) 149/69 (!) 154/57 (!) 167/54   Pulse: 61 60 60 54   Resp: 18 16 17 19   Temp:  99.7 °F (37.6 °C)     TempSrc:  Temporal     SpO2: 94% 92%     Weight:    186 lb 1.1 oz (84.4 kg)    Well-developed, no acute distress, well groomed, head laceration bandaged  Eyes: conjunctivae normal, no xanthelasma   Ears, Nose, Throat: oral mucosa moist, no cyanosis   Neck: supple, no JVD, no bruits, no thyromegaly   CV: normal rate, irregular rhythm,  no murmurs, rubs, or gallops. PMI is nondisplaced, Peripheral pulses normal including carotid auscultation, no noted aortic bruit, bilateral femoral and pedal pulses are normal in quality  Lungs: clear to auscultation bilaterally, normal respiratory effort without used of accessory muscles, no wheezes  Abdomen: soft, non-tender, bowel sounds present, no masses or hepatomegaly   Extremities: no digital clubbing, no edema   Skin: warm, no rashes   Neuro/Psych: A&O x 3, normal mood and affect    Data:    Recent Labs     11/24/22  0908 11/24/22  1814 11/25/22  0503   WBC 10.9 9.9 8.9   HGB 12.2* 10.7* 10.3*   HCT 37.6 32.2* 31.1*    198 189     Recent Labs     11/24/22  0908 11/24/22  1731 11/25/22  0503   * 135 135   K 4.5 4.0 4.5   CL 95* 99 99   CO2 25 23 25   BUN 29* 29* 31*   CREATININE 2.1* 2.0* 2.1*   CALCIUM 9.3 9.1 9.0     No results for input(s): INR in the last 72 hours. Recent Labs     11/24/22  1731   TSH 3.970     Lab Results   Component Value Date/Time    MG 1.8 11/24/2022 09:08 AM     Telemetry: AF with ventricular rate of 50 - 90 bpm with intermittent AV block with ventricular escape rhythm. Assessment/plan:  3* AV block  - baseline bifascicular block.   - Bedrest.  - Pacing pads on patient. - Metoprolol held. Continue isoproterenol infusion.   - TTE to reassess LVEF/AS. - Plan for [employer] dual chamber pacemaker 11/25/22. Keep NPO. AVOID heparin products. I reviewed indications, material risks, and benefits with patient, as well as daughter Eugenio Mealing via phone). Patient and daughter are agreeable to proceed. - Stop isoproterenol infusion after pacemaker. nonvalvular paroxysmal AF  - Initially diagnosed in 2020 or earlier.  - CHADSVASC = (age, HTN). Recommend [address] in males with score of 2 or more. DOAC preferred. - Apixaban 2.5 mg BID held for pacemaker. Plan to restart [address] 24 - 48 hours after procedure. While on DOAC, recommend annual monitoring of CBC and CMP.  - Uncertain how long patient has been in AF. Consider DCCV ~6 weeks after pacemaker implant and reassess symptoms.  - Modifiable risk factors:  -- Obesity: BMI goal < 27.  -- SHAREE: consider SHAREE screening in future. -- HTN: BP goal < 130/80.  -- EtOH: continue to avoid. mild-moderate AS  - Repeat TTE to reassess. GI bleed 2/2 PUD/esophagitis/H. pylori (2012 EGD)  - Continue PPI. I have spent a total of 60 minutes of critical care time with the patient and his/her family reviewing the above stated recommendations. And a total of >50% of that time involved face-to-face time providing counseling and or coordination of care with the other providers. Thank you for allowing me to participate in your patient's care. Please call me if there are any questions.       Janeen Newman, DO   Cardiac Electrophysiology  [facility]  [facility]) [facility]icians

## 2022-11-25 NOTE — PROGRESS NOTES
PACU RN requesting insulin for patient. Evaristo up 8 units and sent to PACU with instructions that they would need to waste what was not needed once patient's blood glucose was resulted. Benoit MOORE was witness to this conversation.

## 2022-11-25 NOTE — PROGRESS NOTES
OT consult received and appreciated. Chart reviewed. Will hold evaluation as per nursing the pt is scheduled for OR today for pacemaker placement. Will evaluate at a later time. Thank you.  Anne Monte, OTR/L # [id number]

## 2022-11-25 NOTE — CARE COORDINATION
11/25: Transition of care:  Pt presented to the [address] [place] [address] for syncope episode. Pt was found to be in complete heart block & was transferred to [facility]. Pt is on 2L/NC at 92%, Iv Protonix, IV Isuprel & Po Eliquis (*old medication). Cm attempted to see pt but off the floor to the OR. Pt is for a pacemaker today. Per previous notes:  Pt's PCP is Dr. Hernán Mercedes uses the [facility] [address] [facility]. Pt lives with his daughter in a 2 story house. PTA pt was independent. Pt doesn't own any DME & has no hx of HHC/SNF. Will need PT/OT evals. Will need 02 testing. Needs are unclear. Sw/CM will continue to follow for dc planning.  Electronically signed by Es Clement RN on 11/25/2022 at 11:40 AM

## 2022-11-25 NOTE — ANESTHESIA POSTPROCEDURE EVALUATION
Department of Anesthesiology  Postprocedure Note    Patient: Randall Styles  MRN: [id number]  YOB: 1933  Date of evaluation: 11/25/2022      Procedure Summary     Date: 11/25/22 Room / Location: [facility]    Anesthesia Start: 1354 Anesthesia Stop: 1228    Procedure: PACEMAKER IMPLANT W/ANES Diagnosis:     Scheduled Providers:  Responsible Provider: Korina Restrepo MD    Anesthesia Type: MAC ASA Status: 3          Anesthesia Type: MAC    Gus Phase I:      Gus Phase II:        Anesthesia Post Evaluation    Patient location during evaluation: PACU  Patient participation: complete - patient participated  Level of consciousness: awake  Pain score: 3  Airway patency: patent  Nausea & Vomiting: no nausea and no vomiting  Complications: no  Cardiovascular status: blood pressure returned to baseline  Respiratory status: acceptable  Hydration status: euvolemic

## 2022-11-25 NOTE — PROGRESS NOTES
Hospitalist Progress Note      PCP: Rodriguez Mendoza MD    Date of Admission: 11/24/2022    Chief Complaint: syncope, heart block complete    Hospital Course:   80 y.o. male with past medical history of atrial fibrillation, DM hypertension . Patient is a transfer from [place] . He was seen there due to chest pain . He states that chest pain is located in the sternal area . He states  that he has shortness of breath with chest pain . He states that when chest pain occurred he felt like he was going to pass out . Patient had a syncopal episode and hit his head . Patient had a second syncopal episode  and after that family called ambulance . En route to ED patient was given 324 mg of ASA . In the ED patient was in Afib with slow ventricular rate having long pauses and bradycardic to the 30s . Lab data revealed sodium 135 potassium 4.1 BUN 29 Scr 2.0  glucose 244 Trop 45 >>43  WBC 9.9 HGB 10.7   Resp panel neg CT head cervical spine neg CXR congestive changes . Patient received fentanyl morphine and was placed on isoproterenol infusion. EP consulted-pacemaker placement. Lyme serology was ordered for possible carditis    Subjective: Patient was seen at bedside this morning in ICU. Does not complain of any shortness of breath but does mention some chest tightness. Discussed on the plan regarding pacemaker placement this morning.       Medications:  Reviewed    Infusion Medications    isoproterenol (ISUPREL) infusion 2 mcg/min (11/25/22 1104)    dextrose      sodium chloride       Scheduled Medications    insulin lispro  0-4 Units SubCUTAneous Nightly    insulin lispro  0-8 Units SubCUTAneous TID WC    sodium chloride flush  5-40 mL IntraVENous 2 times per day    pantoprazole (PROTONIX) 40 mg injection  40 mg IntraVENous Daily    lidocaine  1 patch TransDERmal Daily    allopurinol  300 mg Oral Once per day on Mon Wed Fri    amLODIPine  5 mg Oral Daily    [Held by provider] apixaban  2.5 mg Oral BID    gabapentin 100 mg Oral BID    tamsulosin  0.4 mg Oral Nightly    Arformoterol Tartrate  15 mcg Nebulization BID    ipratropium-albuterol  1 ampule Inhalation Q4H WA    [Held by provider] lisinopril  20 mg Oral Daily    And    [Held by provider] hydroCHLOROthiazide  12.5 mg Oral Daily     PRN Meds: perflutren lipid microspheres, hydrALAZINE, sodium chloride flush, polyethylene glycol, glucose, dextrose bolus **OR** dextrose bolus, glucagon (rDNA), dextrose, sodium chloride, potassium chloride **OR** potassium alternative oral replacement **OR** potassium chloride, ondansetron **OR** [DISCONTINUED] ondansetron, acetaminophen **OR** acetaminophen, aluminum & magnesium hydroxide-simethicone      Intake/Output Summary (Last 24 hours) at 11/25/2022 1107  Last data filed at 11/25/2022 1104  Gross per 24 hour   Intake 1546.27 ml   Output 1150 ml   Net 396.27 ml       Exam:    BP (!) 133/96   Pulse 68   Temp 99 °F (37.2 °C) (Temporal)   Resp 19   Ht 5' 9\" (1.753 m)   Wt 186 lb 1.1 oz (84.4 kg)   SpO2 96%   BMI 27.48 kg/m²     General appearance: No apparent distress, appears stated age and cooperative. HEENT: Pupils equal, round, and reactive to light. Conjunctivae/corneas clear. Neck: Supple, with full range of motion. No jugular venous distention. Trachea midline. Respiratory:  Normal respiratory effort. Clear to auscultation, bilaterally without Rales/Wheezes/Rhonchi. Cardiovascular: Regular rate and rhythm with normal S1/S2 without murmurs, rubs or gallops. Abdomen: Soft, non-tender, non-distended with normal bowel sounds. Musculoskeletal: No clubbing, cyanosis or edema bilaterally. Full range of motion without deformity. Skin: Skin color, texture, turgor normal.  No rashes or lesions.   Neurologic:  No focal deficits,  Psychiatric: Alert and oriented, thought content appropriate, normal insight    Labs:   Recent Labs     11/24/22  0908 11/24/22  1814 11/25/22  0503   WBC 10.9 9.9 8.9   HGB 12.2* 10.7* 10.3*   HCT 37.6 32.2* 31.1*    198 189     Recent Labs     11/24/22  0908 11/24/22  1731 11/25/22  0503   * 135 135   K 4.5 4.0 4.5   CL 95* 99 99   CO2 25 23 25   BUN 29* 29* 31*   CREATININE 2.1* 2.0* 2.1*   CALCIUM 9.3 9.1 9.0     Recent Labs     11/24/22  0908   AST 30   ALT 39   BILITOT 0.4   ALKPHOS 130*     Recent Labs     11/25/22  0506   INR 1.3     No results for input(s): Derick Robles in the last 72 hours.     Assessment/Plan:    Active Hospital Problems    Diagnosis Date Noted    A-fib [facility]) [I48.91] 11/24/2022     Priority: Medium    Atrial fibrillation [facility]) [I48.91] 11/24/2022     Priority: Medium   Degree heart block  Nonvalvular paroxysmal atrial fibrillation  Syncope and collapse  Hypertension  CKD stage III  Diabetes mellitus type 2  Anemia due to CKD    -Continue to monitor in ICU on telemetry  -EP consulted-currently placed on isoproterenol infusion-pacemaker placement-11/25/2022  -Echo ordered-shows an ejection fraction of 60 to 65%, moderately sclerotic aortic valve [stable from previous], mild tricuspid regurgitation, mild pulmonary regurgitation, mild to moderate aortic stenosis, aortic regurgitation  -Continue home medications for hypertension-amlodipine, lisinopril, hydrochlorothiazide  -Creatinine reviewed and at 2.1 which is around his baseline  -Continue sliding scale insulin  -Appreciate critical care management    DVT Prophylaxis:  Eliquis  Diet: Diet NPO  Code Status: Full Code    Dispo - continue ICU Care, Pacemaker placement     Tamara Padron MD

## 2022-11-25 NOTE — PLAN OF CARE
EKG performed by [facility] staff with no epic order on 11/24/2022. Perfect served Dr. Sean Poe to place order in care path. Spoke to Joss Gomez that no repeat needed with this order at this time.

## 2022-11-25 NOTE — H&P
Hospital Medicine History & Physical      PCP: Hubert Michel MD    Date of Admission: 11/24/2022    Date of Service: Pt seen/examined on  11/24/2022 nand Admitted to Inpatient with expected LOS greater than two midnights due to medical therapy. Chief Complaint:  Chest pain       History Of Present Illness:    80 y.o. male with past medical history of atrial fibrillation, DM hypertension . Patient is a transfer from [facility] . He was seen there due to chest pain . He states that chest pain is located in the sternal area . He states  that he has shortness of breath with chest pain . He states that when chest pain occurred he felt like he was going to pass out . Patient had a syncopal episode and hit his head . Patient had a second syncopal episode  and after that family called ambulance . En route to ED patient was given 324 mg of ASA . In the ED patient was in Afib with slow ventricular rate having long pauses and bradycardic to the 30s . Lab data revealed sodium 135 potassium 4.1 BUN 29 Scr 2.0  glucose 244 Trop 45 >>43  WBC 9.9 HGB 10.7   Resp panel neg CT head cervical spine neg CXR congestive changes . Patient received fentanyl morphine and was placed on isoproterenol infusion.  Patient will be admitted for further evaluation       Past Medical History:          Diagnosis Date    A-fib ([address]     Diabetes mellitus ([facility] [address]     H pylori ulcer 8/22/2012    Hyperlipidemia     Hypertension     PONV (postoperative nausea and vomiting)     PUD (peptic ulcer disease) 7/26/2012    Urinary frequency        Past Surgical History:          Procedure Laterality Date    APPENDECTOMY      CATARACT REMOVAL WITH IMPLANT Left 12/17/15    COLONOSCOPY  7/206/2012    CYSTOSCOPY      multiple    CYSTOSCOPY N/A 6/2/2020    CYSTOSCOPY RETROGRADE PYELOGRAM BOTOX  INJECTION (100 UNITS) performed by Albino Watts MD at [address] [facility], COLON, DIAGNOSTIC  7/26/2012    peptic ulcer disease    EYE SURGERY Bilateral cataracts    HERNIA REPAIR  more 10 yrs    left     WI COLONOSCOPY FLX DX W/COLLJ SPEC WHEN PFRMD  7/26/2012         WI EGD TRANSORAL BIOPSY SINGLE/MULTIPLE  7/26/2012            Medications Prior to Admission:      Prior to Admission medications    Medication Sig Start Date End Date Taking? Authorizing Provider   repaglinide (PRANDIN) 1 MG tablet Take 1 mg by mouth 2 times daily (with meals) 11/3/22   Historical Provider, MD   lisinopril-hydroCHLOROthiazide (PRINZIDE;ZESTORETIC) 20-12.5 MG per tablet Take 1 tablet by mouth daily At [facility] 11/3/22   Historical Provider, MD   dextran 70-hypromellose (TEARS NATURALE) 0.1-0.3 % SOLN opthalmic solution Place 1 drop into both eyes as needed    Historical Provider, MD   gabapentin (NEURONTIN) 100 MG capsule Take 1 capsule by mouth in the morning and 1 capsule before bedtime. Do all this for 30 days. 8/9/22 11/24/22  [facility], DO   SITagliptin (JANUVIA) 100 MG tablet Take 1 tablet by mouth in the morning. Patient taking differently: Take 50 mg by mouth 2 times daily (with meals) 8/10/22   [facility], DO   metoprolol succinate (TOPROL XL) 25 MG extended release tablet Take 1 tablet by mouth at bedtime 8/9/22   [facility], DO   amLODIPine (NORVASC) 5 MG tablet Take 1 tablet by mouth in the morning. 8/10/22   [facility], DO   epoetin samantha-epbx (RETACRIT) 3000 UNIT/ML SOLN injection Inject 1 mL into the skin three times a week  Patient not taking: Reported on 11/24/2022 8/10/22   [facility], DO   docusate sodium (COLACE, DULCOLAX) 100 MG CAPS Take 100 mg by mouth in the morning and 100 mg before bedtime. 8/9/22   [facility], DO   tamsulosin (FLOMAX) 0.4 MG capsule Take 1 capsule by mouth in the morning and 1 capsule before bedtime.   Patient taking differently: Take 0.4 mg by mouth nightly 8/9/22   [facility], DO   pantoprazole (PROTONIX) 40 MG tablet Take 1 tablet by mouth every morning (before breakfast)  Patient not taking: Reported on 11/24/2022 8/10/22   [facility], DO   magnesium oxide (MAG-OX) 400 MG tablet Take 400 mg by mouth in the morning. 5/19/22   Historical Provider, MD   allopurinol (ZYLOPRIM) 100 MG tablet Take 300 mg by mouth three times a week M W F 3/24/21   Historical Provider, MD   metoprolol succinate (TOPROL XL) 50 MG extended release tablet 25 mg 4/8/21   Historical Provider, MD   umeclidinium-vilanterol (ANORO ELLIPTA) 62.5-25 MCG/INH AEPB inhaler Inhale 1 puff into the lungs daily   Patient not taking: Reported on 11/24/2022 9/29/20   Historical Provider, MD   albuterol sulfate HFA (PROVENTIL HFA) 108 (90 Base) MCG/ACT inhaler Inhale 2 puffs into the lungs every 6 hours as needed for Wheezing  Patient not taking: Reported on 11/24/2022 5/12/21   Violeta Martin MD   colchicine-probenecid 0.5-500 MG per tablet Take 1 tablet by mouth 2 times daily   Patient not taking: Reported on 8/2/2022 4/24/21 8/9/22  Historical Provider, MD   apixaban (ELIQUIS) 2.5 MG TABS tablet Take 2.5 mg by mouth 2 times daily    Historical Provider, MD   magnesium gluconate (MAGONATE) 500 MG tablet Take 500 mg by mouth daily  Patient not taking: Reported on 8/2/2022 8/9/22  Historical Provider, MD   Multiple Vitamins-Minerals (THERAPEUTIC MULTIVITAMIN-MINERALS) tablet Take 1 tablet by mouth daily  Patient not taking: Reported on 11/24/2022    Historical Provider, MD   Cholecalciferol (VITAMIN D3 PO) Take by mouth daily  Patient not taking: No sig reported    Historical Provider, MD   torsemide (DEMADEX) 10 MG tablet Take 10 mg by mouth in the morning. M-W-F.  8/9/22  Historical Provider, MD   zinc 50 MG CAPS Take by mouth daily  Patient not taking: Reported on 8/2/2022 8/9/22  Historical Provider, MD   glimepiride (AMARYL) 4 MG tablet Take 4 mg by mouth in the morning and at bedtime    Historical Provider, MD       Allergies:  Patient has no known allergies.     Social History:      The patient currently lives with family     TOBACCO:   reports that he quit smoking about 12 hours. No results for input(s): Amber Snell in the last 72 hours. Urinalysis:      Lab Results   Component Value Date/Time    NITRU Negative 08/02/2022 11:48 AM    WBCUA 0-1 08/02/2022 11:48 AM    BACTERIA NONE SEEN 08/02/2022 11:48 AM    RBCUA 0-1 08/02/2022 11:48 AM    BLOODU Negative 08/02/2022 11:48 AM    SPECGRAV 1.025 08/02/2022 11:48 AM    GLUCOSEU Negative 08/02/2022 11:48 AM   CT HEAD   Impression   No acute intracranial abnormality. CT CERVICAL SPINE   No acute abnormality of the cervical spine. Multilevel degenerative changes of the cervical spine visualized most   prominent at C3-C4 and C5-C6. Circumferential opacification visualized in the sphenoid sinus. CXR   Congestive changes demonstrate no change. ASSESSMENT:    Active Hospital Problems    Diagnosis Date Noted    A-fib [facility]) [I48.91] 11/24/2022     Priority: Medium       PLAN:    Sick sinus syndrome :  patient had two syncopal episodes at home . EKG revealed atrial fibrillation with slow ventricular response long pauses and HR in the 30s  CXR revealed congestive changes Trop 45 >> 43  Admit ICU vitals telemetry c/w isoproterenol infusion EP for pacemaker in am   and cardiology consult TSH     Chest pain : Trop 45>> 43  check TSH  Lyme serology was ordered for possible carditis Lidocaine patch PRN    Syncope and collapse : se to bradycardia CT head neg and cervical spine no fracture     Atrial fibrillation : Toprol XL held   CHADVASC score 4 C/w Eliquis     Hypertension : C/w Norvasc Lisinopril HCTZ     CKD stage 3b : Scr 2.0 stable follows with nephrology       History of Fluid overload :  patient was admitted in August 2022   due to fluid overload CXR reveals congestive changes .  2D echo 8/22 revealed EF 74 % C/w  HCTZ monitor weight     Type 2 Diabetes : sliding scale and poct glucose     Anemia : likely due to CKD stable                 DVT Prophylaxis: Eliquis   Diet: Diet NPO  ADULT DIET; Regular; 3 carb choices (45 gm/meal)  Code Status: Full Code    PT/OT Eval Status: ordered     Dispo - likely home        Lamine Garcia MD

## 2022-11-25 NOTE — PROGRESS NOTES
[address] [facility]  Department of Internal Medicine   Internal Medicine Residency   MICU Progress Note    Patient:  Monet Garnica 80 y.o. male  MRN: [id number]     Date of Service: 2022    Allergy: Patient has no known allergies. Subjective   This morning the patient was seen and examined at bedside in no acute distress. His only complaints this morning are headache and rib pain. He was started on lidocaine patch overnight. Otherwise no major events overnight.     24h change: HR stable overnight on isoproterenol   ROS: Denies Fever/chills/CP/SOB/N/V/D/C/Dysuria/Blood in stool or urine  Objective     VITAL SIGNS:  BP (!) 200/60   Pulse 68   Temp 99 °F (37.2 °C) (Temporal)   Resp 19   Ht 5' 9\" (1.753 m)   Wt 186 lb 1.1 oz (84.4 kg)   SpO2 96%   BMI 27.48 kg/m²   Tmax over 24 hours:  Temp (24hrs), Av.4 °F (36.9 °C), Min:97.4 °F (36.3 °C), Max:99.7 °F (37.6 °C)      Patient Vitals for the past 6 hrs:   BP Temp Temp src Pulse Resp SpO2   22 1228 (!) 200/60 -- -- -- -- --   22 1224 (!) 204/60 -- -- -- -- --   22 1100 (!) 133/96 -- -- 68 19 96 %   22 1000 (!) 156/64 -- -- 76 18 97 %   22 0947 -- -- -- 71 15 98 %   22 0946 -- -- -- 80 27 98 %   22 0900 (!) 176/53 -- -- 66 17 98 %   22 0800 (!) 171/49 99 °F (37.2 °C) Temporal 63 18 98 %         Intake/Output Summary (Last 24 hours) at 2022 1320  Last data filed at 2022 1104  Gross per 24 hour   Intake 1546.27 ml   Output 1150 ml   Net 396.27 ml     Wt Readings from Last 2 Encounters:   22 186 lb 1.1 oz (84.4 kg)   22 185 lb (83.9 kg)     Body mass index is 27.48 kg/m².         I & O - 24hr:   Intake/Output Summary (Last 24 hours) at 2022 1320  Last data filed at 2022 1104  Gross per 24 hour   Intake 1546.27 ml   Output 1150 ml   Net 396.27 ml       Physical Exam:  General Appearance: alert, appears stated age, and cooperative  Neck: no adenopathy, no carotid bruit, no JVD, supple, symmetrical, trachea midline, and thyroid not enlarged, symmetric, no tenderness/mass/nodules  Lung: clear to auscultation bilaterally  Heart: regular rate and rhythm, S1, S2 normal, no murmur, click, rub or gallop  Abdomen: soft, non-tender; bowel sounds normal; no masses,  no organomegaly  Extremities:  extremities normal, atraumatic, no cyanosis or edema  Musculoskeletal: No joint swelling, no muscle tenderness. ROM normal in all joints of extremities. Neurologic: Mental status: Alert, oriented, thought content appropriate    Lines     site day    Art line   None    TLC None    PICC None    Hemoaccess None      VENT SETTINGS (Comprehensive) (if applicable): Additional Respiratory Assessments  Heart Rate: 68  Resp: 19  SpO2: 96 %    ABGs:   No results for input(s): PH, PCO2, PO2, HCO3, BE, O2SAT in the last 72 hours.     Laboratory findings:  Complete Blood Count:   Recent Labs     11/24/22  0908 11/24/22  1814 11/25/22  0503   WBC 10.9 9.9 8.9   HGB 12.2* 10.7* 10.3*   HCT 37.6 32.2* 31.1*    198 189        Last 3 Blood Glucose:   Recent Labs     11/24/22  0908 11/24/22  1731 11/25/22  0503   GLUCOSE 268* 244* 300*        PT/INR:    Lab Results   Component Value Date/Time    PROTIME 14.6 11/25/2022 05:06 AM    INR 1.3 11/25/2022 05:06 AM     PTT:    Lab Results   Component Value Date/Time    APTT 31.4 11/25/2022 05:03 AM       Comprehensive Metabolic Profile:   Recent Labs     11/24/22  0908 11/24/22  1731 11/25/22  0503   * 135 135   K 4.5 4.0 4.5   CL 95* 99 99   CO2 25 23 25   BUN 29* 29* 31*   CREATININE 2.1* 2.0* 2.1*   GLUCOSE 268* 244* 300*   CALCIUM 9.3 9.1 9.0   PROT 6.9  --   --    LABALBU 4.2  --   --    BILITOT 0.4  --   --    ALKPHOS 130*  --   --    AST 30  --   --    ALT 39  --   --       Magnesium:   Lab Results   Component Value Date/Time    MG 1.8 11/25/2022 05:03 AM     Phosphorus:   Lab Results   Component Value Date/Time    PHOS 4.6 08/09/2022 05:02 AM Ionized Calcium: No results found for: CAION   Troponin: No results for input(s): TROPONINI in the last 72 hours. Medications     Infusions: (Fluid, Sedation, Vasopressors)    Vasopressors  None  Sedation  None    Nutrition: NPO      ATB:   Antibiotics  Days   Cefazolin prior to procedure            Cultures: Pending    Imaging     CT Head W/O Contrast    Result Date: 11/24/2022  No acute intracranial abnormality. CT CSpine W/O Contrast    Result Date: 11/24/2022  No acute abnormality of the cervical spine. Multilevel degenerative changes of the cervical spine visualized most prominent at C3-C4 and C5-C6. Circumferential opacification visualized in the sphenoid sinus. XR CHEST PORTABLE    Result Date: 11/24/2022  Congestive changes demonstrate no change. Resident's Assessment and Plan     Rafi UZMA De Leon   , 80 y.o. , male came with CC : Syncope     has a past medical history of A-fib ([facility] [address] Diabetes mellitus ([facility] [address] H pylori ulcer, Hyperlipidemia, Hypertension, PONV (postoperative nausea and vomiting), PUD (peptic ulcer disease), and Urinary frequency.        Days since Admission: 11/24/2022  Days on Ventilator : None    Consults:   EP    Assessment:  Neurology  Alert and oriented  Able speak and follow commands     Cardiology  Syncope episode 2/2 complete heart block   Initiated on isoproterenol drip prior to pacemaker placement   He was taken to EP today   Follow Lyme serology   TSH wnl     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 sinus syndrome vs Lyme disease vs carditis vs ischemia   Follow Lyme serology, TSH wnl   The patient was taken to pacemaker placement today     Hx of Afib- on Eliquis, held prior to pacemaker placement   [id number] score is 4   EP following, metoprolol held     Hx of HTN - on amlodipine and lisinopril   Lisinopril held, monitor BP     Renal  Hx of CKD stage IV  Continue home BP s/p pacemaker placement     Endocrine  Hx of NIDDM  Started on LDSS  Hypoglycemia protocol on   Glucose ACHS     PT/OT: Prior to discharge   DVT ppx: Eliquis/held due to surgery   GI ppx:     Code Status: Full code     Disposition: Continue current care     Renetta Morales MD, PGY-1    Attending physician: Dr. Perla Barber     NOTE: This report was transcribed using voice recognition software. Every effort was made to ensure accuracy; however, inadvertent computerized transcription errors may be present. [place]  Department of Pulmonary, Critical Care and Sleep Medicine  [address] [facility]  Department of Internal Medicine      During multidisciplinary team rounds Caridad Mejia is a 80 y.o. male was seen, examined and discussed. This is confirmation that I have personally seen and examined the patient and that the key elements of the encounter were performed by me (> 85 % time). The medications & laboratory data was discussed and adjusted where necessary. The radiographic images were reviewed or with radiologist or consultant if felt dis-concordant with the exam or history. The above findings were corroborated, plans confirmed and changes made if needed. Family is updated at the bedside as available. Key issues of the case were discussed among consultants. Critical Care time is documented if appropriate.       Sandra Trinidad, , FACP, FCCP, [place],

## 2022-11-26 PROBLEM — I44.39 HIGH-GRADE ATRIOVENTRICULAR BLOCK: Status: ACTIVE | Noted: 2022-01-01

## 2022-11-26 NOTE — PLAN OF CARE
Problem: Chronic Conditions and Co-morbidities  Goal: Patient's chronic conditions and co-morbidity symptoms are monitored and maintained or improved  11/26/2022 1409 by Carolyn Miller RN  Outcome: Progressing  11/26/2022 0601 by Ricardo Fletcher RN  Outcome: Progressing  Flowsheets (Taken 11/25/2022 2000)  Care Plan - Patient's Chronic Conditions and Co-Morbidity Symptoms are Monitored and Maintained or Improved: Monitor and assess patient's chronic conditions and comorbid symptoms for stability, deterioration, or improvement     Problem: Discharge Planning  Goal: Discharge to home or other facility with appropriate resources  Outcome: Progressing     Problem: Pain  Goal: Verbalizes/displays adequate comfort level or baseline comfort level  11/26/2022 1409 by Carolyn Miller RN  Outcome: Progressing  11/26/2022 0601 by Ricardo Fletcher RN  Outcome: Progressing  Flowsheets (Taken 11/25/2022 2000)  Verbalizes/displays adequate comfort level or baseline comfort level:   Encourage patient to monitor pain and request assistance   Assess pain using appropriate pain scale   Administer analgesics based on type and severity of pain and evaluate response   Implement non-pharmacological measures as appropriate and evaluate response     Problem: Safety - Adult  Goal: Free from fall injury  11/26/2022 1409 by Carolyn Miller RN  Outcome: Progressing  Flowsheets (Taken 11/26/2022 1405)  Free From Fall Injury: Instruct family/caregiver on patient safety  11/26/2022 0601 by Ricardo Fletcher RN  Outcome: Progressing  Flowsheets  Taken 11/26/2022 0015  Free From Fall Injury: Instruct family/caregiver on patient safety  Taken 11/25/2022 2000  Free From Fall Injury: Instruct family/caregiver on patient safety     Problem: ABCDS Injury Assessment  Goal: Absence of physical injury  11/26/2022 1409 by Carolyn Miller RN  Outcome: Progressing  Flowsheets (Taken 11/26/2022 1405)  Absence of Physical Injury: Implement safety measures based on patient assessment  11/26/2022 0601 by Ricardo Fletcher RN  Outcome: Progressing  Flowsheets  Taken 11/26/2022 0015  Absence of Physical Injury: Implement safety measures based on patient assessment  Taken 11/25/2022 2000  Absence of Physical Injury: Implement safety measures based on patient assessment

## 2022-11-26 NOTE — PROGRESS NOTES
[facility]  CARDIAC ELECTROPHYSIOLOGY DEPARTMENT/DIVISION OF CARDIOLOGY  Inpatient progress report  PATIENT: [address] Ellington RECORD NUMBER: [id number]  DATE OF SERVICE:  11/26/2022  ATTENDING ELECTROPHYSIOLOGIST:  Janey Eli DO   REFERRING PHYSICIAN: Elzbieta Desir DO and Mesha Simental MD  CHIEF COMPLAINT: AV block, AF    HPI: Leydi Dangelo is a 80 y.o. male with a history of nonvalvular paroxysmal AF, mild-moderate AS, HTN, CKD-4, DM, GI bleed 2/2 PUD/esophagitis/H. 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 He is managed by Dr. Vicki Diallo with Norvasc 5 mg daily, apixaban 2.5 mg twice daily, lisinopril 20 mg daily, HCTZ 12.5 mg daily, metoprolol XL 50 mg every morning and 25 mg every night, and PPI. In 2012, he was diagnosed with GI bleed due to peptic ulcer disease. Additionally, on his EGD and colonoscopy he was noted to have grade 1 esophagitis, multiple superficial antral/duodenal ulcers, rectosigmoid AVMs, and internal/external hemorrhoids. His home medications at that time included aspirin 81 mg daily. In 2020 or earlier, patient was diagnosed with nonvalvular paroxysmal AF, which was treated with [address] and beta-blocker. On 11/24/22, he presented with chief complaint of syncope, described as sudden loss of consciousness. He reportedly hit his head when he lost consciousness. Additionally hit his chest and complained of chest pain. Elevated troponin, but serial troponins without any significant change. He was diagnosed with AF with third-degree AV block and a ventricular escape rhythm in the 30s. He was treated with isoproterenol. He was then transferred to [facility] for EP evaluation/management per request of his established cardiologist Dr. Vicki Diallo. EP service is now consulted by admitting physician for further evaluation management of AV block and AF.   Patient reports recurrent syncopal events over the last several weeks. He denies any other complaints at this time. 2022: Patient underwent Medtronic dual-chamber pacemaker on 2022. RV lead implanted in LBB area. Patient denies any complaints at this time. [facility] was restarted this AM by admitting physician. Prior cardiac testing:  -ECG (8/3/2022): At AF with ventricular rate of 92 bpm, bifascicular block (RBBB, LAFB, QRS D = 138 ms)  -TTE (2022): AF, LVEF = 74%, moderate concentric LVH, mild LAE, mild-moderate AS (mean gradient 8.5 mmHg, JOÃO = 1.2-1.5 cm2). -LHC (2020): LVEF = 60 to 65%, RCA dominant circulation, and 10-15 % mid LAD stenosis.  -Pharmacologic nuclear stress test (2018): LVEF = 63%, normal perfusion. Past Medical History:   Diagnosis Date    A-fib ([facility] [address]     Diabetes mellitus ([facility] [address]     H pylori ulcer 2012    Hyperlipidemia     Hypertension     PONV (postoperative nausea and vomiting)     PUD (peptic ulcer disease) 2012    Urinary frequency      Past Surgical History:   Procedure Laterality Date    APPENDECTOMY      CATARACT REMOVAL WITH IMPLANT Left 12/17/15    COLONOSCOPY      CYSTOSCOPY      multiple    CYSTOSCOPY N/A 2020    CYSTOSCOPY RETROGRADE PYELOGRAM BOTOX  INJECTION (100 UNITS) performed by Gianni Delgadillo MD at [address] [facility], [place], DIAGNOSTIC  2012    peptic ulcer disease    EYE SURGERY Bilateral     cataracts    HERNIA REPAIR  more 10 yrs    left     OK COLONOSCOPY FLX DX W/COLLJ SPEC WHEN PFRMD  2012         OK EGD TRANSORAL BIOPSY SINGLE/MULTIPLE  2012           History reviewed. No pertinent family history.   There is no family history of sudden cardiac arrest    Social History     Tobacco Use    Smoking status: Former     Packs/day: 1.00     Years: 60.00     Pack years: 60.00     Types: Cigarettes     Start date: 1950     Quit date: 2010     Years since quittin.3    Smokeless tobacco: Never   Substance Use Topics Alcohol use: Yes     Comment: 1/2 glass  wine  everyday       Current Facility-Administered Medications   Medication Dose Route Frequency Provider Last Rate Last Admin    perflutren lipid microspheres (DEFINITY) injection 1.5 mL  1.5 mL IntraVENous ONCE PRN [facility]        hydrALAZINE (APRESOLINE) injection 10 mg  10 mg IntraVENous Q4H PRN Chandni L Lopez, DO   10 mg at 11/25/22 2015    trimethobenzamide (TIGAN) injection 200 mg  200 mg IntraMUSCular Q6H PRN Chandni L Lopez, DO        ondansetron (ZOFRAN) injection 4 mg  4 mg IntraVENous Q6H PRN Collie Calkin, DO   4 mg at 11/25/22 2047    sodium chloride flush 0.9 % injection 5-40 mL  5-40 mL IntraVENous 2 times per day [facility], DO   10 mL at 11/26/22 0741    sodium chloride flush 0.9 % injection 5-40 mL  5-40 mL IntraVENous PRN [facility], DO        0.9 % sodium chloride infusion   IntraVENous PRN [facility], DO        acetaminophen (TYLENOL) tablet 650 mg  650 mg Oral Q4H PRN [facility], DO   650 mg at 11/25/22 2036    insulin lispro (HUMALOG) injection vial 0-16 Units  0-16 Units SubCUTAneous Q6H Chiquita Abdalla MD   4 Units at 11/26/22 5536    NIFEdipine (PROCARDIA XL) extended release tablet 30 mg  30 mg Oral Daily Johanna Chano, DO   30 mg at 11/26/22 4615    labetalol (NORMODYNE;TRANDATE) injection 5 mg  5 mg IntraVENous Q4H PRN Johanna Chano, DO        sodium chloride flush 0.9 % injection 5-40 mL  5-40 mL IntraVENous 2 times per day Amber Leyva MD   10 mL at 11/25/22 2015    sodium chloride flush 0.9 % injection 5-40 mL  5-40 mL IntraVENous PRN Amber Leyva MD   10 mL at 11/25/22 0515    polyethylene glycol (GLYCOLAX) packet 17 g  17 g Oral Daily PRN Amber Leyva MD        pantoprazole (PROTONIX) 40 mg in sodium chloride (PF) 0.9 % 10 mL injection  40 mg IntraVENous Daily Amber Leyva MD   40 mg at 11/26/22 0733    lidocaine 4 % external patch 1 patch  1 patch TransDERmal Daily Bharath Nimisha Ash MD   1 patch at 11/26/22 0734    glucose chewable tablet 16 g  4 tablet Oral PRN Del Fournier MD        dextrose bolus 10% 125 mL  125 mL IntraVENous PRN Del Fournier MD        Or    dextrose bolus 10% 250 mL  250 mL IntraVENous PRN Del Fournier MD        glucagon (rDNA) injection 1 mg  1 mg SubCUTAneous PRN Del Fournier MD        dextrose 10 % infusion   IntraVENous Continuous PRN Del Fournier MD        allopurinol (ZYLOPRIM) tablet 300 mg  300 mg Oral Once per day on Mon Wed Fri Del Fournier MD        apixaban Reita Keys) tablet 2.5 mg  2.5 mg Oral BID Del Fournier MD   2.5 mg at 11/26/22 0829    gabapentin (NEURONTIN) capsule 100 mg  100 mg Oral BID Del Fournier MD   100 mg at 11/26/22 0828    tamsulosin (FLOMAX) capsule 0.4 mg  0.4 mg Oral Nightly Del Fournier MD   0.4 mg at 11/25/22 2155    Arformoterol Tartrate (BROVANA) nebulizer solution 15 mcg  15 mcg Nebulization BID Del Fournier MD   15 mcg at 11/26/22 0844    ipratropium-albuterol (DUONEB) nebulizer solution 1 ampule  1 ampule Inhalation Q4H WA Del Fournier MD   1 ampule at 11/26/22 0844    lisinopril (PRINIVIL;ZESTRIL) tablet 20 mg  20 mg Oral Daily Del Fournier MD   20 mg at 11/26/22 0732    And    hydroCHLOROthiazide (HYDRODIURIL) tablet 12.5 mg  12.5 mg Oral Daily Del Fournier MD   12.5 mg at 11/26/22 0733    potassium chloride (KLOR-CON M) extended release tablet 40 mEq  40 mEq Oral PRN Simone Rich MD        Or    potassium bicarb-citric acid (EFFER-K) effervescent tablet 40 mEq  40 mEq Oral PRN Simone Rich MD        Or    potassium chloride 10 mEq/100 mL IVPB (Peripheral Line)  10 mEq IntraVENous PRN Simone Rich MD        acetaminophen (TYLENOL) tablet 650 mg  650 mg Oral Q6H PRN Simone Rich MD   650 mg at 11/26/22 0732    Or    acetaminophen (TYLENOL) suppository 650 mg  650 mg Rectal Q6H PRN Simone Rich MD        aluminum & magnesium hydroxide-simethicone (MAALOX) 200-200-20 MG/5ML suspension 30 mL  30 mL Oral Q6H PRN Joanne Sherman MD            No Known Allergies    ROS:   Constitutional: Negative for fever, activity change and appetite change. HENT: Negative for epistaxis. Eyes: Negative for diploplia, blurred vision. Respiratory: Negative for cough, chest tightness, shortness of breath and wheezing. Cardiovascular: pertinent positives in HPI  Gastrointestinal: Negative for abdominal pain and blood in stool. Genitourinary: Negative for hematuria and difficulty urinating. Musculoskeletal: Negative for myalgias and gait problem. Skin: Negative for color change and rash. Neurological: Negative for syncope and light-headedness. Psychiatric/Behavioral: Negative for confusion and agitation. The patient is not nervous/anxious. Heme: no bleeding disorders, no melena or hematochezia  All other review of systems are negative     PHYSICAL EXAM:  Constitutional   Vitals:    11/26/22 0600 11/26/22 0700 11/26/22 0732 11/26/22 0800   BP: (!) 164/71 (!) 149/78 (!) 149/78 (!) 156/74   Pulse: 71 72  69   Resp: 16 18  20   Temp:    98.7 °F (37.1 °C)   TempSrc:    Temporal   SpO2: (!) 89% 92%  93%   Weight: 182 lb 8.7 oz (82.8 kg)      Height:        Well-developed, no acute distress, well groomed, head laceration bandaged  Eyes: conjunctivae normal, no xanthelasma   Ears, Nose, Throat: oral mucosa moist, no cyanosis   Neck: supple, no JVD, no bruits, no thyromegaly   CV: normal rate, regular rhythm,  no murmurs, rubs, or gallops.  PMI is nondisplaced, Peripheral pulses normal including carotid auscultation, no noted aortic bruit, bilateral femoral and pedal pulses are normal in quality  Lungs: clear to auscultation bilaterally, normal respiratory effort without used of accessory muscles, no wheezes  Abdomen: soft, non-tender, bowel sounds present, no masses or hepatomegaly   Extremities: no digital clubbing, no edema   Skin: warm, no rashes   Neuro/Psych: A&O x 3, normal mood and affect  Pacemaker site: I removed pressure dressing today, Aquacel bandage in place, no erythema edema, or drainage  Data:    Recent Labs     11/24/22  1814 11/25/22  0503 11/26/22  0505   WBC 9.9 8.9 11.4   HGB 10.7* 10.3* 10.5*   HCT 32.2* 31.1* 31.8*    189 223     Recent Labs     11/24/22  1731 11/25/22  0503 11/26/22  0505    135 134   K 4.0 4.5 4.0   CL 99 99 99   CO2 23 25 23   BUN 29* 31* 26*   CREATININE 2.0* 2.1* 2.0*   CALCIUM 9.1 9.0 9.2     Recent Labs     11/25/22  0506   INR 1.3     Recent Labs     11/24/22  1731   TSH 3.970     Lab Results   Component Value Date/Time    MG 1.8 11/26/2022 05:05 AM     Telemetry: AF with ventricular rate of 50 - 90 bpm with intermittent AV block with ventricular escape rhythm. Device Interrogation/Reprogramming  Make/Model: [employer]ils Abril XT  DOI: 11/25/22  Battery: 3.18 Gwynda Vito therapy: DDDR  bpm, AV delays 150 ms sense and 180 ms paced  Pacing %: RA = 0.9%, RV = 90.1%  Lead function:  RA lead: sensing = 0.9 mV, impedance = 494 ohms, threshold =  N/A (AF)  RV lead: sensing = 10.0 mV, impedance = 494 ohms, threshold = 0.5 V @ 0.4 msec  Lead programming:  RA lead: sensitivity = 0.3 mV, output = 3.5 V @ 0.4 msec  RV lead: sensitivity = 1.2 mV, output = 3.5 V @ 0.4 msec  Arrhythmias: AF = 100%  Reprogramming included: RV output reduced to 2.5 V @ 0.4 msec  Overall device function is normal  All device programmable settings were evaluated per above and in the scanned document, along with iterative adjustments (capture thresholds) to assess and select the most appropriate final programming to provide for consistent delivery of the appropriate therapy and to verify function of the device. Assessment/plan:  3* AV block sp MDT dc PPM (DOI: 11/25/22-Dr Bravo, RV lead LBB area)  - baseline bifascicular block. - ECG with deep septal capture. - Stable device function. - CXR with stable device position and no pneumothorax. - Site is stable. I removed pressure dressing today.   Aquacel bandage in place.  - Discharge instructions updated. Device clinic check in 2 weeks. My office in ~6 weeks. - Patient can be discharged from EP standpoint. EP service will sign off.    nonvalvular paroxysmal AF  - Initially diagnosed in 2020 or earlier.  - CHADSVASC = 3 (age, HTN). Recommend [address] in males with score of 2 or more. DOAC preferred. - Apixaban 2.5 mg BID held for pacemaker. Restarted this AM by admitting physician. While on DOAC, recommend annual monitoring of CBC and CMP.  - Uncertain how long patient has been in AF. Consider DCCV ~6 weeks after pacemaker implant and reassess symptoms.  - Modifiable risk factors:  -- Obesity: BMI goal < 27.  -- SHAREE: consider SHAERE screening in future. -- HTN: BP goal < 130/80.  -- EtOH: continue to avoid. mild-moderate AS  - Management per Dr Jacque Sexton. GI bleed 2/2 PUD/esophagitis/H. pylori (2012 EGD)  - Continue PPI. I have spent a total of 35 minutes with the patient and his/her family reviewing the above stated recommendations. And a total of >50% of that time involved face-to-face time providing counseling and or coordination of care with the other providers. Thank you for allowing me to participate in your patient's care. Please call me if there are any questions.       Winston Troncoso, DO   Cardiac Electrophysiology  [facility]  [facility]) Physicians

## 2022-11-26 NOTE — OP NOTE
Operative Note      Patient: Gio Jolley  YOB: 1933  MRN: [id number]    Date of Procedure: 11/25/22    Pre-Op Diagnosis: high grade AV block, nonvalvular paroxysmal atrial fibrillation    Post-Op Diagnosis: same       Procedure: Medtronic dual chamber pacemaker implant (CPT code: 30197)     Surgeon: Irlanda Bocanegra DO     Assistant: None     Anesthesia: see separate Anesthesia report     Estimated Blood Loss (mL): 10 mL     Complications: none     Detailed Description of Procedure:   Verbal and written informed consent obtained from the patient and family. The patient was brought to the EP lab in a fasting state. Monitored anesthesia care was administered by anesthesia provider during the procedure. A venogram with IV contrast confirmed patency and location of left axillary, subclavian veins and SVC. The left upper chest was inspected for hair at the anticipated incision site within the clavipectoral triangle and if present was removed with electric clippers. The site was then prepared with chlorhexidine gluconate and draped in a sterile fashion. Ancef was administered paraenterally within 1 hour prior to incision. The left upper chest area was inspected for main folding lines and striae to guide orientation of incision and if not apparent a pinch test in various directions was performed to visualize the folding lines. The incision was planned to follow perpendicular to the main folding lines in order to minimize scar formation and optimize wound healing. Local anesthesia with 2% lidocaine HCl was applied to the planned incision and pocket area. Once adequate sedation was achieved and lidocaine was administered, an incision was made two finger-breadths inferior the left clavicle between the mid-clavicular line and deltopectoral groove, which was of adequate width to accommodate the device.  Using blunt dissection and electrocautery, a subcutaneous device pocket was created superficial to the pectoralis major muscle fascia in order to avoid the pain corpuscles of the subcuticular plane and vascular pectoralis major muscle. The pocket was extended in a medial and inferior direction from the pocket incision site. A superior lip to the pocket was also created. Hemostasis was achieved and confirmed. The previously performed venogram was used to guide access in the axillary vein overlying the first rib with a modified Seldinger technique and micro puncture needle under fluoroscopic visualization in PA caudal 35* view  in order to reduce the risk of pneumothorax and hemothorax. Two J tip wires were advanced to the right atrium (RA) and IVC in order to confirm venous puncture and avoid inadvertent placement of pacing leads in the arterial system. A 7 [place] sheath was advanced over the the first one. Wire and dilator were removed. A 5.5 [place] 43 cm Medtronic C315 (Model HIS) sheath with dilator was introduced via 7 [place] sheath over a long J-wire into the RA. The dilator was then removed. The J-wire was then positioned in the RV and the C315 sheath was advanced over the wire into the RV using a IRAHETA 30° view on fluoroscopy. Once in the RV, the wire and dilator were exhcanged for 4.1 F 69 cm Medtronic atCollabecure MRI SureScan 3830 fixed helix lead, which was advanced through the delivery sheath. Both sheath and lead are retracted to target the upper part of the septum. On fluoroscopy, the tricuspid annulus was located. The sheath was rotated CW, then advanced 1 - 2 cm into the RV along an imaginary line between superior aspect of the triscuspid valve annulus (traditional region of the His) & RV apex. This area was targeted as this is traditionally the area of the left bundle branch. Slight counter clockwise rotation of the sheath and lead combination was applied to position the tip perpendicular on the septum. The distal aspect of the sheath and lead were noted to be directed at 1 oclock position. Septal position was confirmed with ELAINE 25-30° view as the distal aspect of the sheath and lead were noted to be directed to 2-3 oclock position. The connector clips were connected to the lead to facilitate unipolar pacing (black clip to stylet or electrode tip and red clip to pocket). Unipolar pacing was used to locate ideal site for LBB area lead implant & rule out RVOT location. [place] site was noted to have QS with notch in the rolf (W pattern with notch in the rolf), discordance between II and III (R II > R III OR R II and RS in III), and discordance between aVL and aVR (aVL + / aVR -). Once ideal site was located via passive pace mapping, the sheath tip was advanced over the tip of the lead. In order to screw the 3830 lead into a deep septal position, clockwise rotations on the outer lead body were applied while keeping the delivery sheath in close contact with the right septum in a stable fashion. The baseline unipolar pacing impedance was noted. These maneuvers are repeated until lead electrode tip arrived at left bundle branch area. Lead advancement into the septum guided by unipolar pacing impedance (gradual increase by  ohms compared to baseline) and paced QRS morphology. As the pacing lead reached the course of the left bundle branch, the paced W shaped QRS morphology in lead V1 was noted to gradually change to a narrow QRS morphology with a terminal r-wave (qR or rsR') in lead V1 (so-called incomplete right bundle branch block morphology). Additionally, LBB area capture was confirmed as pacing stim LVAT (V5 or V6: stim to peak of R) was noted to be ~65 msec. Additionally, the unipolar pacing impedance was noted to drop by 100 ohms at this location. COI was assessed and noted to have myocardial COI on lead EGM. No evidence of perforation was observed (unipolar pace impedance decrease of > 200 ohms, unipolar pace impedance < 400 ohms, and unipolar R-sensing decreasing).  High output pacing performed and no evidence of phrenic nerve capture was observed. The sheath was then gently retracted into the RA while maintaining adequate lead slack. Lead function in both unipolar and bipolar was reassessed and determined to stable/adequate. The sheath was slit with standard slitting technique and [employer] slitter. Lead function was reassessed and determined to be stable/adequate. Adequate lead slack was confirmed as RV lead noted to lay along the floor of the RA without prolapsing into IVC. The sheath was split, and the lead was sutured to the pectoralis major muscle with a non-absorbable 0 silk suture over suture sleeve. Another 7 [place] sheath was introduced over the second J-tip wire, following which the wire and dilator were removed. A 52 cm 5.7 F [employer] Novus MRI ScureScan (model: [id number]) active fixation atrial lead with soft straight stylet was introduced into the sheath, then the stylet was partially retracted and the lead was advanced into the IVC. The soft straight stylet was exchanged for the soft curved atrial stylet. A soft stylet was chosen to minimize the risk of cardiac perforation. The lead tip was positioned in the right atrial appendage (RAA). The lead tip was deployed. The EGM was reviewed and current of injury observed, which is associated with adequate threshold and long-term stability. No evidence of perforation (negative ST-segment) was observed. Lead parameters were assessed and found to be adequate (sensing > 1 mV, impedance = 400 - 1200 ohms, threshold N/A (AF)). High output pacing performed and no evidence of phrenic nerve capture was observed. Adequate lead slack was confirmed as RA lead had a J-shape without engaging the RA floor or TV annulus. The sheath was split, and the lead was sutured to the pectoralis major muscle with a non-absorbable 0 silk suture over suture sleeve.  The pocket was then irrigated with antibiotic infused saline in order to remove tissue debris and uncover any persistent bleeding. Hemostasis was again confirmed. The leads were connected to the DEQtronic [place] XT DR MRI SureScan (model: [id number]) device. The leads were curled in the pocket in fashion to avoid any acute angles. The device was then placed in a TYRX antibiotic pouch then into the pocket with the leads beneath the device. SURGIFLO hemostatic matrix with thrombin was injected into the pocket above and below the device. Fluoroscopy was used to confirm ideal device and lead position in the pocket, lead connector pins in the device header, adequate lead slack, and lead position. The device was secured in place over the leads with a non-absorbable 0 silk suture. The pocket was closed with multiple layers of suture. An absorbable 2-0 Vicryl violet braided suture with CT-1 needle was used to approximate the clavipectoral deep fascial layer with running suture technique with deep bites of equal spacing in order to isolate the pocket, restore anterior wall structure, and prevent device erosion. An absorbable 2-0 Vicryl violet braided suture with CT-1 needle was used to approximate the subcutaneous tissue with running suture technique with deep bites of equal spacing. An absorbable 4-0 Monocryl suture with reverse cutting needle was used to approximate the subcuticular tissue with minimal tension through running suture technique with closely spaced bites and buried sutured knot at the ends. Dermabond was placed over the incision. An Aquacel Ag surgical dressing was placed over the incision. A pressure dressing was placed over the incision.      Final lead assessment:  RA: sensing = 0.9 mV, impedance = 551 ohms, capture threshold = N/A (AF)   RV: sensing = 11.2 mV, impedance = 703 ohms, capture threshold = 0.5 V @ 0.4 msec    The device was programmed:  DDDR 70/120 ppm  AV delays: 150 msec (sense) and 180 msec (pace)  Lead programming:  RA lead: sensitivity =  0.3 mV, output = 3.5 V @ 0.4 msec  RV lead: sensitivity =  1.2 mV, output =  3.5 V @ 0.4 msec. SUMMARY: Successful Medtronic dual chamber pacemaker implant in the setting of high grade AV block, syncope, and nonvalvular paroxysmal atrial fibrillation. RV lead is LBB area lead.     Electronically signed by Ronald Schmidt DO on 11/26/2022 at 12:58 AM

## 2022-11-26 NOTE — PROGRESS NOTES
Hospitalist Progress Note      PCP: Yasmine Delgado MD    Date of Admission: 11/24/2022    Chief Complaint: syncope, heart block complete    Hospital Course:   80 y.o. male with past medical history of atrial fibrillation, DM hypertension . Patient is a transfer from [place] . He was seen there due to chest pain . He states that chest pain is located in the sternal area . He states  that he has shortness of breath with chest pain . He states that when chest pain occurred he felt like he was going to pass out . Patient had a syncopal episode and hit his head . Patient had a second syncopal episode  and after that family called ambulance . En route to ED patient was given 324 mg of ASA . In the ED patient was in Afib with slow ventricular rate having long pauses and bradycardic to the 30s . Lab data revealed sodium 135 potassium 4.1 BUN 29 Scr 2.0  glucose 244 Trop 45 >>43  WBC 9.9 HGB 10.7   Resp panel neg CT head cervical spine neg CXR congestive changes . Patient received fentanyl morphine and was placed on isoproterenol infusion. EP consulted-pacemaker placement. Lyme serology was ordered for possible carditis. Subjective: Patient was seen at bedside this morning in ICU. Does not complain of any shortness of breath nor chest pain.   Discussed on the plan regarding monitoring post pacemaker, PT/OT eval and disposition based on recs      Medications:  Reviewed    Infusion Medications    sodium chloride      dextrose       Scheduled Medications    guaiFENesin  400 mg Oral TID    sodium chloride flush  5-40 mL IntraVENous 2 times per day    insulin lispro  0-16 Units SubCUTAneous Q6H    NIFEdipine  30 mg Oral Daily    sodium chloride flush  5-40 mL IntraVENous 2 times per day    pantoprazole (PROTONIX) 40 mg injection  40 mg IntraVENous Daily    lidocaine  1 patch TransDERmal Daily    allopurinol  300 mg Oral Once per day on Mon Wed Fri    apixaban  2.5 mg Oral BID    gabapentin  100 mg Oral BID tamsulosin  0.4 mg Oral Nightly    Arformoterol Tartrate  15 mcg Nebulization BID    ipratropium-albuterol  1 ampule Inhalation Q4H WA    lisinopril  20 mg Oral Daily    And    hydroCHLOROthiazide  12.5 mg Oral Daily     PRN Meds: perflutren lipid microspheres, hydrALAZINE, trimethobenzamide, ondansetron, sodium chloride flush, sodium chloride, acetaminophen, labetalol, sodium chloride flush, polyethylene glycol, glucose, dextrose bolus **OR** dextrose bolus, glucagon (rDNA), dextrose, potassium chloride **OR** potassium alternative oral replacement **OR** potassium chloride, acetaminophen **OR** acetaminophen, aluminum & magnesium hydroxide-simethicone      Intake/Output Summary (Last 24 hours) at 11/26/2022 1152  Last data filed at 11/26/2022 1100  Gross per 24 hour   Intake 1078.56 ml   Output 1450 ml   Net -371.44 ml       Exam:    BP (!) 145/57   Pulse 70   Temp 98.7 °F (37.1 °C) (Temporal)   Resp 21   Ht 5' 9\" (1.753 m)   Wt 182 lb 8.7 oz (82.8 kg)   SpO2 95%   BMI 26.96 kg/m²     General appearance: No apparent distress, appears stated age and cooperative. HEENT: Pupils equal, round, and reactive to light. Conjunctivae/corneas clear. Neck: Supple, with full range of motion. No jugular venous distention. Trachea midline. Respiratory:  Normal respiratory effort. Clear to auscultation, bilaterally without Rales/Wheezes/Rhonchi. Cardiovascular: Regular rate and rhythm with normal S1/S2 without murmurs, rubs or gallops. Abdomen: Soft, non-tender, non-distended with normal bowel sounds. Musculoskeletal: No clubbing, cyanosis or edema bilaterally. Full range of motion without deformity. Skin: Skin color, texture, turgor normal.  No rashes or lesions.   Neurologic:  No focal deficits,  Psychiatric: Alert and oriented, thought content appropriate, normal insight    Labs:   Recent Labs     11/24/22  1814 11/25/22  0503 11/26/22  0505   WBC 9.9 8.9 11.4   HGB 10.7* 10.3* 10.5*   HCT 32.2* 31.1* 31.8*   PLT 198 189 223     Recent Labs     11/24/22  1731 11/25/22  0503 11/26/22  0505    135 134   K 4.0 4.5 4.0   CL 99 99 99   CO2 23 25 23   BUN 29* 31* 26*   CREATININE 2.0* 2.1* 2.0*   CALCIUM 9.1 9.0 9.2   PHOS  --   --  2.6     Recent Labs     11/24/22  0908   AST 30   ALT 39   BILITOT 0.4   ALKPHOS 130*     Recent Labs     11/25/22  0506   INR 1.3     No results for input(s): Sandra Bae in the last 72 hours. Assessment/Plan:    Active Hospital Problems    Diagnosis Date Noted    High-grade atrioventricular block [I44.39] 11/26/2022     Priority: Medium    Third degree heart block ([address] [I44.2] 11/25/2022     Priority: Medium    A-fib [facility]) [I48.91] 11/24/2022     Priority: Medium    Atrial fibrillation ([address] [I48.91] 11/24/2022     Priority: Medium   Degree heart block  Nonvalvular paroxysmal atrial fibrillation  Syncope and collapse  Hypertension  CKD stage III  Diabetes mellitus type 2  Anemia due to CKD    -Continue to monitor in ICU on telemetry  -EP consulted-currently placed on isoproterenol infusion-pacemaker placement-11/25/2022  - cardiology consulted- apprec recommendations, Eliquis restarted today per Cardiology, started on nifedipine  -Echo ordered-shows an ejection fraction of 60 to 65%, moderately sclerotic aortic valve [stable from previous], mild tricuspid regurgitation, mild pulmonary regurgitation, mild to moderate aortic stenosis, aortic regurgitation  -Continue home medications for hypertension-amlodipine, lisinopril, hydrochlorothiazide  -Creatinine reviewed and at 2.0 which is around his baseline  -Continue sliding scale insulin  -Appreciate critical care management    DVT Prophylaxis:  Eliquis restarted today per Cardiology  Diet: ADULT DIET; Regular; Low Fat/Low Chol/High Fiber/2 gm Na  Code Status: Full Code    Dispo -PT/OT Eval and dispo based on recs.      Angelic Bustillos MD

## 2022-11-26 NOTE — PROGRESS NOTES
[address] [facility]  Department of Internal Medicine   Internal Medicine Residency   MICU Progress Note    Patient:  Kaleb De eJsus 80 y.o. male  MRN: [id number]     Date of Service: 2022    Allergy: Patient has no known allergies. Subjective   This morning the patient was seen and examined at bedside in no acute distress. He continues to complain of headache which is unchanged from yesterday morning when he was seen. He was given pain medicine overnight but it did not relieve his headache. He also complains of rib pain on both sides. 24h change: S/p dual chamber pacemaker placement   HR stable overnight, /78 mmHg. Lisinopril resumed. , increased insulin to HDSS    ROS: Denies Fever/chills/CP/SOB/N/V/D/C/Dysuria/Blood in stool or urine  Objective     VITAL SIGNS:  BP (!) 144/56   Pulse 74   Temp 98 °F (36.7 °C) (Temporal)   Resp 19   Ht 5' 9\" (1.753 m)   Wt 182 lb 8.7 oz (82.8 kg)   SpO2 92%   BMI 26.96 kg/m²   Tmax over 24 hours:  Temp (24hrs), Av.4 °F (36.9 °C), Min:97.9 °F (36.6 °C), Max:99.1 °F (37.3 °C)      Patient Vitals for the past 6 hrs:   BP Temp Temp src Pulse Resp SpO2   22 1225 -- -- -- 74 19 92 %   22 1200 (!) 144/56 98 °F (36.7 °C) Temporal 73 13 90 %   22 1100 (!) 152/63 -- -- 72 20 96 %   22 1000 (!) 145/57 -- -- 70 21 95 %   22 0900 (!) 153/67 -- -- 77 22 96 %   22 0800 (!) 156/74 98.7 °F (37.1 °C) Temporal 69 20 93 %   22 0732 (!) 149/78 -- -- -- -- --         Intake/Output Summary (Last 24 hours) at 2022 1318  Last data filed at 2022 1200  Gross per 24 hour   Intake 1078.56 ml   Output 1450 ml   Net -371.44 ml     Wt Readings from Last 2 Encounters:   22 182 lb 8.7 oz (82.8 kg)   22 185 lb (83.9 kg)     Body mass index is 26.96 kg/m².         I & O - 24hr:   Intake/Output Summary (Last 24 hours) at 2022 1318  Last data filed at 2022 1200  Gross per 24 hour   Intake --   --   --    AST 30  --   --   --    ALT 39  --   --   --       Magnesium:   Lab Results   Component Value Date/Time    MG 1.8 11/26/2022 05:05 AM     Phosphorus:   Lab Results   Component Value Date/Time    PHOS 2.6 11/26/2022 05:05 AM     Ionized Calcium: No results found for: CAION   Troponin: No results for input(s): TROPONINI in the last 72 hours. Medications     Infusions: (Fluid, Sedation, Vasopressors)    Vasopressors  None  Sedation  None    Nutrition: Adult regular diet       ATB:   Antibiotics  Days   None            Cultures: None        Imaging     CT Head W/O Contrast    Result Date: 11/24/2022  No acute intracranial abnormality. CT CSpine W/O Contrast    Result Date: 11/24/2022  No acute abnormality of the cervical spine. Multilevel degenerative changes of the cervical spine visualized most prominent at C3-C4 and C5-C6. Circumferential opacification visualized in the sphenoid sinus. XR CHEST PORTABLE    Result Date: 11/26/2022  Mild congestive changes. XR CHEST PORTABLE    Result Date: 11/25/2022  No evidence of pneumothorax or parenchymal contusion. AICD visualized in the left chest with leads in the heart. XR CHEST PORTABLE    Result Date: 11/24/2022  Congestive changes demonstrate no change. Resident's Assessment and Plan     Rafi De Leon   , 80 y.o. , male came with CC :      has a past medical history of A-fib ([facility] [address] Diabetes mellitus ([facility] [address] H pylori ulcer, Hyperlipidemia, Hypertension, PONV (postoperative nausea and vomiting), PUD (peptic ulcer disease), and Urinary frequency.      Consults:   Cardiology   EP    Assessment:  Neurology  Alert and oriented  Able speak and follow commands     Headache   Reports headache s/p fall   Initial CT in the ED was negative for intracranial hemorrhage  Continues to complain of headache in spite of pain medicine   He was on Eliquis for afib prior to fall   Repeat non contrast head CT today   Dilaudid PRN for pain Cardiology  Syncope episode 2/2 complete heart block s/p pacemaker placement  He was initially on isoproterenol   Heart rate stable   Continue to monitor        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 sinus syndrome vs Lyme disease vs carditis vs ischemia - resolved   Follow Lyme serology, TSH wnl   The patient was taken to pacemaker placement today      Hx of Afib- on Eliquis, held prior to pacemaker placement, resumed this morning  [id number] score is 4   EP following, metoprolol held      Hx of HTN - on amlodipine and lisinopril   Started on nifedipine by cardio      Renal  Hx of CKD stage IV  Continue home BP s/p pacemaker placement      Endocrine  Hx of NIDDM  Started on HDSS   Hypoglycemia protocol on   Glucose ACHS      PT/OT: Consulted today   DVT ppx: Eliquis, resumed this morning per cardio  GI ppx: None    Problems resolved during this admission:   Third-degree AV block      Code Status: Full code     Disposition:Continue current care     Renetta Morales MD, PGY-1    Attending physician: Dr. Perla Barber     NOTE: This report was transcribed using voice recognition software. Every effort was made to ensure accuracy; however, inadvertent computerized transcription errors may be present. [place]  Department of Pulmonary, Critical Care and Sleep Medicine  [address] [facility]  Department of Internal Medicine      During multidisciplinary team rounds Caridad bennett a 80 y.o. male was seen, examined and discussed. This is confirmation that I have personally seen and examined the patient and that the key elements of the encounter were performed by me (> 85 % time). The medications & laboratory data was discussed and adjusted where necessary. The radiographic images were reviewed or with radiologist or consultant if felt dis-concordant with the exam or history. The above findings were corroborated, plans confirmed and changes made if needed.    Family is updated at the bedside as available. Key issues of the case were discussed among consultants. Critical Care time is documented if appropriate.       Katlyn Figueroa DO, FACP, FCCP, [facility],

## 2022-11-26 NOTE — CONSULTS
CARDIOLOGY CONSULTATION    Patient Name:  Anastasia Ji    :  10/27/1933    Reason for Consultation:   Difficult to control systolic hypertension; atrial fibrillation    History of Present Illness:   Rafi Colbert presents to [facility] following transfer from [address] [place] [address] following history of repetitive episodes of syncope over the past few weeks. He has a history of persistent atrial fibrillation and was on low-dose metoprolol to control his ventricular response in addition to treating his underlying difficult to control hypertension. For several years he has undergone multiple noninvasive testing for coronary artery disease and ultimately underwent cardiac catheterization which demonstrated noncritical coronary atherosclerosis and normal left ventricular systolic function with an average left ventricular ejection fraction >60%. He also has mild - moderate aortic valve stenosis which has been monitored on an outpatient basis. Likewise more recently his atrial fibrillation was associated with a slow ventricular response and discussion for a permanent pacemaker had been made but he did not wish to  proceed at the time. Apparently, he suddenly lost consciousness and fell hitting his head and chest and came to the emergency room at [address] [place] [address] was found to have periods of third-degree block intermixed with his underlying chronic atrial fibrillation. He is now referred to the [place] [address] for insertion of a permanent pacemaker and further treatment of his underlying systolic hypertension. In addition to the above he does have hypercholesterolemia and underlying diabetes mellitus. Past Medical History:   has a past medical history of A-fib ([address] Diabetes mellitus ([facility] [address] H pylori ulcer, Hyperlipidemia, Hypertension, PONV (postoperative nausea and vomiting), PUD (peptic ulcer disease), and Urinary frequency.     Surgical History:   has a past surgical history that includes Appendectomy; hernia repair (more 10 yrs); pr egd transoral biopsy single/multiple (2012); pr colonoscopy flx dx w/collj spec when pfrmd (2012); Endoscopy, colon, diagnostic (2012); Colonoscopy (); Cataract removal with implant (Left, 12/17/15); Cystoscopy; eye surgery (Bilateral); and Cystoscopy (N/A, 2020). Social History:   reports that he quit smoking about 12 years ago. His smoking use included cigarettes. He started smoking about 72 years ago. He has a 60.00 pack-year smoking history. He has never used smokeless tobacco. He reports current alcohol use. He reports that he does not use drugs. Family History:  family history is remarkable in that both parents are  secondary to cerebrovascular accidents. One brother  secondary to carcinoma. Medications:  Prior to Admission medications    Medication Sig Start Date End Date Taking? Authorizing Provider   repaglinide (PRANDIN) 1 MG tablet Take 1 mg by mouth 2 times daily (with meals) 11/3/22   Historical Provider, MD   lisinopril-hydroCHLOROthiazide (PRINZIDE;ZESTORETIC) 20-12.5 MG per tablet Take 1 tablet by mouth daily At [facility] 11/3/22   Historical Provider, MD   dextran 70-hypromellose (TEARS NATURALE) 0.1-0.3 % SOLN opthalmic solution Place 1 drop into both eyes as needed    Historical Provider, MD   gabapentin (NEURONTIN) 100 MG capsule Take 1 capsule by mouth in the morning and 1 capsule before bedtime. Do all this for 30 days. 22  Izabel Alexandria, DO   SITagliptin (JANUVIA) 100 MG tablet Take 1 tablet by mouth in the morning. Patient taking differently: Take 50 mg by mouth 2 times daily (with meals) 8/10/22   Izabel Alexandria, DO   metoprolol succinate (TOPROL XL) 25 MG extended release tablet Take 1 tablet by mouth at bedtime 22   Izabel Alexandria, DO   amLODIPine (NORVASC) 5 MG tablet Take 1 tablet by mouth in the morning.  8/10/22   Izabel Alexandria, DO   epoetin samantha-epbx (RETACRIT) 3000 UNIT/ML SOLN injection Inject 1 mL into the skin three times a week  Patient not taking: Reported on 11/24/2022 8/10/22   Jane Bah DO   docusate sodium (COLACE, DULCOLAX) 100 MG CAPS Take 100 mg by mouth in the morning and 100 mg before bedtime. 8/9/22   Jane Bah DO   tamsulosin (FLOMAX) 0.4 MG capsule Take 1 capsule by mouth in the morning and 1 capsule before bedtime. Patient taking differently: Take 0.4 mg by mouth nightly 8/9/22   Jane Bah DO   pantoprazole (PROTONIX) 40 MG tablet Take 1 tablet by mouth every morning (before breakfast)  Patient not taking: Reported on 11/24/2022 8/10/22   Jane Bah DO   magnesium oxide (MAG-OX) 400 MG tablet Take 400 mg by mouth in the morning.  5/19/22   Historical Provider, MD   allopurinol (ZYLOPRIM) 100 MG tablet Take 300 mg by mouth three times a week M W F 3/24/21   Historical Provider, MD   metoprolol succinate (TOPROL XL) 50 MG extended release tablet 25 mg 4/8/21   Historical Provider, MD   umeclidinium-vilanterol (ANORO ELLIPTA) 62.5-25 MCG/INH AEPB inhaler Inhale 1 puff into the lungs daily   Patient not taking: Reported on 11/24/2022 9/29/20   Historical Provider, MD   albuterol sulfate HFA (PROVENTIL HFA) 108 (90 Base) MCG/ACT inhaler Inhale 2 puffs into the lungs every 6 hours as needed for Wheezing  Patient not taking: Reported on 11/24/2022 5/12/21   Roosevelt Zambrano MD   colchicine-probenecid 0.5-500 MG per tablet Take 1 tablet by mouth 2 times daily   Patient not taking: Reported on 8/2/2022 4/24/21 8/9/22  Historical Provider, MD   apixaban (ELIQUIS) 2.5 MG TABS tablet Take 2.5 mg by mouth 2 times daily    Historical Provider, MD   magnesium gluconate (MAGONATE) 500 MG tablet Take 500 mg by mouth daily  Patient not taking: Reported on 8/2/2022 8/9/22  Historical Provider, MD   Multiple Vitamins-Minerals (THERAPEUTIC MULTIVITAMIN-MINERALS) tablet Take 1 tablet by mouth daily  Patient not taking: Reported on 11/24/2022 Historical Provider, MD   Cholecalciferol (VITAMIN D3 PO) Take by mouth daily  Patient not taking: No sig reported    Historical Provider, MD   torsemide (DEMADEX) 10 MG tablet Take 10 mg by mouth in the morning. M-W-F.  8/9/22  Historical Provider, MD   zinc 50 MG CAPS Take by mouth daily  Patient not taking: Reported on 8/2/2022 8/9/22  Historical Provider, MD   glimepiride (AMARYL) 4 MG tablet Take 4 mg by mouth in the morning and at bedtime    Historical Provider, MD       Allergies:  Patient has no known allergies. Review of Systems:   Constitutional: there has been no unanticipated weight loss. There's been no significant change in energy or activity level, nor sleep pattern . No fever chills or rigors. Eyes: No visual changes or diplopia. No scleral icterus. ENT: No Headaches, hearing loss or vertigo. No mouth sores or sore throat. No change in taste or smell. Cardiovascular:  chest wall soreness dyspnea on exertion, palpitations, loss of consciousness, no phlebitis, no claudication. Respiratory: No cough or wheezing, no sputum production. No hemoptysis, pleuritic pain. Gastrointestinal: No abdominal pain, appetite loss, blood in stools. No change in bowel habits. No hematemesis  Genitourinary: No dysuria, trouble voiding or hematuria. No nocturia or increased frequency. Musculoskeletal:  No gait disturbance, weakness or joint complaints. Integumentary: No rash or pruritis. Neurological: Significant headache, no diplopia, change in muscle strength, numbness or tingling. No change in gait, balance, coordination, mood, affect, memory, mentation, behavior. Psychiatric: No anxiety or depression. Endocrine: No temperature intolerance. No excessive thirst, fluid intake, or urination. No tremor. Hematologic/Lymphatic: No abnormal bruising or bleeding, blood clots or swollen lymph nodes. Allergic/Immunologic: No nasal congestion or hives.     Physical Examination:    Vital Signs: BP (!) 205/73 Pulse 70   Temp 97.9 °F (36.6 °C)   Resp 17   Ht 5' 9\" (1.753 m)   Wt 186 lb 1.1 oz (84.4 kg)   SpO2 94%   BMI 27.48 kg/m²   General appearance: Well preserved, mesomorphic body habitus, alert, no distress. Skin: Skin color, texture, turgor normal. No rashes or lesions. No induration or tightening palpated. Head: Normocephalic. No masses, lesions, tenderness or abnormalities  Eyes: Conjunctivae/corneas clear. PERRL, EOMs intact. Sclera non icteric. Ears: External ears normal. Canals clear. TM's clear bilaterally. Hearing normal to finger rub. Nose/Sinuses: Nares normal. Septum midline. Mucosa normal. No drainage or sinus tenderness. Oropharynx: Lips, mucosa, and tongue normal. Oropharynx clear with no exudate seen. Neck: Neck supple and symmetric. No adenopathy. Thyroid symmetric, normal size, without nodules. Trachea is midline. Carotids brisk in upstroke without bruits, no abnormal JVP noted at 45°. Chest: Even excursion  Lungs: Lungs clear to auscultation bilaterally. No retractions or use of accessory muscles. No tactile vocal fremitus. No rhonchi, crackles or rales. Heart:  S1 > S2. Irregular, regular rhythm. No gallop; grade 2/6 systolic ejection murmur second right intercostal space no rub, palpable thrill or heave noted. PMI 5th intercostal space midclavicular line. Abdomen: Abdomen soft, mildly protuberant, non-tender. BS normal. No masses, organomegaly. No hernia noted. Extremities: Extremities normal. No deformities, edema, or skin discoloration. No cyanosis or clubbing noted to the nails. Peripheral pulses present 2+ upper extremities and present 1+  lower extremities. Musculoskeletal: Spine ROM normal. Muscular strength intact. Neuro: Cranial nerves intact. Motor: Strength 5/5 in all extremities. Reflexes 2+ in all extremities. No focal weakness. Sensory: grossly normal to touch. Coordination intact.     Pertinent Labs:  CBC:   Recent Labs     11/24/22  0908 11/24/22  8344 11/25/22  0503   WBC 10.9 9.9 8.9   HGB 12.2* 10.7* 10.3*    198 189     BMP:  Recent Labs     11/24/22  0908 11/24/22  1731 11/25/22  0503   * 135 135   K 4.5 4.0 4.5   CL 95* 99 99   CO2 25 23 25   BUN 29* 29* 31*   CREATININE 2.1* 2.0* 2.1*   GLUCOSE 268* 244* 300*   LABGLOM 30 31 30     ABGs: No results found for: PH, PO2, PCO2  INR:   Recent Labs     11/25/22  0506   INR 1.3     PRO-BNP:   Lab Results   Component Value Date    PROBNP 8,347 (H) 08/02/2022      Cardiac Injury Profile:   Recent Labs     11/24/22  1018 11/24/22  1731 11/24/22  2318   TROPHS 43* 56* 62*      Lipid Profile: No results found for: TRIG, HDL, LDLCALC, CHOL   Thyroid:   Lab Results   Component Value Date    TSH 3.970 11/24/2022      Hemoglobin A1C: No components found for: HGBA1C   ECG:  See report    Radiology:  XR CHEST PORTABLE   Final Result   No evidence of pneumothorax or parenchymal contusion. AICD visualized in the   left chest with leads in the heart. 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    Esophagitis-grade 1 K20.90    AP (angina pectoris) ([facility]) B08.1    Systolic hypertension C26    Combined forms of age-related cataract of left eye H25.812    Anemia, chronic renal failure, stage 4 (severe) ([facility]) N18.4, D63.1    Sepsis secondary to CAP ([facility] [address] A41.9    Pneumonia of left lung due to infectious organism J18.9    A-fib ([facility]) I48.91    Atrial fibrillation ([facility]) I48.91    Third degree heart block ([facility]) I44.2       Plan:  Based upon Mr. Katina Dorman' clinical presentation, my impression that his systolic blood pressure temporarily is somewhat affected by his underlying intermittent third-degree block certainly he does have significant renal insufficiency and longstanding systolic hypertension even prior to his present cardiac degenerative arrhythmia.   Carefully give a singular dose of nifedipine XL he is already received hydralazine and intravenous labetalol without significant effect and carefully monitor his blood pressure as well as his renal function. Would also reinstitute apixaban tomorrow a.m. to further decrease exposure to potential systemic embolization. I appreciate the efforts of Dr. Betziada Chang and placing his pacemaker under emergent situation. I have spent more than 55 critical care minutes face to face with Ivette Roth reviewing notes and laboratory data with greater than 50% of this time instructing and counseling the patient regarding my findings and recommendations and I have answered all questions as posed to me by Mr. Twin Boston. Thank you, for allowing me to consult in the care of this patient. Eugene Raman DO , FACP, [facility], [facility]    NOTE:  This report was transcribed using voice recognition software. Every effort was made to ensure accuracy; however, inadvertent computerized transcription errors may be present.

## 2022-11-26 NOTE — RT PROTOCOL NOTE
RT Nebulizer Bronchodilator Protocol Note    There is a bronchodilator order in the chart from a provider indicating to follow the RT Bronchodilator Protocol and there is an Initiate RT Bronchodilator Protocol order as well (see protocol at bottom of note). CXR Findings:  XR CHEST PORTABLE    Result Date: 11/26/2022  Mild congestive changes. XR CHEST PORTABLE    Result Date: 11/25/2022  No evidence of pneumothorax or parenchymal contusion. AICD visualized in the left chest with leads in the heart. The findings from the last RT Protocol Assessment were as follows:  Smoking: None or smoker <15 pack years  Respiratory Pattern: Regular pattern and RR 12-20 bpm  Breath Sounds: Clear breath sounds  Cough: Strong, spontaneous, non-productive  Indication for Bronchodilator Therapy: None (pt does not take home inhalers)  Bronchodilator Assessment Score: 0    Aerosolized bronchodilator medication orders have been revised according to the RT Nebulizer Bronchodilator Protocol below. Respiratory Therapist to perform RT Therapy Protocol Assessment initially then follow the protocol. Repeat RT Therapy Protocol Assessment PRN for score 0-3 or on second treatment, BID, and PRN for scores above 3. No Indications - adjust the frequency to every 6 hours PRN wheezing or bronchospasm, if no treatments needed after 48 hours then discontinue using Per Protocol order mode. If indication present, adjust the RT bronchodilator orders based on the Bronchodilator Assessment Score as indicated below. If a patient is on this medication at home then do not decrease Frequency below that used at home. 0-3 - enter or revise RT bronchodilator order(s) to equivalent RT Bronchodilator order with Frequency of every 4 hours PRN for wheezing or increased work of breathing using Per Protocol order mode.        4-6 - enter or revise RT Bronchodilator order(s) to two equivalent RT bronchodilator orders with one order with BID Frequency and one order with Frequency of every 4 hours PRN wheezing or increased work of breathing using Per Protocol order mode. 7-10 - enter or revise RT Bronchodilator order(s) to two equivalent RT bronchodilator orders with one order with TID Frequency and one order with Frequency of every 4 hours PRN wheezing or increased work of breathing using Per Protocol order mode. 11-13 - enter or revise RT Bronchodilator order(s) to one equivalent RT bronchodilator order with QID Frequency and an Albuterol order with Frequency of every 4 hours PRN wheezing or increased work of breathing using Per Protocol order mode. Greater than 13 - enter or revise RT Bronchodilator order(s) to one equivalent RT bronchodilator order with every 4 hours Frequency and an Albuterol order with Frequency of every 2 hours PRN wheezing or increased work of breathing using Per Protocol order mode.        Electronically signed by Nadiya Aldrich RCP on 11/26/2022 at 4:41 PM

## 2022-11-27 NOTE — PLAN OF CARE
Stat CT head showed progressive left subdural hematoma. Eliquis was immediately held. Neurology was contacted via [email or web address] who stated CT head on 11/24 already showed a subdural hematoma on the left but official radiology read was \"no acute abnormality\". Neurosurgery consult stat ordered and contacted Dr. Wang Benjamin via [address] [place] [address], still awaiting reply. Dr. Jodie Park notified who recommended repeat CT head tomorrow. Son notified as well. Patient is currently neuro logically intact with no focal deficits.     Electronically signed by Ron Barber MD on 11/26/2022 at 9:16 PM

## 2022-11-27 NOTE — PLAN OF CARE
Contacted Dr. Alvin De La Rosa via his personal cell phone as he was not reachable on [employer] on multiple attempts. He reviewed the images and recommended Eliquis reversal. Contacted pharmacy for Eliquis reversal and Kcentra ordered. Repeat CT head still ordered for tomorrow. Will await further recommendations from neurosurgery regarding possible surgical intervention.     Electronically signed by Soo Rosa MD on 11/26/2022 at 11:23 PM

## 2022-11-27 NOTE — PROGRESS NOTES
[employer] sent to Dr. Marylene Sherry at 2130. Message unread at this time. 2210 Message still unread at this time. Second message sent.

## 2022-11-27 NOTE — PROGRESS NOTES
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    Esophagitis-grade 1 K20.90    AP (angina pectoris) ([facility]) O63.7    Systolic hypertension F51    Combined forms of age-related cataract of left eye H25.812    Anemia, chronic renal failure, stage 4 (severe) ([facility]) N18.4, D63.1    Sepsis secondary to CAP ([facility]) A41.9    Pneumonia of left lung due to infectious organism J18.9    A-fib ([facility]) I48.91    Atrial fibrillation ([facility]) I48.91    Third degree heart block ([facility]) I44.2    High-grade atrioventricular block I44.39       SUBJECTIVE:  Rafi De Leon states he feels somewhat better and has less of a headache but pressure has reduced to a systolic averaging 561 mmHg. Shoulder harness removed patient having difficulty with chest wall soreness    REVIEW OF SYSTEMS:  General ROS: negative for - fatigue, malaise,  weight gain or weight loss  Psychological ROS: negative for - anxiety , depression  Ophthalmic ROS: negative for - decreased vision or visual distortion. ENT ROS: negative  Allergy and Immunology ROS: negative  Hematological and Lymphatic ROS: negative  Endocrine: no heat or cold intolerance and no polyphagia, polydipsia, or polyuria  Respiratory ROS: negative for - hemoptysis, pleuritic pain, and shortness of breath  Cardiovascular ROS: positive for - irregular heartbeat, murmur, and shortness of breath. Gastrointestinal ROS: no abdominal pain, change in bowel habits, or black or bloody stools  Genito-Urinary ROS: no nocturia, dysuria, trouble voiding, frequency or hematuria  Musculoskeletal ROS: negative for- myalgias, arthralgias, or claudication  Neurological ROS: no TIA or stroke symptoms otherwise no significant change in symptoms or problems since yesterday as documented in previous progress notes.     SCHEDULED MEDICATIONS:   guaiFENesin  400 mg Oral TID    insulin glargine  5 Units SubCUTAneous Daily    sodium chloride flush  5-40 mL IntraVENous 2 times per day    insulin lispro  0-16 Units SubCUTAneous Q6H    NIFEdipine  30 mg Oral Daily    sodium chloride flush  5-40 mL IntraVENous 2 times per day    pantoprazole (PROTONIX) 40 mg injection  40 mg IntraVENous Daily    lidocaine  1 patch TransDERmal Daily    allopurinol  300 mg Oral Once per day on Mon Wed Fri    [Held by provider] apixaban  2.5 mg Oral BID    gabapentin  100 mg Oral BID    tamsulosin  0.4 mg Oral Nightly    Arformoterol Tartrate  15 mcg Nebulization BID    lisinopril  20 mg Oral Daily    And    hydroCHLOROthiazide  12.5 mg Oral Daily       VITAL SIGNS:                                                                                                                          BP (!) 153/68   Pulse 70   Temp 99.1 °F (37.3 °C) (Temporal)   Resp 14   Ht 5' 9\" (1.753 m)   Wt 182 lb 8.7 oz (82.8 kg)   SpO2 95%   BMI 26.96 kg/m²   Patient Vitals for the past 96 hrs (Last 3 readings):   Weight   11/26/22 0600 182 lb 8.7 oz (82.8 kg)   11/25/22 0600 186 lb 1.1 oz (84.4 kg)   11/24/22 1357 185 lb (83.9 kg)     OBJECTIVE:    HEENT: PERRL, EOM  Intact; sclera non-icteric, conjunctiva pink. Carotids are brisk in upstroke with normal contour. No carotid bruits. Normal jugular venous pulsation at 45°. No palpable cervical nor supraclavicular nodes. Thyroid not palpable. Trachea midline. Chest: Even excursion  Lungs: CTA B, no expiratory wheezes or rhonchi, no decreased tactile fremitus without inspiratory rales. Heart: Irregular, regular  rhythm; S1 > S2, no gallop grade 2/6 systolic murmur second right intercostal space no clicks, rub, palpable thrills   or heaves. PMI nondisplaced, 5th intercostal space MCL. Abdomen: Soft, nontender, nondistended,  mildly protuberant, no masses or organomegaly. Bowel sounds active. Extremities: Without clubbing, cyanosis or edema.  Pulses present 3+ upper extermities bilaterally; present 1+ DP  bilaterally and present 1+ PT bilaterally. Data:   Scheduled Meds: Reviewed  Continuous Infusions:    sodium chloride      dextrose         Intake/Output Summary (Last 24 hours) at 11/27/2022 0129  Last data filed at 11/26/2022 1900  Gross per 24 hour   Intake 578.56 ml   Output 625 ml   Net -46.44 ml     CBC:   Recent Labs     11/24/22  0908 11/24/22  1814 11/25/22  0503 11/26/22  0505 11/26/22  2331   WBC 10.9 9.9 8.9 11.4 13.6*   HGB 12.2* 10.7* 10.3* 10.5* 9.8*   HCT 37.6 32.2* 31.1* 31.8* 29.6*    198 189 223 226     BMP:  Recent Labs     11/24/22  0908 11/24/22  1731 11/25/22  0503 11/26/22  0505 11/26/22  2331   * 135 135 134 133   K 4.5 4.0 4.5 4.0 4.5   CL 95* 99 99 99 98   CO2 25 23 25 23 23   BUN 29* 29* 31* 26* 33*   CREATININE 2.1* 2.0* 2.1* 2.0* 2.3*   LABGLOM 30 31 30 31 26     ABGs: No results found for: PH, PO2, PCO2  INR:   Recent Labs     11/25/22  0506   INR 1.3     PRO-BNP:   Lab Results   Component Value Date    PROBNP 8,347 (H) 08/02/2022      TSH:   Lab Results   Component Value Date    TSH 3.970 11/24/2022      Cardiac Injury Profile:   Recent Labs     11/24/22  1018 11/24/22  1731 11/24/22  2318   TROPHS 43* 56* 62*      Lipid Profile: No results found for: TRIG, HDL, LDLCALC, CHOL   Hemoglobin A1C: No components found for: HGBA1C      RAD:   CT HEAD WO CONTRAST   Final Result   Interval increased volume and extent of diffuse left cerebral acute subdural   hemorrhage with greatest thickness adjacent to the frontotemporal region now   measuring up to 22 mm in greatest axial thickness. Interval worsening of rightward midline shift at the level of the septum   pellucidum now measuring 8 mm. Mild cerebral white matter chronic microvascular ischemic disease. Mild diffuse cerebral atrophy. Multiple attempts were made by the [facility] core team to page and notify Dr. Kenny All.   Critical results were then called by  Osman Castillo to OSCAR Mccall on 11/26/2022 at 22:23. Radha Stovall reports that neurosurgery has been   consulted about the case. XR CHEST PORTABLE   Final Result   Mild congestive changes. XR CHEST PORTABLE   Final Result   No evidence of pneumothorax or parenchymal contusion. AICD visualized in the   left chest with leads in the heart. 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    Esophagitis-grade 1 K20.90    AP (angina pectoris) ([facility]) T44.4    Systolic hypertension S13    Combined forms of age-related cataract of left eye H25.812    Anemia, chronic renal failure, stage 4 (severe) ([facility]) N18.4, D63.1    Sepsis secondary to CAP ([facility] [address] A41.9    Pneumonia of left lung due to infectious organism J18.9    A-fib ([facility]) I48.91    Atrial fibrillation ([facility]) I48.91    Third degree heart block ([facility]) I44.2    High-grade atrioventricular block I44.39       RECOMMENDATIONS:  Patient presently stable from a cardiac standpoint but would monitor neurologic status as he continues to complain of cephalgia. Likewise need to carefully monitor renal function as previously mentioned while on nifedipine and make appropriate adjustments as clinically warranted. I have spent more than 30 critical care minutes face to face with An Fine and reviewing notes and laboratory data, with greater than 50% of this time instructing and counseling the patient and his ICU nurse face to face regarding my findings and recommendations and I have answered all questions as posed to me by Mr. Tasha Yan. Kaley Smith, DO FACP,FACC,FSCAI      NOTE:  This report was transcribed using voice recognition software.   Every effort was made to ensure accuracy; however, inadvertent computerized transcription errors may be present

## 2022-11-27 NOTE — CONSULTS
Neurosurgery Consult Note      CHIEF COMPLAINT: Acute left hemispheric large subdural hematoma on Eliquis for atrial fib    HPI:89 y.o. male with past medical history of atrial fibrillation, DM hypertension . Patient is a transfer [facility] . He was seen there due to chest pain . He states that chest pain is located in the sternal area . He states  that he has shortness of breath with chest pain . He states that when chest pain occurred he felt like he was going to pass out . Patient had a syncopal episode and hit his head . Patient had a second syncopal episode  and after that family called ambulance . En route to ED patient was given 324 mg of ASA . In the ED patient was in Afib with slow ventricular rate having long pauses and bradycardic to the 30s . Lab data revealed sodium 135 potassium 4.1 BUN 29 Scr 2.0  glucose 244 Trop 45 >>43  WBC 9.9 HGB 10.7   Resp panel neg CT head cervical spine neg CXR congestive changes . Patient received fentanyl morphine and was placed on isoproterenol infusion. Patient will be admitted for further evaluation     Patient undergoing placement of a pacemaker CT of the head initially was interpreted as unremarkable follow-up head CT that showed a very large acute left hemispheric subdural hematoma with mass-effect and shift however despite this there were no neurologic deficits.     Past Medical History:   Diagnosis Date    A-fib ([facility] [address]     Diabetes mellitus ([facility] [address]     H pylori ulcer 8/22/2012    Hyperlipidemia     Hypertension     PONV (postoperative nausea and vomiting)     PUD (peptic ulcer disease) 7/26/2012    Urinary frequency      Past Surgical History:   Procedure Laterality Date    APPENDECTOMY      CATARACT REMOVAL WITH IMPLANT Left 12/17/15    COLONOSCOPY  7/206/2012    CYSTOSCOPY      multiple    CYSTOSCOPY N/A 6/2/2020    CYSTOSCOPY RETROGRADE PYELOGRAM BOTOX  INJECTION (100 UNITS) performed by Albino Watts MD at [id number] [facility] not taking: Reported on 11/24/2022 8/10/22   Vince Garay,    magnesium oxide (MAG-OX) 400 MG tablet Take 400 mg by mouth in the morning. 5/19/22   Historical Provider, MD   allopurinol (ZYLOPRIM) 100 MG tablet Take 300 mg by mouth three times a week M W F 3/24/21   Historical Provider, MD   metoprolol succinate (TOPROL XL) 50 MG extended release tablet 25 mg 4/8/21   Historical Provider, MD   umeclidinium-vilanterol (ANORO ELLIPTA) 62.5-25 MCG/INH AEPB inhaler Inhale 1 puff into the lungs daily   Patient not taking: Reported on 11/24/2022 9/29/20   Historical Provider, MD   albuterol sulfate HFA (PROVENTIL HFA) 108 (90 Base) MCG/ACT inhaler Inhale 2 puffs into the lungs every 6 hours as needed for Wheezing  Patient not taking: Reported on 11/24/2022 5/12/21   Tr De Jesus MD   colchicine-probenecid 0.5-500 MG per tablet Take 1 tablet by mouth 2 times daily   Patient not taking: Reported on 8/2/2022 4/24/21 8/9/22  Historical Provider, MD   apixaban (ELIQUIS) 2.5 MG TABS tablet Take 2.5 mg by mouth 2 times daily    Historical Provider, MD   magnesium gluconate (MAGONATE) 500 MG tablet Take 500 mg by mouth daily  Patient not taking: Reported on 8/2/2022 8/9/22  Historical Provider, MD   Multiple Vitamins-Minerals (THERAPEUTIC MULTIVITAMIN-MINERALS) tablet Take 1 tablet by mouth daily  Patient not taking: Reported on 11/24/2022    Historical Provider, MD   Cholecalciferol (VITAMIN D3 PO) Take by mouth daily  Patient not taking: No sig reported    Historical Provider, MD   torsemide (DEMADEX) 10 MG tablet Take 10 mg by mouth in the morning. M-W-F.  8/9/22  Historical Provider, MD   zinc 50 MG CAPS Take by mouth daily  Patient not taking: Reported on 8/2/2022 8/9/22  Historical Provider, MD   glimepiride (AMARYL) 4 MG tablet Take 4 mg by mouth in the morning and at bedtime    Historical Provider, MD     No outpatient medications have been marked as taking for the 11/24/22 encounter [facility] Encounter). Social History     Socioeconomic History    Marital status:      Spouse name: Not on file    Number of children: Not on file    Years of education: Not on file    Highest education level: Not on file   Occupational History    Occupation: / mill/ inspection   Tobacco Use    Smoking status: Former     Packs/day: 1.00     Years: 60.00     Pack years: 60.00     Types: Cigarettes     Start date: 1950     Quit date: 2010     Years since quittin.3    Smokeless tobacco: Never   Substance and Sexual Activity    Alcohol use: Yes     Comment: 1/2 glass  wine  everyday    Drug use: No    Sexual activity: Not on file   Other Topics Concern    Not on file   Social History Narrative    Not on file     Social Determinants of Health     Financial Resource Strain: Not on file   Food Insecurity: Not on file   Transportation Needs: Not on file   Physical Activity: Not on file   Stress: Not on file   Social Connections: Not on file   Intimate Partner Violence: Not on file   Housing Stability: Not on file     History reviewed. No pertinent family history. ROS: Complete 10 point ROS was obtained with positives in HPI, otherwise:  Pt denies fevers, denies chills. Pt denies chest pain, denies SOB, denies nausea, denies vomiting, complains of headache. VITALS/DRAINS:   VITALS:  BP (!) 178/81   Pulse 70   Temp 97.9 °F (36.6 °C) (Oral)   Resp 21   Ht 5' 9\" (1.753 m)   Wt 182 lb 8.7 oz (82.8 kg)   SpO2 94%   BMI 26.96 kg/m²   24HR INTAKE/OUTPUT:    Intake/Output Summary (Last 24 hours) at 2022 1301  Last data filed at 2022 0936  Gross per 24 hour   Intake 540 ml   Output 1300 ml   Net -760 ml       EXAMINATION: Sitting upright in the ICU bed opens eyes spontaneously smiles answers questions appropriately complained of slight headache pupils equal round reactive  Cranial Nerves:   Motor normal bulk and tone without focal motor weakness:  Sensory: Grossly intact position and light touch  Cerebellar:  Gait:  DTRs:    IMAGING STUDIES:  CT Head W/O Contrast    Result Date: 11/24/2022  EXAMINATION: CT OF THE HEAD WITHOUT CONTRAST  11/24/2022 9:38 am TECHNIQUE: CT of the head was performed without the administration of intravenous contrast. Automated exposure control, iterative reconstruction, and/or weight based adjustment of the mA/kV was utilized to reduce the radiation dose to as low as reasonably achievable. COMPARISON: None. HISTORY: ORDERING SYSTEM PROVIDED HISTORY: LOC + fall + strike to head; concern for bleed TECHNOLOGIST PROVIDED HISTORY: Reason for exam:->LOC + fall + strike to head; concern for bleed Has a \"code stroke\" or \"stroke alert\" been called? ->No Decision Support Exception - unselect if not a suspected or confirmed emergency medical condition->Emergency Medical Condition (MA) FINDINGS: BRAIN/VENTRICLES: No evidence of parenchymal hemorrhages or contusions. No evidence of intra or extra-axial fluid collection is seen. Scattered areas of low attenuation are visualized in the periventricular and subcortical white matter suggestive of chronic microvascular disease. No evidence of acute territorial infarct is seen. Prominence of the ventricles and sulci is visualized suggestive of chronic atrophic brain changes. No evidence of intracranial mass or mass effect, no evidence of midline shift is seen. No evidence of sellar or parasellar mass is visualized. ORBITS: The visualized portion of the orbits demonstrate no acute abnormality. SINUSES: The visualized paranasal sinuses and mastoid air cells demonstrate no acute abnormality. SOFT TISSUES/SKULL:  No acute abnormality of the visualized skull or soft tissues. No acute intracranial abnormality.      CT CSpine W/O Contrast    Result Date: 11/24/2022  EXAMINATION: CT OF THE CERVICAL SPINE WITHOUT CONTRAST 11/24/2022 9:38 am TECHNIQUE: CT of the cervical spine was performed without the administration of intravenous contrast. Multiplanar reformatted images are provided for review. Automated exposure control, iterative reconstruction, and/or weight based adjustment of the mA/kV was utilized to reduce the radiation dose to as low as reasonably achievable. COMPARISON: None. HISTORY: ORDERING SYSTEM PROVIDED HISTORY: LOC + Fall + head strike; Concern for fx TECHNOLOGIST PROVIDED HISTORY: Reason for exam:->LOC + Fall + head strike; Concern for fx Decision Support Exception - unselect if not a suspected or confirmed emergency medical condition->Emergency Medical Condition (MA) FINDINGS: Straightening of the lumbar the columns of the cervical spine is present. No evidence of spondylolisthesis is seen. Multilevel degenerative endplate changes visualized, no evidence of compression deformity of the cervical vertebral bodies. No evidence of fracture or traumatic subluxation is seen. Decreased intervertebral disc height visualized most prominent at C5-C6 and C3-C4. Multilevel degenerative disc osteophyte complex, uncovertebral disease and hypertrophic changes in the facet joints is seen. Calcification visualized within the spinal canal superimposed over the falciform ligament at the level of the C5 vertebral body. Otherwise abnormal density visualized in the cervical spinal canal. The neck soft tissues are unremarkable. Circumferential opacification visualized in the sphenoid sinus. Limited evaluation of the upper lung fields is unremarkable bilaterally. No acute abnormality of the cervical spine. Multilevel degenerative changes of the cervical spine visualized most prominent at C3-C4 and C5-C6. Circumferential opacification visualized in the sphenoid sinus. XR CHEST PORTABLE    Result Date: 11/25/2022  EXAMINATION: ONE XRAY VIEW OF THE CHEST 11/25/2022 4:57 pm COMPARISON: 11/24/2022.  HISTORY: ORDERING SYSTEM PROVIDED HISTORY: Pacemaker placement and rule out pneumothorax TECHNOLOGIST PROVIDED HISTORY: Reason for exam:->Pacemaker placement and rule out pneumothorax What reading provider will be dictating this exam?->CRC FINDINGS: AICD visualized in the left chest with lead in the heart. EKG leads are seen superimposed over the chest. Poor inspiratory effort is seen. The CT of the prominence of the cardiomediastinal silhouette is visualized demonstrates no significant increase in comparison to the prior study. Atherosclerotic calcifications visualized in the area the aortic arch. Mild prominence of the bronchovascular and interstitial lung markings is visualized in bilateral lung fields more prominent in the lateral right mid lung field large and in lung field linear subsegmental atelectatic streaks of visualized bilateral lung fields. No evidence of focal infiltrate or consolidation. No evidence of pneumothorax or pleural effusion. Degenerative bone changes seen. No evidence of pneumothorax or parenchymal contusion. AICD visualized in the left chest with leads in the heart. XR CHEST PORTABLE    Result Date: 11/24/2022  EXAMINATION: ONE XRAY VIEW OF THE CHEST 11/24/2022 9:30 am COMPARISON: 08/08/2022 HISTORY: ORDERING SYSTEM PROVIDED HISTORY: CP + SOB TECHNOLOGIST PROVIDED HISTORY: Reason for exam:->CP + SOB FINDINGS: Poor inspiratory effort. The cardiomediastinal silhouette is slightly prominent but demonstrates no significant change in comparison to the prior study. Prominence of the bronchovascular interstitial lung markings is visualized bilaterally with scattered areas of patchy airspace opacification seen. Linea subsegmental atelectatic streaks are visualized. The costophrenic angles are clear with no evidence of pleural effusion seen. No evidence of pneumothorax or parenchymal lung mass. The osseous structures are without acute process. Congestive changes demonstrate no change.        LABS:  WBC:    Lab Results   Component Value Date/Time    WBC 12.2 11/27/2022 06:21 AM     BMP:    Lab Results   Component Value Date/Time     11/27/2022 06:21 AM    K 4.4 11/27/2022 06:21 AM    K 4.5 11/24/2022 09:08 AM     11/27/2022 06:21 AM    CO2 22 11/27/2022 06:21 AM    BUN 30 11/27/2022 06:21 AM    LABALBU 4.2 11/24/2022 09:08 AM    CREATININE 2.2 11/27/2022 06:21 AM    CALCIUM 9.6 11/27/2022 06:21 AM    GFRAA 30 09/16/2022 08:24 AM    LABGLOM 28 11/27/2022 06:21 AM    GLUCOSE 209 11/27/2022 06:21 AM     PT/INR:    Lab Results   Component Value Date/Time    PROTIME 13.0 11/27/2022 06:21 AM    INR 1.2 11/27/2022 06:21 AM       IMPRESSION: Large acute subdural hematoma over the left convexity  RECOMMENDATIONS: Reverse Eliquis ASAP full anticoagulation contraindicated until complete resolution of any intracranial bleeding    Long discussion with the patient and family over risks and benefits of craniotomy at this point risks of full craniotomy outweigh benefits as he has a nonfocal neuro exam and is following commands we will add Keppra to his medical regimen agree with current blood pressure recommendations continue close neuro exam every hour, will follow with you    Thank you again for this consultation.       Ben Byrd MD  11/27/2022

## 2022-11-27 NOTE — PLAN OF CARE
Problem: Chronic Conditions and Co-morbidities  Goal: Patient's chronic conditions and co-morbidity symptoms are monitored and maintained or improved  Outcome: Progressing     Problem: Pain  Goal: Verbalizes/displays adequate comfort level or baseline comfort level  Outcome: Progressing     Problem: Safety - Adult  Goal: Free from fall injury  Outcome: Progressing     Problem: ABCDS Injury Assessment  Goal: Absence of physical injury  Outcome: Progressing     Problem: Neurosensory - Adult  Goal: Achieves stable or improved neurological status  Outcome: Progressing  Goal: Absence of seizures  Outcome: Progressing

## 2022-11-27 NOTE — PROGRESS NOTES
Hospitalist Progress Note      PCP: Cristina Wild MD    Date of Admission: 11/24/2022    Chief Complaint: syncope, heart block complete    Hospital Course:   80 y.o. male with past medical history of atrial fibrillation, DM hypertension . Patient is a transfer from [facility] . He was seen there due to chest pain . He states that chest pain is located in the sternal area . He states  that he has shortness of breath with chest pain . He states that when chest pain occurred he felt like he was going to pass out . Patient had a syncopal episode and hit his head . Patient had a second syncopal episode  and after that family called ambulance . En route to ED patient was given 324 mg of ASA . In the ED patient was in Afib with slow ventricular rate having long pauses and bradycardic to the 30s . Lab data revealed sodium 135 potassium 4.1 BUN 29 Scr 2.0  glucose 244 Trop 45 >>43  WBC 9.9 HGB 10.7   Resp panel neg CT head cervical spine neg CXR congestive changes . Patient received fentanyl morphine and was placed on isoproterenol infusion. EP consulted-pacemaker placement. Lyme serology was ordered for possible carditis. Patient had an initial CT of the head after the fall which was negative for any intracranial hemorrhage. Repeat CT head was obtained on 11/26/2022 as patient continued to complain of headache and spite of pain medicine. Repeat CT of the head noncontrast was obtained which showed progressive subdural hemorrhage. Livia Mccauley ordered for Eliquis reversal.  Neurosurgery was contacted-repeat CT head today, await further recommendations from neurosurgery regarding any surgical intervention    Subjective: Patient was seen at bedside this morning in ICU. Does not complain of any shortness of breath nor chest pain. Discussed on the plan regarding monitoring post pacemaker, PT/OT eval and disposition based on recs.  CT head-11/27/22      Medications:  Reviewed    Infusion Medications sodium chloride      dextrose       Scheduled Medications    guaiFENesin  400 mg Oral TID    insulin glargine  5 Units SubCUTAneous Daily    sodium chloride flush  5-40 mL IntraVENous 2 times per day    insulin lispro  0-16 Units SubCUTAneous Q6H    NIFEdipine  30 mg Oral Daily    sodium chloride flush  5-40 mL IntraVENous 2 times per day    pantoprazole (PROTONIX) 40 mg injection  40 mg IntraVENous Daily    lidocaine  1 patch TransDERmal Daily    allopurinol  300 mg Oral Once per day on Mon Wed Fri    [Held by provider] apixaban  2.5 mg Oral BID    gabapentin  100 mg Oral BID    tamsulosin  0.4 mg Oral Nightly    Arformoterol Tartrate  15 mcg Nebulization BID    lisinopril  20 mg Oral Daily    And    hydroCHLOROthiazide  12.5 mg Oral Daily     PRN Meds: ipratropium-albuterol, perflutren lipid microspheres, hydrALAZINE, trimethobenzamide, ondansetron, sodium chloride flush, sodium chloride, acetaminophen, labetalol, sodium chloride flush, polyethylene glycol, glucose, dextrose bolus **OR** dextrose bolus, glucagon (rDNA), dextrose, potassium chloride **OR** potassium alternative oral replacement **OR** potassium chloride, acetaminophen **OR** acetaminophen, aluminum & magnesium hydroxide-simethicone      Intake/Output Summary (Last 24 hours) at 11/27/2022 1007  Last data filed at 11/27/2022 0936  Gross per 24 hour   Intake 540 ml   Output 1300 ml   Net -760 ml       Exam:    BP (!) 179/85   Pulse 71   Temp 99.9 °F (37.7 °C) (Temporal)   Resp 20   Ht 5' 9\" (1.753 m)   Wt 182 lb 8.7 oz (82.8 kg)   SpO2 94%   BMI 26.96 kg/m²     General appearance: No apparent distress, appears stated age and cooperative. HEENT: Pupils equal, round, and reactive to light. Conjunctivae/corneas clear. Neck: Supple, with full range of motion. No jugular venous distention. Trachea midline. Respiratory:  Normal respiratory effort. Clear to auscultation, bilaterally without Rales/Wheezes/Rhonchi.   Cardiovascular: Regular rate and rhythm with normal S1/S2 without murmurs, rubs or gallops. Abdomen: Soft, non-tender, non-distended with normal bowel sounds. Musculoskeletal: No clubbing, cyanosis or edema bilaterally. Full range of motion without deformity. Skin: Skin color, texture, turgor normal.  No rashes or lesions. Neurologic:  No focal deficits,  Psychiatric: Alert and oriented, thought content appropriate, normal insight    Labs:   Recent Labs     11/26/22  0505 11/26/22 2331 11/27/22  0621   WBC 11.4 13.6* 12.2*   HGB 10.5* 9.8* 10.1*   HCT 31.8* 29.6* 30.4*    226 234     Recent Labs     11/26/22  0505 11/26/22 2331 11/27/22  0621    133 134   K 4.0 4.5 4.4   CL 99 98 100   CO2 23 23 22   BUN 26* 33* 30*   CREATININE 2.0* 2.3* 2.2*   CALCIUM 9.2 9.0 9.6   PHOS 2.6  --  3.1     No results for input(s): AST, ALT, BILIDIR, BILITOT, ALKPHOS in the last 72 hours. Recent Labs     11/25/22  0506 11/27/22  0621   INR 1.3 1.2     No results for input(s): Luis Brandonine in the last 72 hours.     Assessment/Plan:    Active Hospital Problems    Diagnosis Date Noted    High-grade atrioventricular block [I44.39] 11/26/2022     Priority: Medium    Third degree heart block ([address] [I44.2] 11/25/2022     Priority: Medium    A-fib [facility]) [I48.91] 11/24/2022     Priority: Medium    Atrial fibrillation ([address] [I48.91] 11/24/2022     Priority: Medium   Degree heart block  Nonvalvular paroxysmal atrial fibrillation  Syncope and collapse  Subdural hematoma-increasing  Hypertension  CKD stage III  Diabetes mellitus type 2  Anemia due to CKD    -Continue to monitor in ICU on telemetry  -EP consulted-currently placed on isoproterenol infusion-pacemaker placement-11/25/2022  - cardiology consulted- apprec recommendations, Eliquis restarted today per Cardiology, started on nifedipine  -Echo ordered-shows an ejection fraction of 60 to 65%, moderately sclerotic aortic valve [stable from previous], mild tricuspid regurgitation, mild pulmonary regurgitation, mild to moderate aortic stenosis, aortic regurgitation  -Continue home medications for hypertension-amlodipine, lisinopril, hydrochlorothiazide  -Creatinine reviewed and at 2.0 which is around his baseline  -CT of head reviewed from yesterday-showed an increase in the extent of diffuse left cerebral acute subdural hemorrhage.  -Neurosurgery consulted-Eliquis stopped, Kcentra for reversal.  Repeat CT head-11/27/2020  -Continue sliding scale insulin  -Appreciate critical care management    DVT Prophylaxis:  Eliquis restarted today per Cardiology  Diet: ADULT DIET; Regular; Low Fat/Low Chol/High Fiber/2 gm Na  Code Status: Full Code    Dispo -CT head ongoing patient specialist eval, continue ICU care  Discharge disposition-PT/OT Eval and dispo based on recs.      Frannie Wright MD

## 2022-11-27 NOTE — PLAN OF CARE
Spoke to pt's son regarding CODE STATUS. Per son's wishes, pt is to be made LIMITED - NO TO EVERYTHING, but ONLY in a circumstance where he is facing imminent herniation of the brain parenchyma. In all other cases, he is to remain FULL CODE. This status change was witnessed by other members of the resident staff, and confirmed with Dr. Ani Garcia. Pt's CODE STATUS has been changed in the chart, with a comment written to clearly delineate when aggressive measures should be pursued. Will continue to follow closely.      Alonso Chandler DO, PGY3   11/27/2022

## 2022-11-27 NOTE — PROGRESS NOTES
Patient transported to and from CT scan via bed accompanied by this nurse and two transporters. No adverse events.

## 2022-11-27 NOTE — PROGRESS NOTES
[address] [facility]  Department of Internal Medicine   Internal Medicine Residency   MICU Progress Note    Patient:  Dom Pate 80 y.o. male  MRN: [id number]     Date of Service: 2022    Allergy: Patient has no known allergies. Subjective   This morning the patient was seen and examined at bedside in no acute distress. He denies any worsening of his headache, no neck pain, rigidity, or fever recently. He was seen by the neurosurgeon who wants to watch and wait for now. 24h change: Non contrast head CT showed subdural hematoma, Kcentra given, Eliquis held   ROS: Denies Fever/chills/CP/SOB/N/V/D/C/Dysuria/Blood in stool or urine  Objective     VITAL SIGNS:  BP (!) 178/81   Pulse 70   Temp 97.9 °F (36.6 °C) (Oral)   Resp 21   Ht 5' 9\" (1.753 m)   Wt 182 lb 8.7 oz (82.8 kg)   SpO2 94%   BMI 26.96 kg/m²   Tmax over 24 hours:  Temp (24hrs), Av °F (37.2 °C), Min:97.9 °F (36.6 °C), Max:99.9 °F (37.7 °C)      Patient Vitals for the past 6 hrs:   BP Temp Temp src Pulse Resp SpO2   22 1200 -- 97.9 °F (36.6 °C) Oral -- -- 94 %   22 1015 (!) 178/81 99.8 °F (37.7 °C) Temporal 70 21 93 %         Intake/Output Summary (Last 24 hours) at 2022 1524  Last data filed at 2022 0936  Gross per 24 hour   Intake 540 ml   Output 1300 ml   Net -760 ml     Wt Readings from Last 2 Encounters:   22 182 lb 8.7 oz (82.8 kg)   22 185 lb (83.9 kg)     Body mass index is 26.96 kg/m².         I & O - 24hr:   Intake/Output Summary (Last 24 hours) at 2022 1524  Last data filed at 2022 0936  Gross per 24 hour   Intake 540 ml   Output 1300 ml   Net -760 ml       Physical Exam:  General Appearance: alert, appears stated age, and cooperative  Neck: no adenopathy, no carotid bruit, no JVD, supple, symmetrical, trachea midline, and thyroid not enlarged, symmetric, no tenderness/mass/nodules  Lung: clear to auscultation bilaterally  Heart: regular rate and rhythm, S1, S2 normal, no murmur, click, rub or gallop  Abdomen: soft, non-tender; bowel sounds normal; no masses,  no organomegaly  Extremities:  extremities normal, atraumatic, no cyanosis or edema  Musculoskeletal: No joint swelling, no muscle tenderness. ROM normal in all joints of extremities. Neurologic: Mental status: Alert, oriented, thought content appropriate, able to follow commands, sensation intact in both extremities, pupil non reactive to light, strength 5/5 in both extremities. Lines     site day    Art line   None    TLC None    PICC None    Hemoaccess None      VENT SETTINGS (Comprehensive) (if applicable): Additional Respiratory Assessments  Heart Rate: 70  Resp: 21  SpO2: 94 %    ABGs:   No results for input(s): PH, PCO2, PO2, HCO3, BE, O2SAT in the last 72 hours. Laboratory findings:  Complete Blood Count:   Recent Labs     11/26/22  0505 11/26/22 2331 11/27/22 0621   WBC 11.4 13.6* 12.2*   HGB 10.5* 9.8* 10.1*   HCT 31.8* 29.6* 30.4*    226 234        Last 3 Blood Glucose:   Recent Labs     11/26/22  0505 11/26/22 2331 11/27/22 0621   GLUCOSE 193* 228* 209*        PT/INR:    Lab Results   Component Value Date/Time    PROTIME 13.0 11/27/2022 06:21 AM    INR 1.2 11/27/2022 06:21 AM     PTT:    Lab Results   Component Value Date/Time    APTT 31.4 11/25/2022 05:03 AM       Comprehensive Metabolic Profile:   Recent Labs     11/26/22  0505 11/26/22 2331 11/27/22 0621    133 134   K 4.0 4.5 4.4   CL 99 98 100   CO2 23 23 22   BUN 26* 33* 30*   CREATININE 2.0* 2.3* 2.2*   GLUCOSE 193* 228* 209*   CALCIUM 9.2 9.0 9.6      Magnesium:   Lab Results   Component Value Date/Time    MG 1.9 11/27/2022 06:21 AM     Phosphorus:   Lab Results   Component Value Date/Time    PHOS 3.1 11/27/2022 06:21 AM     Ionized Calcium: No results found for: CAION   Troponin: No results for input(s): TROPONINI in the last 72 hours.      Medications     Infusions: (Fluid, Sedation, Vasopressors)  Vasopressors  None  Sedation  None    Nutrition: Adult regular, easy to chew       ATB:   Antibiotics  Days   None            Cultures: Pending        Imaging     CT HEAD WO CONTRAST    Result Date: 11/27/2022  1. Moderate to large left subdural hematoma with left-to-right midline structure shift of approximately 7 mm, unchanged. 2.  Greatest maximal thickness of the subdural hematoma on coronal views is 24 mm, compared to 23 mm on the prior study. 3.  Gas fluid level in the left sphenoid sinus may reflect acute sinusitis. CT HEAD WO CONTRAST    Result Date: 11/26/2022  Interval increased volume and extent of diffuse left cerebral acute subdural hemorrhage with greatest thickness adjacent to the frontotemporal region now measuring up to 22 mm in greatest axial thickness. Interval worsening of rightward midline shift at the level of the septum pellucidum now measuring 8 mm. Mild cerebral white matter chronic microvascular ischemic disease. Mild diffuse cerebral atrophy. Multiple attempts were made by the [address] [place] [address] core team to page and notify Dr. Art Romero. Critical results were then called by Dr. Osiris Rivas to [facility]SC[facility] Pollard on 11/26/2022 at 22:23. Mindy Bernal reports that neurosurgery has been consulted about the case. CT Head W/O Contrast    Result Date: 11/24/2022  No acute intracranial abnormality. CT CSpine W/O Contrast    Result Date: 11/24/2022  No acute abnormality of the cervical spine. Multilevel degenerative changes of the cervical spine visualized most prominent at C3-C4 and C5-C6. Circumferential opacification visualized in the sphenoid sinus. XR CHEST PORTABLE    Result Date: 11/26/2022  Mild congestive changes. XR CHEST PORTABLE    Result Date: 11/25/2022  No evidence of pneumothorax or parenchymal contusion. AICD visualized in the left chest with leads in the heart. XR CHEST PORTABLE    Result Date: 11/24/2022  Congestive changes demonstrate no change. Resident's Assessment and Plan     Rafi Mazariegosduncanjade   , 80 y.o. , male came with CC : Syncope      has a past medical history of A-fib ([address] Diabetes mellitus ([address] H pylori ulcer, Hyperlipidemia, Hypertension, PONV (postoperative nausea and vomiting), PUD (peptic ulcer disease), and Urinary frequency.        Consults:   Neurosurgery     Assessment:  Neurology  Alert and oriented  Able speak and follow commands      Headache due to subdural hematoma   Reports headache s/p fall   Initial CT in the ED was read negative for intracranial hemorrhage  Continues to complain of headache in spite of pain medicine   He was on Eliquis for afib prior to fall   CT head on 11/26/2022 showed subdural hematoma of 7 mm   Neurosurgery is on board and does not recommend anything at this time and want to watch and wait   Prophylactically placed on Keppra today  Neuro checks q1h   Maintain BP systolic <197 mmHg         Cardiology  Syncope episode 2/2 complete heart block s/p pacemaker placement  He was initially on isoproterenol   Heart rate stable   Continue to monitor       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 sinus syndrome vs Lyme disease vs carditis vs ischemia - resolved   Follow Lyme serology still pending, TSH wnl   S/P pacemaker dual chamber placement on 11/25/2022     Hx of Afib- on Eliquis, held prior to pacemaker placement  [id number] score is 4   EP following, metoprolol held   Eliquis held and [place] given due to recent brain bleed      Hx of HTN - on amlodipine and lisinopril   Started on nifedipine by cardio      Renal  Hx of CKD stage IV  Continue home BP s/p pacemaker placement      Endocrine  Hx of NIDDM  On LDSS   Hypoglycemia protocol on   Glucose ACHS      PT/OT: Consulted  DVT ppx: PCDs  GI ppx: None    Problems resolved during this admission:   -Third degree AV block     PT/OT: Not indicated at this time   DVT ppx: PCDs  GI ppx: None       Code Status: LIMITED CODE only in a circumstance where he is facing imminent herniation of brain parenchyma, in all other cases he is to remain FULL CODE. Disposition: Continue current care     Jim Carroll MD, PGY-1    Attending physician: Dr. Bhavya Cohen     NOTE: This report was transcribed using voice recognition software. Every effort was made to ensure accuracy; however, inadvertent computerized transcription errors may be present. [place]  Department of Pulmonary, Critical Care and Sleep Medicine  [address] [facility]  Department of Internal Medicine      During multidisciplinary team rounds Dom Pate is a 80 y.o. male was seen, examined and discussed. This is confirmation that I have personally seen and examined the patient and that the key elements of the encounter were performed by me (> 85 % time). The medications & laboratory data was discussed and adjusted where necessary. The radiographic images were reviewed or with radiologist or consultant if felt dis-concordant with the exam or history. The above findings were corroborated, plans confirmed and changes made if needed. Family is updated at the bedside as available. Key issues of the case were discussed among consultants. Critical Care time is documented if appropriate.       Kevyn Romo, DO, FACP, FCCP, Tiff pozo,

## 2022-11-28 ENCOUNTER — TELEPHONE (OUTPATIENT)
Dept: NON INVASIVE DIAGNOSTICS | Age: 87
End: 2022-11-28

## 2022-11-28 PROBLEM — R56.9 SEIZURE (HCC): Status: ACTIVE | Noted: 2022-11-28

## 2022-11-28 NOTE — PROCEDURES
[address] Report    MRN: [id number]   PATIENT NAME: Flora Aranda   DATE OF REPORT: 2022  DATE OF SERVICE: 2022    PHYSICIAN NAME: Raheem Bahena DO  Referring Physician: Malou Lane MD      Patient's : 10/27/1933   Patient's Age: 80 y.o. 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      ____________________________  Electronically signed by: Raheem Bahena DO, 2022 3:18 PM      Patient Clinical Information   Reason for Study: Patient undergoing evaluation for speech difficulty with subdural hematoma  Patient State: Somnolent  Primary neurological diagnosis: Intracranial hemorrhage  Primary indication for monitoring: Diagnosis of nonconvulsive seizures    Pertinent Medications and Treatments    levetiracetam     gabapentin     Sedatives administered: No  Intubated: No  Pharmacological paralytic: No    Reporting Period  Start of Study: 1255, 2022   End of Study:  1455, 2022       EEG Description  Digital video and scalp EEG monitoring was performed using the standard protocol for this laboratory. Scalp electrodes were applied in the international 10/20 system. Multiple digital montage arrangements were utilized for evaluation. EKG and video were recorded.      Background:      Occipital rhythm (posterior dominant rhythm or PDR): Present   Frequency: 7.5 Hz  Voltage: Medium   Organization: fair   Reactivity to eye opening/closure: fair    Drowsiness: Present - abnormal, mildly excessive generalized irregular delta activity noted  Sleep: Stage 2 present - normal    Comments: In the waking state the background is composed often of generalized irregular theta activity. Technical and Activation Procedures:  Hyperventilation: Not done        Photic stimulation: Not done        Reactivity to stimulation: Yes    Abnormalities:    I. Seizures? Yes    Patient noted to have two electrographic seizures with onset from Fp1, F3, F7 with rhythmic delta activity starting at approximately 1.5 Hz. There was evolution in morphology to a sharp-and-wave morphology as well as spread to a field including F3, C3, F7, T3>T5. No clear clinical change noted during these events. During the second he was noted to be trying to talk to a couple of visitors to his left who were speaking to him. II. Rhythmic or Periodic Patterns? Occasional lateralized rhythmic delta activity noted at Fp1, F3, F7 with an average frequency of 1.5 Hz. Occasional evolution in morphology noted     III. Other Abnormalities?         Frequent irregular delta activity noted at Fp1, F3, C3, F7, T3, T5 with a shifting field

## 2022-11-28 NOTE — TELEPHONE ENCOUNTER
----- Message from Maria R Chris DO sent at 11/27/2022  1:04 PM EST -----  Regarding: appt  Delay appt until 3 months from now due to subdural bleed.   Maria R Chris DO

## 2022-11-28 NOTE — PROGRESS NOTES
Physical Therapy    Physical Therapy Initial Assessment     Name: Ricardo Johnson  : 10/27/1933  MRN: [id number]      Date of Service: 2022    Evaluating PT:  Fadi Marianne PT, DPT  [id number]     Room #:  6088/1183-L  Diagnosis:  A-fib ([facility] [I48.91]  Atrial fibrillation ([place] [address] [I48.91]  PMHx/PSHx:   has a past medical history of A-fib ([place] [address] Diabetes mellitus ([place] [address] H pylori ulcer, Hyperlipidemia, Hypertension, PONV (postoperative nausea and vomiting), PUD (peptic ulcer disease), and Urinary frequency. Procedure/Surgery:  Dual chamber pacemaker placement    Precautions:  Falls, Aphasia, L SDH, Pacemaker precautions, LUE sling at night, BP<130/80, Bed/chair alarm   Equipment Needs:  TBD    SUBJECTIVE:    Pt lives with dtr in a 1 story home with 3 stairs and 1 rail to enter. Bedroom and bathroom are on the 1st level. Pt ambulated with no AD PTA. OBJECTIVE:   Initial Evaluation  Date: 22 Treatment Short Term/ Long Term   Goals   AM-PAC 6 Clicks 92/80     Was pt agreeable to Eval/treatment? Yes      Does pt have pain?  No c/o pain     Bed Mobility  Rolling: SBA  Supine to sit: Min A  Sit to supine: Min A  Scooting: SBA  Rolling: Supervision   Supine to sit: Supervision   Sit to supine: Supervision   Scooting: Supervision    Transfers Sit to stand: Min A  Stand to sit: Min A  Stand pivot: Min A  Sit to stand: Supervision   Stand to sit: Supervision   Stand pivot: Supervision    Ambulation    5 feet with HHA Min A  >100 feet with AAD SBA   Stair negotiation: ascended and descended  NT  >4 steps with 1 rail SBA   ROM BUE:  Per OT eval   BLE:  WFL     Strength BUE:  Per OT eval   BLE:  Grossly ~4/5 -- difficulty following MMT commands      Balance Sitting EOB:  SBA  Dynamic Standing:  Min A  Sitting EOB:  Supervision   Dynamic Standing:  SBA     Pt is A & O x ? D/t aphasia   RASS:  0  CAM-ICU:  NT  Sensation:  Pt denies numbness and tingling to extremities  Edema: Unremarkable    Vitals:  Blood Pressure at rest 156/77 mmHg  Blood Pressure post session 140/74 mmHg   Heart Rate at rest 87 bpm  Heart Rate post session 88 bpm    SPO2 at rest 98% on RA SPO2 post session 98% on RA   BP /71, 134/61  BP in chair 124/52      Functional Status Score-Intensive Care Unit (FSS-ICU)   Rolling 5/7   Supine to sit transfer 4/7   Unsupported sitting  5/7   Sit to stand transfers 4/7   Ambulation 1/7   Total  19/35     Therapeutic Exercises:    BLE AROM at EOB  STS x2 reps     Patient education  Pt educated on PT role, safety during functional mobility, pacemaker precautions     Patient response to education:   Pt verbalized understanding Pt demonstrated skill Pt requires further education in this area   no no Yes      ASSESSMENT:    Conditions Requiring Skilled Therapeutic Intervention:    [x]Decreased strength     [x]Decreased ROM  [x]Decreased functional mobility  [x]Decreased balance   [x]Decreased endurance   [x]Decreased posture  []Decreased sensation  []Decreased coordination   []Decreased vision  [x]Decreased safety awareness   [x]Increased pain       Comments:  Pt received supine and agreeable to PT evaluation with OT collaboration. Pt cleared for participation by RN prior to session. Vitals monitored during session. Pt demonstrates aphasia what appears to be both receptive and expressive as he has difficulty following commands and communicated but responds better to tactile cues and verbal demonstrations. BP monitored closely throughout session and activity kept light d/t large L SDH without nsgx intervention at time of evaluation. Pt as able to stand up and transfer to/from chair with light assistance and cues not to use LUE following pacer precautions. Pt not left up in jonathan at this time d/t safety concerns and no chair alarms available on unit. Rn to order chair alarm. Pt left with call button in reach, lines attached, and needs met.     Pt would benefit from Education Training   [x] Patient/Caregiver Education   [x] HEP  [] Other     PT long term treatment goals are located in above grid    Frequency of treatments: 2-5x/week x 1-2 weeks. Time in  1025  Time out  1100    Total Treatment Time  25 minutes     Evaluation Time includes thorough review of current medical information, gathering information on past medical history/social history and prior level of function, completion of standardized testing/informal observation of tasks, assessment of data and education on plan of care and goals.     CPT codes:  [] Low Complexity PT evaluation 85279  [x] Moderate Complexity PT evaluation 76706  [] High Complexity PT evaluation 27286  [] PT Re-evaluation 19921  [] Gait training 80231 -- minutes  [] Manual therapy 81390 -- minutes  [x] Therapeutic activities 18152 25 minutes  [] Therapeutic exercises 21890 - minutes  [] Neuromuscular reeducation 34832 -- minutes     Olegario Signs, PT, DPT  [id number]

## 2022-11-28 NOTE — PROGRESS NOTES
Neurosurg progress note  VITALS:  BP (!) 140/66   Pulse 75   Temp 99 °F (37.2 °C) (Axillary)   Resp 16   Ht 5' 9\" (1.753 m)   Wt 185 lb (83.9 kg)   SpO2 99%   BMI 27.32 kg/m²   24HR INTAKE/OUTPUT:    Intake/Output Summary (Last 24 hours) at 11/28/2022 1231  Last data filed at 11/28/2022 1000  Gross per 24 hour   Intake 240 ml   Output 1050 ml   Net -810 ml     CT Head W/O Contrast    Result Date: 11/24/2022  EXAMINATION: CT OF THE HEAD WITHOUT CONTRAST  11/24/2022 9:38 am TECHNIQUE: CT of the head was performed without the administration of intravenous contrast. Automated exposure control, iterative reconstruction, and/or weight based adjustment of the mA/kV was utilized to reduce the radiation dose to as low as reasonably achievable. COMPARISON: None. HISTORY: ORDERING SYSTEM PROVIDED HISTORY: LOC + fall + strike to head; concern for bleed TECHNOLOGIST PROVIDED HISTORY: Reason for exam:->LOC + fall + strike to head; concern for bleed Has a \"code stroke\" or \"stroke alert\" been called? ->No Decision Support Exception - unselect if not a suspected or confirmed emergency medical condition->Emergency Medical Condition (MA) FINDINGS: BRAIN/VENTRICLES: No evidence of parenchymal hemorrhages or contusions. No evidence of intra or extra-axial fluid collection is seen. Scattered areas of low attenuation are visualized in the periventricular and subcortical white matter suggestive of chronic microvascular disease. No evidence of acute territorial infarct is seen. Prominence of the ventricles and sulci is visualized suggestive of chronic atrophic brain changes. No evidence of intracranial mass or mass effect, no evidence of midline shift is seen. No evidence of sellar or parasellar mass is visualized. ORBITS: The visualized portion of the orbits demonstrate no acute abnormality. SINUSES: The visualized paranasal sinuses and mastoid air cells demonstrate no acute abnormality.  SOFT TISSUES/SKULL:  No acute abnormality of the visualized skull or soft tissues. No acute intracranial abnormality. CT CSpine W/O Contrast    Result Date: 11/24/2022  EXAMINATION: CT OF THE CERVICAL SPINE WITHOUT CONTRAST 11/24/2022 9:38 am TECHNIQUE: CT of the cervical spine was performed without the administration of intravenous contrast. Multiplanar reformatted images are provided for review. Automated exposure control, iterative reconstruction, and/or weight based adjustment of the mA/kV was utilized to reduce the radiation dose to as low as reasonably achievable. COMPARISON: None. HISTORY: ORDERING SYSTEM PROVIDED HISTORY: LOC + Fall + head strike; Concern for fx TECHNOLOGIST PROVIDED HISTORY: Reason for exam:->LOC + Fall + head strike; Concern for fx Decision Support Exception - unselect if not a suspected or confirmed emergency medical condition->Emergency Medical Condition (MA) FINDINGS: Straightening of the lumbar the columns of the cervical spine is present. No evidence of spondylolisthesis is seen. Multilevel degenerative endplate changes visualized, no evidence of compression deformity of the cervical vertebral bodies. No evidence of fracture or traumatic subluxation is seen. Decreased intervertebral disc height visualized most prominent at C5-C6 and C3-C4. Multilevel degenerative disc osteophyte complex, uncovertebral disease and hypertrophic changes in the facet joints is seen. Calcification visualized within the spinal canal superimposed over the falciform ligament at the level of the C5 vertebral body. Otherwise abnormal density visualized in the cervical spinal canal. The neck soft tissues are unremarkable. Circumferential opacification visualized in the sphenoid sinus. Limited evaluation of the upper lung fields is unremarkable bilaterally. No acute abnormality of the cervical spine. Multilevel degenerative changes of the cervical spine visualized most prominent at C3-C4 and C5-C6.  Circumferential opacification visualized in the sphenoid sinus. XR CHEST PORTABLE    Result Date: 11/25/2022  EXAMINATION: ONE XRAY VIEW OF THE CHEST 11/25/2022 4:57 pm COMPARISON: 11/24/2022. HISTORY: ORDERING SYSTEM PROVIDED HISTORY: Pacemaker placement and rule out pneumothorax TECHNOLOGIST PROVIDED HISTORY: Reason for exam:->Pacemaker placement and rule out pneumothorax What reading provider will be dictating this exam?->CRC FINDINGS: AICD visualized in the left chest with lead in the heart. EKG leads are seen superimposed over the chest. Poor inspiratory effort is seen. The CT of the prominence of the cardiomediastinal silhouette is visualized demonstrates no significant increase in comparison to the prior study. Atherosclerotic calcifications visualized in the area the aortic arch. Mild prominence of the bronchovascular and interstitial lung markings is visualized in bilateral lung fields more prominent in the lateral right mid lung field large and in lung field linear subsegmental atelectatic streaks of visualized bilateral lung fields. No evidence of focal infiltrate or consolidation. No evidence of pneumothorax or pleural effusion. Degenerative bone changes seen. No evidence of pneumothorax or parenchymal contusion. AICD visualized in the left chest with leads in the heart. XR CHEST PORTABLE    Result Date: 11/24/2022  EXAMINATION: ONE XRAY VIEW OF THE CHEST 11/24/2022 9:30 am COMPARISON: 08/08/2022 HISTORY: ORDERING SYSTEM PROVIDED HISTORY: CP + SOB TECHNOLOGIST PROVIDED HISTORY: Reason for exam:->CP + SOB FINDINGS: Poor inspiratory effort. The cardiomediastinal silhouette is slightly prominent but demonstrates no significant change in comparison to the prior study. Prominence of the bronchovascular interstitial lung markings is visualized bilaterally with scattered areas of patchy airspace opacification seen. Linea subsegmental atelectatic streaks are visualized.   The costophrenic angles are clear with no evidence of pleural effusion seen. No evidence of pneumothorax or parenchymal lung mass. The osseous structures are without acute process. Congestive changes demonstrate no change.      CBC:   Lab Results   Component Value Date/Time    WBC 11.2 11/28/2022 04:04 AM    RBC 3.69 11/28/2022 04:04 AM    HGB 10.3 11/28/2022 04:04 AM    HCT 31.4 11/28/2022 04:04 AM    MCV 85.1 11/28/2022 04:04 AM    MCH 27.9 11/28/2022 04:04 AM    MCHC 32.8 11/28/2022 04:04 AM    RDW 14.1 11/28/2022 04:04 AM     11/28/2022 04:04 AM    MPV 9.3 11/28/2022 04:04 AM     BMP:    Lab Results   Component Value Date/Time     11/28/2022 04:04 AM    K 4.2 11/28/2022 04:04 AM    K 4.5 11/24/2022 09:08 AM     11/28/2022 04:04 AM    CO2 22 11/28/2022 04:04 AM    BUN 34 11/28/2022 04:04 AM    LABALBU 4.2 11/24/2022 09:08 AM    CREATININE 2.1 11/28/2022 04:04 AM    CALCIUM 9.5 11/28/2022 04:04 AM    GFRAA 30 09/16/2022 08:24 AM    LABGLOM 30 11/28/2022 04:04 AM    GLUCOSE 153 11/28/2022 04:04 AM      insulin lispro  0-4 Units SubCUTAneous TID     insulin lispro  0-4 Units SubCUTAneous Nightly    polyethylene glycol  17 g Oral Daily    levETIRAcetam  500 mg IntraVENous Q12H    insulin glargine  13 Units SubCUTAneous Daily    insulin lispro  3 Units SubCUTAneous TID     guaiFENesin  400 mg Oral TID    sodium chloride flush  5-40 mL IntraVENous 2 times per day    NIFEdipine  30 mg Oral Daily    sodium chloride flush  5-40 mL IntraVENous 2 times per day    pantoprazole (PROTONIX) 40 mg injection  40 mg IntraVENous Daily    lidocaine  1 patch TransDERmal Daily    allopurinol  300 mg Oral Once per day on Mon Wed Fri    gabapentin  100 mg Oral BID    tamsulosin  0.4 mg Oral Nightly    Arformoterol Tartrate  15 mcg Nebulization BID    lisinopril  20 mg Oral Daily    And    hydroCHLOROthiazide  12.5 mg Oral Daily     Sitting upright in the bed comfortable follows commands no focal motor weakness occasional intermittent confusion per nursing  Assessment:  Patient Active Problem List   Diagnosis    PUD (peptic ulcer disease)    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

## 2022-11-28 NOTE — PROGRESS NOTES
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 A41.9    Pneumonia of left lung due to infectious organism J18.9    A-fib ([facility]) I48.91    Atrial fibrillation ([facility]) I48.91    Third degree heart block ([facility]) I44.2    High-grade atrioventricular block I44.39       SUBJECTIVE:  Leydi Dangelo remains critical in the sense that he is now receiving an EEG and Dr. Leisa Ford reviewed his CAT scan findings with me rating a fairly large subdural hematoma which places him further potential compromise from a cerebral vascular standpoint  REVIEW OF SYSTEMS:  Able to presently accurately obtain due to  patient's profound neurologic state.   Unable    SCHEDULED MEDICATIONS:   insulin lispro  0-4 Units SubCUTAneous TID WC    insulin lispro  0-4 Units SubCUTAneous Nightly    polyethylene glycol  17 g Oral Daily    levETIRAcetam  500 mg IntraVENous Q12H    insulin glargine  13 Units SubCUTAneous Daily    insulin lispro  3 Units SubCUTAneous TID WC    guaiFENesin  400 mg Oral TID    sodium chloride flush  5-40 mL IntraVENous 2 times per day    NIFEdipine  30 mg Oral Daily    sodium chloride flush  5-40 mL IntraVENous 2 times per day    pantoprazole (PROTONIX) 40 mg injection  40 mg IntraVENous Daily    lidocaine  1 patch TransDERmal Daily    allopurinol  300 mg Oral Once per day on Mon Wed Fri    gabapentin  100 mg Oral BID    tamsulosin  0.4 mg Oral Nightly    Arformoterol Tartrate  15 mcg Nebulization BID    lisinopril  20 mg Oral Daily    And    hydroCHLOROthiazide  12.5 mg Oral Daily       VITAL SIGNS: BP (!) 146/73   Pulse 90   Temp 99.2 °F (37.3 °C) (Axillary)   Resp 18   Ht 5' 9\" (1.753 m)   Wt 185 lb (83.9 kg)   SpO2 96%   BMI 27.32 kg/m²   Patient Vitals for the past 96 hrs (Last 3 readings):   Weight   11/28/22 0600 185 lb (83.9 kg)   11/26/22 0600 182 lb 8.7 oz (82.8 kg)   11/25/22 0600 186 lb 1.1 oz (84.4 kg)     OBJECTIVE:    HEENT: PERRL, EOM  Intact; sclera non-icteric, conjunctiva pink. Carotids are brisk in upstroke with normal contour. No carotid bruits. Normal jugular venous pulsation at 45°. No palpable cervical nor supraclavicular nodes. Thyroid not palpable. Trachea midline. Chest: Even excursion  Lungs: CTA B, no expiratory wheezes or rhonchi, no decreased tactile fremitus without inspiratory rales. Heart: Irregular, regular  rhythm; S1 > S2, no gallop grade 2/6 systolic murmur second right intercostal space no clicks, rub, palpable thrills   or heaves. PMI nondisplaced, 5th intercostal space MCL. Abdomen: Soft, nontender, nondistended,  mildly protuberant, no masses or organomegaly. Bowel sounds active. Extremities: Without clubbing, cyanosis or edema. Pulses present 3+ upper extermities bilaterally; present 1+ DP  bilaterally and present 1+ PT bilaterally.      Data:   Scheduled Meds: Reviewed  Continuous Infusions:    sodium chloride      dextrose         Intake/Output Summary (Last 24 hours) at 11/28/2022 1420  Last data filed at 11/28/2022 1400  Gross per 24 hour   Intake 460 ml   Output 1400 ml   Net -940 ml     CBC:   Recent Labs     11/26/22  0505 11/26/22 2331 11/27/22 0621 11/28/22  0404   WBC 11.4 13.6* 12.2* 11.2   HGB 10.5* 9.8* 10.1* 10.3*   HCT 31.8* 29.6* 30.4* 31.4*    226 234 247     BMP:  Recent Labs     11/26/22  0505 11/26/22 2331 11/27/22 0621 11/28/22  0404    133 134 137   K 4.0 4.5 4.4 4.2   CL 99 98 100 101   CO2 23 23 22 22   BUN 26* 33* 30* 34*   CREATININE 2.0* 2.3* 2.2* 2.1*   LABGLOM 31 26 28 30     ABGs: No results found for: PH, PO2, PCO2  INR:   Recent Labs     11/27/22  0621 11/28/22  1000   INR 1.2 1.2     PRO-BNP:   Lab Results   Component Value Date    PROBNP 8,347 (H) 08/02/2022      TSH:   Lab Results   Component Value Date    TSH 3.970 11/24/2022      Cardiac Injury Profile:   No results for input(s): TROPHS in the last 72 hours. Lipid Profile: No results found for: TRIG, HDL, LDLCALC, CHOL   Hemoglobin A1C: No components found for: HGBA1C      RAD:   CT HEAD WO CONTRAST   Final Result   Interval increased volume and extent of diffuse left cerebral acute subdural   hemorrhage with greatest thickness adjacent to the frontotemporal region now   measuring up to 22 mm in greatest axial thickness. Interval worsening of rightward midline shift at the level of the septum   pellucidum now measuring 8 mm. Mild cerebral white matter chronic microvascular ischemic disease. Mild diffuse cerebral atrophy. Multiple attempts were made by the [facility] core team to page and notify Dr. Genaro Copeland. Critical results were then called by Dr. Melvena Phoenix to OSCAR Gaviria on 11/26/2022 at 22:23. Rachel Woo reports that neurosurgery has been   consulted about the case. XR CHEST PORTABLE   Final Result   Mild congestive changes. XR CHEST PORTABLE   Final Result   No evidence of pneumothorax or parenchymal contusion. AICD visualized in the   left chest with leads in the heart.          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 Lauren Macedo' clinical status remains critical but presently stable secondary to the effects of his underlying significant subdural hematoma and per neurosurgery is being closely observe for any further neurologic changes. He remains in atrial fibrillation without anticoagulation due to his subdural hematoma, thus exposing him to potential systemic embolization. Continue to carefully monitor him until stable. Maintain systolic blood pressure between 150 and 170 mmHg and over time continue to attempt to decrease while carefully monitoring his renal function as well. I have spent more than 30 critical care minutes face to face with Bijal Robertson and reviewing notes and laboratory data, with greater than 50% of this time instructing and counseling the patient and his family members face to face regarding my findings and recommendations and I have answered all questions as posed to me by Mr. Lauren Macedo ICU team members including Dr. Stiven Lock. Mary Gaytan, DO FACP,FACC,FSCAI      NOTE:  This report was transcribed using voice recognition software.   Every effort was made to ensure accuracy; however, inadvertent computerized transcription errors may be present

## 2022-11-28 NOTE — PROGRESS NOTES
[place] [address] [address]       OTFX:                                                               Patient Name: Marylu Bermudez  MRN: [id number]  : 10/27/1933  Room: [address] [place] [address]-A    Evaluating OT: AKANKSHA Overton,  OTR/L; [id number]    Referring Provider: Emily Garcia MD   Specific Provider Orders/Date: OT eval and treat (22)       Diagnosis: A-fib ([facility] [I48.91]  Atrial fibrillation ([facility] [I48.91]     Reason for admission: Pt admitted with SOB, chest pain with fall, +hitting head with SDH. Surgery/Procedures:  pacemaker placement     Pertinent Medical History:    Past Medical History:   Diagnosis Date    A-fib ([facility]     Diabetes mellitus ([facility]     H pylori ulcer 2012    Hyperlipidemia     Hypertension     PONV (postoperative nausea and vomiting)     PUD (peptic ulcer disease) 2012    Urinary frequency         *Precautions:  Fall Risk, pacemaker precautions, global aphasia, BP <130/80, cognition, easy to chew diet    Assessment of current deficits   [x] Functional mobility  [x]ADLs  [x] Strength               []Cognition   [x] Functional transfers   [x] IADLs         [x] Safety Awareness   [x]Endurance   [] Fine Coordination        [] ROM     [] Vision/perception   []Sensation    []Gross Motor Coordination [x] Balance   [] Delirium                  []Motor Control     [] Communication    OT PLAN OF CARE   OT POC based on physician orders, patient diagnosis and results of clinical assessment.        Frequency/Duration: 1-3 days/wk for 1-2 weeks PRN    Specific OT Treatment Interventions to include:   * Instruction/training on adapted ADL techniques and AE recommendations to increase functional independence within precautions       * Training on energy conservation strategies, correct breathing pattern and techniques to improve independence/tolerance for self-care routine  * Functional transfer/mobility training/DME recommendations for increased independence, safety, and fall prevention  * Patient/Family education to increase follow through with safety techniques and functional independence  * Recommendation of environmental modifications for increased safety with functional transfers/mobility and ADLs  * Cognitive retraining/development of therapeutic activities to improve problem solving, judgement, memory, and attention for increased safety/participation in ADL/IADL tasks  * Sensory re-education to improve body/limb awareness, maintain/improve skin integrity, and improve hand/UE motor function  * Visual-perceptual training to improve environmental scanning, visual attention/focus, and oculomotor skills for increased safety/independence with functional transfers/mobility and ADLs  * Splinting/positioning for increased function, prevention of contractures, and improve skin integrity  * Therapeutic exercise to improve motor endurance, ROM, and functional strength for ADLs/functional transfers  * Therapeutic activities to facilitate/challenge dynamic balance, stand tolerance for increased safety and independence with ADLs  * Therapeutic activities to facilitate gross/fine motor skills for increased independence with ADLs  * Neuro-muscular re-education: facilitation of righting/equilibrium reactions, midline orientation, scapular stability/mobility, normalization of muscle tone, and facilitation of volitional active controled movement  * Positioning to improve skin integrity, interaction with environment and functional independence  * Delirium prevention/treatment  * Manual techniques for edema management    Modified Bill Scale   Score     Description  0             No symptoms  1             No significant disability despite symptoms  2             Slight disability; able to look after own affairs  3             Moderate disability; able to ambulate without assist/ requires assist with ADLs  4             Moderate/Severe disability;requires assist to ambulate/assist with ADLs  5             Severe disability;bedridden/incontinent   6               Score:   4    Recommended Adaptive Equipment: TBD; BSC for hospital use    Pt unable to report hx d/t aphasia/impaired cognition. Home Living: Per chart in August, pt lives with his daughter in a 1 story home with 3 step(s) to enter and rail(s); bed/bath on main level; pt does not go to basement. Bathroom setup: Walk-in shower with shower seat and grab bars, stnd commode. Equipment owned: ?    Prior Level of Function: ? with ADLs;  ? with IADLs. ? for functional mobility. Driving: ?  Occupation: Per chart pt is retired. Pain Level: pt c/o 0/10 pain this session    Cognition: A&O: ~1/4  with choices; Follows 1 step commands with max verbal, visual, tactile cues. Memory: P+   Comprehension: P   Problem solving: P   Judgement/safety: P               Communication skills: Impaired; pt with global aphasia. Vision: Appears WFL; pt tracks L/R; L/R confusion. Glasses: Yes                                                   Hearing: WFL     RASS: 0  CAM-ICU: (NT) Delirium    UE Assessment:  Hand Dominance: Right [x]  Left []     ROM Strength STM goal: PRN   RUE  WFL Proximal: 4-/5  Distal: 4/5    : Fair; difficult to assess d/t cognition. [facility]: WFL  [facility]: WFL Increase overall strength for participation in ADLs. LUE WFL  Within parameters of pacer precautions Grossly 3/5  Observed    : Fair; difficult to assess d/t cognition. [facility]: WFL  [facility]: WFL Increase overall strength for participation in ADLs. Sensation: No c/o numbness/tingling in extremities. Tone: WNL   Edema: Unremarkable.      Functional Assessment:  AM-PAC Daily Activity Raw Score:    Initial Eval Status  Date: 22 Treatment Status  Date:  STGs = LTGs  Time frame: 7-14 days   Feeding Min A  Cues/set-up required                       Mod I       Grooming Mod A  Verbal cues for thoroughness                        Set-up        UB dressing/bathing Max A                        Min A       LB dressing/bathing Max A  Pt able to complete figure 4 technique in supported sitting, confusion limiting performance. Difficulty following simple one-step commands. Min A        Toileting Dep                      Min A     Bed Mobility  Supine to sit:   Min A    Sit to supine:   Min A                        S     Functional Transfers Sit to stand:   Min A    Stand to sit:   Min A    Stand Pivot:   Min A  Bed>chair                        S     Functional Mobility Min A with no AD  For SPT                      S        Balance Sitting:     Static: SBA    Dynamic: Min A  Standing: Min A  Sitting:     Static: S    Dynamic:S  Standing: S                   Endurance/Activity Tolerance   fair tolerance with light activity. WFL  For full ADL   Visual/  Perceptual L/R confusion                     Vitals:   HR at rest: 96 bpm  HR at end of session: 84 bpm   SpO2 at rest: 98%  SpO2 at end of session 97%   BP at rest: 134/61 mmHg BP EOB: 140/74 BP at end of session 124/52 mmHg       Treatment: OT treatment provided this date includes:     Instruction/training on safety and adapted techniques for completion of ADLs: Pt educated on adaptive techniques to increase independence and safety in self-care. Instruction/training on safe bed mobility/functional mobility/transfer techniques: Pt educated on pacemaker precautions prior to mobility with poor understanding; focus on safety, body mechanics, and precautions   Proper Positioning/Alignment: BUE elevated/heels offloaded for optimal healing, skin integrity, to prevent breakdown, decrease edema, reduce risk of contracture, and encourage functional positioning for interaction with environment.   Skilled Monitoring of Vitals: to include BP, spO2, and HR throughout session to maximize safety. Therapeutic activity: to challenge dynamic sitting/standing balance and endurance to promote safety during ADL tasks and functional transfers and mobility. Delirium Prevention: Environmental and sensory modifications assessed and implemented to decrease ICU acquired delirium and to improve overall orientation, mentation and pt interaction with family/staff. Line management and environmental modifications made prior to and end of session to ensure patient safety and to increase efficiency of session. Skilled monitoring of HR, O2 saturation, blood pressure and patient's response to activity performed throughout session. Comments: Pt case discussed in rounds, OK from RN to see patient. Upon arrival, patient semi supine in bed. Pt pleasant and agreeable to participate in therapy session. Pt demo fair+ tolerance with poor understanding of education/techniques. At end of session, patient properly positioned in chair position in bed with call light within reach, all lines and tubes intact. Pt instructed on use of call light for assistance and fall prevention. Nursing notified of patient positioning. Patient presents with decreased ROM/strength, activity tolerance, dynamic balance, functional mobility limiting completion of ADLs and safety. Pt can benefit from continued skilled OT services to increase safety, functional independence and quality of life. Rehab Potential: Fair for established goals    Patient / Family Goal: to return to PLOF    Patient and/or family were instructed/educated on diagnosis, prognosis/goals and plan of care. Patient demonstrated poor understanding. Evaluation Complexity: Moderate     History: Expanded chart review of consults, imaging, and psychosocial history related to current functional performance.    Exam: 5+ performance deficits identified limiting functional independence and safe return home   Assistance/Modification: Min/mod assistance or modifications required to perform tasks. May have comorbidities that affect occupational performance. [] Malnutrition indicators have been identified and nursing has been notified to ensure a dietitian consult is ordered. Time In:1022             Time Out: 1100         Total Treatment time: 23 min   Min Units   OT Eval Low 44302     OT Eval Medium 28080 X    OT Eval High 51461     OT Re-Eval [id number]     Therapeutic Ex 70020     Therapeutic Activities 69071 8 1   ADL/Self Care 05814 15 1   Orthotic Management 32929     Neuro Re-Ed 34352     Non-Billable Time        Evaluation time includes thorough review of current medical information, gathering information on past medical history/social history and prior level of function, completion of standardized testing/informal observation of tasks, assessment of data and development of POC/Goals.      AKANKSHA Varghese,  OTR/L; [id number]

## 2022-11-28 NOTE — PROGRESS NOTES
Comprehensive Nutrition Assessment    Type and Reason for Visit:  Initial (ICU LOS)    Nutrition Recommendations/Plan:     Modify Current Diet- add CHO control 5 carb/meal as pt diabetic w/ elevated glucose  2. Start Oral Nutrition Supplement         Malnutrition Assessment:  Malnutrition Status:  No malnutrition (11/28/22 1311)    Context:  Acute Illness     Findings of the 6 clinical characteristics of malnutrition:  Energy Intake:  Mild decrease in energy intake  Weight Loss:  No significant weight loss     Body Fat Loss:  No significant body fat loss     Muscle Mass Loss:  No significant muscle mass loss    Fluid Accumulation:  No significant fluid accumulation     Strength:  Not Performed    Nutrition Assessment:    Pt transferred from [facility] s/p syncope episode/ fall 2/2 CHB now s/p pacemaker placement. Noted pt c/o HA +SDH. PMhx DM, CKD, & PUD. PO intake ~25-50%. Wt hx appears stable PTA. Will add Ensure HP BID to aid in recovery. Nutrition Related Findings:    Poor attention, MAP WNL, -I/O's, no edema, active BS, hyperglycemia     Wound Type: Surgical Incision       Current Nutrition Intake & Therapies:    Average Meal Intake: 26-50%     ADULT DIET; Easy to Chew; Low Fat/Low Chol/High Fiber/2 gm Na    Anthropometric Measures:  Height: 5' 9\" (175.3 cm)  Ideal Body Weight (IBW): 160 lbs (73 kg)    Admission Body Weight: 186 lb (84.4 kg) (11/25 first measured)  Current Body Weight: 185 lb (83.9 kg) (11/28 bedscale), 115.6 % IBW. Current BMI (kg/m2): 27.3  Usual Body Weight: 183 lb 12.8 oz (83.4 kg) (5/18/22 EMR measured wt from Nephrology visit)  % Weight Change (Calculated): 0.7, wt hx stable                     BMI Categories: Overweight (BMI 25.0-29. 9)    Estimated Daily Nutrient Needs:  Energy Requirements Based On: Formula  Weight Used for Energy Requirements: Current  Energy (kcal/day): MSJ 1495 x 1.2 SF= 7941-7838  Weight Used for Protein Requirements: Ideal  Protein (g/day): 1.2-1.4 g/kg IBW;   Fluid (ml/day): per critical care    Nutrition Diagnosis:   Inadequate oral intake related to cardiac dysfunction (CHB) as evidenced by intake 26-50%    Nutrition Interventions:   Nutrition Education/Counseling: Education not appropriate  Coordination of Nutrition Care: Continue to monitor while inpatient      Goals:    Goals: PO intake 75% or greater, by next RD assessment       Nutrition Monitoring and Evaluation:      Food/Nutrient Intake Outcomes: Food and Nutrient Intake, Supplement Intake  Physical Signs/Symptoms Outcomes: Biochemical Data, Nutrition Focused Physical Findings, Skin, Weight, GI Status, Fluid Status or Edema, Hemodynamic Status    Discharge Planning:     Too soon to determine     Natan Zafar RD, LD  Contact: Ext [phone]

## 2022-11-28 NOTE — PLAN OF CARE
Problem: Safety - Adult  Goal: Free from fall injury  11/28/2022 1109 by Dutch Martinez RN  Outcome: Progressing  11/28/2022 0158 by Win Jaramillo RN  Outcome: Progressing     Problem: ABCDS Injury Assessment  Goal: Absence of physical injury  11/28/2022 1109 by Dutch Martinez RN  Outcome: Progressing  11/28/2022 0158 by Win Jaramillo RN  Outcome: Progressing

## 2022-11-28 NOTE — CONSULTS
[facility]  Neurology Consult    Date:  11/28/2022  Patient Name:  Pranay Chappell  YOB: 1933  MRN: [id number]     PCP:  Danielle Bolden MD   Referring:  Aleksandar Larson DO      Chief Complaint: seizures    History obtained from: patient's family, ICU staff, chart    Assessment  Pranay Chappell is a 80 y.o. male admitted with falls on Eliquis found to have developed a large SDH. He now has developed aphasia secondary to left frontal onset seizures. Plan  Continue Keppra 500 BID (renally dosed at present)  Give 200 Vimpat x 1, continue at 100 BID  If patient not improving tomorrow morning plan for cEEG monitoring with further med titrations (would consider optimizing Vimpat vs bolus and add on with valproate)        History of Present Illness:  Pranay Chappell is a 80 y.o. right handed male presenting for evaluation of seizures. The patient was admitted on 11/24/22 for chest pain and SOB. He has a history of afib and was on Eliquis. He had been having worsening dizziness and had passed out and hit his head. Initial CT head done on 11/24/22 was read as no acute abnormality, however, on review it does appear to show a left SDH. 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 Patient had persistent headache and a repeat CT scan on 11/26 showed increased volume of SDH over left frontotemporal region up to 22m in greatest thickness. 8mm of midline shift also noted. Eliquis was reversed with Kcentra. The patient reportedly remained asymptomatic asymptomatic at this time other than the headache. He was noted to be more somnolent on 11/27/22 with more bereket concern for aphasia on 11/28/22. EEG was ordered which showed left frontal onset seizure activity.        Review of Systems:  Unable to obtain due to:  aphasia    Medical History:   Past Medical History:   Diagnosis Date    A-fib ([facility] Diabetes mellitus ([facility]     H pylori ulcer 2012    Hyperlipidemia     Hypertension     PONV (postoperative nausea and vomiting)     PUD (peptic ulcer disease) 2012    Urinary frequency         Surgical History:   Past Surgical History:   Procedure Laterality Date    APPENDECTOMY      CATARACT REMOVAL WITH IMPLANT Left 2015    COLONOSCOPY      CYSTOSCOPY      multiple    CYSTOSCOPY N/A 2020    CYSTOSCOPY RETROGRADE PYELOGRAM BOTOX  INJECTION (100 UNITS) performed by Oswaldo Luther MD at [address] [facility], COLON, DIAGNOSTIC  2012    peptic ulcer disease    EYE SURGERY Bilateral     cataracts    HERNIA REPAIR  more 10 yrs    left     PACEMAKER INSERTION Left 2022    [employer] Dual Chamber (Dr. Jocelyn Ignacio)    ND COLONOSCOPY FLX DX W/COLLJ SPEC WHEN PFRMD  2012         ND EGD TRANSORAL BIOPSY SINGLE/MULTIPLE  2012             Family History:   Unable to obtain due to aphasia    Social History:  Social History     Tobacco Use    Smoking status: Former     Packs/day: 1.00     Years: 60.00     Pack years: 60.00     Types: Cigarettes     Start date: 1950     Quit date: 2010     Years since quittin.3    Smokeless tobacco: Never   Substance Use Topics    Alcohol use: Yes     Comment: 1/2 glass  wine  everyday    Drug use: No        Current Medications:      Current Facility-Administered Medications   Medication Dose Route Frequency Provider Last Rate Last Admin    insulin lispro (HUMALOG) injection vial 0-4 Units  0-4 Units SubCUTAneous TID WC Chandni L Lopez, DO   1 Units at 22 1840    insulin lispro (HUMALOG) injection vial 0-4 Units  0-4 Units SubCUTAneous Nightly Chandni L Lopez, DO        lacosamide (VIMPAT) 100 mg in sodium chloride 0.9 % 60 mL IVPB  100 mg IntraVENous BID Chandni L Lopez, DO        polyethylene glycol (GLYCOLAX) packet 17 g  17 g Oral Daily Chandni L Lopez, DO   17 g at 22 0804    levETIRAcetam (KEPPRA) 500 mg/100 glucagon (rDNA) injection 1 mg  1 mg SubCUTAneous PRN Bernabe Rogers MD        dextrose 10 % infusion   IntraVENous Continuous PRN Bernabe Rogers MD        allopurinol (ZYLOPRIM) tablet 300 mg  300 mg Oral Once per day on Mon Wed Fri Bernabe Rogers MD   300 mg at 11/28/22 0804    gabapentin (NEURONTIN) capsule 100 mg  100 mg Oral BID Bernabe Rogers MD   100 mg at 11/28/22 0803    tamsulosin (FLOMAX) capsule 0.4 mg  0.4 mg Oral Nightly Bernabe Rogers MD   0.4 mg at 11/27/22 2057    Arformoterol Tartrate (BROVANA) nebulizer solution 15 mcg  15 mcg Nebulization BID Bernabe Rogers MD   15 mcg at 11/28/22 1009    lisinopril (PRINIVIL;ZESTRIL) tablet 20 mg  20 mg Oral Daily Bernabe Rogers MD   20 mg at 11/28/22 1718    And    hydroCHLOROthiazide (HYDRODIURIL) tablet 12.5 mg  12.5 mg Oral Daily Bernabe Rogers MD   12.5 mg at 11/28/22 0804    potassium chloride (KLOR-CON M) extended release tablet 40 mEq  40 mEq Oral PRN Myah Ortega MD        Or    potassium bicarb-citric acid (EFFER-K) effervescent tablet 40 mEq  40 mEq Oral PRN Myah Ortega MD        Or    potassium chloride 10 mEq/100 mL IVPB (Peripheral Line)  10 mEq IntraVENous PRN Myah Ortega MD        acetaminophen (TYLENOL) tablet 650 mg  650 mg Oral Q6H PRN Myah Ortega MD   650 mg at 11/27/22 1208    Or    acetaminophen (TYLENOL) suppository 650 mg  650 mg Rectal Q6H PRN Myah Ortega MD        aluminum & magnesium hydroxide-simethicone (MAALOX) 200-200-20 MG/5ML suspension 30 mL  30 mL Oral Q6H PRN Myah Ortega MD            Allergies:      No Known Allergies     Physical Examination  Vitals   Vitals:    11/28/22 1400 11/28/22 1500 11/28/22 1600 11/28/22 1615   BP: (!) 146/73 (!) 153/65 (!) 162/73    Pulse: 90 77 79 80   Resp: 18 19 30 20   Temp:   99 °F (37.2 °C)    TempSrc:       SpO2: 96% 98% 96%    Weight:       Height:            General: Patient appears in no acute distress.    HEENT: Normocephalic, atraumatic  Chest: Clear to auscultation bilaterally  Heart: irregularly irregular  Extremities/Peripheral vascular: No edema/swelling noted. No cold limbs noted. Neurologic Examination    Mental Status  Awake, alert. Does not produce significant spontaneous speech. Will intermittently follow 1 step commands. Attention poor to fair. Cranial Nerves  II. Visual fields full to threat bilaterally. III, IV, VI: Pupils equally round and reactive to light, 3 to 2 mm bilaterally. EOMs: full, no nystagmus. V.   VII: Facial movements symmetric   VIII: Hearing intact to voice  IX,X: Palate elevates symmetrically. Mild dysarthria  XI: Shoulders appear symmetric  XII: Tongue is midline    Motor  Moves all four limbs symmetrically with 4 to 4+/5 strength    Sensation  Withdraws bilateral lower extremities to light tactile stimulation    Reflexes     Right Left   Biceps 1 1   Brachioradialis 1 1   Patellar 1 1   Achilles 1 1   ankle clonus none none   Babinski absent absent     Coordination  No resting tremors observed    Gait  Deferred for safety/fall consideration      Labs  Recent Labs     11/28/22  0404      K 4.2      CO2 22   BUN 34*   CREATININE 2.1*   GLUCOSE 153*   CALCIUM 9.5   WBC 11.2   RBC 3.69*   HGB 10.3*   HCT 31.4*   MCV 85.1   MCH 27.9   MCHC 32.8   RDW 14.1      MPV 9.3         Imaging  CT HEAD WO CONTRAST   Final Result   No significant change in large left subdural hemorrhage with stable 7 mm   midline shift to the right. CT HEAD WO CONTRAST   Final Result   1. Moderate to large left subdural hematoma with left-to-right midline   structure shift of approximately 7 mm, unchanged. 2.  Greatest maximal thickness of the subdural hematoma on coronal views is   24 mm, compared to 23 mm on the prior study. 3.  Gas fluid level in the left sphenoid sinus may reflect acute sinusitis.          CT HEAD WO CONTRAST   Final Result   Interval increased volume and extent of diffuse left cerebral acute subdural hemorrhage with greatest thickness adjacent to the frontotemporal region now   measuring up to 22 mm in greatest axial thickness. Interval worsening of rightward midline shift at the level of the septum   pellucidum now measuring 8 mm. Mild cerebral white matter chronic microvascular ischemic disease. Mild diffuse cerebral atrophy. Multiple attempts were made by the [facility] core team to page and notify Dr. Mitzi Da Silva. Critical results were then called by Dr. Marilyn Wise to [facility] on 11/26/2022 at 22:23. Diony Guerra reports that neurosurgery has been   consulted about the case. XR CHEST PORTABLE   Final Result   Mild congestive changes. XR CHEST PORTABLE   Final Result   No evidence of pneumothorax or parenchymal contusion. AICD visualized in the   left chest with leads in the heart.                    Electronically signed by Dandy Aden DO on 11/28/2022 at 6:44 PM

## 2022-11-28 NOTE — CONSULTS
[address] [facility]  Department of Internal Medicine   Internal Medicine Residency   MICU Progress Note    Patient:  An Fine 80 y.o. male  MRN: [id number]     Date of Service: 11/28/2022    Allergy: Patient has no known allergies. Subjective     On today's exam pt was not A&O x3. He kept answering yes and no to all the qustions, thus history is not completely reliable. Denies any pain. 24h change: Acute change in mental status w/ dysarthria. CT head w/ stable left subdural hematoma and 7 mm midline shift. BP were controlled under 521 systolic. UA negative. ROS: Denies Fever/chills/CP/SOB/N/V/D/C/Dysuria/Blood in stool or urine  Objective     VS: BP (!) 162/73   Pulse 80   Temp 99 °F (37.2 °C)   Resp 20   Ht 5' 9\" (1.753 m)   Wt 185 lb (83.9 kg)   SpO2 96%   BMI 27.32 kg/m²           I & O - 24hr:   Intake/Output Summary (Last 24 hours) at 11/28/2022 1644  Last data filed at 11/28/2022 1615  Gross per 24 hour   Intake 460 ml   Output 1450 ml   Net -990 ml       Physical Exam:  General Appearance: appears stated age and no distress. Pt is following commands but appears a bit confused. Lung: clear to auscultation bilaterally. Pacemaker surgical wound in the left upper chest, no wound dehiscence, erythema or edema, no purulent drainage, some ecchymosis in surrounding the site. Heart: irregularly irregular rhythm and no S3 or S4. No pitting edema of lower or upper extremities. Abdomen: normal findings: soft, non-tender and umbilicus normal. Non-distended. Seborrhoic keratosis diffusely across the trunk. Extremities:  extremities normal, atraumatic, no cyanosis or edema. Mild erythema of the left axilla/forearm. Musculoskeletal: No joint swelling, no muscle tenderness. ROM normal in all joints of extremities. Upper extremities strength 5/5. LLE strength 4/5 on dorsiflexion and 3/5 on hip flexion. RLE 5/5 strength.  Sensation to light touch is intact and symmetric in bilateral upper and lower extremities. Neurologic: Pt is not A&O self and place. Expressive aphasia noted. Lines     site day    Art line   None    TLC None    PICC None    Hemoaccess None    Right (x3 days) and left (x4 days) forearm peripheral lines in place. Oxygen:    nasal canula at 3.5L/min    ABG:   No results found for: PHART, PH, MQD4JEB, PCO2, PO2ART, PO2, BBU1ACG, HCO3, BEART, BE, THGBART, THB, NWO2QSW, R3KJVFLP, O2SAT     Medications     Infusions: (Fluid, Sedation, Vasopressors)  IVF:   10% dextrose:    Vasopressors  None. Sedation  None. Nutrition:   PO on easy to chew diet (low chol, low fat)    Patient currently has   GI prophylaxis: Protonix 40 mg qd.       Labs   CBC with Differential:    Lab Results   Component Value Date/Time    WBC 11.2 11/28/2022 04:04 AM    RBC 3.69 11/28/2022 04:04 AM    HGB 10.3 11/28/2022 04:04 AM    HCT 31.4 11/28/2022 04:04 AM     11/28/2022 04:04 AM    MCV 85.1 11/28/2022 04:04 AM    MCH 27.9 11/28/2022 04:04 AM    MCHC 32.8 11/28/2022 04:04 AM    RDW 14.1 11/28/2022 04:04 AM    LYMPHOPCT 11.0 11/28/2022 04:04 AM    MONOPCT 10.0 11/28/2022 04:04 AM    BASOPCT 0.4 11/28/2022 04:04 AM    MONOSABS 1.12 11/28/2022 04:04 AM    LYMPHSABS 1.23 11/28/2022 04:04 AM    EOSABS 0.02 11/28/2022 04:04 AM    BASOSABS 0.05 11/28/2022 04:04 AM     CMP:    Lab Results   Component Value Date/Time     11/28/2022 04:04 AM    K 4.2 11/28/2022 04:04 AM    K 4.5 11/24/2022 09:08 AM     11/28/2022 04:04 AM    CO2 22 11/28/2022 04:04 AM    BUN 34 11/28/2022 04:04 AM    CREATININE 2.1 11/28/2022 04:04 AM    GFRAA 30 09/16/2022 08:24 AM    LABGLOM 30 11/28/2022 04:04 AM    GLUCOSE 153 11/28/2022 04:04 AM    PROT 6.9 11/24/2022 09:08 AM    LABALBU 4.2 11/24/2022 09:08 AM    CALCIUM 9.5 11/28/2022 04:04 AM    BILITOT 0.4 11/24/2022 09:08 AM    ALKPHOS 130 11/24/2022 09:08 AM    AST 30 11/24/2022 09:08 AM    ALT 39 11/24/2022 09:08 AM     Hepatic Function Panel:    Lab Results Component Value Date/Time    ALKPHOS 130 11/24/2022 09:08 AM    ALT 39 11/24/2022 09:08 AM    AST 30 11/24/2022 09:08 AM    PROT 6.9 11/24/2022 09:08 AM    BILITOT 0.4 11/24/2022 09:08 AM    LABALBU 4.2 11/24/2022 09:08 AM     PT/INR:    Lab Results   Component Value Date/Time    PROTIME 13.2 11/28/2022 10:00 AM    INR 1.2 11/28/2022 10:00 AM     PTT:    Lab Results   Component Value Date/Time    APTT 29.5 11/28/2022 10:00 AM   [APTT}  U/A:    Lab Results   Component Value Date/Time    COLORU Yellow 11/27/2022 12:30 PM    PROTEINU >=300 11/27/2022 12:30 PM    PHUR 6.0 11/27/2022 12:30 PM    WBCUA NONE 11/27/2022 12:30 PM    RBCUA 2-5 11/27/2022 12:30 PM    BACTERIA NONE SEEN 11/27/2022 12:30 PM    CLARITYU Clear 11/27/2022 12:30 PM    SPECGRAV 1.020 11/27/2022 12:30 PM    LEUKOCYTESUR Negative 11/27/2022 12:30 PM    UROBILINOGEN 0.2 11/27/2022 12:30 PM    BILIRUBINUR Negative 11/27/2022 12:30 PM    BLOODU SMALL 11/27/2022 12:30 PM    GLUCOSEU 500 11/27/2022 12:30 PM       Imaging Studies:  CT scan:    Impression:       No significant change in large left subdural hemorrhage with stable 7 mm   midline shift to the right. Resident's Assessment and Plan     Neuro   Subdural hematoma 2/2 to head injury - stable   Monitor blood pressure with a goal of SBP  150- 170  Continue labetalol and hydralazine prn   Continue holding eliquis   EEG ordered to investigate the cause of acute mental status change yesterday night. Pt was found to have two left frontal seizures involving the temporal region. He was on Keppra 500 mg BID prophylactically. Vimpat 200 mg loading dose given. Vimpat 100 mg BID ordered. Continue to monitor pt's neurological status   Appreciate neurology and cardiology recommendations. Cardio  1. Syncope 2/2 third degree heart block likely ischemic vs lyme vs sick sinus syndrome vs senile amyloidosis   Pacemaker placed on 99/80/38 with no complications. Follow up on Lyme and TSH studies.    Discuss potential plan for fat biopsy with the son of the pt. Consider echocardiogram.   HTN   Continue Lisinopril 20 mg, HCTZ 12.5 mg and Nifedipine 30 mg. Afib   Hold eliquis   CHADS2 VASC score  = 4     Pulmonary    SOB   On 3.5 NC   Continue Duoneb, guaifensin and brovana. Renal   CKD stage IV   Creatinine stable at 2.1 today. No electrolyte disturbances. Continue to monitor kidney function, BMPs. Endocrine   Type II DM   Last HgB A1c 8.5%   Continue Insulin lispro sliding scale. DVT ppx: none. GI ppx: Protonix. Code status: limited. Disposition: continue care     Teddy Espinoza, MS-3    Attending physician: Dr. Alexia Funez  Department of Pulmonary, Critical Care and Sleep Medicine  [address] [facility]  Department of Internal Medicine      During multidisciplinary team rounds Monet Garnica is a 80 y.o. male was seen, examined and discussed. This is confirmation that I have personally seen and examined the patient and that the key elements of the encounter were performed by me (> 85 % time). The medications & laboratory data was discussed and adjusted where necessary. The radiographic images were reviewed or with radiologist or consultant if felt dis-concordant with the exam or history. The above findings were corroborated, plans confirmed and changes made if needed. Family is updated at the bedside as available. Key issues of the case were discussed among consultants. Critical Care time is documented if appropriate.       Hiral Goel DO, FACP, FCCP, [place],

## 2022-11-28 NOTE — PROGRESS NOTES
[address] [facility]  Department of Internal Medicine   Internal Medicine Residency   MICU Progress Note    Patient:  William Frederick 80 y.o. male  MRN: [id number]     Date of Service: 11/28/2022    Allergy: Patient has no known allergies. Subjective     Patient was seen and examined at the bedside in no acute distress. He appeared a bit confused however obeying commands. 24h change: Overnight patient was reported to have alteration in his mental status, CT head obtained which showed stable findings compared to previous. ROS: Denies Fever/chills/CP/SOB/N/V/D/C/Dysuria/Blood in stool or urine  Objective     VS: BP (!) 152/91   Pulse 91   Temp 99 °F (37.2 °C) (Axillary)   Resp 21   Ht 5' 9\" (1.753 m)   Wt 185 lb (83.9 kg)   SpO2 95%   BMI 27.32 kg/m²           I & O - 24hr:   Intake/Output Summary (Last 24 hours) at 11/28/2022 1313  Last data filed at 11/28/2022 1000  Gross per 24 hour   Intake 240 ml   Output 1050 ml   Net -810 ml       Physical Exam:  General Appearance:  confused and only responding nodding to questions  Neck: supple, symmetrical, trachea midline  Lung: clear to auscultation bilaterally  Heart: irregularly irregular rhythm and S1, S2 normal  Abdomen: soft, non-tender; bowel sounds normal; no masses,  no organomegaly  Extremities:  extremities normal, atraumatic, no cyanosis or edema  Musculoskeletal: No joint swelling, no muscle tenderness. ROM normal in all joints of extremities.    Neurologic: Mental status: Confused    Lines     site day    Art line   None    TLC None    PICC None    Hemoaccess None    Oxygen:    nasal canula at 4L/min    Medications     Infusions: (Fluid, Sedation, Vasopressors)  IVF:   None   Vasopressors  None   Sedation  None     Nutrition:   Diet     ATB:   Antibiotics  Days   None               Skin issues: none     Labs   CBC with Differential:    Lab Results   Component Value Date/Time    WBC 11.2 11/28/2022 04:04 AM    RBC 3.69 11/28/2022 04:04 AM    HGB 10.3 11/28/2022 04:04 AM    HCT 31.4 11/28/2022 04:04 AM     11/28/2022 04:04 AM    MCV 85.1 11/28/2022 04:04 AM    MCH 27.9 11/28/2022 04:04 AM    MCHC 32.8 11/28/2022 04:04 AM    RDW 14.1 11/28/2022 04:04 AM    LYMPHOPCT 11.0 11/28/2022 04:04 AM    MONOPCT 10.0 11/28/2022 04:04 AM    BASOPCT 0.4 11/28/2022 04:04 AM    MONOSABS 1.12 11/28/2022 04:04 AM    LYMPHSABS 1.23 11/28/2022 04:04 AM    EOSABS 0.02 11/28/2022 04:04 AM    BASOSABS 0.05 11/28/2022 04:04 AM     BMP:    Lab Results   Component Value Date/Time     11/28/2022 04:04 AM    K 4.2 11/28/2022 04:04 AM    K 4.5 11/24/2022 09:08 AM     11/28/2022 04:04 AM    CO2 22 11/28/2022 04:04 AM    BUN 34 11/28/2022 04:04 AM    LABALBU 4.2 11/24/2022 09:08 AM    CREATININE 2.1 11/28/2022 04:04 AM    CALCIUM 9.5 11/28/2022 04:04 AM    GFRAA 30 09/16/2022 08:24 AM    LABGLOM 30 11/28/2022 04:04 AM    GLUCOSE 153 11/28/2022 04:04 AM     PT/INR:    Lab Results   Component Value Date/Time    PROTIME 13.2 11/28/2022 10:00 AM    INR 1.2 11/28/2022 10:00 AM     Warfarin PT/INR:  No components found for: PTPATWAR, PTINRWAR  U/A:    Lab Results   Component Value Date/Time    COLORU Yellow 11/27/2022 12:30 PM    PROTEINU >=300 11/27/2022 12:30 PM    PHUR 6.0 11/27/2022 12:30 PM    WBCUA NONE 11/27/2022 12:30 PM    RBCUA 2-5 11/27/2022 12:30 PM    BACTERIA NONE SEEN 11/27/2022 12:30 PM    CLARITYU Clear 11/27/2022 12:30 PM    SPECGRAV 1.020 11/27/2022 12:30 PM    LEUKOCYTESUR Negative 11/27/2022 12:30 PM    UROBILINOGEN 0.2 11/27/2022 12:30 PM    BILIRUBINUR Negative 11/27/2022 12:30 PM    BLOODU SMALL 11/27/2022 12:30 PM    GLUCOSEU 500 11/27/2022 12:30 PM     ABG:  No results found for: PH, PCO2, PO2, HCO3, BE, THGB, TCO2, O2SAT  HgBA1c:    Lab Results   Component Value Date/Time    LABA1C 8.5 08/02/2022 01:35 PM       Imaging Studies:    CT scan: No significant change in large left subdural hematoma with stable 7 mm midline shift to the right    Resident's Assessment and Plan     Neurology  Confused but obeying commands  CT scan stable findings     Headache 2/2 acute subdural hematoma   Reports headache s/p fall   Initial CT in the ED was read negative for intracranial hemorrhage  Continues to complain of headache in spite of pain medicine   He was on Eliquis for afib prior to fall   CT head on 11/26/2022 showed subdural hematoma of 7 mm, waxing and waning mentation  Neurosurgery is on board and does not recommend anything at this time and want to watch and wait   Maintain BP systolic <806 mmHg    Non-convulsive Seizure  Extended EEG with video 11/28/2022 shows:  Left frontal electrographic seizures with spread to involve the left frontocentrotemporal region  A potential for focl seizures from the left frontal region  Moderate to severe nonspecific cerebral dysfunction of the left frontocentrotemporal region  A mild to moderate nonspecific encephalopathy  Follow neurology recommendations  Continue Keppra 500 BID (renally dosed at present)  Give 200 Vimpat x 1, continue at 100 BID  If patient not improving tomorrow morning plan for continuous EEG monitoring with further med titrations   Consider optimizing Vimpat vs bolus and add on with valproate as per Neurology  Continue neuro checks q1h      Cardiology  Syncope episode 2/2 complete heart block s/p pacemaker placement  He was initially on isoproterenol   Heart rate currently stable   EP/Cardiology following  Continue to monitor      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 sinus 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)   S/P pacemaker dual chamber placement on 11/25/2022  TSH wnl, ruled out hypothyroidism  Follow Lyme serology still pending  EP/Cardiology following    Consider possible cardiac amyloidosis  Given patients age, complete heart block on EKG, CKD with proteinuria   Not practicable at this point given other acute comorbidities      Hx of Afib [id number] score is 4   on Eliquis, held prior to pacemaker placement  EP following, metoprolol held   Eliquis held and [place] given due to recent brain bleed      Hx of HTN   on amlodipine and lisinopril   Started on nifedipine by cardio      Renal  Hx of CKD stage IV  Continue home BP s/p pacemaker placement      Endocrine  Hx of NIDDM  On LDSS   Hypoglycemia protocol on   Glucose ACHS      PT/OT: Consulted  DVT ppx: PCDs  GI ppx: None     Problems resolved during this admission:   Third degree AV block      PT/OT: Not indicated at this time   DVT ppx: PCDs  GI ppx: None         Code Status: LIMITED CODE only in a circumstance where he is facing imminent herniation of brain parenchyma, in all other cases he is to remain FULL CODE. Disposition: Continue current care     Monet Hernández MD, MPH PGY-1    Attending physician: Dr. Atanacio Fabry [address] [place] [address]  Department of Pulmonary, Critical Care and [address] [place] [address]  Department of Internal Medicine      During multidisciplinary team rounds William Frederick is a 80 y.o. male was seen, examined and discussed. This is confirmation that I have personally seen and examined the patient and that the key elements of the encounter were performed by me (> 85 % time). The medications & laboratory data was discussed and adjusted where necessary. The radiographic images were reviewed or with radiologist or consultant if felt dis-concordant with the exam or history. The above findings were corroborated, plans confirmed and changes made if needed. Family is updated at the bedside as available. Key issues of the case were discussed among consultants. Critical Care time is documented if appropriate.       Nicola Crowley DO, FACP, FCCP, [place],

## 2022-11-28 NOTE — TELEPHONE ENCOUNTER
----- Message from Oliverio Malhotra DO sent at 11/26/2022 10:17 AM EST -----  Regarding: appt  [facility] in ~6 weeks to discuss DCCV.     Oliverio Malhotra DO

## 2022-11-28 NOTE — PROGRESS NOTES
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    Esophagitis-grade 1 K20.90    AP (angina pectoris) ([facility]) Q38.2    Systolic hypertension M12    Combined forms of age-related cataract of left eye H25.812    Anemia, chronic renal failure, stage 4 (severe) ([facility]) N18.4, D63.1    Sepsis secondary to CAP ([facility]) A41.9    Pneumonia of left lung due to infectious organism J18.9    A-fib ([facility]) I48.91    Atrial fibrillation ([facility]) I48.91    Third degree heart block ([facility]) I44.2    High-grade atrioventricular block I44.39       SUBJECTIVE:  Rafi De Leon seems somewhat more somnolent and not at spontaneous presently. He unfortunately has developed a subdural hematoma secondary to his fall was gated with a syncopal episode. Denies any chest discomfort nor shortness of breath presently. REVIEW OF SYSTEMS:  General ROS: negative for - fatigue, malaise,  weight gain or weight loss  Psychological ROS: negative for - anxiety , depression  Ophthalmic ROS: negative for - decreased vision or visual distortion. ENT ROS: negative  Allergy and Immunology ROS: negative  Hematological and Lymphatic ROS: negative  Endocrine: no heat or cold intolerance and no polyphagia, polydipsia, or polyuria  Respiratory ROS: negative for - hemoptysis, pleuritic pain, and shortness of breath  Cardiovascular ROS: positive for - irregular heartbeat, murmur, and shortness of breath.   Gastrointestinal ROS: no abdominal pain, change in bowel habits, or black or bloody stools  Genito-Urinary ROS: no nocturia, dysuria, trouble voiding, frequency or hematuria  Musculoskeletal ROS: negative for- myalgias, arthralgias, or claudication  Neurological ROS: no further TIA or stroke symptoms otherwise no significant change in symptoms or problems since yesterday as documented in previous progress notes.    SCHEDULED MEDICATIONS:   polyethylene glycol  17 g Oral Daily    levETIRAcetam  500 mg IntraVENous Q12H    [START ON 11/28/2022] insulin glargine  13 Units SubCUTAneous Daily    insulin lispro  3 Units SubCUTAneous TID     insulin lispro  0-4 Units SubCUTAneous Q4H    guaiFENesin  400 mg Oral TID    sodium chloride flush  5-40 mL IntraVENous 2 times per day    NIFEdipine  30 mg Oral Daily    sodium chloride flush  5-40 mL IntraVENous 2 times per day    pantoprazole (PROTONIX) 40 mg injection  40 mg IntraVENous Daily    lidocaine  1 patch TransDERmal Daily    allopurinol  300 mg Oral Once per day on Mon Wed Fri    [Held by provider] apixaban  2.5 mg Oral BID    gabapentin  100 mg Oral BID    tamsulosin  0.4 mg Oral Nightly    Arformoterol Tartrate  15 mcg Nebulization BID    lisinopril  20 mg Oral Daily    And    hydroCHLOROthiazide  12.5 mg Oral Daily       VITAL SIGNS:                                                                                                                          BP (!) 165/79   Pulse 74   Temp 100.1 °F (37.8 °C) (Axillary)   Resp 18   Ht 5' 9\" (1.753 m)   Wt 182 lb 8.7 oz (82.8 kg)   SpO2 96%   BMI 26.96 kg/m²   Patient Vitals for the past 96 hrs (Last 3 readings):   Weight   11/26/22 0600 182 lb 8.7 oz (82.8 kg)   11/25/22 0600 186 lb 1.1 oz (84.4 kg)   11/24/22 1357 185 lb (83.9 kg)     OBJECTIVE:    HEENT: PERRL, EOM  Intact; sclera non-icteric, conjunctiva pink. Carotids are brisk in upstroke with normal contour. No carotid bruits. Normal jugular venous pulsation at 45°. No palpable cervical nor supraclavicular nodes. Thyroid not palpable. Trachea midline. Chest: Even excursion  Lungs: CTA B, no expiratory wheezes or rhonchi, no decreased tactile fremitus without inspiratory rales. Heart: Irregular, regular  rhythm; S1 > S2, no gallop grade 2/6 systolic murmur second right intercostal space no clicks, rub, palpable thrills   or heaves.  PMI nondisplaced, 5th intercostal space MCL. Abdomen: Soft, nontender, nondistended,  mildly protuberant, no masses or organomegaly. Bowel sounds active. Extremities: Without clubbing, cyanosis or edema. Pulses present 3+ upper extermities bilaterally; present 1+ DP  bilaterally and present 1+ PT bilaterally. Data:   Scheduled Meds: Reviewed  Continuous Infusions:    sodium chloride      dextrose         Intake/Output Summary (Last 24 hours) at 11/27/2022 2217  Last data filed at 11/27/2022 2044  Gross per 24 hour   Intake 300 ml   Output 1100 ml   Net -800 ml     CBC:   Recent Labs     11/25/22  0503 11/26/22  0505 11/26/22  2331 11/27/22  0621   WBC 8.9 11.4 13.6* 12.2*   HGB 10.3* 10.5* 9.8* 10.1*   HCT 31.1* 31.8* 29.6* 30.4*    223 226 234     BMP:  Recent Labs     11/25/22  0503 11/26/22  0505 11/26/22  2331 11/27/22  0621    134 133 134   K 4.5 4.0 4.5 4.4   CL 99 99 98 100   CO2 25 23 23 22   BUN 31* 26* 33* 30*   CREATININE 2.1* 2.0* 2.3* 2.2*   LABGLOM 30 31 26 28     ABGs: No results found for: PH, PO2, PCO2  INR:   Recent Labs     11/25/22  0506 11/27/22  0621   INR 1.3 1.2     PRO-BNP:   Lab Results   Component Value Date    PROBNP 8,347 (H) 08/02/2022      TSH:   Lab Results   Component Value Date    TSH 3.970 11/24/2022      Cardiac Injury Profile:   Recent Labs     11/24/22  2318   TROPHS 62*      Lipid Profile: No results found for: TRIG, HDL, LDLCALC, CHOL   Hemoglobin A1C: No components found for: HGBA1C      RAD:   CT HEAD WO CONTRAST   Final Result   Interval increased volume and extent of diffuse left cerebral acute subdural   hemorrhage with greatest thickness adjacent to the frontotemporal region now   measuring up to 22 mm in greatest axial thickness. Interval worsening of rightward midline shift at the level of the septum   pellucidum now measuring 8 mm. Mild cerebral white matter chronic microvascular ischemic disease. Mild diffuse cerebral atrophy.       Multiple attempts were made by the [facility] core team to page and notify Dr. Joie Lezama. Critical results were then called by Dr. Rosanne Muñoz to RN Wendelyn Phoenix on 11/26/2022 at 22:23. Cheyanne Morelos reports that neurosurgery has been   consulted about the case. XR CHEST PORTABLE   Final Result   Mild congestive changes. XR CHEST PORTABLE   Final Result   No evidence of pneumothorax or parenchymal contusion. AICD visualized in the   left chest with leads in the heart. 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 Silverio Mccartney' clinical status remains critical but presently stable secondary to his secondary effect of syncope and that he developed a subdural hematoma and per neurosurgery is being closely observe for any further neurologic changes. Anticoagulation has been actively reversed with utilization of Kcentra. This despite remaining in atrial fibrillation which clearly presents its own difficulties and exposing him to potential systemic embolization. Carefully monitor him until stable. Maintain systolic blood pressure between 150 and 170 mmHg and over time continue to attempt to decrease while carefully monitoring his renal function as well.   Note is made that original interpretation of CT scan of the head does not mention any evidence for cerebral hemorrhage or presence of hematoma. I have spent more than 30 critical care minutes face to face with Mely Ramirez and reviewing notes and laboratory data, with greater than 50% of this time instructing and counseling the patient and his family members face to face regarding my findings and recommendations and I have answered all questions as posed to me by Mr. Oscar See' family members present at his bedside. Ilana Montana, DO FACP,FACC,[facility]      NOTE:  This report was transcribed using voice recognition software.   Every effort was made to ensure accuracy; however, inadvertent computerized transcription errors may be present

## 2022-11-29 NOTE — PROGRESS NOTES
Physical Therapy    Pt on PT caseload. Pt on continuous EEG at time of attempt. Will attempt back another time.     Elizabeth Yo, PT, DPT  [id number]

## 2022-11-29 NOTE — PROGRESS NOTES
Patient readjusted in bed and sat up for breakfast. Assisted with setup of patient's breakfast tray. Patient is able to feed himself without issue. Patient is able to answer simple yes and no questions appropriately. EGG techs have called and will be at bedside soon to set up for new orders. Patient has been made aware.

## 2022-11-29 NOTE — FLOWSHEET NOTE
Inpatient Wound Care(initial evaluation) 4422    Admit Date: 11/24/2022  1:48 PM    Reason for consult:  scalp    Significant history:    Chief Complaint:  Chest pain   Past medical history of atrial fibrillation, DM hypertension . Patient is a transfer from [facility] . He was seen there due to chest pain . He states that chest pain is located in the sternal area . He states  that he has shortness of breath with chest pain . He states that when chest pain occurred he felt like he was going to pass out . Patient had a syncopal episode and hit his head . Patient had a second syncopal episode  and after that family called ambulance . En route to ED patient was given 324 mg of ASA . In the ED patient was in Afib with slow ventricular rate having long pauses and bradycardic to the 30s . Procedure: Medtronic dual chamber pacemaker implant (CPT code: 75328) 11/25    Findings:     11/29/22 1247   Skin Integumentary    Skin Integrity Ecchymosis   Location BUE   Wound 11/24/22 Head Upper;Posterior   Date First Assessed: 11/24/22   Present on Hospital Admission: Yes  Primary Wound Type: Traumatic  Location: Head  Wound Location Orientation: Upper;Posterior   Dressing Status Intact   Incision 11/25/22 Chest Left;Upper   Date First Assessed/Time First Assessed: 11/25/22 1610   Location: Chest  Incision Location Orientation: Left;Upper  Incision Description (Comments): surgical glue, covered with dressing from OR to stay in place at this time. Dressing Status Intact   plan:   Will need continued preventative care  Will assess head tomorrow- EEG in progress, unable to remove dressing to assess    Leticia Edmonds RN 11/29/2022 1:17 PM

## 2022-11-29 NOTE — PROGRESS NOTES
[facility]  Neurology follow up     Date:  11/29/2022  Patient Name:  Angélica Baker  YOB: 1933  MRN: [id number]     Aracelis Orozco is a 80 y.o. male admitted with falls on Eliquis found to have developed a large SDH. He now has developed aphasia secondary to left frontal onset seizures. Continues to be aphasic today and following 1 step commands intermittently. Will obtain cEEG at this time to evaluate for ongoing seizures vs prolonged post-ictal aphasia. Further medication adjustments will be made based on EEG. Plan  Continuous EEG - pending on study, further med titrations will be made  (would consider optimizing Vimpat vs bolus and add on with valproate)  Continue Keppra 500 BID (renally dosed at present)  Continue Vimpat at 100 BID  Spoke with son, Javi Stern, on phone at this time and updated on POC from neuro POV        History of Present Illness:  Angélica Baker is a 80 y.o. right handed male presenting for evaluation of seizures. The patient was admitted on 11/24/22 for chest pain and SOB. He has a history of afib and was on Eliquis. He had been having worsening dizziness and had passed out and hit his head. Initial CT head done on 11/24/22 was read as no acute abnormality, however, on review it does appear to show a left SDH. 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 Patient had persistent headache and a repeat CT scan on 11/26 showed increased volume of SDH over left frontotemporal region up to 22m in greatest thickness. 8mm of midline shift also noted. Eliquis was reversed with Kcentra. The patient reportedly remained asymptomatic asymptomatic at this time other than the headache. He was noted to be more somnolent on 11/27/22 with more bereket concern for aphasia on 11/28/22. EEG was ordered which showed left frontal onset seizure activity.      11/29- Patient is sleeping and rouses to name being called and tactile stimuli. Withdraws to pain and follows 1 step commands intermittently. No significant changes overnight and exam appears stable compared to yesterday. No family at bedside. Spoke with Dr. Rubio Juarez, will hook up cEEG. Review of Systems:  Unable to obtain due to:  aphasia    Allergies:      No Known Allergies     Physical Examination  Vitals   Vitals:    11/29/22 0500 11/29/22 0600 11/29/22 0635 11/29/22 0700   BP: (!) 176/79 (!) 168/80  (!) 172/92   Pulse: 84 82 77 81   Resp: 19 22 21 19   Temp:       TempSrc:       SpO2: 95% 96% 97%    Weight:       Height:          General: Patient appears in no acute distress. HEENT: Normocephalic, atraumatic  Chest: effort normal   Heart: irregularly irregular  Extremities/Peripheral vascular: No edema/swelling noted. No cold limbs noted. Neurologic Examination    Mental Status  Sleeping, rouses to name being called and tactile stimuli. Does not produce significant spontaneous speech. Will intermittently follow 1 step commands. Attention poor to fair. Oriented to self, but does not answer any further questions. Cranial Nerves  II. Visual fields full to threat bilaterally. III, IV, VI: Pupils equally round and reactive to light, 3 to 2 mm bilaterally. EOMs: full, no nystagmus. V.   VII: Facial movements appear symmetric   VIII: Hearing intact to voice  IX,X: Palate elevates symmetrically.  Mild dysarthria  XI: Shoulders appear symmetric  XII: Tongue is midline    Motor  Moves all four limbs symmetrically with 4 to 4+/5 strength    Sensation  Withdraws to noxious stimuli in all extremities     Reflexes    No babinski     Coordination  No resting tremors observed    Gait  Deferred for safety/fall consideration    Labs  Recent Labs     11/29/22  0435      K 4.0   CL 99   CO2 20*   BUN 33*   CREATININE 1.8*   GLUCOSE 206*   CALCIUM 8.9   WBC 9.4   RBC 3.56*   HGB 9.8*   HCT 31.0*   MCV 87.1 should be considered for the findings above and appropriate imaging obtained if clinically indicated.       Electronically signed by TOMAS Serna on 11/29/2022 at 9:40 AM

## 2022-11-29 NOTE — PROGRESS NOTES
OT SESSION ATTEMPT     Date:2022  Patient Name: Wang Davey  MRN: [id number]  : 10/27/1933  Room: [address] [place] [address]     Attempted OT session this date, patient having EEG at time of attempt. Will reattempt OT treatment at a later time.     [address] [place] [address], OTR/L, [id number]

## 2022-11-29 NOTE — PROGRESS NOTES
Interim EEG Progress Note    EEG was reviewed from 1054 through 1317, 11/29/2022. One seizure noted at 1227 lasting approximately 3 minutes. During the seizure he puts his left arm on his head, but has no clear clinical change other than what appears to be some mild tachypnea. Will give additional 100 mg Vimpat IV and increase to 200 mg BID this evening. Asia Thompson DO, 1:39 PM, 11/29/2022       EEG addendum  EEG reviewed through 1903, 11/29/2022     Background consistent with mild to moderate nonspecific encephalopathy. No seizures noted. Asia Thompson DO, 8:55 PM, 11/29/2022      EEG addendum  EEG reviewed through 2252, 11/29/2022     One seizure noted at 2056 lasting nearly 2 minutes. IV bolus of valproate 1000 mg ordered to be followed by 500 BID starting morning of 11/30.        Asia Thompson DO, 11:25 PM, 11/29/2022

## 2022-11-29 NOTE — PROGRESS NOTES
Hospitalist Progress Note      SYNOPSIS: Patient admitted on 2022 for A-fib [facility])      SUBJECTIVE:    Patient seen and examined. Wife present at bedside and he is comfortable and less restless this evening per wife. Plan of care discussed. Records reviewed. 80 y.o. male with past medical history of atrial fibrillation, DM hypertension . Patient is a transfer from [facility] . He was seen there due to chest pain . He states that chest pain is located in the sternal area . He states  that he has shortness of breath with chest pain . He states that when chest pain occurred he felt like he was going to pass out . Patient had a syncopal episode and hit his head . Patient had a second syncopal episode  and after that family called ambulance . En route to ED patient was given 324 mg of ASA . In the ED patient was in Afib with slow ventricular rate having long pauses and bradycardic to the 30s . Lab data revealed sodium 135 potassium 4.1 BUN 29 Scr 2.0  glucose 244 Trop 45 >>43  WBC 9.9 HGB 10.7   Resp panel neg CT head cervical spine neg CXR congestive changes . Patient received fentanyl morphine and was placed on isoproterenol infusion. EP consulted-pacemaker placement. Lyme serology was ordered for possible carditis. Patient had an initial CT of the head after the fall which was negative for any intracranial hemorrhage. Repeat CT head was obtained on 2022 as patient continued to complain of headache and spite of pain medicine. Repeat CT of the head noncontrast was obtained which showed progressive subdural hemorrhage. Eliquis was held, [facility] ordered for Eliquis reversal.  Neurosurgery following. Worsening mental status. Found to have two seizures. Started on Keppra and Vimpat. Neurology following. Stable overnight. No other overnight issues reported.    Temp (24hrs), Av.1 °F (37.3 °C), Min:98.2 °F (36.8 °C), Max:100 °F (37.8 °C)    DIET: ADULT ORAL NUTRITION SUPPLEMENT; Lunch, Dinner; Low Calorie/High Protein Oral Supplement  ADULT DIET; Easy to Chew; 5 carb choices (75 gm/meal); Low Fat/Low Chol/High Fiber/2 gm Na  CODE: Limited    Intake/Output Summary (Last 24 hours) at 11/29/2022 1610  Last data filed at 11/29/2022 1248  Gross per 24 hour   Intake 209.3 ml   Output 1050 ml   Net -840.7 ml       OBJECTIVE:    BP (!) 156/65   Pulse 71   Temp 98.2 °F (36.8 °C) (Temporal)   Resp 24   Ht 5' 9\" (1.753 m)   Wt 184 lb 4.9 oz (83.6 kg)   SpO2 96%   BMI 27.22 kg/m²     General appearance: No apparent distress, appears stated age and cooperative. HEENT:  Conjunctivae/corneas clear. Neck: Supple. No jugular venous distention. Respiratory: Clear to auscultation bilaterally, normal respiratory effort  Cardiovascular: Regular rate rhythm, normal S1-S2  Abdomen: Soft, nontender, nondistended  Musculoskeletal: No clubbing, cyanosis, no bilateral lower extremity edema. Brisk capillary refill. 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 Continue Keppra and Vimpat per neurology. EEG today with 1 seizure. At this time patient be given 100 mg of Vimpat IV and increase to 200 mg twice daily this evening per neurology's recommendation. Echo 11/25/22, mild to moderate aortic stenosis and mild to moderate aortic regurgitation EF of 60 to 65%. Continue oxygen support and wean as tolerated.     DISPOSITION:     Medications:  REVIEWED DAILY    Infusion Medications    sodium chloride      dextrose       Scheduled Medications    [START ON 11/30/2022] insulin glargine  20 Units SubCUTAneous Daily insulin lispro  8 Units SubCUTAneous TID     lacosamide (VIMPAT) IVPB  200 mg IntraVENous BID    insulin lispro  0-4 Units SubCUTAneous TID     insulin lispro  0-4 Units SubCUTAneous Nightly    polyethylene glycol  17 g Oral Daily    levETIRAcetam  500 mg IntraVENous Q12H    guaiFENesin  400 mg Oral TID    sodium chloride flush  5-40 mL IntraVENous 2 times per day    NIFEdipine  30 mg Oral Daily    pantoprazole (PROTONIX) 40 mg injection  40 mg IntraVENous Daily    lidocaine  1 patch TransDERmal Daily    allopurinol  300 mg Oral Once per day on Mon Wed Fri    gabapentin  100 mg Oral BID    tamsulosin  0.4 mg Oral Nightly    Arformoterol Tartrate  15 mcg Nebulization BID    lisinopril  20 mg Oral Daily    And    hydroCHLOROthiazide  12.5 mg Oral Daily     PRN Meds: ipratropium-albuterol, perflutren lipid microspheres, hydrALAZINE, trimethobenzamide, sodium chloride flush, sodium chloride, labetalol, glucose, dextrose bolus **OR** dextrose bolus, glucagon (rDNA), dextrose, potassium chloride **OR** potassium alternative oral replacement **OR** potassium chloride, acetaminophen **OR** acetaminophen, aluminum & magnesium hydroxide-simethicone    Labs:     Recent Labs     11/27/22  0621 11/28/22  0404 11/29/22  0435   WBC 12.2* 11.2 9.4   HGB 10.1* 10.3* 9.8*   HCT 30.4* 31.4* 31.0*    247 239       Recent Labs     11/27/22  0621 11/28/22  0404 11/29/22  0435    137 134   K 4.4 4.2 4.0    101 99   CO2 22 22 20*   BUN 30* 34* 33*   CREATININE 2.2* 2.1* 1.8*   CALCIUM 9.6 9.5 8.9   PHOS 3.1 3.6 3.1       No results for input(s): PROT, ALB, ALKPHOS, ALT, AST, BILITOT, AMYLASE, LIPASE in the last 72 hours. Recent Labs     11/27/22 0621 11/28/22  1000 11/29/22  0435   INR 1.2 1.2 1.2       No results for input(s): Gunnar Gubler in the last 72 hours.     Chronic labs:    Lab Results   Component Value Date    TSH 3.970 11/24/2022    PSA 3.76 09/16/2022    INR 1.2 11/29/2022    LABA1C 8.5 (H) 08/02/2022       Radiology: REVIEWED DAILY    +++++++++++++++++++++++++++++++++++++++++++++++++  Kate Sánchez MD  [facility] - [address] [place] [address]  +++++++++++++++++++++++++++++++++++++++++++++++++  NOTE: This report was transcribed using voice recognition software. Every effort was made to ensure accuracy; however, inadvertent computerized transcription errors may be present.

## 2022-11-29 NOTE — CONSULTS
[facility]  Department of Internal Medicine   Internal Medicine Residency   MICU Progress Note    Patient:  Williams Swann 80 y.o. male  MRN: [id number]     Date of Service: 11/29/2022    Allergy: Patient has no known allergies. Subjective     Today, pt was sleeping upon examining him this morning. He was able to answer yes and no and nod his head to some questions but he was not awake to fully be engaged. Upon reexamination, pt is sitting upright and eating dinner. 24h change: no acute changes overnight. -276. -172. ROS: Unable to perform as pt is sleeping. Objective     VS: BP (!) 166/91   Pulse 82   Temp 98.2 °F (36.8 °C) (Temporal)   Resp 18   Ht 5' 9\" (1.753 m)   Wt 184 lb 4.9 oz (83.6 kg)   SpO2 96%   BMI 27.22 kg/m²           I & O - 24hr:   Intake/Output Summary (Last 24 hours) at 11/29/2022 1438  Last data filed at 11/29/2022 1248  Gross per 24 hour   Intake 209.3 ml   Output 1050 ml   Net -840.7 ml       Physical Exam:  General Appearance: appears stated age and no distress. Pt is sleeping. He was responsive but did not fully wake up. Lung: clear to auscultation bilaterally in upper and lower posterior lung fields. Pacemaker surgical wound in the left upper chest, no wound dehiscence, erythema or edema, no purulent drainage, some ecchymosis in surrounding the site. Heart: irregularly irregular rhythm and no S3 or S4. No pitting edema of lower or upper extremities. Abdomen: Soft and non distended. Mildly tender. Seborrhoic keratosis diffusely across the trunk anteriorly and posteriorly. Echymosis next to his sternal notch. Extremities:  extremities normal, atraumatic, no cyanosis or edema. Erythema of the left forearm and axilla improved today compared to yesterday. Musculoskeletal: No joint swelling, no muscle tenderness. Passive ROM in all 4 exrtremities. Strength is 5/5 in upper and lower extremities.   Neurologic: Expressive aphasia noted, slightly improved from yesterday. No agnosia today. Present babinski on the left. Absent on the right. Could not elicit patellar and achilles reflexes bilaterally. Biceps reflexes 2+ bilaterally. Lines     site day    Art line   None    TLC None    PICC None    Hemoaccess None    Right (x 4 days) and left (x 5 days) forearm peripheral lines in place. Oxygen:    nasal canula at 3.5L/min. At 3:00 PM pt was was on room air. ABG:   No results found for: PHART, PH, CWL0NGP, PCO2, PO2ART, PO2, DTA2VAE, HCO3, BEART, BE, THGBART, THB, ZPG0XSN, T9OCGPGB, O2SAT     Medications     Infusions: (Fluid, Sedation, Vasopressors)  IVF:   10% dextrose prn. Vasopressors  None. Sedation  None. Nutrition:   PO on easy to chew diet (low chol, low fat and high fiber). Patient currently has   GI prophylaxis: Protonix 40 mg qd.       Labs   CBC with Differential:    Lab Results   Component Value Date/Time    WBC 9.4 11/29/2022 04:35 AM    RBC 3.56 11/29/2022 04:35 AM    HGB 9.8 11/29/2022 04:35 AM    HCT 31.0 11/29/2022 04:35 AM     11/29/2022 04:35 AM    MCV 87.1 11/29/2022 04:35 AM    MCH 27.5 11/29/2022 04:35 AM    MCHC 31.6 11/29/2022 04:35 AM    RDW 13.9 11/29/2022 04:35 AM    LYMPHOPCT 9.7 11/29/2022 04:35 AM    MONOPCT 11.3 11/29/2022 04:35 AM    BASOPCT 0.4 11/29/2022 04:35 AM    MONOSABS 1.06 11/29/2022 04:35 AM    LYMPHSABS 0.91 11/29/2022 04:35 AM    EOSABS 0.02 11/29/2022 04:35 AM    BASOSABS 0.04 11/29/2022 04:35 AM     CMP:    Lab Results   Component Value Date/Time     11/29/2022 04:35 AM    K 4.0 11/29/2022 04:35 AM    K 4.5 11/24/2022 09:08 AM    CL 99 11/29/2022 04:35 AM    CO2 20 11/29/2022 04:35 AM    BUN 33 11/29/2022 04:35 AM    CREATININE 1.8 11/29/2022 04:35 AM    GFRAA 30 09/16/2022 08:24 AM    LABGLOM 36 11/29/2022 04:35 AM    GLUCOSE 206 11/29/2022 04:35 AM    PROT 6.9 11/24/2022 09:08 AM    LABALBU 4.2 11/24/2022 09:08 AM    CALCIUM 8.9 11/29/2022 04:35 AM    BILITOT 0.4 11/24/2022 09:08 AM    ALKPHOS 130 11/24/2022 09:08 AM    AST 30 11/24/2022 09:08 AM    ALT 39 11/24/2022 09:08 AM     Hepatic Function Panel:    Lab Results   Component Value Date/Time    ALKPHOS 130 11/24/2022 09:08 AM    ALT 39 11/24/2022 09:08 AM    AST 30 11/24/2022 09:08 AM    PROT 6.9 11/24/2022 09:08 AM    BILITOT 0.4 11/24/2022 09:08 AM    LABALBU 4.2 11/24/2022 09:08 AM     PT/INR:    Lab Results   Component Value Date/Time    PROTIME 12.7 11/29/2022 04:35 AM    INR 1.2 11/29/2022 04:35 AM     PTT:    Lab Results   Component Value Date/Time    APTT 24.6 11/29/2022 04:35 AM   [APTT}  U/A:    Lab Results   Component Value Date/Time    COLORU Yellow 11/27/2022 12:30 PM    PROTEINU >=300 11/27/2022 12:30 PM    PHUR 6.0 11/27/2022 12:30 PM    WBCUA NONE 11/27/2022 12:30 PM    RBCUA 2-5 11/27/2022 12:30 PM    BACTERIA NONE SEEN 11/27/2022 12:30 PM    CLARITYU Clear 11/27/2022 12:30 PM    SPECGRAV 1.020 11/27/2022 12:30 PM    LEUKOCYTESUR Negative 11/27/2022 12:30 PM    UROBILINOGEN 0.2 11/27/2022 12:30 PM    BILIRUBINUR Negative 11/27/2022 12:30 PM    BLOODU SMALL 11/27/2022 12:30 PM    GLUCOSEU 500 11/27/2022 12:30 PM       Imaging Studies:  CT scan:    Impression:  11/27/22     No significant change in large left subdural hemorrhage with stable 7 mm   midline shift to the right. Resident's Assessment and Plan     Neuro   Subdural hematoma 2/2 to head injury - stable   Monitor blood pressure with a goal of SBP  150- 170  Continue labetalol and hydralazine prn   Continue holding eliquis   Seizures  EEG ordered to investigate the cause of acute mental status 2 nights ago. Pt was found to have two left frontal seizures involving the temporal region. Vimpat 200 mg loading dose given yesterday. Pt put on EEG for monitoring. Continue Vimpat 200 mg BID. Discontinue Keppra 500 mg BID. Continue to monitor pt's neurological status   Appreciate neurology and cardiology's recommendations. Cardio  1. Syncope 2/2 third degree heart block likely ischemic vs sick sinus syndrome vs senile amyloidosis   Pacemaker placed on 45/79/76 with no complications. 11/24/22- Lyme panel is negative. Discuss potential plan for fat biopsy with the son of the pt. Consider echocardiogram.   HTN   Continue Lisinopril 20 mg, HCTZ 12.5 mg and Nifedipine 30 mg. Afib   Hold eliquis   CHADS2 VASC score  = 4     Pulmonary    SOB   On 3.5 L NC early in the morning. On room air in the afternoon. Continue Duoneb, guaifensin and brovana. Renal   CKD stage IV   Creatinine improved today to 1.8 from 2.1. No electrolyte disturbances. Continue to monitor kidney function, BMPs. Endocrine   Type II DM   Last HgB A1c 8.5%   Continue Insulin lispro sliding scale. TSH wnl 3.970. Peripheral neuropathy   Continue gabapentin 100 mg BID. MSK   1. Rib pain   A. Lidocaine patch and acetaminophen 650 mg q6 prn.    BPH    Continue Tamsulosin 0.4 mg qhs. DVT ppx: none. GI ppx: Protonix 40 mg. Code status: limited.      Disposition: continue care     Cole Handy, MS-3    Attending physician: Dr. Mo Dorman  Department of Pulmonary, Critical Care and Sleep Medicine  [address] [facility]  Department of Internal Medicine

## 2022-11-29 NOTE — PROGRESS NOTES
[address] [facility]  Department of Internal Medicine   Internal Medicine Residency   MICU Progress Note    Patient:  Loyd Schneider 80 y.o. male  MRN: [id number]     Date of Service: 2022    Allergy: Patient has no known allergies. Subjective   This morning the patient was seen and examined at bedside in no acute distress. However, he continues to be lethargic, not oriented to time and place. He is able to follow some commands and answers yes/no to most questions. 24h change: Blood sugar overnight 204, 276, -170/70s    ROS: Cannot be assessed because of mental status  Objective     VITAL SIGNS:  BP (!) 156/65   Pulse 71   Temp 98.9 °F (37.2 °C) (Oral)   Resp 24   Ht 5' 9\" (1.753 m)   Wt 184 lb 4.9 oz (83.6 kg)   SpO2 95%   BMI 27.22 kg/m²   Tmax over 24 hours:  Temp (24hrs), Av.9 °F (37.2 °C), Min:98.1 °F (36.7 °C), Max:100 °F (37.8 °C)      Patient Vitals for the past 6 hrs:   BP Temp Temp src Pulse Resp SpO2   22 1600 (!) 156/65 98.9 °F (37.2 °C) Oral 71 24 95 %   22 1500 (!) 170/65 -- -- 76 23 94 %   22 1400 (!) 155/72 -- -- 77 19 94 %   22 1300 (!) 145/59 -- -- 83 21 95 %         Intake/Output Summary (Last 24 hours) at 2022 1829  Last data filed at 2022 1631  Gross per 24 hour   Intake 369.07 ml   Output 900 ml   Net -530.93 ml     Wt Readings from Last 2 Encounters:   22 184 lb 4.9 oz (83.6 kg)   22 185 lb (83.9 kg)     Body mass index is 27.22 kg/m².         I & O - 24hr:   Intake/Output Summary (Last 24 hours) at 2022 1829  Last data filed at 2022 1631  Gross per 24 hour   Intake 369.07 ml   Output 900 ml   Net -530.93 ml       Physical Exam:  General Appearance: Alert, but not oriented to time and place, says almost no/yes to all questions   Lung: clear to auscultation bilaterally  Heart: regular rate and rhythm, S1, S2 normal, no murmur, click, rub or gallop  Abdomen: soft, non-tender; bowel sounds normal; no masses,  no organomegaly  Extremities:  extremities normal, atraumatic, no cyanosis or edema  Musculoskeletal: No joint swelling, no muscle tenderness. ROM normal in all joints of extremities. Neurologic: Mental status: Not oriented to time and place, aphasic, able to follow commands, positive Babinski on the right, strength 5 out of 5     site day    Art line   None    TLC None    PICC None    Hemoaccess None      VENT SETTINGS (Comprehensive) (if applicable): Additional Respiratory Assessments  Heart Rate: 71  Resp: 24  SpO2: 95 %    ABGs:   No results for input(s): PH, PCO2, PO2, HCO3, BE, O2SAT in the last 72 hours. Laboratory findings:  Complete Blood Count:   Recent Labs     11/27/22 0621 11/28/22 0404 11/29/22 0435   WBC 12.2* 11.2 9.4   HGB 10.1* 10.3* 9.8*   HCT 30.4* 31.4* 31.0*    247 239        Last 3 Blood Glucose:   Recent Labs     11/27/22 0621 11/28/22 0404 11/29/22  0435   GLUCOSE 209* 153* 206*        PT/INR:    Lab Results   Component Value Date/Time    PROTIME 12.7 11/29/2022 04:35 AM    INR 1.2 11/29/2022 04:35 AM     PTT:    Lab Results   Component Value Date/Time    APTT 24.6 11/29/2022 04:35 AM       Comprehensive Metabolic Profile:   Recent Labs     11/27/22 0621 11/28/22 0404 11/29/22  0435    137 134   K 4.4 4.2 4.0    101 99   CO2 22 22 20*   BUN 30* 34* 33*   CREATININE 2.2* 2.1* 1.8*   GLUCOSE 209* 153* 206*   CALCIUM 9.6 9.5 8.9      Magnesium:   Lab Results   Component Value Date/Time    MG 2.0 11/29/2022 04:35 AM     Phosphorus:   Lab Results   Component Value Date/Time    PHOS 3.1 11/29/2022 04:35 AM     Ionized Calcium: No results found for: CAION   Troponin: No results for input(s): TROPONINI in the last 72 hours.   Medications     Infusions: (Fluid, Sedation, Vasopressors)  IVF:     Vasopressors  None  Sedation  None    Nutrition: Adult diet, easy to chew 5 carbs      ATB:   Antibiotics  Days               Cultures: Negative        Imaging     CT HEAD WO CONTRAST    Result Date: 11/27/2022  No significant change in large left subdural hemorrhage with stable 7 mm midline shift to the right. CT HEAD WO CONTRAST    Result Date: 11/27/2022  1. Moderate to large left subdural hematoma with left-to-right midline structure shift of approximately 7 mm, unchanged. 2.  Greatest maximal thickness of the subdural hematoma on coronal views is 24 mm, compared to 23 mm on the prior study. 3.  Gas fluid level in the left sphenoid sinus may reflect acute sinusitis. CT HEAD WO CONTRAST    Result Date: 11/26/2022  Interval increased volume and extent of diffuse left cerebral acute subdural hemorrhage with greatest thickness adjacent to the frontotemporal region now measuring up to 22 mm in greatest axial thickness. Interval worsening of rightward midline shift at the level of the septum pellucidum now measuring 8 mm. Mild cerebral white matter chronic microvascular ischemic disease. Mild diffuse cerebral atrophy. Multiple attempts were made by the [facility] core team to page and notify Dr. Tereso Casas. Critical results were then called by Dr. Osman Castillo to OSCAR Mccoy on 11/26/2022 at 22:23. Radha Stovall reports that neurosurgery has been consulted about the case. CT Head W/O Contrast    Result Date: 11/24/2022  No acute intracranial abnormality. CT CSpine W/O Contrast    Result Date: 11/24/2022  No acute abnormality of the cervical spine. Multilevel degenerative changes of the cervical spine visualized most prominent at C3-C4 and C5-C6. Circumferential opacification visualized in the sphenoid sinus. XR CHEST PORTABLE    Result Date: 11/26/2022  Mild congestive changes. XR CHEST PORTABLE    Result Date: 11/25/2022  No evidence of pneumothorax or parenchymal contusion. AICD visualized in the left chest with leads in the heart. XR CHEST PORTABLE    Result Date: 11/24/2022  Congestive changes demonstrate no change. Resident's Assessment and Plan     Rafi De Leon   , 80 y.o. , male came with CC : Syncope     has a past medical history of A-fib ([facility] [address] Diabetes mellitus ([facility] [address] H pylori ulcer, Hyperlipidemia, Hypertension, PONV (postoperative nausea and vomiting), PUD (peptic ulcer disease), and Urinary frequency.      Consults:   EP  Neurology  PT/OT    Assessment:  Neurology  Not oriented to time and place   He is able to follow some commands   Answers almost yes/no to every question      Headache due to subdural hematoma   Reports headache s/p fall   Initial CT in the ED was read negative for intracranial hemorrhage  Continues to complain of headache in spite of pain medicine   He was on Eliquis for afib prior to fall   CT head on 11/26/2022 showed subdural hematoma of 7 mm   Neurosurgery is on board and does not recommend anything at this time and want to watch and wait   Prophylactically placed on Keppra, Vimpat added due to 1 episode of seizure on video EEG   Neuro checks q1h   Maintain BP systolic 480-825 mmHg     Seizure likely 2/2 subdural hematoma  Developed 1 episode of seizure on video EEG  Was prophylactically started on Keppra, neurology added Vimpat for further management  Continue video EEG for seizure monitoring  Continue Keppra and Vimpat  Continue to monitor neuro status    Cardiology  Syncope episode 2/2 complete heart block s/p pacemaker placement  He was initially on isoproterenol   Heart rate stable   Continue to monitor      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 sinus syndrome vs Lyme disease vs carditis vs ischemia - resolved   Follow Lyme serology still pending, TSH wnl   S/P pacemaker dual chamber placement on 11/25/2022     Hx of Afib- on Eliquis, held prior to pacemaker placement  [id number] score is 4   EP following, metoprolol held   Eliquis held and [place] given due to recent brain bleed      Hx of HTN - on lisinopril, HCTZ, hydralazine as needed, labetalol as needed  Started on nifedipine by cardio      Renal  Hx of CKD stage IV  Continue home BP s/p pacemaker placement      Endocrine  Hx of NIDDM  On Lantus 20 units, lispro 8 units 3 times daily  Hypoglycemia protocol on   Glucose ACHS     Problems resolved during this admission:   Complete heart block    PT/OT: Consulted  DVT ppx: PCDs  GI ppx:       Code Status: Limited code    Disposition: Continue current care    Macey Kebede MD, PGY-1    Attending physician: Dr. Charbel Edwards    NOTE: This report was transcribed using voice recognition software. Every effort was made to ensure accuracy; however, inadvertent computerized transcription errors may be present. [place]  Department of Pulmonary, Critical Care and Sleep Medicine  [facility]  Department of Internal Medicine      During multidisciplinary team rounds Sara Cheatham is a 80 y.o. male was seen, examined and discussed. This is confirmation that I have personally seen and examined the patient and that the key elements of the encounter were performed by me (> 85 % time). The medications & laboratory data was discussed and adjusted where necessary. The radiographic images were reviewed or with radiologist or consultant if felt dis-concordant with the exam or history. The above findings were corroborated, plans confirmed and changes made if needed. Family is updated at the bedside as available. Key issues of the case were discussed among consultants. Critical Care time is documented if appropriate. Case discussed with neurology. Currently on continuous EEG.   For me patient is alert and oriented    Critical Care time: 35 minutes    Lainey Marshall DO

## 2022-11-29 NOTE — PROGRESS NOTES
Hospitalist Progress Note      PCP: Fatoumata Contreras MD    Date of Admission: 11/24/2022    Chief Complaint: syncope, heart block complete    Hospital Course:   80 y.o. male with past medical history of atrial fibrillation, DM hypertension . Patient is a transfer from  [facility] . He was seen there due to chest pain . He states that chest pain is located in the sternal area . He states  that he has shortness of breath with chest pain . He states that when chest pain occurred he felt like he was going to pass out . Patient had a syncopal episode and hit his head . Patient had a second syncopal episode  and after that family called ambulance . En route to ED patient was given 324 mg of ASA . In the ED patient was in Afib with slow ventricular rate having long pauses and bradycardic to the 30s . Lab data revealed sodium 135 potassium 4.1 BUN 29 Scr 2.0  glucose 244 Trop 45 >>43  WBC 9.9 HGB 10.7   Resp panel neg CT head cervical spine neg CXR congestive changes . Patient received fentanyl morphine and was placed on isoproterenol infusion. EP consulted-pacemaker placement. Lyme serology was ordered for possible carditis. Patient had an initial CT of the head after the fall which was negative for any intracranial hemorrhage. Repeat CT head was obtained on 11/26/2022 as patient continued to complain of headache and spite of pain medicine. Repeat CT of the head noncontrast was obtained which showed progressive subdural hemorrhage. Eliquis washeld, Kcentra ordered for Eliquis reversal.  Neurosurgery following. Worsening mental status. Found to have two seizures. Started on Keppra and Vimpat. Neurology following. Subjective:  Patient poor historian with aphasia. Follows commands. No distres.       Medications:  Reviewed    Infusion Medications    sodium chloride      dextrose       Scheduled Medications    insulin lispro  0-4 Units SubCUTAneous TID WC    insulin lispro  0-4 Units SubCUTAneous Nightly lacosamide (VIMPAT) IVPB  100 mg IntraVENous BID    polyethylene glycol  17 g Oral Daily    levETIRAcetam  500 mg IntraVENous Q12H    insulin glargine  13 Units SubCUTAneous Daily    insulin lispro  3 Units SubCUTAneous TID     guaiFENesin  400 mg Oral TID    sodium chloride flush  5-40 mL IntraVENous 2 times per day    NIFEdipine  30 mg Oral Daily    pantoprazole (PROTONIX) 40 mg injection  40 mg IntraVENous Daily    lidocaine  1 patch TransDERmal Daily    allopurinol  300 mg Oral Once per day on Mon Wed Fri    gabapentin  100 mg Oral BID    tamsulosin  0.4 mg Oral Nightly    Arformoterol Tartrate  15 mcg Nebulization BID    lisinopril  20 mg Oral Daily    And    hydroCHLOROthiazide  12.5 mg Oral Daily     PRN Meds: ipratropium-albuterol, perflutren lipid microspheres, hydrALAZINE, trimethobenzamide, sodium chloride flush, sodium chloride, labetalol, glucose, dextrose bolus **OR** dextrose bolus, glucagon (rDNA), dextrose, potassium chloride **OR** potassium alternative oral replacement **OR** potassium chloride, acetaminophen **OR** acetaminophen, aluminum & magnesium hydroxide-simethicone      Intake/Output Summary (Last 24 hours) at 11/28/2022 1930  Last data filed at 11/28/2022 1615  Gross per 24 hour   Intake 460 ml   Output 1450 ml   Net -990 ml       Exam:    BP (!) 162/73   Pulse 80   Temp 99 °F (37.2 °C)   Resp 20   Ht 5' 9\" (1.753 m)   Wt 185 lb (83.9 kg)   SpO2 96%   BMI 27.32 kg/m²     General appearance: No apparent distress, appears stated age and cooperative. HEENT: Pupils equal, round, and reactive to light. Conjunctivae/corneas clear. Neck: Supple, with full range of motion. No jugular venous distention. Trachea midline. Respiratory:  Normal respiratory effort. Clear to auscultation, bilaterally without Rales/Wheezes/Rhonchi. Cardiovascular: Regular rate and rhythm with normal S1/S2 without murmurs, rubs or gallops.   Abdomen: Soft, non-tender, non-distended with normal bowel sounds. Musculoskeletal: No clubbing, cyanosis or edema bilaterally. Full range of motion without deformity. Skin: Skin color, texture, turgor normal.  No rashes or lesions. Neurologic:  No focal deficits,  Psychiatric: Alert and oriented, thought content appropriate, normal insight    Labs:   Recent Labs     11/26/22  2331 11/27/22  0621 11/28/22  0404   WBC 13.6* 12.2* 11.2   HGB 9.8* 10.1* 10.3*   HCT 29.6* 30.4* 31.4*    234 247     Recent Labs     11/26/22  0505 11/26/22  2331 11/27/22  0621 11/28/22  0404    133 134 137   K 4.0 4.5 4.4 4.2   CL 99 98 100 101   CO2 23 23 22 22   BUN 26* 33* 30* 34*   CREATININE 2.0* 2.3* 2.2* 2.1*   CALCIUM 9.2 9.0 9.6 9.5   PHOS 2.6  --  3.1 3.6     No results for input(s): AST, ALT, BILIDIR, BILITOT, ALKPHOS in the last 72 hours. Recent Labs     11/27/22  0621 11/28/22  1000   INR 1.2 1.2     No results for input(s): Zen Bras[place] in the last 72 hours.     Assessment/Plan:    Active Hospital Problems    Diagnosis Date Noted    Seizure ([facility] [address] [R56.9] 11/28/2022     Priority: Medium    High-grade atrioventricular block [I44.39] 11/26/2022     Priority: Medium    Third degree heart block ([address] [I44.2] 11/25/2022     Priority: Medium    A-fib [facility]) [I48.91] 11/24/2022     Priority: Medium    Atrial fibrillation ([address] [I48.91] 11/24/2022     Priority: Medium   Degree heart block  Nonvalvular paroxysmal atrial fibrillation  Syncope and collapse  Subdural hematoma-increasing  Hypertension  CKD stage III  Diabetes mellitus type 2  Anemia due to CKD  Seizure    -Continue to monitor in ICU on telemetry  -EP consulted-currently placed on isoproterenol infusion-pacemaker placement-11/25/2022  - cardiology consulted- apprec recommendations,  Labetalol and Hydralazine  No eliquis  -Echo ordered-shows an ejection fraction of 60 to 65%, moderately sclerotic aortic valve [stable from previous], mild tricuspid regurgitation, mild pulmonary regurgitation, mild to moderate aortic stenosis, aortic regurgitation  -Continue home medications for hypertension-amlodipine, lisinopril, hydrochlorothiazide  -Creatinine reviewed and at 2.0 which is around his baseline  -CT of head reviewed from yesterday-showed an increase in the extent of diffuse left cerebral acute subdural hemorrhage.  -Neurosurgery consulted-Eliquis stopped, Kcentra for reversal.  Repeat CT head-11/27/2020  -Continue sliding scale insulin  -Appreciate critical care management  Neurology following. Keppra and Vimpat     DVT Prophylaxis:  Eliquis restarted today per Cardiology  Diet: ADULT ORAL NUTRITION SUPPLEMENT; Lunch, Dinner; Low Calorie/High Protein Oral Supplement  ADULT DIET; Easy to Chew; 5 carb choices (75 gm/meal); Low Fat/Low Chol/High Fiber/2 gm Na  Code Status: Limited    Dispo -ongoing patient specialist eval, continue ICU care  Discharge disposition-PT/OT Eval and dispo based on recs.      Deandra Ibarra, DO

## 2022-11-29 NOTE — CARE COORDINATION
11/29:  Update CM Note;  Pt was found to be in complete heart block & was transferred to [facility]. Pt is on 4L/NC at 97%, Iv Vimpat, Iv Keppra &  Protonix. Cm spoke with Son Dr. Tomas Rogers at [phone] over the phone to discuss CM role & dc planning. Pt had a pacemaker placed on 11/25. Ct head on 11/26 showed progressive subdural hemorrhage. PT was given [address] Neurology are following. Pt is a limited code. Pt's PCP is Dr. Cornejo Free uses the [facility] [address] [facility]. Pt lives alone in a ranch house with 3 steps to enter. PTA pt was independent. Pt doesn't own any DME & has no hx of HHC/SNF. The dc plan would be home or [place], PMR & then SNF need sister's address [facility] [address]. PT 14/24 OT 12/24. Pending Speech eval.  CM left list of HHC/SNF near daughter's address. The closest facilities are [address] [place] [address], [address] [facility]. Will need 02 testing. Sw/CM will continue to follow for dc planning. Electronically signed by Lyly Sloan RN on 11/29/2022 at 10:18 AM      The Plan for Transition of Care is related to the following treatment goals: SNF    The Patient and/or patient representative  was provided with a choice of provider and agrees   with the discharge plan. [x] Yes [] No    Freedom of choice list was provided with basic dialogue that supports the patient's individualized plan of care/goals, treatment preferences and shares the quality data associated with the providers.  [x] Yes [] No

## 2022-11-30 PROBLEM — S06.5XAA SUBDURAL HEMATOMA: Status: ACTIVE | Noted: 2022-11-30

## 2022-11-30 NOTE — PROGRESS NOTES
Physical Therapy    Pt on PT caseload. Pt still on continuous EEG at time of attempt. Will attempt back another time.     Sandra Mauricio, PT, DPT  [id number]

## 2022-11-30 NOTE — PROGRESS NOTES
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    Esophagitis-grade 1 K20.90    AP (angina pectoris) ([facility]) L19.3    Systolic hypertension B04    Combined forms of age-related cataract of left eye H25.812    Anemia, chronic renal failure, stage 4 (severe) ([facility]) N18.4, D63.1    Sepsis secondary to CAP ([facility]) A41.9    Pneumonia of left lung due to infectious organism J18.9    A-fib ([facility]) I48.91    Atrial fibrillation (HCC) I48.91    Third degree heart block ([facility]) I44.2    High-grade atrioventricular block I44.39    Seizure ([facility] [address] R56.9       SUBJECTIVE:  Pranay Chappell remains critical But hopefully neurologically he will improve over the next 48 hours. With size of subdural hematoma certainly there is pressures to bear and chance patient may not be able to accommodate this. Maintain present drug regimen. REVIEW OF SYSTEMS:  Able to presently accurately obtain due to  patient's profound neurologic state.   Unable    SCHEDULED MEDICATIONS:   [START ON 11/30/2022] insulin glargine  20 Units SubCUTAneous Daily    insulin lispro  8 Units SubCUTAneous TID WC    lacosamide (VIMPAT) IVPB  200 mg IntraVENous BID    [START ON 11/30/2022] insulin lispro  0-8 Units SubCUTAneous TID WC    insulin lispro  0-4 Units SubCUTAneous Nightly    valproate sodium (DEPACON) IVPB  1,000 mg IntraVENous Once    [START ON 11/30/2022] valproate sodium (DEPACON) IVPB  500 mg IntraVENous 2 times per day    Or    [START ON 11/30/2022] divalproex  500 mg Oral 2 times per day    polyethylene glycol  17 g Oral Daily    levETIRAcetam  500 mg IntraVENous Q12H    guaiFENesin  400 mg Oral TID    sodium chloride flush  5-40 mL IntraVENous 2 times per day    NIFEdipine  30 mg Oral Daily    pantoprazole (PROTONIX) 40 mg injection  40 mg IntraVENous Daily    lidocaine  1 patch TransDERmal Daily allopurinol  300 mg Oral Once per day on Mon Wed Fri    gabapentin  100 mg Oral BID    tamsulosin  0.4 mg Oral Nightly    Arformoterol Tartrate  15 mcg Nebulization BID    lisinopril  20 mg Oral Daily    And    hydroCHLOROthiazide  12.5 mg Oral Daily       VITAL SIGNS:                                                                                                                          BP (!) 140/65   Pulse 70   Temp 98.9 °F (37.2 °C) (Oral)   Resp 22   Ht 5' 9\" (1.753 m)   Wt 184 lb 4.9 oz (83.6 kg)   SpO2 96%   BMI 27.22 kg/m²   Patient Vitals for the past 96 hrs (Last 3 readings):   Weight   11/29/22 0445 184 lb 4.9 oz (83.6 kg)   11/28/22 0600 185 lb (83.9 kg)   11/26/22 0600 182 lb 8.7 oz (82.8 kg)     OBJECTIVE:    HEENT: PERRL, EOM  Intact; sclera non-icteric, conjunctiva pink. Carotids are brisk in upstroke with normal contour. No carotid bruits. Normal jugular venous pulsation at 45°. No palpable cervical nor supraclavicular nodes. Thyroid not palpable. Trachea midline. Chest: Even excursion  Lungs: CTA B, no expiratory wheezes or rhonchi, no decreased tactile fremitus without inspiratory rales. Heart: Irregular, regular  rhythm; S1 > S2, no gallop grade 2/6 systolic murmur second right intercostal space no clicks, rub, palpable thrills   or heaves. PMI nondisplaced, 5th intercostal space MCL. Abdomen: Soft, nontender, nondistended,  mildly protuberant, no masses or organomegaly. Bowel sounds active. Extremities: Without clubbing, cyanosis or edema. Pulses present 3+ upper extermities bilaterally; present 1+ DP  bilaterally and present 1+ PT bilaterally.      Data:   Scheduled Meds: Reviewed  Continuous Infusions:    sodium chloride      dextrose         Intake/Output Summary (Last 24 hours) at 11/29/2022 2340  Last data filed at 11/29/2022 2300  Gross per 24 hour   Intake 369.07 ml   Output 1125 ml   Net -755.93 ml     CBC:   Recent Labs     11/27/22  0621 11/28/22  0404 11/29/22  0435 WBC 12.2* 11.2 9.4   HGB 10.1* 10.3* 9.8*   HCT 30.4* 31.4* 31.0*    247 239     BMP:  Recent Labs     11/27/22  0621 11/28/22  0404 11/29/22  0435    137 134   K 4.4 4.2 4.0    101 99   CO2 22 22 20*   BUN 30* 34* 33*   CREATININE 2.2* 2.1* 1.8*   LABGLOM 28 30 36     ABGs: No results found for: PH, PO2, PCO2  INR:   Recent Labs     11/27/22  0621 11/28/22  1000 11/29/22  0435   INR 1.2 1.2 1.2     PRO-BNP:   Lab Results   Component Value Date    PROBNP 8,347 (H) 08/02/2022      TSH:   Lab Results   Component Value Date    TSH 3.970 11/24/2022      Cardiac Injury Profile:   No results for input(s): TROPHS in the last 72 hours. Lipid Profile: No results found for: TRIG, HDL, LDLCALC, CHOL   Hemoglobin A1C: No components found for: HGBA1C      RAD:   CT HEAD WO CONTRAST   Final Result   Interval increased volume and extent of diffuse left cerebral acute subdural   hemorrhage with greatest thickness adjacent to the frontotemporal region now   measuring up to 22 mm in greatest axial thickness. Interval worsening of rightward midline shift at the level of the septum   pellucidum now measuring 8 mm. Mild cerebral white matter chronic microvascular ischemic disease. Mild diffuse cerebral atrophy. Multiple attempts were made by the [facility] core team to page and notify Dr. Flip Castro. Critical results were then called by Dr. Sudheer Carballo to [facility] [place] on 11/26/2022 at 22:23. Kita De Anda reports that neurosurgery has been   consulted about the case. XR CHEST PORTABLE   Final Result   Mild congestive changes. XR CHEST PORTABLE   Final Result   No evidence of pneumothorax or parenchymal contusion. AICD visualized in the   left chest with leads in the heart.          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    Esophagitis-grade 1 K20.90    AP (angina pectoris) ([facility]) X24.3    Systolic hypertension G09    Combined forms of age-related cataract of left eye H25.812    Anemia, chronic renal failure, stage 4 (severe) ([facility]) N18.4, D63.1    Sepsis secondary to CAP ([facility]) A41.9    Pneumonia of left lung due to infectious organism J18.9    A-fib ([facility]) I48.91    Atrial fibrillation (HCC) I48.91    Third degree heart block (HCC) I44.2    High-grade atrioventricular block I44.39    Seizure ([facility]) R56.9       RECOMMENDATIONS:  Unfortunately, once again Mr. Juli Teague continues to mildly deteriorate in neurologic status but remains awake when calling his name. Maintain systolic blood pressure without any significant drop off of <130 mmHg. I have spent more than 30 critical care minutes face to face with Nasir Silva and reviewing notes and laboratory data, with greater than 50% of this time instructing and counseling the patient and his family members face to face regarding my findings and recommendations and I have answered all questions as posed to me by Mr. Juli Teague ICU team nurse. Fabio Sheridan DO FACP,FACC,FSCAI      NOTE:  This report was transcribed using voice recognition software.   Every effort was made to ensure accuracy; however, inadvertent computerized transcription errors may be present

## 2022-11-30 NOTE — PROGRESS NOTES
Updated Dr. Elijah Espinoza on patient's sodium level of 127, verbal orders to increase patient's NS to 75 ml/hr.

## 2022-11-30 NOTE — PROGRESS NOTES
chloride      dextrose       Scheduled Meds:   insulin glargine  28 Units SubCUTAneous Daily    insulin lispro  11 Units SubCUTAneous TID WC    lacosamide (VIMPAT) IVPB  200 mg IntraVENous BID    insulin lispro  0-8 Units SubCUTAneous TID WC    insulin lispro  0-4 Units SubCUTAneous Nightly    valproate sodium (DEPACON) IVPB  500 mg IntraVENous 2 times per day    Or    divalproex  500 mg Oral 2 times per day    polyethylene glycol  17 g Oral Daily    levETIRAcetam  500 mg IntraVENous Q12H    guaiFENesin  400 mg Oral TID    sodium chloride flush  5-40 mL IntraVENous 2 times per day    NIFEdipine  30 mg Oral Daily    pantoprazole (PROTONIX) 40 mg injection  40 mg IntraVENous Daily    lidocaine  1 patch TransDERmal Daily    allopurinol  300 mg Oral Once per day on Mon Wed Fri    gabapentin  100 mg Oral BID    tamsulosin  0.4 mg Oral Nightly    Arformoterol Tartrate  15 mcg Nebulization BID    lisinopril  20 mg Oral Daily    And    hydroCHLOROthiazide  12.5 mg Oral Daily     PRN Meds: ipratropium-albuterol, perflutren lipid microspheres, hydrALAZINE, trimethobenzamide, sodium chloride flush, sodium chloride, labetalol, glucose, dextrose bolus **OR** dextrose bolus, glucagon (rDNA), dextrose, potassium chloride **OR** potassium alternative oral replacement **OR** potassium chloride, acetaminophen **OR** acetaminophen, aluminum & magnesium hydroxide-simethicone  Nutrition:   ADULT ORAL NUTRITION SUPPLEMENT; Lunch, Dinner; Low Calorie/High Protein Oral Supplement  ADULT DIET; Easy to Chew; 5 carb choices (75 gm/meal);  Low Fat/Low Chol/High Fiber/2 gm Na    Labs and Imaging Studies     CBC:   Recent Labs     11/28/22 0404 11/29/22 0435 11/30/22 0417   WBC 11.2 9.4 10.3   HGB 10.3* 9.8* 10.2*   HCT 31.4* 31.0* 30.3*   MCV 85.1 87.1 82.8    239 264       BMP:    Recent Labs     11/28/22 0404 11/29/22 0435 11/30/22 0417    134 132   K 4.2 4.0 4.2    99 99   CO2 22 20* 22   BUN 34* 33* 45*   CREATININE 2. 1* 1.8* 2.0*   GLUCOSE 153* 206* 227*       LIVER PROFILE:   No results for input(s): AST, ALT, LIPASE, BILIDIR, BILITOT, ALKPHOS in the last 72 hours. Invalid input(s): AMYLASE,  ALB    PT/INR:   Recent Labs     11/28/22  1000 11/29/22  0435 11/30/22  0417   PROTIME 13.2* 12.7* 12.6*   INR 1.2 1.2 1.2       APTT:   Recent Labs     11/28/22  1000 11/29/22  0435 11/30/22  0417   APTT 29.5 24.6 26.3       Fasting Lipid Panel:    No results found for: CHOL, TRIG, HDL    Cardiac Enzymes:    Lab Results   Component Value Date    CKTOTAL 59 02/05/2013    CKMB 1.6 02/05/2013    TROPONINI <0.01 02/05/2013       Notable Cultures:      Blood cultures   Blood Culture, Routine   Date Value Ref Range Status   11/27/2022 24 Hours no growth  Preliminary     Respiratory cultures No results found for: RESPCULTURE No results found for: LABGRAM  Urine   Urine Culture, Routine   Date Value Ref Range Status   06/15/2020 >100,000 CFU/ml  Final     Legionella No results found for: LABLEGI  C Diff PCR No results found for: CDIFPCR  Wound culture/abscess: No results for input(s): WNDABS in the last 72 hours. Tip culture:No results for input(s): CXCATHTIP in the last 72 hours. Oxygen: Additional Respiratory Assessments  Heart Rate: 76  Resp: 20  SpO2: 98 %            Imaging Studies:  CT HEAD WO CONTRAST   Final Result   No significant change in large left subdural hemorrhage with stable 7 mm   midline shift to the right. CT HEAD WO CONTRAST   Final Result   1. Moderate to large left subdural hematoma with left-to-right midline   structure shift of approximately 7 mm, unchanged. 2.  Greatest maximal thickness of the subdural hematoma on coronal views is   24 mm, compared to 23 mm on the prior study. 3.  Gas fluid level in the left sphenoid sinus may reflect acute sinusitis.          CT HEAD WO CONTRAST   Final Result   Interval increased volume and extent of diffuse left cerebral acute subdural hemorrhage with greatest thickness adjacent to the frontotemporal region now   measuring up to 22 mm in greatest axial thickness. Interval worsening of rightward midline shift at the level of the septum   pellucidum now measuring 8 mm. Mild cerebral white matter chronic microvascular ischemic disease. Mild diffuse cerebral atrophy. Multiple attempts were made by the [facility] core team to page and notify Dr. Oralia Mackey. Critical results were then called by Dr. Mary Duvall to [facility] [place] on 11/26/2022 at 22:23. Claudene Law reports that neurosurgery has been   consulted about the case. XR CHEST PORTABLE   Final Result   Mild congestive changes. XR CHEST PORTABLE   Final Result   No evidence of pneumothorax or parenchymal contusion. AICD visualized in the   left chest with leads in the heart.               Resident's Assessment and Plan     Assessment and Plan:    Neurology  A bit drowsy but obeying commands  CT scan stable findings     Headache 2/2 acute subdural hematoma   Reports headache s/p fall   Initial CT in the ED was read negative for intracranial hemorrhage  He was on Eliquis for afib prior to fall   CT head on 11/26/2022 showed subdural hematoma of 7 mm, waxing and waning mentation  Neurosurgery is on board and does not recommend anything at this time and want to watch and wait   Discussed with Neurosurgery, Ok with Normal saline at 50cc/hr, maintain Na level>145  May consider Callie in 7-10 days post hematoma if hematoma liquifies, obtain CT head wo day 7 (12/3/22)  Maintain BP systolic <201 mmHg     Non-convulsive Seizure  Extended EEG with video 11/28/2022 shows:  Left frontal electrographic seizures with spread to involve the left frontocentrotemporal region  A potential for focl seizures from the left frontal region  Moderate to severe nonspecific cerebral dysfunction of the left frontocentrotemporal region  A mild to moderate nonspecific encephalopathy  Valproate 1000mg bolus followed by 500mg BID starting 11/30/22  On continuous EEG monitoring  Follow neurology recommendations  Continue Keppra 500 BID (renally dosed at present)  Give 200 Vimpat x 1, continue at 100 BID  Continue neuro checks q1h      Cardiology  Syncope episode 2/2 complete heart block s/p pacemaker placement  He was initially on isoproterenol   Heart rate currently stable   EP/Cardiology following  Continue to monitor      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 sinus 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)   S/P pacemaker dual chamber placement on 11/25/2022  TSH wnl, ruled out hypothyroidism  Lyme serology negative  EP/Cardiology following     Consider possible cardiac amyloidosis  Given patients age, complete heart block on EKG, CKD with proteinuria   Not practicable at this point given other acute comorbidities      Afib [id number] score 4   Currently rate controlled HR 81, not rhythm controlled  On Eliquis, held prior to pacemaker placement  EP following, metoprolol held   Eliquis held and Nitro given due to recent brain bleed      HTN   On amlodipine and lisinopril   Continue nifedipine as per cardio      Renal  CKD stage IV  Continue home BP s/p pacemaker placement      Endocrine  NIDDM  On MDSS, lispro 11units TID, glargine 28units daily    Hypoglycemia protocol on   Glucose ACHS       Problems resolved during this admission:   Third degree AV block s/p pacemaker    Antibiotics:none   Cultures:negative  Lines:peripherals  Intubated: No  Diet: oral diet    # Peptic ulcer prophylaxis: protonix  # DVT Prophylaxis: mechanical  # Disposition: Cont current care     Diane Hodges MD, PGY-1    Attending Physician: Dr. Raissa Whiteside, [address] [place] [address]  Department of Pulmonary, Critical Care and Sleep Medicine  [address] [facility]  Department of Internal Medicine      During multidisciplinary team rounds An Fine is a 80 y.o. male was seen, examined and discussed. This is confirmation that I have personally seen and examined the patient and that the key elements of the encounter were performed by me (> 85 % time). The medications & laboratory data was discussed and adjusted where necessary. The radiographic images were reviewed or with radiologist or consultant if felt dis-concordant with the exam or history. The above findings were corroborated, plans confirmed and changes made if needed. Family is updated at the bedside as available. Key issues of the case were discussed among consultants. Critical Care time is documented if appropriate. One episode of seizure activity noted last 24 hours on EEG. Anti-epileptics adjusted per neurology. Started on NS infusion. Continue 24 hour EEG monitoring.      Critical Care time: 35 minutes    Lainey Marshall DO

## 2022-11-30 NOTE — PROGRESS NOTES
[facility] Quality Flow/Interdisciplinary Rounds Progress Note        Quality Flow Rounds held on November 30, 2022    Disciplines Attending:  Bedside nurse, charge nurse, , PT/OT, pharmacy, nursing unit leadership, , Medical residents     Michael Fang was admitted on 11/24/2022  1:48 PM    Anticipated Discharge Date:  Expected Discharge Date: 12/06/22    Disposition:    Jim Score:  Jim Scale Score: 18    Readmission Risk              Risk of Unplanned Readmission:  26           Discussed patient goal for the day, patient clinical progression, and barriers to discharge.   The following Goal(s) of the Day/Commitment(s) have been identified:  Continuous EEG, continue ICU care        Andrea Tian RN  November 30, 2022

## 2022-11-30 NOTE — PROGRESS NOTES
[facility]  Neurology follow up     Date:  11/30/2022  Patient Name:  Robinson Burch  YOB: 1933  MRN: [id number]     Hillary Vogel is a 80 y.o. male admitted with falls on Eliquis found to have developed a large SDH. He now has developed aphasia secondary to left frontal onset seizures. Remains on continuous EEG. 3 seizures recorded since being up yesterday with the last being at 2350. ASM now Keppra 500 mg twice daily (renally dosed), Vimpat 200 mg twice daily,  mg twice daily. We will continue to monitor. Plan  Continuous EEG   Continue Keppra 500 BID (renally dosed at present)  Continue Vimpat at 200 BID  Continue  mg BID  Will follow         History of Present Illness:  Robinson Burch is a 80 y.o. right handed male presenting for evaluation of seizures. The patient was admitted on 11/24/22 for chest pain and SOB. He has a history of afib and was on Eliquis. He had been having worsening dizziness and had passed out and hit his head. Initial CT head done on 11/24/22 was read as no acute abnormality, however, on review it does appear to show a left SDH. 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 Patient had persistent headache and a repeat CT scan on 11/26 showed increased volume of SDH over left frontotemporal region up to 22m in greatest thickness. 8mm of midline shift also noted. Eliquis was reversed with Kcentra. The patient reportedly remained asymptomatic asymptomatic at this time other than the headache. He was noted to be more somnolent on 11/27/22 with more bereket concern for aphasia on 11/28/22. EEG was ordered which showed left frontal onset seizure activity. 11/29- Patient is sleeping and rouses to name being called and tactile stimuli. Withdraws to pain and follows 1 step commands intermittently.  No significant changes overnight and exam appears stable compared to yesterday. No family at bedside. 11/30-continuous EEG hooked up yesterday. 3 seizures have been recorded since being coughed up. Seizure at 1227 lasted approximately 3 minutes. During the seizure patient put left arm on his head, but has no clear clinical change other than some mild tachypnea. At that time Vimpat was bolused with an additional 100 mg and dose increased to 200 mg twice daily. Another seizure was recorded at 2056 lasting 2 minutes. Bolus of valproate 1000 mg ordered and 500 mg twice daily to be started this morning. Third seizure occurred at 2350 lasting nearly 3 minutes. This was prior to valproate bolus being given. There have been no further seizures recorded up through 0918 this morning. Patient appears to be more alert today. Spoke with RN at bedside and states that patient is more alert when being stimulated and able to string together more words than yesterday. Still mostly aphasic. When not being stimulated does drift back to sleep quickly. Neurosurgery continuing to follow, no new plans from their perspective per RN at this time. Review of Systems:  Unable to obtain due to:  aphasia    Allergies:      No Known Allergies     Physical Examination  Vitals   Vitals:    11/30/22 0600 11/30/22 0700 11/30/22 0800 11/30/22 1003   BP: (!) 158/86 (!) 172/73 (!) 157/91    Pulse: 77 75 92 70   Resp: 15 15 21 19   Temp: 97.7 °F (36.5 °C)  98.2 °F (36.8 °C)    TempSrc: Axillary  Temporal    SpO2: (!) 80% 96% 96% 96%   Weight: 194 lb 0.1 oz (88 kg)      Height:          General: Patient appears in no acute distress. HEENT: Normocephalic, atraumatic  Chest: effort normal   Heart: irregularly irregular  Extremities/Peripheral vascular: No edema/swelling noted. No cold limbs noted. Neurologic Examination    Mental Status  Alert,  Oriented to self. Able to string a couple words together spontaneously. Still pretty aphasic.  Unable to name objects or repeat. Replies \"yes\" to questions, but unable to answer them. Does not produce significant spontaneous speech. follows 1 step commands better today. Attention  fair. Cranial Nerves  II. Visual fields full to threat bilaterally. III, IV, VI: Pupils equally round and reactive to light, 3 to 2 mm bilaterally. EOMs: full, no nystagmus. V.   VII: Facial movements appear symmetric   VIII: Hearing intact to voice  IX,X: Palate elevates symmetrically. Mild dysarthria  XI: Shoulders appear symmetric  XII: Tongue is midline    Motor  Moves all four limbs symmetrically with  4+/5 strength  Normal tone and bulk   No abnormal movements     Sensation  Appreciates LT in all limbs     Reflexes    No babinski     Coordination  FFM symmetrical     Gait  Deferred for safety/fall consideration    Labs  Recent Labs     11/30/22  0417      K 4.2   CL 99   CO2 22   BUN 45*   CREATININE 2.0*   GLUCOSE 227*   CALCIUM 9.2   WBC 10.3   RBC 3.66*   HGB 10.2*   HCT 30.3*   MCV 82.8   MCH 27.9   MCHC 33.7   RDW 13.7      MPV 9.1         Imaging  CT HEAD WO CONTRAST   Final Result   No significant change in large left subdural hemorrhage with stable 7 mm   midline shift to the right. CT HEAD WO CONTRAST   Final Result   1. Moderate to large left subdural hematoma with left-to-right midline   structure shift of approximately 7 mm, unchanged. 2.  Greatest maximal thickness of the subdural hematoma on coronal views is   24 mm, compared to 23 mm on the prior study. 3.  Gas fluid level in the left sphenoid sinus may reflect acute sinusitis. CT HEAD WO CONTRAST   Final Result   Interval increased volume and extent of diffuse left cerebral acute subdural   hemorrhage with greatest thickness adjacent to the frontotemporal region now   measuring up to 22 mm in greatest axial thickness. Interval worsening of rightward midline shift at the level of the septum   pellucidum now measuring 8 mm. Mild cerebral white matter chronic microvascular ischemic disease. Mild diffuse cerebral atrophy. Multiple attempts were made by the [facility] core team to page and notify Dr. Jon Grayson. Critical results were then called by Dr. Hector Wheeler to [facility] on 11/26/2022 at 22:23. Chacorta Crystal reports that neurosurgery has been   consulted about the case. XR CHEST PORTABLE   Final Result   Mild congestive changes. XR CHEST PORTABLE   Final Result   No evidence of pneumothorax or parenchymal contusion. AICD visualized in the   left chest with leads in the heart. EEG 11/28  This abnormal study showed evidence of:     Left frontal electrographic seizures with spread to involve the left frontocentrotemporal region  A potential for focl seizures from the left frontal region  Moderate to severe nonspecific cerebral dysfunction of the left frontocentrotemporal region  A mild to moderate nonspecific encephalopathy     Structural abnormalities should be considered for the findings above and appropriate imaging obtained if clinically indicated. cEEG  EEG was reviewed from 1054 through 1317, 11/29/2022. One seizure noted at 1227 lasting approximately 3 minutes. During the seizure he puts his left arm on his head, but has no clear clinical change other than what appears to be some mild tachypnea. Will give additional 100 mg Vimpat IV and increase to 200 mg BID this evening. Ang De DO, 1:39 PM, 11/29/2022         EEG addendum  EEG reviewed through 1903, 11/29/2022      Background consistent with mild to moderate nonspecific encephalopathy. No seizures noted. Ang De DO, 8:55 PM, 11/29/2022       EEG addendum  EEG reviewed through 2252, 11/29/2022      One seizure noted at 2056 lasting nearly 2 minutes. IV bolus of valproate 1000 mg ordered to be followed by 500 BID starting morning of 11/30.        Interim EEG Progress Note     EEG was reviewed from 2252, 11/29/22 through 0918, 11/30/2022. One seizure noted at 2350 lasting for approximately 3 minutes. This occurred prior to valproate bolus being completed at 0200. No further seizures noted during this period.       Electronically signed by TOMAS Espinosa on 11/30/2022 at 10:25 AM

## 2022-11-30 NOTE — PLAN OF CARE
Problem: Chronic Conditions and Co-morbidities  Goal: Patient's chronic conditions and co-morbidity symptoms are monitored and maintained or improved  Outcome: Progressing     Problem: Discharge Planning  Goal: Discharge to home or other facility with appropriate resources  Outcome: Progressing     Problem: Safety - Adult  Goal: Free from fall injury  Outcome: Progressing  Flowsheets (Taken 11/30/2022 1149)  Free From Fall Injury: Instruct family/caregiver on patient safety     Problem: ABCDS Injury Assessment  Goal: Absence of physical injury  Outcome: Progressing     Problem: Neurosensory - Adult  Goal: Achieves stable or improved neurological status  Outcome: Progressing  Goal: Absence of seizures  Outcome: Progressing  Goal: Achieves maximal functionality and self care  Outcome: Progressing     Problem: Cardiovascular - Adult  Goal: Maintains optimal cardiac output and hemodynamic stability  Outcome: Progressing  Goal: Absence of cardiac dysrhythmias or at baseline  Outcome: Progressing     Problem: Skin/Tissue Integrity - Adult  Goal: Skin integrity remains intact  Outcome: Progressing  Flowsheets (Taken 11/30/2022 1140)  Skin Integrity Remains Intact: Monitor for areas of redness and/or skin breakdown  Goal: Oral mucous membranes remain intact  Outcome: Progressing     Problem: Gastrointestinal - Adult  Goal: Minimal or absence of nausea and vomiting  Outcome: Progressing  Goal: Maintains or returns to baseline bowel function  Outcome: Progressing  Goal: Maintains adequate nutritional intake  Outcome: Progressing     Problem: Skin/Tissue Integrity  Goal: Absence of new skin breakdown  Description: 1. Monitor for areas of redness and/or skin breakdown  2. Assess vascular access sites hourly  3. Every 4-6 hours minimum:  Change oxygen saturation probe site  4.   Every 4-6 hours:  If on nasal continuous positive airway pressure, respiratory therapy assess nares and determine need for appliance change or resting period.   Outcome: Progressing     Problem: Nutrition Deficit:  Goal: Optimize nutritional status  Outcome: Progressing

## 2022-11-30 NOTE — PROGRESS NOTES
Interim EEG Progress Note    EEG was reviewed from 2252, 11/29/22 through 0918, 11/30/2022. One seizure noted at 2350 lasting for approximately 3 minutes. This occurred prior to valproate bolus being completed at 0200. No further seizures noted during this period. Winston Linares DO, 9:21 AM, 11/30/2022     EEG addendum  EEG reviewed through 1515, 11/30/2022     Background consistent with a moderate encephalopathy. No seizures noted during this period. Winston Linares DO, 4:54 PM, 11/30/2022        EEG addendum  EEG reviewed through 1230, 12/1/2022. No significant change from previous period. No seizures noted.       Winston Linares DO, 12:47 AM, 12/1/2022

## 2022-11-30 NOTE — CONSULTS
[address] [facility]  Department of Internal Medicine   Internal Medicine Residency   MICU Progress Note    Patient:  Sania Watts 80 y.o. male  MRN: [id number]     Date of Service: 11/30/2022    Allergy: Patient has no known allergies. Subjective     Pt is sleeping, he did not wake up and interact as much. 24h change: no acute changes overnight. -276. -172. ROS: Unable to perform as pt is sleeping. Objective     VS: BP (!) 151/64   Pulse 75   Temp 98.8 °F (37.1 °C) (Temporal)   Resp 21   Ht 5' 9\" (1.753 m)   Wt 194 lb 0.1 oz (88 kg)   SpO2 95%   BMI 28.65 kg/m²           I & O - 24hr:   Intake/Output Summary (Last 24 hours) at 11/30/2022 1500  Last data filed at 11/30/2022 1300  Gross per 24 hour   Intake 834.69 ml   Output 1175 ml   Net -340.31 ml         Physical Exam: limited due to pt sleeping  General Appearance: appears stated age and no distress. Pt is sleeping. Lung: clear to auscultation bilaterally anterior lung fields. Pacemaker surgical wound in the left upper chest, no wound dehiscence, erythema or edema, no purulent drainage, some ecchymosis in surrounding the site. Heart: irregularly irregular rhythm and no S3 or S4. No pitting edema of lower or upper extremities. Abdomen: Soft and non distended. Seborrhoic keratosis diffusely across the trunk anteriorly and posteriorly. Echymosis next to his sternal notch. Extremities:  extremities normal, atraumatic, no cyanosis or edema. Erythema of the left forearm and axilla improving. Musculoskeletal: No joint swelling. Neurologic: Could not assess as pt is sleeping. Lines     site day    Art line   None    TLC None    PICC None    Hemoaccess None    Right (x 5 days) and left (x 6 days) forearm peripheral lines in place. Oxygen: On room air since yesterday afternoon.      ABG:   No results found for: PHART, PH, [id number], PCO2, PO2ART, PO2, [id number], HCO3, BEART, BE, THGBART, THB, RAP4HXH, G1VNJUBK, O2SAT     Medications     Infusions: (Fluid, Sedation, Vasopressors)  IVF:   NS.     Vasopressors  None. Sedation  None. Nutrition:   PO on easy to chew diet (low chol, low fat and high fiber). Patient currently has   GI prophylaxis: Protonix 40 mg qd.       Labs   CBC with Differential:    Lab Results   Component Value Date/Time    WBC 10.3 11/30/2022 04:17 AM    RBC 3.66 11/30/2022 04:17 AM    HGB 10.2 11/30/2022 04:17 AM    HCT 30.3 11/30/2022 04:17 AM     11/30/2022 04:17 AM    MCV 82.8 11/30/2022 04:17 AM    MCH 27.9 11/30/2022 04:17 AM    MCHC 33.7 11/30/2022 04:17 AM    RDW 13.7 11/30/2022 04:17 AM    LYMPHOPCT 13.9 11/30/2022 04:17 AM    MONOPCT 11.5 11/30/2022 04:17 AM    BASOPCT 0.4 11/30/2022 04:17 AM    MONOSABS 1.19 11/30/2022 04:17 AM    LYMPHSABS 1.43 11/30/2022 04:17 AM    EOSABS 0.02 11/30/2022 04:17 AM    BASOSABS 0.04 11/30/2022 04:17 AM     CMP:    Lab Results   Component Value Date/Time     11/30/2022 04:17 AM    K 4.2 11/30/2022 04:17 AM    K 4.5 11/24/2022 09:08 AM    CL 99 11/30/2022 04:17 AM    CO2 22 11/30/2022 04:17 AM    BUN 45 11/30/2022 04:17 AM    CREATININE 2.0 11/30/2022 04:17 AM    GFRAA 30 09/16/2022 08:24 AM    LABGLOM 31 11/30/2022 04:17 AM    GLUCOSE 227 11/30/2022 04:17 AM    PROT 6.9 11/24/2022 09:08 AM    LABALBU 4.2 11/24/2022 09:08 AM    CALCIUM 9.2 11/30/2022 04:17 AM    BILITOT 0.4 11/24/2022 09:08 AM    ALKPHOS 130 11/24/2022 09:08 AM    AST 30 11/24/2022 09:08 AM    ALT 39 11/24/2022 09:08 AM     Hepatic Function Panel:    Lab Results   Component Value Date/Time    ALKPHOS 130 11/24/2022 09:08 AM    ALT 39 11/24/2022 09:08 AM    AST 30 11/24/2022 09:08 AM    PROT 6.9 11/24/2022 09:08 AM    BILITOT 0.4 11/24/2022 09:08 AM    LABALBU 4.2 11/24/2022 09:08 AM     PT/INR:    Lab Results   Component Value Date/Time    PROTIME 12.6 11/30/2022 04:17 AM    INR 1.2 11/30/2022 04:17 AM     PTT:    Lab Results   Component Value Date/Time    APTT 26.3 11/30/2022 04:17 AM   [APTT}  U/A:    Lab Results   Component Value Date/Time    COLORU Yellow 11/27/2022 12:30 PM    PROTEINU >=300 11/27/2022 12:30 PM    PHUR 6.0 11/27/2022 12:30 PM    WBCUA NONE 11/27/2022 12:30 PM    RBCUA 2-5 11/27/2022 12:30 PM    BACTERIA NONE SEEN 11/27/2022 12:30 PM    CLARITYU Clear 11/27/2022 12:30 PM    SPECGRAV 1.020 11/27/2022 12:30 PM    LEUKOCYTESUR Negative 11/27/2022 12:30 PM    UROBILINOGEN 0.2 11/27/2022 12:30 PM    BILIRUBINUR Negative 11/27/2022 12:30 PM    BLOODU SMALL 11/27/2022 12:30 PM    GLUCOSEU 500 11/27/2022 12:30 PM       Imaging Studies:  CT scan:    Impression:  11/27/22     No significant change in large left subdural hemorrhage with stable 7 mm   midline shift to the right. Resident's Assessment and Plan     Neuro   Subdural hematoma 2/2 to head injury - stable   Monitor blood pressure with a goal of SBP  150- 170  Continue labetalol and hydralazine prn   Continue holding eliquis   Dr. Rapp Board advised to medically manage the pt and wait for the hematoma to liquify and then decided whether to do a Callie hole. A craniotomy is very high risk for the pt. Seizures 2/2 to irritation and mass effect from the hematoma  Pt had two seizures yesterday (9 and 11 pm) that lasted 2 minutes. He was started on valproic acid 1000 mg bolus followed by 500 mg BID. Continue Vimpat 200 mg BID. Start Keppra 500 mg BID. Continue to monitor pt's neurological status   On EEG monitoring until no seizure activity is noted. Appreciate neurology and cardiology's recommendations. Cardio  1. Syncope 2/2 third degree heart block likely ischemic vs sick sinus syndrome vs senile amyloidosis   Pacemaker placed on 19/04/80 with no complications. 11/24/22- Lyme panel is negative. HTN   Continue Lisinopril 20 mg, HCTZ 12.5 mg and Nifedipine 30 mg. Afib   Continue holding eliquis   CHADS2 VASC score  = 4     Pulmonary    SOB   On 3.5 L NC early in the morning.  On room air in the afternoon. Continue Duoneb, guaifensin and brovana. Renal   CKD stage IV (baseline Cr 2.1-2.2)  Creatinine stable today at from 2.0. No electrolyte disturbances. Continue to monitor kidney function, BMP qd. Endocrine   Type II DM   Last HgB A1c 8.5%   Continue Insulin lispro sliding scale. BG elevated at night to 316 (8 units + 3 mdss was given). TSH wnl 3.970. Peripheral neuropathy   Continue gabapentin 100 mg BID. MSK   1. Rib pain   A. Lidocaine patch and acetaminophen 650 mg q6 prn.    Urinary frequency    Continue Tamsulosin 0.4 mg qhs. DVT ppx: PCDs. GI ppx: Protonix 40 mg. Code status: limited.      Disposition: continue care     Tracee Meyer, MS-3    Attending physician: Dr. Jad Mejia  Department of Pulmonary, Critical Care and Sleep Medicine  [address] [facility]  Department of Internal Medicine

## 2022-11-30 NOTE — PROGRESS NOTES
OT consult received and appreciated. Chart reviewed. Will hold evaluation per nursing d/t pt having continuous EEG, recording active seizures. Will re-attempt as appropriate. Thank you.  Lynette Angel, OTR/L # [id number]

## 2022-11-30 NOTE — PROGRESS NOTES
Hospitalist Progress Note      SYNOPSIS: Patient admitted on 11/24/2022 for A-fib [facility])      SUBJECTIVE:    Patient seen and examined. Still confused. Records reviewed. 80 y.o. male with past medical history of atrial fibrillation, DM hypertension . Patient is a transfer from [place] . He was seen there due to chest pain . He states that chest pain is located in the sternal area . He states  that he has shortness of breath with chest pain . He states that when chest pain occurred he felt like he was going to pass out . Patient had a syncopal episode and hit his head . Patient had a second syncopal episode  and after that family called ambulance . En route to ED patient was given 324 mg of ASA . In the ED patient was in Afib with slow ventricular rate having long pauses and bradycardic to the 30s . Lab data revealed sodium 135 potassium 4.1 BUN 29 Scr 2.0  glucose 244 Trop 45 >>43  WBC 9.9 HGB 10.7   Resp panel neg CT head cervical spine neg CXR congestive changes . Patient received fentanyl morphine and was placed on isoproterenol infusion. EP consulted-pacemaker placement. Lyme serology was ordered for possible carditis. Patient had an initial CT of the head after the fall which was negative for any intracranial hemorrhage. Repeat CT head was obtained on 11/26/2022 as patient continued to complain of headache and spite of pain medicine. Repeat CT of the head noncontrast was obtained which showed progressive subdural hemorrhage. Eliquis was held, [facility] ordered for Eliquis reversal.  Neurosurgery following. Worsening mental status with aphasia. Found to have two seizures. Started on Keppra and Vimpat. Neurology following. He has been on continuous video EEG. Seizure medications has been adjusted. Last seizure episode 2350 lasted approximately 3 minutes prior to giving valproate and bolus which was completed at 02 100. No further seizures after that. Appreciate neurology's inputs. Continue Vimpat, Keppra and Depacon. Stable overnight. No other overnight issues reported. Temp (24hrs), Av.7 °F (37.1 °C), Min:97.7 °F (36.5 °C), Max:99.6 °F (37.6 °C)    DIET: ADULT ORAL NUTRITION SUPPLEMENT; Lunch, Dinner; Low Calorie/High Protein Oral Supplement  ADULT DIET; Easy to Chew; 5 carb choices (75 gm/meal); Low Fat/Low Chol/High Fiber/2 gm Na  CODE: Limited    Intake/Output Summary (Last 24 hours) at 2022 1330  Last data filed at 2022 1147  Gross per 24 hour   Intake 834.69 ml   Output 825 ml   Net 9.69 ml       OBJECTIVE:    BP (!) 163/63   Pulse 76   Temp 98.2 °F (36.8 °C) (Temporal)   Resp 20   Ht 5' 9\" (1.753 m)   Wt 194 lb 0.1 oz (88 kg)   SpO2 98%   BMI 28.65 kg/m²     General appearance: No apparent distress, appears stated age   HEENT:  Conjunctivae/corneas clear. Neck: Supple. No jugular venous distention. Respiratory: Clear to auscultation bilaterally, normal respiratory effort  Cardiovascular: Regular rate rhythm, normal S1-S2  Abdomen: Soft, nontender, nondistended  Musculoskeletal: No clubbing, cyanosis, no bilateral lower extremity edema. Brisk capillary refill. Skin:  No rashes  on visible skin  Neurologic: awake, alert but not following commands     ASSESSMENT:  -Third-degree heart block status post pacemaker placement 2022   -Syncope and collapse secondary to third-degree heart block also sick sinus syndrome  -Subdural hematoma  -Atrial fibrillation Eliquis, Eliquis held due to subdural hematoma   -Seizure disorder   -Hypertension  -CKD stage III  -Diabetes mellitus type 2       PLAN:  1. Seizure medications with Vimpat Keppra and Depacon per neurology. Echocardiogram 2022 with mild to moderate aortic stenosis and mild to moderate aortic regurgitation. EF of 60-65%. Continue to hold Eliquis.       DISPOSITION:     Medications:  REVIEWED DAILY    Infusion Medications    sodium chloride      dextrose       Scheduled Medications    insulin glargine  28 Units SubCUTAneous Daily    insulin lispro  11 Units SubCUTAneous TID WC    lacosamide (VIMPAT) IVPB  200 mg IntraVENous BID    insulin lispro  0-8 Units SubCUTAneous TID WC    insulin lispro  0-4 Units SubCUTAneous Nightly    valproate sodium (DEPACON) IVPB  500 mg IntraVENous 2 times per day    Or    divalproex  500 mg Oral 2 times per day    polyethylene glycol  17 g Oral Daily    levETIRAcetam  500 mg IntraVENous Q12H    guaiFENesin  400 mg Oral TID    sodium chloride flush  5-40 mL IntraVENous 2 times per day    NIFEdipine  30 mg Oral Daily    pantoprazole (PROTONIX) 40 mg injection  40 mg IntraVENous Daily    lidocaine  1 patch TransDERmal Daily    allopurinol  300 mg Oral Once per day on Mon Wed Fri    gabapentin  100 mg Oral BID    tamsulosin  0.4 mg Oral Nightly    Arformoterol Tartrate  15 mcg Nebulization BID    lisinopril  20 mg Oral Daily    And    hydroCHLOROthiazide  12.5 mg Oral Daily     PRN Meds: ipratropium-albuterol, perflutren lipid microspheres, hydrALAZINE, trimethobenzamide, sodium chloride flush, sodium chloride, labetalol, glucose, dextrose bolus **OR** dextrose bolus, glucagon (rDNA), dextrose, potassium chloride **OR** potassium alternative oral replacement **OR** potassium chloride, acetaminophen **OR** acetaminophen, aluminum & magnesium hydroxide-simethicone    Labs:     Recent Labs     11/28/22  0404 11/29/22 0435 11/30/22 0417   WBC 11.2 9.4 10.3   HGB 10.3* 9.8* 10.2*   HCT 31.4* 31.0* 30.3*    239 264       Recent Labs     11/28/22  0404 11/29/22 0435 11/30/22 0417    134 132   K 4.2 4.0 4.2    99 99   CO2 22 20* 22   BUN 34* 33* 45*   CREATININE 2.1* 1.8* 2.0*   CALCIUM 9.5 8.9 9.2   PHOS 3.6 3.1 2.8       No results for input(s): PROT, ALB, ALKPHOS, ALT, AST, BILITOT, AMYLASE, LIPASE in the last 72 hours.     Recent Labs     11/28/22  1000 11/29/22 0435 11/30/22 0417   INR 1.2 1.2 1.2       No results for input(s): Deep Long in the last 72 hours. Chronic labs:    Lab Results   Component Value Date    TSH 3.970 11/24/2022    PSA 3.76 09/16/2022    INR 1.2 11/30/2022    LABA1C 8.5 (H) 08/02/2022       Radiology: REVIEWED DAILY    +++++++++++++++++++++++++++++++++++++++++++++++++  Cierra Mireles MD  [facility] [place] Physician - [address] [place] [address]  +++++++++++++++++++++++++++++++++++++++++++++++++  NOTE: This report was transcribed using voice recognition software. Every effort was made to ensure accuracy; however, inadvertent computerized transcription errors may be present.

## 2022-12-01 NOTE — PROGRESS NOTES
[facility]  Neurology follow up     Date:  12/1/2022  Patient Name:  Wang Davey  YOB: 1933  MRN: [id number]     Anderson Mendoza is a 80 y.o. male admitted with falls on Eliquis found to have developed a large SDH. He now has developed aphasia secondary to left frontal onset seizures. No seizures since 11/29 at 2350. Maintained on ASM as listed below. So far, he has displayed minimal improvement. Plan  Continue Keppra 500 BID (renally dosed at present)  Continue Vimpat at 200 BID  Continue  mg BID  Will follow     History of Present Illness:  Wang Davey is a 80 y.o. right handed male presenting for evaluation of seizures. The patient was admitted on 11/24/22 for chest pain and SOB. He has a history of afib and was on Eliquis. He had been having worsening dizziness and had passed out and hit his head. Initial CT head done on 11/24/22 was read as no acute abnormality, however, on review it does appear to show a left SDH. 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 Patient had persistent headache and a repeat CT scan on 11/26 showed increased volume of SDH over left frontotemporal region up to 22m in greatest thickness. 8mm of midline shift also noted. Eliquis was reversed with Kcentra. The patient reportedly remained asymptomatic asymptomatic at this time other than the headache. He was noted to be more somnolent on 11/27/22 with more bereket concern for aphasia on 11/28/22. EEG was ordered which showed left frontal onset seizure activity. 11/29- Patient is sleeping and rouses to name being called and tactile stimuli. Withdraws to pain and follows 1 step commands intermittently. No significant changes overnight and exam appears stable compared to yesterday. No family at bedside. 11/30-continuous EEG hooked up yesterday.   3 seizures have been No recorded since being coughed up. Seizure at 1227 lasted approximately 3 minutes. During the seizure patient put left arm on his head, but has no clear clinical change other than some mild tachypnea. At that time Vimpat was bolused with an additional 100 mg and dose increased to 200 mg twice daily. Another seizure was recorded at 2056 lasting 2 minutes. Bolus of valproate 1000 mg ordered and 500 mg twice daily to be started this morning. Third seizure occurred at 2350 lasting nearly 3 minutes. This was prior to valproate bolus being given. There have been no further seizures recorded up through 0918 this morning. 12/1There have been no further seizures since 2350 on 11/29. Tolerating current antiseizure medication regimen well at this time. Continuous EEG discontinued this morning. Neurosurgery continuing to follow, maintaining sodium level greater than 45. Considering bur hole in 7 to 10 days post hematoma if hematoma liquefies. Planning to repeat CT head on 12/3    Patient is sleeping this morning. He does to verbal and tactile stimulation but quickly drifts back off to sleep. Follows one-step simple commands and is able to tell me his name but otherwise not produce any significant speech. No family at bedside    Review of Systems:  Unable to obtain due to:  aphasia    Allergies:      No Known Allergies     Physical Examination  Vitals   Vitals:    12/01/22 0933 12/01/22 1000 12/01/22 1013 12/01/22 1014   BP:  (!) 185/84 (!) 165/82 (!) 165/82   Pulse: 71 71 70 70   Resp: 22 22 20 19   Temp:       TempSrc:       SpO2:       Weight:       Height:          General: Patient appears in no acute distress. HEENT: Normocephalic, atraumatic  Chest: effort normal   Heart: irregularly irregular  Extremities/Peripheral vascular: No edema/swelling noted. No cold limbs noted. Neurologic Examination    Mental Status  Sleeping, but rouses to voice and tactile stimuliation.  Requires repeated stimulation to attend. Oriented to self. Does not produce significant spontaneous speech. follows 1 step commands better today. Attention  fair. Cranial Nerves  II. Visual fields full to threat bilaterally. III, IV, VI: Pupils equally round and reactive to light, 3 to 2 mm bilaterally. EOMs: full, no nystagmus. V.   VII: Facial movements appear symmetric   VIII: Hearing intact to voice  IX,X: Palate elevates symmetrically. Mild dysarthria  XI: Shoulders appear symmetric  XII: Tongue is midline    Motor  Moves all four limbs symmetrically with  4+/5 strength  Normal tone and bulk   No abnormal movements     Sensation  Appreciates LT in all limbs     Reflexes    No babinski     Coordination  No resting tremors observed     Gait  Deferred for safety/fall consideration    Labs  Recent Labs     12/01/22  0450      K 4.2   CL 97*   CO2 23   BUN 43*   CREATININE 1.9*   GLUCOSE 149*   CALCIUM 9.1   WBC 9.8   RBC 3.91   HGB 10.9*   HCT 32.9*   MCV 84.1   MCH 27.9   MCHC 33.1   RDW 13.7      MPV 8.9         Imaging  CT HEAD WO CONTRAST   Final Result   No significant change in large left subdural hemorrhage with stable 7 mm   midline shift to the right. CT HEAD WO CONTRAST   Final Result   1. Moderate to large left subdural hematoma with left-to-right midline   structure shift of approximately 7 mm, unchanged. 2.  Greatest maximal thickness of the subdural hematoma on coronal views is   24 mm, compared to 23 mm on the prior study. 3.  Gas fluid level in the left sphenoid sinus may reflect acute sinusitis. CT HEAD WO CONTRAST   Final Result   Interval increased volume and extent of diffuse left cerebral acute subdural   hemorrhage with greatest thickness adjacent to the frontotemporal region now   measuring up to 22 mm in greatest axial thickness. Interval worsening of rightward midline shift at the level of the septum   pellucidum now measuring 8 mm.       Mild cerebral white matter chronic microvascular ischemic disease. Mild diffuse cerebral atrophy. Multiple attempts were made by the [facility] core team to page and notify Dr. Jesica De La Rosa. Critical results were then called by Dr. Familia Suggs to [facility] on 11/26/2022 at 22:23. Amarjit Yun reports that neurosurgery has been   consulted about the case. XR CHEST PORTABLE   Final Result   Mild congestive changes. XR CHEST PORTABLE   Final Result   No evidence of pneumothorax or parenchymal contusion. AICD visualized in the   left chest with leads in the heart. EEG 11/28  This abnormal study showed evidence of:     Left frontal electrographic seizures with spread to involve the left frontocentrotemporal region  A potential for focl seizures from the left frontal region  Moderate to severe nonspecific cerebral dysfunction of the left frontocentrotemporal region  A mild to moderate nonspecific encephalopathy     Structural abnormalities should be considered for the findings above and appropriate imaging obtained if clinically indicated. cEEG  EEG was reviewed from 1054 through 1317, 11/29/2022. One seizure noted at 1227 lasting approximately 3 minutes. During the seizure he puts his left arm on his head, but has no clear clinical change other than what appears to be some mild tachypnea. Will give additional 100 mg Vimpat IV and increase to 200 mg BID this evening. Anai Medellin DO, 1:39 PM, 11/29/2022         EEG addendum  EEG reviewed through 1903, 11/29/2022      Background consistent with mild to moderate nonspecific encephalopathy. No seizures noted. Anai Medellin DO, 8:55 PM, 11/29/2022       EEG addendum  EEG reviewed through 2252, 11/29/2022      One seizure noted at 2056 lasting nearly 2 minutes. IV bolus of valproate 1000 mg ordered to be followed by 500 BID starting morning of 11/30.        Interim EEG Progress Note     EEG was reviewed from 2252, 11/29/22 through 8066, 11/30/2022. One seizure noted at 2350 lasting for approximately 3 minutes. This occurred prior to valproate bolus being completed at 0200. No further seizures noted during this period. Interim EEG Progress Note     EEG was reviewed from [facility] through 0614, 12/1/2022     Background consistent with a mild to moderate encephalopathy. No seizures noted during this period.          Kenny Melchor DO, 7:29 AM, 12/1/2022      Electronically signed by TOMAS Ernandez on 12/1/2022 at 10:21 AM

## 2022-12-01 NOTE — FLOWSHEET NOTE
Inpatient Wound Care(follow up) 4422    Admit Date: 11/24/2022  1:48 PM    Reason for consult:  posterior head    Findings:     12/01/22 1455   Wound 11/24/22 Head Proximal;Posterior   Date First Assessed: 11/24/22   Present on Hospital Admission: Yes  Primary Wound Type: Traumatic  Location: Head  Wound Location Orientation: Proximal;Posterior   Wound Image    Dressing Status New dressing applied   Dressing/Treatment Non adherent   Wound Length (cm) 1 cm   Wound Width (cm) 4 cm   Wound Depth (cm) 0.1 cm   Wound Surface Area (cm^2) 4 cm^2   Wound Volume (cm^3) 0.4 cm^3   Wound Assessment Pink/red   Drainage Amount Scant   Drainage Description Serosanguinous   Odor None   Nydia-wound Assessment Intact     **Informed Consent**    photos taken of wound and inserted into their chart as part of their permanent medical record for purposes of documentation, treatment management and/or medical review. All Images taken on 12/1/22 of patient name: Wang Davey were transmitted and stored on secured UTILICASE located within [facility] by a registered [employer] Mobile Application Device.       Impression:  partial thickness wound    Plan:  Versatel one dressing      Oumou Blake RN 12/1/2022 2:58 PM

## 2022-12-01 NOTE — PROGRESS NOTES
Neurosurg progress note  VITALS:  BP (!) 187/74   Pulse 70   Temp 97.8 °F (36.6 °C)   Resp 19   Ht 5' 9\" (1.753 m)   Wt 193 lb (87.5 kg)   SpO2 98%   BMI 28.50 kg/m²   24HR INTAKE/OUTPUT:    Intake/Output Summary (Last 24 hours) at 12/1/2022 1610  Last data filed at 12/1/2022 1052  Gross per 24 hour   Intake 1173.62 ml   Output 675 ml   Net 498.62 ml     CT Head W/O Contrast    Result Date: 11/24/2022  EXAMINATION: CT OF THE HEAD WITHOUT CONTRAST  11/24/2022 9:38 am TECHNIQUE: CT of the head was performed without the administration of intravenous contrast. Automated exposure control, iterative reconstruction, and/or weight based adjustment of the mA/kV was utilized to reduce the radiation dose to as low as reasonably achievable. COMPARISON: None. HISTORY: ORDERING SYSTEM PROVIDED HISTORY: LOC + fall + strike to head; concern for bleed TECHNOLOGIST PROVIDED HISTORY: Reason for exam:->LOC + fall + strike to head; concern for bleed Has a \"code stroke\" or \"stroke alert\" been called? ->No Decision Support Exception - unselect if not a suspected or confirmed emergency medical condition->Emergency Medical Condition (MA) FINDINGS: BRAIN/VENTRICLES: No evidence of parenchymal hemorrhages or contusions. No evidence of intra or extra-axial fluid collection is seen. Scattered areas of low attenuation are visualized in the periventricular and subcortical white matter suggestive of chronic microvascular disease. No evidence of acute territorial infarct is seen. Prominence of the ventricles and sulci is visualized suggestive of chronic atrophic brain changes. No evidence of intracranial mass or mass effect, no evidence of midline shift is seen. No evidence of sellar or parasellar mass is visualized. ORBITS: The visualized portion of the orbits demonstrate no acute abnormality. SINUSES: The visualized paranasal sinuses and mastoid air cells demonstrate no acute abnormality.  SOFT TISSUES/SKULL:  No acute abnormality of the visualized skull or soft tissues. No acute intracranial abnormality. CT CSpine W/O Contrast    Result Date: 11/24/2022  EXAMINATION: CT OF THE CERVICAL SPINE WITHOUT CONTRAST 11/24/2022 9:38 am TECHNIQUE: CT of the cervical spine was performed without the administration of intravenous contrast. Multiplanar reformatted images are provided for review. Automated exposure control, iterative reconstruction, and/or weight based adjustment of the mA/kV was utilized to reduce the radiation dose to as low as reasonably achievable. COMPARISON: None. HISTORY: ORDERING SYSTEM PROVIDED HISTORY: LOC + Fall + head strike; Concern for fx TECHNOLOGIST PROVIDED HISTORY: Reason for exam:->LOC + Fall + head strike; Concern for fx Decision Support Exception - unselect if not a suspected or confirmed emergency medical condition->Emergency Medical Condition (MA) FINDINGS: Straightening of the lumbar the columns of the cervical spine is present. No evidence of spondylolisthesis is seen. Multilevel degenerative endplate changes visualized, no evidence of compression deformity of the cervical vertebral bodies. No evidence of fracture or traumatic subluxation is seen. Decreased intervertebral disc height visualized most prominent at C5-C6 and C3-C4. Multilevel degenerative disc osteophyte complex, uncovertebral disease and hypertrophic changes in the facet joints is seen. Calcification visualized within the spinal canal superimposed over the falciform ligament at the level of the C5 vertebral body. Otherwise abnormal density visualized in the cervical spinal canal. The neck soft tissues are unremarkable. Circumferential opacification visualized in the sphenoid sinus. Limited evaluation of the upper lung fields is unremarkable bilaterally. No acute abnormality of the cervical spine. Multilevel degenerative changes of the cervical spine visualized most prominent at C3-C4 and C5-C6.  Circumferential opacification visualized in the sphenoid sinus. XR CHEST PORTABLE    Result Date: 11/25/2022  EXAMINATION: ONE XRAY VIEW OF THE CHEST 11/25/2022 4:57 pm COMPARISON: 11/24/2022. HISTORY: ORDERING SYSTEM PROVIDED HISTORY: Pacemaker placement and rule out pneumothorax TECHNOLOGIST PROVIDED HISTORY: Reason for exam:->Pacemaker placement and rule out pneumothorax What reading provider will be dictating this exam?->CRC FINDINGS: AICD visualized in the left chest with lead in the heart. EKG leads are seen superimposed over the chest. Poor inspiratory effort is seen. The CT of the prominence of the cardiomediastinal silhouette is visualized demonstrates no significant increase in comparison to the prior study. Atherosclerotic calcifications visualized in the area the aortic arch. Mild prominence of the bronchovascular and interstitial lung markings is visualized in bilateral lung fields more prominent in the lateral right mid lung field large and in lung field linear subsegmental atelectatic streaks of visualized bilateral lung fields. No evidence of focal infiltrate or consolidation. No evidence of pneumothorax or pleural effusion. Degenerative bone changes seen. No evidence of pneumothorax or parenchymal contusion. AICD visualized in the left chest with leads in the heart. XR CHEST PORTABLE    Result Date: 11/24/2022  EXAMINATION: ONE XRAY VIEW OF THE CHEST 11/24/2022 9:30 am COMPARISON: 08/08/2022 HISTORY: ORDERING SYSTEM PROVIDED HISTORY: CP + SOB TECHNOLOGIST PROVIDED HISTORY: Reason for exam:->CP + SOB FINDINGS: Poor inspiratory effort. The cardiomediastinal silhouette is slightly prominent but demonstrates no significant change in comparison to the prior study. Prominence of the bronchovascular interstitial lung markings is visualized bilaterally with scattered areas of patchy airspace opacification seen. Linea subsegmental atelectatic streaks are visualized.   The costophrenic angles are clear with no evidence of pleural effusion seen.  No evidence of pneumothorax or parenchymal lung mass. The osseous structures are without acute process. Congestive changes demonstrate no change.      CBC:   Lab Results   Component Value Date/Time    WBC 9.8 12/01/2022 04:50 AM    RBC 3.91 12/01/2022 04:50 AM    HGB 10.9 12/01/2022 04:50 AM    HCT 32.9 12/01/2022 04:50 AM    MCV 84.1 12/01/2022 04:50 AM    MCH 27.9 12/01/2022 04:50 AM    MCHC 33.1 12/01/2022 04:50 AM    RDW 13.7 12/01/2022 04:50 AM     12/01/2022 04:50 AM    MPV 8.9 12/01/2022 04:50 AM     BMP:    Lab Results   Component Value Date/Time     12/01/2022 04:50 AM    K 4.2 12/01/2022 04:50 AM    K 4.5 11/24/2022 09:08 AM    CL 97 12/01/2022 04:50 AM    CO2 23 12/01/2022 04:50 AM    BUN 43 12/01/2022 04:50 AM    LABALBU 4.2 11/24/2022 09:08 AM    CREATININE 1.9 12/01/2022 04:50 AM    CALCIUM 9.1 12/01/2022 04:50 AM    GFRAA 30 09/16/2022 08:24 AM    LABGLOM 33 12/01/2022 04:50 AM    GLUCOSE 149 12/01/2022 04:50 AM      [START ON 12/2/2022] insulin glargine  35 Units SubCUTAneous Daily    insulin lispro  15 Units SubCUTAneous TID WC    [START ON 12/2/2022] NIFEdipine  60 mg Oral Daily    [START ON 12/2/2022] pantoprazole  40 mg Oral QAM AC    lacosamide (VIMPAT) IVPB  200 mg IntraVENous BID    insulin lispro  0-8 Units SubCUTAneous TID WC    insulin lispro  0-4 Units SubCUTAneous Nightly    valproate sodium (DEPACON) IVPB  500 mg IntraVENous 2 times per day    Or    divalproex  500 mg Oral 2 times per day    polyethylene glycol  17 g Oral Daily    levETIRAcetam  500 mg IntraVENous Q12H    guaiFENesin  400 mg Oral TID    sodium chloride flush  5-40 mL IntraVENous 2 times per day    lidocaine  1 patch TransDERmal Daily    allopurinol  300 mg Oral Once per day on Mon Wed Fri    gabapentin  100 mg Oral BID    tamsulosin  0.4 mg Oral Nightly    Arformoterol Tartrate  15 mcg Nebulization BID    lisinopril  20 mg Oral Daily    And    hydroCHLOROthiazide  12.5 mg Oral Daily     Opens eyes to voice, partial aphasia (follows commands intermittently)perrl eomi    Assessment:  Patient Active Problem List   Diagnosis    PUD (peptic ulcer disease)    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

## 2022-12-01 NOTE — CARE COORDINATION
12/1  Update CM Note;  Pt was found to be in complete heart block & was transferred to [facility]. Pt is on room air at 96%, Iv Fluids, Iv Depacon, Iv Vimpat, Iv Keppra &  Protonix. Cm spoke with Son Dr. Jaymie Barnes at [phone] over the phone to discuss CM role & dc planning. Pt had a pacemaker placed on 11/25. Ct head on 11/26 showed progressive subdural hemorrhage. Pt was given [address] Neurology are following. Pt is a limited code. Pt's PCP is Dr. Hernán Mercedes uses the [facility] on [address] [facility]. Pt lives alone in a ranch house with 3 steps to enter. PTA pt was independent. Pt doesn't own any DME & has no hx of HHC/SNF. The dc plan would be home or [facility], PMR & then SNF need sister's address [facility] [address] [id number]. PT 14/24 OT 12/24. Pending Speech eval.  CM left list of HHC/SNF near daughter's address. The closest facilities are [address] [facility], [facility] [address], [facility], [address] [place] [address]. Will need 02 testing. Sw/CM will continue to follow for dc planning.   Electronically signed by Es Clement RN on 12/1/2022 at 10:21 AM

## 2022-12-01 NOTE — PROGRESS NOTES
Patient stating urine pressure did not void on C bladder scanner showed 900cc toure inserted with 900cc return.

## 2022-12-01 NOTE — PROGRESS NOTES
[address] [facility]   Department of Internal Medicine   Internal Medicine Residency  MICU Progress Note    Patient:  Gila Mariscal 80 y.o. male   MRN: [id number]       Date of Service: 2022    Allergy: Patient has no known allergies. Subjective     Patient was seen and examined this morning at bedside in no acute distress. This morning was a bit better with improved mentation obeying all commands. He reports no complaints. Overnight, Normal saline was increased to 75cc/hr to improve sodium level from 127>133    Objective     TEMPERATURE:  Current - Temp: 97.8 °F (36.6 °C); Max - Temp  Av.4 °F (36.9 °C)  Min: 97.8 °F (36.6 °C)  Max: 98.7 °F (37.1 °C)  RESPIRATIONS RANGE: Resp  Av.6  Min: 17  Max: 26  PULSE RANGE: Pulse  Av.5  Min: 70  Max: 81  BLOOD PRESSURE RANGE:  Systolic (51LZM), VVN:968 , Min:143 , LNZ:680   ; Diastolic (92PCO), WVK:29, Min:54, Max:90    PULSE OXIMETRY RANGE: SpO2  Av.5 %  Min: 83 %  Max: 100 %    I & O - 24hr:    Intake/Output Summary (Last 24 hours) at 2022 1307  Last data filed at 2022 1052  Gross per 24 hour   Intake 1573.62 ml   Output 675 ml   Net 898.62 ml     I/O last 3 completed shifts: In: 2248.5 [P.O.:880; I.V.:911.3; IV Piggyback:457.3]  Out: 1800 [Urine:1800] I/O this shift:  In: 240 [P.O.:240]  Out: 50 [Urine:50]   Weight change: -1 lb 0.1 oz (-0.456 kg)    Physical Exam:   General Appearance:    obeying commands, no acute distress. HEENT:    mucous membranes are moist   Neck:   Supple, no jugular venous distention. Resp:     No wheezes, No rhonchi, no use of accessory muscles   Heart:    Irregular irregular pulse (in Afib), S1 and S2 normal, no murmur, rub or gallop.     Abdomen:     Soft, non-tender, non-distended with normal bowel sounds   Extremities:   Atraumatic, no cyanosis or edema   Pulses:  Radial and pedal pulses are intact bilaterally   Neurologic:   AAOx3, No focal motor deficit       Medications Continuous Infusions:   sodium chloride 50 mL/hr at 12/01/22 1243    sodium chloride      dextrose       Scheduled Meds:   insulin glargine  28 Units SubCUTAneous Daily    insulin lispro  11 Units SubCUTAneous TID WC    lacosamide (VIMPAT) IVPB  200 mg IntraVENous BID    insulin lispro  0-8 Units SubCUTAneous TID WC    insulin lispro  0-4 Units SubCUTAneous Nightly    valproate sodium (DEPACON) IVPB  500 mg IntraVENous 2 times per day    Or    divalproex  500 mg Oral 2 times per day    polyethylene glycol  17 g Oral Daily    levETIRAcetam  500 mg IntraVENous Q12H    guaiFENesin  400 mg Oral TID    sodium chloride flush  5-40 mL IntraVENous 2 times per day    NIFEdipine  30 mg Oral Daily    pantoprazole (PROTONIX) 40 mg injection  40 mg IntraVENous Daily    lidocaine  1 patch TransDERmal Daily    allopurinol  300 mg Oral Once per day on Mon Wed Fri    gabapentin  100 mg Oral BID    tamsulosin  0.4 mg Oral Nightly    Arformoterol Tartrate  15 mcg Nebulization BID    lisinopril  20 mg Oral Daily    And    hydroCHLOROthiazide  12.5 mg Oral Daily     PRN Meds: ipratropium-albuterol, perflutren lipid microspheres, hydrALAZINE, trimethobenzamide, sodium chloride flush, sodium chloride, labetalol, glucose, dextrose bolus **OR** dextrose bolus, glucagon (rDNA), dextrose, potassium chloride **OR** potassium alternative oral replacement **OR** potassium chloride, acetaminophen **OR** acetaminophen, aluminum & magnesium hydroxide-simethicone  Nutrition:   ADULT ORAL NUTRITION SUPPLEMENT; Lunch, Dinner; Low Calorie/High Protein Oral Supplement  ADULT DIET; Easy to Chew; 5 carb choices (75 gm/meal);  Low Fat/Low Chol/High Fiber/2 gm Na    Labs and Imaging Studies     CBC:   Recent Labs     11/29/22  0435 11/30/22  0417 12/01/22  0450   WBC 9.4 10.3 9.8   HGB 9.8* 10.2* 10.9*   HCT 31.0* 30.3* 32.9*   MCV 87.1 82.8 84.1    264 283       BMP:    Recent Labs     11/30/22  0417 11/30/22  1641 12/01/22  0450    127* 133   K 4.2 4.1 4.2   CL 99 91* 97*   CO2 22 22 23   BUN 45* 44* 43*   CREATININE 2.0* 1.7* 1.9*   GLUCOSE 227* 209* 149*       LIVER PROFILE:   No results for input(s): AST, ALT, LIPASE, BILIDIR, BILITOT, ALKPHOS in the last 72 hours. Invalid input(s): AMYLASE,  ALB    PT/INR:   Recent Labs     11/29/22  0435 11/30/22 0417 12/01/22  0450   PROTIME 12.7* 12.6* 12.8*   INR 1.2 1.2 1.2       APTT:   Recent Labs     11/29/22  0435 11/30/22 0417 12/01/22 0450   APTT 24.6 26.3 28.0       Fasting Lipid Panel:    No results found for: CHOL, TRIG, HDL    Cardiac Enzymes:    Lab Results   Component Value Date    CKTOTAL 59 02/05/2013    CKMB 1.6 02/05/2013    TROPONINI <0.01 02/05/2013       Notable Cultures:      Blood cultures   Blood Culture, Routine   Date Value Ref Range Status   11/27/2022 24 Hours no growth  Preliminary     Respiratory cultures No results found for: RESPCULTURE No results found for: LABGRAM  Urine   Urine Culture, Routine   Date Value Ref Range Status   06/15/2020 >100,000 CFU/ml  Final     Legionella No results found for: LABLEGI  C Diff PCR No results found for: CDIFPCR  Wound culture/abscess: No results for input(s): WNDABS in the last 72 hours. Tip culture:No results for input(s): CXCATHTIP in the last 72 hours. Oxygen: Additional Respiratory Assessments  Heart Rate: 70  Resp: 19  SpO2: 98 %        Imaging Studies:  CT HEAD WO CONTRAST   Final Result   No significant change in large left subdural hemorrhage with stable 7 mm   midline shift to the right. CT HEAD WO CONTRAST   Final Result   1. Moderate to large left subdural hematoma with left-to-right midline   structure shift of approximately 7 mm, unchanged. 2.  Greatest maximal thickness of the subdural hematoma on coronal views is   24 mm, compared to 23 mm on the prior study. 3.  Gas fluid level in the left sphenoid sinus may reflect acute sinusitis.          CT HEAD WO CONTRAST   Final Result   Interval increased volume and extent of diffuse left cerebral acute subdural   hemorrhage with greatest thickness adjacent to the frontotemporal region now   measuring up to 22 mm in greatest axial thickness. Interval worsening of rightward midline shift at the level of the septum   pellucidum now measuring 8 mm. Mild cerebral white matter chronic microvascular ischemic disease. Mild diffuse cerebral atrophy. Multiple attempts were made by the [facility] core team to page and notify Dr. Oralia Mackey. Critical results were then called by Dr. Mary Duvall to OSCAR Patel on 11/26/2022 at 22:23. Claudene Law reports that neurosurgery has been   consulted about the case. XR CHEST PORTABLE   Final Result   Mild congestive changes. XR CHEST PORTABLE   Final Result   No evidence of pneumothorax or parenchymal contusion. AICD visualized in the   left chest with leads in the heart.               Resident's Assessment and Plan     Assessment and Plan:    Neurology     Headache 2/2 acute subdural hematoma   Reports headache s/p fall   Initial CT in the ED was read negative for intracranial hemorrhage  He was on Eliquis for afib prior to fall   CT head on 11/26/2022 showed subdural hematoma of 7 mm, waxing and waning mentation  Neurosurgery is on board and does not recommend anything at this time and want to watch and wait   Maintain Na level>145  May consider Norina Colfax in 7-10 days post hematoma if hematoma liquifies, obtain CT head wo day 7 (12/2/22)  Maintain BP systolic <813 mmHg  SBP above 180, nifedipine increased to 60mg     Non-convulsive Seizure  Extended EEG with video 11/28/2022 shows:  Left frontal electrographic seizures with spread to involve the left frontocentrotemporal region  A potential for focl seizures from the left frontal region  Moderate to severe nonspecific cerebral dysfunction of the left frontocentrotemporal region  A mild to moderate nonspecific encephalopathy  On Valproate 500mg BID, Keppra 500 BID (renally dosed at present), and Vimpat 100 BID  Follow neurology recommendations  Continue neuro checks q1h      Cardiology  Syncope episode 2/2 complete heart block s/p pacemaker placement  He was initially on isoproterenol which has now been stopped. 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)   S/P pacemaker dual chamber placement on 11/25/2022  TSH wnl, ruled out hypothyroidism  Lyme serology negative  EP/Cardiology following       Afib [id number] score 4   Currently rate controlled HR 81, not rhythm controlled  On Eliquis, held prior to pacemaker placement  EP following, metoprolol held   Eliquis held and [place] [facility] and [place] given due to recent brain bleed      HTN   On amlodipine and lisinopril   Nifedipne increased to 60mg daily     Renal  CKD stage IV  Continue home BP s/p pacemaker placement      Endocrine  NIDDM  On MDSS, lispro 15units TID, glargine 35units daily  >166  Hypoglycemia protocol on   Glucose Q4hrs      Problems resolved during this admission:   Third degree AV block s/p pacemaker    Antibiotics:none   Cultures:negative  Lines:peripherals  Intubated: No  Diet: oral diet    # Peptic ulcer prophylaxis: protonix  # DVT Prophylaxis: mechanical  # Disposition: Cont current care     Kyra Conner MD, PGY-1    Attending Physician: Dr. Moisés Galindo, [address] [place] [address]  Department of Pulmonary, Critical Care and [address] [place] [address]  Department of Internal Medicine      During multidisciplinary team rounds Sherif Antony is a 80 y.o. male was seen, examined and discussed. This is confirmation that I have personally seen and examined the patient and that the key elements of the encounter were performed by me (> 85 % time). The medications & laboratory data was discussed and adjusted where necessary.  The radiographic images were reviewed or with radiologist or consultant if felt dis-concordant with the exam or history. The above findings were corroborated, plans confirmed and changes made if needed. Family is updated at the bedside as available. Key issues of the case were discussed among consultants. Critical Care time is documented if appropriate. Seen and examined. Currently without complaints. Working with physical therapy. No further seizure episodes.      Critical Care time: 32 minutes    Lainey Marshall DO

## 2022-12-01 NOTE — PROGRESS NOTES
Interim EEG Progress Note    EEG was reviewed from [facility] through 0614, 12/1/2022    Background consistent with a mild to moderate encephalopathy. No seizures noted during this period.        Anai Medellin DO, 7:29 AM, 12/1/2022

## 2022-12-01 NOTE — PROCEDURES
[address] Report    MRN: [id number]   PATIENT NAME: Gerhardt Castleman   DATE OF REPORT: 2022  DATE OF SERVICE: 2022 - 2022    PHYSICIAN NAME: Luz Elena Addison DO  Referring Physician: Luz Elena Addison DO      Patient's : 10/27/1933   Patient's Age: 80 y.o. Gender: male     PROCEDURE: Continous EEG with video      Clinical Interpretation: This abnormal study showed evidence of: Three electrographic seizures with onset from the left frontotemporal region  Moderate nonspecific cerebral dysfunction of the left hemisphere and and right frontal regions  A mild to moderate nonspecific encephalopathy which showed evidence of gradual improvement over the course of this study    Structural abnormalities should be considered for the findings above and appropriate imaging obtained if clinically indicated.      ____________________________  Electronically signed by: Luz Elena Addison DO, 2022 9:27 AM      Patient Clinical Information   Reason for Study: Patient undergoing monitoring for nonconvulsive seizures  Patient State: Somnolent  Primary neurological diagnosis: Subdural hemorrhage   Primary indication for monitoring: Diagnosis and treatment of seizures    Pertinent Medications and Treatments    lacosamide     valproate     levetiracetam     gabapentin       Sedatives administered: No  Intubated: No  Pharmacological paralytic: No    Reporting Period  Start of Study: 105, 2022  End of Study:  075, 2022      EEG Description  Digital video and scalp EEG monitoring was performed using the standard protocol for this laboratory. Scalp electrodes were applied in the international 10/20 system. Multiple digital montage arrangements were utilized for evaluation. EKG and video were recorded.        Technical and Activation Procedures:  Hyperventilation: Not done        Photic stimulation: Not done        Reactivity to stimulation: Yes    Abnormalities:    I. Seizures? Yes    II. Rhythmic or Periodic Patterns? Yes    III. Other Abnormalities? Yes    Day 1 (1054, 11/29/2022 - 0730, 11/30/2022)  In the awake state the background was composed primarily of generalized irregular theta activity. Stage 2 sleep was noted with sleep spindles not as well defined over the left hemisphere. Occasional irregular delta activity noted at Fp1, F3, F7, T3. Patient with multiple electrographic seizures noted during this period at 1227, 2056, and 2350. These all had onset with rhythmic delta activity at Fp1, F3, F7, T3>T5. This transitioned to a sharp/spike-wave morphology with a frequency of 2 Hz with continued increase in frequency up to 5-6 Hz with spread through the majority of the left hemisphere. Each seizure lasted 2-3 minutes. Day 2 (0730, 11/30/2022 - 0759, 12/1/2022)  In the awake state the background was composed primarily of generalized irregular theta activity. There were occasional periods of wakefulness with a well defined posterior dominant rhythm of 6-7 Hz. This improved to 8 Hz by the end of this period. Stage 2 sleep was noted with sleep spindles not as well defined over the left hemisphere. Occasional irregular delta activity noted at Fp1, F3, F7, T3, P3, O1 with a shifting field and at Fp2, F4, F8. No seizures were noted during this period.

## 2022-12-01 NOTE — PROGRESS NOTES
[id number] [place] [address], [address] [place] [address] Road                                                               Patient Name: Williams Swann  MRN: [id number]  : 10/27/1933  Room: [address] [place] [address]-A    Evaluating OT: Sandra Zaragoza, LEIGHD,  OTR/L; [id number]    Referring Provider: Myah Ortega MD   Specific Provider Orders/Date: OT eval and treat (22)       Diagnosis: A-fib ([facility] [I48.91]  Atrial fibrillation ([facility] [I48.91]     Reason for admission: Pt admitted with SOB, chest pain with fall, +hitting head with SDH. Surgery/Procedures:  pacemaker placement     Pertinent Medical History:    Past Medical History:   Diagnosis Date    A-fib ([facility]     Diabetes mellitus ([facility]     H pylori ulcer 2012    Hyperlipidemia     Hypertension     PONV (postoperative nausea and vomiting)     PUD (peptic ulcer disease) 2012    Urinary frequency         *Precautions:  Fall Risk, pacemaker precautions, global aphasia, BP <130/80, cognition, easy to chew diet    Assessment of current deficits   [x] Functional mobility  [x]ADLs  [x] Strength               []Cognition   [x] Functional transfers   [x] IADLs         [x] Safety Awareness   [x]Endurance   [] Fine Coordination        [] ROM     [] Vision/perception   []Sensation    []Gross Motor Coordination [x] Balance   [] Delirium                  []Motor Control     [] Communication    OT PLAN OF CARE   OT POC based on physician orders, patient diagnosis and results of clinical assessment.        Frequency/Duration: 1-3 days/wk for 1-2 weeks PRN    Specific OT Treatment Interventions to include:   * Instruction/training on adapted ADL techniques and AE recommendations to increase functional independence within precautions       * Training on energy conservation strategies, correct breathing pattern and techniques to improve independence/tolerance for self-care routine  * Functional transfer/mobility training/DME recommendations for increased independence, safety, and fall prevention  * Patient/Family education to increase follow through with safety techniques and functional independence  * Recommendation of environmental modifications for increased safety with functional transfers/mobility and ADLs  * Cognitive retraining/development of therapeutic activities to improve problem solving, judgement, memory, and attention for increased safety/participation in ADL/IADL tasks  * Sensory re-education to improve body/limb awareness, maintain/improve skin integrity, and improve hand/UE motor function  * Visual-perceptual training to improve environmental scanning, visual attention/focus, and oculomotor skills for increased safety/independence with functional transfers/mobility and ADLs  * Splinting/positioning for increased function, prevention of contractures, and improve skin integrity  * Therapeutic exercise to improve motor endurance, ROM, and functional strength for ADLs/functional transfers  * Therapeutic activities to facilitate/challenge dynamic balance, stand tolerance for increased safety and independence with ADLs  * Therapeutic activities to facilitate gross/fine motor skills for increased independence with ADLs  * Neuro-muscular re-education: facilitation of righting/equilibrium reactions, midline orientation, scapular stability/mobility, normalization of muscle tone, and facilitation of volitional active controled movement  * Positioning to improve skin integrity, interaction with environment and functional independence  * Delirium prevention/treatment  * Manual techniques for edema management    Modified [place] Scale   Score     Description  0             No symptoms  1             No significant disability despite symptoms  2             Slight disability; able to look after own affairs  3             Moderate disability; able to ambulate without assist/ requires assist with ADLs  4             Moderate/Severe disability;requires assist to ambulate/assist with ADLs  5             Severe disability;bedridden/incontinent   6               Score:   4    Recommended Adaptive Equipment: TBD; BSC for hospital use    Pt unable to report hx d/t aphasia/impaired cognition. Home Living: Per chart in August, pt lives with his daughter in a 1 story home with 3 step(s) to enter and rail(s); bed/bath on main level; pt does not go to basement. Bathroom setup: Walk-in shower with shower seat and grab bars, stnd commode. Equipment owned: ?    Prior Level of Function: ? with ADLs;  ? with IADLs. ? for functional mobility. Driving: ?  Occupation: Per chart pt is retired. Pain Level: pt c/o 0/10 pain this session    Cognition: A&O: ~1/4, 3/4 with choices; Follows 1 step commands with max verbal, visual, tactile cues. Memory: P+   Comprehension: P+   Problem solving: P   Judgement/safety: P               Communication skills: Impaired; pt with global aphasia. Vision: Appears WFL; pt tracks L/R; L/R confusion; Pt demos difficulty locating stagnant objects              Glasses: Yes                                                   Hearing: WFL     RASS: 0  CAM-ICU: (NT) Delirium    UE Assessment:  Hand Dominance: Right [x]  Left []     ROM Strength STM goal: PRN   RUE  WFL Proximal: 4-/5  Distal: 4/5    : Fair; difficult to assess d/t cognition. [facility]: WF  [facility]: WFL Increase overall strength for participation in ADLs. LUE WFL  Within parameters of pacer precautions Grossly 3/5  Observed    : Fair; difficult to assess d/t cognition. [facility]: WFL  [facility]: WFL Increase overall strength for participation in ADLs. Sensation: No c/o numbness/tingling in extremities. Tone: WNL   Edema: Unremarkable.      Functional Assessment:  AM-PAC Daily Activity Raw Score:    Initial Eval Status  Date: 22 Treatment Status  Date: 22 STGs = LTGs  Time frame: 7-14 days   Feeding Min A  Cues/set-up required   S; Set-up                    Mod I       Grooming Mod A  Verbal cues for thoroughness   Mod A                     Set-up        UB dressing/bathing Max A   Max A                     Min A       LB dressing/bathing Max A  Pt able to complete figure 4 technique in supported sitting, confusion limiting performance. Difficulty following simple one-step commands. Max A                     Min A        Toileting Dep Max A  Use of BSC. Dep for hygiene. Min A     Bed Mobility  Supine to sit:   Min A    Sit to supine:   Min A   Supine to sit:   Min A    Sit to supine:   Min A                     S     Functional Transfers Sit to stand:   Min A    Stand to sit:   Min A    Stand Pivot:   Min A  Bed>chair   Sit to stand: Mod A    Stand to sit:   Mod A    Stand Pivot: Max A  Bed>chair                     S     Functional Mobility Min A with no AD  For SPT Max A with HHA  For 2-3 steps from EOB. S        Balance Sitting:     Static: SBA    Dynamic: Min A  Standing: Min A Sitting:     Static: SBA    Dynamic: Min A  Standing: Mod A Sitting:     Static: S    Dynamic:S  Standing: S                   Endurance/Activity Tolerance   fair tolerance with light activity. fair tolerance with light activity. WFL  For full ADL   Visual/  Perceptual L/R confusion    Difficulty tracking. Fair+ object identification with increased processing time. Limited by aphasia. Vitals:   HR at rest: 70 bpm HR at end of session: 71 bpm   SpO2 at rest: 98% SpO2 at end of session 98%   BP at rest: 162/76 mmHg BP at end of session 133/48 mmHg       Treatment: OT treatment provided this date includes:     Facilitation of Visual Perceptual Skills for increased safety and independence with ADLs. : to facilitate functional cognition during participation in self-care tasks.  Pt able to identify 4/4 objects on first attempt, unable to locate stagnant objects in room. Instruction/training on safe bed mobility/functional mobility/transfer techniques: Pt educated on pacemaker precautions prior to mobility with poor understanding; focus on safety, body mechanics, and precautions. Skilled Monitoring of Vitals: to include BP, spO2, and HR throughout session to maximize safety. Therapeutic activity: to challenge dynamic sitting/standing balance and endurance to promote safety during ADL tasks and functional transfers and mobility. Delirium Prevention: Environmental and sensory modifications assessed and implemented to decrease ICU acquired delirium and to improve overall orientation, mentation and pt interaction with family/staff. Line management and environmental modifications made prior to and end of session to ensure patient safety and to increase efficiency of session. Skilled monitoring of HR, O2 saturation, blood pressure and patient's response to activity performed throughout session. Comments: Pt case discussed in rounds, OK from RN to see patient. Upon arrival, patient semi supine in bed. Pt pleasant and agreeable to participate in therapy session. Pt demo fair+ tolerance with poor+ understanding of education/techniques. At end of session, patient properly positioned in chair for lunch with call light within reach, all lines and tubes intact. Pt instructed on use of call light for assistance and fall prevention. Nursing notified of patient positioning.       Time In: 1342             Time Out: 1421         Total Treatment time: 39 min   Min Units   OT Eval Low [id number]     OT Eval Medium [id number]     OT Eval High [id number]     OT Re-Eval [id number]     Therapeutic Ex [address] [facility]     Therapeutic Activities [id number] 23 2   ADL/Self Care 92583 15 1   Orthotic Management [id number]     Neuro Re-Ed 67573     Non-Billable Time          AKANKSHA Harry,  OTR/L; [id number]

## 2022-12-01 NOTE — PROGRESS NOTES
Physical Therapy    Physical Therapy Daily Treatment Note      Name: Ricardo Johnson  : 10/27/1933  MRN: [id number]      Date of Service: 2022    Evaluating PT:  Fadi Lopes PT, DPT  [id number]     Room #:  2099/7771-L  Diagnosis:  A-fib ([facility] [I48.91]  Atrial fibrillation ([place] [address] [I48.91]  PMHx/PSHx:   has a past medical history of A-fib ([place] [address] Diabetes mellitus ([place] [address] H pylori ulcer, Hyperlipidemia, Hypertension, PONV (postoperative nausea and vomiting), PUD (peptic ulcer disease), and Urinary frequency. Procedure/Surgery:  Dual chamber pacemaker placement    Precautions:  Falls, Aphasia, L SDH, Pacemaker precautions, LUE sling at night, BP<130/80, Bed/chair alarm   Equipment Needs:  TBD    SUBJECTIVE:    Pt lives with dtr in a 1 story home with 3 stairs and 1 rail to enter. Bedroom and bathroom are on the 1st level. Pt ambulated with no AD PTA. OBJECTIVE:   Initial Evaluation  Date: 22 Treatment  22 Short Term/ Long Term   Goals   AM-PAC 6 Clicks  32/53    Was pt agreeable to Eval/treatment? Yes  Yes     Does pt have pain? No c/o pain No c/o pain    Bed Mobility  Rolling: SBA  Supine to sit: Min A  Sit to supine: Min A  Scooting: SBA Rolling: NT  Supine to sit: Min A  Sit to supine: Min A  Scooting: Min A Rolling: Supervision   Supine to sit: Supervision   Sit to supine: Supervision   Scooting: Supervision    Transfers Sit to stand: Min A  Stand to sit: Min A  Stand pivot: Min A Sit to stand: Mod A  Stand to sit: Mod A  Stand pivot:  Max A Sit to stand: Supervision   Stand to sit: Supervision   Stand pivot: Supervision    Ambulation    5 feet with HHA Min A Few feet x3 reps with HHA Max A >100 feet with AAD SBA   Stair negotiation: ascended and descended  NT NT >4 steps with 1 rail SBA   ROM BUE:  Per OT eval   BLE:  WFL     Strength BUE:  Per OT eval   BLE:  Grossly ~4/5 -- difficulty following MMT commands      Balance Sitting EOB:  SBA  Dynamic Standing:  Min A Sitting EOB:  SBA  Dynamic Standing:  Mod/Max A Sitting EOB:  Supervision   Dynamic Standing:  SBA     Pt is A & O x 2-3? Difficult to discern d/t aphasia   RASS:  0  CAM-ICU:  NT  Sensation:  Pt denies numbness and tingling to extremities  Edema:  Unremarkable    Vitals:  Blood Pressure at rest 162/76 mmHg  Blood Pressure post session 133/48 mmHg   Heart Rate at rest 73 bpm  Heart Rate post session 77 bpm    SPO2 at rest 97% on RA SPO2 post session 98% on RA   BP /67    Therapeutic Exercises:    BLE AROM at EOB  STS x3 reps     Patient education  Pt educated on PT role, safety during functional mobility, pacemaker precautions     Patient response to education:   Pt verbalized understanding Pt demonstrated skill Pt requires further education in this area   no no Yes      ASSESSMENT:    Conditions Requiring Skilled Therapeutic Intervention:    [x]Decreased strength     [x]Decreased ROM  [x]Decreased functional mobility  [x]Decreased balance   [x]Decreased endurance   [x]Decreased posture  []Decreased sensation  []Decreased coordination   []Decreased vision  [x]Decreased safety awareness   [x]Increased pain       Comments:  Pt received supine and agreeable to PT tx with OT collaboration. Pt cleared for participation by RN prior to session. Vitals monitored during session. Pt still aphasic throughout session. Follows instructions with verbal and visual instructions. Completed STS and short distance ambulation bed>BSC. Completed ambulation BSC>bed. Completed ambulation bed>bedside chair. During all 3 bouts pt was forward flexed and required significant cues and assistance. Able to take small, shuffled steps with assistance. Pt in chair on exit, RN aware and ordering chair alarm. Pt left with call button in reach, lines attached, and needs met. Pt would benefit from intensive PT services at discharge.      Treatment:  Patient practiced and was instructed in the following treatment:    Bed mobility training - pt given verbal and tactile cues to facilitate proper sequencing and safety during rolling and supine>sit as well as provided with physical assistance to complete task    STS and pivot transfer training - pt educated on proper hand and foot placement, safety and sequencing, and use of R HHA to safely complete sit<>stand and pivot transfers with hands on assistance to complete task safely    PHYSICAL THERAPY PLAN OF CARE:  Pt is making progress towards established goals. Continue PT POC. Specific instructions for next treatment:  transfer and gait training     Time in  1345  Time out  1415    Total Treatment Time  30 minutes     Evaluation Time includes thorough review of current medical information, gathering information on past medical history/social history and prior level of function, completion of standardized testing/informal observation of tasks, assessment of data and education on plan of care and goals.     CPT codes:  [] Low Complexity PT evaluation 40520  [] Moderate Complexity PT evaluation 87112  [] High Complexity PT evaluation 18958  [] PT Re-evaluation 35774  [] Gait training 38172 -- minutes  [] Manual therapy 75261 -- minutes  [x] Therapeutic activities 43411 30 minutes  [] Therapeutic exercises 33872 - minutes  [] Neuromuscular reeducation 08357 -- minutes     Grecia Odonnell PT, DPT  [id number]

## 2022-12-01 NOTE — PROGRESS NOTES
[address] [facility]  Department of Internal Medicine   Internal Medicine Residency   MICU Progress Note    Patient:  Shira Peterson 80 y.o. male  MRN: [id number]     Date of Service: 12/1/2022    Allergy: Patient has no known allergies. Subjective     Pt is sleeping, he did not wake up and interact as much. 24h change: no acute changes overnight. -276. -172. ROS: Unable to perform as pt is sleeping. Objective     VS: BP (!) 187/74   Pulse 70   Temp 97.8 °F (36.6 °C)   Resp 19   Ht 5' 9\" (1.753 m)   Wt 193 lb (87.5 kg)   SpO2 98%   BMI 28.50 kg/m²           I & O - 24hr:   Intake/Output Summary (Last 24 hours) at 12/1/2022 1418  Last data filed at 12/1/2022 1052  Gross per 24 hour   Intake 1573.62 ml   Output 675 ml   Net 898.62 ml         Physical Exam: limited due to pt sleeping  General Appearance: appears stated age and no distress. Pt is sleeping. Lung: clear to auscultation bilaterally anterior lung fields. Pacemaker surgical wound in the left upper chest, no wound dehiscence, erythema or edema, no purulent drainage, some ecchymosis in surrounding the site. Heart: irregularly irregular rhythm and no S3 or S4. No pitting edema of lower or upper extremities. Abdomen: Soft and non distended. Seborrhoic keratosis diffusely across the trunk anteriorly and posteriorly. Echymosis next to his sternal notch. Extremities:  extremities normal, atraumatic, no cyanosis or edema. Erythema of the left forearm and axilla improving. Musculoskeletal: No joint swelling. Neurologic: Could not assess as pt is sleeping. Lines     site day    Art line   None    TLC None    PICC None    Hemoaccess None    Right (x 5 days) and left (x 6 days) forearm peripheral lines in place. Oxygen: On room air since yesterday afternoon.      ABG:   No results found for: PHART, PH, QZN1HNO, PCO2, PO2ART, PO2, SMZ7NWT, HCO3, BEART, BE, THGBART, THB, SRE2NNT, X0KWPXEE, O2SAT Medications     Infusions: (Fluid, Sedation, Vasopressors)  IVF:   NS.     Vasopressors  None. Sedation  None. Nutrition:   PO on easy to chew diet (low chol, low fat and high fiber). Patient currently has   GI prophylaxis: Protonix 40 mg qd.       Labs   CBC with Differential:    Lab Results   Component Value Date/Time    WBC 9.8 12/01/2022 04:50 AM    RBC 3.91 12/01/2022 04:50 AM    HGB 10.9 12/01/2022 04:50 AM    HCT 32.9 12/01/2022 04:50 AM     12/01/2022 04:50 AM    MCV 84.1 12/01/2022 04:50 AM    MCH 27.9 12/01/2022 04:50 AM    MCHC 33.1 12/01/2022 04:50 AM    RDW 13.7 12/01/2022 04:50 AM    LYMPHOPCT 10.7 12/01/2022 04:50 AM    MONOPCT 11.6 12/01/2022 04:50 AM    BASOPCT 0.5 12/01/2022 04:50 AM    MONOSABS 1.13 12/01/2022 04:50 AM    LYMPHSABS 1.05 12/01/2022 04:50 AM    EOSABS 0.03 12/01/2022 04:50 AM    BASOSABS 0.05 12/01/2022 04:50 AM     CMP:    Lab Results   Component Value Date/Time     12/01/2022 04:50 AM    K 4.2 12/01/2022 04:50 AM    K 4.5 11/24/2022 09:08 AM    CL 97 12/01/2022 04:50 AM    CO2 23 12/01/2022 04:50 AM    BUN 43 12/01/2022 04:50 AM    CREATININE 1.9 12/01/2022 04:50 AM    GFRAA 30 09/16/2022 08:24 AM    LABGLOM 33 12/01/2022 04:50 AM    GLUCOSE 149 12/01/2022 04:50 AM    PROT 6.9 11/24/2022 09:08 AM    LABALBU 4.2 11/24/2022 09:08 AM    CALCIUM 9.1 12/01/2022 04:50 AM    BILITOT 0.4 11/24/2022 09:08 AM    ALKPHOS 130 11/24/2022 09:08 AM    AST 30 11/24/2022 09:08 AM    ALT 39 11/24/2022 09:08 AM     Hepatic Function Panel:    Lab Results   Component Value Date/Time    ALKPHOS 130 11/24/2022 09:08 AM    ALT 39 11/24/2022 09:08 AM    AST 30 11/24/2022 09:08 AM    PROT 6.9 11/24/2022 09:08 AM    BILITOT 0.4 11/24/2022 09:08 AM    LABALBU 4.2 11/24/2022 09:08 AM     PT/INR:    Lab Results   Component Value Date/Time    PROTIME 12.8 12/01/2022 04:50 AM    INR 1.2 12/01/2022 04:50 AM     PTT:    Lab Results   Component Value Date/Time    APTT 28.0 12/01/2022 04:50 AM [APTT}  U/A:    Lab Results   Component Value Date/Time    COLORU Yellow 11/27/2022 12:30 PM    PROTEINU >=300 11/27/2022 12:30 PM    PHUR 6.0 11/27/2022 12:30 PM    WBCUA NONE 11/27/2022 12:30 PM    RBCUA 2-5 11/27/2022 12:30 PM    BACTERIA NONE SEEN 11/27/2022 12:30 PM    CLARITYU Clear 11/27/2022 12:30 PM    SPECGRAV 1.020 11/27/2022 12:30 PM    LEUKOCYTESUR Negative 11/27/2022 12:30 PM    UROBILINOGEN 0.2 11/27/2022 12:30 PM    BILIRUBINUR Negative 11/27/2022 12:30 PM    BLOODU SMALL 11/27/2022 12:30 PM    GLUCOSEU 500 11/27/2022 12:30 PM       Imaging Studies:  CT scan:    Impression:  11/27/22     No significant change in large left subdural hemorrhage with stable 7 mm   midline shift to the right. Resident's Assessment and Plan     Neuro   Subdural hematoma 2/2 to head injury - stable   Monitor blood pressure with a goal of SBP  150- 170  Continue labetalol and hydralazine prn   Continue holding eliquis   Dr. Adryan Quintana advised to medically manage the pt and wait for the hematoma to liquify and then decided whether to do a Callie hole. A craniotomy is very high risk for the pt. Seizures 2/2 to irritation and mass effect from the hematoma  Pt had two seizures yesterday (9 and 11 pm) that lasted 2 minutes. He was started on valproic acid 1000 mg bolus followed by 500 mg BID. Continue Vimpat 200 mg BID. Start Keppra 500 mg BID. Continue to monitor pt's neurological status   On EEG monitoring until no seizure activity is noted. Appreciate neurology and cardiology's recommendations. Cardio  1. Syncope 2/2 third degree heart block likely ischemic vs sick sinus syndrome vs senile amyloidosis   Pacemaker placed on 80/82/95 with no complications. 11/24/22- Lyme panel is negative. HTN   Continue Lisinopril 20 mg, HCTZ 12.5 mg and Nifedipine 30 mg. Afib   Continue holding eliquis   CHADS2 VASC score  = 4     Pulmonary    SOB   On 3.5 L NC early in the morning. On room air in the afternoon. Continue Duoneb, guaifensin and brovana. Renal   CKD stage IV (baseline Cr 2.1-2.2)  Creatinine stable today at from 2.0. No electrolyte disturbances. Continue to monitor kidney function, BMP qd. Endocrine   Type II DM   Last HgB A1c 8.5%   Continue Insulin lispro sliding scale. BG elevated at night to 316 (8 units + 3 mdss was given). TSH wnl 3.970. Peripheral neuropathy   Continue gabapentin 100 mg BID. MSK   1. Rib pain   A. Lidocaine patch and acetaminophen 650 mg q6 prn.    Urinary frequency    Continue Tamsulosin 0.4 mg qhs. DVT ppx: PCDs. GI ppx: Protonix 40 mg. Code status: limited.      Disposition: continue care     Boo Isaacs, MS-3    Attending physician: Dr. Jaymie Durham  Department of Pulmonary, Critical Care and Sleep Medicine  [address] [facility]  Department of Internal Medicine

## 2022-12-01 NOTE — PROGRESS NOTES
Hospitalist Progress Note      SYNOPSIS: Patient admitted on 11/24/2022 for A-fib [facility])      SUBJECTIVE:    Patient seen and examined. He is much alert today answer some questions. Records reviewed. 80 y.o. male with past medical history of atrial fibrillation, DM hypertension . Patient is a transfer from [place] . He was seen there due to chest pain . He states that chest pain is located in the sternal area . He states  that he has shortness of breath with chest pain . He states that when chest pain occurred he felt like he was going to pass out . Patient had a syncopal episode and hit his head . Patient had a second syncopal episode  and after that family called ambulance . En route to ED patient was given 324 mg of ASA . In the ED patient was in Afib with slow ventricular rate having long pauses and bradycardic to the 30s . Lab data revealed sodium 135 potassium 4.1 BUN 29 Scr 2.0  glucose 244 Trop 45 >>43  WBC 9.9 HGB 10.7   Resp panel neg CT head cervical spine neg CXR congestive changes . Patient received fentanyl morphine and was placed on isoproterenol infusion. EP consulted-pacemaker placement. Lyme serology was ordered for possible carditis. Patient had an initial CT of the head after the fall which was negative for any intracranial hemorrhage. Repeat CT head was obtained on 11/26/2022 as patient continued to complain of headache and spite of pain medicine. Repeat CT of the head noncontrast was obtained which showed progressive subdural hemorrhage. Eliquis was held, [facility] ordered for Eliquis reversal.  Neurosurgery following. Worsening mental status with aphasia. Found to have two seizures. Started on Keppra and Vimpat. Neurology following. He has been on continuous video EEG. Seizure medications has been adjusted. Last seizure episode 2350 lasted approximately 3 minutes prior to giving valproate and bolus which was completed at 02 100. No further seizures after that.   Appreciate neurology's inputs. Continue Vimpat, Keppra and Depacon. Stable overnight. No other overnight issues reported. Temp (24hrs), Av.4 °F (36.9 °C), Min:97.8 °F (36.6 °C), Max:98.7 °F (37.1 °C)    DIET: ADULT ORAL NUTRITION SUPPLEMENT; Lunch, Dinner; Low Calorie/High Protein Oral Supplement  ADULT DIET; Easy to Chew; 5 carb choices (75 gm/meal); Low Fat/Low Chol/High Fiber/2 gm Na  CODE: Limited    Intake/Output Summary (Last 24 hours) at 2022 1602  Last data filed at 2022 1052  Gross per 24 hour   Intake 1173.62 ml   Output 675 ml   Net 498.62 ml       OBJECTIVE:    BP (!) 187/74   Pulse 70   Temp 97.8 °F (36.6 °C)   Resp 19   Ht 5' 9\" (1.753 m)   Wt 193 lb (87.5 kg)   SpO2 98%   BMI 28.50 kg/m²     General appearance: No apparent distress, appears stated age and cooperative but still slightly confused. HEENT:  Conjunctivae/corneas clear. Neck: Supple. No jugular venous distention. Respiratory: Clear to auscultation bilaterally, normal respiratory effort  Cardiovascular: Regular rate rhythm, normal S1-S2  Abdomen: Soft, nontender, nondistended  Musculoskeletal: No clubbing, cyanosis, no bilateral lower extremity edema. Brisk capillary refill. 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 Seizure medications with Vimpat 200 mg twice daily, Keppra 500 mg twice daily and Depacon 500 mg twice daily per neurology. No seizures since 1129 at 2350. Echocardiogram 2022 with mild to moderate aortic stenosis and mild to moderate aortic regurgitation. EF of 60-65%. Continue to hold Eliquis. DISPOSITION:     Medications:  REVIEWED DAILY    Infusion Medications    sodium chloride      dextrose       Scheduled Medications    [START ON 12/2/2022] insulin glargine  35 Units SubCUTAneous Daily    insulin lispro  15 Units SubCUTAneous TID WC    [START ON 12/2/2022] NIFEdipine  60 mg Oral Daily    NIFEdipine  30 mg Oral Once    [START ON 12/2/2022] pantoprazole  40 mg Oral QAM AC    lacosamide (VIMPAT) IVPB  200 mg IntraVENous BID    insulin lispro  0-8 Units SubCUTAneous TID WC    insulin lispro  0-4 Units SubCUTAneous Nightly    valproate sodium (DEPACON) IVPB  500 mg IntraVENous 2 times per day    Or    divalproex  500 mg Oral 2 times per day    polyethylene glycol  17 g Oral Daily    levETIRAcetam  500 mg IntraVENous Q12H    guaiFENesin  400 mg Oral TID    sodium chloride flush  5-40 mL IntraVENous 2 times per day    lidocaine  1 patch TransDERmal Daily    allopurinol  300 mg Oral Once per day on Mon Wed Fri    gabapentin  100 mg Oral BID    tamsulosin  0.4 mg Oral Nightly    Arformoterol Tartrate  15 mcg Nebulization BID    lisinopril  20 mg Oral Daily    And    hydroCHLOROthiazide  12.5 mg Oral Daily     PRN Meds: ipratropium-albuterol, perflutren lipid microspheres, hydrALAZINE, trimethobenzamide, sodium chloride flush, sodium chloride, labetalol, glucose, dextrose bolus **OR** dextrose bolus, glucagon (rDNA), dextrose, potassium chloride **OR** potassium alternative oral replacement **OR** potassium chloride, acetaminophen **OR** acetaminophen, aluminum & magnesium hydroxide-simethicone    Labs:     Recent Labs     11/29/22  0435 11/30/22  0417 12/01/22  0450   WBC 9.4 10.3 9.8   HGB 9.8* 10.2* 10.9*   HCT 31.0* 30.3* 32.9*    264 283       Recent Labs     11/29/22  0435 11/30/22  0417 11/30/22  1641 12/01/22  0450    132 127* 133   K 4.0 4.2 4.1 4.2   CL 99 99 91* 97*   CO2 20* 22 22 23   BUN 33* 45* 44* 43*   CREATININE 1.8* 2.0* 1.7* 1.9*   CALCIUM 8.9 9.2 9.6 9.1   PHOS 3.1 2.8  -- 2.8       No results for input(s): PROT, ALB, ALKPHOS, ALT, AST, BILITOT, AMYLASE, LIPASE in the last 72 hours. Recent Labs     11/29/22  0435 11/30/22  0417 12/01/22 0450   INR 1.2 1.2 1.2       No results for input(s): Susanna Kaufman in the last 72 hours. Chronic labs:    Lab Results   Component Value Date    TSH 3.970 11/24/2022    PSA 3.76 09/16/2022    INR 1.2 12/01/2022    LABA1C 8.5 (H) 08/02/2022       Radiology: REVIEWED DAILY    +++++++++++++++++++++++++++++++++++++++++++++++++  Lexis Saleem MD  [facility] Physician - [address] [place] [address]  +++++++++++++++++++++++++++++++++++++++++++++++++  NOTE: This report was transcribed using voice recognition software. Every effort was made to ensure accuracy; however, inadvertent computerized transcription errors may be present.

## 2022-12-01 NOTE — PROGRESS NOTES
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    Esophagitis-grade 1 K20.90    AP (angina pectoris) ([facility]) F38.7    Systolic hypertension M92    Combined forms of age-related cataract of left eye H25.812    Anemia, chronic renal failure, stage 4 (severe) ([facility]) N18.4, D63.1    Sepsis secondary to CAP ([facility]) A41.9    Pneumonia of left lung due to infectious organism J18.9    A-fib ([facility]) I48.91    Atrial fibrillation ([facility]) I48.91    Third degree heart block ([facility]) I44.2    High-grade atrioventricular block I44.39    Seizure ([facility]) R56.9    Subdural hematoma S06. 5XAA       SUBJECTIVE:  Rafi Gunter Sep notes that he recognizes me but otherwise is somewhat mildly confused to time and place. He denies any chest discomfort nor palpitations. REVIEW OF SYSTEMS:  Presently unable to accurately obtain due to  patient's profound neurologic state.       SCHEDULED MEDICATIONS:   insulin glargine  28 Units SubCUTAneous Daily    insulin lispro  11 Units SubCUTAneous TID WC    lacosamide (VIMPAT) IVPB  200 mg IntraVENous BID    insulin lispro  0-8 Units SubCUTAneous TID WC    insulin lispro  0-4 Units SubCUTAneous Nightly    valproate sodium (DEPACON) IVPB  500 mg IntraVENous 2 times per day    Or    divalproex  500 mg Oral 2 times per day    polyethylene glycol  17 g Oral Daily    levETIRAcetam  500 mg IntraVENous Q12H    guaiFENesin  400 mg Oral TID    sodium chloride flush  5-40 mL IntraVENous 2 times per day    NIFEdipine  30 mg Oral Daily    pantoprazole (PROTONIX) 40 mg injection  40 mg IntraVENous Daily    lidocaine  1 patch TransDERmal Daily    allopurinol  300 mg Oral Once per day on Mon Wed Fri    gabapentin  100 mg Oral BID    tamsulosin  0.4 mg Oral Nightly    Arformoterol Tartrate  15 mcg Nebulization BID    lisinopril  20 mg Oral Daily    And hydroCHLOROthiazide  12.5 mg Oral Daily       VITAL SIGNS:                                                                                                                          BP (!) 190/78   Pulse 70   Temp 98.8 °F (37.1 °C) (Temporal)   Resp 21   Ht 5' 9\" (1.753 m)   Wt 194 lb 0.1 oz (88 kg)   SpO2 96%   BMI 28.65 kg/m²   Patient Vitals for the past 96 hrs (Last 3 readings):   Weight   11/30/22 0600 194 lb 0.1 oz (88 kg)   11/29/22 0445 184 lb 4.9 oz (83.6 kg)   11/28/22 0600 185 lb (83.9 kg)     OBJECTIVE:    HEENT: PERRL, EOM  Intact; sclera non-icteric, conjunctiva pink. Carotids are brisk in upstroke with normal contour. No carotid bruits. Normal jugular venous pulsation at 45°. No palpable cervical nor supraclavicular nodes. Thyroid not palpable. Trachea midline. Chest: Even excursion  Lungs: grossly clear to auscultation bilaterally no expiratory wheezes or rhonchi, no decreased tactile fremitus without inspiratory rales. Heart: Irregular, regular  rhythm; S1 > S2, no gallop grade 2/6 systolic murmur second right intercostal space no clicks, rub, palpable thrills   or heaves. PMI nondisplaced, 5th intercostal space MCL. Abdomen: Soft, nontender, nondistended,  mildly protuberant, no masses or organomegaly. Bowel sounds active. Extremities: Without clubbing, cyanosis or edema. Pulses present 3+ upper extermities bilaterally; present 1+ DP  bilaterally and present 1+ PT bilaterally.      Data:   Scheduled Meds: Reviewed  Continuous Infusions:    sodium chloride 75 mL/hr at 11/30/22 2230    sodium chloride      dextrose         Intake/Output Summary (Last 24 hours) at 11/30/2022 2338  Last data filed at 11/30/2022 1836  Gross per 24 hour   Intake 1739.41 ml   Output 1000 ml   Net 739.41 ml     CBC:   Recent Labs     11/28/22  0404 11/29/22  0435 11/30/22  0417   WBC 11.2 9.4 10.3   HGB 10.3* 9.8* 10.2*   HCT 31.4* 31.0* 30.3*    239 264     BMP:  Recent Labs     11/28/22  0404 11/29/22  0435 11/30/22  0417 11/30/22  1641    134 132 127*   K 4.2 4.0 4.2 4.1    99 99 91*   CO2 22 20* 22 22   BUN 34* 33* 45* 44*   CREATININE 2.1* 1.8* 2.0* 1.7*   LABGLOM 30 36 31 38     ABGs: No results found for: PH, PO2, PCO2  INR:   Recent Labs     11/28/22  1000 11/29/22 0435 11/30/22 0417   INR 1.2 1.2 1.2     PRO-BNP:   Lab Results   Component Value Date    PROBNP 8,347 (H) 08/02/2022      TSH:   Lab Results   Component Value Date    TSH 3.970 11/24/2022      Cardiac Injury Profile:   No results for input(s): TROPHS in the last 72 hours. Lipid Profile: No results found for: TRIG, HDL, LDLCALC, CHOL   Hemoglobin A1C: No components found for: HGBA1C      RAD:   CT HEAD WO CONTRAST   Final Result   Interval increased volume and extent of diffuse left cerebral acute subdural   hemorrhage with greatest thickness adjacent to the frontotemporal region now   measuring up to 22 mm in greatest axial thickness. Interval worsening of rightward midline shift at the level of the septum   pellucidum now measuring 8 mm. Mild cerebral white matter chronic microvascular ischemic disease. Mild diffuse cerebral atrophy. Multiple attempts were made by the [facility] core team to page and notify Dr. Samina Rubi. Critical results were then called by Dr. Nav De Luna to [facility] on 11/26/2022 at 22:23. Thom Domingo reports that neurosurgery has been   consulted about the case. XR CHEST PORTABLE   Final Result   Mild congestive changes. XR CHEST PORTABLE   Final Result   No evidence of pneumothorax or parenchymal contusion. AICD visualized in the   left chest with leads in the heart.          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    Esophagitis-grade 1 K20.90    AP (angina pectoris) ([facility]) M50.5    Systolic hypertension L36    Combined forms of age-related cataract of left eye H25.812    Anemia, chronic renal failure, stage 4 (severe) ([facility]) N18.4, D63.1    Sepsis secondary to CAP ([facility]) A41.9    Pneumonia of left lung due to infectious organism J18.9    A-fib ([facility]) I48.91    Atrial fibrillation ([facility]) I48.91    Third degree heart block ([facility]) I44.2    High-grade atrioventricular block I44.39    Seizure ([facility]) R56.9    Subdural hematoma S06. 5XAA       RECOMMENDATIONS:   Mr. Lamin Yañez clinically appears minimally improved but I remain concerned relative to his significant systolic blood pressure. Continue to carefully monitor both blood pressure and renal function and carefully adjust medications  I have spent more than 30 critical care minutes face to face with Ramona Spence and reviewing notes and laboratory data, with greater than 50% of this time instructing and counseling the patient and his family members face to face regarding my findings and recommendations and I have answered all questions as posed to me by Mr. Lamin Yañez ICU team nurse. Ritika Colunga, DO FACP,FACC,FSCAI      NOTE:  This report was transcribed using voice recognition software.   Every effort was made to ensure accuracy; however, inadvertent computerized transcription errors may be present

## 2022-12-01 NOTE — PROGRESS NOTES
[address] [facility]  Department of Internal Medicine   Internal Medicine Residency   MICU Progress Note    Patient:  Blake Mejia 80 y.o. male  MRN: [id number]     Date of Service: 12/1/2022    Allergy: Patient has no known allergies. Subjective     Upon examination, pt is sleeping. He shook his head to my questions but was too sleepy to respond. He did point to his suprapubic region when his abdomen was palpated and stated he had pain there. 24h change: BP aziza to 169/80  > 190/78 overnight. Pt was given hydralazine but his pressure still went back up to 188/69. ROS: Unable to perform as pt is sleeping. Objective     VS: BP (!) 187/74   Pulse 70   Temp 97.8 °F (36.6 °C)   Resp 19   Ht 5' 9\" (1.753 m)   Wt 193 lb (87.5 kg)   SpO2 98%   BMI 28.50 kg/m²           I & O - 24hr:   Intake/Output Summary (Last 24 hours) at 12/1/2022 1614  Last data filed at 12/1/2022 1052  Gross per 24 hour   Intake 1173.62 ml   Output 675 ml   Net 498.62 ml         Physical Exam: limited due to pt sleeping  General Appearance: appears stated age and no distress. Pt is sleeping. Lung: clear to auscultation bilaterally anterior lung fields. Pacemaker surgical wound in the left upper chest, no wound dehiscence, erythema or edema, no purulent drainage, some ecchymosis in surrounding the site. Heart: irregularly irregular rhythm and no S3 or S4. No pitting edema of lower or upper extremities. Abdomen: Soft and non distended. Seborrhoic keratosis diffusely across the trunk anteriorly and posteriorly. Echymosis next to his sternal notch. Tender to palpation on suprapubic region. Extremities:  extremities normal, atraumatic, no cyanosis or edema. Musculoskeletal: No joint swelling. Neurologic: Could not assess as pt is sleeping. Lines     site day    Art line   None    TLC None    PICC None    Hemoaccess None    Right (x 6 days) and left (x 7 days) forearm peripheral lines in place.       Oxygen: Component Value Date/Time    PROTIME 12.8 12/01/2022 04:50 AM    INR 1.2 12/01/2022 04:50 AM     PTT:    Lab Results   Component Value Date/Time    APTT 28.0 12/01/2022 04:50 AM   [APTT}  U/A:    Lab Results   Component Value Date/Time    COLORU Yellow 11/27/2022 12:30 PM    PROTEINU >=300 11/27/2022 12:30 PM    PHUR 6.0 11/27/2022 12:30 PM    WBCUA NONE 11/27/2022 12:30 PM    RBCUA 2-5 11/27/2022 12:30 PM    BACTERIA NONE SEEN 11/27/2022 12:30 PM    CLARITYU Clear 11/27/2022 12:30 PM    SPECGRAV 1.020 11/27/2022 12:30 PM    LEUKOCYTESUR Negative 11/27/2022 12:30 PM    UROBILINOGEN 0.2 11/27/2022 12:30 PM    BILIRUBINUR Negative 11/27/2022 12:30 PM    BLOODU SMALL 11/27/2022 12:30 PM    GLUCOSEU 500 11/27/2022 12:30 PM       Imaging Studies:  CT scan:   No studies done today. Resident's Assessment and Plan     Neuro   Subdural hematoma 2/2 to head injury - stable   Monitor blood pressure with a goal of SBP  150- 170  Continue labetalol and hydralazine prn   Continue holding eliquis   CT head to assess the hematoma on Day 8,9 or 10 th day of admission. Dr. Marlyn Yang advised to medically manage the pt and wait for the hematoma to liquify and then decide to whether to do a Callie hole. Seizures 2/2 to irritation and mass effect from the hematoma  No seizure activity overnight. Continue Vimpat 200 mg BID, Valproic acid 500 mg bolus BID and Keppra 500 mg BID. Continue to monitor pt's neurological status    Appreciate neurology and cardiology's recommendations. Cardio  1. Syncope 2/2 third degree heart block likely ischemic vs sick sinus syndrome vs senile amyloidosis   Pacemaker placed on 85/36/27 with no complications. HTN   Continue Lisinopril 20 mg, HCTZ 12.5 mg. Increased Nifedipine from 30 mg to 60 mg  qd. Afib   Continue holding eliquis   CHADS2 VASC score  = 4     Pulmonary    SOB   On room air. Continue Duoneb, guaifensin and brovana.     Renal   CKD stage IV (baseline Cr 2.1-2.2)  Creatinine stable today at from 1.9. No electrolyte disturbances. Continue to monitor kidney function, BMP qd. Endocrine   Type II DM   Last HgB A1c 8.5%   Continue Insulin lispro sliding scale. BG elevated at night to 209. Peripheral neuropathy   Continue gabapentin 100 mg BID. MSK   1. Rib pain   A. Lidocaine patch and acetaminophen 650 mg q6 prn.    Urinary frequency    Continue Tamsulosin 0.4 mg qhs. Ordered a UA for new onset of suprapubic tenderness. DVT ppx: PCDs. GI ppx: Protonix 40 mg. Code status: limited.      Disposition: continue care     Janyth Blizzard, MS-3    Attending physician: Dr. Dane Jacobs  Department of Pulmonary, Critical Care and Sleep Medicine  [address] [facility]  Department of Internal Medicine

## 2022-12-02 NOTE — PROGRESS NOTES
[address] [id number] [facility]  [address] [place] [address]       DOWO:                                                               Patient Name: Kamala Mcghee  MRN: [id number]  : 10/27/1933  Room: [address] [place] [address]A    Evaluating OT: Janneth Crowder, OTD,  OTR/L; [id number]    Referring Provider: Romulo Gupta MD   Specific Provider Orders/Date: OT eval and treat (22)       Diagnosis: A-fib ([facility] [I48.91]  Atrial fibrillation ([facility] [I48.91]     Reason for admission: Pt admitted with SOB, chest pain with fall, +hitting head with SDH. Surgery/Procedures:  pacemaker placement     Pertinent Medical History:    Past Medical History:   Diagnosis Date    A-fib ([facility]     Diabetes mellitus ([facility]     H pylori ulcer 2012    Hyperlipidemia     Hypertension     PONV (postoperative nausea and vomiting)     PUD (peptic ulcer disease) 2012    Urinary frequency         *Precautions:  Fall Risk, pacemaker precautions, global aphasia, BP <180 , cognition, easy to chew diet    Assessment of current deficits   [x] Functional mobility  [x]ADLs  [x] Strength               []Cognition   [x] Functional transfers   [x] IADLs         [x] Safety Awareness   [x]Endurance   [] Fine Coordination        [] ROM     [] Vision/perception   []Sensation    []Gross Motor Coordination [x] Balance   [] Delirium                  []Motor Control     [] Communication    OT PLAN OF CARE   OT POC based on physician orders, patient diagnosis and results of clinical assessment.        Frequency/Duration: 1-3 days/wk for 1-2 weeks PRN    Specific OT Treatment Interventions to include:   * Instruction/training on adapted ADL techniques and AE recommendations to increase functional independence within precautions       * Training on energy conservation strategies, correct breathing pattern and techniques to improve independence/tolerance for self-care routine  * Functional transfer/mobility training/DME recommendations for increased independence, safety, and fall prevention  * Patient/Family education to increase follow through with safety techniques and functional independence  * Recommendation of environmental modifications for increased safety with functional transfers/mobility and ADLs  * Cognitive retraining/development of therapeutic activities to improve problem solving, judgement, memory, and attention for increased safety/participation in ADL/IADL tasks  * Sensory re-education to improve body/limb awareness, maintain/improve skin integrity, and improve hand/UE motor function  * Visual-perceptual training to improve environmental scanning, visual attention/focus, and oculomotor skills for increased safety/independence with functional transfers/mobility and ADLs  * Splinting/positioning for increased function, prevention of contractures, and improve skin integrity  * Therapeutic exercise to improve motor endurance, ROM, and functional strength for ADLs/functional transfers  * Therapeutic activities to facilitate/challenge dynamic balance, stand tolerance for increased safety and independence with ADLs  * Therapeutic activities to facilitate gross/fine motor skills for increased independence with ADLs  * Neuro-muscular re-education: facilitation of righting/equilibrium reactions, midline orientation, scapular stability/mobility, normalization of muscle tone, and facilitation of volitional active controled movement  * Positioning to improve skin integrity, interaction with environment and functional independence  * Delirium prevention/treatment  * Manual techniques for edema management    Modified [place] Scale   Score     Description  0             No symptoms  1             No significant disability despite symptoms  2             Slight disability; able to look after own affairs  3             Moderate disability; able to ambulate without assist/ requires assist with ADLs  4             Moderate/Severe disability;requires assist to ambulate/assist with ADLs  5             Severe disability;bedridden/incontinent   6               Score:   4    Recommended Adaptive Equipment: TBD; BSC for hospital use    Pt unable to report hx d/t aphasia/impaired cognition. Home Living: Per chart in August, pt lives with his daughter in a 1 story home with 3 step(s) to enter and rail(s); bed/bath on main level; pt does not go to basement. Bathroom setup: Walk-in shower with shower seat and grab bars, stnd commode. Equipment owned: ?    Prior Level of Function: ? with ADLs;  ? with IADLs. ? for functional mobility. Driving: ?  Occupation: Per chart pt is retired. Pain Level: pt c/o 0/10 pain this session    Cognition: A&O: ~2/4, ; Follows 1 step commands with max verbal, visual, tactile cues. Memory: P+   Comprehension: P+   Problem solving: P   Judgement/safety: P               Communication skills: Impaired; pt with global aphasia. Vision: Appears WFL; pt tracks L/R; L/R confusion; Pt demos difficulty locating stagnant objects              Glasses: Yes                                                   Hearing: WFL     RASS: 0  CAM-ICU: (NT) Delirium    UE Assessment:  Hand Dominance: Right [x]  Left []     ROM Strength STM goal: PRN   RUE  WFL Proximal: 4-/5  Distal: 4/5    : Fair; difficult to assess d/t cognition. [address]: WFL  G[facility]: WFL Increase overall strength for participation in ADLs. LUE WFL  Within parameters of pacer precautions Grossly 3/5  Observed    : Fair; difficult to assess d/t cognition. [address]: WFL  G[facility]: WFL Increase overall strength for participation in ADLs. Sensation: No c/o numbness/tingling in extremities. Tone: WNL   Edema: Unremarkable.      Functional Assessment:  AM-PAC Daily Activity Raw Score:    Initial Eval Status  Date: 22 Treatment Status  Date: 22 STGs = LTGs  Time frame: 7-14 days   Feeding Min A  Cues/set-up required   S; Set-up                    Mod I       Grooming Mod A  Verbal cues for thoroughness   Min A overall  To comb hair in supported sitting in bed. Verbal cues for sequencing. Set-up        UB dressing/bathing Max A   Max A                     Min A       LB dressing/bathing Max A  Pt able to complete figure 4 technique in supported sitting, confusion limiting performance. Difficulty following simple one-step commands. Max A                       Min A        Toileting Dep Max A                       Min A     Bed Mobility  Supine to sit:   Min A    Sit to supine:   Min A   Supine to sit:   Mod A    Sit to supine: Mod A                     S     Functional Transfers Sit to stand:   Min A    Stand to sit:   Min A    Stand Pivot:   Min A  Bed>chair   Sit to stand: Mod A>Min A    Stand to sit:   Mod A>Min A    Stand Pivot:   Min A  Bed>chair   With increased verbal/tactile cues for safety/sequencing. Limited by aphasia. S     Functional Mobility Min A with no AD  For SPT Min A with HHA and assist of IV pole for stability. Small shuffling steps with max verbal cues. S        Balance Sitting:     Static: SBA    Dynamic: Min A  Standing: Min A Sitting:     Static: SBA    Dynamic: Min A  Standing: Mod A>Min A Sitting:     Static: S    Dynamic:S  Standing: S                   Endurance/Activity Tolerance   fair tolerance with light activity. fair tolerance with light activity. WFL  For full ADL   Visual/  Perceptual L/R confusion    Increased processing time.                  Vitals:   HR at rest: 70 bpm HR at end of session: 72 bpm   SpO2 at rest: --% SpO2 at end of session --%   BP at rest: 144/70 mmHg BP at end of session 154/76 mmHg       Treatment: OT treatment provided this date includes:   Instruction/training on safety and adapted techniques for completion of ADLs: to increase independence and safety in self-care. Instruction/training on safe bed mobility/functional mobility/transfer techniques: with focus on safety, body mechanics, and precautions   Proper Positioning/Alignment for optimal healing, skin integrity, to prevent breakdown, decrease edema, and reduce risk of contracture. Skilled Monitoring of Vitals: to include BP, spO2, and HR throughout session to maximize safety. Therapeutic activity: to challenge dynamic sitting/standing balance and endurance to promote safety during ADL tasks and functional transfers and mobility. Activities to challenge functional cognition, word finding, and expression. Delirium Prevention: Environmental and sensory modifications assessed and implemented to decrease ICU acquired delirium and to improve overall orientation, mentation and pt interaction with family/staff. Line management and environmental modifications made prior to and end of session to ensure patient safety and to increase efficiency of session. Skilled monitoring of HR, O2 saturation, blood pressure and patient's response to activity performed throughout session. Comments: Pt case discussed in rounds, OK from RN to see patient. Upon arrival, patient semi supine in bed. Pt pleasant and agreeable to participate in therapy session. Pt demo fair+ tolerance with poor+ understanding of education/techniques. At end of session, patient properly positioned in bed for lunch with call light within reach, all lines and tubes intact. Pt instructed on use of call light for assistance and fall prevention. Nursing notified of patient positioning. Pt unable to remain OOB this date d/t poor understanding and impaired safety awareness. Pt attempting to stand from chair during meal, returned to chair position in bed to maximize safety.       Time In: 0950             Time Out: 1031         Total Treatment time: 41 min   Min Units   OT Eval Low [id number]     OT Eval Medium [id number]     OT Eval High Romea Solorzano [address] [phone] [id number]   ADL/Self Care [id number] [phone]   Orthotic Management [id number]     Neuro Re-Ed [id number]     Non-Billable Time          Venetia Canavan, OTTRINA,  OTR/L; [id number]

## 2022-12-02 NOTE — PLAN OF CARE
Problem: Chronic Conditions and Co-morbidities  Goal: Patient's chronic conditions and co-morbidity symptoms are monitored and maintained or improved  Outcome: Progressing     Problem: Discharge Planning  Goal: Discharge to home or other facility with appropriate resources  Outcome: Progressing     Problem: Pain  Goal: Verbalizes/displays adequate comfort level or baseline comfort level  Outcome: Progressing     Problem: Safety - Adult  Goal: Free from fall injury  Outcome: Progressing     Problem: ABCDS Injury Assessment  Goal: Absence of physical injury  Outcome: Progressing     Problem: Neurosensory - Adult  Goal: Achieves stable or improved neurological status  Outcome: Progressing     Problem: Cardiovascular - Adult  Goal: Maintains optimal cardiac output and hemodynamic stability  Outcome: Progressing     Problem: Skin/Tissue Integrity - Adult  Goal: Skin integrity remains intact  Outcome: Progressing     Problem: Gastrointestinal - Adult  Goal: Minimal or absence of nausea and vomiting  Outcome: Progressing     Problem: Genitourinary - Adult  Goal: Absence of urinary retention  Outcome: Progressing     Problem: Metabolic/Fluid and Electrolytes - Adult  Goal: Electrolytes maintained within normal limits  Outcome: Progressing     Problem: Hematologic - Adult  Goal: Maintains hematologic stability  Outcome: Progressing     Problem: Skin/Tissue Integrity  Goal: Absence of new skin breakdown  Description: 1. Monitor for areas of redness and/or skin breakdown  2. Assess vascular access sites hourly  3. Every 4-6 hours minimum:  Change oxygen saturation probe site  4. Every 4-6 hours:  If on nasal continuous positive airway pressure, respiratory therapy assess nares and determine need for appliance change or resting period.   Outcome: Progressing     Problem: Nutrition Deficit:  Goal: Optimize nutritional status  Outcome: Progressing

## 2022-12-02 NOTE — PROGRESS NOTES
Hospitalist Progress Note      SYNOPSIS: Patient admitted on 11/24/2022 for A-fib [facility])      SUBJECTIVE:    Patient seen and examined. More alert and follows command today. Son at bedside. Plan of care discussed. Per son, he did participate with PT today. Records reviewed. 80 y.o. male with past medical history of atrial fibrillation, DM hypertension . Patient is a transfer from [facility] . He was seen there due to chest pain . He states that chest pain is located in the sternal area . He states  that he has shortness of breath with chest pain . He states that when chest pain occurred he felt like he was going to pass out . Patient had a syncopal episode and hit his head . Patient had a second syncopal episode  and after that family called ambulance . En route to ED patient was given 324 mg of ASA . In the ED patient was in Afib with slow ventricular rate having long pauses and bradycardic to the 30s . Lab data revealed sodium 135 potassium 4.1 BUN 29 Scr 2.0  glucose 244 Trop 45 >>43  WBC 9.9 HGB 10.7   Resp panel neg CT head cervical spine neg CXR congestive changes . Patient received fentanyl morphine and was placed on isoproterenol infusion. EP consulted-pacemaker placement. Lyme serology was ordered for possible carditis. Patient had an initial CT of the head after the fall which was negative for any intracranial hemorrhage. Repeat CT head was obtained on 11/26/2022 as patient continued to complain of headache and spite of pain medicine. Repeat CT of the head noncontrast was obtained which showed progressive subdural hemorrhage. Eliquis was held, [facility] ordered for Eliquis reversal.  Neurosurgery following. Worsening mental status with aphasia. Found to have two seizures. Started on Keppra and Vimpat. Neurology following. He has been on continuous video EEG. Seizure medications has been adjusted.   Last seizure episode 2350 lasted approximately 3 minutes prior to giving valproate and bolus which was completed at 02 100. No further seizures after that. Appreciate neurology's inputs. Continue Vimpat, Keppra and Depacon. Stable overnight. No other overnight issues reported. Temp (24hrs), Av.8 °F (36.6 °C), Min:97 °F (36.1 °C), Max:98.2 °F (36.8 °C)    DIET: ADULT ORAL NUTRITION SUPPLEMENT; Lunch, Dinner; Low Calorie/High Protein Oral Supplement  ADULT DIET; Easy to Chew; 5 carb choices (75 gm/meal); Low Fat/Low Chol/High Fiber/2 gm Na  CODE: Limited    Intake/Output Summary (Last 24 hours) at 2022 1530  Last data filed at 2022 1300  Gross per 24 hour   Intake 2403.63 ml   Output 2425 ml   Net -21.37 ml       OBJECTIVE:    BP (!) 147/67   Pulse 73   Temp 97.6 °F (36.4 °C)   Resp 16   Ht 5' 9\" (1.753 m)   Wt 179 lb 3.7 oz (81.3 kg)   SpO2 99%   BMI 26.47 kg/m²     General appearance: No apparent distress, appears stated age and cooperative. HEENT:  Conjunctivae/corneas clear. Neck: Supple. No jugular venous distention. Respiratory: Clear to auscultation bilaterally, normal respiratory effort  Cardiovascular: Regular rate rhythm, normal S1-S2  Abdomen: Soft, nontender, nondistended  Musculoskeletal: No clubbing, cyanosis, 2+bilateral lower extremity edema. Brisk capillary refill. 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 Seizure medications with Vimpat 200 mg twice daily, Keppra 500 mg twice daily and Depacon 500 mg twice daily per neurology. No seizures since 1129 at 2350. Echocardiogram 2022 with mild to moderate aortic stenosis and mild to moderate aortic regurgitation. EF of 60-65%. Continue to hold Eliquis.       DISPOSITION:     Medications:  REVIEWED DAILY    Infusion Medications    sodium chloride      dextrose       Scheduled Medications    insulin lispro  0-4 Units SubCUTAneous TID WC    insulin lispro  0-4 Units SubCUTAneous Nightly    insulin lispro  12 Units SubCUTAneous TID     sennosides-docusate sodium  2 tablet Oral Daily    [START ON 12/3/2022] insulin glargine  30 Units SubCUTAneous Daily    NIFEdipine  60 mg Oral Daily    pantoprazole  40 mg Oral QAM AC    lacosamide (VIMPAT) IVPB  200 mg IntraVENous BID    valproate sodium (DEPACON) IVPB  500 mg IntraVENous 2 times per day    Or    divalproex  500 mg Oral 2 times per day    polyethylene glycol  17 g Oral Daily    levETIRAcetam  500 mg IntraVENous Q12H    guaiFENesin  400 mg Oral TID    sodium chloride flush  5-40 mL IntraVENous 2 times per day    lidocaine  1 patch TransDERmal Daily    allopurinol  300 mg Oral Once per day on Mon Wed Fri    gabapentin  100 mg Oral BID    tamsulosin  0.4 mg Oral Nightly    Arformoterol Tartrate  15 mcg Nebulization BID    lisinopril  20 mg Oral Daily    And    hydroCHLOROthiazide  12.5 mg Oral Daily     PRN Meds: ipratropium-albuterol, perflutren lipid microspheres, hydrALAZINE, trimethobenzamide, sodium chloride flush, sodium chloride, labetalol, glucose, dextrose bolus **OR** dextrose bolus, glucagon (rDNA), dextrose, potassium chloride **OR** potassium alternative oral replacement **OR** potassium chloride, acetaminophen **OR** acetaminophen, aluminum & magnesium hydroxide-simethicone    Labs:     Recent Labs     11/30/22  0417 12/01/22  0450 12/02/22  0451   WBC 10.3 9.8 9.6   HGB 10.2* 10.9* 10.7*   HCT 30.3* 32.9* 32.4*    283 247       Recent Labs     11/30/22  0417 11/30/22  1641 12/01/22  0450 12/02/22  0451    127* 133 138   K 4.2 4.1 4.2 4.6   CL 99 91* 97* 103   CO2 22 22 23 20*   BUN 45* 44* 43* 46*   CREATININE 2.0* 1.7* 1.9* 1.9*   CALCIUM 9.2 9.6 9.1 9. 5   PHOS 2.8  --  2.8 3.7       No results for input(s): PROT, ALB, ALKPHOS, ALT, AST, BILITOT, AMYLASE, LIPASE in the last 72 hours. Recent Labs     11/30/22  0417 12/01/22 0450 12/02/22 0451   INR 1.2 1.2 1.2       No results for input(s): Carter Carboner in the last 72 hours. Chronic labs:    Lab Results   Component Value Date    TSH 3.970 11/24/2022    PSA 3.76 09/16/2022    INR 1.2 12/02/2022    LABA1C 8.5 (H) 08/02/2022       Radiology: REVIEWED DAILY    +++++++++++++++++++++++++++++++++++++++++++++++++  June MD Viridiana  [facility] Physician - [address] [place] [address]  +++++++++++++++++++++++++++++++++++++++++++++++++  NOTE: This report was transcribed using voice recognition software. Every effort was made to ensure accuracy; however, inadvertent computerized transcription errors may be present.

## 2022-12-02 NOTE — PROGRESS NOTES
[address] [facility]   Department of Internal Medicine   Internal Medicine Residency  MICU Progress Note    Patient:  Sania Watts 80 y.o. male   MRN: [id number]       Date of Service: 2022    Allergy: Patient has no known allergies. Subjective     Patient was seen and examined this morning at bedside in no acute distress. He appeared a bit drowsy in the morning but during the rounds patient appeared more awake eating from the tray. Overnight, BP stable and well controlled s/p increased nifedipine 60 mg.    Objective     TEMPERATURE:  Current - Temp: 97 °F (36.1 °C); Max - Temp  Av.8 °F (36.6 °C)  Min: 97 °F (36.1 °C)  Max: 98.2 °F (36.8 °C)  RESPIRATIONS RANGE: Resp  Av  Min: 10  Max: 29  PULSE RANGE: Pulse  Av.8  Min: 69  Max: 87  BLOOD PRESSURE RANGE:  Systolic (25XNA), EAV:248 , Min:121 , SRL:695   ; Diastolic (55KER), CSI:97, Min:51, Max:91    PULSE OXIMETRY RANGE: SpO2  Av.1 %  Min: 80 %  Max: 100 %    I & O - 24hr:    Intake/Output Summary (Last 24 hours) at 2022 1320  Last data filed at 2022 1033  Gross per 24 hour   Intake 2203.63 ml   Output 2265 ml   Net -61.37 ml     I/O last 3 completed shifts: In: 2952.8 [P.O.:1160; I.V.:1266.6; IV Piggyback:526.2]  Out: 6815 [Urine:2445] I/O this shift:  In: -   Out: 270 [Urine:270]   Weight change: -13 lb 12.3 oz (-6.244 kg)    Physical Exam:   General Appearance:    obeying commands, no acute distress. HEENT:    mucous membranes are moist   Neck:   Supple, no jugular venous distention.     Resp:     No wheezes, No rhonchi, no use of accessory muscles   Heart:    Irregular irregular pulse (in Afib), S1 and S2 normal   Abdomen:     Soft, non-tender, non-distended with normal bowel sounds   Extremities:   Atraumatic, no cyanosis or edema   Pulses:  Radial and pedal pulses are intact bilaterally   Neurologic:   AAOx3, No focal motor deficit       Medications     Continuous Infusions:   sodium chloride dextrose       Scheduled Meds:   insulin lispro  0-4 Units SubCUTAneous TID     insulin lispro  0-4 Units SubCUTAneous Nightly    insulin lispro  12 Units SubCUTAneous TID     sennosides-docusate sodium  2 tablet Oral Daily    [START ON 12/3/2022] insulin glargine  30 Units SubCUTAneous Daily    NIFEdipine  60 mg Oral Daily    pantoprazole  40 mg Oral QAM AC    lacosamide (VIMPAT) IVPB  200 mg IntraVENous BID    valproate sodium (DEPACON) IVPB  500 mg IntraVENous 2 times per day    Or    divalproex  500 mg Oral 2 times per day    polyethylene glycol  17 g Oral Daily    levETIRAcetam  500 mg IntraVENous Q12H    guaiFENesin  400 mg Oral TID    sodium chloride flush  5-40 mL IntraVENous 2 times per day    lidocaine  1 patch TransDERmal Daily    allopurinol  300 mg Oral Once per day on Mon Wed Fri    gabapentin  100 mg Oral BID    tamsulosin  0.4 mg Oral Nightly    Arformoterol Tartrate  15 mcg Nebulization BID    lisinopril  20 mg Oral Daily    And    hydroCHLOROthiazide  12.5 mg Oral Daily     PRN Meds: ipratropium-albuterol, perflutren lipid microspheres, hydrALAZINE, trimethobenzamide, sodium chloride flush, sodium chloride, labetalol, glucose, dextrose bolus **OR** dextrose bolus, glucagon (rDNA), dextrose, potassium chloride **OR** potassium alternative oral replacement **OR** potassium chloride, acetaminophen **OR** acetaminophen, aluminum & magnesium hydroxide-simethicone  Nutrition:   ADULT ORAL NUTRITION SUPPLEMENT; Lunch, Dinner; Low Calorie/High Protein Oral Supplement  ADULT DIET; Easy to Chew; 5 carb choices (75 gm/meal);  Low Fat/Low Chol/High Fiber/2 gm Na    Labs and Imaging Studies     CBC:   Recent Labs     11/30/22  0417 12/01/22  0450 12/02/22  0451   WBC 10.3 9.8 9.6   HGB 10.2* 10.9* 10.7*   HCT 30.3* 32.9* 32.4*   MCV 82.8 84.1 84.2    283 247       BMP:    Recent Labs     11/30/22  1641 12/01/22  0450 12/02/22  0451   * 133 138   K 4.1 4.2 4.6   CL 91* 97* 103   CO2 22 23 20* BUN 44* 43* 46*   CREATININE 1.7* 1.9* 1.9*   GLUCOSE 209* 149* 97       LIVER PROFILE:   No results for input(s): AST, ALT, LIPASE, BILIDIR, BILITOT, ALKPHOS in the last 72 hours. Invalid input(s): AMYLASE,  ALB    PT/INR:   Recent Labs     11/30/22  0417 12/01/22  0450 12/02/22  0451   PROTIME 12.6* 12.8* 12.9*   INR 1.2 1.2 1.2       APTT:   Recent Labs     11/30/22 0417 12/01/22  0450   APTT 26.3 28.0       Fasting Lipid Panel:    No results found for: CHOL, TRIG, HDL    Cardiac Enzymes:    Lab Results   Component Value Date    CKTOTAL 59 02/05/2013    CKMB 1.6 02/05/2013    TROPONINI <0.01 02/05/2013       Notable Cultures:      Blood cultures   Blood Culture, Routine   Date Value Ref Range Status   11/27/2022 24 Hours no growth  Preliminary     Respiratory cultures No results found for: RESPCULTURE No results found for: LABGRAM  Urine   Urine Culture, Routine   Date Value Ref Range Status   06/15/2020 >100,000 CFU/ml  Final     Legionella No results found for: LABLEGI  C Diff PCR No results found for: CDIFPCR  Wound culture/abscess: No results for input(s): WNDABS in the last 72 hours. Tip culture:No results for input(s): CXCATHTIP in the last 72 hours. Oxygen: Additional Respiratory Assessments  Heart Rate: 70  Resp: 15  SpO2: 98 %   Urinary Catheter 12/01/22-Output (mL): 100 mL    Imaging Studies:  CT HEAD WO CONTRAST   Final Result   No significant change in large left subdural hemorrhage with stable 7 mm   midline shift to the right. CT HEAD WO CONTRAST   Final Result   1. Moderate to large left subdural hematoma with left-to-right midline   structure shift of approximately 7 mm, unchanged. 2.  Greatest maximal thickness of the subdural hematoma on coronal views is   24 mm, compared to 23 mm on the prior study. 3.  Gas fluid level in the left sphenoid sinus may reflect acute sinusitis.          CT HEAD WO CONTRAST   Final Result   Interval increased volume and extent of diffuse left cerebral acute subdural   hemorrhage with greatest thickness adjacent to the frontotemporal region now   measuring up to 22 mm in greatest axial thickness. Interval worsening of rightward midline shift at the level of the septum   pellucidum now measuring 8 mm. Mild cerebral white matter chronic microvascular ischemic disease. Mild diffuse cerebral atrophy. Multiple attempts were made by the [facility] core team to page and notify Dr. Monica Perea. Critical results were then called by Dr. Marv Jones to RN Dorita Church on 11/26/2022 at 22:23. [facility] reports that neurosurgery has been   consulted about the case. XR CHEST PORTABLE   Final Result   Mild congestive changes. XR CHEST PORTABLE   Final Result   No evidence of pneumothorax or parenchymal contusion. AICD visualized in the   left chest with leads in the heart.               Resident's Assessment and Plan     Assessment and Plan:    Neurology  Headache 2/2 acute subdural hematoma   Reports headache s/p fall   Initial CT in the ED was read negative for intracranial hemorrhage  He was on Eliquis for afib prior to fall   CT head on 11/26/2022 showed subdural hematoma of 7 mm, waxing and waning mentation  Neurosurgery is on board and does not recommend anything at this time and want to watch and wait   Maintain Na level>145  May consider Pama Durie in 7-10 days post hematoma if hematoma liquifies, obtain CT head wo day 7 (12/2/22) awaiting further recommendations from neurosurgery (Dr Ezra Smith)  Maintain BP systolic <804 mmHg  BP better controlled after nifedipine was increased     Non-convulsive Seizure  Extended EEG with video 11/28/2022 shows:  Left frontal electrographic seizures with spread to involve the left frontocentrotemporal region  A potential for focl seizures from the left frontal region  Moderate to severe nonspecific cerebral dysfunction of the left frontocentrotemporal region  A mild to moderate nonspecific encephalopathy  On Valproate 500mg BID, Keppra 500 BID (renally dosed at present), and Vimpat 100 BID  Follow neurology recommendations, continue on triple anti-seizure meds  Continue neuro checks q1h      Cardiology  Syncope episode 2/2 complete heart block s/p pacemaker placement  He was initially on isoproterenol which has now been stopped. Heart rate currently stable   EP/Cardiology following     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 sinus 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)   S/P pacemaker dual chamber placement on 11/25/2022  TSH wnl, ruled out hypothyroidism  Lyme serology negative  EP/Cardiology following       Afib [id number] score 4   Currently rate controlled HR 70 , not rhythm controlled  On Eliquis, held prior to pacemaker placement  EP following, metoprolol held   Eliquis held and reversal with Kcentra done     HTN   On amlodipine and lisinopril, continue  Continue on Nifedipne 60mg daily     Renal  CKD stage IV  Continue home BP s/p pacemaker placement   Stable renal function at this point     Endocrine  NIDDM  On MDSS, lispro 12units TID, glargine 30units daily  >166  Hypoglycemia protocol on   Glucose Q4hrs      Problems resolved during this admission:   Third degree AV block s/p pacemaker    Antibiotics:none   Cultures:negative BC in 5 days  Lines:peripherals  Intubated: No  Diet: oral diet    # Peptic ulcer prophylaxis: protonix  # DVT Prophylaxis: mechanical  # Disposition: Cont current care     Iliana Joya MD, PGY-1    Attending Physician: Dr. Anuradha Mendieta, [address] [place] [address]  Department of Pulmonary, Critical Care and [facility]  Department of Internal Medicine      During multidisciplinary team rounds Ricardo Johnson is a 80 y.o. male was seen, examined and discussed.  This is confirmation that I have personally seen and examined the patient and that the key elements of the encounter were performed by me (> 85 % time). The medications & laboratory data was discussed and adjusted where necessary. The radiographic images were reviewed or with radiologist or consultant if felt dis-concordant with the exam or history. The above findings were corroborated, plans confirmed and changes made if needed. Family is updated at the bedside as available. Key issues of the case were discussed among consultants. Critical Care time is documented if appropriate.       Critical Care time: 35 minutes    Lainey Marshall DO

## 2022-12-02 NOTE — PROGRESS NOTES
SPEECH LANGUAGE PATHOLOGY  DAILY PROGRESS NOTE        PATIENT NAME:  Bijal Robertson      :  10/27/1933          TODAY'S DATE:  2022 ROOM:  [facility]    Order received for \"language and cognition evaluation. \" Chart reviewed. Pt unavailable at this time due to:  [] HOLD per RN  [] Off unit for testing/ procedure    [] With medical staff   [] Declined intervention  [x] Sleeping/ Lethargic-pt asleep and did not awaken given verbal/tactile cues to complete speech/lang eval.    [] Other:     Will re-attempt later this date as able. Thank you.

## 2022-12-02 NOTE — PROGRESS NOTES
Neurosurg progress note  VITALS:  /74   Pulse 76   Temp 97 °F (36.1 °C)   Resp 25   Ht 5' 9\" (1.753 m)   Wt 179 lb 3.7 oz (81.3 kg)   SpO2 98%   BMI 26.47 kg/m²   24HR INTAKE/OUTPUT:    Intake/Output Summary (Last 24 hours) at 12/2/2022 1140  Last data filed at 12/2/2022 1033  Gross per 24 hour   Intake 2203.63 ml   Output 2265 ml   Net -61.37 ml     CT Head W/O Contrast    Result Date: 11/24/2022  EXAMINATION: CT OF THE HEAD WITHOUT CONTRAST  11/24/2022 9:38 am TECHNIQUE: CT of the head was performed without the administration of intravenous contrast. Automated exposure control, iterative reconstruction, and/or weight based adjustment of the mA/kV was utilized to reduce the radiation dose to as low as reasonably achievable. COMPARISON: None. HISTORY: ORDERING SYSTEM PROVIDED HISTORY: LOC + fall + strike to head; concern for bleed TECHNOLOGIST PROVIDED HISTORY: Reason for exam:->LOC + fall + strike to head; concern for bleed Has a \"code stroke\" or \"stroke alert\" been called? ->No Decision Support Exception - unselect if not a suspected or confirmed emergency medical condition->Emergency Medical Condition (MA) FINDINGS: BRAIN/VENTRICLES: No evidence of parenchymal hemorrhages or contusions. No evidence of intra or extra-axial fluid collection is seen. Scattered areas of low attenuation are visualized in the periventricular and subcortical white matter suggestive of chronic microvascular disease. No evidence of acute territorial infarct is seen. Prominence of the ventricles and sulci is visualized suggestive of chronic atrophic brain changes. No evidence of intracranial mass or mass effect, no evidence of midline shift is seen. No evidence of sellar or parasellar mass is visualized. ORBITS: The visualized portion of the orbits demonstrate no acute abnormality. SINUSES: The visualized paranasal sinuses and mastoid air cells demonstrate no acute abnormality.  SOFT TISSUES/SKULL:  No acute abnormality of the visualized skull or soft tissues. No acute intracranial abnormality. CT CSpine W/O Contrast    Result Date: 11/24/2022  EXAMINATION: CT OF THE CERVICAL SPINE WITHOUT CONTRAST 11/24/2022 9:38 am TECHNIQUE: CT of the cervical spine was performed without the administration of intravenous contrast. Multiplanar reformatted images are provided for review. Automated exposure control, iterative reconstruction, and/or weight based adjustment of the mA/kV was utilized to reduce the radiation dose to as low as reasonably achievable. COMPARISON: None. HISTORY: ORDERING SYSTEM PROVIDED HISTORY: LOC + Fall + head strike; Concern for fx TECHNOLOGIST PROVIDED HISTORY: Reason for exam:->LOC + Fall + head strike; Concern for fx Decision Support Exception - unselect if not a suspected or confirmed emergency medical condition->Emergency Medical Condition (MA) FINDINGS: Straightening of the lumbar the columns of the cervical spine is present. No evidence of spondylolisthesis is seen. Multilevel degenerative endplate changes visualized, no evidence of compression deformity of the cervical vertebral bodies. No evidence of fracture or traumatic subluxation is seen. Decreased intervertebral disc height visualized most prominent at C5-C6 and C3-C4. Multilevel degenerative disc osteophyte complex, uncovertebral disease and hypertrophic changes in the facet joints is seen. Calcification visualized within the spinal canal superimposed over the falciform ligament at the level of the C5 vertebral body. Otherwise abnormal density visualized in the cervical spinal canal. The neck soft tissues are unremarkable. Circumferential opacification visualized in the sphenoid sinus. Limited evaluation of the upper lung fields is unremarkable bilaterally. No acute abnormality of the cervical spine. Multilevel degenerative changes of the cervical spine visualized most prominent at C3-C4 and C5-C6.  Circumferential opacification visualized in the sphenoid sinus. XR CHEST PORTABLE    Result Date: 11/25/2022  EXAMINATION: ONE XRAY VIEW OF THE CHEST 11/25/2022 4:57 pm COMPARISON: 11/24/2022. HISTORY: ORDERING SYSTEM PROVIDED HISTORY: Pacemaker placement and rule out pneumothorax TECHNOLOGIST PROVIDED HISTORY: Reason for exam:->Pacemaker placement and rule out pneumothorax What reading provider will be dictating this exam?->CRC FINDINGS: AICD visualized in the left chest with lead in the heart. EKG leads are seen superimposed over the chest. Poor inspiratory effort is seen. The CT of the prominence of the cardiomediastinal silhouette is visualized demonstrates no significant increase in comparison to the prior study. Atherosclerotic calcifications visualized in the area the aortic arch. Mild prominence of the bronchovascular and interstitial lung markings is visualized in bilateral lung fields more prominent in the lateral right mid lung field large and in lung field linear subsegmental atelectatic streaks of visualized bilateral lung fields. No evidence of focal infiltrate or consolidation. No evidence of pneumothorax or pleural effusion. Degenerative bone changes seen. No evidence of pneumothorax or parenchymal contusion. AICD visualized in the left chest with leads in the heart. XR CHEST PORTABLE    Result Date: 11/24/2022  EXAMINATION: ONE XRAY VIEW OF THE CHEST 11/24/2022 9:30 am COMPARISON: 08/08/2022 HISTORY: ORDERING SYSTEM PROVIDED HISTORY: CP + SOB TECHNOLOGIST PROVIDED HISTORY: Reason for exam:->CP + SOB FINDINGS: Poor inspiratory effort. The cardiomediastinal silhouette is slightly prominent but demonstrates no significant change in comparison to the prior study. Prominence of the bronchovascular interstitial lung markings is visualized bilaterally with scattered areas of patchy airspace opacification seen. Linea subsegmental atelectatic streaks are visualized.   The costophrenic angles are clear with no evidence of pleural effusion seen.  No evidence of pneumothorax or parenchymal lung mass. The osseous structures are without acute process. Congestive changes demonstrate no change.      CBC:   Lab Results   Component Value Date/Time    WBC 9.6 12/02/2022 04:51 AM    RBC 3.85 12/02/2022 04:51 AM    HGB 10.7 12/02/2022 04:51 AM    HCT 32.4 12/02/2022 04:51 AM    MCV 84.2 12/02/2022 04:51 AM    MCH 27.8 12/02/2022 04:51 AM    MCHC 33.0 12/02/2022 04:51 AM    RDW 13.8 12/02/2022 04:51 AM     12/02/2022 04:51 AM    MPV 10.1 12/02/2022 04:51 AM     BMP:    Lab Results   Component Value Date/Time     12/02/2022 04:51 AM    K 4.6 12/02/2022 04:51 AM    K 4.5 11/24/2022 09:08 AM     12/02/2022 04:51 AM    CO2 20 12/02/2022 04:51 AM    BUN 46 12/02/2022 04:51 AM    LABALBU 4.2 11/24/2022 09:08 AM    CREATININE 1.9 12/02/2022 04:51 AM    CALCIUM 9.5 12/02/2022 04:51 AM    GFRAA 30 09/16/2022 08:24 AM    LABGLOM 33 12/02/2022 04:51 AM    GLUCOSE 97 12/02/2022 04:51 AM      insulin lispro  0-4 Units SubCUTAneous TID     insulin lispro  0-4 Units SubCUTAneous Nightly    insulin lispro  12 Units SubCUTAneous TID     sennosides-docusate sodium  2 tablet Oral Daily    [START ON 12/3/2022] insulin glargine  30 Units SubCUTAneous Daily    NIFEdipine  60 mg Oral Daily    pantoprazole  40 mg Oral QAM AC    lacosamide (VIMPAT) IVPB  200 mg IntraVENous BID    valproate sodium (DEPACON) IVPB  500 mg IntraVENous 2 times per day    Or    divalproex  500 mg Oral 2 times per day    polyethylene glycol  17 g Oral Daily    levETIRAcetam  500 mg IntraVENous Q12H    guaiFENesin  400 mg Oral TID    sodium chloride flush  5-40 mL IntraVENous 2 times per day    lidocaine  1 patch TransDERmal Daily    allopurinol  300 mg Oral Once per day on Mon Wed Fri    gabapentin  100 mg Oral BID    tamsulosin  0.4 mg Oral Nightly    Arformoterol Tartrate  15 mcg Nebulization BID    lisinopril  20 mg Oral Daily    And    hydroCHLOROthiazide  12.5 mg Oral Daily Sitting upright in the hospital bed eating awake alert follows commands moving all fours equal pupils equal round reactive to light  Assessment:  Patient Active Problem List   Diagnosis    PUD (peptic ulcer disease)    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    Esophagitis-grade 1    AP (angina pectoris) ([facility])    Systolic hypertension    Combined forms of age-related cataract of left eye    Anemia, chronic renal failure, stage 4 (severe) (HCC)    Sepsis secondary to CAP ([address]    Pneumonia of left lung due to infectious organism    A-fib [facility])    Atrial fibrillation ([facility])    Third degree heart block ([address]    High-grade atrioventricular block    Seizure ([address]    Subdural hematoma     Plan:Continue current care  Jana Larson MD M.D.

## 2022-12-02 NOTE — PROGRESS NOTES
[address]  Neurology follow up     Date:  12/2/2022  Patient Name:  Sherif Antony  YOB: 1933  MRN: [id number]     Rosa Sutton is a 80 y.o. male admitted with falls on Eliquis found to have developed a large SDH. He now has developed aphasia secondary to left frontal onset seizures. No seizures since 11/29 at 2350. Maintained on ASM as listed below. He is improving slowly on exam.     Plan  Continue Keppra 500 BID (renally dosed at present)  Continue Vimpat at 200 BID  Continue  mg BID  Continue above at discharge and will plan on weaning as outpatient  Seizure safety precautions for 6 months  Neurology will be available if needed, please reconsult if concern for additional seizures prior to discharge, otherwise he is okay for discharge from neurology point of view once okay with others  Follow-up with outpatient neurology    History of Present Illness:  Sherif Antony is a 80 y.o. right handed male presenting for evaluation of seizures. The patient was admitted on 11/24/22 for chest pain and SOB. He has a history of afib and was on Eliquis. He had been having worsening dizziness and had passed out and hit his head. Initial CT head done on 11/24/22 was read as no acute abnormality, however, on review it does appear to show a left SDH. 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 Patient had persistent headache and a repeat CT scan on 11/26 showed increased volume of SDH over left frontotemporal region up to 22m in greatest thickness. 8mm of midline shift also noted. Eliquis was reversed with Kcentra. The patient reportedly remained asymptomatic asymptomatic at this time other than the headache. He was noted to be more somnolent on 11/27/22 with more bereket concern for aphasia on 11/28/22. EEG was ordered which showed left frontal onset seizure activity. 11/29- Patient is sleeping and rouses to name being called and tactile stimuli. Withdraws to pain and follows 1 step commands intermittently. No significant changes overnight and exam appears stable compared to yesterday. No family at bedside. 11/30-continuous EEG hooked up yesterday. 3 seizures have been recorded since being coughed up. Seizure at 1227 lasted approximately 3 minutes. During the seizure patient put left arm on his head, but has no clear clinical change other than some mild tachypnea. At that time Vimpat was bolused with an additional 100 mg and dose increased to 200 mg twice daily. Another seizure was recorded at 2056 lasting 2 minutes. Bolus of valproate 1000 mg ordered and 500 mg twice daily to be started this morning. Third seizure occurred at 2350 lasting nearly 3 minutes. This was prior to valproate bolus being given. There have been no further seizures recorded up through 0918 this morning. 12/1There have been no further seizures since 2350 on 11/29. Tolerating current antiseizure medication regimen well at this time. Continuous EEG discontinued this morning. Neurosurgery continuing to follow, maintaining sodium level greater than 45. Considering bur hole in 7 to 10 days post hematoma if hematoma liquefies. Planning to repeat CT head on 12/3    12/2  No further seizures reported. Patient is sleeping this morning. He does wake up to verbal and tactile stimulation but quickly drifts back off to sleep. Follows one-step simple commands and is able to tell me his name and does produce some more spontaneous speech today.     No family at bedside    Review of Systems:  Unable to obtain due to:  aphasia    Allergies:      No Known Allergies     Physical Examination  Vitals   Vitals:    12/02/22 0510 12/02/22 0600 12/02/22 0700 12/02/22 0800   BP:  (!) 167/72 139/69 137/74   Pulse:  79 80 87   Resp:  17 16 22   Temp:    97 °F (36.1 °C)   TempSrc:       SpO2:  97% 97% 96%   Weight: 179 lb 3.7 oz (81.3 kg)      Height:          General: Patient appears in no acute distress. HEENT: Normocephalic, atraumatic  Chest: effort normal   Heart: irregularly irregular  Extremities/Peripheral vascular: No edema/swelling noted. No cold limbs noted. Neurologic Examination    Mental Status  Sleeping, but rouses to voice and tactile stimuliation. Requires repeated stimulation to attend. Oriented to self. Does produce more spontaneous speech today. follows 1 step commands better today. Attention  fair. Cranial Nerves  II. Visual fields full to threat bilaterally. III, IV, VI: Pupils equally round and reactive to light, 3 to 2 mm bilaterally. EOMs: full, no nystagmus. V.   VII: Facial movements appear symmetric   VIII: Hearing intact to voice  IX,X: Palate elevates symmetrically. Mild dysarthria  XI: Shoulders appear symmetric  XII: Tongue is midline    Motor  Moves all four limbs symmetrically with  4+/5 strength  Normal tone and bulk   No abnormal movements     Sensation  Appreciates LT in all limbs     Reflexes    No babinski     Coordination  No resting tremors observed     Gait  Deferred for safety/fall consideration    Labs  Recent Labs     12/02/22  0451      K 4.6      CO2 20*   BUN 46*   CREATININE 1.9*   GLUCOSE 97   CALCIUM 9.5   WBC 9.6   RBC 3.85   HGB 10.7*   HCT 32.4*   MCV 84.2   MCH 27.8   MCHC 33.0   RDW 13.8      MPV 10.1         Imaging  CT HEAD WO CONTRAST   Final Result   No significant change in large left subdural hemorrhage with stable 7 mm   midline shift to the right. CT HEAD WO CONTRAST   Final Result   1. Moderate to large left subdural hematoma with left-to-right midline   structure shift of approximately 7 mm, unchanged. 2.  Greatest maximal thickness of the subdural hematoma on coronal views is   24 mm, compared to 23 mm on the prior study.       3.  Gas fluid level in the left sphenoid sinus may reflect acute sinusitis. CT HEAD WO CONTRAST   Final Result   Interval increased volume and extent of diffuse left cerebral acute subdural   hemorrhage with greatest thickness adjacent to the frontotemporal region now   measuring up to 22 mm in greatest axial thickness. Interval worsening of rightward midline shift at the level of the septum   pellucidum now measuring 8 mm. Mild cerebral white matter chronic microvascular ischemic disease. Mild diffuse cerebral atrophy. Multiple attempts were made by the [facility] core team to page and notify Dr. Kalyn Trinh. Critical results were then called by Dr. Ann Mcgowan to [facility] Richardson on 11/26/2022 at 22:23. Ruth Brought reports that neurosurgery has been   consulted about the case. XR CHEST PORTABLE   Final Result   Mild congestive changes. XR CHEST PORTABLE   Final Result   No evidence of pneumothorax or parenchymal contusion. AICD visualized in the   left chest with leads in the heart. EEG 11/28  This abnormal study showed evidence of:     Left frontal electrographic seizures with spread to involve the left frontocentrotemporal region  A potential for focl seizures from the left frontal region  Moderate to severe nonspecific cerebral dysfunction of the left frontocentrotemporal region  A mild to moderate nonspecific encephalopathy     Structural abnormalities should be considered for the findings above and appropriate imaging obtained if clinically indicated. cEEG  EEG was reviewed from 1054 through 1317, 11/29/2022. One seizure noted at 1227 lasting approximately 3 minutes. During the seizure he puts his left arm on his head, but has no clear clinical change other than what appears to be some mild tachypnea. Will give additional 100 mg Vimpat IV and increase to 200 mg BID this evening.      Candace Roth DO, 1:39 PM, 11/29/2022         EEG addendum  EEG reviewed through 1903, 11/29/2022      Background consistent with mild to moderate nonspecific encephalopathy. No seizures noted. Cathie Hu DO, 8:55 PM, 11/29/2022       EEG addendum  EEG reviewed through 2252, 11/29/2022      One seizure noted at 2056 lasting nearly 2 minutes. IV bolus of valproate 1000 mg ordered to be followed by 500 BID starting morning of 11/30. Interim EEG Progress Note     EEG was reviewed from 2252, 11/29/22 through 0918, 11/30/2022. One seizure noted at 2350 lasting for approximately 3 minutes. This occurred prior to valproate bolus being completed at 0200. No further seizures noted during this period. Interim EEG Progress Note     EEG was reviewed from [facility] through 0614, 12/1/2022     Background consistent with a mild to moderate encephalopathy. No seizures noted during this period.          Cathie Hu DO, 7:29 AM, 12/1/2022      Electronically signed by TOMAS Harris on 12/2/2022 at 10:29 AM

## 2022-12-02 NOTE — PROGRESS NOTES
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    Esophagitis-grade 1 K20.90    AP (angina pectoris) ([facility]) T76.9    Systolic hypertension P68    Combined forms of age-related cataract of left eye H25.812    Anemia, chronic renal failure, stage 4 (severe) ([facility]) N18.4, D63.1    Sepsis secondary to CAP ([facility]) A41.9    Pneumonia of left lung due to infectious organism J18.9    A-fib ([facility]) I48.91    Atrial fibrillation ([facility]) I48.91    Third degree heart block ([facility]) I44.2    High-grade atrioventricular block I44.39    Seizure ([facility]) R56.9    Subdural hematoma S06. 5XAA       SUBJECTIVE:  29 [address] [place] readily recognizes me sitting at his bedside notes that He denies any chest discomfort nor palpitations. systolic blood pressure remains problematic. REVIEW OF SYSTEMS:  Presently unable to accurately obtain due to  patient's profound neurologic state.       SCHEDULED MEDICATIONS:   insulin glargine  35 Units SubCUTAneous Daily    insulin lispro  15 Units SubCUTAneous TID WC    NIFEdipine  60 mg Oral Daily    pantoprazole  40 mg Oral QAM AC    lacosamide (VIMPAT) IVPB  200 mg IntraVENous BID    insulin lispro  0-8 Units SubCUTAneous TID WC    insulin lispro  0-4 Units SubCUTAneous Nightly    valproate sodium (DEPACON) IVPB  500 mg IntraVENous 2 times per day    Or    divalproex  500 mg Oral 2 times per day    polyethylene glycol  17 g Oral Daily    levETIRAcetam  500 mg IntraVENous Q12H    guaiFENesin  400 mg Oral TID    sodium chloride flush  5-40 mL IntraVENous 2 times per day    lidocaine  1 patch TransDERmal Daily    allopurinol  300 mg Oral Once per day on Mon Wed Fri    gabapentin  100 mg Oral BID    tamsulosin  0.4 mg Oral Nightly    Arformoterol Tartrate  15 mcg Nebulization BID    lisinopril  20 mg Oral Daily    And    hydroCHLOROthiazide  12.5 mg Oral Daily       VITAL SIGNS:                                                                                                                          BP (!) 141/65   Pulse 70   Temp 98.1 °F (36.7 °C) (Axillary)   Resp 16   Ht 5' 9\" (1.753 m)   Wt 193 lb (87.5 kg)   SpO2 99%   BMI 28.50 kg/m²   Patient Vitals for the past 96 hrs (Last 3 readings):   Weight   12/01/22 0600 193 lb (87.5 kg)   11/30/22 0600 194 lb 0.1 oz (88 kg)   11/29/22 0445 184 lb 4.9 oz (83.6 kg)     OBJECTIVE:    HEENT: PERRL, EOM  Intact; sclera non-icteric, conjunctiva pink. Carotids are brisk in upstroke with normal contour. No carotid bruits. Normal jugular venous pulsation at 45°. No palpable cervical nor supraclavicular nodes. Thyroid not palpable. Trachea midline. Chest: Even excursion  Lungs: grossly clear to auscultation bilaterally no expiratory wheezes or rhonchi, no decreased tactile fremitus without inspiratory rales. Heart: Irregular, regular  rhythm; S1 > S2, no gallop grade 2/6 systolic murmur second right intercostal space no clicks, rub, palpable thrills   or heaves. PMI nondisplaced, 5th intercostal space MCL. Abdomen: Soft, nontender, nondistended,  mildly protuberant, no masses or organomegaly. Bowel sounds active. Extremities: Without clubbing, cyanosis or edema. Pulses present 3+ upper extermities bilaterally; present 1+ DP  bilaterally and present 1+ PT bilaterally.      Data:   Scheduled Meds: Reviewed  Continuous Infusions:    sodium chloride      dextrose         Intake/Output Summary (Last 24 hours) at 12/2/2022 0021  Last data filed at 12/1/2022 2300  Gross per 24 hour   Intake 2243.63 ml   Output 1425 ml   Net 818.63 ml     CBC:   Recent Labs     11/29/22  0435 11/30/22  0417 12/01/22  0450   WBC 9.4 10.3 9.8   HGB 9.8* 10.2* 10.9*   HCT 31.0* 30.3* 32.9*    264 283     BMP:  Recent Labs     11/29/22  0435 11/30/22  0417 11/30/22  1641 12/01/22  0450    132 127* 133   K 4.0 4.2 4.1 4.2   CL 99 99 91* 97*   CO2 20* 22 22 23   BUN 33* 45* 44* 43*   CREATININE 1.8* 2.0* 1.7* 1.9*   LABGLOM 36 31 38 33     ABGs: No results found for: PH, PO2, PCO2  INR:   Recent Labs     11/29/22  0435 11/30/22  0417 12/01/22  0450   INR 1.2 1.2 1.2     PRO-BNP:   Lab Results   Component Value Date    PROBNP 8,347 (H) 08/02/2022      TSH:   Lab Results   Component Value Date    TSH 3.970 11/24/2022      Cardiac Injury Profile:   No results for input(s): TROPHS in the last 72 hours. Lipid Profile: No results found for: TRIG, HDL, LDLCALC, CHOL   Hemoglobin A1C: No components found for: HGBA1C      RAD:   CT HEAD WO CONTRAST   Final Result   Interval increased volume and extent of diffuse left cerebral acute subdural   hemorrhage with greatest thickness adjacent to the frontotemporal region now   measuring up to 22 mm in greatest axial thickness. Interval worsening of rightward midline shift at the level of the septum   pellucidum now measuring 8 mm. Mild cerebral white matter chronic microvascular ischemic disease. Mild diffuse cerebral atrophy. Multiple attempts were made by the [address] [place] [address] core team to page and notify Dr. Kalyn Trinh. Critical results were then called by Dr. Ann Mcgowan to [facility] on 11/26/2022 at 22:23. Ruth Brought reports that neurosurgery has been   consulted about the case. XR CHEST PORTABLE   Final Result   Mild congestive changes. XR CHEST PORTABLE   Final Result   No evidence of pneumothorax or parenchymal contusion. AICD visualized in the   left chest with leads in the heart.          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 5XAA       RECOMMENDATIONS:   Mr. Keaton Santamaria has significantly improved today from a neurologic standpoint. His blood pressure however remains problematic and if no objection would recommend adjustment of his medical regimen. I have spent more than 30 critical care minutes face to face with Gerhardt Castleman and reviewing notes and laboratory data, with greater than 50% of this time instructing and counseling the patient and his family members face to face regarding my findings and recommendations and I have answered all questions as posed to me by Mr. Keaton Santamaria ICU team nurse. Leona Silva,  FACP,FACC,FSCAI      NOTE:  This report was transcribed using voice recognition software.   Every effort was made to ensure accuracy; however, inadvertent computerized transcription errors may be present

## 2022-12-02 NOTE — PROGRESS NOTES
Physical Therapy    Physical Therapy Daily Treatment Note      Name: Shira Peterson  : 10/27/1933  MRN: [id number]      Date of Service: 2022    Evaluating PT:  Connie Aponte PT, DPT  [id number]     Room #:  7131/2693-V  Diagnosis:  A-fib ([facility] [I48.91]  Atrial fibrillation ([place] [address] [I48.91]  PMHx/PSHx:   has a past medical history of A-fib ([place] [address] Diabetes mellitus ([place] [address] H pylori ulcer, Hyperlipidemia, Hypertension, PONV (postoperative nausea and vomiting), PUD (peptic ulcer disease), and Urinary frequency. Procedure/Surgery:  Dual chamber pacemaker placement    Precautions:  Falls, Aphasia, L SDH, Pacemaker precautions, LUE sling at night, SBP<180, Bed/chair alarm   Equipment Needs:  TBD    SUBJECTIVE:    Pt lives with dtr in a 1 story home with 3 stairs and 1 rail to enter. Bedroom and bathroom are on the 1st level. Pt ambulated with no AD PTA. OBJECTIVE:   Initial Evaluation  Date: 22 Treatment  22 Short Term/ Long Term   Goals   AM-PAC 6 Clicks 87/93 70/90    Was pt agreeable to Eval/treatment? Yes  Yes     Does pt have pain? No c/o pain No c/o pain    Bed Mobility  Rolling: SBA  Supine to sit: Min A  Sit to supine: Min A  Scooting: SBA Rolling: NT  Supine to sit: Min A  Sit to supine: Min A  Scooting: Min A Rolling: Supervision   Supine to sit: Supervision   Sit to supine: Supervision   Scooting: Supervision    Transfers Sit to stand: Min A  Stand to sit: Min A  Stand pivot: Min A Sit to stand: Mod A  Stand to sit: Mod A  Stand pivot:  Max A Sit to stand: Supervision   Stand to sit: Supervision   Stand pivot: Supervision    Ambulation    5 feet with HHA Min A Few feet x3 reps with HHA Max A >100 feet with AAD SBA   Stair negotiation: ascended and descended  NT NT >4 steps with 1 rail SBA   ROM BUE:  Per OT eval   BLE:  WFL     Strength BUE:  Per OT eval   BLE:  Grossly ~4/5 -- difficulty following MMT commands      Balance Sitting EOB:  SBA  Dynamic Standing:  Min A Sitting EOB:  SBA  Dynamic Standing:  Mod/Max A Sitting EOB:  Supervision   Dynamic Standing:  SBA     Pt is A & O x 2-3? Difficult to discern d/t aphasia   RASS:  0  CAM-ICU:  NT  Sensation:  Pt denies numbness and tingling to extremities  Edema:  Unremarkable    Vitals:  Blood Pressure at rest 144/70 mmHg  Blood Pressure post session 154/76 mmHg   Heart Rate at rest 70 bpm  Heart Rate post session 72 bpm    SPO2 at rest 95% on RA SPO2 post session 96% on RA       Therapeutic Exercises:    BLE AROM at EOB  STS x3 reps     Patient education  Pt educated on PT role, safety during functional mobility, pacemaker precautions     Patient response to education:   Pt verbalized understanding Pt demonstrated skill Pt requires further education in this area   no no Yes      ASSESSMENT:    Conditions Requiring Skilled Therapeutic Intervention:    [x]Decreased strength     [x]Decreased ROM  [x]Decreased functional mobility  [x]Decreased balance   [x]Decreased endurance   [x]Decreased posture  []Decreased sensation  []Decreased coordination   []Decreased vision  [x]Decreased safety awareness   [x]Increased pain       Comments:  Pt received supine and agreeable to PT tx with OT collaboration. Pt cleared for participation by RN prior to session. Vitals monitored during session. Aphasia appears worse today, difficulty following commands. Pt also appeared more lethargic. Required heavy assistance and cueing during STS and transfer bed<>chair. Pt not left in chair d/t safety concern. RN aware. Discussed with rounding team about pt working with speech therapy to improve performance in physical therapy. Placed in chair position. Pt left with call button in reach, lines attached, and needs met. Pt would benefit from intensive PT services at discharge.      Treatment:  Patient practiced and was instructed in the following treatment:    Bed mobility training - pt given verbal and tactile cues to facilitate proper sequencing and safety during rolling and supine>sit as well as provided with physical assistance to complete task    STS and pivot transfer training - pt educated on proper hand and foot placement, safety and sequencing, and use of R HHA to safely complete sit<>stand and pivot transfers with hands on assistance to complete task safely    PHYSICAL THERAPY PLAN OF CARE:  Pt is making progress towards established goals. Continue PT POC. Specific instructions for next treatment:  transfer and gait training     Time in  0950  Time out  1030    Total Treatment Time  40 minutes     Evaluation Time includes thorough review of current medical information, gathering information on past medical history/social history and prior level of function, completion of standardized testing/informal observation of tasks, assessment of data and education on plan of care and goals.     CPT codes:  [] Low Complexity PT evaluation 32816  [] Moderate Complexity PT evaluation 82602  [] High Complexity PT evaluation 45906  [] PT Re-evaluation 29840  [] Gait training 39727 -- minutes  [] Manual therapy 88165 -- minutes  [x] Therapeutic activities 99996 40 minutes  [] Therapeutic exercises 83796 - minutes  [] Neuromuscular reeducation 48684 -- minutes     Albino Brine, PT, DPT  [id number]

## 2022-12-03 NOTE — PROGRESS NOTES
Speech Language Pathology      NAME:  Ricardo Johnson  :  10/27/1933  DATE: 12/3/2022  ROOM:  [address] [place] [address]-A    Attempted speech/language evaluation again today. Pt lethargic, able to say name and follow simple 1 step directions with moderate cues however then promptly drifts back to sleep. Will hold formal speech/language eval until more consistently awake and able to participate.     A-fib ([facility] [I48.91]  Atrial fibrillation ([address] [I48.91]

## 2022-12-03 NOTE — PROGRESS NOTES
Neurosurg progress note  VITALS:  /64   Pulse 70   Temp 97.8 °F (36.6 °C) (Axillary)   Resp 20   Ht 5' 9\" (1.753 m)   Wt 179 lb 3.7 oz (81.3 kg)   SpO2 99%   BMI 26.47 kg/m²   24HR INTAKE/OUTPUT:    Intake/Output Summary (Last 24 hours) at 12/3/2022 1157  Last data filed at 12/3/2022 1000  Gross per 24 hour   Intake 1873.48 ml   Output 1055 ml   Net 818.48 ml     CT Head W/O Contrast    Result Date: 11/24/2022  EXAMINATION: CT OF THE HEAD WITHOUT CONTRAST  11/24/2022 9:38 am TECHNIQUE: CT of the head was performed without the administration of intravenous contrast. Automated exposure control, iterative reconstruction, and/or weight based adjustment of the mA/kV was utilized to reduce the radiation dose to as low as reasonably achievable. COMPARISON: None. HISTORY: ORDERING SYSTEM PROVIDED HISTORY: LOC + fall + strike to head; concern for bleed TECHNOLOGIST PROVIDED HISTORY: Reason for exam:->LOC + fall + strike to head; concern for bleed Has a \"code stroke\" or \"stroke alert\" been called? ->No Decision Support Exception - unselect if not a suspected or confirmed emergency medical condition->Emergency Medical Condition (MA) FINDINGS: BRAIN/VENTRICLES: No evidence of parenchymal hemorrhages or contusions. No evidence of intra or extra-axial fluid collection is seen. Scattered areas of low attenuation are visualized in the periventricular and subcortical white matter suggestive of chronic microvascular disease. No evidence of acute territorial infarct is seen. Prominence of the ventricles and sulci is visualized suggestive of chronic atrophic brain changes. No evidence of intracranial mass or mass effect, no evidence of midline shift is seen. No evidence of sellar or parasellar mass is visualized. ORBITS: The visualized portion of the orbits demonstrate no acute abnormality. SINUSES: The visualized paranasal sinuses and mastoid air cells demonstrate no acute abnormality.  SOFT TISSUES/SKULL:  No acute abnormality of the visualized skull or soft tissues. No acute intracranial abnormality. CT CSpine W/O Contrast    Result Date: 11/24/2022  EXAMINATION: CT OF THE CERVICAL SPINE WITHOUT CONTRAST 11/24/2022 9:38 am TECHNIQUE: CT of the cervical spine was performed without the administration of intravenous contrast. Multiplanar reformatted images are provided for review. Automated exposure control, iterative reconstruction, and/or weight based adjustment of the mA/kV was utilized to reduce the radiation dose to as low as reasonably achievable. COMPARISON: None. HISTORY: ORDERING SYSTEM PROVIDED HISTORY: LOC + Fall + head strike; Concern for fx TECHNOLOGIST PROVIDED HISTORY: Reason for exam:->LOC + Fall + head strike; Concern for fx Decision Support Exception - unselect if not a suspected or confirmed emergency medical condition->Emergency Medical Condition (MA) FINDINGS: Straightening of the lumbar the columns of the cervical spine is present. No evidence of spondylolisthesis is seen. Multilevel degenerative endplate changes visualized, no evidence of compression deformity of the cervical vertebral bodies. No evidence of fracture or traumatic subluxation is seen. Decreased intervertebral disc height visualized most prominent at C5-C6 and C3-C4. Multilevel degenerative disc osteophyte complex, uncovertebral disease and hypertrophic changes in the facet joints is seen. Calcification visualized within the spinal canal superimposed over the falciform ligament at the level of the C5 vertebral body. Otherwise abnormal density visualized in the cervical spinal canal. The neck soft tissues are unremarkable. Circumferential opacification visualized in the sphenoid sinus. Limited evaluation of the upper lung fields is unremarkable bilaterally. No acute abnormality of the cervical spine. Multilevel degenerative changes of the cervical spine visualized most prominent at C3-C4 and C5-C6.  Circumferential opacification visualized in the sphenoid sinus. XR CHEST PORTABLE    Result Date: 11/25/2022  EXAMINATION: ONE XRAY VIEW OF THE CHEST 11/25/2022 4:57 pm COMPARISON: 11/24/2022. HISTORY: ORDERING SYSTEM PROVIDED HISTORY: Pacemaker placement and rule out pneumothorax TECHNOLOGIST PROVIDED HISTORY: Reason for exam:->Pacemaker placement and rule out pneumothorax What reading provider will be dictating this exam?->CRC FINDINGS: AICD visualized in the left chest with lead in the heart. EKG leads are seen superimposed over the chest. Poor inspiratory effort is seen. The CT of the prominence of the cardiomediastinal silhouette is visualized demonstrates no significant increase in comparison to the prior study. Atherosclerotic calcifications visualized in the area the aortic arch. Mild prominence of the bronchovascular and interstitial lung markings is visualized in bilateral lung fields more prominent in the lateral right mid lung field large and in lung field linear subsegmental atelectatic streaks of visualized bilateral lung fields. No evidence of focal infiltrate or consolidation. No evidence of pneumothorax or pleural effusion. Degenerative bone changes seen. No evidence of pneumothorax or parenchymal contusion. AICD visualized in the left chest with leads in the heart. XR CHEST PORTABLE    Result Date: 11/24/2022  EXAMINATION: ONE XRAY VIEW OF THE CHEST 11/24/2022 9:30 am COMPARISON: 08/08/2022 HISTORY: ORDERING SYSTEM PROVIDED HISTORY: CP + SOB TECHNOLOGIST PROVIDED HISTORY: Reason for exam:->CP + SOB FINDINGS: Poor inspiratory effort. The cardiomediastinal silhouette is slightly prominent but demonstrates no significant change in comparison to the prior study. Prominence of the bronchovascular interstitial lung markings is visualized bilaterally with scattered areas of patchy airspace opacification seen. Linea subsegmental atelectatic streaks are visualized.   The costophrenic angles are clear with no evidence of pleural effusion seen. No evidence of pneumothorax or parenchymal lung mass. The osseous structures are without acute process. Congestive changes demonstrate no change.      CBC:   Lab Results   Component Value Date/Time    WBC 9.1 12/03/2022 04:48 AM    RBC 3.59 12/03/2022 04:48 AM    HGB 9.9 12/03/2022 04:48 AM    HCT 29.7 12/03/2022 04:48 AM    MCV 82.7 12/03/2022 04:48 AM    MCH 27.6 12/03/2022 04:48 AM    MCHC 33.3 12/03/2022 04:48 AM    RDW 13.7 12/03/2022 04:48 AM     12/03/2022 04:48 AM    MPV 8.9 12/03/2022 04:48 AM     BMP:    Lab Results   Component Value Date/Time     12/03/2022 04:48 AM    K 4.1 12/03/2022 04:48 AM    K 4.5 11/24/2022 09:08 AM     12/03/2022 04:48 AM    CO2 21 12/03/2022 04:48 AM    BUN 48 12/03/2022 04:48 AM    LABALBU 4.2 11/24/2022 09:08 AM    CREATININE 2.0 12/03/2022 04:48 AM    CALCIUM 9.1 12/03/2022 04:48 AM    GFRAA 30 09/16/2022 08:24 AM    LABGLOM 31 12/03/2022 04:48 AM    GLUCOSE 97 12/03/2022 04:48 AM      insulin lispro  0-4 Units SubCUTAneous TID     insulin lispro  0-4 Units SubCUTAneous Nightly    insulin lispro  12 Units SubCUTAneous TID     sennosides-docusate sodium  2 tablet Oral Daily    insulin glargine  30 Units SubCUTAneous Daily    NIFEdipine  60 mg Oral Daily    pantoprazole  40 mg Oral QAM AC    lacosamide (VIMPAT) IVPB  200 mg IntraVENous BID    divalproex  500 mg Oral 2 times per day    polyethylene glycol  17 g Oral Daily    levETIRAcetam  500 mg IntraVENous Q12H    guaiFENesin  400 mg Oral TID    sodium chloride flush  5-40 mL IntraVENous 2 times per day    lidocaine  1 patch TransDERmal Daily    allopurinol  300 mg Oral Once per day on Mon Wed Fri    gabapentin  100 mg Oral BID    tamsulosin  0.4 mg Oral Nightly    Arformoterol Tartrate  15 mcg Nebulization BID    lisinopril  20 mg Oral Daily    And    hydroCHLOROthiazide  12.5 mg Oral Daily     Resting comfortably opens eyes follows commands pupils equal round reactive daughter at the bedside counseled her extensively all questions were answered  Assessment:  Patient Active Problem List   Diagnosis    PUD (peptic ulcer disease)    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

## 2022-12-03 NOTE — PROGRESS NOTES
Hospitalist Progress Note      SYNOPSIS: Patient admitted on 11/24/2022 for A-fib [facility])      SUBJECTIVE:    Patient seen and examined. Wife present at bedside all questions and concerns addressed. She did mention this morning he can of had a slight cough when she try to feed him but wanted to go back to sleep and should try again later. He did do well with therapy yesterday. Records reviewed. 80 y.o. male with past medical history of atrial fibrillation, DM hypertension . Patient is a transfer from [place] . He was seen there due to chest pain . He states that chest pain is located in the sternal area . He states  that he has shortness of breath with chest pain . He states that when chest pain occurred he felt like he was going to pass out . Patient had a syncopal episode and hit his head . Patient had a second syncopal episode  and after that family called ambulance . En route to ED patient was given 324 mg of ASA . In the ED patient was in Afib with slow ventricular rate having long pauses and bradycardic to the 30s . Lab data revealed sodium 135 potassium 4.1 BUN 29 Scr 2.0  glucose 244 Trop 45 >>43  WBC 9.9 HGB 10.7   Resp panel neg CT head cervical spine neg CXR congestive changes . Patient received fentanyl morphine and was placed on isoproterenol infusion. EP consulted-pacemaker placement. Lyme serology was ordered for possible carditis. Patient had an initial CT of the head after the fall which was negative for any intracranial hemorrhage. Repeat CT head was obtained on 11/26/2022 as patient continued to complain of headache and spite of pain medicine. Repeat CT of the head noncontrast was obtained which showed progressive subdural hemorrhage. Eliquis was held, [facility] ordered for Eliquis reversal.  Neurosurgery following. Worsening mental status with aphasia. Found to have two seizures. Started on Keppra and Vimpat. Neurology following. He has been on continuous video EEG.   Seizure medications has been adjusted. Last seizure episode  lasted approximately 3 minutes prior to giving valproate and bolus which was completed at . No further seizures after that. Appreciate neurology's inputs. Continue Vimpat, Keppra and Depacon. Stable overnight. No other overnight issues reported. Temp (24hrs), Av °F (36.7 °C), Min:97.6 °F (36.4 °C), Max:98.2 °F (36.8 °C)    DIET: ADULT ORAL NUTRITION SUPPLEMENT; Lunch, Dinner; Low Calorie/High Protein Oral Supplement  ADULT DIET; Easy to Chew; 5 carb choices (75 gm/meal); Low Fat/Low Chol/High Fiber/2 gm Na  CODE: Limited    Intake/Output Summary (Last 24 hours) at 12/3/2022 1048  Last data filed at 12/3/2022 1000  Gross per 24 hour   Intake 2353.48 ml   Output 1055 ml   Net 1298.48 ml       OBJECTIVE:    BP (!) 163/77   Pulse 71   Temp 97.8 °F (36.6 °C) (Axillary)   Resp 22   Ht 5' 9\" (1.753 m)   Wt 179 lb 3.7 oz (81.3 kg)   SpO2 95%   BMI 26.47 kg/m²     General appearance: No apparent distress, appears stated age and cooperative. HEENT:  Conjunctivae/corneas clear. Neck: Supple. No jugular venous distention. Respiratory: Clear to auscultation bilaterally, normal respiratory effort  Cardiovascular: Regular rate rhythm, normal S1-S2  Abdomen: Soft, nontender, nondistended  Musculoskeletal: No clubbing, cyanosis, no bilateral lower extremity edema. Brisk capillary refill. 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 Seizure medications with Vimpat 200 mg twice daily, Keppra 500 mg twice daily and Depacon 500 mg twice daily per neurology. No seizures since 1129 at 2350. Echocardiogram 11/25/2022 with mild to moderate aortic stenosis and mild to moderate aortic regurgitation. EF of 60-65%. Continue to hold Eliquis.     DISPOSITION:     Medications:  REVIEWED DAILY    Infusion Medications    sodium chloride      dextrose       Scheduled Medications    insulin lispro  0-4 Units SubCUTAneous TID WC    insulin lispro  0-4 Units SubCUTAneous Nightly    insulin lispro  12 Units SubCUTAneous TID WC    sennosides-docusate sodium  2 tablet Oral Daily    insulin glargine  30 Units SubCUTAneous Daily    NIFEdipine  60 mg Oral Daily    pantoprazole  40 mg Oral QAM AC    lacosamide (VIMPAT) IVPB  200 mg IntraVENous BID    divalproex  500 mg Oral 2 times per day    polyethylene glycol  17 g Oral Daily    levETIRAcetam  500 mg IntraVENous Q12H    guaiFENesin  400 mg Oral TID    sodium chloride flush  5-40 mL IntraVENous 2 times per day    lidocaine  1 patch TransDERmal Daily    allopurinol  300 mg Oral Once per day on Mon Wed Fri    gabapentin  100 mg Oral BID    tamsulosin  0.4 mg Oral Nightly    Arformoterol Tartrate  15 mcg Nebulization BID    lisinopril  20 mg Oral Daily    And    hydroCHLOROthiazide  12.5 mg Oral Daily     PRN Meds: ipratropium-albuterol, perflutren lipid microspheres, hydrALAZINE, trimethobenzamide, sodium chloride flush, sodium chloride, labetalol, glucose, dextrose bolus **OR** dextrose bolus, glucagon (rDNA), dextrose, potassium chloride **OR** potassium alternative oral replacement **OR** potassium chloride, acetaminophen **OR** acetaminophen, aluminum & magnesium hydroxide-simethicone    Labs:     Recent Labs     12/01/22 0450 12/02/22  0451 12/03/22  0448   WBC 9.8 9.6 9.1   HGB 10.9* 10.7* 9.9*   HCT 32.9* 32.4* 29.7*    247 310       Recent Labs     12/01/22  0450 12/02/22  0451 12/03/22  0448    138 135   K 4.2 4.6 4.1   CL 97* 103 101   CO2 23 20* 21*   BUN 43* 46* 48*   CREATININE 1.9* 1.9* 2.0*   CALCIUM 9.1 9.5 9.1 PHOS 2.8 3.7 3.6       No results for input(s): PROT, ALB, ALKPHOS, ALT, AST, BILITOT, AMYLASE, LIPASE in the last 72 hours. Recent Labs     12/01/22  0450 12/02/22 0451 12/03/22 0448   INR 1.2 1.2 1.2       No results for input(s): Deep Long in the last 72 hours. Chronic labs:    Lab Results   Component Value Date    TSH 3.970 11/24/2022    PSA 3.76 09/16/2022    INR 1.2 12/03/2022    LABA1C 8.5 (H) 08/02/2022       Radiology: REVIEWED DAILY    +++++++++++++++++++++++++++++++++++++++++++++++++  Anabela Louis MD  [facility] Physician - [address] [place] [address]  +++++++++++++++++++++++++++++++++++++++++++++++++  NOTE: This report was transcribed using voice recognition software. Every effort was made to ensure accuracy; however, inadvertent computerized transcription errors may be present.

## 2022-12-03 NOTE — PROGRESS NOTES
SubCUTAneous TID WC    insulin lispro  0-4 Units SubCUTAneous Nightly    insulin lispro  12 Units SubCUTAneous TID WC    sennosides-docusate sodium  2 tablet Oral Daily    insulin glargine  30 Units SubCUTAneous Daily    NIFEdipine  60 mg Oral Daily    pantoprazole  40 mg Oral QAM AC    lacosamide (VIMPAT) IVPB  200 mg IntraVENous BID    divalproex  500 mg Oral 2 times per day    polyethylene glycol  17 g Oral Daily    levETIRAcetam  500 mg IntraVENous Q12H    guaiFENesin  400 mg Oral TID    sodium chloride flush  5-40 mL IntraVENous 2 times per day    lidocaine  1 patch TransDERmal Daily    allopurinol  300 mg Oral Once per day on Mon Wed Fri    gabapentin  100 mg Oral BID    tamsulosin  0.4 mg Oral Nightly    Arformoterol Tartrate  15 mcg Nebulization BID    lisinopril  20 mg Oral Daily    And    hydroCHLOROthiazide  12.5 mg Oral Daily     PRN Meds: ipratropium-albuterol, perflutren lipid microspheres, hydrALAZINE, trimethobenzamide, sodium chloride flush, sodium chloride, labetalol, glucose, dextrose bolus **OR** dextrose bolus, glucagon (rDNA), dextrose, potassium chloride **OR** potassium alternative oral replacement **OR** potassium chloride, acetaminophen **OR** acetaminophen, aluminum & magnesium hydroxide-simethicone  Nutrition:   ADULT ORAL NUTRITION SUPPLEMENT; Lunch, Dinner; Low Calorie/High Protein Oral Supplement  ADULT DIET; Easy to Chew; 5 carb choices (75 gm/meal);  Low Fat/Low Chol/High Fiber/2 gm Na    Labs and Imaging Studies     CBC:   Recent Labs     12/01/22 0450 12/02/22 0451 12/03/22  0448   WBC 9.8 9.6 9.1   HGB 10.9* 10.7* 9.9*   HCT 32.9* 32.4* 29.7*   MCV 84.1 84.2 82.7    247 310       BMP:    Recent Labs     12/01/22 0450 12/02/22 0451 12/03/22  0448    138 135   K 4.2 4.6 4.1   CL 97* 103 101   CO2 23 20* 21*   BUN 43* 46* 48*   CREATININE 1.9* 1.9* 2.0*   GLUCOSE 149* 97 97       LIVER PROFILE:   No results for input(s): AST, ALT, LIPASE, BILIDIR, BILITOT, ALKPHOS in the last 72 hours. Invalid input(s): AMYLASE,  ALB    PT/INR:   Recent Labs     12/01/22  0450 12/02/22  0451 12/03/22  0448   PROTIME 12.8* 12.9* 13.2*   INR 1.2 1.2 1.2       APTT:   Recent Labs     12/01/22  0450   APTT 28.0       Fasting Lipid Panel:    No results found for: CHOL, TRIG, HDL    Cardiac Enzymes:    Lab Results   Component Value Date    CKTOTAL 59 02/05/2013    CKMB 1.6 02/05/2013    TROPONINI <0.01 02/05/2013       Notable Cultures:      Blood cultures   Blood Culture, Routine   Date Value Ref Range Status   11/27/2022 5 Days no growth  Final     Respiratory cultures No results found for: RESPCULTURE No results found for: LABGRAM  Urine   Urine Culture, Routine   Date Value Ref Range Status   06/15/2020 >100,000 CFU/ml  Final     Legionella No results found for: LABLEGI  C Diff PCR No results found for: CDIFPCR  Wound culture/abscess: No results for input(s): WNDABS in the last 72 hours. Tip culture:No results for input(s): CXCATHTIP in the last 72 hours. Oxygen: Additional Respiratory Assessments  Heart Rate: 71  Resp: 25  SpO2: (!) 83 %   Urinary Catheter 12/01/22-Output (mL): 125 mL    Imaging Studies:  CT HEAD WO CONTRAST   Final Result   No significant change in large left subdural hemorrhage with stable 7 mm   midline shift to the right. CT HEAD WO CONTRAST   Final Result   1. Moderate to large left subdural hematoma with left-to-right midline   structure shift of approximately 7 mm, unchanged. 2.  Greatest maximal thickness of the subdural hematoma on coronal views is   24 mm, compared to 23 mm on the prior study. 3.  Gas fluid level in the left sphenoid sinus may reflect acute sinusitis. CT HEAD WO CONTRAST   Final Result   Interval increased volume and extent of diffuse left cerebral acute subdural   hemorrhage with greatest thickness adjacent to the frontotemporal region now   measuring up to 22 mm in greatest axial thickness.       Interval worsening of rightward midline shift at the level of the septum   pellucidum now measuring 8 mm. Mild cerebral white matter chronic microvascular ischemic disease. Mild diffuse cerebral atrophy. Multiple attempts were made by the [facility] core team to page and notify Dr. Mindy Funez. Critical results were then called by Dr. Alea Jessica to [facility] Pink on 11/26/2022 at 22:23. Roxie Lancaster reports that neurosurgery has been   consulted about the case. XR CHEST PORTABLE   Final Result   Mild congestive changes. XR CHEST PORTABLE   Final Result   No evidence of pneumothorax or parenchymal contusion. AICD visualized in the   left chest with leads in the heart.               Resident's Assessment and Plan   Neurology  Headache 2/2 acute subdural hematoma   Reports headache s/p fall   Initial CT in the ED was read negative for intracranial hemorrhage  He was on Eliquis for afib prior to fall   CT head on 11/26/2022 showed subdural hematoma of 7 mm, waxing and waning mentation  Neurosurgery is on board and does not recommend anything at this time and want to watch and wait   Maintain Na level>145  May consider Fatemeh Figures in 7-10 days post hematoma if hematoma liquifies, obtain CT head wo day 7 (12/2/22) awaiting further recommendations from neurosurgery (Dr Wyatt Baumann)  Obtain check CT tomorrow 12/04/2022  Maintain BP systolic <808 mmHg  BP better controlled after nifedipine was increased     Non-convulsive Seizure  Extended EEG with video 11/28/2022 shows:  Left frontal electrographic seizures with spread to involve the left frontocentrotemporal region  A potential for focl seizures from the left frontal region  Moderate to severe nonspecific cerebral dysfunction of the left frontocentrotemporal region  A mild to moderate nonspecific encephalopathy  On Valproate 500mg BID, Keppra 500 BID (renally dosed at present), and Vimpat 100 BID  Follow neurology recommendations, continue on triple anti-seizure meds  Continue neuro checks q1h      Cardiology  Syncope episode 2/2 complete heart block s/p pacemaker placement  He was initially on isoproterenol which has now been stopped. Heart rate currently stable   EP/Cardiology following     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 sinus 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)   S/P pacemaker dual chamber placement on 11/25/2022  TSH wnl, ruled out hypothyroidism  Lyme serology negative  EP/Cardiology following       Afib [id number] score 4   Currently rate controlled HR 70 , not rhythm controlled  On Eliquis, held prior to pacemaker placement  EP following, metoprolol held   Eliquis held and reversal with Kcentra done     HTN   On amlodipine and lisinopril, continue  Continue on Nifedipne 60mg daily     Renal  CKD stage IV  Continue home BP s/p pacemaker placement   Stable renal function at this point     Endocrine  NIDDM  On LDSS, glargine 15units daily  >189>08 today  Hypoglycemia protocol on   Glucose Q4hrs      Problems resolved during this admission:   Third degree AV block s/p pacemaker    Antibiotics:none   Cultures:negative BC in 5 days  Lines:peripherals  Intubated: No  Diet: oral diet    # Peptic ulcer prophylaxis: protonix  # DVT Prophylaxis: mechanical  # Disposition: Cont current care     Arlin Sanabria MD, PGY-1    Attending Physician: Dr. Bhargavi Peñaloza, [address] [place] [address]  Department of Pulmonary, Critical Care and [address] [place] [address]  Department of Internal Medicine      During multidisciplinary team rounds Gerhardt Castleman is a 80 y.o. male was seen, examined and discussed. This is confirmation that I have personally seen and examined the patient and that the key elements of the encounter were performed by me (> 85 % time). The medications & laboratory data was discussed and adjusted where necessary.  The radiographic images were reviewed or with radiologist or consultant if felt dis-concordant with the exam or history. The above findings were corroborated, plans confirmed and changes made if needed. Family is updated at the bedside as available. Key issues of the case were discussed among consultants. Critical Care time is documented if appropriate. Plan for repeat CT of head tomorrow. Mentation continues to wax and wane. Critical Care time: 35 minutes.       Lainey Marshall DO

## 2022-12-03 NOTE — PROGRESS NOTES
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    Esophagitis-grade 1 K20.90    AP (angina pectoris) ([facility]) W39.6    Systolic hypertension U02    Combined forms of age-related cataract of left eye H25.812    Anemia, chronic renal failure, stage 4 (severe) ([facility]) N18.4, D63.1    Sepsis secondary to CAP ([facility]) A41.9    Pneumonia of left lung due to infectious organism J18.9    A-fib ([facility]) I48.91    Atrial fibrillation ([facility]) I48.91    Third degree heart block ([facility]) I44.2    High-grade atrioventricular block I44.39    Seizure ([facility]) R56.9    Subdural hematoma S06. 5XAA       SUBJECTIVE:  29 Ruth Mccurdy is much more alert this afternoon and is able to spontaneously answer questions correctly he still has a slight headache but notes that his vision has improved. He denies any chest discomfort nor shortness of breath. REVIEW OF SYSTEMS:  Presently unable to accurately obtain due to  patient's profound neurologic state.       SCHEDULED MEDICATIONS:   insulin lispro  0-4 Units SubCUTAneous TID WC    insulin lispro  0-4 Units SubCUTAneous Nightly    insulin lispro  12 Units SubCUTAneous TID WC    sennosides-docusate sodium  2 tablet Oral Daily    insulin glargine  30 Units SubCUTAneous Daily    NIFEdipine  60 mg Oral Daily    pantoprazole  40 mg Oral QAM AC    lacosamide (VIMPAT) IVPB  200 mg IntraVENous BID    divalproex  500 mg Oral 2 times per day    polyethylene glycol  17 g Oral Daily    levETIRAcetam  500 mg IntraVENous Q12H    guaiFENesin  400 mg Oral TID    sodium chloride flush  5-40 mL IntraVENous 2 times per day    lidocaine  1 patch TransDERmal Daily    allopurinol  300 mg Oral Once per day on Mon Wed Fri    gabapentin  100 mg Oral BID    tamsulosin  0.4 mg Oral Nightly    Arformoterol Tartrate  15 mcg Nebulization BID    lisinopril  20 mg Oral Daily And    hydroCHLOROthiazide  12.5 mg Oral Daily       VITAL SIGNS:                                                                                                                          BP (!) 140/63   Pulse 70   Temp 98.1 °F (36.7 °C) (Axillary)   Resp 20   Ht 5' 9\" (1.753 m)   Wt 179 lb 3.7 oz (81.3 kg)   SpO2 96%   BMI 26.47 kg/m²   Patient Vitals for the past 96 hrs (Last 3 readings):   Weight   12/02/22 0510 179 lb 3.7 oz (81.3 kg)   12/01/22 0600 193 lb (87.5 kg)   11/30/22 0600 194 lb 0.1 oz (88 kg)     OBJECTIVE:    HEENT: PERRL, EOM  Intact; sclera non-icteric, conjunctiva pink. Carotids are brisk in upstroke with normal contour. No carotid bruits. Normal jugular venous pulsation at 45°. No palpable cervical nor supraclavicular nodes. Thyroid not palpable. Trachea midline. Chest: Even excursion  Lungs: grossly clear to auscultation bilaterally no expiratory wheezes or rhonchi, no decreased tactile fremitus without inspiratory rales. Heart: Irregular, regular  rhythm; S1 > S2, no gallop grade 2/6 systolic murmur second right intercostal space no clicks, rub, palpable thrills   or heaves. PMI nondisplaced, 5th intercostal space MCL. Abdomen: Soft, nontender, nondistended,  mildly protuberant, no masses or organomegaly. Bowel sounds active. Extremities: Without clubbing, cyanosis or edema. Pulses present 3+ upper extermities bilaterally; present 1+ DP  bilaterally and present 1+ PT bilaterally.      Data:   Scheduled Meds: Reviewed  Continuous Infusions:    sodium chloride      dextrose         Intake/Output Summary (Last 24 hours) at 12/3/2022 0002  Last data filed at 12/2/2022 2323  Gross per 24 hour   Intake 2553.48 ml   Output 1570 ml   Net 983.48 ml     CBC:   Recent Labs     11/30/22  0417 12/01/22  0450 12/02/22  0451   WBC 10.3 9.8 9.6   HGB 10.2* 10.9* 10.7*   HCT 30.3* 32.9* 32.4*    283 247     BMP:  Recent Labs     11/30/22  0417 11/30/22  1641 12/01/22  0450 12/02/22  0451  127* 133 138   K 4.2 4.1 4.2 4.6   CL 99 91* 97* 103   CO2 22 22 23 20*   BUN 45* 44* 43* 46*   CREATININE 2.0* 1.7* 1.9* 1.9*   LABGLOM 31 38 33 33     ABGs: No results found for: PH, PO2, PCO2  INR:   Recent Labs     11/30/22  0417 12/01/22  0450 12/02/22  0451   INR 1.2 1.2 1.2     PRO-BNP:   Lab Results   Component Value Date    PROBNP 8,347 (H) 08/02/2022      TSH:   Lab Results   Component Value Date    TSH 3.970 11/24/2022      Cardiac Injury Profile:   No results for input(s): TROPHS in the last 72 hours. Lipid Profile: No results found for: TRIG, HDL, LDLCALC, CHOL   Hemoglobin A1C: No components found for: HGBA1C      RAD:   CT HEAD WO CONTRAST   Final Result   Interval increased volume and extent of diffuse left cerebral acute subdural   hemorrhage with greatest thickness adjacent to the frontotemporal region now   measuring up to 22 mm in greatest axial thickness. Interval worsening of rightward midline shift at the level of the septum   pellucidum now measuring 8 mm. Mild cerebral white matter chronic microvascular ischemic disease. Mild diffuse cerebral atrophy. Multiple attempts were made by the [facility] core team to page and notify Dr. Dennie Ar. Critical results were then called by Dr. Aimee Carreon to [facility] Mittal on 11/26/2022 at 22:23. Mark Anthony Alvarez reports that neurosurgery has been   consulted about the case. XR CHEST PORTABLE   Final Result   Mild congestive changes. XR CHEST PORTABLE   Final Result   No evidence of pneumothorax or parenchymal contusion. AICD visualized in the   left chest with leads in the heart.          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 5XAA       RECOMMENDATIONS:   Mr. Lauren Macedo has remarkably improved within the past 24 hours from a global standpoint. Maintain systolic blood pressure and adjust medications accordingly. Maintain compression devices on lower extremities. . I have spent more than 30 critical care minutes face to face with Bijal Robertson and reviewing notes and laboratory data, with greater than 50% of this time instructing and counseling the patient face to face regarding my findings and recommendations and I have answered all questions as posed to me by Mr. Lauren Macedo ICU team nurse. Mary Gaytan, DO FACP,FACC,FSCAI      NOTE:  This report was transcribed using voice recognition software.   Every effort was made to ensure accuracy; however, inadvertent computerized transcription errors may be present

## 2022-12-03 NOTE — PROGRESS NOTES
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    Esophagitis-grade 1 K20.90    AP (angina pectoris) ([facility]) L73.1    Systolic hypertension P11    Combined forms of age-related cataract of left eye H25.812    Anemia, chronic renal failure, stage 4 (severe) ([facility]) N18.4, D63.1    Sepsis secondary to CAP ([facility]) A41.9    Pneumonia of left lung due to infectious organism J18.9    A-fib ([facility]) I48.91    Atrial fibrillation ([facility]) I48.91    Third degree heart block ([facility]) I44.2    High-grade atrioventricular block I44.39    Seizure ([facility]) R56.9    Subdural hematoma S06. 5XAA       SUBJECTIVE:  29 Ruth Mccurdy is much more sedated this afternoon with his son at his bedside who is a physician also noted the same. He does not appear to be uncomfortable but simply minimally communicative and somewhat distant. REVIEW OF SYSTEMS:  Presently unable to accurately obtain due to  patient's profound neurologic state.       SCHEDULED MEDICATIONS:   [START ON 12/4/2022] insulin glargine  15 Units SubCUTAneous Daily    insulin lispro  0-4 Units SubCUTAneous Q4H    sennosides-docusate sodium  2 tablet Oral Daily    NIFEdipine  60 mg Oral Daily    pantoprazole  40 mg Oral QAM AC    lacosamide (VIMPAT) IVPB  200 mg IntraVENous BID    divalproex  500 mg Oral 2 times per day    polyethylene glycol  17 g Oral Daily    levETIRAcetam  500 mg IntraVENous Q12H    guaiFENesin  400 mg Oral TID    sodium chloride flush  5-40 mL IntraVENous 2 times per day    lidocaine  1 patch TransDERmal Daily    allopurinol  300 mg Oral Once per day on Mon Wed Fri    gabapentin  100 mg Oral BID    tamsulosin  0.4 mg Oral Nightly    Arformoterol Tartrate  15 mcg Nebulization BID    lisinopril  20 mg Oral Daily    And    hydroCHLOROthiazide  12.5 mg Oral Daily       VITAL SIGNS: BP (!) 149/66   Pulse 70   Temp 98.7 °F (37.1 °C) (Axillary)   Resp 19   Ht 5' 9\" (1.753 m)   Wt 179 lb 3.7 oz (81.3 kg)   SpO2 95%   BMI 26.47 kg/m²   Patient Vitals for the past 96 hrs (Last 3 readings):   Weight   12/02/22 0510 179 lb 3.7 oz (81.3 kg)   12/01/22 0600 193 lb (87.5 kg)   11/30/22 0600 194 lb 0.1 oz (88 kg)     OBJECTIVE:    HEENT: PERRL, EOM  Intact; sclera non-icteric, conjunctiva pink. Carotids are brisk in upstroke with normal contour. No carotid bruits. Normal jugular venous pulsation at 45°. No palpable cervical nor supraclavicular nodes. Thyroid not palpable. Trachea midline. Chest: Even excursion  Lungs: grossly clear to auscultation bilaterally no expiratory wheezes or rhonchi, no decreased tactile fremitus without inspiratory rales. Heart: Irregular, regular  rhythm; S1 > S2, no gallop grade 2/6 systolic murmur second right intercostal space no clicks, rub, palpable thrills   or heaves. PMI nondisplaced, 5th intercostal space MCL. Abdomen: Soft, nontender, nondistended,  mildly protuberant, no masses or organomegaly. Bowel sounds active. Extremities: Without clubbing, cyanosis or edema. Pulses present 3+ upper extermities bilaterally; present 1+ DP  bilaterally and present 1+ PT bilaterally.      Data:   Scheduled Meds: Reviewed  Continuous Infusions:    sodium chloride      dextrose         Intake/Output Summary (Last 24 hours) at 12/3/2022 1824  Last data filed at 12/3/2022 1800  Gross per 24 hour   Intake 1200 ml   Output 930 ml   Net 270 ml     CBC:   Recent Labs     12/01/22  0450 12/02/22  0451 12/03/22 0448   WBC 9.8 9.6 9.1   HGB 10.9* 10.7* 9.9*   HCT 32.9* 32.4* 29.7*    247 310     BMP:  Recent Labs     12/01/22  0450 12/02/22  0451 12/03/22 0448    138 135   K 4.2 4.6 4.1   CL 97* 103 101   CO2 23 20* 21*   BUN 43* 46* 48*   CREATININE 1.9* 1.9* 2.0*   LABGLOM 33 33 31     ABGs: No results found for: PH, PO2, PCO2  INR:   Recent Labs     12/01/22  0450 12/02/22  0451 12/03/22  0448   INR 1.2 1.2 1.2     PRO-BNP:   Lab Results   Component Value Date    PROBNP 8,347 (H) 08/02/2022      TSH:   Lab Results   Component Value Date    TSH 3.970 11/24/2022      Cardiac Injury Profile:   No results for input(s): TROPHS in the last 72 hours. Lipid Profile: No results found for: TRIG, HDL, LDLCALC, CHOL   Hemoglobin A1C: No components found for: HGBA1C      RAD:   CT HEAD WO CONTRAST   Final Result   Interval increased volume and extent of diffuse left cerebral acute subdural   hemorrhage with greatest thickness adjacent to the frontotemporal region now   measuring up to 22 mm in greatest axial thickness. Interval worsening of rightward midline shift at the level of the septum   pellucidum now measuring 8 mm. Mild cerebral white matter chronic microvascular ischemic disease. Mild diffuse cerebral atrophy. Multiple attempts were made by the [facility] core team to page and notify Dr. Carol Santa. Critical results were then called by Dr. Lenon Fothergill to [facility] on 11/26/2022 at 22:23. Sumeet Baron reports that neurosurgery has been   consulted about the case. XR CHEST PORTABLE   Final Result   Mild congestive changes. XR CHEST PORTABLE   Final Result   No evidence of pneumothorax or parenchymal contusion. AICD visualized in the   left chest with leads in the heart.          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 5XAA       RECOMMENDATIONS:   Mr. Rosario Valerio has significantly retrogressed from a cerebral status point of view and perhaps medications may need readdressed as to their involvement in the presence of renal insufficiency. . I have spent more than 30 critical care minutes face to face with Siva Roberto & son and reviewing notes and laboratory data, with greater than 50% of this time instructing and counseling the patient face to face regarding my findings and recommendations and I have answered all questions as posed to me by Mr. Rosario Valerio son . Johnson Bishop,  FACP,FACC,FSCAI      NOTE:  This report was transcribed using voice recognition software.   Every effort was made to ensure accuracy; however, inadvertent computerized transcription errors may be present

## 2022-12-03 NOTE — PLAN OF CARE
Problem: Chronic Conditions and Co-morbidities  Goal: Patient's chronic conditions and co-morbidity symptoms are monitored and maintained or improved  Outcome: Progressing     Problem: Discharge Planning  Goal: Discharge to home or other facility with appropriate resources  Outcome: Progressing     Problem: Pain  Goal: Verbalizes/displays adequate comfort level or baseline comfort level  Outcome: Progressing     Problem: Safety - Adult  Goal: Free from fall injury  Outcome: Progressing     Problem: ABCDS Injury Assessment  Goal: Absence of physical injury  Outcome: Progressing     Problem: Neurosensory - Adult  Goal: Achieves stable or improved neurological status  Outcome: Progressing  Flowsheets (Taken 12/2/2022 2000)  Achieves stable or improved neurological status:   Assess for and report changes in neurological status   Monitor temperature, glucose, and sodium. Initiate appropriate interventions as ordered  Goal: Absence of seizures  Outcome: Progressing  Flowsheets (Taken 12/2/2022 2000)  Absence of seizures: Monitor for seizure activity.   If seizure occurs, document type and location of movements and any associated apnea  Goal: Achieves maximal functionality and self care  Outcome: Progressing  Flowsheets (Taken 12/2/2022 2000)  Achieves maximal functionality and self care: Monitor swallowing and airway patency with patient fatigue and changes in neurological status     Problem: Skin/Tissue Integrity - Adult  Goal: Skin integrity remains intact  Outcome: Progressing  Goal: Oral mucous membranes remain intact  Outcome: Progressing     Problem: Gastrointestinal - Adult  Goal: Minimal or absence of nausea and vomiting  Outcome: Progressing  Goal: Maintains or returns to baseline bowel function  Outcome: Progressing  Goal: Maintains adequate nutritional intake  Outcome: Progressing     Problem: Genitourinary - Adult  Goal: Absence of urinary retention  Outcome: Progressing     Problem: Metabolic/Fluid and Electrolytes - Adult  Goal: Electrolytes maintained within normal limits  Outcome: Progressing  Goal: Hemodynamic stability and optimal renal function maintained  Outcome: Progressing  Goal: Glucose maintained within prescribed range  Outcome: Progressing     Problem: Hematologic - Adult  Goal: Maintains hematologic stability  Outcome: Progressing     Problem: Skin/Tissue Integrity  Goal: Absence of new skin breakdown  Description: 1. Monitor for areas of redness and/or skin breakdown  2. Assess vascular access sites hourly  3. Every 4-6 hours minimum:  Change oxygen saturation probe site  4. Every 4-6 hours:  If on nasal continuous positive airway pressure, respiratory therapy assess nares and determine need for appliance change or resting period.   Outcome: Progressing     Problem: Nutrition Deficit:  Goal: Optimize nutritional status  Outcome: Progressing

## 2022-12-04 ENCOUNTER — ANESTHESIA EVENT (OUTPATIENT)
Dept: OPERATING ROOM | Age: 87
End: 2022-12-04
Payer: MEDICARE

## 2022-12-04 PROCEDURE — 6360000002 HC RX W HCPCS

## 2022-12-04 PROCEDURE — 2500000003 HC RX 250 WO HCPCS

## 2022-12-04 PROCEDURE — 6360000002 HC RX W HCPCS: Performed by: NEUROLOGICAL SURGERY

## 2022-12-04 PROCEDURE — 2580000003 HC RX 258

## 2022-12-04 PROCEDURE — 2580000003 HC RX 258: Performed by: NEUROLOGICAL SURGERY

## 2022-12-04 RX ORDER — ETOMIDATE 2 MG/ML
INJECTION INTRAVENOUS PRN
Status: DISCONTINUED | OUTPATIENT
Start: 2022-12-04 | End: 2022-12-04 | Stop reason: SDUPTHER

## 2022-12-04 RX ORDER — LIDOCAINE HYDROCHLORIDE 20 MG/ML
INJECTION, SOLUTION INTRAVENOUS PRN
Status: DISCONTINUED | OUTPATIENT
Start: 2022-12-04 | End: 2022-12-04 | Stop reason: SDUPTHER

## 2022-12-04 RX ORDER — ONDANSETRON 2 MG/ML
INJECTION INTRAMUSCULAR; INTRAVENOUS PRN
Status: DISCONTINUED | OUTPATIENT
Start: 2022-12-04 | End: 2022-12-04 | Stop reason: SDUPTHER

## 2022-12-04 RX ORDER — FENTANYL CITRATE 50 UG/ML
INJECTION, SOLUTION INTRAMUSCULAR; INTRAVENOUS PRN
Status: DISCONTINUED | OUTPATIENT
Start: 2022-12-04 | End: 2022-12-04 | Stop reason: SDUPTHER

## 2022-12-04 RX ORDER — SODIUM CHLORIDE 9 MG/ML
INJECTION, SOLUTION INTRAVENOUS CONTINUOUS PRN
Status: DISCONTINUED | OUTPATIENT
Start: 2022-12-04 | End: 2022-12-04 | Stop reason: SDUPTHER

## 2022-12-04 RX ORDER — DEXAMETHASONE SODIUM PHOSPHATE 10 MG/ML
INJECTION INTRAMUSCULAR; INTRAVENOUS PRN
Status: DISCONTINUED | OUTPATIENT
Start: 2022-12-04 | End: 2022-12-04 | Stop reason: SDUPTHER

## 2022-12-04 RX ORDER — SUCCINYLCHOLINE/SOD CL,ISO/PF 200MG/10ML
SYRINGE (ML) INTRAVENOUS PRN
Status: DISCONTINUED | OUTPATIENT
Start: 2022-12-04 | End: 2022-12-04 | Stop reason: SDUPTHER

## 2022-12-04 RX ORDER — PROPOFOL 10 MG/ML
INJECTION, EMULSION INTRAVENOUS PRN
Status: DISCONTINUED | OUTPATIENT
Start: 2022-12-04 | End: 2022-12-04 | Stop reason: SDUPTHER

## 2022-12-04 RX ORDER — PHENYLEPHRINE HCL IN 0.9% NACL 1 MG/10 ML
SYRINGE (ML) INTRAVENOUS PRN
Status: DISCONTINUED | OUTPATIENT
Start: 2022-12-04 | End: 2022-12-04 | Stop reason: SDUPTHER

## 2022-12-04 RX ADMIN — PROPOFOL 20 MG: 10 INJECTION, EMULSION INTRAVENOUS at 19:55

## 2022-12-04 RX ADMIN — Medication 200 MCG: at 19:39

## 2022-12-04 RX ADMIN — SODIUM CHLORIDE: 9 INJECTION, SOLUTION INTRAVENOUS at 19:09

## 2022-12-04 RX ADMIN — FENTANYL CITRATE 50 MCG: 50 INJECTION, SOLUTION INTRAMUSCULAR; INTRAVENOUS at 19:16

## 2022-12-04 RX ADMIN — Medication 200 MCG: at 19:41

## 2022-12-04 RX ADMIN — ONDANSETRON 4 MG: 2 INJECTION INTRAMUSCULAR; INTRAVENOUS at 19:32

## 2022-12-04 RX ADMIN — LIDOCAINE HYDROCHLORIDE 50 MG: 20 INJECTION, SOLUTION INTRAVENOUS at 19:16

## 2022-12-04 RX ADMIN — Medication 160 MG: at 19:16

## 2022-12-04 RX ADMIN — FENTANYL CITRATE 50 MCG: 50 INJECTION, SOLUTION INTRAMUSCULAR; INTRAVENOUS at 19:32

## 2022-12-04 RX ADMIN — CEFAZOLIN 2000 MG: 2 INJECTION, POWDER, FOR SOLUTION INTRAMUSCULAR; INTRAVENOUS at 19:25

## 2022-12-04 RX ADMIN — Medication 200 MCG: at 20:05

## 2022-12-04 RX ADMIN — ETOMIDATE 20 MG: 2 INJECTION, SOLUTION INTRAVENOUS at 19:16

## 2022-12-04 RX ADMIN — DEXAMETHASONE SODIUM PHOSPHATE 10 MG: 10 INJECTION INTRAMUSCULAR; INTRAVENOUS at 19:16

## 2022-12-04 RX ADMIN — Medication 200 MCG: at 19:43

## 2022-12-04 RX ADMIN — Medication 200 MCG: at 20:10

## 2022-12-04 RX ADMIN — Medication 100 MCG: at 19:36

## 2022-12-04 ASSESSMENT — LIFESTYLE VARIABLES: SMOKING_STATUS: 0

## 2022-12-04 NOTE — PROGRESS NOTES
[facility] Quality Flow/Interdisciplinary Rounds Progress Note        Quality Flow Rounds held on December 4, 2022    Disciplines Attending:  Bedside nurse, charge nurse, , PT/OT, pharmacy, nursing unit leadership, , Medical residents     Angélica Baker was admitted on 11/24/2022  1:48 PM    Anticipated Discharge Date:  Expected Discharge Date: 12/06/22    Disposition:    Jim Score:  Jim Scale Score: 16    Readmission Risk              Risk of Unplanned Readmission:  27           Discussed patient goal for the day, patient clinical progression, and barriers to discharge.   The following Goal(s) of the Day/Commitment(s) have been identified:  continue ICU care, CT scan      Penny Allison RN  December 4, 2022

## 2022-12-04 NOTE — ANESTHESIA PRE PROCEDURE
Department of Anesthesiology  Preprocedure Note       Name:  Aurelio Ochoa   Age:  80 y.o.  :  10/27/1933                                          MRN:  [id number]         Date:  2022      Surgeon: Nolvia Denise):  Kaity Diana MD    Procedure: Craniotomy cira hole, placement of subdural drain    Medications prior to admission:   Prior to Admission medications    Medication Sig Start Date End Date Taking? Authorizing Provider   repaglinide (PRANDIN) 1 MG tablet Take 1 mg by mouth 2 times daily (with meals) 11/3/22   Historical Provider, MD   lisinopril-hydroCHLOROthiazide (PRINZIDE;ZESTORETIC) 20-12.5 MG per tablet Take 1 tablet by mouth daily At [facility] 11/3/22   Historical Provider, MD   dextran 70-hypromellose (TEARS NATURALE) 0.1-0.3 % SOLN opthalmic solution Place 1 drop into both eyes as needed    Historical Provider, MD   gabapentin (NEURONTIN) 100 MG capsule Take 1 capsule by mouth in the morning and 1 capsule before bedtime. Do all this for 30 days. 22  Lubna Nolan DO   SITagliptin (JANUVIA) 100 MG tablet Take 1 tablet by mouth in the morning. Patient taking differently: Take 50 mg by mouth 2 times daily (with meals) 8/10/22   Lubna Nolan DO   metoprolol succinate (TOPROL XL) 25 MG extended release tablet Take 1 tablet by mouth at bedtime 22   Lubna Nolan DO   amLODIPine (NORVASC) 5 MG tablet Take 1 tablet by mouth in the morning. Patient taking differently: Take 2.5 mg by mouth every other day 8/10/22   Lubna Nolan DO   epoetin samantha-epbx (RETACRIT) 3000 UNIT/ML SOLN injection Inject 1 mL into the skin three times a week  Patient not taking: Reported on 2022 8/10/22   Lubna Nolan DO   docusate sodium (COLACE, DULCOLAX) 100 MG CAPS Take 100 mg by mouth in the morning and 100 mg before bedtime. 22   Lubna Nolan DO   tamsulosin (FLOMAX) 0.4 MG capsule Take 1 capsule by mouth in the morning and 1 capsule before bedtime.   Patient taking differently: Take 0.4 mg by mouth nightly 8/9/22   Jane Bah,    magnesium oxide (MAG-OX) 400 MG tablet Take 400 mg by mouth in the morning. 5/19/22   Historical Provider, MD   allopurinol (ZYLOPRIM) 100 MG tablet Take 300 mg by mouth three times a week M W F 3/24/21   Historical Provider, MD   albuterol sulfate HFA (PROVENTIL HFA) 108 (90 Base) MCG/ACT inhaler Inhale 2 puffs into the lungs every 6 hours as needed for Wheezing  Patient not taking: Reported on 11/24/2022 5/12/21   Roosevlet Zambrano MD   colchicine-probenecid 0.5-500 MG per tablet Take 1 tablet by mouth 2 times daily   Patient not taking: Reported on 8/2/2022 4/24/21 8/9/22  Historical Provider, MD   apixaban (ELIQUIS) 2.5 MG TABS tablet Take 2.5 mg by mouth 2 times daily    Historical Provider, MD   magnesium gluconate (MAGONATE) 500 MG tablet Take 500 mg by mouth daily  Patient not taking: Reported on 8/2/2022 8/9/22  Historical Provider, MD   Cholecalciferol (VITAMIN D3 PO) Take by mouth daily  Patient not taking: No sig reported    Historical Provider, MD   torsemide (DEMADEX) 10 MG tablet Take 10 mg by mouth in the morning. M-W-F.  8/9/22  Historical Provider, MD   zinc 50 MG CAPS Take by mouth daily  Patient not taking: Reported on 8/2/2022 8/9/22  Historical Provider, MD   glimepiride (AMARYL) 4 MG tablet Take 4 mg by mouth in the morning and at bedtime    Historical Provider, MD       Current medications:    No current facility-administered medications for this visit. No current outpatient medications on file.      Facility-Administered Medications Ordered in Other Visits   Medication Dose Route Frequency Provider Last Rate Last Admin    pantoprazole (PROTONIX) 40 mg in sodium chloride (PF) 0.9 % 10 mL injection  40 mg IntraVENous Q12H Chandni Lopez,         sucralfate (CARAFATE) tablet 1 g  1 g Oral 4 times per day Damon Gudino,         hydrALAZINE (APRESOLINE) injection 10 mg  10 mg IntraVENous Q4H PRN Damon Gudino, DO        ceFAZolin (ANCEF) 2,000 mg in sterile water 20 mL IV syringe  2,000 mg IntraVENous 30 Min Pre-Op Larry Case MD        insulin glargine-yfgn [facility]) injection vial 15 Units  15 Units SubCUTAneous Daily Micha Alaniz MD   15 Units at 12/04/22 0929    insulin lispro (HUMALOG) injection vial 0-4 Units  0-4 Units SubCUTAneous Q4H Micha Alaniz MD   1 Units at 12/03/22 2326    sennosides-docusate sodium (SENOKOT-S) 8.6-50 MG tablet 2 tablet  2 tablet Oral Daily Finch Steens, DO   2 tablet at 12/04/22 8753    NIFEdipine (PROCARDIA XL) extended release tablet 60 mg  60 mg Oral Daily Chandni L Lopez, DO   60 mg at 12/04/22 8741    lacosamide (VIMPAT) 200 mg in sodium chloride 0.9 % 70 mL IVPB  200 mg IntraVENous BID Hector Reason, DO   Stopped at 12/04/22 1000    divalproex (DEPAKOTE SPRINKLE) capsule 500 mg  500 mg Oral 2 times per day Hector Reason, DO   500 mg at 12/04/22 4448    polyethylene glycol (GLYCOLAX) packet 17 g  17 g Oral Daily Chandni L Lopez, DO   17 g at 12/03/22 0829    levETIRAcetam (KEPPRA) 500 mg/100 mL IVPB  500 mg IntraVENous Q12H Larry Case MD   Stopped at 12/04/22 1251    guaiFENesin tablet 400 mg  400 mg Oral TID Sanjuanita Barreto MD   400 mg at 12/04/22 9862    ipratropium-albuterol (DUONEB) nebulizer solution 1 ampule  1 ampule Inhalation Q4H PRN Ezzie Farhana, DO   1 ampule at 11/29/22 2004    perflutren lipid microspheres (DEFINITY) injection 1.5 mL  1.5 mL IntraVENous ONCE PRN Temi So DO        trimethobenzamide Arman Lapidus) injection 200 mg  200 mg IntraMUSCular Q6H PRN Chandni L Lopez, DO        sodium chloride flush 0.9 % injection 5-40 mL  5-40 mL IntraVENous 2 times per day Temi So DO   10 mL at 12/03/22 2138    sodium chloride flush 0.9 % injection 5-40 mL  5-40 mL IntraVENous PRN Temi So, DO   10 mL at 11/26/22 6250    0.9 % sodium chloride infusion   IntraVENous PRN Temi So,         labetalol (NORMODYNE;TRANDATE) injection 5 mg  5 mg IntraVENous Q4H PRN Chandnisivan Lopez DO   5 mg at 12/04/22 1330    lidocaine 4 % external patch 1 patch  1 patch TransDERmal Daily Lawanda Burden MD   1 patch at 12/04/22 0926    glucose chewable tablet 16 g  4 tablet Oral PRN Lawanda Burden MD        dextrose bolus 10% 125 mL  125 mL IntraVENous PRN Lawanda Burden MD        Or    dextrose bolus 10% 250 mL  250 mL IntraVENous PRN Lawanda Burden MD        glucagon (rDNA) injection 1 mg  1 mg SubCUTAneous PRN Lawanda Burden MD        dextrose 10 % infusion   IntraVENous Continuous PRN Lawanda Burden MD        allopurinol (ZYLOPRIM) tablet 300 mg  300 mg Oral Once per day on Mon Wed Fri Lawanda Burden MD   300 mg at 12/02/22 1036    gabapentin (NEURONTIN) capsule 100 mg  100 mg Oral BID Lawanda Burden MD   100 mg at 12/04/22 0924    tamsulosin (FLOMAX) capsule 0.4 mg  0.4 mg Oral Nightly Lawanda Burden MD   0.4 mg at 12/03/22 2137    Arformoterol Tartrate (BROVANA) nebulizer solution 15 mcg  15 mcg Nebulization BID Lawanda Burden MD   15 mcg at 12/04/22 0846    lisinopril (PRINIVIL;ZESTRIL) tablet 20 mg  20 mg Oral Daily Lawanda Burden MD   20 mg at 12/04/22 5163    And    hydroCHLOROthiazide (HYDRODIURIL) tablet 12.5 mg  12.5 mg Oral Daily Lawanda Burden MD   12.5 mg at 12/04/22 2958    potassium chloride (KLOR-CON M) extended release tablet 40 mEq  40 mEq Oral PRN Mauro Macias MD        Or    potassium bicarb-citric acid (EFFER-K) effervescent tablet 40 mEq  40 mEq Oral PRN Mauro Macias MD        Or    potassium chloride 10 mEq/100 mL IVPB (Peripheral Line)  10 mEq IntraVENous PRN Mauro Macias MD        acetaminophen (TYLENOL) tablet 650 mg  650 mg Oral Q6H PRN Mauro Macias MD   650 mg at 11/27/22 1208    Or    acetaminophen (TYLENOL) suppository 650 mg  650 mg Rectal Q6H PRN Mauro Macias MD        aluminum & magnesium hydroxide-simethicone (MAALOX) 200-200-20 MG/5ML suspension 30 mL  30 mL Oral Q6H PRN Mauro Macias MD 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    Esophagitis-grade 1 K20.90    AP (angina pectoris) ([facility]) A53.4    Systolic hypertension R85    Combined forms of age-related cataract of left eye H25.812    Anemia, chronic renal failure, stage 4 (severe) ([facility]) N18.4, D63.1    Sepsis secondary to CAP ([address] A41.9    Pneumonia of left lung due to infectious organism J18.9    A-fib ([facility]) I48.91    Atrial fibrillation ([facility]) I48.91    Third degree heart block ([facility]) I44.2    High-grade atrioventricular block I44.39    Seizure ([address] R56.9    Subdural hematoma S06. 5XAA       Past Medical History:        Diagnosis Date    A-fib ([address]     [facility]     H pylori ulcer 8/22/2012    Hyperlipidemia     Hypertension     PONV (postoperative nausea and vomiting)     PUD (peptic ulcer disease) 7/26/2012    Urinary frequency        Past Surgical History:        Procedure Laterality Date    APPENDECTOMY      CATARACT REMOVAL WITH IMPLANT Left 12/17/2015    COLONOSCOPY  7/206/2012    CYSTOSCOPY      multiple    CYSTOSCOPY N/A 06/02/2020    CYSTOSCOPY RETROGRADE PYELOGRAM BOTOX  INJECTION (100 UNITS) performed by Ashley Farmer MD at [facility]  07/26/2012    peptic ulcer disease    EYE SURGERY Bilateral     cataracts    HERNIA REPAIR  more 10 yrs    left     PACEMAKER INSERTION Left 11/25/2022    [employer] Dual Chamber (Dr. Preston Portillo)    OK COLONOSCOPY FLX DX W/COLLJ [address] WHEN PFRMD  07/26/2012         OK EGD TRANSORAL BIOPSY SINGLE/MULTIPLE  07/26/2012            Social History:    Social History     Tobacco Use    Smoking status: Former     Packs/day: 1.00     Years: 60.00     Pack years: 60.00     Types: Cigarettes     Start date: 1/1/1950     Quit date: 7/23/2010     Years since quittin.3    Smokeless tobacco: Never   Substance Use Topics    Alcohol use: Yes     Comment: 1/2 glass  wine  everyday                                Counseling given: Not Answered      Vital Signs (Current): There were no vitals filed for this visit. BP Readings from Last 3 Encounters:   22 (!) 171/82   22 (!) 170/80   22 (!) 152/72       NPO Status:                                                                                 BMI:   Wt Readings from Last 3 Encounters:   22 179 lb 3.7 oz (81.3 kg)   22 185 lb (83.9 kg)   22 194 lb 9 oz (88.3 kg)     There is no height or weight on file to calculate BMI.    CBC:   Lab Results   Component Value Date/Time    WBC 13.5 2022 04:22 AM    RBC 2.56 2022 04:22 AM    HGB 10.6 2022 12:05 PM    HCT 32.2 2022 12:05 PM    MCV 84.4 2022 04:22 AM    RDW 13.5 2022 04:22 AM     2022 04:22 AM       CMP:   Lab Results   Component Value Date/Time     2022 04:22 AM    K 4.3 2022 04:22 AM    K 4.5 2022 09:08 AM     2022 04:22 AM    CO2 19 2022 04:22 AM    BUN 50 2022 04:22 AM    CREATININE 2.1 2022 04:22 AM    GFRAA 30 2022 08:24 AM    LABGLOM 30 2022 04:22 AM    GLUCOSE 108 2022 04:22 AM    PROT 6.9 2022 09:08 AM    CALCIUM 8.9 2022 04:22 AM    BILITOT 0.4 2022 09:08 AM    ALKPHOS 130 2022 09:08 AM    AST 30 2022 09:08 AM    ALT 39 2022 09:08 AM       POC Tests: No results for input(s): POCGLU, POCNA, POCK, POCCL, POCBUN, POCHEMO, POCHCT in the last 72 hours.     Coags:   Lab Results   Component Value Date/Time    PROTIME 13.3 2022 04:22 AM    INR 1.2 2022 04:22 AM    APTT 28.0 2022 04:50 AM       HCG (If Applicable): No results found for: PREGTESTUR, PREGSERUM, HCG, HCGQUANT     ABGs: No results found for: PHART, PO2ART, KTZ8IWH, TCR3GCO, BEART, D0REEJHA     Type & Screen (If Applicable):  No results found for: LABABO, LABRH    Drug/Infectious Status (If Applicable):  No results found for: HIV, HEPCAB    COVID-19 Screening (If Applicable):   Lab Results   Component Value Date/Time    COVID19 Not Detected 12/04/2022 12:05 PM           Anesthesia Evaluation  Patient summary reviewed   history of anesthetic complications: PONV. 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 Endo/Other:    (+) DiabetesType II DM, , blood dyscrasia: anticoagulation therapy and anemia:., .                 Abdominal:             Vascular: negative vascular ROS. Other Findings:             Anesthesia Plan      general     ASA 3     (Ate at 7 am.)  Induction: intravenous. Anesthetic plan and risks discussed with patient. Plan discussed with CRNA.                     Patt Miller MD   12/4/2022

## 2022-12-04 NOTE — PROGRESS NOTES
Requesting Physician: Denice Blanc MD     History of Present Illness:  Michael Fang is a 80 y.o. male with history of atrial fibrillation, DM, and HTN admitted on 11/24/2022 with onset of chest pain and shortness of breath. Reporting feeling lightheaded with near syncope associated with this chest pain. He subsequently passed out, but recovered without event. He had a second syncopal event at home where he apparently hit his head. Patient was given aspirin in route to the hospital and upon arrival to the ED he was noted to be in A. fib with fall pauses and bradycardia. Scan of the head done on admission did not show any acute intracranial abnormalities. With headache on admission and continued headache prompted repeat CT scan of the head on 11/26/2022 showing progressive subdural hematoma on the left side. She had been on Eliquis at the time which was held and reversed with Kcentra. Patient developed progressive altered mentation with decreased responsiveness and aphasia. Neurosurgery was consulted for management with no postsurgical intervention performed. Neurology was also consulted with EEG done 11/28/2022 showing left frontocentrotemporal electrographic seizures with associated moderate to severe nonspecific encephalopathy. It placed on continuous EEG monitoring ended on process 12/1/22 with note of continued encephalopathy but improved to mild to moderate severity. 3-minute seizure reported on 11/30/2022 along with a 2-minute seizure on 11/29/2022 with Depakote added to your medication on 11/29/2022. Patient has already been on Keppra 500 mg twice daily for max renal dosing along with Vimpat 200 mg twice daily at time of Depakote addition. No further seizure medication changes since addition of Depakote with 4 days of increasing alertness and responsiveness.   However, patient recently with decreased responsiveness and decreased arousal.  Repeat CT scan of the head done today did not show any significant interval change other than evolving SDH on the left side. No abnormal activity observed to suggest witnessed seizure activity. Metabolic profile notable only for elevated creatinine at 2.1 with glucose of 108.     Past Medical History:        Diagnosis Date    A-fib ([facility] [address]     Diabetes mellitus ([facility] [address]     H pylori ulcer 8/22/2012    Hyperlipidemia     Hypertension     PONV (postoperative nausea and vomiting)     PUD (peptic ulcer disease) 7/26/2012    Urinary frequency            Procedure Laterality Date    APPENDECTOMY      CATARACT REMOVAL WITH IMPLANT Left 12/17/2015    COLONOSCOPY  7/206/2012    CYSTOSCOPY      multiple    CYSTOSCOPY N/A 06/02/2020    CYSTOSCOPY RETROGRADE PYELOGRAM BOTOX  INJECTION (100 UNITS) performed by Marisol Echeverria MD at [address] [facility], [place], DIAGNOSTIC  07/26/2012    peptic ulcer disease    EYE SURGERY Bilateral     cataracts    HERNIA REPAIR  more 10 yrs    left     PACEMAKER INSERTION Left 11/25/2022    [employer] Dual Chamber (Dr. Brennen Wang)    KS COLONOSCOPY FLX DX W/COLLJ SPEC WHEN PFRMD  07/26/2012         KS EGD TRANSORAL BIOPSY SINGLE/MULTIPLE  07/26/2012            Allergies:    No Known Allergies     Current Medications:   pantoprazole (PROTONIX) 40 mg in sodium chloride (PF) 0.9 % 10 mL injection, Q12H  sucralfate (CARAFATE) tablet 1 g, 4 times per day  hydrALAZINE (APRESOLINE) injection 10 mg, Q4H PRN  ceFAZolin (ANCEF) 2,000 mg in sterile water 20 mL IV syringe, 30 Min Pre-Op  insulin glargine-yfgn (SEMGLEE-YFGN) injection vial 15 Units, Daily  insulin lispro (HUMALOG) injection vial 0-4 Units, Q4H  sennosides-docusate sodium (SENOKOT-S) 8.6-50 MG tablet 2 tablet, Daily  NIFEdipine (PROCARDIA XL) extended release tablet 60 mg, Daily  lacosamide (VIMPAT) 200 mg in sodium chloride 0.9 % 70 mL IVPB, BID  divalproex (DEPAKOTE SPRINKLE) capsule 500 mg, 2 times per day  polyethylene glycol (GLYCOLAX) packet 17 g, Daily  levETIRAcetam (KEPPRA) 500 mg/100 mL IVPB, Q12H  guaiFENesin tablet 400 mg, TID  ipratropium-albuterol (DUONEB) nebulizer solution 1 ampule, Q4H PRN  perflutren lipid microspheres (DEFINITY) injection 1.5 mL, ONCE PRN  trimethobenzamide (TIGAN) injection 200 mg, Q6H PRN  sodium chloride flush 0.9 % injection 5-40 mL, 2 times per day  sodium chloride flush 0.9 % injection 5-40 mL, PRN  0.9 % sodium chloride infusion, PRN  labetalol (NORMODYNE;TRANDATE) injection 5 mg, Q4H PRN  lidocaine 4 % external patch 1 patch, Daily  glucose chewable tablet 16 g, PRN  dextrose bolus 10% 125 mL, PRN   Or  dextrose bolus 10% 250 mL, PRN  glucagon (rDNA) injection 1 mg, PRN  dextrose 10 % infusion, Continuous PRN  allopurinol (ZYLOPRIM) tablet 300 mg, Once per day on Mon Wed Fri  gabapentin (NEURONTIN) capsule 100 mg, BID  tamsulosin (FLOMAX) capsule 0.4 mg, Nightly  Arformoterol Tartrate (BROVANA) nebulizer solution 15 mcg, BID  lisinopril (PRINIVIL;ZESTRIL) tablet 20 mg, Daily   And  hydroCHLOROthiazide (HYDRODIURIL) tablet 12.5 mg, Daily  potassium chloride (KLOR-CON M) extended release tablet 40 mEq, PRN   Or  potassium bicarb-citric acid (EFFER-K) effervescent tablet 40 mEq, PRN   Or  potassium chloride 10 mEq/100 mL IVPB (Peripheral Line), PRN  acetaminophen (TYLENOL) tablet 650 mg, Q6H PRN   Or  acetaminophen (TYLENOL) suppository 650 mg, Q6H PRN  aluminum & magnesium hydroxide-simethicone (MAALOX) 200-200-20 MG/5ML suspension 30 mL, Q6H PRN         Review of Systems:  CONSTITUTIONAL:  negative for  {CONSTITUTIONAL SYMPTOMS:216178936}  HEENT:  negative for  {HEENT SYMPTOMS:755953459}  RESPIRATORY:  negative for  {RESPIRATORY POJXLHWD:243307488}  CARDIOVASCULAR:  negative for  {CARDIAC RSTRITFY:772080119}  GASTROINTESTINAL:  negative for {GASTROINTESTINAL SYMPTOMS:945394504}  HEMATOLOGIC/LYMPHATIC:  negative for {HEMATOLOGIC WCIOUTAT:289231404}  MUSCULOSKELETAL:  negative for  {MUSCULOSKELETAL CIJSBCSM:679819939}  BEHAVIOR/PSYCH:  negative for {PSYCHIATRIC QUKOWFPL:766799895}  SKIN: negative for {:69519}  GENITOURINARY: negative for {Ros-gu negatives:5190}. Physical Exam:  BP (!) 171/82   Pulse 70   Temp 97.9 °F (36.6 °C)   Resp 20   Ht 5' 9\" (1.753 m)   Wt 179 lb 3.7 oz (81.3 kg)   SpO2 93%   BMI 26.47 kg/m²  I Body mass index is 26.47 kg/m². I   Wt Readings from Last 1 Encounters:   12/02/22 179 lb 3.7 oz (81.3 kg)          Physical Exam    HEENT: Normocephalic, atraumatic, no lesions or abnormalities noted. Neck:  supple with full ROM; no masses, nodes or bruits; no cervical tenderness on palpation. Lungs:  clear to auscultation  bilaterally     CV: RRR without gallops or murmurs     Extremities: no c/c/e          Neurologic Exam     Mental Status:  patient was alert, responsive, oriented, appropriate, answering questions, and following commands. Speech was fluent with normal sensorium and cognition. (Mental status exam was grossly intact. )     Cranial Nerves:     (CN -II-XII was grossly intact.)  Pupils were equal round and reactive to light;    Visual fields were full on confrontation; Extraocular movements were intact; no nystagmus; Intact facial sensation to temp, pinprick, and light touch; Symmetric facial movements with good lip and eye closure bilaterally; Hearing was intact; Thornton was midline;    Normal palatal elevation with midline uvula  Sternocleidomastoid  / Trapezius strength of 5/5. Tongue was midline with no atrophy or fasciculations    Motor Exam: (Normal strength bilaterally in all muscle groups tested.)    Strength was 5/5 throughout; normal tone and bulk; no atrophy or fasiculations noted. Sensory Exam:  normal sensation to light touch, pin-prick, and temperature; No sensory extinction. (Intact sensation to all modalities.   Left / Right sided sensory deficits   Focal sensory deficits: __ )        Cerebella Exam:  (Normal cerebellum function on gross exam)    Intact finger-nose-finger, rapidly alternating movements, fine motor   movements, and heel-down-shin maneuvers; No cerebellar rebound or drift; No tremors   Normal tandem gait. Negative Romberg       Gait: Gait was not tested. Normal Gait. Reflexes: normal and symmetric bilaterally; Babinski is negative    Labs:  CBC:   Recent Labs     12/02/22 0451 12/03/22 0448 12/04/22 0422 12/04/22  1205   WBC 9.6 9.1 13.5*  --    HGB 10.7* 9.9* 7.1* 10.6*    310 380  --    MCV 84.2 82.7 84.4  --    MCH 27.8 27.6 27.7  --    MCHC 33.0 33.3 32.9  --    RDW 13.8 13.7 13.5  --      CMP:  Recent Labs     12/02/22 0451 12/03/22 0448 12/04/22 0422    135 135   K 4.6 4.1 4.3    101 100   CO2 20* 21* 19*   BUN 46* 48* 50*   CREATININE 1.9* 2.0* 2.1*   LABGLOM 33 31 30   GLUCOSE 97 97 108*   CALCIUM 9.5 9.1 8.9     Liver: No results for input(s): AST, ALT, ALKPHOS, PROT, LABALBU, BILITOT in the last 72 hours. Invalid input(s): BILDIR  INR:   Recent Labs     12/02/22 0451 12/03/22 0448 12/04/22 0422   PROTIME 12.9* 13.2* 13.3*   INR 1.2 1.2 1.2       ToxicologyNo results for input(s): PHENYTOIN, CARBTOT, PHENOBARB, VALPROATE, LAMOTRIG in the last 72 hours. Invalid input(s):  KEPPRA  No results for input(s): AMPMETHURSCR, BARBTQTU, BDZQTU, CANNABQUANT, COCMETQTU, OPIAU, PCPQUANT in the last 72 hours. Radiology:  CT HEAD WO CONTRAST    Result Date: 12/4/2022  EXAMINATION: CT OF THE HEAD WITHOUT CONTRAST  12/4/2022 11:39 am TECHNIQUE: CT of the head was performed without the administration of intravenous contrast. Automated exposure control, iterative reconstruction, and/or weight based adjustment of the mA/kV was utilized to reduce the radiation dose to as low as reasonably achievable.  COMPARISON: 11/27/2022 HISTORY: ORDERING SYSTEM PROVIDED HISTORY: Follow up on subdural hematoma TECHNOLOGIST PROVIDED HISTORY: Reason for exam:->Follow up on subdural hematoma Has a \"code stroke\" or \"stroke alert\" been called? ->No What reading provider will be dictating this exam?->CRC FINDINGS: BRAIN/VENTRICLES: There is a stable evolving large left subdural hematoma. There is mass effect identified on the left hemisphere. There is hemorrhage seen along the falx. Midline shift to the right of approximately 7-1/2 mm which is not significantly changed in the interval.  Atrophy of the brain. Minimal low-attenuation areas seen in the periventricular and subcortical white matter. ORBITS: The visualized portion of the orbits demonstrate no acute abnormality. SINUSES: The visualized paranasal sinuses and mastoid air cells demonstrate no acute abnormality. SOFT TISSUES/SKULL:  No acute abnormality of the visualized skull or soft tissues. Evolving large left subdural hematoma with no change seen in the surrounding mass effect or midline shift. There is no new hemorrhage visualized. CT HEAD WO CONTRAST    Result Date: 11/27/2022  EXAMINATION: CT OF THE HEAD WITHOUT CONTRAST  11/27/2022 6:39 pm TECHNIQUE: CT of the head was performed without the administration of intravenous contrast. Automated exposure control, iterative reconstruction, and/or weight based adjustment of the mA/kV was utilized to reduce the radiation dose to as low as reasonably achievable. COMPARISON: 11/27/2022 at 09:59 HISTORY: ORDERING SYSTEM PROVIDED HISTORY: neuro changes TECHNOLOGIST PROVIDED HISTORY: Reason for exam:->neuro changes Has a \"code stroke\" or \"stroke alert\" been called? ->No What reading provider will be dictating this exam?->CRC FINDINGS: BRAIN/VENTRICLES: The previously identified large left subdural hemorrhage is stable in size and distribution measuring up to 2.4 cm in thickness. The associated mass effect and midline shift is stable measuring 7 mm, previously 7 mm. Small amount of subdural hemorrhage along the falx is also stable.   No discrete new hemorrhage is identified. The gray-white differentiation is maintained without evidence of an acute infarct. There is prominence of the ventricles and sulci due to global parenchymal volume loss. There are nonspecific areas of hypoattenuation within the periventricular and subcortical white matter, which likely represent chronic microvascular ischemic change. ORBITS: The visualized portion of the orbits demonstrate no acute abnormality. SINUSES: The visualized paranasal sinuses and mastoid air cells demonstrate no acute abnormality. SOFT TISSUES/SKULL: No acute abnormality of the visualized skull or soft tissues. No significant change in large left subdural hemorrhage with stable 7 mm midline shift to the right. CT HEAD WO CONTRAST    Result Date: 11/27/2022  EXAMINATION: CT OF THE HEAD WITHOUT CONTRAST  11/27/2022 10:01 am TECHNIQUE: CT of the head was performed without the administration of intravenous contrast. Automated exposure control, iterative reconstruction, and/or weight based adjustment of the mA/kV was utilized to reduce the radiation dose to as low as reasonably achievable. COMPARISON: None. HISTORY: ORDERING SYSTEM PROVIDED HISTORY: new subdural hematoma TECHNOLOGIST PROVIDED HISTORY: Reason for exam:->new subdural hematoma Has a \"code stroke\" or \"stroke alert\" been called? ->No What reading provider will be dictating this exam?->CRC FINDINGS: BRAIN/VENTRICLES: Again noted is a large left subdural hematoma slightly greater in the frontal region. 1 diameter on coronal views measures 24 mm on image 48 series 601. This compares with 23 mm on the prior examination. There is a approximately 7 mm of shift of midline structures, unchanged when measured in a similar fashion. Minimal subdural hematoma along the falx, not significantly changed. There is unchanged minimal linear subdural hematoma along the tentorium on the left. No acute intraparenchymal hemorrhage.  Minimal subarachnoid hemorrhage in the right frontal region. No evidence of hydrocephalus. ORBITS: The visualized portion of the orbits demonstrate no acute abnormality. SINUSES: Small mucous retention cyst or nasal polyp in the inferior left maxillary sinus. There is a gas fluid level within the left sphenoid sinus. Mastoid air cells appear clear. SOFT TISSUES/SKULL:  No acute abnormality of the visualized skull or soft tissues. 1.  Moderate to large left subdural hematoma with left-to-right midline structure shift of approximately 7 mm, unchanged. 2.  Greatest maximal thickness of the subdural hematoma on coronal views is 24 mm, compared to 23 mm on the prior study. 3.  Gas fluid level in the left sphenoid sinus may reflect acute sinusitis. CT HEAD WO CONTRAST    Result Date: 11/26/2022  EXAMINATION: CT OF THE HEAD WITHOUT CONTRAST  11/26/2022 8:16 pm TECHNIQUE: CT of the head was performed without the administration of intravenous contrast. Automated exposure control, iterative reconstruction, and/or weight based adjustment of the mA/kV was utilized to reduce the radiation dose to as low as reasonably achievable. COMPARISON: CT head scan without contrast November 24, 2022. HISTORY: ORDERING SYSTEM PROVIDED HISTORY: fall prior to admission, family request TECHNOLOGIST PROVIDED HISTORY: Reason for exam:->fall prior to admission, family request Has a \"code stroke\" or \"stroke alert\" been called? ->No What reading provider will be dictating this exam?->CRC FINDINGS: BRAIN/VENTRICLES: Interval increased volume and extent of acute subdural hemorrhage is seen overlying the left cerebral hemisphere and tracking along the left margin of the falx and left supratentorium with greatest thickness adjacent to the frontotemporal region measuring up to 22 mm in greatest axial extent. Associated rightward midline shift is noted at the level of the septum pellucidum which now measures 8 mm. .  No abnormal extra-axial fluid collection.   The gray-white differentiation is maintained without evidence of an acute infarct. There is no evidence of hydrocephalus. Mild diffuse decrease in cerebral volume is noted with corresponding prominence of the sulci and ventricles. Scattered ill-defined hypoattenuation is present within cerebral white matter consistent with mild microvascular ischemic disease. ORBITS: The visualized portion of the orbits demonstrate no acute abnormality. SINUSES: The visualized paranasal sinuses and mastoid air cells demonstrate no acute abnormality. SOFT TISSUES/SKULL:  No acute abnormality of the visualized skull or soft tissues. Interval increased volume and extent of diffuse left cerebral acute subdural hemorrhage with greatest thickness adjacent to the frontotemporal region now measuring up to 22 mm in greatest axial thickness. Interval worsening of rightward midline shift at the level of the septum pellucidum now measuring 8 mm. Mild cerebral white matter chronic microvascular ischemic disease. Mild diffuse cerebral atrophy. Multiple attempts were made by the [facility] core team to page and notify Dr. Mario Byrd. Critical results were then called by Dr. Lenon Fothergill to [facility] on 11/26/2022 at 22:23. Sumeet Baron reports that neurosurgery has been consulted about the case. CT Head W/O Contrast    Result Date: 11/24/2022  EXAMINATION: CT OF THE HEAD WITHOUT CONTRAST  11/24/2022 9:38 am TECHNIQUE: CT of the head was performed without the administration of intravenous contrast. Automated exposure control, iterative reconstruction, and/or weight based adjustment of the mA/kV was utilized to reduce the radiation dose to as low as reasonably achievable. COMPARISON: None. HISTORY: ORDERING SYSTEM PROVIDED HISTORY: LOC + fall + strike to head; concern for bleed TECHNOLOGIST PROVIDED HISTORY: Reason for exam:->LOC + fall + strike to head; concern for bleed Has a \"code stroke\" or \"stroke alert\" been called? ->No Decision Support Exception - unselect if not a suspected or confirmed emergency medical condition->Emergency Medical Condition (MA) FINDINGS: BRAIN/VENTRICLES: No evidence of parenchymal hemorrhages or contusions. No evidence of intra or extra-axial fluid collection is seen. Scattered areas of low attenuation are visualized in the periventricular and subcortical white matter suggestive of chronic microvascular disease. No evidence of acute territorial infarct is seen. Prominence of the ventricles and sulci is visualized suggestive of chronic atrophic brain changes. No evidence of intracranial mass or mass effect, no evidence of midline shift is seen. No evidence of sellar or parasellar mass is visualized. ORBITS: The visualized portion of the orbits demonstrate no acute abnormality. SINUSES: The visualized paranasal sinuses and mastoid air cells demonstrate no acute abnormality. SOFT TISSUES/SKULL:  No acute abnormality of the visualized skull or soft tissues. No acute intracranial abnormality. CT CSpine W/O Contrast    Result Date: 11/24/2022  EXAMINATION: CT OF THE CERVICAL SPINE WITHOUT CONTRAST 11/24/2022 9:38 am TECHNIQUE: CT of the cervical spine was performed without the administration of intravenous contrast. Multiplanar reformatted images are provided for review. Automated exposure control, iterative reconstruction, and/or weight based adjustment of the mA/kV was utilized to reduce the radiation dose to as low as reasonably achievable. COMPARISON: None. HISTORY: ORDERING SYSTEM PROVIDED HISTORY: LOC + Fall + head strike; Concern for fx TECHNOLOGIST PROVIDED HISTORY: Reason for exam:->LOC + Fall + head strike; Concern for fx Decision Support Exception - unselect if not a suspected or confirmed emergency medical condition->Emergency Medical Condition (MA) FINDINGS: Straightening of the lumbar the columns of the cervical spine is present. No evidence of spondylolisthesis is seen.  Multilevel degenerative endplate changes visualized, no evidence of compression deformity of the cervical vertebral bodies. No evidence of fracture or traumatic subluxation is seen. Decreased intervertebral disc height visualized most prominent at C5-C6 and C3-C4. Multilevel degenerative disc osteophyte complex, uncovertebral disease and hypertrophic changes in the facet joints is seen. Calcification visualized within the spinal canal superimposed over the falciform ligament at the level of the C5 vertebral body. Otherwise abnormal density visualized in the cervical spinal canal. The neck soft tissues are unremarkable. Circumferential opacification visualized in the sphenoid sinus. Limited evaluation of the upper lung fields is unremarkable bilaterally. No acute abnormality of the cervical spine. Multilevel degenerative changes of the cervical spine visualized most prominent at C3-C4 and C5-C6. Circumferential opacification visualized in the sphenoid sinus. XR CHEST PORTABLE    Result Date: 11/26/2022  EXAMINATION: ONE XRAY VIEW OF THE CHEST 11/26/2022 8:06 am COMPARISON: 11/25/2022 HISTORY: ORDERING SYSTEM PROVIDED HISTORY: pacemaker TECHNOLOGIST PROVIDED HISTORY: Reason for exam:->pacemaker What reading provider will be dictating this exam?->CRC FINDINGS: AICD visualized in the left chest with 2 leads in the heart. EKG leads are seen superimposed over the chest. Poor inspiratory effort is seen. The cardiomediastinal silhouette is slightly prominent in size. Atherosclerotic calcifications visualized in the aortic arch. Mild prominence of the bronchovascular and interstitial lung markings is seen. Peribronchial cuffing bilateral hilar prominence is seen. Mild haziness overlying bilateral costophrenic angles is seen. No evidence of pneumothorax or parenchymal lung mass. The osseous structures are without acute process. Mild congestive changes.      XR CHEST PORTABLE    Result Date: 11/25/2022  EXAMINATION: ONE XRAY VIEW OF THE CHEST 11/25/2022 4:57 pm COMPARISON: 11/24/2022. HISTORY: ORDERING SYSTEM PROVIDED HISTORY: Pacemaker placement and rule out pneumothorax TECHNOLOGIST PROVIDED HISTORY: Reason for exam:->Pacemaker placement and rule out pneumothorax What reading provider will be dictating this exam?->CRC FINDINGS: AICD visualized in the left chest with lead in the heart. EKG leads are seen superimposed over the chest. Poor inspiratory effort is seen. The CT of the prominence of the cardiomediastinal silhouette is visualized demonstrates no significant increase in comparison to the prior study. Atherosclerotic calcifications visualized in the area the aortic arch. Mild prominence of the bronchovascular and interstitial lung markings is visualized in bilateral lung fields more prominent in the lateral right mid lung field large and in lung field linear subsegmental atelectatic streaks of visualized bilateral lung fields. No evidence of focal infiltrate or consolidation. No evidence of pneumothorax or pleural effusion. Degenerative bone changes seen. No evidence of pneumothorax or parenchymal contusion. AICD visualized in the left chest with leads in the heart. XR CHEST PORTABLE    Result Date: 11/24/2022  EXAMINATION: ONE XRAY VIEW OF THE CHEST 11/24/2022 9:30 am COMPARISON: 08/08/2022 HISTORY: ORDERING SYSTEM PROVIDED HISTORY: CP + SOB TECHNOLOGIST PROVIDED HISTORY: Reason for exam:->CP + SOB FINDINGS: Poor inspiratory effort. The cardiomediastinal silhouette is slightly prominent but demonstrates no significant change in comparison to the prior study. Prominence of the bronchovascular interstitial lung markings is visualized bilaterally with scattered areas of patchy airspace opacification seen. Linea subsegmental atelectatic streaks are visualized. The costophrenic angles are clear with no evidence of pleural effusion seen. No evidence of pneumothorax or parenchymal lung mass.  The osseous structures are without acute process. Congestive changes demonstrate no change. Impression:  Large left hemisphere subdural hematoma  Seizure activity secondary to SDH with mass-effect. 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     Esophagitis-grade 1     AP (angina pectoris) (HCC)     Systolic hypertension     Combined forms of age-related cataract of left eye     Anemia, chronic renal failure, stage 4 (severe) (HCC)     Sepsis secondary to CAP ([address]     Pneumonia of left lung due to infectious organism     A-fib [facility])     Atrial fibrillation (HCC)     Third degree heart block (HCC)     High-grade atrioventricular block     Seizure (HCC)     Subdural hematoma      Recommendations:  Repeat EEG given change in level of consciousness  Vimpat bolus with 200 mg IV  Labs: Depakote level, ammonia, LFTs, prolactin level,     It was my pleasure to evaluate Rafi Espitia today. Please call with questions.     Electronically signed by Andie Phelps MD on 12/4/2022 at 3:36 PM

## 2022-12-04 NOTE — PROGRESS NOTES
Neurosurgery    Risks and benefits of cira hole for drainage of subdural hematoma discussed with son  , all questions answered he gives informed consent to proceed with the procedure tomorrow am    Orders placed

## 2022-12-04 NOTE — PROGRESS NOTES
Hospitalist Progress Note      SYNOPSIS: Patient admitted on 11/24/2022 for A-fib [facility])      SUBJECTIVE:    Patient seen and examined. He is more sleepy today. Records reviewed. 80 y.o. male with past medical history of atrial fibrillation, DM hypertension . Patient is a transfer from [facility] . He was seen there due to chest pain . He states that chest pain is located in the sternal area . He states  that he has shortness of breath with chest pain . He states that when chest pain occurred he felt like he was going to pass out . Patient had a syncopal episode and hit his head . Patient had a second syncopal episode  and after that family called ambulance . En route to ED patient was given 324 mg of ASA . In the ED patient was in Afib with slow ventricular rate having long pauses and bradycardic to the 30s . Lab data revealed sodium 135 potassium 4.1 BUN 29 Scr 2.0  glucose 244 Trop 45 >>43  WBC 9.9 HGB 10.7   Resp panel neg CT head cervical spine neg CXR congestive changes . Patient received fentanyl morphine and was placed on isoproterenol infusion. EP consulted-pacemaker placement. Lyme serology was ordered for possible carditis. Patient had an initial CT of the head after the fall which was negative for any intracranial hemorrhage. Repeat CT head was obtained on 11/26/2022 as patient continued to complain of headache and spite of pain medicine. Repeat CT of the head noncontrast was obtained which showed progressive subdural hemorrhage. Eliquis was held, [facility] and [facility] ordered for Eliquis reversal.  Neurosurgery following. Worsening mental status with aphasia. Found to have two seizures. Started on Keppra and Vimpat. Neurology following. He has been on continuous video EEG. Seizure medications has been adjusted. Last seizure episode 2350 lasted approximately 3 minutes prior to giving valproate and bolus which was completed at 02 100. No further seizures after that.   Appreciate neurology's inputs. Continue Vimpat, Keppra and Depacon. Stable overnight. No other overnight issues reported. Temp (24hrs), Av.3 °F (36.8 °C), Min:97.7 °F (36.5 °C), Max:98.7 °F (37.1 °C)    DIET: ADULT ORAL NUTRITION SUPPLEMENT; Lunch, Dinner; Low Calorie/High Protein Oral Supplement  ADULT DIET; Dysphagia - Soft and Bite Sized  CODE: Limited    Intake/Output Summary (Last 24 hours) at 2022 0817  Last data filed at 2022 0555  Gross per 24 hour   Intake 1640 ml   Output 1510 ml   Net 130 ml       OBJECTIVE:    BP (!) 178/69   Pulse 71   Temp 97.7 °F (36.5 °C) (Axillary)   Resp 19   Ht 5' 9\" (1.753 m)   Wt 179 lb 3.7 oz (81.3 kg)   SpO2 97%   BMI 26.47 kg/m²     General appearance: No apparent distress, appears stated age and cooperative. HEENT:  Conjunctivae/corneas clear. Neck: Supple. No jugular venous distention. Respiratory: Clear to auscultation bilaterally, normal respiratory effort  Cardiovascular: Regular rate rhythm, normal S1-S2  Abdomen: Soft, nontender, nondistended  Musculoskeletal: No clubbing, cyanosis, no bilateral lower extremity edema. Brisk capillary refill. 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 Given that he is most sleepy over the last 2 days, EEG has been ordered. Seizure medications with Vimpat 200 mg twice daily, Keppra 500 mg twice daily and Depacon 500 mg twice daily per neurology. No seizures since 1129 at 2350. Echocardiogram 2022 with mild to moderate aortic stenosis and mild to moderate aortic regurgitation. EF of 60-65%. Continue to hold Eliquis. Neurosurgery to repeat CT head today. DISPOSITION:     Medications:  REVIEWED DAILY    Infusion Medications    sodium chloride      dextrose       Scheduled Medications    insulin glargine  15 Units SubCUTAneous Daily    insulin lispro  0-4 Units SubCUTAneous Q4H    sennosides-docusate sodium  2 tablet Oral Daily    NIFEdipine  60 mg Oral Daily    pantoprazole  40 mg Oral QAM AC    lacosamide (VIMPAT) IVPB  200 mg IntraVENous BID    divalproex  500 mg Oral 2 times per day    polyethylene glycol  17 g Oral Daily    levETIRAcetam  500 mg IntraVENous Q12H    guaiFENesin  400 mg Oral TID    sodium chloride flush  5-40 mL IntraVENous 2 times per day    lidocaine  1 patch TransDERmal Daily    allopurinol  300 mg Oral Once per day on Mon Wed Fri    gabapentin  100 mg Oral BID    tamsulosin  0.4 mg Oral Nightly    Arformoterol Tartrate  15 mcg Nebulization BID    lisinopril  20 mg Oral Daily    And    hydroCHLOROthiazide  12.5 mg Oral Daily     PRN Meds: ipratropium-albuterol, perflutren lipid microspheres, hydrALAZINE, trimethobenzamide, sodium chloride flush, sodium chloride, labetalol, glucose, dextrose bolus **OR** dextrose bolus, glucagon (rDNA), dextrose, potassium chloride **OR** potassium alternative oral replacement **OR** potassium chloride, acetaminophen **OR** acetaminophen, aluminum & magnesium hydroxide-simethicone    Labs:     Recent Labs     12/02/22 0451 12/03/22 0448 12/04/22 0422   WBC 9.6 9.1 13.5*   HGB 10.7* 9.9* 7.1*   HCT 32.4* 29.7* 21.6*    310 380       Recent Labs     12/02/22 0451 12/03/22 0448 12/04/22 0422    135 135   K 4.6 4.1 4.3    101 100   CO2 20* 21* 19*   BUN 46* 48* 50*   CREATININE 1.9* 2.0* 2.1*   CALCIUM 9.5 9.1 8.9   PHOS 3.7 3.6 4.1       No results for input(s): PROT, ALB, ALKPHOS, ALT, AST, BILITOT, AMYLASE, LIPASE in the last 72 hours.     Recent Labs     12/02/22 0451 12/03/22 0448 12/04/22 0422   INR 1.2 1.2 1.2       No results for input(s): Neisha Escobar in the last 72 hours. Chronic labs:    Lab Results   Component Value Date    TSH 3.970 11/24/2022    PSA 3.76 09/16/2022    INR 1.2 12/04/2022    LABA1C 8.5 (H) 08/02/2022       Radiology: REVIEWED DAILY    +++++++++++++++++++++++++++++++++++++++++++++++++  Gordo Brandt MD  [facility] Physician - [address] [place] [address]  +++++++++++++++++++++++++++++++++++++++++++++++++  NOTE: This report was transcribed using voice recognition software. Every effort was made to ensure accuracy; however, inadvertent computerized transcription errors may be present.

## 2022-12-04 NOTE — PROGRESS NOTES
Neurosurg progress note  VITALS:  BP (!) 178/69   Pulse 70   Temp 97.7 °F (36.5 °C) (Axillary)   Resp 13   Ht 5' 9\" (1.753 m)   Wt 179 lb 3.7 oz (81.3 kg)   SpO2 97%   BMI 26.47 kg/m²   24HR INTAKE/OUTPUT:    Intake/Output Summary (Last 24 hours) at 12/4/2022 0941  Last data filed at 12/4/2022 0555  Gross per 24 hour   Intake 1640 ml   Output 1435 ml   Net 205 ml     CT Head W/O Contrast    Result Date: 11/24/2022  EXAMINATION: CT OF THE HEAD WITHOUT CONTRAST  11/24/2022 9:38 am TECHNIQUE: CT of the head was performed without the administration of intravenous contrast. Automated exposure control, iterative reconstruction, and/or weight based adjustment of the mA/kV was utilized to reduce the radiation dose to as low as reasonably achievable. COMPARISON: None. HISTORY: ORDERING SYSTEM PROVIDED HISTORY: LOC + fall + strike to head; concern for bleed TECHNOLOGIST PROVIDED HISTORY: Reason for exam:->LOC + fall + strike to head; concern for bleed Has a \"code stroke\" or \"stroke alert\" been called? ->No Decision Support Exception - unselect if not a suspected or confirmed emergency medical condition->Emergency Medical Condition (MA) FINDINGS: BRAIN/VENTRICLES: No evidence of parenchymal hemorrhages or contusions. No evidence of intra or extra-axial fluid collection is seen. Scattered areas of low attenuation are visualized in the periventricular and subcortical white matter suggestive of chronic microvascular disease. No evidence of acute territorial infarct is seen. Prominence of the ventricles and sulci is visualized suggestive of chronic atrophic brain changes. No evidence of intracranial mass or mass effect, no evidence of midline shift is seen. No evidence of sellar or parasellar mass is visualized. ORBITS: The visualized portion of the orbits demonstrate no acute abnormality. SINUSES: The visualized paranasal sinuses and mastoid air cells demonstrate no acute abnormality.  SOFT TISSUES/SKULL:  No acute abnormality of the visualized skull or soft tissues. No acute intracranial abnormality. CT CSpine W/O Contrast    Result Date: 11/24/2022  EXAMINATION: CT OF THE CERVICAL SPINE WITHOUT CONTRAST 11/24/2022 9:38 am TECHNIQUE: CT of the cervical spine was performed without the administration of intravenous contrast. Multiplanar reformatted images are provided for review. Automated exposure control, iterative reconstruction, and/or weight based adjustment of the mA/kV was utilized to reduce the radiation dose to as low as reasonably achievable. COMPARISON: None. HISTORY: ORDERING SYSTEM PROVIDED HISTORY: LOC + Fall + head strike; Concern for fx TECHNOLOGIST PROVIDED HISTORY: Reason for exam:->LOC + Fall + head strike; Concern for fx Decision Support Exception - unselect if not a suspected or confirmed emergency medical condition->Emergency Medical Condition (MA) FINDINGS: Straightening of the lumbar the columns of the cervical spine is present. No evidence of spondylolisthesis is seen. Multilevel degenerative endplate changes visualized, no evidence of compression deformity of the cervical vertebral bodies. No evidence of fracture or traumatic subluxation is seen. Decreased intervertebral disc height visualized most prominent at C5-C6 and C3-C4. Multilevel degenerative disc osteophyte complex, uncovertebral disease and hypertrophic changes in the facet joints is seen. Calcification visualized within the spinal canal superimposed over the falciform ligament at the level of the C5 vertebral body. Otherwise abnormal density visualized in the cervical spinal canal. The neck soft tissues are unremarkable. Circumferential opacification visualized in the sphenoid sinus. Limited evaluation of the upper lung fields is unremarkable bilaterally. No acute abnormality of the cervical spine. Multilevel degenerative changes of the cervical spine visualized most prominent at C3-C4 and C5-C6.  Circumferential opacification visualized in the sphenoid sinus. XR CHEST PORTABLE    Result Date: 11/25/2022  EXAMINATION: ONE XRAY VIEW OF THE CHEST 11/25/2022 4:57 pm COMPARISON: 11/24/2022. HISTORY: ORDERING SYSTEM PROVIDED HISTORY: Pacemaker placement and rule out pneumothorax TECHNOLOGIST PROVIDED HISTORY: Reason for exam:->Pacemaker placement and rule out pneumothorax What reading provider will be dictating this exam?->CRC FINDINGS: AICD visualized in the left chest with lead in the heart. EKG leads are seen superimposed over the chest. Poor inspiratory effort is seen. The CT of the prominence of the cardiomediastinal silhouette is visualized demonstrates no significant increase in comparison to the prior study. Atherosclerotic calcifications visualized in the area the aortic arch. Mild prominence of the bronchovascular and interstitial lung markings is visualized in bilateral lung fields more prominent in the lateral right mid lung field large and in lung field linear subsegmental atelectatic streaks of visualized bilateral lung fields. No evidence of focal infiltrate or consolidation. No evidence of pneumothorax or pleural effusion. Degenerative bone changes seen. No evidence of pneumothorax or parenchymal contusion. AICD visualized in the left chest with leads in the heart. XR CHEST PORTABLE    Result Date: 11/24/2022  EXAMINATION: ONE XRAY VIEW OF THE CHEST 11/24/2022 9:30 am COMPARISON: 08/08/2022 HISTORY: ORDERING SYSTEM PROVIDED HISTORY: CP + SOB TECHNOLOGIST PROVIDED HISTORY: Reason for exam:->CP + SOB FINDINGS: Poor inspiratory effort. The cardiomediastinal silhouette is slightly prominent but demonstrates no significant change in comparison to the prior study. Prominence of the bronchovascular interstitial lung markings is visualized bilaterally with scattered areas of patchy airspace opacification seen. Linea subsegmental atelectatic streaks are visualized.   The costophrenic angles are clear with no evidence of pleural effusion seen. No evidence of pneumothorax or parenchymal lung mass. The osseous structures are without acute process. Congestive changes demonstrate no change.      CBC:   Lab Results   Component Value Date/Time    WBC 13.5 12/04/2022 04:22 AM    RBC 2.56 12/04/2022 04:22 AM    HGB 7.1 12/04/2022 04:22 AM    HCT 21.6 12/04/2022 04:22 AM    MCV 84.4 12/04/2022 04:22 AM    MCH 27.7 12/04/2022 04:22 AM    MCHC 32.9 12/04/2022 04:22 AM    RDW 13.5 12/04/2022 04:22 AM     12/04/2022 04:22 AM    MPV 9.8 12/04/2022 04:22 AM     BMP:    Lab Results   Component Value Date/Time     12/04/2022 04:22 AM    K 4.3 12/04/2022 04:22 AM    K 4.5 11/24/2022 09:08 AM     12/04/2022 04:22 AM    CO2 19 12/04/2022 04:22 AM    BUN 50 12/04/2022 04:22 AM    LABALBU 4.2 11/24/2022 09:08 AM    CREATININE 2.1 12/04/2022 04:22 AM    CALCIUM 8.9 12/04/2022 04:22 AM    GFRAA 30 09/16/2022 08:24 AM    LABGLOM 30 12/04/2022 04:22 AM    GLUCOSE 108 12/04/2022 04:22 AM      insulin glargine  15 Units SubCUTAneous Daily    insulin lispro  0-4 Units SubCUTAneous Q4H    sennosides-docusate sodium  2 tablet Oral Daily    NIFEdipine  60 mg Oral Daily    pantoprazole  40 mg Oral QAM AC    lacosamide (VIMPAT) IVPB  200 mg IntraVENous BID    divalproex  500 mg Oral 2 times per day    polyethylene glycol  17 g Oral Daily    levETIRAcetam  500 mg IntraVENous Q12H    guaiFENesin  400 mg Oral TID    sodium chloride flush  5-40 mL IntraVENous 2 times per day    lidocaine  1 patch TransDERmal Daily    allopurinol  300 mg Oral Once per day on Mon Wed Fri    gabapentin  100 mg Oral BID    tamsulosin  0.4 mg Oral Nightly    Arformoterol Tartrate  15 mcg Nebulization BID    lisinopril  20 mg Oral Daily    And    hydroCHLOROthiazide  12.5 mg Oral Daily     Opens eyes to voice follows simple commands partial aphasia remains moving all fours equally  Assessment:  Patient Active Problem List   Diagnosis    PUD (peptic ulcer disease)    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

## 2022-12-05 ENCOUNTER — ANESTHESIA (OUTPATIENT)
Dept: OPERATING ROOM | Age: 87
End: 2022-12-05
Payer: MEDICARE

## 2022-12-05 NOTE — CONSULTS
Chart reviewed. Patient seen in SICU. Spoke with son, Dr. Gricelda Blanc (Palliative medicine physician). Son stated he appreciated me reaching out however they do not need a palliative medicine consult at this time. At this time palliative medicine will sign off per family request please please reconsult if family would like to speak with palliative medicine at a later time. Thank you.       Fritz Salgado, APRN - CNP  Palliative Medicine

## 2022-12-05 NOTE — PROGRESS NOTES
Physical Therapy    Physical Therapy Re-Assessment     Name: Anastasia Ji  : 10/27/1933  MRN: [id number]      Date of Service: 2022    Evaluating PT:  Janet Spencer PT, DPT    Room #:  8390/9287-N  Diagnosis:  A-fib ([facility] [I48.91]  Atrial fibrillation ([place] [address] [I48.91]  PMHx/PSHx:   has a past medical history of A-fib ([place] [address].), Diabetes mellitus ([place] [address].), H pylori ulcer, Hyperlipidemia, Hypertension, PONV (postoperative nausea and vomiting), PUD (peptic ulcer disease), and Urinary frequency. Procedure/Surgery:   Dual chamber pacemaker placement;  L frontal cira holes with drain    Precautions:  Falls, Aphasia, L SDH, Pacemaker precautions, LUE sling at night, Bed alarm, Subdural drain, NG tube  Equipment Needs:  TBD    SUBJECTIVE:    Pt lives with dtr in a 1 story home with 3 stairs and 1 rail to enter. Bedroom and bathroom are on the 1st level. Pt ambulated with no AD PTA. OBJECTIVE:   Re-Evaluation  Date: 22 Treatment Short Term/ Long Term   Goals   AM-PAC 6 Clicks      Was pt agreeable to Eval/treatment? Yes      Does pt have pain?  Pt stated \"yes\" but unable to elaborate      Bed Mobility  Rolling: NT  Supine to sit: MaxA x 2  Sit to supine: MaxA x 2  Scooting: MaxA  Jessenia   Transfers Sit to stand: MaxA  Stand to sit: MaxA  Stand pivot: NT  Jessenia with AAD   Ambulation   2 side steps with MaxA x 2 no device  >50 feet with Jessenia with AAD   Stair negotiation: ascended and descended  NT  >4 steps with 1 rail Jessenia   ROM BUE:  Per OT eval   BLE:  WFL     Strength BUE:  Per OT eval   BLE:  3+ to 4-/5 grossly     Balance Sitting EOB:  ModA to Jessenia  Dynamic Standing:  MaxA x 2 no device  Sitting EOB:  SBA  Dynamic Standing:  Jessenia with AAD     Pt is A & O x 1 self  RASS:  -1  CAM-ICU:  NT  Sensation:  Pt denies numbness and tingling to extremities  Edema:  Unremarkable    Vitals:  Heart Rate at rest 70 bpm Heart Rate post session 76 bpm   SpO2 at rest 99% SpO2 post session 98%   Blood Pressure at rest 133/63 mmHg Blood Pressure post session 119/67 mmHg         Functional Status Score-Intensive Care Unit (FSS-ICU)   Rolling -/7   Supine to sit transfer 1/7   Unsupported sitting  3/7   Sit to stand transfers 2/7   Ambulation 1/7   Total  7/35     Therapeutic Exercises:  BLE AROM at EOB x 5 reps    Patient education  Pt educated on PT role, safety during functional mobility, pacemaker precautions     Patient response to education:   Pt verbalized understanding Pt demonstrated skill Pt requires further education in this area   no no Yes      ASSESSMENT:    Conditions Requiring Skilled Therapeutic Intervention:    [x]Decreased strength     [x]Decreased ROM  [x]Decreased functional mobility  [x]Decreased balance   [x]Decreased endurance   [x]Decreased posture  []Decreased sensation  []Decreased coordination   []Decreased vision  [x]Decreased safety awareness   [x]Increased pain       Comments:  RN reported pt was medically stable. Pt was in bed upon arrival, agreeable to re-evaluation s/p cira holes. Pt continued to be aphasic but was able to state his first name and numbers shown by therapist's fingers. Pt required increased assistance for all mobility. Anterior lean to the R noted at EOB but pt's sitting balance improved with time and repositioning. Initiation required for pt to complete BLE ROM but was able to hold against resistance. Pt stood with flexed posture and eventually was able to reach a more erect posture. Assistance to advance RLE and for weight shifting required for pt to complete two side steps. Fatigue limited further activity. Pt was left in bed with all needs met and call light in reach. All lines remained intact and restraints reapplied. Pt would benefit from intensive PT services at discharge.      Treatment:  Patient practiced and was instructed in the following treatment:    Bed mobility training - pt given verbal and tactile cues to facilitate proper sequencing and safety during rolling and supine>sit as well as provided with physical assistance to complete task    Sitting EOB for >5 minutes for upright tolerance, postural awareness and BLE ROM  Transfer training - pt was given verbal and tactile cues to facilitate proper hand placement, technique and safety during sit to stand and stand to sit as well as provided with physical assistance. Gait training- pt was given verbal and tactile cues to facilitate safety, weight shifting and advancement of RLE during side steps as well as provided with physical assistance. Skilled positioning - Pt placed in the chair position with pillows utilized to facilitate upright posture, joint and skin integrity, and interaction with environment. Pt's/ family goals   1. None stated     Prognosis is fair for reaching above PT goals. Patient and or family understand(s) diagnosis, prognosis, and plan of care.   ? Questionable d/t aphasia   No family present on evaluation     PHYSICAL THERAPY PLAN OF CARE:    PT POC is established based on physician order and patient diagnosis     Referring provider/PT Order:  John Higgins MD / 12/05/22 1000 PT eval and treat   Diagnosis:  A-fib ([facility] [I48.91]  Atrial fibrillation ([facility] [I48.91]  Specific instructions for next treatment:  transfer and gait training     Current Treatment Recommendations:     [x] Strengthening to improve independence with functional mobility   [] ROM to improve independence with functional mobility   [x] Balance Training to improve static/dynamic balance and to reduce fall risk  [x] Endurance Training to improve activity tolerance during functional mobility   [x] Transfer Training to improve safety and independence with all functional transfers   [x] Gait Training to improve gait mechanics, endurance and asses need for appropriate assistive device  [x] Stair Training in preparation for safe discharge home and/or into the community   [x] Positioning to prevent skin breakdown and contractures  [x] Safety and Education Training   [x] Patient/Caregiver Education   [x] HEP  [] Other     PT long term treatment goals are located in above grid    Frequency of treatments: 2-5x/week x 1-2 weeks. Time in  0916  Time out  0940    Total Treatment Time  9 minutes     Evaluation Time includes thorough review of current medical information, gathering information on past medical history/social history and prior level of function, completion of standardized testing/informal observation of tasks, assessment of data and education on plan of care and goals.     CPT codes:  [] Low Complexity PT evaluation 72140  [] Moderate Complexity PT evaluation 39680  [] High Complexity PT evaluation 02936  [x] PT Re-evaluation 09669  [] Gait training 04223 -- minutes  [] Manual therapy 97170 -- minutes  [x] Therapeutic activities 95824 9 minutes  [] Therapeutic exercises 63167 - minutes  [] Neuromuscular reeducation 57357 -- minutes     Sylwia Solano, PT, DPT  [id number]

## 2022-12-05 NOTE — PLAN OF CARE
Problem: Safety - Medical Restraint  Goal: Remains free of injury from restraints (Restraint for Interference with Medical Device)  Description: INTERVENTIONS:  1. Determine that other, less restrictive measures have been tried or would not be effective before applying the restraint  2. Evaluate the patient's condition at the time of restraint application  3. Inform patient/family regarding the reason for restraint  4.  Q2H: Monitor safety, psychosocial status, comfort, nutrition and hydration  12/5/2022 1633 by Guilherme Holt RN  Outcome: Progressing  12/5/2022 1633 by Guilherme Holt RN  Outcome: Progressing

## 2022-12-05 NOTE — CONSULTS
SURGICAL INTENSIVE CARE  CONSULT NOTE    Date of admission:  11/24/2022  Reason for ICU transfer:  s/p craniotomy    SUBJECTIVE:  Patient waking up from anesthesia. Moves LUE spontaneously. Inappropriate speech. HOSPITAL COURSE:  Gerhardt Castleman is a 80year old male who presents to the SICU s/p craniotomy for SDH. Patient initially presented to the hospital on 11/24 for chest pain. At that time, he had a syncopal event and hit his head. Initial CT head was read as negative. He had a pacemaker placed on 11/25 for sick sinus syndrome. Follow-up CT head showed large acute SDH with shift. There were reportedly no neurologic deficits at that time. Over the past two days, patient became more lethargic. 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 He was started on Keppra and Vimpat. Patient was on Eliquis which was reversed. PHYSICAL EXAM:  BP (!) 155/73   Pulse 70   Temp 98 °F (36.7 °C)   Resp 20   Ht 5' 9\" (1.753 m)   Wt 179 lb 3.7 oz (81.3 kg)   SpO2 94%   BMI 26.47 kg/m²     GENERAL:  In no acute distress. HEAD:  Normocephalic, atraumatic. EYES:   No scleral icterus. PERRLA. LUNGS:  On 3 L O2. Breath sounds slightly course bilaterally. CARDIOVASCULAR: Regular rate, hypertensive. No murmurs, gallops, or rubs. ABDOMEN:  Soft, non-distended, non-tender. No guarding, rigidity, rebound. EXTREMITIES:   Moves LUE spontaneously. No witnessed movement of BLE and RUE. Atraumatic. No LE edema. SKIN:  Warm and dry  NEUROLOGIC:  GCS 12    ASSESSMENT / PLAN:  Neuro:  SDH- s/p crani with evacuation of hematoma and drain placement 12/4. Maintain drains. Neurosurgery following. Left frontal seizures- neurology following. Continue Vimpat and Keppra. Continue Neurontin and depakote sprinkles. GCS 12. Pain control with tylenol. CV: Maintain SBP < 160. Continue lisinopril, hydrochlorothiazide, and nifedipine. Prn hydralazine and labetalol.  Dorothy Ji sinus syndrome s/p pacemaker 11/25. Cardiology team following. Pulm: Acute respiratory failure- wean from O2 as able. GI: NPO. Bowel regimen. Tigan PRN. SLP to see. Continue Protonix and carafate. Renal: No acute issues. Trend BUN/Cr/UOP. Replace electrolytes prn. Endocrine: Diabetes- sugars well controlled on low dose SSI and 15 Lantus. Maintain glucose < 180. Continue allopurinol. MSK: No acute issues. PT/OT. Heme: No acute issues. Daily CBC. ID: Ancef while surgical drain in place. Trend WBC and fever curve.     Pain/Analgesia: tylenol  Bowel regimen: Milk of mag, Glycolax, Senokot  DVT proph:  SCDs, chemoprophylaxis when cleared by neurosurgery  GI proph:  Protonix, Carafate   Glucose protocol: SSI, Lantus    Mouth/eye care: As needed per patient  Stephens:   present  CVC sites:  none    Ancillary consults: Neurosurgery, neurology, cardiology, medicine  Family Update: As available    Disposition: KASHIF Hopper MD  Resident, PGY-2  12/4/2022  11:26 PM

## 2022-12-05 NOTE — PROGRESS NOTES
Hospitalist Progress Note      SYNOPSIS: Patient admitted on 11/24/2022 for A-fib [facility])      SUBJECTIVE:    Patient seen and examined  Records reviewed. 80 y.o. male with past medical history of atrial fibrillation, DM hypertension . Patient is a transfer from [place] . He was seen there due to chest pain . He states that chest pain is located in the sternal area . He states  that he has shortness of breath with chest pain . He states that when chest pain occurred he felt like he was going to pass out . Patient had a syncopal episode and hit his head . Patient had a second syncopal episode  and after that family called ambulance . En route to ED patient was given 324 mg of ASA . In the ED patient was in Afib with slow ventricular rate having long pauses and bradycardic to the 30s . Lab data revealed sodium 135 potassium 4.1 BUN 29 Scr 2.0  glucose 244 Trop 45 >>43  WBC 9.9 HGB 10.7   Resp panel neg CT head cervical spine neg CXR congestive changes . Patient received fentanyl morphine and was placed on isoproterenol infusion. EP consulted-pacemaker placement. Lyme serology was ordered for possible carditis. Patient had an initial CT of the head after the fall which was negative for any intracranial hemorrhage. Repeat CT head was obtained on 11/26/2022 as patient continued to complain of headache and spite of pain medicine. Repeat CT of the head noncontrast was obtained which showed progressive subdural hemorrhage. Eliquis was held, [facility] ordered for Eliquis reversal.  Neurosurgery following. Worsening mental status with aphasia. Found to have two seizures. Started on Keppra and Vimpat. Neurology following. He has been on continuous video EEG. Seizure medications has been adjusted. Last seizure episode 2350 lasted approximately 3 minutes prior to giving valproate and bolus which was completed at 02 100. No further seizures after that. Appreciate neurology's inputs.   Continue Vimpat, Keppra and Depacon. He is status post left frontal cira hole for placement of subdural drain and drainage of some acute subdural hematoma 2022    Stable overnight. No other overnight issues reported. Temp (24hrs), Av.2 °F (36.8 °C), Min:97.9 °F (36.6 °C), Max:98.6 °F (37 °C)    DIET: Diet NPO Exceptions are: Sips of Water with Meds  CODE: Limited    Intake/Output Summary (Last 24 hours) at 2022 1053  Last data filed at 2022 1000  Gross per 24 hour   Intake 700 ml   Output 2085 ml   Net -1385 ml       OBJECTIVE:    BP (!) 106/59   Pulse 81   Temp 98.4 °F (36.9 °C) (Oral)   Resp 26   Ht 5' 9\" (1.753 m)   Wt 182 lb 15.7 oz (83 kg)   SpO2 99%   BMI 27.02 kg/m²     General appearance: No apparent distress, appears stated age and cooperative. HEENT:  Conjunctivae/corneas clear. Left frontal scalp drain with surgical dressing in place. Neck: Supple. No jugular venous distention. Respiratory: Clear to auscultation bilaterally, normal respiratory effort  Cardiovascular: Regular rate rhythm, normal S1-S2  Abdomen: Soft, nontender, nondistended  Musculoskeletal: No clubbing, cyanosis, no bilateral lower extremity edema. Brisk capillary refill. 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 Continue seizure medications per neurology. Vimpat, Depakote and Keppra. Neurosurgery following appreciate their input. Appreciate ICU team input.       DISPOSITION:     Medications:  REVIEWED DAILY    Infusion Medications    sodium chloride      dextrose       Scheduled Medications    pantoprazole (PROTONIX) 40 mg injection  40 mg IntraVENous Q12H    sucralfate  1 g Oral 4 times per day    ceFAZolin  1,000 mg IntraVENous Q8H    oxymetazoline  2 spray Each Nostril Once    lidocaine   Topical Once    insulin glargine  15 Units SubCUTAneous Daily    insulin lispro  0-4 Units SubCUTAneous Q4H    sennosides-docusate sodium  2 tablet Oral Daily    NIFEdipine  60 mg Oral Daily    lacosamide (VIMPAT) IVPB  200 mg IntraVENous BID    divalproex  500 mg Oral 2 times per day    polyethylene glycol  17 g Oral Daily    levETIRAcetam  500 mg IntraVENous Q12H    guaiFENesin  400 mg Oral TID    sodium chloride flush  5-40 mL IntraVENous 2 times per day    lidocaine  1 patch TransDERmal Daily    allopurinol  300 mg Oral Once per day on Mon Wed Fri    gabapentin  100 mg Oral BID    tamsulosin  0.4 mg Oral Nightly    Arformoterol Tartrate  15 mcg Nebulization BID    lisinopril  20 mg Oral Daily    And    hydroCHLOROthiazide  12.5 mg Oral Daily     PRN Meds: hydrALAZINE, labetalol, ipratropium-albuterol, perflutren lipid microspheres, trimethobenzamide, sodium chloride flush, sodium chloride, glucose, dextrose bolus **OR** dextrose bolus, glucagon (rDNA), dextrose, potassium chloride **OR** potassium alternative oral replacement **OR** potassium chloride, acetaminophen **OR** acetaminophen, aluminum & magnesium hydroxide-simethicone    Labs:     Recent Labs     12/03/22 0448 12/04/22 0422 12/04/22  1205 12/05/22  0450   WBC 9.1 13.5*  --  12.3*   HGB 9.9* 7.1* 10.6* 10.6*   HCT 29.7* 21.6* 32.2* 31.9*    380  --  358       Recent Labs     12/03/22  0448 12/04/22  0422 12/05/22  0450    135 139   K 4.1 4.3 4.9    100 104   CO2 21* 19* 17*   BUN 48* 50* 54*   CREATININE 2.0* 2.1* 2.1* CALCIUM 9.1 8.9 9.1   PHOS 3.6 4.1 5.2*       Recent Labs     12/05/22 0450   PROT 6.3*   ALKPHOS 91   ALT 13   AST 25   BILITOT 0.4       Recent Labs     12/03/22  0448 12/04/22 0422 12/05/22 0450   INR 1.2 1.2 1.2       No results for input(s): Jayleen Moses in the last 72 hours. Chronic labs:    Lab Results   Component Value Date    TSH 3.970 11/24/2022    PSA 3.76 09/16/2022    INR 1.2 12/05/2022    LABA1C 8.5 (H) 08/02/2022       Radiology: REVIEWED DAILY    +++++++++++++++++++++++++++++++++++++++++++++++++  Sarah Banuelos MD  [facility] Physician - [address] [place] [address], [place]  +++++++++++++++++++++++++++++++++++++++++++++++++  NOTE: This report was transcribed using voice recognition software. Every effort was made to ensure accuracy; however, inadvertent computerized transcription errors may be present.

## 2022-12-05 NOTE — PROGRESS NOTES
Comprehensive Nutrition Assessment    Type and Reason for Visit:  Reassess    Nutrition Recommendations/Plan:     Continue NPO. Swallow eval if feasible. TF Rec if needed: Diabetic (Glucerna 1.5) @ 55 ml/hr. Will provide: 1320 ml tv, 1980 kcals, 108 gm pro, 1001 ml free water  Regimen meets 100% est calorie & protein needs  Water flushes per neuro management          Malnutrition Assessment:  Malnutrition Status: At risk for malnutrition (12/05/22 1001)    Context:  Acute Illness     Findings of the 6 clinical characteristics of malnutrition:  Energy Intake:  50% or less of estimated energy requirements for 5 or more days (average)  Weight Loss:  No significant weight loss     Body Fat Loss:  No significant body fat loss     Muscle Mass Loss:  No significant muscle mass loss    Fluid Accumulation:  No significant fluid accumulation     Strength:  Not Performed    Nutrition Assessment:    Pt remains at risk d/t ongoing need for ICU care. Admit from [facility] s/p syncope episode/ fall 2/2 [facility] s/p pacemaker placement. Noted +SDH now w/ midline shift s/p crani +drain placement. PMhx DM, CKD, & PUD. Current NPO status post-op, NGT in place. Will provide TF recs if needed if unable to perform swallow eval.    Nutrition Related Findings:    Pt disoriented/ poor attention, -I/O's 1L, no edema, active BS, closed suction drain x 1, NGT, hyperphosphatemia    Wound Type: Multiple, Surgical Incision       Current Nutrition Intake & Therapies:    Average Meal Intake: NPO (average ~25-50% prior)     Diet NPO Exceptions are: Sips of Water with Meds    Anthropometric Measures:  Height: 5' 9\" (175.3 cm)  Ideal Body Weight (IBW): 160 lbs (73 kg)    Admission Body Weight: 186 lb (84.4 kg) (11/25 first measured)  Current Body Weight: 182 lb (82.6 kg) (12/5 actual), 115.6 % IBW.     Current BMI (kg/m2): 26.9  Usual Body Weight: 183 lb 12.8 oz (83.4 kg) (5/18/22 EMR measured wt from Nephrology visit)  % Weight Change (Calculated): 0.7 wt hx appears stable                    BMI Categories: Overweight (BMI 25.0-29. 9)    Estimated Daily Nutrient Needs:  Energy Requirements Based On: Formula  Weight Used for Energy Requirements: Current  Energy (kcal/day): MSJ 1486 x 1.3 SF= 9890-5874  Weight Used for Protein Requirements: Ideal  Protein (g/day): 1.3-1.5 g/kg IBW;   Fluid (ml/day): per critical care    Nutrition Diagnosis:   Inadequate oral intake related to cognitive or neurological impairment as evidenced by NPO or clear liquid status due to medical condition    Nutrition Interventions:   Nutrition Education/Counseling: Education not appropriate  Coordination of Nutrition Care: Continue to monitor while inpatient      Goals:  Previous Goal Met: Progress towards Goal(s) Declining (Pt now NPO)    Specify Other Goals: New goal- Nutriton progression    Nutrition Monitoring and Evaluation:      Food/Nutrient Intake Outcomes: Diet Advancement/Tolerance  Physical Signs/Symptoms Outcomes: Biochemical Data, Nutrition Focused Physical Findings, Skin, Weight, Chewing or Swallowing, GI Status, Fluid Status or Edema, Hemodynamic Status    Discharge Planning:     Too soon to determine     Eliana Wesley RD, LD  Contact: Ext [phone]

## 2022-12-05 NOTE — PROGRESS NOTES
[id number] [place] [address]  [facility], [address] [place] [address]                                                               Patient Name: Nini Garrido  MRN: [id number]  : 10/27/1933  Room: [address] [place] [address]-A    Evaluating OT: AKANKSHA Morales,  OTR/L; [id number]    Referring Provider: Joanne Sherman MD   Specific Provider Orders/Date: OT eval and treat (22)       Diagnosis: A-fib ([facility] [I48.91]  Atrial fibrillation ([facility] [I48.91]     Reason for admission: Pt admitted with SOB, chest pain with fall, +hitting head with SDH. Surgery/Procedures:  pacemaker placement   : [address] [place] [address], PLACEMENT OF SUBDURAL DRAIN    Pertinent Medical History:    Past Medical History:   Diagnosis Date    A-fib ([facility]     Diabetes mellitus ([facility]     H pylori ulcer 2012    Hyperlipidemia     Hypertension     PONV (postoperative nausea and vomiting)     PUD (peptic ulcer disease) 2012    Urinary frequency         *Precautions:  Fall Risk, pacemaker precautions, global aphasia, BP <180 , cognition, easy to chew diet, 2 pt restraints    Assessment of current deficits   [x] Functional mobility  [x]ADLs  [x] Strength               []Cognition   [x] Functional transfers   [x] IADLs         [x] Safety Awareness   [x]Endurance   [] Fine Coordination        [] ROM     [] Vision/perception   []Sensation    []Gross Motor Coordination [x] Balance   [] Delirium                  []Motor Control     [] Communication    OT PLAN OF CARE   OT POC based on physician orders, patient diagnosis and results of clinical assessment.        Frequency/Duration: 1-3 days/wk for 1-2 weeks PRN    Specific OT Treatment Interventions to include:   * Instruction/training on adapted ADL techniques and AE recommendations to increase functional independence within precautions       * Training on energy conservation strategies, correct breathing pattern and techniques to improve independence/tolerance for self-care routine  * Functional transfer/mobility training/DME recommendations for increased independence, safety, and fall prevention  * Patient/Family education to increase follow through with safety techniques and functional independence  * Recommendation of environmental modifications for increased safety with functional transfers/mobility and ADLs  * Cognitive retraining/development of therapeutic activities to improve problem solving, judgement, memory, and attention for increased safety/participation in ADL/IADL tasks  * Sensory re-education to improve body/limb awareness, maintain/improve skin integrity, and improve hand/UE motor function  * Visual-perceptual training to improve environmental scanning, visual attention/focus, and oculomotor skills for increased safety/independence with functional transfers/mobility and ADLs  * Splinting/positioning for increased function, prevention of contractures, and improve skin integrity  * Therapeutic exercise to improve motor endurance, ROM, and functional strength for ADLs/functional transfers  * Therapeutic activities to facilitate/challenge dynamic balance, stand tolerance for increased safety and independence with ADLs  * Therapeutic activities to facilitate gross/fine motor skills for increased independence with ADLs  * Neuro-muscular re-education: facilitation of righting/equilibrium reactions, midline orientation, scapular stability/mobility, normalization of muscle tone, and facilitation of volitional active controled movement  * Positioning to improve skin integrity, interaction with environment and functional independence  * Delirium prevention/treatment  * Manual techniques for edema management    Modified [place] Scale   Score     Description  0             No symptoms  1             No significant disability despite symptoms  2             Slight disability; able to look after own affairs  3             Moderate disability; able to ambulate without assist/ requires assist with ADLs  4             Moderate/Severe disability;requires assist to ambulate/assist with ADLs  5             Severe disability;bedridden/incontinent   6               Score:   4    Recommended Adaptive Equipment: TBD; BSC for hospital use    Pt unable to report hx d/t aphasia/impaired cognition. Home Living: Per chart in August, pt lives with his daughter in a 1 story home with 3 step(s) to enter and rail(s); bed/bath on main level; pt does not go to basement. Bathroom setup: Walk-in shower with shower seat and grab bars, stnd commode. Equipment owned: ?    Prior Level of Function: ? with ADLs;  ? with IADLs. ? for functional mobility. Driving: ?  Occupation: Per chart pt is retired. Pain Level: pt c/o 0/10 pain this session    Cognition: A&O: ~1/4, ; Follows 1 step commands with max verbal, visual, tactile cues. Memory: P+   Comprehension: P+   Problem solving: P   Judgement/safety: P               Communication skills: Impaired; pt with global aphasia. Vision: Appears WFL; pt tracks L/R; L/R confusion         Glasses: Yes                                                   Hearing: WFL     RASS: 0  CAM-ICU: (NT) Delirium    UE Assessment:  Hand Dominance: Right [x]  Left []     ROM Strength STM goal: PRN   RUE  WFL Proximal: 4-/5  Distal: 4/5    : Fair; difficult to assess d/t cognition. [address]: WFL  GMC: WFL Increase overall strength for participation in ADLs. LUE WFL  Within parameters of pacer precautions Grossly 3/5  Observed    : Fair; difficult to assess d/t cognition. [address]: WFL  G[facility]: WFL Increase overall strength for participation in ADLs. Sensation: No c/o numbness/tingling in extremities. Tone: WNL   Edema: Unremarkable.      Functional Assessment:  AM-PAC Daily Activity Raw Score:    Initial Eval Status  Date: 22 Treatment Status  Date: 22 STGs = LTGs  Time frame: 7-14 days   Feeding Min A  Cues/set-up required    SBA                    Mod I       Grooming Mod A  Verbal cues for thoroughness   Min A                       Set-up        UB dressing/bathing Max A   Max A                     Min A       LB dressing/bathing Max A  Pt able to complete figure 4 technique in supported sitting, confusion limiting performance. Difficulty following simple one-step commands. Max A                       Min A        Toileting Dep Max A                       Min A     Bed Mobility  Supine to sit:   Min A    Sit to supine:   Min A   Supine to sit: Max A x2    Sit to supine:   Max A x2                     S     Functional Transfers Sit to stand:   Min A    Stand to sit:   Min A    Stand Pivot:   Min A  Bed>chair   Sit to stand: Max A    Stand to sit: Max A    Stand Pivot:   NT                          S     Functional Mobility Min A with no AD  For SPT Max A  For side steps at EOB and assist to progress RLE. S        Balance Sitting:     Static: SBA    Dynamic: Min A  Standing: Min A Sitting:     Static: Mod>Min A    Dynamic: Mod A  Standing: Max A Sitting:     Static: S    Dynamic:S  Standing: S                   Endurance/Activity Tolerance   fair tolerance with light activity. Fair- tolerance with light activity. Pt fatigued at end of session. WFL  For full ADL   Visual/  Perceptual L/R confusion    Increased processing time, aphasia.                  Vitals:   HR at rest: 70 bpm HR at end of session: 80 bpm   SpO2 at rest: 99% SpO2 at end of session 98%   BP at rest: 157/69 mmHg BP at end of session 119/67 mmHg       Treatment: OT treatment provided this date includes:   Instruction/training on safe bed mobility/functional mobility/transfer techniques: with focus on safety, body mechanics, and precautions   Proper Positioning/Alignment: BUE supported for optimal healing, skin integrity, to prevent breakdown, decrease edema, and reduce risk of contracture. Skilled Monitoring of Vitals: to include BP, spO2, and HR throughout session to maximize safety. Therapeutic activity: to challenge dynamic sitting/standing balance and endurance to promote safety during ADL tasks and functional transfers and mobility. Activities to challenge functional cognition, word finding, and expression. Pt able to report numerical value on command. Delirium Prevention: Environmental and sensory modifications assessed and implemented to decrease ICU acquired delirium and to improve overall orientation, mentation and pt interaction with family/staff. Line management and environmental modifications made prior to and end of session to ensure patient safety and to increase efficiency of session. Skilled monitoring of HR, O2 saturation, blood pressure and patient's response to activity performed throughout session. Comments: OK from RN to see patient. Upon arrival, patient semi supine in bed. Pt pleasant and agreeable to participate in therapy session. Pt demo fair+ tolerance with poor+ understanding of education/techniques limited by aphasia. At end of session, patient properly positioned in bed with BUE supported with call light within reach, all lines and tubes intact. Pt instructed on use of call light for assistance and fall prevention. Nursing notified of patient positioning.       Time In: 0791             Time Out: 6851         Total Treatment time: 24 min   Min Units   OT Eval Low 80945     OT Eval Medium 02250     OT Eval High [id number]     OT Re-Eval [id number]     Therapeutic Ex [id number]     Therapeutic Activities 71956 32 2   ADL/Self Care [id number]     Orthotic Management [id number]     Neuro Re-Ed 96214     Non-Billable Time          AKANKSHA Harry,  OTR/L; [id number]

## 2022-12-05 NOTE — OP NOTE
[address]                  [address] [facility],  [facility]                                OPERATIVE REPORT    PATIENT NAME: Howie Cabrales             :        10/27/1933  MED REC NO:   [id number]                            ROOM:       [facility]  ACCOUNT NO:   [de-identified]                           ADMIT DATE: 2022  PROVIDER:     Adrianne Hernandez MD    DATE OF PROCEDURE:  2022    PREOPERATIVE DIAGNOSIS:  Subacute left frontal subdural hematoma. POSTOPERATIVE DIAGNOSIS:  Subacute left frontal subdural hematoma. PROCEDURE PERFORMED:  Left frontal bur holes for placement of subdural  drain and drainage of subacute subdural hematoma. SURGEON:   Adrianne Hernandez MD    ANESTHESIA:  General endotracheal.    INDICATION:   This gentleman suffered from an acute subdural hematoma  about 7 days ago. He was watched closely, became more lethargic, so was  taken to the OR today for bur hole evacuation. DESCRIPTION OF PROCEDURE:  Following induction of general endotracheal  anesthesia, turned to right lateral decubitus position with an axillary  roll. Left frontal region was prepped and draped per routine fashion. A linear incision was made just above temporalis line in the posterior  left frontal region. Self-retaining retractor placed in wound. Bur  hole placed in a standard fashion. Dura was cauterized. Subdural  hematoma was encountered, was irrigated out copiously. There were some  liquid portion, drain, it was acutely drained, not all of it was liquid. Drain placed into the subdural space, tunneled posterior, connected to  sterile closed drainage system. Meticulous hemostasis obtained. Cover  placed over the bur hole. Closed in layers. No apparent complications. Stable throughout the case. Taken to ICU in stable condition.    Estimated blood loss minimal.        Bonnie Waters MD    D: 2022 21:41:02       T: 2022 21:44:25     [email or web address]  Job#: [id number]     Doc#: [id number]    CC:

## 2022-12-05 NOTE — ANESTHESIA POSTPROCEDURE EVALUATION
Department of Anesthesiology  Postprocedure Note    Patient: Sherif Antony  MRN: [id number]  YOB: 1933  Date of evaluation: 12/4/2022      Procedure Summary     Date: 12/04/22 Room / Location: [address] [place] [address] 20 / [facility]    Anesthesia Start: 5052 Anesthesia Stop: 2041    Procedure: CRANIOTOMY KHURRAM HOLE, PLACEMENT OF SUBDURAL DRAIN (Left: Head) Diagnosis:       Subdural hematoma      (SUBDURAL HEMATOMA)    Surgeons: Ty Lawton MD Responsible Provider: Ani Orellana MD    Anesthesia Type: general ASA Status: 3          Anesthesia Type: No value filed.     Gus Phase I: Gus Score: 6    Gus Phase II:        Anesthesia Post Evaluation    Patient location during evaluation: PACU  Patient participation: complete - patient participated  Level of consciousness: awake  Pain score: 0  Airway patency: patent  Nausea & Vomiting: no nausea  Complications: no  Cardiovascular status: hemodynamically stable  Respiratory status: acceptable  Hydration status: stable

## 2022-12-05 NOTE — BRIEF OP NOTE
Brief Postoperative Note      Patient: Anastasia Ji  YOB: 1933  MRN: [id number]    Date of Procedure: 12/4/2022    Pre-Op Diagnosis: SUBDURAL HEMATOMA    Post-Op Diagnosis: Same       Procedure(s):  CRANIOTOMY KHURRAM HOLE, PLACEMENT OF SUBDURAL DRAIN    Surgeon(s):  Najma Kilgore MD    Assistant:  * No surgical staff found *    Anesthesia: General    Estimated Blood Loss (mL): Minimal    Complications: None    Specimens:   * No specimens in log *    Implants:  Implant Name Type Inv. Item Serial No.  Lot No. LRB No. Used Action   SCREW BONE L4MM DIA1. 5MM ST SELF CNTR AX [facility] - [id number]  SCREW BONE L4MM DIA1. 5MM ST SELF CNTR AX STBL UNIII  FRAN MARGI-WD  Left 5 Implanted   COVER BUR H [id number] CRANIOMAXILLOFACIAL PLT LO PROF W/ TAB - [id number]  COVER BUR H [id number] CRANIOMAXILLOFACIAL PLT LO PROF W/ TAB  FRAN CRANIOMAXILLOFACIAL-WD  Left 1 Implanted         Drains:   Closed/Suction Drain Left;Superior Other (Comment) Other (Comment) (Active)       Urinary Catheter 12/01/22 (Active)   Catheter Indications Urinary retention (acute or chronic), continuous bladder irrigation or bladder outlet obstruction 12/04/22 0800   Urine Color Yellow 12/04/22 0800   Urine Appearance Clear 12/04/22 0800   Collection Container Standard 12/04/22 0800   Securement Method Leg strap 12/04/22 0800   Status Patent 12/04/22 0600   Output (mL) 125 mL 12/04/22 1800       Findings:     Electronically signed by Najma Kilgore MD on 12/4/2022 at 9:21 PM

## 2022-12-05 NOTE — PROCEDURES
EEG Report  Anastasia Ji is a 80 y.o. male      Appointment Date 12/5/2022   Appointment Time  3:00pm   [place] [facility] [place] [id number]   Type of Study portable Floor 3806-A     Technical Specifications  Technician [facility] of consciousness Awake/drowsy   Sleep deprived? no   Hyperventilation tested? no   Photic stim tested? no   EEG recording Standard 10-20 electrode placement    Duration of recording 25 mins   EEG complete? Yes         Clinical History   Anastasia Ji is a 80 y.o. male with history of atrial fibrillation, DM, and HTN admitted on 11/24/2022 with onset of chest pain and shortness of breath. Reporting feeling lightheaded with near syncope associated with this chest pain. He subsequently passed out, but recovered without event. He had a second syncopal event at home where he apparently hit his head. Patient was given aspirin in route to the hospital and upon arrival to the ED he was noted to be in A. fib with fall pauses and bradycardia. Scan of the head done on admission did not show any acute intracranial abnormalities. With headache on admission and continued headache prompted repeat CT scan of the head on 11/26/2022 showing progressive subdural hematoma on the left side. She had been on Eliquis at the time which was held and reversed with Kcentra. Patient developed progressive altered mentation with decreased responsiveness and aphasia. Neurosurgery was consulted for management with no postsurgical intervention performed. Neurology was also consulted with EEG done 11/28/2022 showing left frontocentrotemporal electrographic seizures with associated moderate to severe nonspecific encephalopathy. It placed on continuous EEG monitoring ended on process 12/1/22 with note of continued encephalopathy but improved to mild to moderate severity.   3-minute seizure reported on 11/30/2022 along with a 2-minute seizure on 11/29/2022 with Depakote added to your medication on 11/29/2022. Patient has already been on Keppra 500 mg twice daily for max renal dosing along with Vimpat 200 mg twice daily at time of Depakote addition. No further seizure medication changes since addition of Depakote with 4 days of increasing alertness and responsiveness.     Medications    Current Facility-Administered Medications:     levETIRAcetam (KEPPRA) tablet 500 mg, 500 mg, Oral, BID, Cloyce Flair, DO    lacosamide (VIMPAT) tablet 200 mg, 200 mg, Oral, BID, Jamila Michelle Clinton, DO    sucralfate (CARAFATE) tablet 1 g, 1 g, Oral, 4 times per day, Sarah Mccurdy MD, 1 g at 12/05/22 1121    ceFAZolin (ANCEF) 1,000 mg in sterile water 10 mL IV syringe, 1,000 mg, IntraVENous, Q8H, Sarah Mccurdy MD, 1,000 mg at 12/05/22 1119    oxymetazoline (AFRIN) 0.05 % nasal spray 2 spray, 2 spray, Each Nostril, Once, Daisha Parsons MD    lidocaine (XYLOCAINE) 2 % uro-jet, , Topical, Once, Daisha Parsons MD    hydrALAZINE (APRESOLINE) injection 10 mg, 10 mg, IntraVENous, Q30 Min PRN, Daisha Parsons MD, 10 mg at 12/05/22 0706    labetalol (NORMODYNE;TRANDATE) injection 10 mg, 10 mg, IntraVENous, Q1H PRN, Daisha Parsons MD, 10 mg at 12/04/22 2305    insulin glargine-yfgn (SEMGLEE-YFGN) injection vial 15 Units, 15 Units, SubCUTAneous, Daily, Sarah Mccurdy MD, 15 Units at 12/05/22 0902    insulin lispro (HUMALOG) injection vial 0-4 Units, 0-4 Units, SubCUTAneous, Q4H, Sarah Mccurdy MD, 1 Units at 12/05/22 1201    sennosides-docusate sodium (SENOKOT-S) 8.6-50 MG tablet 2 tablet, 2 tablet, Oral, Daily, Sarah Mccurdy MD, 2 tablet at 12/05/22 0901    NIFEdipine (PROCARDIA XL) extended release tablet 60 mg, 60 mg, Oral, Daily, Sarah Mccurdy MD, 60 mg at 12/05/22 0901    [DISCONTINUED] valproate (DEPACON) 500 mg in dextrose 5 % 100 mL IVPB, 500 mg, IntraVENous, 2 times per day, Stopped at 12/02/22 1213 **OR** divalproex (DEPAKOTE SPRINKLE) capsule 500 mg, 500 mg, Oral, 2 times per day, Trudy Nuñez MD, 500 mg at 12/05/22 0901    polyethylene glycol (GLYCOLAX) packet 17 g, 17 g, Oral, Daily, Trudy Nuñez MD, 17 g at 12/05/22 0901    guaiFENesin tablet 400 mg, 400 mg, Oral, TID, Trudy Nuñez MD, 400 mg at 12/05/22 0901    ipratropium-albuterol (DUONEB) nebulizer solution 1 ampule, 1 ampule, Inhalation, Q4H PRN, Trudy Nuñez MD, 1 ampule at 12/05/22 0959    perflutren lipid microspheres (DEFINITY) injection 1.5 mL, 1.5 mL, IntraVENous, ONCE PRN, Trudy Nuñez MD    trimethobenzamide Louanna Madi) injection 200 mg, 200 mg, IntraMUSCular, Q6H PRN, Trudy Nuñez MD    sodium chloride flush 0.9 % injection 5-40 mL, 5-40 mL, IntraVENous, 2 times per day, Trudy Nuñez MD, 10 mL at 12/04/22 2312    sodium chloride flush 0.9 % injection 5-40 mL, 5-40 mL, IntraVENous, PRN, Trudy Nuñez MD, 10 mL at 11/26/22 2335    0.9 % sodium chloride infusion, , IntraVENous, PRN, Trudy Nuñez MD    lidocaine 4 % external patch 1 patch, 1 patch, TransDERmal, Daily, Trudy Nuñez MD, 1 patch at 12/05/22 0901    glucose chewable tablet 16 g, 4 tablet, Oral, PRN, Trudy Nuñez MD    dextrose bolus 10% 125 mL, 125 mL, IntraVENous, PRN **OR** dextrose bolus 10% 250 mL, 250 mL, IntraVENous, PRN, Trudy Nuñez MD    glucagon (rDNA) injection 1 mg, 1 mg, SubCUTAneous, PRN, Trudy Nuñez MD    dextrose 10 % infusion, , IntraVENous, Continuous PRN, Trudy Nuñez MD    allopurinol (ZYLOPRIM) tablet 300 mg, 300 mg, Oral, Once per day on Mon Wed Fri, Samy Wolff MD, 300 mg at 12/05/22 0901    gabapentin (NEURONTIN) capsule 100 mg, 100 mg, Oral, BID, Trudy Nuñez MD, 100 mg at 12/05/22 0901    tamsulosin (FLOMAX) capsule 0.4 mg, 0.4 mg, Oral, Nightly, Trudy Nuñez MD, 0.4 mg at 12/04/22 2235    Arformoterol Tartrate (BROVANA) nebulizer solution 15 mcg, 15 mcg, Nebulization, BID, Trudy Nuñez MD, 15 mcg at 12/05/22 0959    lisinopril (PRINIVIL;ZESTRIL) tablet 20 mg, 20 mg, Oral, Daily, 20 mg at 12/05/22 0906 **AND** hydroCHLOROthiazide (HYDRODIURIL) tablet 12.5 mg, 12.5 mg, Oral, Daily, Larry Case MD, 12.5 mg at 12/05/22 0901    potassium chloride (KLOR-CON M) extended release tablet 40 mEq, 40 mEq, Oral, PRN **OR** potassium bicarb-citric acid (EFFER-K) effervescent tablet 40 mEq, 40 mEq, Oral, PRN **OR** potassium chloride 10 mEq/100 mL IVPB (Peripheral Line), 10 mEq, IntraVENous, PRN, Larry Case MD    acetaminophen (TYLENOL) tablet 650 mg, 650 mg, Oral, Q6H PRN, 650 mg at 11/27/22 1208 **OR** acetaminophen (TYLENOL) suppository 650 mg, 650 mg, Rectal, Q6H PRN, Larry Case MD    aluminum & magnesium hydroxide-simethicone (MAALOX) 200-200-20 MG/5ML suspension 30 mL, 30 mL, Oral, Q6H PRN, Larry Case MD        Physician Interpretation    General EEG Report  EEG study was performed using the 10-20 electrode placement system in patient who was obtunded at time of study. No abnormal behavior or movements noted during the study. Activation procedures included photic stimulation and hyperventilation. Type of EEG:   Routine Inpatient EEG with video done at bedside    Description   Background: Alpha activity was poorly fnot discernable. Fast Activity: low amplitude beta activity across all leads with  prominent increased amplitude activity bifrontally consistent with muscle artifact. Slowing: Diffuse delta slowing throughout study with presence of some triphasic waves. Non-Epileptiform Abnormality: as above  Epileptiform Discharge: None  Ictal: n/a    Sleep: drowsy    Photic stimulation: not performed. Hyperventilation: not performed     General Impression  Abnormal EEG with presence of diffuse delta slowing with triphasic waves indicative of mod-severe  encephalopathy. No epileptiform activity observed. Clinical correlation is indicated.     Thea Must, MD Pearletha Gilford

## 2022-12-05 NOTE — FLOWSHEET NOTE
Patient continues to reach for lines and drains that pertain to critical care despite multiple attempts of redirection. Will continue use of bilateral soft wrist restraints and will continue to assess and monitor.

## 2022-12-05 NOTE — FLOWSHEET NOTE
Patient pulled out NGT, resident and attending notified. Asked to perform bedside swallow eval. Patient passed. 12/05/22 1100   Swallow Screening   Is the patient unable to remain alert for testing? No   Is the patient on a modified diet (thickened liquids) due to pre-existing dysphagia? No   Is there presence of existing enteral tube feeding via the stomach or nose? No   Is there presence of head-of-bed restrictions (less than 30 degrees)? No   Is there presence of tracheotomy tube? No   Is the patient ordered nothing-by-mouth status?  No   3 oz Water Swallow Screen Pass

## 2022-12-05 NOTE — CARE COORDINATION
12/5 Care Coordination: Pt remains in SICU. admitted with falls on Eliquis found to have developed a large SDH. He now has developed aphasia secondary to left frontal onset seizures. Underwent cira hole for SDH evacuation yesterday on 12/4. Spoke with Pt's Son. Discussed Acute Rehab Options. They want pt to stay here. Order for PM&R written. CM/SW will continue to follow for discharge planning.    Garcia WARREN,RN-CV-BC  [phone]

## 2022-12-05 NOTE — PROGRESS NOTES
Neurosurg progress note  VITALS:  BP (!) 106/59   Pulse 81   Temp 98.4 °F (36.9 °C) (Oral)   Resp 26   Ht 5' 9\" (1.753 m)   Wt 182 lb 15.7 oz (83 kg)   SpO2 99%   BMI 27.02 kg/m²   24HR INTAKE/OUTPUT:    Intake/Output Summary (Last 24 hours) at 12/5/2022 1056  Last data filed at 12/5/2022 1000  Gross per 24 hour   Intake 700 ml   Output 2085 ml   Net -1385 ml     CT Head W/O Contrast    Result Date: 11/24/2022  EXAMINATION: CT OF THE HEAD WITHOUT CONTRAST  11/24/2022 9:38 am TECHNIQUE: CT of the head was performed without the administration of intravenous contrast. Automated exposure control, iterative reconstruction, and/or weight based adjustment of the mA/kV was utilized to reduce the radiation dose to as low as reasonably achievable. COMPARISON: None. HISTORY: ORDERING SYSTEM PROVIDED HISTORY: LOC + fall + strike to head; concern for bleed TECHNOLOGIST PROVIDED HISTORY: Reason for exam:->LOC + fall + strike to head; concern for bleed Has a \"code stroke\" or \"stroke alert\" been called? ->No Decision Support Exception - unselect if not a suspected or confirmed emergency medical condition->Emergency Medical Condition (MA) FINDINGS: BRAIN/VENTRICLES: No evidence of parenchymal hemorrhages or contusions. No evidence of intra or extra-axial fluid collection is seen. Scattered areas of low attenuation are visualized in the periventricular and subcortical white matter suggestive of chronic microvascular disease. No evidence of acute territorial infarct is seen. Prominence of the ventricles and sulci is visualized suggestive of chronic atrophic brain changes. No evidence of intracranial mass or mass effect, no evidence of midline shift is seen. No evidence of sellar or parasellar mass is visualized. ORBITS: The visualized portion of the orbits demonstrate no acute abnormality. SINUSES: The visualized paranasal sinuses and mastoid air cells demonstrate no acute abnormality.  SOFT TISSUES/SKULL:  No acute abnormality of the visualized skull or soft tissues. No acute intracranial abnormality. CT CSpine W/O Contrast    Result Date: 11/24/2022  EXAMINATION: CT OF THE CERVICAL SPINE WITHOUT CONTRAST 11/24/2022 9:38 am TECHNIQUE: CT of the cervical spine was performed without the administration of intravenous contrast. Multiplanar reformatted images are provided for review. Automated exposure control, iterative reconstruction, and/or weight based adjustment of the mA/kV was utilized to reduce the radiation dose to as low as reasonably achievable. COMPARISON: None. HISTORY: ORDERING SYSTEM PROVIDED HISTORY: LOC + Fall + head strike; Concern for fx TECHNOLOGIST PROVIDED HISTORY: Reason for exam:->LOC + Fall + head strike; Concern for fx Decision Support Exception - unselect if not a suspected or confirmed emergency medical condition->Emergency Medical Condition (MA) FINDINGS: Straightening of the lumbar the columns of the cervical spine is present. No evidence of spondylolisthesis is seen. Multilevel degenerative endplate changes visualized, no evidence of compression deformity of the cervical vertebral bodies. No evidence of fracture or traumatic subluxation is seen. Decreased intervertebral disc height visualized most prominent at C5-C6 and C3-C4. Multilevel degenerative disc osteophyte complex, uncovertebral disease and hypertrophic changes in the facet joints is seen. Calcification visualized within the spinal canal superimposed over the falciform ligament at the level of the C5 vertebral body. Otherwise abnormal density visualized in the cervical spinal canal. The neck soft tissues are unremarkable. Circumferential opacification visualized in the sphenoid sinus. Limited evaluation of the upper lung fields is unremarkable bilaterally. No acute abnormality of the cervical spine. Multilevel degenerative changes of the cervical spine visualized most prominent at C3-C4 and C5-C6.  Circumferential opacification visualized in the sphenoid sinus. XR CHEST PORTABLE    Result Date: 11/25/2022  EXAMINATION: ONE XRAY VIEW OF THE CHEST 11/25/2022 4:57 pm COMPARISON: 11/24/2022. HISTORY: ORDERING SYSTEM PROVIDED HISTORY: Pacemaker placement and rule out pneumothorax TECHNOLOGIST PROVIDED HISTORY: Reason for exam:->Pacemaker placement and rule out pneumothorax What reading provider will be dictating this exam?->CRC FINDINGS: AICD visualized in the left chest with lead in the heart. EKG leads are seen superimposed over the chest. Poor inspiratory effort is seen. The CT of the prominence of the cardiomediastinal silhouette is visualized demonstrates no significant increase in comparison to the prior study. Atherosclerotic calcifications visualized in the area the aortic arch. Mild prominence of the bronchovascular and interstitial lung markings is visualized in bilateral lung fields more prominent in the lateral right mid lung field large and in lung field linear subsegmental atelectatic streaks of visualized bilateral lung fields. No evidence of focal infiltrate or consolidation. No evidence of pneumothorax or pleural effusion. Degenerative bone changes seen. No evidence of pneumothorax or parenchymal contusion. AICD visualized in the left chest with leads in the heart. XR CHEST PORTABLE    Result Date: 11/24/2022  EXAMINATION: ONE XRAY VIEW OF THE CHEST 11/24/2022 9:30 am COMPARISON: 08/08/2022 HISTORY: ORDERING SYSTEM PROVIDED HISTORY: CP + SOB TECHNOLOGIST PROVIDED HISTORY: Reason for exam:->CP + SOB FINDINGS: Poor inspiratory effort. The cardiomediastinal silhouette is slightly prominent but demonstrates no significant change in comparison to the prior study. Prominence of the bronchovascular interstitial lung markings is visualized bilaterally with scattered areas of patchy airspace opacification seen. Linea subsegmental atelectatic streaks are visualized.   The costophrenic angles are clear with no evidence of pleural effusion seen. No evidence of pneumothorax or parenchymal lung mass. The osseous structures are without acute process. Congestive changes demonstrate no change.      CBC:   Lab Results   Component Value Date/Time    WBC 12.3 12/05/2022 04:50 AM    RBC 3.83 12/05/2022 04:50 AM    HGB 10.6 12/05/2022 04:50 AM    HCT 31.9 12/05/2022 04:50 AM    MCV 83.3 12/05/2022 04:50 AM    MCH 27.7 12/05/2022 04:50 AM    MCHC 33.2 12/05/2022 04:50 AM    RDW 13.7 12/05/2022 04:50 AM     12/05/2022 04:50 AM    MPV 9.5 12/05/2022 04:50 AM     BMP:    Lab Results   Component Value Date/Time     12/05/2022 04:50 AM    K 4.9 12/05/2022 04:50 AM    K 4.5 11/24/2022 09:08 AM     12/05/2022 04:50 AM    CO2 17 12/05/2022 04:50 AM    BUN 54 12/05/2022 04:50 AM    LABALBU 3.0 12/05/2022 04:50 AM    CREATININE 2.1 12/05/2022 04:50 AM    CALCIUM 9.1 12/05/2022 04:50 AM    GFRAA 30 09/16/2022 08:24 AM    LABGLOM 30 12/05/2022 04:50 AM    GLUCOSE 197 12/05/2022 04:50 AM      pantoprazole (PROTONIX) 40 mg injection  40 mg IntraVENous Q12H    sucralfate  1 g Oral 4 times per day    ceFAZolin  1,000 mg IntraVENous Q8H    oxymetazoline  2 spray Each Nostril Once    lidocaine   Topical Once    insulin glargine  15 Units SubCUTAneous Daily    insulin lispro  0-4 Units SubCUTAneous Q4H    sennosides-docusate sodium  2 tablet Oral Daily    NIFEdipine  60 mg Oral Daily    lacosamide (VIMPAT) IVPB  200 mg IntraVENous BID    divalproex  500 mg Oral 2 times per day    polyethylene glycol  17 g Oral Daily    levETIRAcetam  500 mg IntraVENous Q12H    guaiFENesin  400 mg Oral TID    sodium chloride flush  5-40 mL IntraVENous 2 times per day    lidocaine  1 patch TransDERmal Daily    allopurinol  300 mg Oral Once per day on Mon Wed Fri    gabapentin  100 mg Oral BID    tamsulosin  0.4 mg Oral Nightly    Arformoterol Tartrate  15 mcg Nebulization BID    lisinopril  20 mg Oral Daily    And    hydroCHLOROthiazide  12.5 mg Oral Daily Opens eyes to voice pupils remain equal and reactive follows simple commands drain functions  Assessment:  Patient Active Problem List   Diagnosis    PUD (peptic ulcer disease)    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

## 2022-12-05 NOTE — PROGRESS NOTES
[address] [facility]  Department of Internal Medicine   Internal Medicine Residency   MICU Progress Note    Patient:  Chintan Delong 80 y.o. male  MRN: [id number]     Date of Service: 12/4/2022    Allergy: Patient has no known allergies. Subjective     The patient was seen and examined in the morning today. In the morning the patient was not mentating well he was drowsy and not following commands. Overnight the blood pressure of the patient was in the higher side of 170 but patient was not given with any as needed antihypertensive medication. Due to concerns for possible hypoglycemia and decreased oral intake Lantus was decreased to 15 units and LD SSI was started and the blood glucose was stable at around 110s to 120s. Neurosurgery recommended on doing CT scan without contrast today in the morning. Repeat CT scan of head showed evolving large left subdural hematoma with no change in surrounding mass-effect or midline shift and no new hemorrhage visualized. The patient was planned for an surgical intervention later in the afternoon. Objective     VS: BP (!) 155/73   Pulse 70   Temp 98 °F (36.7 °C)   Resp 20   Ht 5' 9\" (1.753 m)   Wt 179 lb 3.7 oz (81.3 kg)   SpO2 94%   BMI 26.47 kg/m²           I & O - 24hr:   Intake/Output Summary (Last 24 hours) at 12/4/2022 2141  Last data filed at 12/4/2022 2033  Gross per 24 hour   Intake 1390 ml   Output 2305 ml   Net -915 ml       Physical Exam:  General Appearance: Alert not following commands and was drowsy  Neck: no adenopathy, no carotid bruit, no JVD,symmetrical, trachea midline  Lung: B/L equal air entry, NVBS no added sound  Heart: irregularly irregular rate and rhythm, S1, S2 normal, no murmur, click, rub or gallop  Abdomen: soft, non-tender; bowel sounds normal; no masses,  no organomegaly  Extremities:  extremities normal, atraumatic, no cyanosis or edema  Musculoskeletal: No joint swelling, no muscle tenderness.  ROM normal in all joints of extremities.    Neurologic: Mental status: drowsy, alert, not following command, no visible facial deviations or deficit, B/L pupil equal and reactive, reflex 2+   Lines     site day    Art line   None    TLC None    PICC None    Hemoaccess None    Oxygen:    None    ABG:   No results found for: PHART, PH, UAT5DZF, PCO2, PO2ART, PO2, GJI2WOA, HCO3, BEART, BE, THGBART, THB, BFO4TTK, E8UHGQGK, O2SAT     Medications       Nutrition:   Adult diet, Dysphagia, soft and bite sized    ATB:   Antibiotics  Days   None                Patient currently has   Urinary cath  DVT prophylaxis/ GI prophylaxis: Held/ Protonix   PT/OT    Labs   CBC with Differential:    Lab Results   Component Value Date/Time    WBC 13.5 12/04/2022 04:22 AM    RBC 2.56 12/04/2022 04:22 AM    HGB 10.6 12/04/2022 12:05 PM    HCT 32.2 12/04/2022 12:05 PM     12/04/2022 04:22 AM    MCV 84.4 12/04/2022 04:22 AM    MCH 27.7 12/04/2022 04:22 AM    MCHC 32.9 12/04/2022 04:22 AM    RDW 13.5 12/04/2022 04:22 AM    NRBC 2.8 12/04/2022 04:22 AM    LYMPHOPCT 8.3 12/04/2022 04:22 AM    MONOPCT 6.4 12/04/2022 04:22 AM    BASOPCT 1.8 12/04/2022 04:22 AM    MONOSABS 0.81 12/04/2022 04:22 AM    LYMPHSABS 1.08 12/04/2022 04:22 AM    EOSABS 0.12 12/04/2022 04:22 AM    BASOSABS 0.24 12/04/2022 04:22 AM     CMP:    Lab Results   Component Value Date/Time     12/04/2022 04:22 AM    K 4.3 12/04/2022 04:22 AM    K 4.5 11/24/2022 09:08 AM     12/04/2022 04:22 AM    CO2 19 12/04/2022 04:22 AM    BUN 50 12/04/2022 04:22 AM    CREATININE 2.1 12/04/2022 04:22 AM    GFRAA 30 09/16/2022 08:24 AM    LABGLOM 30 12/04/2022 04:22 AM    GLUCOSE 108 12/04/2022 04:22 AM    PROT 6.9 11/24/2022 09:08 AM    LABALBU 4.2 11/24/2022 09:08 AM    CALCIUM 8.9 12/04/2022 04:22 AM    BILITOT 0.4 11/24/2022 09:08 AM    ALKPHOS 130 11/24/2022 09:08 AM    AST 30 11/24/2022 09:08 AM    ALT 39 11/24/2022 09:08 AM     BMP:    Lab Results   Component Value Date/Time     12/04/2022 04:22 AM    K 4.3 12/04/2022 04:22 AM    K 4.5 11/24/2022 09:08 AM     12/04/2022 04:22 AM    CO2 19 12/04/2022 04:22 AM    BUN 50 12/04/2022 04:22 AM    LABALBU 4.2 11/24/2022 09:08 AM    CREATININE 2.1 12/04/2022 04:22 AM    CALCIUM 8.9 12/04/2022 04:22 AM    GFRAA 30 09/16/2022 08:24 AM    LABGLOM 30 12/04/2022 04:22 AM    GLUCOSE 108 12/04/2022 04:22 AM     Albumin:    Lab Results   Component Value Date/Time    LABALBU 4.2 11/24/2022 09:08 AM     Calcium:    Lab Results   Component Value Date/Time    CALCIUM 8.9 12/04/2022 04:22 AM     Magnesium:    Lab Results   Component Value Date/Time    MG 2.2 12/04/2022 04:22 AM     Phosphorus:    Lab Results   Component Value Date/Time    PHOS 4.1 12/04/2022 04:22 AM       Imaging Studies:  CT scan:   Impression:        Evolving large left subdural hematoma with no change seen in the surrounding   mass effect or midline shift. There is no new hemorrhage visualized.         Resident's Assessment and Plan     Neurology    Headache likely 2/2 acute subdural hematoma  -Reports headache s/p fall initially  -Initial CT read negative for intracranial hemorrhage, repeat CT head on 11/26/2022 showed subdural hematoma 7 mm waxing and waning mentation  -Today in the morning patient alert drowsy not following commands, change in mentation compared to the previous day  -Repeat CT scan done and findings noted  -Plan for neurosurgical intervention as per neurosurgery  (Possible bur hole drainage as per Dr. Cheryl Helm)    Non Convulsive seizures  -Extended EEG with video 11/28/2022 shows:  Left frontal electrographic seizures with spread to involve the left frontocentrotemporal region  A potential for focl seizures from the left frontal region  Moderate to severe nonspecific cerebral dysfunction of the left frontocentrotemporal region  A mild to moderate nonspecific encephalopathy  -On valproate 500 mg twice daily, Keppra 500 mg twice daily and Vimpat 200 mg twice daily  -Continue on the medication and follow-up for any seizure  -Follow neurology recommendation and continue neurochecks every hour    Cardiology  \  Syncope episode likely 2/2 complete heart block s/p pacemaker placement  -Initially patient started on isoproterenol which was stopped later on  -Heart rate currently stable  -EP/cardiology regularly following    Complete heart block likely 2/2 sick sinus syndrome versus infectious process (?  Lyme's disease)  -S/p dual-chamber pacemaker placement on 11/25/2022  -TSH within normal limit, hypothyroidism rule out  -Lyme serology negative  -EP/cardiology following, follow recommendations    A. fib with RVR  -[id number] score 4  -Overnight heart rate maintained below 100, irregularly irregular  -Previously on Eliquis held prior to pacemaker placement  -EP regularly following, metoprolol held  -Eliquis started and held and reversed with Kcentra done  -Monitor heart rate and rhythm  -Follow EP and cardiology recommendations    Hypertension  -SBP in the higher side overnight  - Continue on amlodipine and lisinopril  -Continue on nifedipine 60 mg daily    Renal and electrolytes    CKD stage IV  -Creatinine 2.1 today, around baseline  -Monitor BMP daily  -Monitor urine output regularly    Endocrine    Non-insulin-dependent diabetes mellitus  -Insulin titrated to glargine 15 units nightly and LDSSI  -Blood glucose within the desired range  -Hypoglycemia protocol in place  -Monitor blood glucose every 4 hours    Hematology    Normocytic normochromic anemia likely 2/2 ?  UGI bleed  -Hemoglobin trended down from 9.9-7.1  -WBC increased to 13.5, possible reactive  -Patient afebrile over last 24 hours  -Plan for Hemoccult  -Continue patient on PPI twice daily  -Monitor H&H every 8    Kar Marie MD, PGY-1    Attending physician: Dr. Luis Carlos [facility]  Department of Pulmonary, Critical Care and Sleep Medicine  Pulmonary [facility] [facility]  Department of Internal Medicine      During multidisciplinary team rounds Loyd Schneider is a 80 y.o. male was seen, examined and discussed. This is confirmation that I have personally seen and examined the patient and that the key elements of the encounter were performed by me (> 85 % time). The medications & laboratory data was discussed and adjusted where necessary. The radiographic images were reviewed or with radiologist or consultant if felt dis-concordant with the exam or history. The above findings were corroborated, plans confirmed and changes made if needed. Family is updated at the bedside as available. Key issues of the case were discussed among consultants. Critical Care time is documented if appropriate. Hgb drop noted on am labs however on repeat was in line with prior testing. Lantus decreased. CT head reviewed. Plan for OR with cira hole and evacuation of hematoma today.        Critical Care time: 34 minutes    Lainey Marshall DO

## 2022-12-05 NOTE — PROGRESS NOTES
[address]  Neurology follow up     Date:  12/5/2022  Patient Name:  Ramona Spence  YOB: 1933  MRN: [id number]     Marsha Quiles is a 80 y.o. male admitted with falls on Eliquis found to have developed a large SDH. He now has developed aphasia secondary to left frontal onset seizures. No seizures since 11/29 at 2350. Maintained on ASM as listed below. Due to increased lethargy over the last two days, concern for BT seizure activity and repeat EEG is pending. Vimpat was bolused on 12/4. S/p cira hole for SDH on 12/4    Plan  Repeat EEG  Check VPA level   Continue Keppra 500 BID (renally dosed at present)  Continue Vimpat at 200 BID  Continue  mg BID  Seizure safety precautions for 6 months  Will follow     History of Present Illness:  Ramona Spence is a 80 y.o. right handed male presenting for evaluation of seizures. The patient was admitted on 11/24/22 for chest pain and SOB. He has a history of afib and was on Eliquis. He had been having worsening dizziness and had passed out and hit his head. Initial CT head done on 11/24/22 was read as no acute abnormality, however, on review it does appear to show a left SDH. 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 Patient had persistent headache and a repeat CT scan on 11/26 showed increased volume of SDH over left frontotemporal region up to 22m in greatest thickness. 8mm of midline shift also noted. Eliquis was reversed with Kcentra. The patient reportedly remained asymptomatic asymptomatic at this time other than the headache. He was noted to be more somnolent on 11/27/22 with more bereket concern for aphasia on 11/28/22. EEG was ordered which showed left frontal onset seizure activity. 11/29- Patient is sleeping and rouses to name being called and tactile stimuli.  Withdraws to pain and follows 1 step commands intermittently. No significant changes overnight and exam appears stable compared to yesterday. No family at bedside. 11/30-continuous EEG hooked up yesterday. 3 seizures have been recorded since being coughed up. Seizure at 1227 lasted approximately 3 minutes. During the seizure patient put left arm on his head, but has no clear clinical change other than some mild tachypnea. At that time Vimpat was bolused with an additional 100 mg and dose increased to 200 mg twice daily. Another seizure was recorded at 2056 lasting 2 minutes. Bolus of valproate 1000 mg ordered and 500 mg twice daily to be started this morning. Third seizure occurred at 2350 lasting nearly 3 minutes. This was prior to valproate bolus being given. There have been no further seizures recorded up through 0918 this morning. 12/1There have been no further seizures since 2350 on 11/29. Tolerating current antiseizure medication regimen well at this time. Continuous EEG discontinued this morning. Neurosurgery continuing to follow, maintaining sodium level greater than 45. Considering bur hole in 7 to 10 days post hematoma if hematoma liquefies. Planning to repeat CT head on 12/3    12/2  No further seizures reported. Patient is sleeping this morning. He does wake up to verbal and tactile stimulation but quickly drifts back off to sleep. Follows one-step simple commands and is able to tell me his name and does produce some more spontaneous speech today. No family at bedside    12/5   Underwent cira hole for SDH evacuation yesterday on 12/4. Over the last two days, patient had become more lethargic. We were asked to evaluate again due to concern for BT seizure activity. He was loaded with another 200 mg of Vimpat last night. Labs were ordered. EEG is pending.      No family at bedside     Review of Systems:  Unable to obtain due to:  aphasia    Allergies:      No Known Allergies     Physical Examination  Vitals Vitals:    12/05/22 0900 12/05/22 0906 12/05/22 0959 12/05/22 1000   BP: (!) 157/69 (!) 157/69  (!) 106/59   Pulse: 70  83 81   Resp: 17  20 26   Temp:    98.4 °F (36.9 °C)   TempSrc:    Oral   SpO2: 99%  99% 99%   Weight:       Height:          General: Patient appears in no acute distress. HEENT: Normocephalic, atraumatic  Chest: effort normal   Heart: irregularly irregular  Extremities/Peripheral vascular: No edema/swelling noted. No cold limbs noted. Neurologic Examination    Mental Status  Alert. Oriented to self. Severe expressive and receptive aphasia. Perseverates on name. Attention  fair. Cranial Nerves  II. Visual fields full to threat bilaterally. III, IV, VI: Pupils equally round and reactive to light, 3 to 2 mm bilaterally. EOMs: full, no nystagmus. V.   VII: Facial movements appear symmetric   VIII: Hearing intact to voice  IX,X: Palate elevates symmetrically. Mild to moderate dysarthria  XI: Shoulders appear symmetric  XII: Tongue is midline    Motor  RUE 4/5, LUE 5/5  Wiggles toes b/l   Normal tone and bulk   No abnormal movements     Sensation  Appreciates LT in all limbs     Reflexes    No babinski     Coordination  No resting tremors observed     Gait  Deferred for safety/fall consideration    Labs  Recent Labs     12/05/22  0450      K 4.9      CO2 17*   BUN 54*   CREATININE 2.1*   GLUCOSE 197*   CALCIUM 9.1   PROT 6.3*   LABALBU 3.0*   BILITOT 0.4   ALKPHOS 91   AST 25   ALT 13   WBC 12.3*   RBC 3.83   HGB 10.6*   HCT 31.9*   MCV 83.3   MCH 27.7   MCHC 33.2   RDW 13.7      MPV 9.5         Imaging  CT HEAD WO CONTRAST   Final Result   1. Reduction in volume of left subdural hematoma. 2.  Left craniotomy with placement of dural drainage catheter, no evidence of   complication. XR ABDOMEN FOR NG/OG/NE TUBE PLACEMENT   Final Result   Enteric tube terminates in the gastric body.          CT HEAD WO CONTRAST   Final Result   Evolving large left subdural hematoma with no change seen in the surrounding   mass effect or midline shift. There is no new hemorrhage visualized. CT HEAD WO CONTRAST   Final Result   No significant change in large left subdural hemorrhage with stable 7 mm   midline shift to the right. CT HEAD WO CONTRAST   Final Result   1. Moderate to large left subdural hematoma with left-to-right midline   structure shift of approximately 7 mm, unchanged. 2.  Greatest maximal thickness of the subdural hematoma on coronal views is   24 mm, compared to 23 mm on the prior study. 3.  Gas fluid level in the left sphenoid sinus may reflect acute sinusitis. CT HEAD WO CONTRAST   Final Result   Interval increased volume and extent of diffuse left cerebral acute subdural   hemorrhage with greatest thickness adjacent to the frontotemporal region now   measuring up to 22 mm in greatest axial thickness. Interval worsening of rightward midline shift at the level of the septum   pellucidum now measuring 8 mm. Mild cerebral white matter chronic microvascular ischemic disease. Mild diffuse cerebral atrophy. Multiple attempts were made by the [facility] core team to page and notify Dr. Vito Arechiga. Critical results were then called by Dr. Fatemeh Bartholomew to [facility] on 11/26/2022 at 22:23. Aden Del Rio reports that neurosurgery has been   consulted about the case. XR CHEST PORTABLE   Final Result   Mild congestive changes. XR CHEST PORTABLE   Final Result   No evidence of pneumothorax or parenchymal contusion. AICD visualized in the   left chest with leads in the heart.              EEG 11/28  This abnormal study showed evidence of:     Left frontal electrographic seizures with spread to involve the left frontocentrotemporal region  A potential for focl seizures from the left frontal region  Moderate to severe nonspecific cerebral dysfunction of the left frontocentrotemporal region  A mild to moderate nonspecific encephalopathy     Structural abnormalities should be considered for the findings above and appropriate imaging obtained if clinically indicated. cEEG  EEG was reviewed from 1054 through 1317, 11/29/2022. One seizure noted at 1227 lasting approximately 3 minutes. During the seizure he puts his left arm on his head, but has no clear clinical change other than what appears to be some mild tachypnea. Will give additional 100 mg Vimpat IV and increase to 200 mg BID this evening. Anai Medellin DO, 1:39 PM, 11/29/2022         EEG addendum  EEG reviewed through 1903, 11/29/2022      Background consistent with mild to moderate nonspecific encephalopathy. No seizures noted. Anai Medellin DO, 8:55 PM, 11/29/2022       EEG addendum  EEG reviewed through 2252, 11/29/2022      One seizure noted at 2056 lasting nearly 2 minutes. IV bolus of valproate 1000 mg ordered to be followed by 500 BID starting morning of 11/30. Interim EEG Progress Note     EEG was reviewed from 2252, 11/29/22 through 0918, 11/30/2022. One seizure noted at 2350 lasting for approximately 3 minutes. This occurred prior to valproate bolus being completed at 0200. No further seizures noted during this period. Interim EEG Progress Note     EEG was reviewed from [facility] through 0614, 12/1/2022     Background consistent with a mild to moderate encephalopathy. No seizures noted during this period.          Anai Medellin DO, 7:29 AM, 12/1/2022      Electronically signed by TOMAS Angelo on 12/5/2022 at 11:03 AM

## 2022-12-05 NOTE — PROGRESS NOTES
SICU Intensive Care Unit   Daily Progress Note     Date of Admission: 11/24    Reason for ICU: SDH with midline shift s/p craniotomy, L frontal lobe seizure    HPI:  Gerhardt Castleman is a 80year old male who presents to the SICU s/p craniotomy for SDH. Patient initially presented to the hospital on 11/24 for chest pain. At that time, he had a syncopal event and hit his head. Initial CT head was read as negative. He had a pacemaker placed on 11/25 for sick sinus syndrome. Follow-up CT head showed large acute SDH with shift. There were reportedly no neurologic deficits at that time. Over the past two days, patient became more lethargic. 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 He was started on Keppra and Vimpat. Patient was on Eliquis which was reversed. Hospital Course:  12/5 patient underwent bur hole for increasing subdural hematoma with worsening neurological exam.  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 No acute issues. Patient seems to be [place] speaking only. Baseline aphasia, unable to follow commands. Does not grimace to abdominal palpations.      Surgical/Interventional Procedures:  11/25: Pacemaker placement  11/04: Craniotomy    Physical Exam:     BP (!) 148/73   Pulse 70   Temp 98.4 °F (36.9 °C) (Axillary)   Resp 14   Ht 5' 9\" (1.753 m)   Wt 182 lb 15.7 oz (83 kg)   SpO2 98%   BMI 27.02 kg/m²       GCS:    4 - Opens eyes on own   5 - Pushes away noxious stimulus (Monegasque speaker)  3 - Talks, but nonsensical    Pupil size:  Left 3 mm    Right 3 mm    Pupil reaction: Yes    Wiggles fingers: Left Yes Right Yes    Hand grasp:   Left normal       Right normal    Wiggles toes: Left Yes    Right Yes    Plantar flexion: Left normal     Right normal      CONSTITUTIONAL: no acute distress, lying in hospital bed  NEUROLOGIC: PERRL, EOMI, Aox 0 (Monegasque speaking), craniotomy incision site c/d/i  CARDIOVASCULAR: S1 S2, regular rate, regular rhythm, no murmur/gallop/rub  PULMONARY: no rhonchi/rales/wheezes, no use of accessory muscles  RENAL: toure to gravity, clear yellow urine  ABDOMEN: soft, nontender, nondistended, nontympanic, no masses, no organomegaly, normal bowel sounds   MUSCULOSKELETAL: moves all extremities purposefully, 5/5 strength   SKIN/EXTREMITIES: no rashes/ecchymosis, no edema/clubbing, warm/dry, good capillary refill       ASSESSMENT / PLAN:   Neuro: GCS 12; unable to asses  SDH s/p crani with evaucation of hematoma and drain placement 12/4  NSGY following  Abx: Ancef for drain  L Frontal lobe seizures   Neurology following   Antiseizure:   Keppra 500mg IV Q12H, Vimpat 200mg IV BID, Gabapentin 100mg BID, Depakote Sprinkle 500mg Q12H  Pain control: tylenol PRN    CV: SBP Goal < 160  SSS s/p pacemaker  Persistent A fib with RVR  HOLD Eliquis (reversed with Kcentra 12/4)  HTN  Linsinopril, HCTZ, nifedipine, PRN hydralazine, PRN labetalol    Pulmonary  Acute hypoxic respiratory failure  On ventilator  RSBI > 105 today, plan for SBT  Breathing tx: Duoneb, Brovana, guaifensein    GI: NPO, SLP for swallow eval, plan to advance as able.  Bowel Reg  Renal:   CKD Stage IV (baseline Cr ~2.0)  Monitor CMP daily, Monitor UOP  Hx of urinary retention  Tamulosin 0.4mg QNight  ID:  Drain placed s/p craniotomy  Ancef 1000mg IV Q8H  Trend WBC and fever curve  Endo  Non-insulin depedent diabetes mellitus  Insulin Reg: 15U lantus + LDSSI  MSK: no acute issues, WBAT on all extremities, [facility]  Heme:  Acute anemia  Monitor CBC daily, transfuse if Hgb < 7.0    Pain/Analgesia: tylenol PRn  Bowel regimen: MoM, glycolax, senokot  DVT proph: SDH, Chemoppx once SDH stable for 48h  GI proph: protonix 40mg BID, carafate 1g Q6H  Seizure proph: Keppra, vimpat, depakote sprinkle  Glucose protocol: Lantus 15u + LDSSI  Mouth/eye care: per patient  Stephens: urinary cath (11/24)  CVC sites: none  Ancillary consults: Neurology, Cardiology, Neurosurgery, Medicine  Family Update: as available  CODE Status: LIMITED (No intubation, no defibrillation, no chest compression, no resuscitative meds - applicable in case of impending herniation).  Other cases, FULL CODE    Dispo: con't current care in SICU      Electronically signed by Qian Buenrostro MD 12/5/2022  5:33 AM

## 2022-12-05 NOTE — PROGRESS NOTES
Speech Language Pathology      NAME:  Hair Amaya  :  10/27/1933  DATE: 2022  ROOM:  3805/3805-A    Pt is now s/p cira hole crani. New orders requested when warranted/ appropriate.      A-fib ([facility] [I48.91]  Atrial fibrillation ([facility] [I48.91]

## 2022-12-05 NOTE — PROGRESS NOTES
SICU Intensive Care Unit   Daily Progress Note     Date of Admission: 11/24/2022    Reason for ICU: Pt had subdural hematoma after falling     HPI: Pt is an 80year old male who presented to the SICU s/p craniotomy for SDH. Patient initially came to the hospital on 11/24 for chest pain. Pacemaker placed on 11/25 for sick sinus syndrome. Pt had a syncopal event 11/24 and hit his head. Initial CT head was negative. Follow-up CT head showed large acute SDH with shift. Over the past two days, patient became more lethargic. 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  Admitted to SICU s/p craniotomy    VS:  -HR 72-78  -RR 8-19  -94%O2 Sat, 3L O2  -/70    Physical Exam:     BP (!) 106/59   Pulse 81   Temp 98.4 °F (36.9 °C) (Oral)   Resp 26   Ht 5' 9\" (1.753 m)   Wt 182 lb 15.7 oz (83 kg)   SpO2 99%   BMI 27.02 kg/m²       GCS:    4 - Opens eyes on own   5 - Pushes away noxious stimulus  3 - Talks, but nonsensical    Pupil size:  Left 3 mm    Right 3 mm    Pupil reaction: Yes    Wiggles fingers: Left Yes Right Yes    Hand grasp:   Left normal       Right normal    Wiggles toes: Left Yes    Right Yes    Plantar flexion: Left normal     Right normal      CONSTITUTIONAL: no acute distress  NEUROLOGIC: Craniotomy incision clean, dry, intact  CARDIOVASCULAR: Normal RR  PULMONARY: no rhonchi/rales/wheezes, no use of accessory muscles  RENAL: toure to gravity, clear yellow urine  ABDOMEN: soft, nontender, nondistended, nontympanic, no masses, no organomegaly, normal bowel sounds   MUSCULOSKELETAL: moves all extremities purposefully, 5/5 strength   SKIN/EXTREMITIES: no rashes/ecchymosis, no edema/clubbing, warm/dry, good capillary refill       ASSESSMENT / PLAN:   Neuro:  -SDH s/p craniotomy, drain was placed 12/4   -Drainage showed 50 mL blood   -Neurosurgery Following   -Ancef from drainage  -Left frontal Lobe Seizures   -Neuro Following   - IV Keppra 500mg q12   CV:  -Pacemaker placed for Sick Sinus Syndrome 11/25/22  -HTN   -Lisinopril, HCTZ, Nifedipine, PRN hydralazine, PRN Labetalol  Pulm:   -Acute Hypoxic Repiratory Failure   -On Vent   -RSBI >105, plan SBT  GI:   - NPO, Bowel Regiment  Renal:   -CKD Stage 4    -Monitor CMP daily   -Hx of Urinary Retention    -Tamsulosin 0.4 mg nightly  ID:    -IV Ancef 1000mg IV q8h  Endo:    - Sliding Scale 15U Lantus  Heme:   -Monitor CBC, Transfuse if Hgb <7        DVT proph: Start once pt is stable for 48 hr  GI proph: Protonix 40 mg BID, carafate 1g q6      Dispo: Current care in SICU    Klaudia Prakash, [id number] [facility]

## 2022-12-05 NOTE — PROGRESS NOTES
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    Esophagitis-grade 1 K20.90    AP (angina pectoris) ([facility]) U95.7    Systolic hypertension Q07    Combined forms of age-related cataract of left eye H25.812    Anemia, chronic renal failure, stage 4 (severe) ([facility]) N18.4, D63.1    Sepsis secondary to CAP ([facility]) A41.9    Pneumonia of left lung due to infectious organism J18.9    A-fib ([facility]) I48.91    Atrial fibrillation ([facility]) I48.91    Third degree heart block ([facility]) I44.2    High-grade atrioventricular block I44.39    Seizure ([facility]) R56.9    Subdural hematoma S06. 5XAA       SUBJECTIVE:  Rafi Bardales is profoundly lethargic-stuporous this morning. His son is at his bedside with me. Vital signs are presently stable neurologically quite depressed. REVIEW OF SYSTEMS:  Presently unable to accurately obtain due to  patient's profound neurologic state.       SCHEDULED MEDICATIONS:   pantoprazole (PROTONIX) 40 mg injection  40 mg IntraVENous Q12H    sucralfate  1 g Oral 4 times per day    ceFAZolin  2,000 mg IntraVENous 30 Min Pre-Op    insulin glargine  15 Units SubCUTAneous Daily    insulin lispro  0-4 Units SubCUTAneous Q4H    sennosides-docusate sodium  2 tablet Oral Daily    NIFEdipine  60 mg Oral Daily    lacosamide (VIMPAT) IVPB  200 mg IntraVENous BID    divalproex  500 mg Oral 2 times per day    polyethylene glycol  17 g Oral Daily    levETIRAcetam  500 mg IntraVENous Q12H    guaiFENesin  400 mg Oral TID    sodium chloride flush  5-40 mL IntraVENous 2 times per day    lidocaine  1 patch TransDERmal Daily    allopurinol  300 mg Oral Once per day on Mon Wed Fri    gabapentin  100 mg Oral BID    tamsulosin  0.4 mg Oral Nightly    Arformoterol Tartrate  15 mcg Nebulization BID    lisinopril  20 mg Oral Daily    And    hydroCHLOROthiazide  12.5 mg Oral Daily       VITAL SIGNS:                                                                                                                          BP (!) 170/62   Pulse 69   Temp 97.9 °F (36.6 °C)   Resp 18   Ht 5' 9\" (1.753 m)   Wt 179 lb 3.7 oz (81.3 kg)   SpO2 96%   BMI 26.47 kg/m²   Patient Vitals for the past 96 hrs (Last 3 readings):   Weight   12/02/22 0510 179 lb 3.7 oz (81.3 kg)   12/01/22 0600 193 lb (87.5 kg)     OBJECTIVE:    HEENT: PERRL, EOM  Intact; sclera non-icteric, conjunctiva pink. Carotids are brisk in upstroke with normal contour. No carotid bruits. Normal jugular venous pulsation at 45°. No palpable cervical nor supraclavicular nodes. Thyroid not palpable. Trachea midline. Chest: Even excursion  Lungs: grossly clear to auscultation bilaterally no expiratory wheezes or rhonchi, no decreased tactile fremitus without inspiratory rales. Heart: Irregular, regular  rhythm; S1 > S2, no gallop grade 2/6 systolic murmur second right intercostal space no clicks, rub, palpable thrills   or heaves. PMI nondisplaced, 5th intercostal space MCL. Abdomen: Soft, nontender, nondistended,  mildly protuberant, no masses or organomegaly. Bowel sounds active. Extremities: Without clubbing, cyanosis or edema. Pulses present 3+ upper extermities bilaterally; present 1+ DP  bilaterally and present 1+ PT bilaterally.      Data:   Scheduled Meds: Reviewed  Continuous Infusions:    sodium chloride      dextrose         Intake/Output Summary (Last 24 hours) at 12/4/2022 1939  Last data filed at 12/4/2022 1800  Gross per 24 hour   Intake 690 ml   Output 2155 ml   Net -1465 ml     CBC:   Recent Labs     12/02/22 0451 12/03/22 0448 12/04/22 0422 12/04/22  1205   WBC 9.6 9.1 13.5*  --    HGB 10.7* 9.9* 7.1* 10.6*   HCT 32.4* 29.7* 21.6* 32.2*    310 380  --      BMP:  Recent Labs     12/02/22  0451 12/03/22 0448 12/04/22 0422    135 135   K 4.6 4.1 4.3    101 100   CO2 20* 21* 19*   BUN 46* 48* 50*   CREATININE 1.9* 2.0* 2.1*   LABGLOM 33 31 30     ABGs: No results found for: PH, PO2, PCO2  INR:   Recent Labs     12/02/22  0451 12/03/22  0448 12/04/22  0422   INR 1.2 1.2 1.2     PRO-BNP:   Lab Results   Component Value Date    PROBNP 8,347 (H) 08/02/2022      TSH:   Lab Results   Component Value Date    TSH 3.970 11/24/2022      Cardiac Injury Profile:   No results for input(s): TROPHS in the last 72 hours. Lipid Profile: No results found for: TRIG, HDL, LDLCALC, CHOL   Hemoglobin A1C: No components found for: HGBA1C      RAD:   CT HEAD WO CONTRAST   Final Result   Interval increased volume and extent of diffuse left cerebral acute subdural   hemorrhage with greatest thickness adjacent to the frontotemporal region now   measuring up to 22 mm in greatest axial thickness. Interval worsening of rightward midline shift at the level of the septum   pellucidum now measuring 8 mm. Mild cerebral white matter chronic microvascular ischemic disease. Mild diffuse cerebral atrophy. Multiple attempts were made by the [facility] core team to page and notify Dr. Carmel Lowery. Critical results were then called by Dr. Altagracia Mukherjee to OSC[facility] Mosley on 11/26/2022 at 22:23. Susannah Javed reports that neurosurgery has been   consulted about the case. XR CHEST PORTABLE   Final Result   Mild congestive changes. XR CHEST PORTABLE   Final Result   No evidence of pneumothorax or parenchymal contusion. AICD visualized in the   left chest with leads in the heart.          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    Esophagitis-grade 1 K20.90    AP (angina pectoris) ([facility]) V44.0    Systolic hypertension X79    Combined forms of age-related cataract of left eye H25.812    Anemia, chronic renal failure, stage 4 (severe) (HCC) N18.4, D63.1    Sepsis secondary to CAP ([facility] A41.9    Pneumonia of left lung due to infectious organism J18.9    A-fib (HCC) I48.91    Atrial fibrillation (HCC) I48.91    Third degree heart block (HCC) I44.2    High-grade atrioventricular block I44.39    Seizure ([facility]) R56.9    Subdural hematoma S06. 5XAA       RECOMMENDATIONS:   Mr. Jenny Snell seems to have significantly deteriorated over the past 48 hours and presently awaiting the results of his morning CT scan of his head. At this point he may indeed require surgical intervention but his son Tolbert Kussmaul is indicated that his father did not want any heroic effort should he sustain cardiopulmonary collapse. Will respect his wishes. At this point his prognosis remains somewhat guarded. I have spent more than 30 critical care minutes face to face with Michael Fang & son and reviewing notes and laboratory data, with greater than 50% of this time instructing and counseling the patient face to face regarding my findings and recommendations and I have answered all questions as posed to me by Mr. Jenny Snell son . Cesario Pyle,  FACP,FACC,FSCAI      NOTE:  This report was transcribed using voice recognition software.   Every effort was made to ensure accuracy; however, inadvertent computerized transcription errors may be present

## 2022-12-06 NOTE — PROGRESS NOTES
SICU Intensive Care Unit   Daily Progress Note     Date of Admission: 11/24    Reason for ICU: SDH with midline shift s/p craniotomy, L frontal lobe seizure    HPI:  Ramona Spence is a 80year old male who presents to the SICU s/p craniotomy for SDH. Patient initially presented to the hospital on 11/24 for chest pain. At that time, he had a syncopal event and hit his head. Initial CT head was read as negative. He had a pacemaker placed on 11/25 for sick sinus syndrome. Follow-up CT head showed large acute SDH with shift. There were reportedly no neurologic deficits at that time. Over the past two days, patient became more lethargic. 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 He was started on Keppra and Vimpat. Patient was on Eliquis which was reversed. Hospital Course:  12/5 patient underwent bur hole for increasing subdural hematoma with worsening neurological exam.  Repeat head CT without contrast shows decrease midline shift by 1.2 mm  12/6: No acute events overnight. Diet advanced. 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 No acute issues. Patient seems to be [place] speaking only. Baseline aphasia, unable to follow commands. Does not grimace to abdominal palpations.      Surgical/Interventional Procedures:  11/25: Pacemaker placement  11/04: Craniotomy    Physical Exam:     BP (!) 164/70   Pulse 77   Temp 97.7 °F (36.5 °C) (Axillary)   Resp 18   Ht 5' 9\" (1.753 m)   Wt 182 lb 15.7 oz (83 kg)   SpO2 98%   BMI 27.02 kg/m²       GCS:    4 - Opens eyes on own   5 - Pushes away noxious stimulus ([place] speaker)  3 - Talks, but nonsensical    Pupil size:  Left 3 mm    Right 3 mm    Pupil reaction: Yes    Wiggles fingers: Left Yes Right Yes    Hand grasp:   Left normal       Right normal    Wiggles toes: Left Yes    Right Yes    Plantar flexion: Left normal     Right normal      CONSTITUTIONAL: no acute distress, lying in hospital bed  NEUROLOGIC: PERRL, EOMI, Aox 0 ([place]ian speaking), craniotomy incision site c/d/i  CARDIOVASCULAR: S1 S2, regular rate, regular rhythm, no murmur/gallop/rub  PULMONARY: no rhonchi/rales/wheezes, no use of accessory muscles  RENAL: toure to gravity, clear yellow urine  ABDOMEN: soft, nontender, nondistended, nontympanic, no masses, no organomegaly, normal bowel sounds   MUSCULOSKELETAL: moves all extremities purposefully, 5/5 strength   SKIN/EXTREMITIES: no rashes/ecchymosis, no edema/clubbing, warm/dry, good capillary refill       ASSESSMENT / PLAN:   Neuro: GCS 12; unable to asses  SDH s/p crani with evaucation of hematoma and drain placement 12/4  NSGY following  Abx: Ancef for drain  L Frontal lobe seizures   Neurology following   Antiseizure:   Keppra 500mg IV Q12H, Vimpat 200mg IV BID, Gabapentin 100mg BID, Depakote Sprinkle 500mg Q12H  Pain control: tylenol PRN    CV: SBP Goal < 160  SSS s/p pacemaker  Persistent A fib with RVR  HOLD Eliquis (reversed with Kcentra 12/4)  HTN  Linsinopril, HCTZ, nifedipine, PRN hydralazine, PRN labetalol    Pulmonary  Acute hypoxic respiratory failure  Breathing tx: Duoneb, Brovana, guaifensein    GI: Regular diet  Renal:   CKD Stage IV (baseline Cr ~2.0)  Monitor CMP daily, Monitor UOP  Hx of urinary retention  Tamulosin 0.4mg QNight  ID:  Drain placed s/p craniotomy  Ancef 1000mg IV Q8H  Trend WBC and fever curve  Endo  Non-insulin depedent diabetes mellitus  Insulin Reg: 15U lantus + LDSSI  MSK: no acute issues, WBAT on all extremities, PT/OT to eval for [facility]  Heme:  Acute anemia  Monitor CBC daily, transfuse if Hgb < 7.0    Pain/Analgesia: tylenol PRn  Bowel regimen: MoM, glycolax, senokot  DVT proph: SDH, Chemoppx once SDH stable for 48h  GI proph: protonix 40mg BID  Seizure proph: Keppra, vimpat, depakote sprinkle  Glucose protocol: Lantus 15u + LDSSI  Mouth/eye care: per patient  Stephens: urinary cath (11/24)  CVC sites: none  Ancillary consults: Neurology, Cardiology, Neurosurgery, Medicine  Family Update: as available  CODE Status: LIMITED (No intubation, no defibrillation, no chest compression, no resuscitative meds - applicable in case of impending herniation).      Dispo: SICU      Electronically signed by Lauren Crowe DO, PGY-2 12/6/2022  5:36 AM

## 2022-12-06 NOTE — PLAN OF CARE
Problem: Safety - Medical Restraint  Goal: Remains free of injury from restraints (Restraint for Interference with Medical Device)  Description: INTERVENTIONS:  1. Determine that other, less restrictive measures have been tried or would not be effective before applying the restraint  2. Evaluate the patient's condition at the time of restraint application  3. Inform patient/family regarding the reason for restraint  4.  Q2H: Monitor safety, psychosocial status, comfort, nutrition and hydration  12/5/2022 2047 by Morgan Cantor RN  Outcome: Progressing  12/5/2022 2043 by Morgan Cantor RN  Outcome: Not Progressing  12/5/2022 1633 by Alysa Ziegler RN  Outcome: Progressing  12/5/2022 1633 by Alysa Ziegler RN  Outcome: Progressing

## 2022-12-06 NOTE — PROGRESS NOTES
SICU Intensive Care Unit   Daily Progress Note     Date of Admission: 11/24/2022    Reason for ICU: Pt had subdural hematoma after falling, left craniotomy    HPI: Pt is an 80year old male who presented to the SICU s/p craniotomy for SDH. Patient initially came to the hospital on 11/24 for chest pain. Pacemaker placed on 11/25 for sick sinus syndrome. Pt had a syncopal event 11/24 and hit his head. Initial CT head was negative. Follow-up CT head showed large acute SDH with shift. Over the past two days, patient became more lethargic. 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    Esophagitis-grade 1    AP (angina pectoris) ([address]    Systolic hypertension    Combined forms of age-related cataract of left eye    Anemia, chronic renal failure, stage 4 (severe) (HCC)    Sepsis secondary to CAP ([address]    Pneumonia of left lung due to infectious organism    A-fib [facility])    Atrial fibrillation (HCC)    Third degree heart block (HCC)    High-grade atrioventricular block    Seizure (HCC)    Subdural hematoma       Overnight Events: None    VS:  -HR 70-74  -RR 14-18  -94%O2 Sat  -/70    Surgical/Interventional Procedures: Left Craniotomy, cira hole 12/4/2022      Imaging:   -Head CT 12/5/2022 showed reducing Left Subdural hematoma; left dural drainage catheter with no evidence of complication    Physical Exam:     BP (!) 164/70   Pulse 77   Temp 97.7 °F (36.5 °C) (Axillary)   Resp 18   Ht 5' 9\" (1.753 m)   Wt 182 lb 15.7 oz (83 kg)   SpO2 98%   BMI 27.02 kg/m²       GCS:    4 - Opens eyes on own   6 - Follows simple motor commands  4 - Seems confused, disoriented    Pupil size:  Left 3 mm    Right 3 mm    Pupil reaction: Yes    Wiggles fingers: Left Yes Right Yes    Hand grasp:   Left normal       Right decreased    Wiggles toes: Left Yes    Right Yes    Plantar flexion: Left normal     Right normal      CONSTITUTIONAL: no acute distress, lying in hospital bed  NEUROLOGIC: PERRL, Left Craniotomy nag placed  CARDIOVASCULAR: Regular RR  PULMONARY: no rhonchi/rales/wheezes, no use of accessory muscles  RENAL: toure to gravity, clear yellow urine  ABDOMEN: soft, nontender, nondistended, nontympanic, no masses, no organomegaly, normal bowel sounds   MUSCULOSKELETAL: moves all extremities purposefully, 5/5 strength   SKIN/EXTREMITIES: no rashes/ecchymosis, no edema/clubbing, warm/dry, good capillary refill       ASSESSMENT / PLAN:   Neuro:  -SDH s/p craniotomy, drain was placed 12/4              -Drainage showed 50 mL blood              -Neurosurgery Following              -Ancef from drainage  -Left frontal Lobe Seizures              -Neuro Following   -EEG Showed Diffuse Slowing 12/5/2022              - IV Keppra 500mg q12   CV:  -Pacemaker placed for Sick Sinus Syndrome 11/25/22  -HTN              -Lisinopril, HCTZ, Nifedipine, PRN hydralazine, PRN Labetalol  -Cardio Following  Pulm:   -Acute Hypoxic Repiratory Failure                   -RSBI >105, plan SBT  GI:   - NPO, Bowel Regiment  Renal:        -CKD Stage 4                    -Monitor CMP daily        -Hx of Urinary Retention                    -Tamsulosin 0.4 mg nightly  ID:               -IV Ancef 1000mg IV q8h  Endo:               - Sliding Scale 15U Lantus  Heme:        -Monitor CBC, Transfuse if Hgb <7           DVT proph: Start once pt is stable for 48 hr  GI proph: Protonix 40 mg BID, carafate 1g q6        Dispo: Current care in SICU    Mayte Judd, [id number] [facility]

## 2022-12-06 NOTE — PROGRESS NOTES
Hospitalist Progress Note      SYNOPSIS: Patient admitted on 11/24/2022 for A-fib [facility])  80 y.o. male with past medical history of atrial fibrillation, DM hypertension . Patient is a transfer from [place] . He was seen there due to chest pain . He states that chest pain is located in the sternal area . He states  that he has shortness of breath with chest pain . He states that when chest pain occurred he felt like he was going to pass out . Patient had a syncopal episode and hit his head . Patient had a second syncopal episode  and after that family called ambulance . En route to ED patient was given 324 mg of ASA . In the ED patient was in Afib with slow ventricular rate having long pauses and bradycardic to the 30s . Lab data revealed sodium 135 potassium 4.1 BUN 29 Scr 2.0  glucose 244 Trop 45 >>43  WBC 9.9 HGB 10.7   Resp panel neg CT head cervical spine neg CXR congestive changes . Patient received fentanyl morphine and was placed on isoproterenol infusion. EP consulted-pacemaker placement. Lyme serology was ordered for possible carditis. Patient had an initial CT of the head after the fall which was negative for any intracranial hemorrhage. Repeat CT head was obtained on 11/26/2022 as patient continued to complain of headache and spite of pain medicine. Repeat CT of the head noncontrast was obtained which showed progressive subdural hemorrhage. Eliquis was held, [facility] ordered for Eliquis reversal.  Neurosurgery following. Worsening mental status with aphasia. Found to have two seizures. Started on Keppra and Vimpat. Neurology following. He has been on continuous video EEG. Seizure medications has been adjusted. Last seizure episode 2350 lasted approximately 3 minutes prior to giving valproate and bolus which was completed at 02 100. No further seizures after that. Appreciate neurology's inputs. Continue Vimpat, Keppra and Depacon.  He is status post left frontal cira hole for placement of subdural drain and drainage of some acute subdural hematoma 2022. SUBJECTIVE:  Patient seen and examined, nods head, unable to make a conversation. Follows simple commands-able to make fist, squeeze hand, wiggle toes  Records reviewed. Stable overnight. No other overnight issues reported. Temp (24hrs), Av.9 °F (36.6 °C), Min:97.2 °F (36.2 °C), Max:98.4 °F (36.9 °C)    DIET: ADULT DIET; Regular  CODE: Limited    Intake/Output Summary (Last 24 hours) at 2022 1044  Last data filed at 2022 0600  Gross per 24 hour   Intake 1673.94 ml   Output 1195 ml   Net 478.94 ml       OBJECTIVE:    BP (!) 167/96   Pulse 70   Temp 98 °F (36.7 °C) (Axillary)   Resp 22   Ht 5' 9\" (1.753 m)   Wt 181 lb (82.1 kg)   SpO2 100%   BMI 26.73 kg/m²     General appearance: No apparent distress, appears stated age and cooperative. HEENT:  Conjunctivae/corneas clear. Left frontal scalp drain with surgical dressing in place. Neck: Supple. No jugular venous distention. Respiratory: Clear to auscultation bilaterally, normal respiratory effort  Cardiovascular: Regular rate rhythm, normal S1-S2  Abdomen: Soft, nontender, nondistended  Musculoskeletal: No clubbing, cyanosis, no bilateral lower extremity edema. Brisk capillary refill.    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 continue on Ancef for drain.  - Continue seizure medications per neurology. Vimpat, Depakote and Keppra. - Neurosurgery following appreciate their input.   - Appreciate ICU team input.  -Status post pacemaker [third-degree heart block A. fib with RVR]    DISPOSITION:     Medications:  REVIEWED DAILY    Infusion Medications    sodium chloride      dextrose       Scheduled Medications    levETIRAcetam  500 mg Oral BID    lacosamide  200 mg Oral BID    ceFAZolin  1,000 mg IntraVENous Q8H    oxymetazoline  2 spray Each Nostril Once    lidocaine   Topical Once    insulin glargine  15 Units SubCUTAneous Daily    insulin lispro  0-4 Units SubCUTAneous Q4H    sennosides-docusate sodium  2 tablet Oral Daily    NIFEdipine  60 mg Oral Daily    divalproex  500 mg Oral 2 times per day    polyethylene glycol  17 g Oral Daily    guaiFENesin  400 mg Oral TID    sodium chloride flush  5-40 mL IntraVENous 2 times per day    lidocaine  1 patch TransDERmal Daily    allopurinol  300 mg Oral Once per day on Mon Wed Fri    gabapentin  100 mg Oral BID    tamsulosin  0.4 mg Oral Nightly    Arformoterol Tartrate  15 mcg Nebulization BID    lisinopril  20 mg Oral Daily    And    hydroCHLOROthiazide  12.5 mg Oral Daily     PRN Meds: hydrALAZINE, labetalol, ipratropium-albuterol, perflutren lipid microspheres, trimethobenzamide, sodium chloride flush, sodium chloride, glucose, dextrose bolus **OR** dextrose bolus, glucagon (rDNA), dextrose, potassium chloride **OR** potassium alternative oral replacement **OR** potassium chloride, acetaminophen **OR** acetaminophen, aluminum & magnesium hydroxide-simethicone    Labs:     Recent Labs     12/04/22 0422 12/04/22  1205 12/05/22  0450 12/06/22  0435   WBC 13.5*  --  12.3* 12.3*   HGB 7.1* 10.6* 10.6* 10.5*   HCT 21.6* 32.2* 31.9* 31.7*     --  358 416       Recent Labs     12/04/22 0422 12/05/22  0450 12/06/22  0435    139 139   K 4.3 4.9 4.0    104 105   CO2 19* 17* 20*   BUN 50* 54* 58*   CREATININE 2.1* 2.1* 2.0*   CALCIUM 8.9 9.1 9.3   PHOS 4.1 5.2* 3.6       Recent Labs     12/05/22  0450 12/06/22  0435   PROT 6.3* 6.4   ALKPHOS 91 91   ALT 13 6   AST 25 16   BILITOT 0.4 <0.2       Recent Labs     12/04/22  0422 12/05/22  0450 12/06/22  0530   INR 1.2 1.2 1.2       No results for input(s): Geetha Ivy in the last 72 hours. Chronic labs:    Lab Results   Component Value Date    TSH 3.970 11/24/2022    PSA 3.76 09/16/2022    INR 1.2 12/06/2022    LABA1C 8.5 (H) 08/02/2022       Radiology: REVIEWED DAILY    +++++++++++++++++++++++++++++++++++++++++++++++++  Jason Monroy MD  [employer] - Hospitalist  [address] [place] [address]  +++++++++++++++++++++++++++++++++++++++++++++++++  NOTE: This report was transcribed using voice recognition software. Every effort was made to ensure accuracy; however, inadvertent computerized transcription errors may be present.

## 2022-12-06 NOTE — PROCEDURES
EEG Report  Aurelio Ochoa is a 80 y.o. male      Appointment Date 12/6/2022   Appointment Time  2:30pm   [place] [facility] [id number]   Type of Study Portable routine Floor 3805     Technical Specifications  Technician [address] of consciousness Sleeping/awake   Sleep deprived? no   Hyperventilation tested? no   Photic stim tested? no   EEG recording Standard 10-20 electrode placement    Duration of recording 25 mins   EEG complete? Yes         Clinical History    Aurelio Ochoa is a 80 y.o. male with history of atrial fibrillation, DM, and HTN admitted on 11/24/2022 with onset of chest pain and shortness of breath. Reporting feeling lightheaded with near syncope associated with this chest pain. He subsequently passed out, but recovered without event. He had a second syncopal event at home where he apparently hit his head. Patient was given aspirin in route to the hospital and upon arrival to the ED he was noted to be in A. fib with fall pauses and bradycardia. Scan of the head done on admission did not show any acute intracranial abnormalities. With headache on admission and continued headache prompted repeat CT scan of the head on 11/26/2022 showing progressive subdural hematoma on the left side. She had been on Eliquis at the time which was held and reversed with Kcentra. Patient developed progressive altered mentation with decreased responsiveness and aphasia. Neurosurgery was consulted for management with no postsurgical intervention performed. Neurology was also consulted with EEG done 11/28/2022 showing left frontocentrotemporal electrographic seizures with associated moderate to severe nonspecific encephalopathy. It placed on continuous EEG monitoring ended on process 12/1/22 with note of continued encephalopathy but improved to mild to moderate severity.   3-minute seizure reported on 11/30/2022 along with a 2-minute seizure on 11/29/2022 with Depakote added to your medication on 11/29/2022. Patient has already been on Keppra 500 mg twice daily for max renal dosing along with Vimpat 200 mg twice daily at time of Depakote addition. No further seizure medication changes since addition of Depakote with 4 days of increasing alertness and responsiveness.     Medications    Current Facility-Administered Medications:     levETIRAcetam (KEPPRA) tablet 500 mg, 500 mg, Oral, BID, Thor Clintonch, DO, 500 mg at 12/06/22 0915    lacosamide (VIMPAT) tablet 200 mg, 200 mg, Oral, BID, Daren Hensel Clinton, DO, 200 mg at 12/06/22 0915    ceFAZolin (ANCEF) 1,000 mg in sterile water 10 mL IV syringe, 1,000 mg, IntraVENous, Q8H, Kaity Diana MD, 1,000 mg at 12/06/22 1205    oxymetazoline (AFRIN) 0.05 % nasal spray 2 spray, 2 spray, Each Nostril, Once, Natacha Sanz MD    lidocaine (XYLOCAINE) 2 % uro-jet, , Topical, Once, Natacha Sanz MD    hydrALAZINE (APRESOLINE) injection 10 mg, 10 mg, IntraVENous, Q30 Min PRN, Natacha Sanz MD, 10 mg at 12/06/22 0205    labetalol (NORMODYNE;TRANDATE) injection 10 mg, 10 mg, IntraVENous, Q1H PRN, Natacha Sanz MD, 10 mg at 12/06/22 0521    insulin glargine-yfgn (SEMGLEE-YFGN) injection vial 15 Units, 15 Units, SubCUTAneous, Daily, Kaity Diana MD, 15 Units at 12/06/22 0928    insulin lispro (HUMALOG) injection vial 0-4 Units, 0-4 Units, SubCUTAneous, Q4H, Kaity Diana MD, 1 Units at 12/06/22 1246    sennosides-docusate sodium (SENOKOT-S) 8.6-50 MG tablet 2 tablet, 2 tablet, Oral, Daily, Kaity Diana MD, 2 tablet at 12/06/22 0915    NIFEdipine (PROCARDIA XL) extended release tablet 60 mg, 60 mg, Oral, Daily, Kaity Diana MD, 60 mg at 12/06/22 6241    [DISCONTINUED] valproate (DEPACON) 500 mg in dextrose 5 % 100 mL IVPB, 500 mg, IntraVENous, 2 times per day, Stopped at 12/02/22 1213 **OR** divalproex (DEPAKOTE SPRINKLE) capsule 500 mg, 500 mg, Oral, 2 times per day, Kaity Diana MD, 500 mg at 12/06/22 8515 polyethylene glycol (GLYCOLAX) packet 17 g, 17 g, Oral, Daily, Jayla Elliott MD, 17 g at 12/06/22 0914    guaiFENesin tablet 400 mg, 400 mg, Oral, TID, Jayla Elliott MD, 400 mg at 12/06/22 0916    ipratropium-albuterol (DUONEB) nebulizer solution 1 ampule, 1 ampule, Inhalation, Q4H PRN, Jayla Elliott MD, 1 ampule at 12/05/22 0959    perflutren lipid microspheres (DEFINITY) injection 1.5 mL, 1.5 mL, IntraVENous, ONCE PRN, Jayla Elliott MD    trimethobenzamide Farhad Tracy) injection 200 mg, 200 mg, IntraMUSCular, Q6H PRN, Jayla Elliott MD    sodium chloride flush 0.9 % injection 5-40 mL, 5-40 mL, IntraVENous, 2 times per day, Jayla Elliott MD, 10 mL at 12/06/22 0912    sodium chloride flush 0.9 % injection 5-40 mL, 5-40 mL, IntraVENous, PRN, Jayla Elliott MD, 10 mL at 11/26/22 2335    0.9 % sodium chloride infusion, , IntraVENous, PRN, Jayla Elliott MD    lidocaine 4 % external patch 1 patch, 1 patch, TransDERmal, Daily, Jayla Elliott MD, 1 patch at 12/06/22 0916    glucose chewable tablet 16 g, 4 tablet, Oral, PRN, Jayla Elliott MD    dextrose bolus 10% 125 mL, 125 mL, IntraVENous, PRN **OR** dextrose bolus 10% 250 mL, 250 mL, IntraVENous, PRN, Jayla Elliott MD    glucagon (rDNA) injection 1 mg, 1 mg, SubCUTAneous, PRN, Jayla Elliott MD    dextrose 10 % infusion, , IntraVENous, Continuous PRN, Jayla Elliott MD    allopurinol (ZYLOPRIM) tablet 300 mg, 300 mg, Oral, Once per day on Mon Wed Fri, La Nena Wolff MD, 300 mg at 12/05/22 0901    gabapentin (NEURONTIN) capsule 100 mg, 100 mg, Oral, BID, Jayla Elliott MD, 100 mg at 12/06/22 0914    tamsulosin (FLOMAX) capsule 0.4 mg, 0.4 mg, Oral, Nightly, Jayla Elliott MD, 0.4 mg at 12/05/22 2100    Arformoterol Tartrate (BROVANA) nebulizer solution 15 mcg, 15 mcg, Nebulization, BID, Jayla Elliott MD, 15 mcg at 12/06/22 0804    lisinopril (PRINIVIL;ZESTRIL) tablet 20 mg, 20 mg, Oral, Daily, 20 mg at 12/06/22 0951 **AND** hydroCHLOROthiazide (HYDRODIURIL) tablet 12.5 mg, 12.5 mg, Oral, Daily, Artur Vega MD, 12.5 mg at 12/06/22 0915    potassium chloride (KLOR-CON M) extended release tablet 40 mEq, 40 mEq, Oral, PRN **OR** potassium bicarb-citric acid (EFFER-K) effervescent tablet 40 mEq, 40 mEq, Oral, PRN **OR** potassium chloride 10 mEq/100 mL IVPB (Peripheral Line), 10 mEq, IntraVENous, PRN, Artur Vega MD    acetaminophen (TYLENOL) tablet 650 mg, 650 mg, Oral, Q6H PRN, 650 mg at 11/27/22 1208 **OR** acetaminophen (TYLENOL) suppository 650 mg, 650 mg, Rectal, Q6H PRN, Artur Vega MD    aluminum & magnesium hydroxide-simethicone (MAALOX) 200-200-20 MG/5ML suspension 30 mL, 30 mL, Oral, Q6H PRN, Artur Vega MD        Physician Interpretation    General EEG Report  EEG study was performed using the 10-20 electrode placement system in patient who was lethargic and responsive at time of study. No abnormal behavior or movements noted during the study. Activation procedures following left polymorphic delta/theta slowing noted with intermittent sharp wave activity consistent with structural abnormality. Consistent with encephalopathy photic stimulation and hyperventilation. Type of EEG:   Routine Inpatient EEG with video done at bedside    Description   Background: Low amplitude fast background activity with superimposed beta noted on the right side. Left hemisphere slowing with polymorphic delta/theta slowing associated with erythema slowing and sharps and intermittent spike and wave activity particularly in the temporal parietal region. There is no generalized sharp activity present. Sleep: N1 and some N2 sleep    Photic stimulation: Not performed  Hyperventilation: Not performed    General Impression  Abnormal awake and asleep EEG with asymmetric slowing involving left side consistent with structural abnormality and moderate encephalopathy.   Some epileptiform activity noted consistent with epileptiform foci.  Clinical correlation indicated.     MD Anastasiia Cristobal

## 2022-12-06 NOTE — PROGRESS NOTES
[facility]  Neurology follow up     Date:  12/6/2022  Patient Name:  Wang Davey  YOB: 1933  MRN: [id number]     Anderson Mendoza is a 80 y.o. male admitted with falls on Eliquis found to have developed a large SDH. He now has developed aphasia secondary to left frontal onset seizures. No seizures since 11/29 at 2350. Maintained on ASM as listed below. Repeat EEG 12/5 with mod-severe encephalopathy, no epileptiform activity. VPA level 51. S/p cira hole for SDH on 12/4    Plan  Continue Keppra 500 BID (renally dosed at present)  Continue Vimpat at 200 BID  Continue  mg BID  Seizure safety precautions for 6 months  Will follow     History of Present Illness:  Wang Davey is a 80 y.o. right handed male presenting for evaluation of seizures. The patient was admitted on 11/24/22 for chest pain and SOB. He has a history of afib and was on Eliquis. He had been having worsening dizziness and had passed out and hit his head. Initial CT head done on 11/24/22 was read as no acute abnormality, however, on review it does appear to show a left SDH. 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 Patient had persistent headache and a repeat CT scan on 11/26 showed increased volume of SDH over left frontotemporal region up to 22m in greatest thickness. 8mm of midline shift also noted. Eliquis was reversed with Kcentra. The patient reportedly remained asymptomatic asymptomatic at this time other than the headache. He was noted to be more somnolent on 11/27/22 with more bereket concern for aphasia on 11/28/22. EEG was ordered which showed left frontal onset seizure activity. 11/29- Patient is sleeping and rouses to name being called and tactile stimuli. Withdraws to pain and follows 1 step commands intermittently.  No significant changes overnight and exam appears stable compared to yesterday. No family at bedside. 11/30-continuous EEG hooked up yesterday. 3 seizures have been recorded since being coughed up. Seizure at 1227 lasted approximately 3 minutes. During the seizure patient put left arm on his head, but has no clear clinical change other than some mild tachypnea. At that time Vimpat was bolused with an additional 100 mg and dose increased to 200 mg twice daily. Another seizure was recorded at 2056 lasting 2 minutes. Bolus of valproate 1000 mg ordered and 500 mg twice daily to be started this morning. Third seizure occurred at 2350 lasting nearly 3 minutes. This was prior to valproate bolus being given. There have been no further seizures recorded up through 0918 this morning. 12/1There have been no further seizures since 2350 on 11/29. Tolerating current antiseizure medication regimen well at this time. Continuous EEG discontinued this morning. Neurosurgery continuing to follow, maintaining sodium level greater than 45. Considering bur hole in 7 to 10 days post hematoma if hematoma liquefies. Planning to repeat CT head on 12/3    12/2  No further seizures reported. Patient is sleeping this morning. He does wake up to verbal and tactile stimulation but quickly drifts back off to sleep. Follows one-step simple commands and is able to tell me his name and does produce some more spontaneous speech today. No family at bedside    12/5   Underwent cira hole for SDH evacuation yesterday on 12/4. Over the last two days, patient had become more lethargic. We were asked to evaluate again due to concern for BT seizure activity. He was loaded with another 200 mg of Vimpat last night. Labs were ordered. EEG is pending. 12/6  Stable overnight. EEG yesterday showed a moderate to severe encephalopathy. No epileptiform activity. VPA level 51. Granddaughter Gela Matta at bedside. All questions answered at this time.      Review of Systems:  Unable to obtain due to:  aphasia    Allergies:      No Known Allergies     Physical Examination  Vitals   Vitals:    12/06/22 0700 12/06/22 0800 12/06/22 0805 12/06/22 0900   BP: (!) 151/74 139/78  (!) 159/82   Pulse: 73 73 84 70   Resp: 15 14 19 22   Temp:       TempSrc:       SpO2: 98% 97% 98% 100%   Weight:       Height:          General: Patient appears in no acute distress. HEENT: Drain in place   Chest: effort normal   Heart: irregularly irregular  Extremities/Peripheral vascular: No edema/swelling noted. No cold limbs noted. Neurologic Examination    Mental Status  Alert. Oriented to self. Severe expressive and receptive aphasia. Perseverates on name. Attention  fair. Follows very few simple commands- squeezes hands, wiggles toes. Does attend to examiner     Cranial Nerves  II. Visual fields full to threat bilaterally- less so on the R   III, IV, VI: Pupils equally round and reactive to light, 3 to 2 mm bilaterally. EOMs: full, no nystagmus. V.   VII: Facial movements appear symmetric   VIII: Hearing intact to voice  IX,X: Palate elevates symmetrically. moderate to severe dysarthria  XI: Shoulders appear symmetric  XII: Tongue is midline    Motor  R hemiparesis   Wiggles toes b/l   Normal tone and bulk   No abnormal movements     Sensation  Responds to pain in all limbs     Reflexes    R babinski     Coordination  No resting tremors observed     Gait  Deferred for safety/fall consideration    Labs  Recent Labs     12/06/22  0435      K 4.0      CO2 20*   BUN 58*   CREATININE 2.0*   GLUCOSE 143*   CALCIUM 9.3   PROT 6.4   LABALBU 3.0*   BILITOT <0.2   ALKPHOS 91   AST 16   ALT 6   WBC 12.3*   RBC 3.81   HGB 10.5*   HCT 31.7*   MCV 83.2   MCH 27.6   MCHC 33.1   RDW 13.8      MPV 8.9         Imaging  CT HEAD WO CONTRAST   Final Result   1. Reduction in volume of left subdural hematoma. 2.  Left craniotomy with placement of dural drainage catheter, no evidence of   complication. XR ABDOMEN FOR NG/OG/NE TUBE PLACEMENT   Final Result   Enteric tube terminates in the gastric body. CT HEAD WO CONTRAST   Final Result   Evolving large left subdural hematoma with no change seen in the surrounding   mass effect or midline shift. There is no new hemorrhage visualized. CT HEAD WO CONTRAST   Final Result   No significant change in large left subdural hemorrhage with stable 7 mm   midline shift to the right. CT HEAD WO CONTRAST   Final Result   1. Moderate to large left subdural hematoma with left-to-right midline   structure shift of approximately 7 mm, unchanged. 2.  Greatest maximal thickness of the subdural hematoma on coronal views is   24 mm, compared to 23 mm on the prior study. 3.  Gas fluid level in the left sphenoid sinus may reflect acute sinusitis. CT HEAD WO CONTRAST   Final Result   Interval increased volume and extent of diffuse left cerebral acute subdural   hemorrhage with greatest thickness adjacent to the frontotemporal region now   measuring up to 22 mm in greatest axial thickness. Interval worsening of rightward midline shift at the level of the septum   pellucidum now measuring 8 mm. Mild cerebral white matter chronic microvascular ischemic disease. Mild diffuse cerebral atrophy. Multiple attempts were made by the [facility] core team to page and notify Dr. Indu Murguia. Critical results were then called by Dr. Karen Davidson to OSCAR Fernandez on 11/26/2022 at 22:23. Pedrito Simon reports that neurosurgery has been   consulted about the case. XR CHEST PORTABLE   Final Result   Mild congestive changes. XR CHEST PORTABLE   Final Result   No evidence of pneumothorax or parenchymal contusion. AICD visualized in the   left chest with leads in the heart.              EEG 11/28  This abnormal study showed evidence of:     Left frontal electrographic seizures with spread to involve the left frontocentrotemporal region  A potential for focl seizures from the left frontal region  Moderate to severe nonspecific cerebral dysfunction of the left frontocentrotemporal region  A mild to moderate nonspecific encephalopathy     Structural abnormalities should be considered for the findings above and appropriate imaging obtained if clinically indicated. cEEG  EEG was reviewed from 1054 through 1317, 11/29/2022. One seizure noted at 1227 lasting approximately 3 minutes. During the seizure he puts his left arm on his head, but has no clear clinical change other than what appears to be some mild tachypnea. Will give additional 100 mg Vimpat IV and increase to 200 mg BID this evening. Efraín Dunbar DO, 1:39 PM, 11/29/2022         EEG addendum  EEG reviewed through 1903, 11/29/2022      Background consistent with mild to moderate nonspecific encephalopathy. No seizures noted. Efraín Dunbar DO, 8:55 PM, 11/29/2022       EEG addendum  EEG reviewed through 2252, 11/29/2022      One seizure noted at 2056 lasting nearly 2 minutes. IV bolus of valproate 1000 mg ordered to be followed by 500 BID starting morning of 11/30. Interim EEG Progress Note     EEG was reviewed from 2252, 11/29/22 through 0918, 11/30/2022. One seizure noted at 2350 lasting for approximately 3 minutes. This occurred prior to valproate bolus being completed at 0200. No further seizures noted during this period. Interim EEG Progress Note     EEG was reviewed from [facility] through 0614, 12/1/2022     Background consistent with a mild to moderate encephalopathy. No seizures noted during this period. Efraín Dunbar DO, 7:29 AM, 12/1/2022     EEG 12/5  Abnormal EEG with presence of diffuse delta slowing with triphasic waves indicative of mod-severe  encephalopathy. No epileptiform activity observed. Clinical correlation is indicated.      Electronically signed by TOMAS Mederos on 12/6/2022 at 9:38 AM

## 2022-12-06 NOTE — ACP (ADVANCE CARE PLANNING)
Advance Care Planning   Healthcare Decision Maker:    Primary Decision Maker: Debora Goetz Child - [phone]    Secondary Decision Maker: Patrick Aguilar  Child - [phone]    Click here to complete Healthcare Decision Makers including selection of the Healthcare Decision Maker Relationship (ie \"Primary\").

## 2022-12-06 NOTE — PROGRESS NOTES
SPEECH/LANGUAGE PATHOLOGY  SPEECH/LANGUAGE/COGNITIVE EVALUATION   and PLAN OF CARE      PATIENT NAME:  Flora Aranda  (male)     MRN:  [id number]    :  10/27/1933  (80 y.o.)  STATUS:  Inpatient: Room 3805/3805-A    TODAY'S DATE:  22    SLP eval and treat  Start:  22,   End:  22,   ONE TIME,   Standing Count:  1 Occurrences,   R         Elvin Zaman MD   REASON FOR REFERRAL:  assess speech/language/cognition  EVALUATING THERAPIST: ANSELMO Espino    ADMITTING DIAGNOSIS: A-fib ([facility] [address] [I48.91]  Atrial fibrillation ([address] [I48.91]    VISIT DIAGNOSIS:        SPEECH THERAPY  PLAN OF CARE   The speech therapy  POC is established based on physician order, speech pathology diagnosis and results of clinical assessment     SPEECH PATHOLOGY DIAGNOSIS:    Profound aphasia; dysarthria     Speech Pathology intervention is recommended up to 6 times per week for LOS or when goals are met with emphasis on the following:   Anticipate Patient will benefit from ongoing intensive speech therapy at discharge due to degree of deficits     Conditions Requiring Skilled Therapeutic Intervention for speech, language and/or cognition    Receptive Aphasia  Expressive Aphasia   Dysarthria     Specific Speech Therapy Interventions to Include:   Receptive language training   Expressive language training   Therapeutic exercises for dysarthria    Specific instructions for next treatment: To initiate POC    SHORT/LONG TERM GOALS  Pt will improve orientation to spatial and temporal surroundings with use of external memory aides.   Pt will Improve receptive language skills for comprehension of simple level yes/no questions, basic commands (auditory/written format), and for comprehension of basic conversation with 50% accuracy  Pt will improve expressive language skills to improve accuracy of completion of automatic speech tasks, object/picture naming, phrase completion, and phrasal expression of basic wants and needs with 50% accuracy    Patient goals: Patient/family involved in developing goals and treatment plan:   Treatment goals discussed with Patient and Family (granddaughter)   The Patient did not demonstrate complete understanding of the diagnosis, prognosis and plan of care and Family understand(s) the diagnosis, prognosis and plan of care   The patient/family Agreed with above,     This plan may be re-evaluated and revised as warranted. Rehabilitation Potential/Prognosis: fair                CLINICAL ASSESSMENT:  MOTOR SPEECH       Oral Peripheral Examination   Generalized oral weakness    Parameters of Speech Production  Respiration:  Adequate for speech production  Articulation:  Distortion  Resonance:  Impaired  Quality:   Within functional limits  Pitch: Within functional limits  Intensity: Quiet  Fluency:  Intact  Prosody Intact    RECEPTIVE LANGUAGE    Comprehension of Yes/No Questions:   Inconsistent    Process  Simple Verbal Commands:   Inconsistent  Process Intermediate Verbal Commands:   Impaired  Process Complex Verbal Commands:     Impaired    Comprehension of Conversation:      Impaired      EXPRESSIVE LANGUAGE     Serials: To be assessed    Imitation:  Words   Impaired   Sentences To be assessed    Naming:  (Modality used:  Verbal)  Confrontation Naming  To be assessed  Functional Description  To be assessed  Response Naming:  To be assessed    Conversation:      Anomia was present and Neologistic errors were noted    COGNITION     Attention/Orientation  Attention: Sustained attention   Orientation:  Oriented to Person    Memory   TBA    Organization/Problem Solving/Reasoning   TBA         CLINICAL OBSERVATIONS NOTED DURING THE EVALUATION  Latent responses, Inconsistent responses, Perseveration errors, Anomic errors, Paraphasic errors, Cueing was required, and Flat affect                  EDUCATION:   The Speech Language Pathologist (SLP) completed education regarding results of evaluation and that intervention is warranted at this time. Learner: Patient and Family  Education: Reviewed results and recommendations of this evaluation  Evaluation of Education:  Verbalizes understanding    Evaluation Time includes thorough review of current medical information, gathering information on past medical history/social history and prior level of function, completion of standardized testing/informal observation of tasks, assessment of data and education on plan of care and goals. CPT code:    88417  eval speech sound lang comprehension      The admitting diagnosis and active problem list, as listed below have been reviewed prior to initiation of this evaluation. 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    Esophagitis-grade 1    AP (angina pectoris) (HCC)    Systolic hypertension    Combined forms of age-related cataract of left eye    Anemia, chronic renal failure, stage 4 (severe) (HCC)    Sepsis secondary to CAP ([facility] [address]    Pneumonia of left lung due to infectious organism    A-fib [facility])    Atrial fibrillation (HCC)    Third degree heart block (HCC)    High-grade atrioventricular block    Seizure (HCC)    Subdural hematoma       INTERVENTION  CPT Code: 97315  speech/language tx    Therapeutic tasks completed to increase verbal output, including carrier phrases/ semantic cue/ phonemic cue. Pt making attempts at vocalization/ turn taking in question format however nonsensical. Will cont POC.

## 2022-12-06 NOTE — PROGRESS NOTES
Spoke with patient's son, Radha London Telles. Updated on patient's current status. He requested to speak with Dr. Iam Perkins with neurology. Will send Dr Iam Perkins a perfect serve.

## 2022-12-06 NOTE — PLAN OF CARE
Problem: Chronic Conditions and Co-morbidities  Goal: Patient's chronic conditions and co-morbidity symptoms are monitored and maintained or improved  12/6/2022 0216 by Sammie Navas RN  Outcome: Progressing     Problem: Discharge Planning  Goal: Discharge to home or other facility with appropriate resources  12/6/2022 0216 by Sammie Navas RN  Outcome: Progressing     Problem: Pain  Goal: Verbalizes/displays adequate comfort level or baseline comfort level  12/6/2022 0216 by Sammie Navas RN  Outcome: Progressing     Problem: Safety - Medical Restraint  Goal: Remains free of injury from restraints (Restraint for Interference with Medical Device)  Description: INTERVENTIONS:  1. Determine that other, less restrictive measures have been tried or would not be effective before applying the restraint  2. Evaluate the patient's condition at the time of restraint application  3. Inform patient/family regarding the reason for restraint  4. Q2H: Monitor safety, psychosocial status, comfort, nutrition and hydration  12/6/2022 0833 by Jaelyn Ferrer RN  Outcome: Progressing  12/6/2022 0216 by Sammie Navas RN  Outcome: Progressing  Flowsheets (Taken 12/6/2022 0000)  Remains free of injury from restraints (restraint for interference with medical device): Every 2 hours: Monitor safety, psychosocial status, comfort, nutrition and hydration  12/5/2022 2047 by Cheo Griggs RN  Outcome: Progressing  12/5/2022 2043 by Cheo Griggs RN  Outcome: Not Progressing     Problem: Nutrition Deficit:  Goal: Optimize nutritional status  12/6/2022 0216 by Sammie Navas RN  Outcome: Progressing     Problem: Skin/Tissue Integrity  Goal: Absence of new skin breakdown  Description: 1. Monitor for areas of redness and/or skin breakdown  2. Assess vascular access sites hourly  3. Every 4-6 hours minimum:  Change oxygen saturation probe site  4.   Every 4-6 hours:  If on nasal continuous positive airway pressure, respiratory therapy assess nares and determine need for appliance change or resting period. 12/6/2022 8896 by Tayla Khan RN  Outcome: Progressing  12/6/2022 0216 by Doc Glasgow RN  Outcome: Progressing     Problem: Hematologic - Adult  Goal: Maintains hematologic stability  12/6/2022 0216 by Doc Glasgow RN  Outcome: Progressing     Problem: Safety - Medical Restraint  Goal: Remains free of injury from restraints (Restraint for Interference with Medical Device)  Description: INTERVENTIONS:  1. Determine that other, less restrictive measures have been tried or would not be effective before applying the restraint  2. Evaluate the patient's condition at the time of restraint application  3. Inform patient/family regarding the reason for restraint  4.  Q2H: Monitor safety, psychosocial status, comfort, nutrition and hydration  12/6/2022 0833 by Tayla Khan RN  Outcome: Progressing  12/6/2022 0216 by Doc Glasgow RN  Outcome: Progressing  Flowsheets (Taken 12/6/2022 0000)  Remains free of injury from restraints (restraint for interference with medical device): Every 2 hours: Monitor safety, psychosocial status, comfort, nutrition and hydration  12/5/2022 2047 by Valarie Badillo RN  Outcome: Progressing  12/5/2022 2043 by Valarie Badillo RN  Outcome: Not Progressing

## 2022-12-06 NOTE — FLOWSHEET NOTE
Patient will reach at lines and tubes needing to be redirected much of time. Patient at risk for dislodging subdural drain, and necessary medical equipment. Attempting to provide calm environment and reorientation, but patient still assessed to be a risk of harm to self. Family aware for need of restraints. Will continue to monitor patient and assess for earliest removal capability.

## 2022-12-06 NOTE — FLOWSHEET NOTE
Nursing bedside swallow completed before PO med pass. 12/05/22 2000   Swallow Screening   Is the patient unable to remain alert for testing? No   Is the patient on a modified diet (thickened liquids) due to pre-existing dysphagia? No   Is there presence of existing enteral tube feeding via the stomach or nose? No   Is there presence of head-of-bed restrictions (less than 30 degrees)? No   Is there presence of tracheotomy tube? No   Is the patient ordered nothing-by-mouth status?  No   3 oz Water Swallow Screen (S)  Pass

## 2022-12-06 NOTE — PROGRESS NOTES
Neurosurg progress note  VITALS:  BP (!) 151/72   Pulse 72   Temp 98 °F (36.7 °C) (Axillary)   Resp 21   Ht 5' 9\" (1.753 m)   Wt 181 lb (82.1 kg)   SpO2 97%   BMI 26.73 kg/m²   24HR INTAKE/OUTPUT:    Intake/Output Summary (Last 24 hours) at 12/6/2022 1455  Last data filed at 12/6/2022 0600  Gross per 24 hour   Intake 310 ml   Output 950 ml   Net -640 ml     CT Head W/O Contrast    Result Date: 11/24/2022  EXAMINATION: CT OF THE HEAD WITHOUT CONTRAST  11/24/2022 9:38 am TECHNIQUE: CT of the head was performed without the administration of intravenous contrast. Automated exposure control, iterative reconstruction, and/or weight based adjustment of the mA/kV was utilized to reduce the radiation dose to as low as reasonably achievable. COMPARISON: None. HISTORY: ORDERING SYSTEM PROVIDED HISTORY: LOC + fall + strike to head; concern for bleed TECHNOLOGIST PROVIDED HISTORY: Reason for exam:->LOC + fall + strike to head; concern for bleed Has a \"code stroke\" or \"stroke alert\" been called? ->No Decision Support Exception - unselect if not a suspected or confirmed emergency medical condition->Emergency Medical Condition (MA) FINDINGS: BRAIN/VENTRICLES: No evidence of parenchymal hemorrhages or contusions. No evidence of intra or extra-axial fluid collection is seen. Scattered areas of low attenuation are visualized in the periventricular and subcortical white matter suggestive of chronic microvascular disease. No evidence of acute territorial infarct is seen. Prominence of the ventricles and sulci is visualized suggestive of chronic atrophic brain changes. No evidence of intracranial mass or mass effect, no evidence of midline shift is seen. No evidence of sellar or parasellar mass is visualized. ORBITS: The visualized portion of the orbits demonstrate no acute abnormality. SINUSES: The visualized paranasal sinuses and mastoid air cells demonstrate no acute abnormality.  SOFT TISSUES/SKULL:  No acute abnormality of the visualized skull or soft tissues. No acute intracranial abnormality. CT CSpine W/O Contrast    Result Date: 11/24/2022  EXAMINATION: CT OF THE CERVICAL SPINE WITHOUT CONTRAST 11/24/2022 9:38 am TECHNIQUE: CT of the cervical spine was performed without the administration of intravenous contrast. Multiplanar reformatted images are provided for review. Automated exposure control, iterative reconstruction, and/or weight based adjustment of the mA/kV was utilized to reduce the radiation dose to as low as reasonably achievable. COMPARISON: None. HISTORY: ORDERING SYSTEM PROVIDED HISTORY: LOC + Fall + head strike; Concern for fx TECHNOLOGIST PROVIDED HISTORY: Reason for exam:->LOC + Fall + head strike; Concern for fx Decision Support Exception - unselect if not a suspected or confirmed emergency medical condition->Emergency Medical Condition (MA) FINDINGS: Straightening of the lumbar the columns of the cervical spine is present. No evidence of spondylolisthesis is seen. Multilevel degenerative endplate changes visualized, no evidence of compression deformity of the cervical vertebral bodies. No evidence of fracture or traumatic subluxation is seen. Decreased intervertebral disc height visualized most prominent at C5-C6 and C3-C4. Multilevel degenerative disc osteophyte complex, uncovertebral disease and hypertrophic changes in the facet joints is seen. Calcification visualized within the spinal canal superimposed over the falciform ligament at the level of the C5 vertebral body. Otherwise abnormal density visualized in the cervical spinal canal. The neck soft tissues are unremarkable. Circumferential opacification visualized in the sphenoid sinus. Limited evaluation of the upper lung fields is unremarkable bilaterally. No acute abnormality of the cervical spine. Multilevel degenerative changes of the cervical spine visualized most prominent at C3-C4 and C5-C6.  Circumferential opacification visualized in the sphenoid sinus. XR CHEST PORTABLE    Result Date: 11/25/2022  EXAMINATION: ONE XRAY VIEW OF THE CHEST 11/25/2022 4:57 pm COMPARISON: 11/24/2022. HISTORY: ORDERING SYSTEM PROVIDED HISTORY: Pacemaker placement and rule out pneumothorax TECHNOLOGIST PROVIDED HISTORY: Reason for exam:->Pacemaker placement and rule out pneumothorax What reading provider will be dictating this exam?->CRC FINDINGS: AICD visualized in the left chest with lead in the heart. EKG leads are seen superimposed over the chest. Poor inspiratory effort is seen. The CT of the prominence of the cardiomediastinal silhouette is visualized demonstrates no significant increase in comparison to the prior study. Atherosclerotic calcifications visualized in the area the aortic arch. Mild prominence of the bronchovascular and interstitial lung markings is visualized in bilateral lung fields more prominent in the lateral right mid lung field large and in lung field linear subsegmental atelectatic streaks of visualized bilateral lung fields. No evidence of focal infiltrate or consolidation. No evidence of pneumothorax or pleural effusion. Degenerative bone changes seen. No evidence of pneumothorax or parenchymal contusion. AICD visualized in the left chest with leads in the heart. XR CHEST PORTABLE    Result Date: 11/24/2022  EXAMINATION: ONE XRAY VIEW OF THE CHEST 11/24/2022 9:30 am COMPARISON: 08/08/2022 HISTORY: ORDERING SYSTEM PROVIDED HISTORY: CP + SOB TECHNOLOGIST PROVIDED HISTORY: Reason for exam:->CP + SOB FINDINGS: Poor inspiratory effort. The cardiomediastinal silhouette is slightly prominent but demonstrates no significant change in comparison to the prior study. Prominence of the bronchovascular interstitial lung markings is visualized bilaterally with scattered areas of patchy airspace opacification seen. Linea subsegmental atelectatic streaks are visualized.   The costophrenic angles are clear with no evidence of pleural effusion seen.  No evidence of pneumothorax or parenchymal lung mass. The osseous structures are without acute process. Congestive changes demonstrate no change.      CBC:   Lab Results   Component Value Date/Time    WBC 12.3 12/06/2022 04:35 AM    RBC 3.81 12/06/2022 04:35 AM    HGB 10.5 12/06/2022 04:35 AM    HCT 31.7 12/06/2022 04:35 AM    MCV 83.2 12/06/2022 04:35 AM    MCH 27.6 12/06/2022 04:35 AM    MCHC 33.1 12/06/2022 04:35 AM    RDW 13.8 12/06/2022 04:35 AM     12/06/2022 04:35 AM    MPV 8.9 12/06/2022 04:35 AM     BMP:    Lab Results   Component Value Date/Time     12/06/2022 04:35 AM    K 4.0 12/06/2022 04:35 AM    K 4.5 11/24/2022 09:08 AM     12/06/2022 04:35 AM    CO2 20 12/06/2022 04:35 AM    BUN 58 12/06/2022 04:35 AM    LABALBU 3.0 12/06/2022 04:35 AM    CREATININE 2.0 12/06/2022 04:35 AM    CALCIUM 9.3 12/06/2022 04:35 AM    GFRAA 30 09/16/2022 08:24 AM    LABGLOM 31 12/06/2022 04:35 AM    GLUCOSE 143 12/06/2022 04:35 AM      levETIRAcetam  500 mg Oral BID    lacosamide  200 mg Oral BID    ceFAZolin  1,000 mg IntraVENous Q8H    oxymetazoline  2 spray Each Nostril Once    lidocaine   Topical Once    insulin glargine  15 Units SubCUTAneous Daily    insulin lispro  0-4 Units SubCUTAneous Q4H    sennosides-docusate sodium  2 tablet Oral Daily    NIFEdipine  60 mg Oral Daily    divalproex  500 mg Oral 2 times per day    polyethylene glycol  17 g Oral Daily    guaiFENesin  400 mg Oral TID    sodium chloride flush  5-40 mL IntraVENous 2 times per day    lidocaine  1 patch TransDERmal Daily    allopurinol  300 mg Oral Once per day on Mon Wed Fri    gabapentin  100 mg Oral BID    tamsulosin  0.4 mg Oral Nightly    Arformoterol Tartrate  15 mcg Nebulization BID    lisinopril  20 mg Oral Daily    And    hydroCHLOROthiazide  12.5 mg Oral Daily     Opens eyes to voice followed simple commands drain functioning pupils equal round reactive moves all fours equally  Assessment:  Patient Active Problem

## 2022-12-06 NOTE — PROGRESS NOTES
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 5XAA       SUBJECTIVE:  Rafi Mae is Somewhat more alert and readily recognize me and greeted me. No voice complaints but remains not well oriented to time place or person. REVIEW OF SYSTEMS:  Presently unable to accurately obtain due to  patient's profound neurologic state.       SCHEDULED MEDICATIONS:   levETIRAcetam  500 mg Oral BID    lacosamide  200 mg Oral BID    sucralfate  1 g Oral 4 times per day    ceFAZolin  1,000 mg IntraVENous Q8H    oxymetazoline  2 spray Each Nostril Once    lidocaine   Topical Once    insulin glargine  15 Units SubCUTAneous Daily    insulin lispro  0-4 Units SubCUTAneous Q4H    sennosides-docusate sodium  2 tablet Oral Daily    NIFEdipine  60 mg Oral Daily    divalproex  500 mg Oral 2 times per day    polyethylene glycol  17 g Oral Daily    guaiFENesin  400 mg Oral TID    sodium chloride flush  5-40 mL IntraVENous 2 times per day    lidocaine  1 patch TransDERmal Daily    allopurinol  300 mg Oral Once per day on Mon Wed Fri    gabapentin  100 mg Oral BID    tamsulosin  0.4 mg Oral Nightly    Arformoterol Tartrate  15 mcg Nebulization BID    lisinopril  20 mg Oral Daily    And    hydroCHLOROthiazide  12.5 mg Oral Daily       VITAL SIGNS: /71   Pulse 70   Temp 98.4 °F (36.9 °C) (Oral)   Resp 15   Ht 5' 9\" (1.753 m)   Wt 182 lb 15.7 oz (83 kg)   SpO2 98%   BMI 27.02 kg/m²   Patient Vitals for the past 96 hrs (Last 3 readings):   Weight   12/05/22 0442 182 lb 15.7 oz (83 kg)   12/02/22 0510 179 lb 3.7 oz (81.3 kg)     OBJECTIVE:    HEENT: PERRL, EOM  Intact; sclera non-icteric, conjunctiva pink. Carotids are brisk in upstroke with normal contour. No carotid bruits. Normal jugular venous pulsation at 45°. No palpable cervical nor supraclavicular nodes. Thyroid not palpable. Trachea midline. Chest: Even excursion  Lungs: grossly clear to auscultation bilaterally no expiratory wheezes or rhonchi, no decreased tactile fremitus without inspiratory rales. Heart: Irregular, regular  rhythm; S1 > S2, no gallop grade 2/6 systolic murmur second right intercostal space no clicks, rub, palpable thrills   or heaves. PMI nondisplaced, 5th intercostal space MCL. Abdomen: Soft, nontender, nondistended,  mildly protuberant, no masses or organomegaly. Bowel sounds active. Extremities: Without clubbing, cyanosis or edema. Pulses present 3+ upper extermities bilaterally; present 1+ DP  bilaterally and present 1+ PT bilaterally.      Data:   Scheduled Meds: Reviewed  Continuous Infusions:    sodium chloride      dextrose         Intake/Output Summary (Last 24 hours) at 12/5/2022 2300  Last data filed at 12/5/2022 2200  Gross per 24 hour   Intake 1613.94 ml   Output 1255 ml   Net 358.94 ml     CBC:   Recent Labs     12/03/22  0448 12/04/22  0422 12/04/22  1205 12/05/22  0450   WBC 9.1 13.5*  --  12.3*   HGB 9.9* 7.1* 10.6* 10.6*   HCT 29.7* 21.6* 32.2* 31.9*    380  --  358     BMP:  Recent Labs     12/03/22 0448 12/04/22  0422 12/05/22  0450    135 139   K 4.1 4.3 4.9    100 104   CO2 21* 19* 17*   BUN 48* 50* 54*   CREATININE 2.0* 2.1* 2.1* LABGLOM 31 30 30     ABGs: No results found for: PH, PO2, PCO2  INR:   Recent Labs     12/03/22  0448 12/04/22  0422 12/05/22  0450   INR 1.2 1.2 1.2     PRO-BNP:   Lab Results   Component Value Date    PROBNP 8,347 (H) 08/02/2022      TSH:   Lab Results   Component Value Date    TSH 3.970 11/24/2022      Cardiac Injury Profile:   No results for input(s): TROPHS in the last 72 hours. Lipid Profile: No results found for: TRIG, HDL, LDLCALC, CHOL   Hemoglobin A1C: No components found for: HGBA1C      RAD:   CT HEAD WO CONTRAST   Final Result   Interval increased volume and extent of diffuse left cerebral acute subdural   hemorrhage with greatest thickness adjacent to the frontotemporal region now   measuring up to 22 mm in greatest axial thickness. Interval worsening of rightward midline shift at the level of the septum   pellucidum now measuring 8 mm. Mild cerebral white matter chronic microvascular ischemic disease. Mild diffuse cerebral atrophy. Multiple attempts were made by the [facility] core team to page and notify Dr. Ron Baxter. Critical results were then called by Dr. Benito Yoder to RN Romi Melgar on 11/26/2022 at 22:23. Maggie Man reports that neurosurgery has been   consulted about the case. XR CHEST PORTABLE   Final Result   Mild congestive changes. XR CHEST PORTABLE   Final Result   No evidence of pneumothorax or parenchymal contusion. AICD visualized in the   left chest with leads in the heart.          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 5XAA       RECOMMENDATIONS:   Mr. Jen Randle seems to have modestly improved since undergoing bur hole insertion. Interestingly systolic blood pressure now well controlled. A  I have spent more than 30 critical care minutes face to face with Shira Peterson  and reviewing notes and laboratory data, with greater than 50% of this time reviewing all data and discussing with Dr. Bang Colin continue to carefully monitor renal function and blood pressure as well as rhythm and would consider removal of pacemaker protective patch. Alessandra Keene, DO FACP,FACC,FSCAI      NOTE:  This report was transcribed using voice recognition software.   Every effort was made to ensure accuracy; however, inadvertent computerized transcription errors may be present

## 2022-12-06 NOTE — PROGRESS NOTES
[facility]  SURGICAL INTENSIVE CARE UNIT (SICU)  ATTENDING PHYSICIAN CRITICAL CARE PROGRESS NOTE     I have examined the patient, reviewed the record,and discussed the case with the APN/  Resident. I have reviewed all relevant labs and imaging data. The following summarizes my clinical findings and independent assessment. Date of admission:  11/24/2022    CC: Patient waking up from anesthesia. Moves LUE spontaneously. Inappropriate speech. HOSPITAL COURSE:   Ramona Spence is a 80year old male who presents to the SICU s/p craniotomy for SDH. Patient initially presented to the hospital on 11/24 for chest pain. At that time, he had a syncopal event and hit his head. Initial CT head was read as negative. He had a pacemaker placed on 11/25 for sick sinus syndrome. Follow-up CT head showed large acute SDH with shift. There were reportedly no neurologic deficits at that time. Over the past two days, patient became more lethargic. 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 He was started on Keppra and Vimpat. Patient was on Eliquis which was reversed. 12/5 went yesterday for bur hole for his increasing subdural hematoma with worsening neurological exam.  Repeat head CT done this morning lessening of midline shift still with left subdural hematoma with a drain in place  12/6 No issues overnight     New Imaging Reviewed and personally interpreted:    CT head left subdural hematoma with subdural drain in place    New Labs reviewed sodium 139, K 4.0, Cr 2.0, LFTs normal , WBC 12.3, Hb 10.5     Physical Exam  HENT:      Head: Normocephalic. Comments: Subdural drain in place  Eyes:      Extraocular Movements: Extraocular movements intact. Pupils: Pupils are equal, round, and reactive to light. Cardiovascular:      Rate and Rhythm: Normal rate.    Pulmonary:      Effort: Pulmonary effort is normal.   Abdominal: General: Abdomen is flat. There is no distension. Tenderness: There is no abdominal tenderness. Musculoskeletal:         General: Normal range of motion. Cervical back: Neck supple. Comments: 5 out of 5 strength upper and lower extremities   Skin:     General: Skin is warm. Neurological:      General: No focal deficit present. Mental Status: He is alert. Psychiatric:         Mood and Affect: Mood normal.       Assessment   Principal Problem:    A-fib ([address]  Active Problems:    Atrial fibrillation (HCC)    Third degree heart block (HCC)    High-grade atrioventricular block    Seizure (HCC)    Subdural hematoma  Resolved Problems:    * No resolved hospital problems.  *      Plan   GI:  Reg diet  , Senakot  glycolax  Neuro:  Depakote sprinkles , Vimpat, Keppra,  ( Discuss with Neurology) Neurontin prn Tylenol   Neurosurgery following for SDH  , Maintain Normonatremia >140, and All IV infusions and IVPB should be in 0.9% NaCl if available  , CT head improving s/p Bur hole   Renal: Hep lock IV, Flomax, monitor Urine Output, Daily CBC,BMP, Mg,Phos, ionized Ca  Musculoskeletal: WBAT all extremities , Spines Clear AM-PAC Score pending  Allopurinol   Pulmonary: Aggressive pulmonary hygiene , brovana , guaifenesin , duonebs, afrin , Monitor RR and Maintain SpO2 > 92%  ID:  Ancef while drains in     Monitor leukocytosis and Monitor Fever Curve  Heme: No indication for Transfusion ,   Monitor Hb   Cardiac: Monitor Hemodynamics  lisinopril, hydrochlorothiazide, nifedipine hold Eliquis  Endocrine: Maintain glucose <180 hold Januvia, Amaryl, Prandin 15U lantus SSI     DVT Prophylaxis: PCDs, Hold Chemoprophylaxis until  SD drain removed   Ulcer Prophylaxis:  PPI home medication , stop Carafate at home   Tubes and Lines: PIV   Seizure proph:     Keppra/Vimpat and depakote   Ancillary consults:   Neurosurgery, Palliative Care, and PT/OT  , Neurology , IM , EP   Family Update:         As available   CODE Status:       DNR-Limited      Dispo: SICU    Beckie Smith MD    Critical Care: 35 minutes evaluating and managing patient with Risk of neurological decompensation,, requiring frequent and emergent imaging, lab studies, intensive monitoring, data review, and adjusting the clinical plan as well as urgent coordination with multiple specialists. , and At risk for further deterioration  time exclusive of teaching and procedures

## 2022-12-07 PROBLEM — Z95.0 HISTORY OF CARDIAC PACEMAKER IN SITU: Status: ACTIVE | Noted: 2022-01-01

## 2022-12-07 NOTE — PROGRESS NOTES
SPEECH/LANGUAGE PATHOLOGY  CLINICAL ASSESSMENT OF SWALLOWING FUNCTION   and PLAN OF CARE    PATIENT NAME:  Shira Peterson  (male)     MRN:  [id number]    :  10/27/1933  (80 y.o.)  STATUS:  Inpatient: Room 3805/3805-A    TODAY'S DATE:  22    SLP eval and treat  Start:  22,   End:  22,   ONE TIME,   Standing Count:  1 Occurrences,   Shaina Saenz MD   REASON FOR REFERRAL: assess oropharyngeal swallow function    EVALUATING THERAPIST: ANSELMO Simpson                 RESULTS:    DYSPHAGIA DIAGNOSIS:   Clinical indicators of questionable pharyngeal phase dysphagia       DIET RECOMMENDATIONS:  NPO with ongoing PO analysis by SLP only to determine if PO diet can be initiated        Pt for stat HCT. Will determine if NGT is warranted following that     FEEDING RECOMMENDATIONS:     Assistance level:  Not applicable      Compensatory strategies recommended: Not applicable      Discussed recommendations with nursing and/or faxed report to referring provider: Yes    SPEECH THERAPY  PLAN OF CARE   The dysphagia POC is established based on physician order, dysphagia diagnosis and results of clinical assessment     Skilled SLP intervention for dysphagia management up to 5x per week until goals met, pt plateaus in function and/or discharged from hospital    Conditions Requiring Skilled Therapeutic Intervention for dysphagia:    Patient is performing below functional baseline d/t  current acute condition, Multiple diagnoses, multiple medications, and increased dependency upon caregivers.     Specific dysphagia interventions to include:     Trials of upgraded diet/liquid     Specific instructions for next treatment:  ongoing skilled PO analysis to determine if PO diet can be initiated   Patient Treatment Goals:    Short Term Goals:  Pt will participate in ongoing evaluation of swallow function to determine when PO diet can be safely initiated  Pt will participate in MBSS to fully assess oropharyngeal swallow function and to assist in determining the least restrictive PO diet to maintain adequate nutrition/hydration     Long Term Goals:   Pt will improve oropharyngeal swallow function to ensure airway protection during PO intake to maintain adequate nutrition/hydration and decrease signs/symptoms of aspiration to less than 1 x/day. Patient/family Goal:    Did not state. Will further assess during treatment. Plan of care discussed with Patient   The Patient did not demonstrate complete understanding of the diagnosis, prognosis and plan of care     Rehabilitation Potential/Prognosis: fair                    ADMITTING DIAGNOSIS: A-fib ([facility] [address] [I48.91]  Atrial fibrillation ([facility] [address] [I48.91]    VISIT DIAGNOSIS:      PATIENT REPORT/COMPLAINT: Pt reported difficulty swallowing to SLP when asked   RN cleared patient for participation in assessment     yes     PRIOR LEVEL OF SWALLOW FUNCTION:    PAST HISTORY OF DYSPHAGIA?: none reported    Home diet: Regular consistency solids (IDDSI level 7) with  thin liquids (IDDSI level 0)  Current Diet Order:  Diet NPO Exceptions are: Sips of Water with Meds    PROCEDURE:  Consistencies Administered During the Evaluation   Liquids: thin liquid   Solids:  pureed foods      Method of Intake:   spoon  Fed by clinician      Position:   Seated, upright    CLINICAL ASSESSMENT:  Oral Stage:       Lingual pumping present  Decreased bolus formation resulting in suspected premature pharyngeal spillage    Possible Piecemeal deglutition     Pharyngeal Stage:    Wet respirations were noted after presentation of all consistencies administered  Multiple swallows were noted after presentation of all consistencies administered  No other signs of aspiration were noted during this evaluation however, silent aspiration cannot be ruled out at bedside.   If silent aspiration is suspected, a Videofluoroscopic Study of Swallowing (MBS) is recommended and requires a physician order. Cognition:   Language impaired ( did present with increased/ more verbal variety today)     Oral Peripheral Examination   Generalized oral weakness    Current Respiratory Status    room air     Parameters of Speech Production  Respiration:  Adequate for speech production  Quality:   Hoarse  Intensity: Quiet    Volitional Swallow: not able to elicit     Volitional Cough:   not able to elicit     Pain: No pain reported. EDUCATION:   The Speech Language Pathologist (SLP) completed education regarding results of evaluation and that intervention is warranted at this time. Learner: Patient  Education: Reviewed results and recommendations of this evaluation  Evaluation of Education:  Needs further instruction    This plan may be re-evaluated and revised as warranted. Evaluation Time includes thorough review of current medical information, gathering information on past medical history/social history and prior level of function, completion of standardized testing/informal observation of tasks, assessment of data and education on plan of care and goals. [x]The admitting diagnosis and active problem list, have been reviewed prior to initiation of this evaluation.         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

## 2022-12-07 NOTE — PROGRESS NOTES
Device Interrogation ( 12/07/2022 )  Make/Model Medtronic Abril   Mode DDDR 70/120  P wave: 0.6 mV  Impedance: 608 ohms   Threshold: AF   RV R wave: 10.4 mV  Impedance: 532 ohms   Threshold: 0.75 V @ 0.4 ms  Pacing: A: 0.1%  RV: 77%   Battery Voltage/Longevity:  12.6 years     Arrhythmias: 100% AF no VT noted. Reprogramming included none   Overall device function is normal  All device programmable settings were evaluated per above and in the scanned document, along with iterative adjustments (capture thresholds) to assess and select the most appropriate final programming to provide for consistent delivery of the appropriate therapy and to verify function of the device.      Electronically signed by KELLY Natarajan CNP on 12/7/2022 at 2:50 PM

## 2022-12-07 NOTE — PROGRESS NOTES
Neurosurg progress note  VITALS:  BP (!) 157/85   Pulse 84   Temp 99 °F (37.2 °C) (Bladder)   Resp 18   Ht 5' 9\" (1.753 m)   Wt 180 lb 8 oz (81.9 kg)   SpO2 97%   BMI 26.66 kg/m²   24HR INTAKE/OUTPUT:    Intake/Output Summary (Last 24 hours) at 12/7/2022 1312  Last data filed at 12/7/2022 0900  Gross per 24 hour   Intake 260 ml   Output 2545 ml   Net -2285 ml     CT Head W/O Contrast    Result Date: 11/24/2022  EXAMINATION: CT OF THE HEAD WITHOUT CONTRAST  11/24/2022 9:38 am TECHNIQUE: CT of the head was performed without the administration of intravenous contrast. Automated exposure control, iterative reconstruction, and/or weight based adjustment of the mA/kV was utilized to reduce the radiation dose to as low as reasonably achievable. COMPARISON: None. HISTORY: ORDERING SYSTEM PROVIDED HISTORY: LOC + fall + strike to head; concern for bleed TECHNOLOGIST PROVIDED HISTORY: Reason for exam:->LOC + fall + strike to head; concern for bleed Has a \"code stroke\" or \"stroke alert\" been called? ->No Decision Support Exception - unselect if not a suspected or confirmed emergency medical condition->Emergency Medical Condition (MA) FINDINGS: BRAIN/VENTRICLES: No evidence of parenchymal hemorrhages or contusions. No evidence of intra or extra-axial fluid collection is seen. Scattered areas of low attenuation are visualized in the periventricular and subcortical white matter suggestive of chronic microvascular disease. No evidence of acute territorial infarct is seen. Prominence of the ventricles and sulci is visualized suggestive of chronic atrophic brain changes. No evidence of intracranial mass or mass effect, no evidence of midline shift is seen. No evidence of sellar or parasellar mass is visualized. ORBITS: The visualized portion of the orbits demonstrate no acute abnormality. SINUSES: The visualized paranasal sinuses and mastoid air cells demonstrate no acute abnormality.  SOFT TISSUES/SKULL:  No acute abnormality of the visualized skull or soft tissues. No acute intracranial abnormality. CT CSpine W/O Contrast    Result Date: 11/24/2022  EXAMINATION: CT OF THE CERVICAL SPINE WITHOUT CONTRAST 11/24/2022 9:38 am TECHNIQUE: CT of the cervical spine was performed without the administration of intravenous contrast. Multiplanar reformatted images are provided for review. Automated exposure control, iterative reconstruction, and/or weight based adjustment of the mA/kV was utilized to reduce the radiation dose to as low as reasonably achievable. COMPARISON: None. HISTORY: ORDERING SYSTEM PROVIDED HISTORY: LOC + Fall + head strike; Concern for fx TECHNOLOGIST PROVIDED HISTORY: Reason for exam:->LOC + Fall + head strike; Concern for fx Decision Support Exception - unselect if not a suspected or confirmed emergency medical condition->Emergency Medical Condition (MA) FINDINGS: Straightening of the lumbar the columns of the cervical spine is present. No evidence of spondylolisthesis is seen. Multilevel degenerative endplate changes visualized, no evidence of compression deformity of the cervical vertebral bodies. No evidence of fracture or traumatic subluxation is seen. Decreased intervertebral disc height visualized most prominent at C5-C6 and C3-C4. Multilevel degenerative disc osteophyte complex, uncovertebral disease and hypertrophic changes in the facet joints is seen. Calcification visualized within the spinal canal superimposed over the falciform ligament at the level of the C5 vertebral body. Otherwise abnormal density visualized in the cervical spinal canal. The neck soft tissues are unremarkable. Circumferential opacification visualized in the sphenoid sinus. Limited evaluation of the upper lung fields is unremarkable bilaterally. No acute abnormality of the cervical spine. Multilevel degenerative changes of the cervical spine visualized most prominent at C3-C4 and C5-C6.  Circumferential opacification visualized in the sphenoid sinus. XR CHEST PORTABLE    Result Date: 11/25/2022  EXAMINATION: ONE XRAY VIEW OF THE CHEST 11/25/2022 4:57 pm COMPARISON: 11/24/2022. HISTORY: ORDERING SYSTEM PROVIDED HISTORY: Pacemaker placement and rule out pneumothorax TECHNOLOGIST PROVIDED HISTORY: Reason for exam:->Pacemaker placement and rule out pneumothorax What reading provider will be dictating this exam?->CRC FINDINGS: AICD visualized in the left chest with lead in the heart. EKG leads are seen superimposed over the chest. Poor inspiratory effort is seen. The CT of the prominence of the cardiomediastinal silhouette is visualized demonstrates no significant increase in comparison to the prior study. Atherosclerotic calcifications visualized in the area the aortic arch. Mild prominence of the bronchovascular and interstitial lung markings is visualized in bilateral lung fields more prominent in the lateral right mid lung field large and in lung field linear subsegmental atelectatic streaks of visualized bilateral lung fields. No evidence of focal infiltrate or consolidation. No evidence of pneumothorax or pleural effusion. Degenerative bone changes seen. No evidence of pneumothorax or parenchymal contusion. AICD visualized in the left chest with leads in the heart. XR CHEST PORTABLE    Result Date: 11/24/2022  EXAMINATION: ONE XRAY VIEW OF THE CHEST 11/24/2022 9:30 am COMPARISON: 08/08/2022 HISTORY: ORDERING SYSTEM PROVIDED HISTORY: CP + SOB TECHNOLOGIST PROVIDED HISTORY: Reason for exam:->CP + SOB FINDINGS: Poor inspiratory effort. The cardiomediastinal silhouette is slightly prominent but demonstrates no significant change in comparison to the prior study. Prominence of the bronchovascular interstitial lung markings is visualized bilaterally with scattered areas of patchy airspace opacification seen. Linea subsegmental atelectatic streaks are visualized.   The costophrenic angles are clear with no evidence of pleural effusion seen. No evidence of pneumothorax or parenchymal lung mass. The osseous structures are without acute process. Congestive changes demonstrate no change.      CBC:   Lab Results   Component Value Date/Time    WBC 10.9 12/07/2022 04:20 AM    RBC 4.01 12/07/2022 04:20 AM    HGB 11.0 12/07/2022 04:20 AM    HCT 33.8 12/07/2022 04:20 AM    MCV 84.3 12/07/2022 04:20 AM    MCH 27.4 12/07/2022 04:20 AM    MCHC 32.5 12/07/2022 04:20 AM    RDW 13.8 12/07/2022 04:20 AM     12/07/2022 04:20 AM    MPV 9.1 12/07/2022 04:20 AM     BMP:    Lab Results   Component Value Date/Time     12/07/2022 04:20 AM    K 4.0 12/07/2022 04:20 AM    K 4.5 11/24/2022 09:08 AM     12/07/2022 04:20 AM    CO2 20 12/07/2022 04:20 AM    BUN 60 12/07/2022 04:20 AM    LABALBU 3.1 12/07/2022 04:20 AM    CREATININE 2.0 12/07/2022 04:20 AM    CALCIUM 9.3 12/07/2022 04:20 AM    GFRAA 30 09/16/2022 08:24 AM    LABGLOM 31 12/07/2022 04:20 AM    GLUCOSE 150 12/07/2022 04:20 AM      ceFAZolin  1,000 mg IntraVENous Q12H    bisacodyl  10 mg Rectal Daily    levETIRAcetam  500 mg Oral BID    lacosamide  200 mg Oral BID    oxymetazoline  2 spray Each Nostril Once    lidocaine   Topical Once    insulin glargine  15 Units SubCUTAneous Daily    insulin lispro  0-4 Units SubCUTAneous Q4H    sennosides-docusate sodium  2 tablet Oral Daily    NIFEdipine  60 mg Oral Daily    divalproex  500 mg Oral 2 times per day    polyethylene glycol  17 g Oral Daily    guaiFENesin  400 mg Oral TID    sodium chloride flush  5-40 mL IntraVENous 2 times per day    lidocaine  1 patch TransDERmal Daily    allopurinol  300 mg Oral Once per day on Mon Wed Fri    gabapentin  100 mg Oral BID    tamsulosin  0.4 mg Oral Nightly    Arformoterol Tartrate  15 mcg Nebulization BID    lisinopril  20 mg Oral Daily    And    hydroCHLOROthiazide  12.5 mg Oral Daily     Opens eyes follows simple commands less alert today repeat head CT results reviewed drain functioning  Assessment:  Patient Active Problem List   Diagnosis    PUD (peptic ulcer disease)    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

## 2022-12-07 NOTE — PROGRESS NOTES
Hospitalist Progress Note      SYNOPSIS: Patient admitted on 11/24/2022 for A-fib [facility])  80 y.o. male with past medical history of atrial fibrillation, DM hypertension . Patient is a transfer from [employer] [facility] . He was seen there due to chest pain . He states that chest pain is located in the sternal area . He states  that he has shortness of breath with chest pain . He states that when chest pain occurred he felt like he was going to pass out . Patient had a syncopal episode and hit his head . Patient had a second syncopal episode  and after that family called ambulance . En route to ED patient was given 324 mg of ASA . In the ED patient was in Afib with slow ventricular rate having long pauses and bradycardic to the 30s . Lab data revealed sodium 135 potassium 4.1 BUN 29 Scr 2.0  glucose 244 Trop 45 >>43  WBC 9.9 HGB 10.7   Resp panel neg CT head cervical spine neg CXR congestive changes . Patient received fentanyl morphine and was placed on isoproterenol infusion. EP consulted-pacemaker placement. Lyme serology was ordered for possible carditis. Patient had an initial CT of the head after the fall which was negative for any intracranial hemorrhage. Repeat CT head was obtained on 11/26/2022 as patient continued to complain of headache and spite of pain medicine. Repeat CT of the head noncontrast was obtained which showed progressive subdural hemorrhage. Eliquis was held, [facility] ordered for Eliquis reversal.  Neurosurgery following. Worsening mental status with aphasia. Found to have two seizures. Started on Keppra and Vimpat. Neurology following. He has been on continuous video EEG. Seizure medications has been adjusted. Last seizure episode 2350 lasted approximately 3 minutes prior to giving valproate and bolus which was completed at 02 100. No further seizures after that. Appreciate neurology's inputs. Continue Vimpat, Keppra and Depacon.  He is status post left frontal cira hole for placement of subdural drain and drainage of some acute subdural hematoma 2022. continue on Ancef for drain. Drain replaced 22, monitor drain output    SUBJECTIVE:  Patient seen and examined, nods head, unable to make a conversation. Follows simple commands-able to make fist, squeeze hand, wiggle toes  Records reviewed. Stable overnight. No other overnight issues reported. Temp (24hrs), Av.7 °F (36.5 °C), Min:97.3 °F (36.3 °C), Max:98.2 °F (36.8 °C)    DIET: Diet NPO Exceptions are: Sips of Water with Meds  CODE: Limited    Intake/Output Summary (Last 24 hours) at 2022 09  Last data filed at 2022 0900  Gross per 24 hour   Intake 860 ml   Output 2545 ml   Net -1685 ml       OBJECTIVE:    BP (!) 155/77   Pulse 83   Temp 98 °F (36.7 °C) (Bladder)   Resp 20   Ht 5' 9\" (1.753 m)   Wt 180 lb 8 oz (81.9 kg)   SpO2 97%   BMI 26.66 kg/m²     General appearance: No apparent distress, appears stated age and cooperative. HEENT:  Conjunctivae/corneas clear. Left frontal scalp drain with surgical dressing in place. Neck: Supple. No jugular venous distention. Respiratory: Clear to auscultation bilaterally, normal respiratory effort  Cardiovascular: Regular rate rhythm, normal S1-S2  Abdomen: Soft, nontender, nondistended  Musculoskeletal: No clubbing, cyanosis, no bilateral lower extremity edema. Brisk capillary refill.    Skin:  No rashes  on visible skin  Neurologic: awake, alert and follows simple commands, expressive aphasia, decreased strength    ASSESSMENT:  -Third-degree heart block status post pacemaker placement 2022   -Syncope and collapse secondary to third-degree heart block also sick sinus syndrome  -Subdural hematoma  -Status post left frontal cira hole for placement of subdural drain and drainage of some acute subdural hematoma 2022  -Atrial fibrillation Eliquis, Eliquis held due to subdural hematoma   -Seizure disorder   -Hypertension  -CKD stage III  -Diabetes mellitus type 2       PLAN:  - Status post left frontal cira hole for placement of subdural drain and drainage of some acute subdural hematoma 12/4/2022. continue on Ancef for drain. Drain replaced 12/6/22, monitor drain output  - Continue seizure medications per neurology. Vimpat, Depakote and Keppra. - Neurosurgery following appreciate their input.   - Appreciate ICU team input.  -Status post pacemaker [third-degree heart block A. fib with RVR]    DISPOSITION:     Medications:  REVIEWED DAILY    Infusion Medications    sodium chloride      dextrose       Scheduled Medications    levETIRAcetam  500 mg Oral BID    lacosamide  200 mg Oral BID    ceFAZolin  1,000 mg IntraVENous Q8H    oxymetazoline  2 spray Each Nostril Once    lidocaine   Topical Once    insulin glargine  15 Units SubCUTAneous Daily    insulin lispro  0-4 Units SubCUTAneous Q4H    sennosides-docusate sodium  2 tablet Oral Daily    NIFEdipine  60 mg Oral Daily    divalproex  500 mg Oral 2 times per day    polyethylene glycol  17 g Oral Daily    guaiFENesin  400 mg Oral TID    sodium chloride flush  5-40 mL IntraVENous 2 times per day    lidocaine  1 patch TransDERmal Daily    allopurinol  300 mg Oral Once per day on Mon Wed Fri    gabapentin  100 mg Oral BID    tamsulosin  0.4 mg Oral Nightly    Arformoterol Tartrate  15 mcg Nebulization BID    lisinopril  20 mg Oral Daily    And    hydroCHLOROthiazide  12.5 mg Oral Daily     PRN Meds: hydrALAZINE, labetalol, ipratropium-albuterol, perflutren lipid microspheres, trimethobenzamide, sodium chloride flush, sodium chloride, glucose, dextrose bolus **OR** dextrose bolus, glucagon (rDNA), dextrose, potassium chloride **OR** potassium alternative oral replacement **OR** potassium chloride, acetaminophen **OR** acetaminophen, aluminum & magnesium hydroxide-simethicone    Labs:     Recent Labs     12/05/22  0450 12/06/22  0435 12/07/22  0420   WBC 12.3* 12.3* 10.9   HGB 10.6* 10.5* 11.0*   HCT 31.9* 31.7* 33.8*   PLT 358 416 500*       Recent Labs     12/05/22 0450 12/06/22 0435 12/07/22 0420    139 139   K 4.9 4.0 4.0    105 105   CO2 17* 20* 20*   BUN 54* 58* 60*   CREATININE 2.1* 2.0* 2.0*   CALCIUM 9.1 9.3 9.3   PHOS 5.2* 3.6 3.7       Recent Labs     12/05/22 0450 12/06/22 0435 12/07/22 0420   PROT 6.3* 6.4 6.4   ALKPHOS 91 91 97   ALT 13 6 5   AST 25 16 15   BILITOT 0.4 <0.2 <0.2       Recent Labs     12/05/22 0450 12/06/22 0530 12/07/22 0420   INR 1.2 1.2 1.2       No results for input(s): [employer] in the last 72 hours. Chronic labs:    Lab Results   Component Value Date    TSH 3.970 11/24/2022    PSA 3.76 09/16/2022    INR 1.2 12/07/2022    LABA1C 8.5 (H) 08/02/2022       Radiology: REVIEWED DAILY    +++++++++++++++++++++++++++++++++++++++++++++++++  Abbie Bryant MD  [facility] Physician - Hospitalist  [address] [place] [address]  +++++++++++++++++++++++++++++++++++++++++++++++++  NOTE: This report was transcribed using voice recognition software. Every effort was made to ensure accuracy; however, inadvertent computerized transcription errors may be present.

## 2022-12-07 NOTE — PROGRESS NOTES
Messaged EP about patients pacer spiking when HR is still in 80s. Ringing as V-tach. Suggested possible need to integrate pacer.

## 2022-12-07 NOTE — PROCEDURES
EEG Report  Aurelio Ochoa is a 80 y.o. male      Appointment Date 12/7/2022 Appointment Time  [address] EEG Number [id number]   Type of Study 2 hour portable EEG Floor 3805     Technical Specifications  Technician Merlin Carrasco of consciousness Awake, sleep   Sleep deprived? ? Hyperventilation tested? no   Photic stim tested? no   EEG recording Standard 10-20 electrode placement    Duration of recording 2 hours+   EEG complete? yes       Clinical History    Aurelio Ochoa is a 80 y.o. male with history of atrial fibrillation, DM, and HTN admitted on 11/24/2022 with onset of chest pain and shortness of breath. Reporting feeling lightheaded with near syncope associated with this chest pain. He subsequently passed out, but recovered without event. He had a second syncopal event at home where he apparently hit his head. Patient was given aspirin in route to the hospital and upon arrival to the ED he was noted to be in A. fib with fall pauses and bradycardia. Scan of the head done on admission did not show any acute intracranial abnormalities. With headache on admission and continued headache prompted repeat CT scan of the head on 11/26/2022 showing progressive subdural hematoma on the left side. She had been on Eliquis at the time which was held and reversed with Kcentra. Patient developed progressive altered mentation with decreased responsiveness and aphasia. Neurosurgery was consulted for management with no postsurgical intervention performed. Neurology was also consulted with EEG done 11/28/2022 showing left frontocentrotemporal electrographic seizures with associated moderate to severe nonspecific encephalopathy. It placed on continuous EEG monitoring ended on process 12/1/22 with note of continued encephalopathy but improved to mild to moderate severity.   3-minute seizure reported on 11/30/2022 along with a 2-minute seizure on 11/29/2022 with Depakote added to your medication on 11/29/2022. Patient has already been on Keppra 500 mg twice daily for max renal dosing along with Vimpat 200 mg twice daily at time of Depakote addition. No further seizure medication changes since addition of Depakote with 4 days of increasing alertness and responsiveness.     Medications    Current Facility-Administered Medications:     ceFAZolin (ANCEF) 1,000 mg in sterile water 10 mL IV syringe, 1,000 mg, IntraVENous, Q12H, Henry Lewis MD    bisacodyl (DULCOLAX) suppository 10 mg, 10 mg, Rectal, Daily, Jourdan Bessy, DO    levETIRAcetam (KEPPRA) tablet 500 mg, 500 mg, Oral, BID, Jourdan Bessy, DO, 500 mg at 12/06/22 2046    lacosamide (VIMPAT) tablet 200 mg, 200 mg, Oral, BID, Jourdan Bessy, DO, 200 mg at 12/06/22 2045    oxymetazoline (AFRIN) 0.05 % nasal spray 2 spray, 2 spray, Each Nostril, Once, Jade Snell MD    lidocaine (XYLOCAINE) 2 % uro-jet, , Topical, Once, Jade Snell MD    hydrALAZINE (APRESOLINE) injection 10 mg, 10 mg, IntraVENous, Q30 Min PRN, Jade Snell MD, 10 mg at 12/06/22 1710    labetalol (NORMODYNE;TRANDATE) injection 10 mg, 10 mg, IntraVENous, Q1H PRN, Jade Snell MD, 10 mg at 12/06/22 0521    insulin glargine-yfgn (SEMGLEE-YFGN) injection vial 15 Units, 15 Units, SubCUTAneous, Daily, Jessee Dunlap MD, 15 Units at 12/07/22 1014    insulin lispro (HUMALOG) injection vial 0-4 Units, 0-4 Units, SubCUTAneous, Q4H, Jessee Dunlap MD, 1 Units at 12/06/22 1246    sennosides-docusate sodium (SENOKOT-S) 8.6-50 MG tablet 2 tablet, 2 tablet, Oral, Daily, Jessee Dunlap MD, 2 tablet at 12/06/22 0915    NIFEdipine (PROCARDIA XL) extended release tablet 60 mg, 60 mg, Oral, Daily, Jessee Dunlap MD, 60 mg at 12/06/22 5977    [DISCONTINUED] valproate (DEPACON) 500 mg in dextrose 5 % 100 mL IVPB, 500 mg, IntraVENous, 2 times per day, Stopped at 12/02/22 1213 **OR** divalproex (DEPAKOTE SPRINKLE) capsule 500 mg, 500 mg, Oral, 2 times per day, Jayla Elliott MD, 500 mg at 12/06/22 2045    polyethylene glycol (GLYCOLAX) packet 17 g, 17 g, Oral, Daily, Jayla Elliott MD, 17 g at 12/06/22 0914    guaiFENesin tablet 400 mg, 400 mg, Oral, TID, Jayla Elliott MD, 400 mg at 12/06/22 2046    ipratropium-albuterol (DUONEB) nebulizer solution 1 ampule, 1 ampule, Inhalation, Q4H PRN, Jayla Elliott MD, 1 ampule at 12/05/22 1516    perflutren lipid microspheres (DEFINITY) injection 1.5 mL, 1.5 mL, IntraVENous, ONCE PRN, Jayla Elliott MD    trimethobenzamide Farhad Kohler) injection 200 mg, 200 mg, IntraMUSCular, Q6H PRN, Jayla Elliott MD    sodium chloride flush 0.9 % injection 5-40 mL, 5-40 mL, IntraVENous, 2 times per day, Jayla Elliott MD, 10 mL at 12/06/22 2045    sodium chloride flush 0.9 % injection 5-40 mL, 5-40 mL, IntraVENous, PRN, Jayla Elliott MD, 10 mL at 11/26/22 2335    0.9 % sodium chloride infusion, , IntraVENous, PRN, Jayla Elliott MD    lidocaine 4 % external patch 1 patch, 1 patch, TransDERmal, Daily, Jayla Elliott MD, 1 patch at 12/06/22 0916    glucose chewable tablet 16 g, 4 tablet, Oral, PRN, Jayla Elliott MD    dextrose bolus 10% 125 mL, 125 mL, IntraVENous, PRN **OR** dextrose bolus 10% 250 mL, 250 mL, IntraVENous, PRN, Jayla Elliott MD    glucagon (rDNA) injection 1 mg, 1 mg, SubCUTAneous, PRN, Jayla Elliott MD    dextrose 10 % infusion, , IntraVENous, Continuous PRN, Jayla Elliott MD    allopurinol (ZYLOPRIM) tablet 300 mg, 300 mg, Oral, Once per day on Mon Wed Fri, Samy Wolff MD, 300 mg at 12/05/22 0901    gabapentin (NEURONTIN) capsule 100 mg, 100 mg, Oral, BID, Jayla Elliott MD, 100 mg at 12/06/22 2046    tamsulosin (FLOMAX) capsule 0.4 mg, 0.4 mg, Oral, Nightly, Jayla Elliott MD, 0.4 mg at 12/06/22 2046    Arformoterol Tartrate (BROVANA) nebulizer solution 15 mcg, 15 mcg, Nebulization, BID, Jayla Elliott MD, 15 mcg at 12/07/22 0734    lisinopril (PRINIVIL;ZESTRIL) tablet 20 mg, 20 mg, Oral, Daily, 20 mg at 12/06/22 0951 **AND** hydroCHLOROthiazide (HYDRODIURIL) tablet 12.5 mg, 12.5 mg, Oral, Daily, Ty Lawton MD, 12.5 mg at 12/06/22 0915    potassium chloride (KLOR-CON M) extended release tablet 40 mEq, 40 mEq, Oral, PRN **OR** potassium bicarb-citric acid (EFFER-K) effervescent tablet 40 mEq, 40 mEq, Oral, PRN **OR** potassium chloride 10 mEq/100 mL IVPB (Peripheral Line), 10 mEq, IntraVENous, PRN, Ty Lawton MD    acetaminophen (TYLENOL) tablet 650 mg, 650 mg, Oral, Q6H PRN, 650 mg at 11/27/22 1208 **OR** acetaminophen (TYLENOL) suppository 650 mg, 650 mg, Rectal, Q6H PRN, Ty Lawton MD    aluminum & magnesium hydroxide-simethicone (MAALOX) 200-200-20 MG/5ML suspension 30 mL, 30 mL, Oral, Q6H PRN, Ty Lawton MD        Physician Interpretation    General EEG Report  EEG study was performed using the 10-20 electrode placement system in patient who was lethargic and responsive at time of study. No abnormal behavior or movements noted during the study. Activation procedures following left hemisphere consistent with encephalopathy photic stimulation and hyperventilation. Type of EEG:   Routine Inpatient EEG with video done at bedside and Prolonged Inpatient EEG with video: Duration = 2 hours     Description   Background: Asymmetric background activity with significant delta theta slowing along left hemisphere with occasional sharp slow noted in right frontal region associated with sharp activity in the left. There was notes intermittent sharp: Spikes and sounds spike and wave activity noted in the left hemisphere during study was consistent with epileptiform activity. Sleep: Brief periods of drowsiness     Photic stimulation: Not performed  Hyperventilation: Not performed     General Impression  Abnormal prolonged EEG with note polymorphic delta/theta slowing consistent with moderate encephalopathy.   Presence spike and wave discharges with polymorphic sharp activity consistent with structural abnormality with epileptiform foci. Similar activity as seen on prior study also increase in sharp activity. Clinical correlation is indicated.      MD Kami Corrales

## 2022-12-07 NOTE — PROGRESS NOTES
[facility]  SURGICAL INTENSIVE CARE UNIT (SICU)  ATTENDING PHYSICIAN CRITICAL CARE PROGRESS NOTE     I have examined the patient, reviewed the record,and discussed the case with the APN/  Resident. I have reviewed all relevant labs and imaging data. The following summarizes my clinical findings and independent assessment. Date of admission:  11/24/2022    CC: Patient waking up from anesthesia. Moves LUE spontaneously. Inappropriate speech. HOSPITAL COURSE:   Shira Peterson is a 80year old male who presents to the SICU s/p craniotomy for SDH. Patient initially presented to the hospital on 11/24 for chest pain. At that time, he had a syncopal event and hit his head. Initial CT head was read as negative. He had a pacemaker placed on 11/25 for sick sinus syndrome. Follow-up CT head showed large acute SDH with shift. There were reportedly no neurologic deficits at that time. Over the past two days, patient became more lethargic. 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 He was started on Keppra and Vimpat. Patient was on Eliquis which was reversed. 12/5 went yesterday for bur hole for his increasing subdural hematoma with worsening neurological exam.  Repeat head CT done this morning lessening of midline shift still with left subdural hematoma with a drain in place  12/6 No issues overnight   12/7 patient more somnolent this morning not following commands just saying yes to questions but not appropriate. Sent for stat head CT personally reviewed this appeared stable from the last CT head.   Spoke with neurology we will repeat EEG    New Imaging Reviewed and personally interpreted:    CT head left subdural hematoma with subdural drain in place-stable    New Labs reviewed sodium 139, creatinine 2.0, calcium 1.29, LFTs normal, white blood cell count 10.9, hemoglobin 11, platelet count increased to 500    Physical Exam  HENT:      Head: Normocephalic. Comments: Subdural drain in place  Eyes:      Extraocular Movements: Extraocular movements intact. Pupils: Pupils are equal, round, and reactive to light. Cardiovascular:      Rate and Rhythm: Normal rate. Comments: Pacer not seeming to capture  Pulmonary:      Effort: Pulmonary effort is normal.   Abdominal:      General: Abdomen is flat. There is no distension. Tenderness: There is no abdominal tenderness. Musculoskeletal:         General: Normal range of motion. Cervical back: Neck supple. Comments: Moving all extremities spontaneously weak diffusely but not following commands   Skin:     General: Skin is warm. Neurological:      General: No focal deficit present. Mental Status: He is alert. Psychiatric:         Mood and Affect: Mood normal.       Assessment   Principal Problem:    A-fib ([address]  Active Problems:    Atrial fibrillation (HCC)    Third degree heart block (HCC)    High-grade atrioventricular block    Seizure (HCC)    Subdural hematoma  Resolved Problems:    * No resolved hospital problems. *      Plan   GI:  NPO  , Senakot  glycolax, dulcolax suppository   Neuro:  Depakote sprinkles , Vimpat, Keppra,  ( Discuss with Neurology) Neurontin prn Tylenol   Neurosurgery following for SDH  , Maintain Normonatremia >140, and All IV infusions and IVPB should be in 0.9% NaCl if available  , CT head improving s/p Bur hole -stat repeat this morning considering his increasing altered mental status subdural hematoma was essentially stable therefore spoke to neurology about ordering a new EEG as he may be seizing again.   Renal: Hep lock IV, Flomax, monitor Urine Output, Daily CBC,BMP, Mg,Phos, ionized Ca  Musculoskeletal: WBAT all extremities , Spines Clear AM-PAC Score pending  Allopurinol   Pulmonary: Aggressive pulmonary hygiene , brovana , guaifenesin , duonebs, afrin , Monitor RR and Maintain SpO2 > 92%  ID:  Ancef while drains in Monitor leukocytosis and Monitor Fever Curve  Heme: No indication for Transfusion ,   Monitor Hb   Cardiac: Monitor Hemodynamics  lisinopril, hydrochlorothiazide, nifedipine hold Eliquis -new pacemaker seems to be malfunctioning consult EP this will get interrogated  Endocrine: Maintain glucose <180 hold Januvia, Amaryl, Prandin 15U lantus SSI     DVT Prophylaxis: PCDs, Hold Chemoprophylaxis until  SD drain removed   Ulcer Prophylaxis:  PPI home medication , stop Carafate at home   Tubes and Lines: PIV   Seizure proph:     Keppra/Vimpat and depakote   Ancillary consults:   Neurosurgery, Palliative Care, and PT/OT  , Neurology , IM , EP   Family Update:        Spoke to the daughter in the room. As of now continue medications but if patient is having recurrent seizures or does not progress plan is to make him comfort care. Other family is on their way in. CODE Status:       DNR-Limited      Dispo: [facility]    Nilda Rashid MD    Critical Care: 35 minutes evaluating and managing patient with Risk of neurological decompensation,, requiring frequent and emergent imaging, lab studies, intensive monitoring, data review, and adjusting the clinical plan as well as urgent coordination with multiple specialists. , and At risk for further deterioration  time exclusive of teaching and procedures

## 2022-12-07 NOTE — PROGRESS NOTES
SICU Intensive Care Unit   Daily Progress Note     Date of Admission: 11/24/2022    Reason for ICU: Subdural hematoma after falling, left craniotomy    HPI: Pt is an 80year old male who presented to the SICU s/p craniotomy for SDH. Patient initially came to the hospital on 11/24 for chest pain. Pacemaker placed on 11/25 for sick sinus syndrome. Pt had a syncopal event 11/24 and hit his head. Initial CT head was negative. Follow-up CT head showed large acute SDH with shift. Over the past two days, patient became more lethargic. 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    Esophagitis-grade 1    AP (angina pectoris) ([facility] [address]    Systolic hypertension    Combined forms of age-related cataract of left eye    Anemia, chronic renal failure, stage 4 (severe) (HCC)    Sepsis secondary to CAP ([facility] [address]    Pneumonia of left lung due to infectious organism    A-fib [facility])    Atrial fibrillation (HCC)    Third degree heart block (HCC)    High-grade atrioventricular block    Seizure (HCC)    Subdural hematoma       Overnight Events: None    VS:  -HR 70-76  -RR 17-19  -97-98%O2 Sat  -/68     Surgical/Interventional Procedures: Left Craniotomy, cira hole 12/4/2022        Imaging:   -Head CT 12/5/2022 showed reducing Left Subdural hematoma; left dural drainage catheter with no evidence of complication      Physical Exam:     /61   Pulse 71   Temp 97.3 °F (36.3 °C) (Bladder)   Resp 13   Ht 5' 9\" (1.753 m)   Wt 181 lb (82.1 kg)   SpO2 96%   BMI 26.73 kg/m²       GCS:    4 - Opens eyes on own   6 - Follows simple motor commands  5 - Alert and oriented    Pupil size:  Left 3 mm    Right 3 mm    Pupil reaction: Yes    Wiggles fingers: Left Yes Right Yes    Hand grasp:   Left normal       Right normal    Wiggles toes: Left Yes    Right Yes    Plantar flexion: Left normal     Right normal      CONSTITUTIONAL: no acute distress, sleepy   NEUROLOGIC: PERRL  CARDIOVASCULAR: S1 S2, regular rate, regular rhythm, no murmur/gallop/rub  PULMONARY: no rhonchi/rales/wheezes, no use of accessory muscles  RENAL: toure to gravity, clear yellow urine  ABDOMEN: soft, nontender, nondistended, nontympanic, no masses, no organomegaly, normal bowel sounds   MUSCULOSKELETAL: moves all extremities purposefully, 5/5 strength   SKIN/EXTREMITIES: no rashes/ecchymosis, no edema/clubbing, warm/dry, good capillary refill       ASSESSMENT / PLAN:   Neuro:  -SDH s/p craniotomy, drain was placed 12/4             -Drainage showed 50 mL blood              -Neurosurgery Following              -Ancef for drainage  -Left frontal Lobe Seizures              -Neuro Following              -Repeat EEG 12/7              - IV Keppra 500mg q12   CV:  -Pacemaker placed for Sick Sinus Syndrome 11/25/22  -HTN              -Lisinopril, HCTZ, Nifedipine, PRN hydralazine, PRN Labetalol  -Cardio Following  Pulm:   -Acute Hypoxic Repiratory Failure                   -Duoneb, Brovana, Guaifensein  GI:   - NPO  Renal:       -CKD Stage 4                    -Monitor CMP daily        -Hx of Urinary Retention                    -Tamsulosin 0.4 mg nightly  ID:               -IV Ancef 1000mg IV q8h  Endo:               - Sliding Scale 15U Lantus  Heme:        -Monitor CBC, Transfuse if Hgb <7    DVT proph: PCDs, hold chemoppx until SD drain is removed  GI proph:  Protonix 40mg BID      Dispo: Continue Care in [facility]    Bandar Humphrey, [id number] [facility]

## 2022-12-07 NOTE — PROGRESS NOTES
Ep messaged multiple times about EKG results and pacer failing. Michael has no new orders and has not been seen by anyone yet. Pacer interrogated by nursing staff. Nurse messaged EP to say that we cannot get results of that therefore I cannot fax over report.  EKG report faxed to [address] per Kj Pina request.

## 2022-12-07 NOTE — PROGRESS NOTES
Inserted 12fr envue feeding tube to 90cm internally successfully to left nostril using tracking device. Tolerated well and bridled for safety. KUB ordered.

## 2022-12-07 NOTE — PROGRESS NOTES
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    Esophagitis-grade 1 K20.90    AP (angina pectoris) ([facility]) T72.7    Systolic hypertension Q01    Combined forms of age-related cataract of left eye H25.812    Anemia, chronic renal failure, stage 4 (severe) ([facility]) N18.4, D63.1    Sepsis secondary to CAP ([facility]) A41.9    Pneumonia of left lung due to infectious organism J18.9    A-fib ([facility]) I48.91    Atrial fibrillation ([facility]) I48.91    Third degree heart block ([facility]) I44.2    High-grade atrioventricular block I44.39    Seizure ([facility]) R56.9    Subdural hematoma S06. 5XAA       SUBJECTIVE:  29 Ruth Mccurdy remains quite lethargic but opens his eyes and makes eye contact. Vitals presently remained stable but no significant improvement over past 24 hours in neurologic status. REVIEW OF SYSTEMS:  Presently unable to accurately obtain due to  patient's profound neurologic state.       SCHEDULED MEDICATIONS:   levETIRAcetam  500 mg Oral BID    lacosamide  200 mg Oral BID    ceFAZolin  1,000 mg IntraVENous Q8H    oxymetazoline  2 spray Each Nostril Once    lidocaine   Topical Once    insulin glargine  15 Units SubCUTAneous Daily    insulin lispro  0-4 Units SubCUTAneous Q4H    sennosides-docusate sodium  2 tablet Oral Daily    NIFEdipine  60 mg Oral Daily    divalproex  500 mg Oral 2 times per day    polyethylene glycol  17 g Oral Daily    guaiFENesin  400 mg Oral TID    sodium chloride flush  5-40 mL IntraVENous 2 times per day    lidocaine  1 patch TransDERmal Daily    allopurinol  300 mg Oral Once per day on Mon Wed Fri    gabapentin  100 mg Oral BID    tamsulosin  0.4 mg Oral Nightly    Arformoterol Tartrate  15 mcg Nebulization BID    lisinopril  20 mg Oral Daily    And    hydroCHLOROthiazide  12.5 mg Oral Daily       VITAL SIGNS: /68   Pulse 70   Temp 99.1 °F (37.3 °C) (Bladder)   Resp 15   Ht 5' 9\" (1.753 m)   Wt 181 lb (82.1 kg)   SpO2 91%   BMI 26.73 kg/m²   Patient Vitals for the past 96 hrs (Last 3 readings):   Weight   12/06/22 0600 181 lb (82.1 kg)   12/05/22 0442 182 lb 15.7 oz (83 kg)     OBJECTIVE:    HEENT: PERRL, EOM  Intact; sclera non-icteric, conjunctiva pink. Carotids are brisk in upstroke with normal contour. No carotid bruits. Normal jugular venous pulsation at 45°. No palpable cervical nor supraclavicular nodes. Thyroid not palpable. Trachea midline. Chest: Even excursion  Lungs: grossly clear to auscultation bilaterally no expiratory wheezes or rhonchi, no decreased tactile fremitus without inspiratory rales. Heart: Irregular, regular  rhythm; S1 > S2, no gallop grade 2/6 systolic murmur second right intercostal space no clicks, rub, palpable thrills   or heaves. PMI nondisplaced, 5th intercostal space MCL. Abdomen: Soft, nontender, nondistended,  mildly protuberant, no masses or organomegaly. Bowel sounds active. Extremities: Without clubbing, cyanosis or edema. Pulses present 3+ upper extermities bilaterally; present 1+ DP  bilaterally and present 1+ PT bilaterally.      Data:   Scheduled Meds: Reviewed  Continuous Infusions:    sodium chloride      dextrose         Intake/Output Summary (Last 24 hours) at 12/6/2022 2355  Last data filed at 12/6/2022 2300  Gross per 24 hour   Intake 920 ml   Output 2935 ml   Net -2015 ml     CBC:   Recent Labs     12/04/22  0422 12/04/22  1205 12/05/22  0450 12/06/22  0435   WBC 13.5*  --  12.3* 12.3*   HGB 7.1* 10.6* 10.6* 10.5*   HCT 21.6* 32.2* 31.9* 31.7*     --  358 416     BMP:  Recent Labs     12/04/22  0422 12/05/22  0450 12/06/22  0435    139 139   K 4.3 4.9 4.0    104 105   CO2 19* 17* 20*   BUN 50* 54* 58*   CREATININE 2.1* 2.1* 2.0*   LABGLOM 30 30 31     ABGs: No results found for: PH, PO2, PCO2  INR:   Recent Labs     12/04/22  0422 12/05/22  0450 12/06/22  0530   INR 1.2 1.2 1.2     PRO-BNP:   Lab Results   Component Value Date    PROBNP 8,347 (H) 08/02/2022      TSH:   Lab Results   Component Value Date    TSH 3.970 11/24/2022      Cardiac Injury Profile:   No results for input(s): TROPHS in the last 72 hours. Lipid Profile: No results found for: TRIG, HDL, LDLCALC, CHOL   Hemoglobin A1C: No components found for: HGBA1C      RAD:   CT HEAD WO CONTRAST   Final Result   Interval increased volume and extent of diffuse left cerebral acute subdural   hemorrhage with greatest thickness adjacent to the frontotemporal region now   measuring up to 22 mm in greatest axial thickness. Interval worsening of rightward midline shift at the level of the septum   pellucidum now measuring 8 mm. Mild cerebral white matter chronic microvascular ischemic disease. Mild diffuse cerebral atrophy. Multiple attempts were made by the [facility] core team to page and notify Dr. Naresh Macias. Critical results were then called by Dr. Neeraj Hooks to [facility] Wilks on 11/26/2022 at 22:23. Leah Greene reports that neurosurgery has been   consulted about the case. XR CHEST PORTABLE   Final Result   Mild congestive changes. XR CHEST PORTABLE   Final Result   No evidence of pneumothorax or parenchymal contusion. AICD visualized in the   left chest with leads in the heart.          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 [address] N18.4, D63.1    Sepsis secondary to CAP ([address] A41.9    Pneumonia of left lung due to infectious organism J18.9    A-fib ([facility]) I48.91    Atrial fibrillation (H[facility]) I48.91    Third degree heart block ([facility]) I44.2    High-grade atrioventricular block I44.39    Seizure (HCC) R56.9    Subdural hematoma S06. 5XAA       RECOMMENDATIONS:   Mr. Rosalina Rodriguez cardiovascular status remains tenuously stable but neurologic status without significant improvement. Continue to carefully monitor electrolytes and adjust medications accordingly. Avoid significant fluctuations in blood pressure and avoid any further renal insult. I have spent more than 30 critical care minutes face to face with Orlene Roberto  and reviewing notes and laboratory data, with greater than 50% of this time reviewing all data and discussing with Dr. Sondra Caban continue to carefully monitor renal function and blood pressure as well as rhythm. Leeroy Huang, DO FACP,FACC,FSCAI      NOTE:  This report was transcribed using voice recognition software.   Every effort was made to ensure accuracy; however, inadvertent computerized transcription errors may be present

## 2022-12-07 NOTE — PROGRESS NOTES
[address]  Neurology follow up     Date:  12/7/2022  Patient Name:  Loyd Schneider  YOB: 1933  MRN: [id number]     [facility] President is a 80 y.o. male admitted with falls on Eliquis found to have developed a large SDH. He now has developed aphasia secondary to left frontal onset seizures. No seizures since 11/29 at 2350. Maintained on ASM as listed below. Repeat EEG 12/5 with mod-severe encephalopathy, no epileptiform activity. VPA level 51. With ongoing concerns of seizure activity given variable levels of responsiveness. Repeat EEG will again be done today for 2 hours. Current ASM will be maintained. S/p cira hole for SDH on 12/4    Plan  2-hour EEG  Continue Keppra 500 BID (renally dosed at present)  Continue Vimpat at 200 BID  Continue  mg BID  Seizure safety precautions for 6 months  Will follow     History of Present Illness:  Loyd Schneider is a 80 y.o. right handed male presenting for evaluation of seizures. The patient was admitted on 11/24/22 for chest pain and SOB. He has a history of afib and was on Eliquis. He had been having worsening dizziness and had passed out and hit his head. Initial CT head done on 11/24/22 was read as no acute abnormality, however, on review it does appear to show a left SDH. 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 Patient had persistent headache and a repeat CT scan on 11/26 showed increased volume of SDH over left frontotemporal region up to 22m in greatest thickness. 8mm of midline shift also noted. Eliquis was reversed with Kcentra. The patient reportedly remained asymptomatic asymptomatic at this time other than the headache. He was noted to be more somnolent on 11/27/22 with more bereket concern for aphasia on 11/28/22. EEG was ordered which showed left frontal onset seizure activity.      11/29- Patient is sleeping and rouses to name being called and tactile stimuli. Withdraws to pain and follows 1 step commands intermittently. No significant changes overnight and exam appears stable compared to yesterday. No family at bedside. 11/30-continuous EEG hooked up yesterday. 3 seizures have been recorded since being coughed up. Seizure at 1227 lasted approximately 3 minutes. During the seizure patient put left arm on his head, but has no clear clinical change other than some mild tachypnea. At that time Vimpat was bolused with an additional 100 mg and dose increased to 200 mg twice daily. Another seizure was recorded at 2056 lasting 2 minutes. Bolus of valproate 1000 mg ordered and 500 mg twice daily to be started this morning. Third seizure occurred at 2350 lasting nearly 3 minutes. This was prior to valproate bolus being given. There have been no further seizures recorded up through 0918 this morning. 12/1There have been no further seizures since 2350 on 11/29. Tolerating current antiseizure medication regimen well at this time. Continuous EEG discontinued this morning. Neurosurgery continuing to follow, maintaining sodium level greater than 45. Considering bur hole in 7 to 10 days post hematoma if hematoma liquefies. Planning to repeat CT head on 12/3    12/2  No further seizures reported. Patient is sleeping this morning. He does wake up to verbal and tactile stimulation but quickly drifts back off to sleep. Follows one-step simple commands and is able to tell me his name and does produce some more spontaneous speech today. No family at bedside    12/5   Underwent cira hole for SDH evacuation yesterday on 12/4. Over the last two days, patient had become more lethargic. We were asked to evaluate again due to concern for BT seizure activity. He was loaded with another 200 mg of Vimpat last night. Labs were ordered. EEG is pending. 12/6  Stable overnight.  EEG yesterday showed a moderate to severe encephalopathy. No epileptiform activity. VPA level 51. Granddaughter Franklyn Sing at bedside. All questions answered at this time. 12/7  Notified patient with increasing AMS and lethargy this am. STAT CT head was stable. On exam, he is able to tell me his name. He is otherwise lethargic, not following other commands. 2-hour EEG being hooked up at this time. Spoke with RN. Pacemaker is not working properly. EP to see. Daughter [facility] KATY at bedside. All questions answered at this time. She states that he would not want any compressions or intubation. Review of Systems:  Unable to obtain due to:  aphasia    Allergies:      No Known Allergies     Physical Examination  Vitals   Vitals:    12/07/22 0600 12/07/22 0700 12/07/22 0800 12/07/22 0900   BP: (!) 150/74 (!) 156/81 128/76 (!) 155/77   Pulse: 73 85 75 83   Resp: 20 15 14 20   Temp: 97.3 °F (36.3 °C)  98 °F (36.7 °C)    TempSrc: Bladder  Bladder    SpO2: 98% 95% 98% 97%   Weight: 180 lb 8 oz (81.9 kg)      Height:          General: Patient appears in no acute distress. HEENT: Drain in place   Chest: effort normal   Heart: irregularly irregular  Extremities/Peripheral vascular: No edema/swelling noted. No cold limbs noted. Neurologic Examination    Mental Status  Lethargic today. Oriented to self. Severe expressive and receptive aphasia. Perseverates on name. Attention poor. Does not follow commands for me today. Cranial Nerves  II. Visual fields full to threat bilaterally- less so on the R   III, IV, VI: Pupils equally round and reactive to light, 3 to 2 mm bilaterally. EOMs: full, no nystagmus. V.   VII: Facial movements appear symmetric   VIII: Hearing intact to voice  IX,X: Palate elevates symmetrically.  moderate to severe dysarthria  XI: Shoulders appear symmetric  XII: Tongue is midline    Motor  No significant spontaneous movements, appears to be weaker on the right  Normal tone and bulk   No abnormal movements Sensation  Responds to pain in all limbs     Reflexes    B/l  babinski     Coordination  No resting tremors observed     Gait  Deferred for safety/fall consideration    Labs  Recent Labs     12/07/22  0420      K 4.0      CO2 20*   BUN 60*   CREATININE 2.0*   GLUCOSE 150*   CALCIUM 9.3   PROT 6.4   LABALBU 3.1*   BILITOT <0.2   ALKPHOS 97   AST 15   ALT 5   WBC 10.9   RBC 4.01   HGB 11.0*   HCT 33.8*   MCV 84.3   MCH 27.4   MCHC 32.5   RDW 13.8   *   MPV 9.1         Imaging  CT HEAD WO CONTRAST   Final Result   Stable left holo hemispheric subdural hematoma with subdural drain and 9 mm   left to right midline shift. Blood is noted to layer on the tentorium   unchanged. CT HEAD WO CONTRAST   Final Result   1. Reduction in volume of left subdural hematoma. 2.  Left craniotomy with placement of dural drainage catheter, no evidence of   complication. XR ABDOMEN FOR NG/OG/NE TUBE PLACEMENT   Final Result   Enteric tube terminates in the gastric body. CT HEAD WO CONTRAST   Final Result   Evolving large left subdural hematoma with no change seen in the surrounding   mass effect or midline shift. There is no new hemorrhage visualized. CT HEAD WO CONTRAST   Final Result   No significant change in large left subdural hemorrhage with stable 7 mm   midline shift to the right. CT HEAD WO CONTRAST   Final Result   1. Moderate to large left subdural hematoma with left-to-right midline   structure shift of approximately 7 mm, unchanged. 2.  Greatest maximal thickness of the subdural hematoma on coronal views is   24 mm, compared to 23 mm on the prior study. 3.  Gas fluid level in the left sphenoid sinus may reflect acute sinusitis.          CT HEAD WO CONTRAST   Final Result   Interval increased volume and extent of diffuse left cerebral acute subdural   hemorrhage with greatest thickness adjacent to the frontotemporal region now   measuring up to 22 IV bolus of valproate 1000 mg ordered to be followed by 500 BID starting morning of 11/30. Interim EEG Progress Note     EEG was reviewed from 2252, 11/29/22 through 0918, 11/30/2022. One seizure noted at 2350 lasting for approximately 3 minutes. This occurred prior to valproate bolus being completed at 0200. No further seizures noted during this period. Interim EEG Progress Note     EEG was reviewed from [address] [place] through 0614, 12/1/2022     Background consistent with a mild to moderate encephalopathy. No seizures noted during this period. Efraín Dunbar DO, 7:29 AM, 12/1/2022     EEG 12/5  Abnormal EEG with presence of diffuse delta slowing with triphasic waves indicative of mod-severe  encephalopathy. No epileptiform activity observed. Clinical correlation is indicated.     EEG 12/6- report pending      Electronically signed by TOMAS Mederos on 12/7/2022 at 12:34 PM

## 2022-12-07 NOTE — PROGRESS NOTES
SICU Intensive Care Unit   Daily Progress Note     Date of Admission: 11/24    Reason for ICU: SDH with midline shift s/p craniotomy, L frontal lobe seizure    HPI:  Hair Amaya is a 80year old male who presents to the SICU s/p craniotomy for SDH. Patient initially presented to the hospital on 11/24 for chest pain. At that time, he had a syncopal event and hit his head. Initial CT head was read as negative. He had a pacemaker placed on 11/25 for sick sinus syndrome. Follow-up CT head showed large acute SDH with shift. There were reportedly no neurologic deficits at that time. Over the past two days, patient became more lethargic. 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 He was started on Keppra and Vimpat. Patient was on Eliquis which was reversed. Hospital Course:  12/5 patient underwent bur hole for increasing subdural hematoma with worsening neurological exam.  Repeat head CT without contrast shows decrease midline shift by 1.2 mm  12/6: No acute events overnight. Diet advanced. 12/7: Less responsive, repeat CT head stable, repeat EEG pending, pacemaker malfunction. 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 Less responsive this morning.     Surgical/Interventional Procedures:  11/25: Pacemaker placement  11/04: Craniotomy    Physical Exam: /61   Pulse 71   Temp 97.3 °F (36.3 °C) (Bladder)   Resp 13   Ht 5' 9\" (1.753 m)   Wt 181 lb (82.1 kg)   SpO2 96%   BMI 26.73 kg/m²         CONSTITUTIONAL: no acute distress, lying in hospital bed, somnolent   NEUROLOGIC: PERRL, EOMI, Aox 0 (Upper sor[place] speaking), craniotomy incision site c/d/I, does not follow commands  CARDIOVASCULAR: S1 S2, regular rate, regular rhythm, no murmur/gallop/rub  PULMONARY: no rhonchi/rales/wheezes, no use of accessory muscles  RENAL: toure to gravity, clear yellow urine  ABDOMEN: soft, nontender, nondistended, nontympanic, no masses, no organomegaly, normal bowel sounds   MUSCULOSKELETAL: moves all extremities, diffusely weak  SKIN/EXTREMITIES: no rashes/ecchymosis, no edema/clubbing, warm/dry, good capillary refill       ASSESSMENT / PLAN:   Neuro:   SDH s/p crani with evaucation of hematoma and drain placement 12/4  NSGY following  Abx: Ancef for drain  L Frontal lobe seizures   Neurology following   Antiseizure:   Keppra 500mg IV Q12H, Vimpat 200mg IV BID, Gabapentin 100mg BID, Depakote Sprinkle 500mg Q12H  EEG completed 12/6- pending read, repeat EEG 12/7  Pain control: tylenol PRN    CV: SBP Goal < 160  SSS s/p pacemaker- malfunction- EP consulted, interrogate pacemaker  Persistent A fib with RVR  HOLD Eliquis (reversed with Kcentra 12/4)  HTN  Linsinopril, HCTZ, nifedipine, PRN hydralazine, PRN labetalol    Pulmonary  Acute hypoxic respiratory failure  Breathing tx: Duoneb, Brovana, guaifensein    GI: NPO  Renal:   CKD Stage IV (baseline Cr ~2.0)  Monitor CMP daily, Monitor UOP  Hx of urinary retention  Tamulosin 0.4mg QNight  ID:  Drain placed s/p craniotomy  Ancef 1000mg IV Q8H  Trend WBC and fever curve  Endo  Non-insulin depedent diabetes mellitus  Insulin Reg: 15U lantus + LDSSI  MSK: no acute issues, WBAT on all extremities, PT/OT to eval for AMPAC  Heme:  Acute anemia  Monitor CBC daily, transfuse if Hgb < 7.0    Pain/Analgesia: tylenol PRn  Bowel regimen: glycolax, senokot, dulcolax suppository  DVT proph: PCDs, Chemoppx until SD drain removed  GI proph: protonix 40mg BID  Seizure proph: Keppra, vimpat, depakote sprinkle  Glucose protocol: Lantus 15u + LDSSI  Mouth/eye care: per patient  Stephens: urinary cath (11/24)  CVC sites: none  Ancillary consults: Neurology, Cardiology, Neurosurgery, Medicine, EP  Family Update: as available  CODE Status: LIMITED    Dispo: SICU      Electronically signed by Andriy Barrientos DO, PGY-2 12/7/2022  5:30 AM

## 2022-12-07 NOTE — FLOWSHEET NOTE
Patient tries to pull out all life saving lines, and tubes. After multiple attempts at reorientation and educating patient the importance of keeping all lines and tubes in place for their own safety, there was no evidence of learning. Bilateral wrist restraints applied for the patient's safety. There is no evidence of injury noted at this time. Will continue to monitor.  Cassidy Nguyen RN

## 2022-12-08 NOTE — PROGRESS NOTES
12/08/22 0828   Treatment   Treatment Type HHN   $Treatment Type $Inhaled Therapy/Meds   Medications Arformoterol   Pre-Tx Pulse 72   Pre-Tx Resps 14   Breath Sounds Pre-Tx ANNA Clear   Breath Sounds Pre-Tx LLL Clear   Breath Sounds Pre-Tx RUL Clear   Breath Sounds Pre-Tx RML Clear   Breath Sounds Pre-Tx RLL Clear   Breath Sounds Post-Tx ANNA Clear   Breath Sounds Post-Tx LLL Clear   Breath Sounds Post-Tx RUL Clear   Breath Sounds Post-Tx RML Clear   Breath Sounds Post-Tx RLL Clear   Post-Tx Pulse 76   Post-Tx Resps 14   Delivery Source Oxygen;Mask   Position Semi-Jules's   Treatment Tolerance Well   Duration 8   Is patient on O2?  N   Oxygen Therapy/Pulse Ox   O2 Therapy Room air   O2 Device None (Room air)   Heart Rate 72   Resp 14   SpO2 97 %

## 2022-12-08 NOTE — PROGRESS NOTES
Hospitalist Progress Note      SYNOPSIS: Patient admitted on 11/24/2022 for A-fib [facility])  80 y.o. male with past medical history of atrial fibrillation, DM hypertension . Patient is a transfer from [place] . He was seen there due to chest pain . He states that chest pain is located in the sternal area . He states  that he has shortness of breath with chest pain . He states that when chest pain occurred he felt like he was going to pass out . Patient had a syncopal episode and hit his head . Patient had a second syncopal episode  and after that family called ambulance . En route to ED patient was given 324 mg of ASA . In the ED patient was in Afib with slow ventricular rate having long pauses and bradycardic to the 30s . Lab data revealed sodium 135 potassium 4.1 BUN 29 Scr 2.0  glucose 244 Trop 45 >>43  WBC 9.9 HGB 10.7   Resp panel neg CT head cervical spine neg CXR congestive changes . Patient received fentanyl morphine and was placed on isoproterenol infusion. EP consulted-pacemaker placement. Lyme serology was ordered for possible carditis. Patient had an initial CT of the head after the fall which was negative for any intracranial hemorrhage. Repeat CT head was obtained on 11/26/2022 as patient continued to complain of headache and spite of pain medicine. Repeat CT of the head noncontrast was obtained which showed progressive subdural hemorrhage. Eliquis was held, Rae Goddard ordered for Eliquis reversal.  Neurosurgery following. Worsening mental status with aphasia. Found to have two seizures. Started on Keppra and Vimpat. Neurology following. He has been on continuous video EEG. Seizure medications has been adjusted. Last seizure episode 2350 lasted approximately 3 minutes prior to giving valproate and bolus which was completed at 02 100. No further seizures after that. Appreciate neurology's inputs. Continue Vimpat, Keppra and Depacon.  He is status post left frontal cira hole for placement of subdural drain and drainage of some acute subdural hematoma 2022. continue on Ancef for drain. Drain replaced 22, monitor drain output    SUBJECTIVE:  Patient seen and examined, nods head, unable to make a conversation. Follows simple commands-able to make fist, squeeze hand, wiggle toes  Records reviewed. Stable overnight. No other overnight issues reported. Temp (24hrs), Av.2 °F (36.8 °C), Min:97.3 °F (36.3 °C), Max:99 °F (37.2 °C)    DIET: Diet NPO Exceptions are: Sips of Water with Meds  ADULT TUBE FEEDING; Nasogastric; Standard with Fiber; Continuous; 10; Yes; 10; Q 4 hours; 55; 30; Q 1 hour  CODE: Limited    Intake/Output Summary (Last 24 hours) at 2022 0956  Last data filed at 2022 0700  Gross per 24 hour   Intake 600 ml   Output 1545 ml   Net -945 ml       OBJECTIVE:    BP (!) 146/80   Pulse 74   Temp 97.3 °F (36.3 °C)   Resp 14   Ht 5' 9\" (1.753 m)   Wt 176 lb 5.9 oz (80 kg)   SpO2 97%   BMI 26.05 kg/m²     General appearance:  appears stated age and cooperative. HEENT:  Conjunctivae/corneas clear. Left frontal scalp drain with surgical dressing in place. Neck: Supple. No jugular venous distention. Respiratory: Clear to auscultation bilaterally, normal respiratory effort  Cardiovascular: Regular rate rhythm, normal S1-S2  Abdomen: Soft, nontender, nondistended  Musculoskeletal: No clubbing, cyanosis, no bilateral lower extremity edema. Brisk capillary refill.    Skin:  No rashes  on visible skin  Neurologic: awake, alert and follows simple commands, expressive aphasia, decreased strength    ASSESSMENT:  -Third-degree heart block status post pacemaker placement 2022   -Syncope and collapse secondary to third-degree heart block also sick sinus syndrome  -Subdural hematoma  -Status post left frontal cira hole for placement of subdural drain and drainage of some acute subdural hematoma 2022  -Atrial fibrillation Eliquis, Eliquis held due to subdural hematoma   -Seizure disorder   -Hypertension  -CKD stage III  -Diabetes mellitus type 2       PLAN:  - Neurosurgery following appreciate their input- Status post left frontal cira hole for placement of subdural drain and drainage of some acute subdural hematoma 12/4/2022. continue on Ancef for drain. Drain replaced 12/6/22, monitor drain output  - repeat CT head-12/7/2022-shows stable left subdural hematoma with subdural drain and a 9 mm midline shift  - Continue seizure medications per neurology. Vimpat, Depakote and Keppra. EEG-2 hours-shows abnormal prolonged EEG-epileptiform foci noted.   - Appreciate ICU team input.  -Status post pacemaker [third-degree heart block A. fib with RVR]-device interrogated 12/7/2022-overall device function normal    DISPOSITION: continue icu care    Medications:  REVIEWED DAILY    Infusion Medications    sodium chloride 75 mL/hr at 12/08/22 0528    sodium chloride      dextrose       Scheduled Medications    ceFAZolin  1,000 mg IntraVENous Q12H    bisacodyl  10 mg Rectal Daily    levETIRAcetam  500 mg Oral BID    lacosamide  200 mg Oral BID    lidocaine   Topical Once    insulin glargine  15 Units SubCUTAneous Daily    insulin lispro  0-4 Units SubCUTAneous Q4H    sennosides-docusate sodium  2 tablet Oral Daily    NIFEdipine  60 mg Oral Daily    divalproex  500 mg Oral 2 times per day    polyethylene glycol  17 g Oral Daily    guaiFENesin  400 mg Oral TID    sodium chloride flush  5-40 mL IntraVENous 2 times per day    lidocaine  1 patch TransDERmal Daily    allopurinol  300 mg Oral Once per day on Mon Wed Fri    gabapentin  100 mg Oral BID    tamsulosin  0.4 mg Oral Nightly    Arformoterol Tartrate  15 mcg Nebulization BID    lisinopril  20 mg Oral Daily    And    hydroCHLOROthiazide  12.5 mg Oral Daily     PRN Meds: hydrALAZINE, labetalol, ipratropium-albuterol, perflutren lipid microspheres, trimethobenzamide, sodium chloride flush, sodium chloride, glucose, dextrose bolus **OR** dextrose bolus, glucagon (rDNA), dextrose, potassium chloride **OR** potassium alternative oral replacement **OR** potassium chloride, acetaminophen **OR** acetaminophen, aluminum & magnesium hydroxide-simethicone    Labs:     Recent Labs     12/06/22 0435 12/07/22 0420 12/08/22  0636   WBC 12.3* 10.9 10.2   HGB 10.5* 11.0* 10.3*   HCT 31.7* 33.8* 31.7*    500* 487*       Recent Labs     12/06/22  0435 12/07/22  0420 12/08/22  0636    139 144   K 4.0 4.0 4.0    105 108*   CO2 20* 20* 20*   BUN 58* 60* 59*   CREATININE 2.0* 2.0* 2.0*   CALCIUM 9.3 9.3 9.3   PHOS 3.6 3.7 3.4       Recent Labs     12/06/22 0435 12/07/22 0420 12/08/22  0636   PROT 6.4 6.4 6.3*   ALKPHOS 91 97 99   ALT 6 5 <5   AST 16 15 14   BILITOT <0.2 <0.2 0.3       Recent Labs     12/06/22  0530 12/07/22 0420 12/08/22  0636   INR 1.2 1.2 1.3       No results for input(s): Robert Grullon in the last 72 hours. Chronic labs:    Lab Results   Component Value Date    TSH 3.970 11/24/2022    PSA 3.76 09/16/2022    INR 1.3 12/08/2022    LABA1C 8.5 (H) 08/02/2022       Radiology: REVIEWED DAILY    +++++++++++++++++++++++++++++++++++++++++++++++++  Frannie Wright MD  [facility] - Hospitalist  [address] [place] [address]  +++++++++++++++++++++++++++++++++++++++++++++++++  NOTE: This report was transcribed using voice recognition software. Every effort was made to ensure accuracy; however, inadvertent computerized transcription errors may be present.

## 2022-12-08 NOTE — FLOWSHEET NOTE
Patient tries to pull out all life saving lines, and tubes. After multiple attempts at reorientation and educating patient the importance of keeping all lines and tubes in place for their own safety, there was no evidence of learning. Bilateral wrist restraints applied for the patient's safety. There is no evidence of injury noted at this time. Will continue to monitor.  Ethan Saenz RN

## 2022-12-08 NOTE — CARE COORDINATION
12/8 Care Coordination:Pt remains in SICU, Cont ICP monitor. S/p cira hole for SDH on 12/4. Plan is to start weaning ASM, hopefully this will improve his wakefulness . PM&R is still following. If not a candidate for Acute rehab will need to re address JERICHO placement. Will need new PT/OT evals. Last seen on 12/5 AM-PAC 7/24. Will wait to see if decreasing med's and pt becomes more alert. CM/SW will continue to follow for discharge planning.    Elizabeth LOUN,RN-CV-BC  [phone]

## 2022-12-08 NOTE — PROGRESS NOTES
[facility]  Neurology follow up     Date:  12/8/2022  Patient Name:  Marylu Bermudez  YOB: 1933  MRN: [id number]     Gladys Tolbert is a 80 y.o. male admitted with falls on Eliquis found to have developed a large SDH. He now has developed aphasia secondary to left frontal onset seizures. No seizures since 11/29 at 2350.  2 -hour EEG on 12/7 with a moderate encephalopathy, no seizures. Currently on Keppra 500 mg BID, Vimpat 200 mg BID and  mg BID. Repeat CT scans of the head have been stable. Due to significant lethargy and as he is s/p SDH evacuation, it is within reason to slowly start weaning ASM. Depakote will be decreased to 250 mg BID today     S/p cira hole for SDH on 12/4    Plan  Will slowly start weaning ASM, hopefully this will improve his wakefulness   Decrease Depakote to 250 mg BID today  Continue Keppra 500 BID (renally dosed at present)  Continue Vimpat at 200 BID for now   Seizure safety precautions for 6 months  Will follow   Spoke with patient's son, Jacky at this time as well as discussed with ICU staff    History of Present Illness:  Marylu Bermudez is a 80 y.o. right handed male presenting for evaluation of seizures. The patient was admitted on 11/24/22 for chest pain and SOB. He has a history of afib and was on Eliquis. He had been having worsening dizziness and had passed out and hit his head. Initial CT head done on 11/24/22 was read as no acute abnormality, however, on review it does appear to show a left SDH. 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 Patient had persistent headache and a repeat CT scan on 11/26 showed increased volume of SDH over left frontotemporal region up to 22m in greatest thickness. 8mm of midline shift also noted. Eliquis was reversed with Kcentra.  The patient reportedly remained asymptomatic asymptomatic at this time other than the headache. He was noted to be more somnolent on 11/27/22 with more bereket concern for aphasia on 11/28/22. EEG was ordered which showed left frontal onset seizure activity. 11/29- Patient is sleeping and rouses to name being called and tactile stimuli. Withdraws to pain and follows 1 step commands intermittently. No significant changes overnight and exam appears stable compared to yesterday. No family at bedside. 11/30-continuous EEG hooked up yesterday. 3 seizures have been recorded since being coughed up. Seizure at 1227 lasted approximately 3 minutes. During the seizure patient put left arm on his head, but has no clear clinical change other than some mild tachypnea. At that time Vimpat was bolused with an additional 100 mg and dose increased to 200 mg twice daily. Another seizure was recorded at 2056 lasting 2 minutes. Bolus of valproate 1000 mg ordered and 500 mg twice daily to be started this morning. Third seizure occurred at 2350 lasting nearly 3 minutes. This was prior to valproate bolus being given. There have been no further seizures recorded up through 0918 this morning. 12/1There have been no further seizures since 2350 on 11/29. Tolerating current antiseizure medication regimen well at this time. Continuous EEG discontinued this morning. Neurosurgery continuing to follow, maintaining sodium level greater than 45. Considering bur hole in 7 to 10 days post hematoma if hematoma liquefies. Planning to repeat CT head on 12/3    12/2  No further seizures reported. Patient is sleeping this morning. He does wake up to verbal and tactile stimulation but quickly drifts back off to sleep. Follows one-step simple commands and is able to tell me his name and does produce some more spontaneous speech today. No family at bedside    12/5   Underwent cira hole for SDH evacuation yesterday on 12/4. Over the last two days, patient had become more lethargic.  We were asked to evaluate again due to concern for BT seizure activity. He was loaded with another 200 mg of Vimpat last night. Labs were ordered. EEG is pending. 12/6  Stable overnight. EEG yesterday showed a moderate to severe encephalopathy. No epileptiform activity. VPA level 51. Granddaughter Arely Lat at bedside. All questions answered at this time. 12/7  Notified patient with increasing AMS and lethargy this am. STAT CT head was stable. On exam, he is able to tell me his name. He is otherwise lethargic, not following other commands. 2-hour EEG being hooked up at this time. Spoke with RN. Pacemaker is not working properly. EP to see. Spoke with Daughter Aiyana Morales    12/8  Patient remains very lethargic. 2hr EEG with a moderate encephalopathy. No seizures. Repeat ct scans have been stable. Concerned that he is sedated from his medications. Spoke with patient's son, Dr. Rolan España via phone at this time. Reviewed EEG results and that we will slowly start to wean Depakote. All questions answered at this time     Review of Systems:  Unable to obtain due to:  aphasia    Allergies:      No Known Allergies     Physical Examination  Vitals   Vitals:    12/08/22 0600 12/08/22 0700 12/08/22 0828 12/08/22 0829   BP: (!) 174/89 (!) 146/80     Pulse: 74 78 72 74   Resp: 19 13 14 14   Temp:       TempSrc:       SpO2: (!) 79% 97% 97% 97%   Weight: 176 lb 5.9 oz (80 kg)      Height:          General: Patient appears in no acute distress. HEENT: Drain in place   Chest: effort normal   Heart: irregularly irregular  Extremities/Peripheral vascular: No edema/swelling noted. No cold limbs noted. Neurologic Examination    Mental Status  More Lethargic today. Oriented to self- able to tell me his first name, but only once. Severe expressive and receptive aphasia. Attention poor. Weakly gripped L hand to command, but otherwise did not follow     Cranial Nerves  II.  Visual fields - b/l threat, more so on L III, IV, VI: Pupils equally round and reactive to light, 3 to 2 mm bilaterally. EOMs: full, no nystagmus. V.   VII: Facial movements appear symmetric   VIII: Hearing intact to voice  IX,X: Palate elevates symmetrically. moderate to severe dysarthria  XI: Shoulders appear symmetric  XII: Tongue is midline    Motor  No significant spontaneous movements today, withdraws to pain. Normal tone and bulk   No abnormal movements     Sensation  Responds to pain in all limbs     Reflexes    B/l  babinski     Coordination  No resting tremors observed     Gait  Deferred for safety/fall consideration    Labs  Recent Labs     12/08/22  0636      K 4.0   *   CO2 20*   BUN 59*   CREATININE 2.0*   GLUCOSE 121*   CALCIUM 9.3   PROT 6.3*   LABALBU 3.0*   BILITOT 0.3   ALKPHOS 99   AST 14   ALT <5   WBC 10.2   RBC 3.76*   HGB 10.3*   HCT 31.7*   MCV 84.3   MCH 27.4   MCHC 32.5   RDW 13.8   *   MPV 8.7         Imaging  XR ABDOMEN (KUB) (SINGLE AP VIEW)   Final Result   NG/OG feeding tube terminating near the gastroduodenal junction with   decreased redundancy of the tube in the proximal stomach since earlier the   same day. XR ABDOMEN (KUB) (SINGLE AP VIEW)   Final Result   The feeding tube tip lies in the region of the distal stomach or D1 duodenal   bulb. CT HEAD WO CONTRAST   Final Result   Stable left holo hemispheric subdural hematoma with subdural drain and 9 mm   left to right midline shift. Blood is noted to layer on the tentorium   unchanged. CT HEAD WO CONTRAST   Final Result   1. Reduction in volume of left subdural hematoma. 2.  Left craniotomy with placement of dural drainage catheter, no evidence of   complication. XR ABDOMEN FOR NG/OG/NE TUBE PLACEMENT   Final Result   Enteric tube terminates in the gastric body.          CT HEAD WO CONTRAST   Final Result   Evolving large left subdural hematoma with no change seen in the surrounding   mass effect or midline shift.  There is no new hemorrhage visualized. CT HEAD WO CONTRAST   Final Result   No significant change in large left subdural hemorrhage with stable 7 mm   midline shift to the right. CT HEAD WO CONTRAST   Final Result   1. Moderate to large left subdural hematoma with left-to-right midline   structure shift of approximately 7 mm, unchanged. 2.  Greatest maximal thickness of the subdural hematoma on coronal views is   24 mm, compared to 23 mm on the prior study. 3.  Gas fluid level in the left sphenoid sinus may reflect acute sinusitis. CT HEAD WO CONTRAST   Final Result   Interval increased volume and extent of diffuse left cerebral acute subdural   hemorrhage with greatest thickness adjacent to the frontotemporal region now   measuring up to 22 mm in greatest axial thickness. Interval worsening of rightward midline shift at the level of the septum   pellucidum now measuring 8 mm. Mild cerebral white matter chronic microvascular ischemic disease. Mild diffuse cerebral atrophy. Multiple attempts were made by the [facility] core team to page and notify Dr. Vinny Rene. Critical results were then called by Dr. Jose Potts to RN Susan Ordaz on 11/26/2022 at 22:23. Juan Luis Arana reports that neurosurgery has been   consulted about the case. XR CHEST PORTABLE   Final Result   Mild congestive changes. XR CHEST PORTABLE   Final Result   No evidence of pneumothorax or parenchymal contusion. AICD visualized in the   left chest with leads in the heart.              EEG 11/28  This abnormal study showed evidence of:     Left frontal electrographic seizures with spread to involve the left frontocentrotemporal region  A potential for focl seizures from the left frontal region  Moderate to severe nonspecific cerebral dysfunction of the left frontocentrotemporal region  A mild to moderate nonspecific encephalopathy     Structural abnormalities should be considered for the findings above and appropriate imaging obtained if clinically indicated. cEEG  EEG was reviewed from 1054 through 1317, 11/29/2022. One seizure noted at 1227 lasting approximately 3 minutes. During the seizure he puts his left arm on his head, but has no clear clinical change other than what appears to be some mild tachypnea. Will give additional 100 mg Vimpat IV and increase to 200 mg BID this evening. Bhavik Landin DO, 1:39 PM, 11/29/2022         EEG addendum  EEG reviewed through 1903, 11/29/2022      Background consistent with mild to moderate nonspecific encephalopathy. No seizures noted. Bhavik Landin DO, 8:55 PM, 11/29/2022       EEG addendum  EEG reviewed through 2252, 11/29/2022      One seizure noted at 2056 lasting nearly 2 minutes. IV bolus of valproate 1000 mg ordered to be followed by 500 BID starting morning of 11/30. Interim EEG Progress Note     EEG was reviewed from 2252, 11/29/22 through 0918, 11/30/2022. One seizure noted at 2350 lasting for approximately 3 minutes. This occurred prior to valproate bolus being completed at 0200. No further seizures noted during this period. Interim EEG Progress Note     EEG was reviewed from [facility] through 0614, 12/1/2022     Background consistent with a mild to moderate encephalopathy. No seizures noted during this period. Bhavik Landin DO, 7:29 AM, 12/1/2022     EEG 12/5  Abnormal EEG with presence of diffuse delta slowing with triphasic waves indicative of mod-severe  encephalopathy. No epileptiform activity observed. Clinical correlation is indicated. EEG 12/7  Abnormal prolonged EEG with note polymorphic delta/theta slowing consistent with moderate encephalopathy. Presence spike and wave discharges with polymorphic sharp activity consistent with structural abnormality with epileptiform foci. Similar activity as seen on prior study also increase in sharp activity.   Clinical correlation is indicated.      Electronically signed by TOMAS Quintanilla on 12/8/2022 at 10:47 AM

## 2022-12-08 NOTE — PROGRESS NOTES
Pt remains lethargic and difficult to keep alert. Bedside swallow not appropriate at this time.    Will continue to monitor alertness and complete swallow evaluation when appropriate

## 2022-12-08 NOTE — PROGRESS NOTES
[facility]  SURGICAL INTENSIVE CARE UNIT (SICU)  ATTENDING PHYSICIAN CRITICAL CARE PROGRESS NOTE     I have examined the patient, reviewed the record,and discussed the case with the APN/  Resident. I have reviewed all relevant labs and imaging data. The following summarizes my clinical findings and independent assessment. Date of admission:  11/24/2022    CC: Patient waking up from anesthesia. Moves LUE spontaneously. Inappropriate speech. HOSPITAL COURSE:   Hair Amaya is a 80year old male who presents to the SICU s/p craniotomy for SDH. Patient initially presented to the hospital on 11/24 for chest pain. At that time, he had a syncopal event and hit his head. Initial CT head was read as negative. He had a pacemaker placed on 11/25 for sick sinus syndrome. Follow-up CT head showed large acute SDH with shift. There were reportedly no neurologic deficits at that time. Over the past two days, patient became more lethargic. 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 He was started on Keppra and Vimpat. Patient was on Eliquis which was reversed. 12/5 went yesterday for bur hole for his increasing subdural hematoma with worsening neurological exam.  Repeat head CT done this morning lessening of midline shift still with left subdural hematoma with a drain in place  12/6 No issues overnight   12/7 patient more somnolent this morning not following commands just saying yes to questions but not appropriate. Sent for stat head CT personally reviewed this appeared stable from the last CT head.   Spoke with neurology we will repeat EEG  12/8 much more somnolent today     New Imaging Reviewed and personally interpreted:    No new imaging    New Labs reviewed sodium 144, creatinine stable 2.0, Phos 3.4, LFTs normal, white blood cell count 10.2, hemoglobin 10.3, platelet count 918, INR 1.3    Physical Exam  HENT:      Head: Normocephalic. Comments: Subdural drain in place  Eyes:      Extraocular Movements: Extraocular movements intact. Pupils: Pupils are equal, round, and reactive to light. Cardiovascular:      Rate and Rhythm: Normal rate. Comments: Pacer not seeming to capture  Pulmonary:      Effort: Pulmonary effort is normal.   Abdominal:      General: Abdomen is flat. There is no distension. Tenderness: There is no abdominal tenderness. Musculoskeletal:         General: Normal range of motion. Cervical back: Neck supple. Comments: Neuro exam even worse than yesterday very weak in all extremities less purposeful more so withdrawing   Skin:     General: Skin is warm. Neurological:      General: No focal deficit present. Mental Status: He is alert. Psychiatric:         Mood and Affect: Mood normal.       Assessment   Principal Problem:    A-fib ([address]  Active Problems:    Atrial fibrillation (HCC)    Third degree heart block (HCC)    High-grade atrioventricular block    Seizures (HCC)    SDH (subdural hematoma)    History of cardiac pacemaker in situ  Resolved Problems:    * No resolved hospital problems.  *      Plan   GI:  Start TFs  , Senakot  glycolax, dulcolax suppository   Neuro:  Depakote sprinkles , Vimpat, Keppra,  ( Discuss with Neurology again-extremely altered whether this is secondary to seizures or if we can de-escalate antiepileptics and stop Depakote?) Neurontin-100 twice daily, will get a Keppra level prn Tylenol   Neurosurgery following for SDH  , Maintain Normonatremia >140, and All IV infusions and IVPB should be in 0.9% NaCl if available  , CT head improving s/p Bur hole -stat repeat yesterday subdural hematoma was essentially stable therefore got an EEG-neurology will be reviewing this again  Renal: Stop IVFs cannot get Flomax considering NG tube we will start Proscar, monitor Urine Output, Daily CBC,BMP, Mg,Phos, ionized Ca    Musculoskeletal: WBAT all extremities , Spines Clear AM-PAC Score pending  Allopurinol   Pulmonary: Aggressive pulmonary hygiene , brovana , guaifenesin , duonebs, afrin , Monitor RR and Maintain SpO2 > 92%  ID:  Ancef while drains in     Monitor leukocytosis and Monitor Fever Curve  Heme: No indication for Transfusion ,  Monitor Hb   Cardiac: Monitor Hemodynamics  lisinopril, hydrochlorothiazide, nifedipine - can't crush now NPO NGT only will need to choose alternate medication start norvasc , will discuss beta-blockade with cardiology as patient was on beta-blocker at home which was stopped in the beginning of this admission likely secondary to sick sinus but now since he has a pacer should this be restarted for cardioprotection? Hold Eliquis -new pacemaker seems to be malfunctioning  but EP reviewed this they stated there is no issue with that but he is going in A. fib   Endocrine: Maintain glucose <180 hold Januvia, Amaryl, Prandin 15U lantus SSI     DVT Prophylaxis: PCDs, Hold Chemoprophylaxis until  SD drain removed   Ulcer Prophylaxis: PPI home medication   Tubes and Lines: PIV , NG tube, Stephens-we will allow patient to get a few doses of the prescribed and take Stephens catheter out  Seizure proph:     Keppra/Vimpat and depakote   Ancillary consults:   Neurosurgery, Palliative Care, and PT/OT  , Neurology , IM , EP   Family Update:        Spoke to the daughter in the room. As of now continue medications but if patient is having recurrent seizures or does not progress plan is to make him comfort care. Other family is on their way in. CODE Status:       DNR-Limited      Dispo: KASHIF Bhat MD    Critical Care: 35 minutes evaluating and managing patient with Risk of neurological decompensation,, requiring frequent and emergent imaging, lab studies, intensive monitoring, data review, and adjusting the clinical plan as well as urgent coordination with multiple specialists. , and At risk for further deterioration  time exclusive of teaching and procedures

## 2022-12-08 NOTE — PROGRESS NOTES
SICU Intensive Care Unit   Daily Progress Note     Date of Admission: 11/24/2022    Reason for ICU: Subdural hematoma after falling, left craniotomy    HPI: Pt is an 80year old male who presented to the SICU s/p craniotomy for SDH. Patient initially came to the hospital on 11/24 for chest pain. Pacemaker placed on 11/25 for sick sinus syndrome. Pt had a syncopal event 11/24 and hit his head. Initial CT head was negative. Follow-up CT head showed large acute SDH with shift. Over the past two days, patient became more lethargic. 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    Esophagitis-grade 1    AP (angina pectoris) ([facility] [address]    Systolic hypertension    Combined forms of age-related cataract of left eye    Anemia, chronic renal failure, stage 4 (severe) (HCC)    Sepsis secondary to CAP ([facility]    Pneumonia of left lung due to infectious organism    A-fib [facility])    Atrial fibrillation (HCC)    Third degree heart block (HCC)    High-grade atrioventricular block    Seizures (HCC)    SDH (subdural hematoma)    History of cardiac pacemaker in situ       Overnight Events: None    VS:  -HR 72-74  -RR 20-22  -95-97%O2 Sat  -/87     Surgical/Interventional Procedures: Left Craniotomy, cira hole 12/4/2022        Imaging:   -Head CT 12/5/2022 showed reducing Left Subdural hematoma; left dural drainage catheter with no evidence of complication      Physical Exam:     BP (!) 168/87   Pulse 81   Temp 97.3 °F (36.3 °C)   Resp 24   Ht 5' 9\" (1.753 m)   Wt 180 lb 8 oz (81.9 kg)   SpO2 96%   BMI 26.66 kg/m²       GCS:    4 - Opens eyes on own   6 - Follows simple motor commands  5 - Alert and oriented    Pupil size:  Left 3 mm    Right 3 mm    Pupil reaction: Yes    Wiggles fingers: Left Yes Right Yes    Hand grasp:   Left normal       Right normal    Wiggles toes: Left Yes    Right Yes    Plantar flexion: Left normal     Right normal      CONSTITUTIONAL: no acute distress, sleepy   NEUROLOGIC: PERRL  CARDIOVASCULAR: S1 S2, regular rate, regular rhythm, no murmur/gallop/rub  PULMONARY: no rhonchi/rales/wheezes, no use of accessory muscles  RENAL: toure to gravity, clear yellow urine  ABDOMEN: soft, nontender, nondistended, nontympanic, no masses, no organomegaly, normal bowel sounds   MUSCULOSKELETAL: moves all extremities purposefully, 5/5 strength   SKIN/EXTREMITIES: no rashes/ecchymosis, no edema/clubbing, warm/dry, good capillary refill       ASSESSMENT / PLAN:   Neuro:  -SDH s/p craniotomy, drain was placed 12/4             -Drainage showed ~250 mL blood              -Neurosurgery Following              -Ancef for drainage  -Left frontal Lobe Seizures              -Neuro Following              -Repeat EEG 12/7              - IV Keppra 500mg q12   CV:  -Pacemaker placed for Sick Sinus Syndrome 11/25/22  -HTN              -Lisinopril, HCTZ, Nifedipine, PRN hydralazine, PRN Labetalol  -Hold Eliquis  -Cardio Following  Pulm:   -Acute Hypoxic Repiratory Failure                   -Duoneb, Brovana, Guaifensein  GI:   - Initiate Tfs, Senekot, Dulcolax  Renal:       -CKD Stage 4                    -Monitor CMP daily        -Hx of Urinary Retention                    -Tamsulosin 0.4 mg nightly  ID:               -IV Ancef 1000mg IV q8h  Endo:               - Sliding Scale 15U Lantus  Heme:        -Monitor CBC, Transfuse if Hgb <7    DVT proph: PCDs, hold chemoppx until SD drain is removed  GI proph:  Protonix 40mg BID      Dispo: Continue Care in [facility], [address] [place] [address]

## 2022-12-08 NOTE — PROGRESS NOTES
Neurosurg progress note  VITALS:  BP (!) 146/80   Pulse 74   Temp 97.3 °F (36.3 °C)   Resp 14   Ht 5' 9\" (1.753 m)   Wt 176 lb 5.9 oz (80 kg)   SpO2 97%   BMI 26.05 kg/m²   24HR INTAKE/OUTPUT:    Intake/Output Summary (Last 24 hours) at 12/8/2022 1407  Last data filed at 12/8/2022 0700  Gross per 24 hour   Intake 600 ml   Output 1220 ml   Net -620 ml     CT Head W/O Contrast    Result Date: 11/24/2022  EXAMINATION: CT OF THE HEAD WITHOUT CONTRAST  11/24/2022 9:38 am TECHNIQUE: CT of the head was performed without the administration of intravenous contrast. Automated exposure control, iterative reconstruction, and/or weight based adjustment of the mA/kV was utilized to reduce the radiation dose to as low as reasonably achievable. COMPARISON: None. HISTORY: ORDERING SYSTEM PROVIDED HISTORY: LOC + fall + strike to head; concern for bleed TECHNOLOGIST PROVIDED HISTORY: Reason for exam:->LOC + fall + strike to head; concern for bleed Has a \"code stroke\" or \"stroke alert\" been called? ->No Decision Support Exception - unselect if not a suspected or confirmed emergency medical condition->Emergency Medical Condition (MA) FINDINGS: BRAIN/VENTRICLES: No evidence of parenchymal hemorrhages or contusions. No evidence of intra or extra-axial fluid collection is seen. Scattered areas of low attenuation are visualized in the periventricular and subcortical white matter suggestive of chronic microvascular disease. No evidence of acute territorial infarct is seen. Prominence of the ventricles and sulci is visualized suggestive of chronic atrophic brain changes. No evidence of intracranial mass or mass effect, no evidence of midline shift is seen. No evidence of sellar or parasellar mass is visualized. ORBITS: The visualized portion of the orbits demonstrate no acute abnormality. SINUSES: The visualized paranasal sinuses and mastoid air cells demonstrate no acute abnormality.  SOFT TISSUES/SKULL:  No acute abnormality of the visualized skull or soft tissues. No acute intracranial abnormality. CT CSpine W/O Contrast    Result Date: 11/24/2022  EXAMINATION: CT OF THE CERVICAL SPINE WITHOUT CONTRAST 11/24/2022 9:38 am TECHNIQUE: CT of the cervical spine was performed without the administration of intravenous contrast. Multiplanar reformatted images are provided for review. Automated exposure control, iterative reconstruction, and/or weight based adjustment of the mA/kV was utilized to reduce the radiation dose to as low as reasonably achievable. COMPARISON: None. HISTORY: ORDERING SYSTEM PROVIDED HISTORY: LOC + Fall + head strike; Concern for fx TECHNOLOGIST PROVIDED HISTORY: Reason for exam:->LOC + Fall + head strike; Concern for fx Decision Support Exception - unselect if not a suspected or confirmed emergency medical condition->Emergency Medical Condition (MA) FINDINGS: Straightening of the lumbar the columns of the cervical spine is present. No evidence of spondylolisthesis is seen. Multilevel degenerative endplate changes visualized, no evidence of compression deformity of the cervical vertebral bodies. No evidence of fracture or traumatic subluxation is seen. Decreased intervertebral disc height visualized most prominent at C5-C6 and C3-C4. Multilevel degenerative disc osteophyte complex, uncovertebral disease and hypertrophic changes in the facet joints is seen. Calcification visualized within the spinal canal superimposed over the falciform ligament at the level of the C5 vertebral body. Otherwise abnormal density visualized in the cervical spinal canal. The neck soft tissues are unremarkable. Circumferential opacification visualized in the sphenoid sinus. Limited evaluation of the upper lung fields is unremarkable bilaterally. No acute abnormality of the cervical spine. Multilevel degenerative changes of the cervical spine visualized most prominent at C3-C4 and C5-C6.  Circumferential opacification visualized in the sphenoid sinus. XR CHEST PORTABLE    Result Date: 11/25/2022  EXAMINATION: ONE XRAY VIEW OF THE CHEST 11/25/2022 4:57 pm COMPARISON: 11/24/2022. HISTORY: ORDERING SYSTEM PROVIDED HISTORY: Pacemaker placement and rule out pneumothorax TECHNOLOGIST PROVIDED HISTORY: Reason for exam:->Pacemaker placement and rule out pneumothorax What reading provider will be dictating this exam?->CRC FINDINGS: AICD visualized in the left chest with lead in the heart. EKG leads are seen superimposed over the chest. Poor inspiratory effort is seen. The CT of the prominence of the cardiomediastinal silhouette is visualized demonstrates no significant increase in comparison to the prior study. Atherosclerotic calcifications visualized in the area the aortic arch. Mild prominence of the bronchovascular and interstitial lung markings is visualized in bilateral lung fields more prominent in the lateral right mid lung field large and in lung field linear subsegmental atelectatic streaks of visualized bilateral lung fields. No evidence of focal infiltrate or consolidation. No evidence of pneumothorax or pleural effusion. Degenerative bone changes seen. No evidence of pneumothorax or parenchymal contusion. AICD visualized in the left chest with leads in the heart. XR CHEST PORTABLE    Result Date: 11/24/2022  EXAMINATION: ONE XRAY VIEW OF THE CHEST 11/24/2022 9:30 am COMPARISON: 08/08/2022 HISTORY: ORDERING SYSTEM PROVIDED HISTORY: CP + SOB TECHNOLOGIST PROVIDED HISTORY: Reason for exam:->CP + SOB FINDINGS: Poor inspiratory effort. The cardiomediastinal silhouette is slightly prominent but demonstrates no significant change in comparison to the prior study. Prominence of the bronchovascular interstitial lung markings is visualized bilaterally with scattered areas of patchy airspace opacification seen. Linea subsegmental atelectatic streaks are visualized.   The costophrenic angles are clear with no evidence of pleural effusion seen.  No evidence of pneumothorax or parenchymal lung mass. The osseous structures are without acute process. Congestive changes demonstrate no change.      CBC:   Lab Results   Component Value Date/Time    WBC 10.2 12/08/2022 06:36 AM    RBC 3.76 12/08/2022 06:36 AM    HGB 10.3 12/08/2022 06:36 AM    HCT 31.7 12/08/2022 06:36 AM    MCV 84.3 12/08/2022 06:36 AM    MCH 27.4 12/08/2022 06:36 AM    MCHC 32.5 12/08/2022 06:36 AM    RDW 13.8 12/08/2022 06:36 AM     12/08/2022 06:36 AM    MPV 8.7 12/08/2022 06:36 AM     BMP:    Lab Results   Component Value Date/Time     12/08/2022 06:36 AM    K 4.0 12/08/2022 06:36 AM    K 4.5 11/24/2022 09:08 AM     12/08/2022 06:36 AM    CO2 20 12/08/2022 06:36 AM    BUN 59 12/08/2022 06:36 AM    LABALBU 3.0 12/08/2022 06:36 AM    CREATININE 2.0 12/08/2022 06:36 AM    CALCIUM 9.3 12/08/2022 06:36 AM    GFRAA 30 09/16/2022 08:24 AM    LABGLOM 31 12/08/2022 06:36 AM    GLUCOSE 121 12/08/2022 06:36 AM      finasteride  5 mg Per NG tube Daily    amLODIPine  10 mg Oral Daily    divalproex  250 mg Oral Q12H    ceFAZolin  1,000 mg IntraVENous Q12H    bisacodyl  10 mg Rectal Daily    levETIRAcetam  500 mg Oral BID    lacosamide  200 mg Oral BID    lidocaine   Topical Once    insulin glargine  15 Units SubCUTAneous Daily    insulin lispro  0-4 Units SubCUTAneous Q4H    sennosides-docusate sodium  2 tablet Oral Daily    polyethylene glycol  17 g Oral Daily    guaiFENesin  400 mg Oral TID    sodium chloride flush  5-40 mL IntraVENous 2 times per day    lidocaine  1 patch TransDERmal Daily    allopurinol  300 mg Oral Once per day on Mon Wed Fri    gabapentin  100 mg Oral BID    Arformoterol Tartrate  15 mcg Nebulization BID    lisinopril  20 mg Oral Daily    And    hydroCHLOROthiazide  12.5 mg Oral Daily     Opens eyes to voice follows simple commands drain functions  Assessment:  Patient Active Problem List   Diagnosis    PUD (peptic ulcer disease)    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

## 2022-12-08 NOTE — PLAN OF CARE
Problem: Chronic Conditions and Co-morbidities  Goal: Patient's chronic conditions and co-morbidity symptoms are monitored and maintained or improved  Outcome: Progressing     Problem: Discharge Planning  Goal: Discharge to home or other facility with appropriate resources  Outcome: Progressing     Problem: Pain  Goal: Verbalizes/displays adequate comfort level or baseline comfort level  Outcome: Progressing     Problem: Genitourinary - Adult  Goal: Absence of urinary retention  Outcome: Progressing     Problem: Metabolic/Fluid and Electrolytes - Adult  Goal: Electrolytes maintained within normal limits  Outcome: Progressing

## 2022-12-08 NOTE — PROGRESS NOTES
12/08/22 0829   Treatment   Treatment Type HHN   $Treatment Type $Inhaled Therapy/Meds   Medications Albuterol/Ipratropium   Oxygen Therapy/Pulse Ox   Heart Rate 74   Resp 14   SpO2 97 % Message addressed.

## 2022-12-08 NOTE — ACP (ADVANCE CARE PLANNING)
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    Esophagitis-grade 1 K20.90    AP (angina pectoris) ([facility]) G44.6    Systolic hypertension A77    Combined forms of age-related cataract of left eye H25.812    Anemia, chronic renal failure, stage 4 (severe) ([facility]) N18.4, D63.1    Sepsis secondary to CAP ([facility]) A41.9    Pneumonia of left lung due to infectious organism J18.9    A-fib ([facility]) I48.91    Atrial fibrillation ([facility]) I48.91    Third degree heart block ([facility]) I44.2    High-grade atrioventricular block I44.39    Seizures ([facility]) R56.9    SDH (subdural hematoma) S06. 5XAA    History of cardiac pacemaker in situ Z95.0       SUBJECTIVE:  Caridad Mejia is awake but not oriented to time place or person. He attempts to answer my questions in short order. No complaints of palpitations nor chest discomfort no shortness of breath presently. REVIEW OF SYSTEMS:  Unable to accurately obtain secondary to patient's depressed neurologic status. Meeta Alvarado MEDICATIONS:   ceFAZolin  1,000 mg IntraVENous Q12H    bisacodyl  10 mg Rectal Daily    levETIRAcetam  500 mg Oral BID    lacosamide  200 mg Oral BID    lidocaine   Topical Once    insulin glargine  15 Units SubCUTAneous Daily    insulin lispro  0-4 Units SubCUTAneous Q4H    sennosides-docusate sodium  2 tablet Oral Daily    NIFEdipine  60 mg Oral Daily    divalproex  500 mg Oral 2 times per day    polyethylene glycol  17 g Oral Daily    guaiFENesin  400 mg Oral TID    sodium chloride flush  5-40 mL IntraVENous 2 times per day    lidocaine  1 patch TransDERmal Daily    allopurinol  300 mg Oral Once per day on Mon Wed Fri    gabapentin  100 mg Oral BID    tamsulosin  0.4 mg Oral Nightly    Arformoterol Tartrate  15 mcg Nebulization BID    lisinopril  20 mg Oral Daily    And    hydroCHLOROthiazide  12.5 mg Oral Daily       VITAL SIGNS:                                                                                                                          BP (!) 166/80   Pulse 73   Temp 98 °F (36.7 °C) (Bladder)   Resp 15   Ht 5' 9\" (1.753 m)   Wt 180 lb 8 oz (81.9 kg)   SpO2 93%   BMI 26.66 kg/m²   Patient Vitals for the past 96 hrs (Last 3 readings):   Weight   12/07/22 0600 180 lb 8 oz (81.9 kg)   12/06/22 0600 181 lb (82.1 kg)   12/05/22 0442 182 lb 15.7 oz (83 kg)     OBJECTIVE:    HEENT: PERRL, EOM  Intact; sclera non-icteric, conjunctiva pink. Carotids are brisk in upstroke with normal contour. No carotid bruits. Normal jugular venous pulsation at 45°. No palpable cervical nor supraclavicular nodes. Thyroid not palpable. Trachea midline. Chest: Even excursion  LungsDecreased bases bilaterally no expiratory wheezes or rhonchi, no decreased tactile fremitus without inspiratory rales. Heart: Regular  rhythm; S1 > S2, no gallop or murmur. No clicks, rub, palpable thrills   or heaves. PMI nondisplaced, 5th intercostal space MCL. Abdomen: Soft, nontender, nondistended,  mildly protuberant, no masses or organomegaly. Bowel sounds active. Extremities: Without clubbing, cyanosis or edema. Pulses present 3+ upper extermities bilaterally; present 1+ DP  bilaterally and present 1+ PT bilaterally.      Data:   Scheduled Meds: Reviewed  Continuous Infusions:    sodium chloride      dextrose         Intake/Output Summary (Last 24 hours) at 12/7/2022 2324  Last data filed at 12/7/2022 1800  Gross per 24 hour   Intake 600 ml   Output 1045 ml   Net -445 ml     CBC:   Recent Labs     12/05/22  0450 12/06/22  0435 12/07/22  0420   WBC 12.3* 12.3* 10.9   HGB 10.6* 10.5* 11.0*   HCT 31.9* 31.7* 33.8*    416 500*     BMP:  Recent Labs     12/05/22  0450 12/06/22  0435 12/07/22  0420    139 139   K 4.9 4.0 4.0    105 105   CO2 17* 20* 20*   BUN 54* 58* 60*   CREATININE 2.1* 2.0* 2.0*   LABGLOM 30 31 31 ABGs: No results found for: PH, PO2, PCO2  INR:   Recent Labs     12/05/22  0450 12/06/22  0530 12/07/22  0420   INR 1.2 1.2 1.2     PRO-BNP:   Lab Results   Component Value Date    PROBNP 8,347 (H) 08/02/2022      TSH:   Lab Results   Component Value Date    TSH 3.970 11/24/2022      Cardiac Injury Profile: No results for input(s): TROPHS in the last 72 hours. Lipid Profile: No results found for: TRIG, HDL, LDLCALC, CHOL   Hemoglobin A1C: No components found for: HGBA1C      RAD:   XR ABDOMEN (KUB) (SINGLE AP VIEW)    Result Date: 12/7/2022  NG/OG feeding tube terminating near the gastroduodenal junction with decreased redundancy of the tube in the proximal stomach since earlier the same day. XR ABDOMEN (KUB) (SINGLE AP VIEW)    Result Date: 12/7/2022  The feeding tube tip lies in the region of the distal stomach or D1 duodenal bulb. CT HEAD WO CONTRAST    Result Date: 12/7/2022  Stable left holo hemispheric subdural hematoma with subdural drain and 9 mm left to right midline shift. Blood is noted to layer on the tentorium unchanged. 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    Esophagitis-grade 1 K20.90    AP (angina pectoris) ([facility]) P06.9    Systolic hypertension T59    Combined forms of age-related cataract of left eye H25.812    Anemia, chronic renal failure, stage 4 (severe) ([facility]) N18.4, D63.1    Sepsis secondary to CAP ([facility]) A41.9    Pneumonia of left lung due to infectious organism J18.9    A-fib ([facility]) I48.91    Atrial fibrillation ([facility]) I48.91    Third degree heart block ([facility]) I44.2    High-grade atrioventricular block I44.39    Seizures ([facility]) R56.9    SDH (subdural hematoma) S06. 5XAA    History of cardiac pacemaker in situ Z95.0       RECOMMENDATIONS:  Continue conservative measures at this point maintaining systolic blood pressure 1 40-1 60 mmHg and pacemaker functioning normally with underlying atrial fibrillation and intermittent paced beats. I have spent more than 30 critical-care minutes face to face with Kaleb De Jesus and reviewing notes and laboratory data, with greater than 50% of this time instructing and counseling the patient care team face to face regarding my findings and recommendations and I have answered all questions as posed to me by Mr. Platttremayne Alvareze. CARE team members    Anuradha Prado,  FACP,FACC,[facility]      NOTE:  This report was transcribed using voice recognition software.   Every effort was made to ensure accuracy; however, inadvertent computerized transcription errors may be present

## 2022-12-09 NOTE — PROGRESS NOTES
VITAL SIGNS:                                                                                                                          /64   Pulse 72   Temp 98.8 °F (37.1 °C) (Bladder)   Resp 24   Ht 5' 9\" (1.753 m)   Wt 176 lb 5.9 oz (80 kg)   SpO2 95%   BMI 26.05 kg/m²   Patient Vitals for the past 96 hrs (Last 3 readings):   Weight   12/08/22 0600 176 lb 5.9 oz (80 kg)   12/07/22 0600 180 lb 8 oz (81.9 kg)   12/06/22 0600 181 lb (82.1 kg)     OBJECTIVE:    HEENT: PERRL, EOM  Intact; sclera non-icteric, conjunctiva pink. Carotids are brisk in upstroke with normal contour. No carotid bruits. Normal jugular venous pulsation at 45°. No palpable cervical nor supraclavicular nodes. Thyroid not palpable. Trachea midline. Chest: Even excursion  Lungs: grossly clear to auscultation bilaterally no expiratory wheezes or rhonchi, no decreased tactile fremitus without inspiratory rales. Heart: Irregular, regular  rhythm; S1 > S2, no gallop grade 2/6 systolic murmur second right intercostal space no clicks, rub, palpable thrills   or heaves. PMI nondisplaced, 5th intercostal space MCL. Abdomen: Soft, nontender, nondistended,  mildly protuberant, no masses or organomegaly. Bowel sounds active. Extremities: Without clubbing, cyanosis or edema. Pulses present 3+ upper extermities bilaterally; present 1+ DP  bilaterally and present 1+ PT bilaterally.      Data:   Scheduled Meds: Reviewed  Continuous Infusions:    niCARdipene (CARDENE) 50 mg in 250 mL 0.9 % sodium chloride infusion 2.5 mg/hr (12/08/22 2207)    sodium chloride      dextrose         Intake/Output Summary (Last 24 hours) at 12/8/2022 2337  Last data filed at 12/8/2022 2300  Gross per 24 hour   Intake 2104.34 ml   Output 2040 ml   Net 64.34 ml     CBC:   Recent Labs     12/06/22  0435 12/07/22  0420 12/08/22  0636   WBC 12.3* 10.9 10.2   HGB 10.5* 11.0* 10.3*   HCT 31.7* 33.8* 31.7*    500* 487*     BMP:  Recent Labs     12/06/22  0438 12/07/22  0420 12/08/22  0636    139 144   K 4.0 4.0 4.0    105 108*   CO2 20* 20* 20*   BUN 58* 60* 59*   CREATININE 2.0* 2.0* 2.0*   LABGLOM 31 31 31     ABGs:   Lab Results   Component Value Date/Time    PH 7.472 12/08/2022 01:34 PM    PO2 93.6 12/08/2022 01:34 PM    PCO2 29.5 12/08/2022 01:34 PM     INR:   Recent Labs     12/06/22  0530 12/07/22  0420 12/08/22  0636   INR 1.2 1.2 1.3     PRO-BNP:   Lab Results   Component Value Date    PROBNP 8,347 (H) 08/02/2022      TSH:   Lab Results   Component Value Date    TSH 3.970 11/24/2022      Cardiac Injury Profile:   No results for input(s): TROPHS in the last 72 hours. Lipid Profile: No results found for: TRIG, HDL, LDLCALC, CHOL   Hemoglobin A1C: No components found for: HGBA1C      RAD:   CT HEAD WO CONTRAST   Final Result   Interval increased volume and extent of diffuse left cerebral acute subdural   hemorrhage with greatest thickness adjacent to the frontotemporal region now   measuring up to 22 mm in greatest axial thickness. Interval worsening of rightward midline shift at the level of the septum   pellucidum now measuring 8 mm. Mild cerebral white matter chronic microvascular ischemic disease. Mild diffuse cerebral atrophy. Multiple attempts were made by the [facility] core team to page and notify Dr. Mindy Funez. Critical results were then called by Dr. Alea Jessica to [facility] on 11/26/2022 at 22:23. Roxie Lancaster reports that neurosurgery has been   consulted about the case. XR CHEST PORTABLE   Final Result   Mild congestive changes. XR CHEST PORTABLE   Final Result   No evidence of pneumothorax or parenchymal contusion. AICD visualized in the   left chest with leads in the heart.          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    Esophagitis-grade 1 K20.90    AP (angina pectoris) ([facility]) R78.6    Systolic hypertension P21    Combined forms of age-related cataract of left eye H25.812    Anemia, chronic renal failure, stage 4 (severe) ([facility]) N18.4, D63.1    Sepsis secondary to CAP ([facility]) A41.9    Pneumonia of left lung due to infectious organism J18.9    A-fib ([facility]) I48.91    Atrial fibrillation ([facility]) I48.91    Third degree heart block ([facility]) I44.2    High-grade atrioventricular block I44.39    Seizures ([facility]) R56.9    SDH (subdural hematoma) S06. 5XAA    History of cardiac pacemaker in situ Z95.0       RECOMMENDATIONS:   Mr. Shreyas Brunson remains in atrial fibrillation with intermittent pacing. But from a neurologic standpoint he is not significantly improved over the past 72 hours. He has been placed on carvedilol in consultation with me and the ICU resident. Hopefully his renal status will remain stable or in fact improve if at all possible. I have spent more than 30 critical care minutes face to face with Monet Board  and reviewing notes and laboratory data, with greater than 50% of this time reviewing all data and discussing with Dr. Grace Garcia and ' ICU resident . Continue to carefully monitor renal function and blood pressure as well as rhythm. Anjana Acosta, DO FACP,FACC,FSCAI      NOTE:  This report was transcribed using voice recognition software.   Every effort was made to ensure accuracy; however, inadvertent computerized transcription errors may be present

## 2022-12-09 NOTE — PROGRESS NOTES
Hospitalist Progress Note      SYNOPSIS: Patient admitted on 11/24/2022 for A-fib [facility])  80 y.o. male with past medical history of atrial fibrillation, DM hypertension . Patient is a transfer from [place] . He was seen there due to chest pain . He states that chest pain is located in the sternal area . He states  that he has shortness of breath with chest pain . He states that when chest pain occurred he felt like he was going to pass out . Patient had a syncopal episode and hit his head . Patient had a second syncopal episode  and after that family called ambulance . En route to ED patient was given 324 mg of ASA . In the ED patient was in Afib with slow ventricular rate having long pauses and bradycardic to the 30s . Lab data revealed sodium 135 potassium 4.1 BUN 29 Scr 2.0  glucose 244 Trop 45 >>43  WBC 9.9 HGB 10.7   Resp panel neg CT head cervical spine neg CXR congestive changes . Patient received fentanyl morphine and was placed on isoproterenol infusion. EP consulted-pacemaker placement. Lyme serology was ordered for possible carditis. Patient had an initial CT of the head after the fall which was negative for any intracranial hemorrhage. Repeat CT head was obtained on 11/26/2022 as patient continued to complain of headache and spite of pain medicine. Repeat CT of the head noncontrast was obtained which showed progressive subdural hemorrhage. Eliquis was held, [facility] and [facility] ordered for Eliquis reversal.  Neurosurgery following. Worsening mental status with aphasia. Found to have two seizures. Started on Keppra and Vimpat. Neurology following. He has been on continuous video EEG. Seizure medications has been adjusted. Last seizure episode 2350 lasted approximately 3 minutes prior to giving valproate and bolus which was completed at 02 100. No further seizures after that. Appreciate neurology's inputs. Continue Vimpat, Keppra and Depacon.  He is status post left frontal cira hole for placement of subdural drain and drainage of some acute subdural hematoma 2022. continue on Ancef for drain. Drain replaced 22, monitor drain output    SUBJECTIVE:  Patient seen and examined, nods head, aphasic, somnolence improved compared to yesterday slightly  Records reviewed. Stable overnight. No other overnight issues reported. Temp (24hrs), Av.3 °F (36.8 °C), Min:97.9 °F (36.6 °C), Max:98.8 °F (37.1 °C)    DIET: Diet NPO Exceptions are: Sips of Water with Meds  ADULT TUBE FEEDING; Nasogastric; Standard with Fiber; Continuous; 10; Yes; 10; Q 4 hours; 55; 30; Q 1 hour  CODE: Limited    Intake/Output Summary (Last 24 hours) at 2022 1147  Last data filed at 2022 1100  Gross per 24 hour   Intake 2854.46 ml   Output 1830 ml   Net 1024.46 ml       OBJECTIVE:    BP (!) 173/100   Pulse 72   Temp 98.6 °F (37 °C) (Bladder)   Resp 26   Ht 5' 9\" (1.753 m)   Wt 176 lb 5.9 oz (80 kg)   SpO2 98%   BMI 26.05 kg/m²     General appearance:  appears stated age and cooperative. HEENT:  Conjunctivae/corneas clear. Left frontal scalp drain with surgical dressing in place. Neck: Supple. No jugular venous distention. Respiratory: Clear to auscultation bilaterally, normal respiratory effort  Cardiovascular: Regular rate rhythm, normal S1-S2  Abdomen: Soft, nontender, nondistended  Musculoskeletal: No clubbing, cyanosis, no bilateral lower extremity edema. Brisk capillary refill.    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 continue on Ancef for drain. Drain replaced 12/6/22, monitor drain output  - repeat CT head-12/7/2022-shows stable left subdural hematoma with subdural drain and a 9 mm midline shift  - Continue seizure medications per neurology. Vimpat, Depakote and Keppra. EEG-2 hours-shows abnormal prolonged EEG-epileptiform foci noted.   - Appreciate ICU team input.  -Status post pacemaker [third-degree heart block A. fib with RVR]-device interrogated 12/7/2022-overall device function normal    DISPOSITION: continue icu care, per neuro-Consider EEG on Monday and possibly weaning ASM's further if needed    Medications:  REVIEWED DAILY    Infusion Medications    sodium chloride      dextrose       Scheduled Medications    carvedilol  12.5 mg Oral BID WC    potassium & sodium phosphates  2 packet Oral 4x Daily    lactulose  20 g Oral TID    insulin lispro  0-16 Units SubCUTAneous Q4H    finasteride  5 mg Per NG tube Daily    amLODIPine  10 mg Oral Daily    lacosamide  200 mg Oral BID    levETIRAcetam  500 mg Oral BID    ceFAZolin  1,000 mg IntraVENous Q12H    bisacodyl  10 mg Rectal Daily    lidocaine   Topical Once    insulin glargine  15 Units SubCUTAneous Daily    sennosides-docusate sodium  2 tablet Oral Daily    polyethylene glycol  17 g Oral Daily    guaiFENesin  400 mg Oral TID    sodium chloride flush  5-40 mL IntraVENous 2 times per day    lidocaine  1 patch TransDERmal Daily    allopurinol  300 mg Oral Once per day on Mon Wed Fri    Arformoterol Tartrate  15 mcg Nebulization BID    lisinopril  20 mg Oral Daily    And    hydroCHLOROthiazide  12.5 mg Oral Daily     PRN Meds: hydrALAZINE, labetalol, ipratropium-albuterol, perflutren lipid microspheres, trimethobenzamide, sodium chloride flush, sodium chloride, glucose, dextrose bolus **OR** dextrose bolus, glucagon (rDNA), dextrose, potassium chloride **OR** potassium alternative oral replacement **OR** potassium chloride, acetaminophen **OR** acetaminophen, aluminum & magnesium hydroxide-simethicone    Labs:     Recent Labs     12/07/22 0420 12/08/22 0636 12/09/22  0418   WBC 10.9 10.2 11.9*   HGB 11.0* 10.3* 10.6*   HCT 33.8* 31.7* 33.6*   * 487* 516*       Recent Labs     12/07/22 0420 12/08/22  0636 12/09/22  0418    144 140   K 4.0 4.0 3.7    108* 106   CO2 20* 20* 22   BUN 60* 59* 52*   CREATININE 2.0* 2.0* 1.8*   CALCIUM 9.3 9.3 9.6   PHOS 3.7 3.4 2.4*       Recent Labs     12/07/22 0420 12/08/22  0636 12/09/22  0418   PROT 6.4 6.3* 6.2*   ALKPHOS 97 99 108   ALT 5 <5 <5   AST 15 14 16   BILITOT <0.2 0.3 0.3       Recent Labs     12/07/22 0420 12/08/22 0636 12/09/22 0418   INR 1.2 1.3 1.3       No results for input(s): Reece Ruiz in the last 72 hours. Chronic labs:    Lab Results   Component Value Date    TSH 3.970 11/24/2022    PSA 3.76 09/16/2022    INR 1.3 12/09/2022    LABA1C 8.5 (H) 08/02/2022       Radiology: REVIEWED DAILY    +++++++++++++++++++++++++++++++++++++++++++++++++  Tory Moirllo MD  [facility] Physician - Hospitalist  [address] [place] [address]  +++++++++++++++++++++++++++++++++++++++++++++++++  NOTE: This report was transcribed using voice recognition software. Every effort was made to ensure accuracy; however, inadvertent computerized transcription errors may be present.

## 2022-12-09 NOTE — PLAN OF CARE
Problem: Neurosensory - Adult  Goal: Achieves maximal functionality and self care  Outcome: Not Progressing     Problem: Genitourinary - Adult  Goal: Absence of urinary retention  12/9/2022 0323 by Astrid Serra RN  Outcome: Not Progressing  12/8/2022 1633 by Win Jaramillo RN  Outcome: Progressing     Problem: Chronic Conditions and Co-morbidities  Goal: Patient's chronic conditions and co-morbidity symptoms are monitored and maintained or improved  12/9/2022 0323 by Astrid Serra RN  Outcome: Progressing  Flowsheets (Taken 12/8/2022 2000)  Care Plan - Patient's Chronic Conditions and Co-Morbidity Symptoms are Monitored and Maintained or Improved: Monitor and assess patient's chronic conditions and comorbid symptoms for stability, deterioration, or improvement  12/8/2022 1633 by Win Jaramillo RN  Outcome: Progressing     Problem: Discharge Planning  Goal: Discharge to home or other facility with appropriate resources  12/9/2022 0323 by Astrid Serra RN  Outcome: Progressing  12/8/2022 1633 by Win Jaramillo RN  Outcome: Progressing     Problem: Pain  Goal: Verbalizes/displays adequate comfort level or baseline comfort level  12/9/2022 0323 by Astrid Serra RN  Outcome: Progressing  12/8/2022 1633 by Win Jaramillo RN  Outcome: Progressing     Problem: Safety - Adult  Goal: Free from fall injury  Outcome: Progressing     Problem: ABCDS Injury Assessment  Goal: Absence of physical injury  Outcome: Progressing     Problem: Neurosensory - Adult  Goal: Absence of seizures  Outcome: Progressing     Problem: Metabolic/Fluid and Electrolytes - Adult  Goal: Electrolytes maintained within normal limits  Recent Flowsheet Documentation  Taken 12/8/2022 2000 by Astrid Serra RN  Electrolytes maintained within normal limits:   Monitor labs and assess patient for signs and symptoms of electrolyte imbalances   Administer electrolyte replacement as ordered  12/8/2022 1633 by Win Jaramillo RN  Outcome: Progressing     Problem: Safety - Medical Restraint  Goal: Remains free of injury from restraints (Restraint for Interference with Medical Device)  Description: INTERVENTIONS:  1. Determine that other, less restrictive measures have been tried or would not be effective before applying the restraint  2. Evaluate the patient's condition at the time of restraint application  3. Inform patient/family regarding the reason for restraint  4.  Q2H: Monitor safety, psychosocial status, comfort, nutrition and hydration  Outcome: Progressing  Flowsheets  Taken 12/9/2022 0200  Remains free of injury from restraints (restraint for interference with medical device): Every 2 hours: Monitor safety, psychosocial status, comfort, nutrition and hydration  Taken 12/9/2022 0000  Remains free of injury from restraints (restraint for interference with medical device): Every 2 hours: Monitor safety, psychosocial status, comfort, nutrition and hydration  Taken 12/8/2022 2200  Remains free of injury from restraints (restraint for interference with medical device): Every 2 hours: Monitor safety, psychosocial status, comfort, nutrition and hydration  Taken 12/8/2022 2000  Remains free of injury from restraints (restraint for interference with medical device): Every 2 hours: Monitor safety, psychosocial status, comfort, nutrition and hydration     Problem: Cardiovascular - Adult  Goal: Maintains optimal cardiac output and hemodynamic stability  Recent Flowsheet Documentation  Taken 12/8/2022 2000 by Lucas Whitley RN  Maintains optimal cardiac output and hemodynamic stability:   Monitor blood pressure and heart rate   Monitor urine output and notify Licensed Independent Practitioner for values outside of normal range   Assess for signs of decreased cardiac output  Goal: Absence of cardiac dysrhythmias or at baseline  Recent Flowsheet Documentation  Taken 12/8/2022 2000 by Lucas Whitley RN  Absence of cardiac dysrhythmias or at baseline: Monitor cardiac rate and rhythm     Problem: Metabolic/Fluid and Electrolytes - Adult  Goal: Hemodynamic stability and optimal renal function maintained  Recent Flowsheet Documentation  Taken 12/8/2022 2000 by Susy Zaragoza RN  Hemodynamic stability and optimal renal function maintained:   Monitor labs and assess for signs and symptoms of volume excess or deficit   Monitor intake, output and patient weight  Goal: Glucose maintained within prescribed range  Recent Flowsheet Documentation  Taken 12/8/2022 2000 by Susy Zaragoza RN  Glucose maintained within prescribed range: Monitor blood glucose as ordered     Problem: Neurosensory - Adult  Goal: Achieves maximal functionality and self care  Outcome: Not Progressing     Problem: Genitourinary - Adult  Goal: Absence of urinary retention  12/9/2022 0323 by Susy Zaragoza RN  Outcome: Not Progressing  12/8/2022 1633 by Moiz Cummins RN  Outcome: Progressing

## 2022-12-09 NOTE — PROGRESS NOTES
Physical Therapy  Physical Therapy Attempt    Name: Lyssa Villeda  : 10/27/1933  MRN: [id number]      Date of Service: 2022  Chart reviewed. Attempted treatment session this afternoon; however, pt does not follow commands. Will re-attempt as able.     Andrea Mccormick, PT, DPT  [id number]

## 2022-12-09 NOTE — FLOWSHEET NOTE
Patient will reach at lines and tubes needing to be redirected much of the time. Attempting to provide calm environment and reorientation, but patient still assessed to be a risk of harm to self. Family aware for need of restraints. Will continue to monitor patient and assess for earliest removal capability.

## 2022-12-09 NOTE — PLAN OF CARE
Problem: Chronic Conditions and Co-morbidities  Goal: Patient's chronic conditions and co-morbidity symptoms are monitored and maintained or improved  12/9/2022 0944 by Felicia Harper RN  Outcome: Progressing     Problem: Discharge Planning  Goal: Discharge to home or other facility with appropriate resources  12/9/2022 0944 by Felicia Harper RN  Outcome: Progressing     Problem: Pain  Goal: Verbalizes/displays adequate comfort level or baseline comfort level  12/9/2022 0944 by Felicia Harper RN  Outcome: Progressing     Problem: Safety - Adult  Goal: Free from fall injury  12/9/2022 0944 by Felicia Harper RN  Outcome: Progressing  12/9/2022 0323 by Suyapa Morrell RN  Outcome: Progressing     Problem: Safety - Adult  Goal: Free from fall injury  12/9/2022 0944 by Felicia Harper RN  Outcome: Progressing     Problem: ABCDS Injury Assessment  Goal: Absence of physical injury  12/9/2022 0944 by Felicia Harper RN  Outcome: Progressing     Problem: Neurosensory - Adult  Goal: Absence of seizures  12/9/2022 0944 by Felicia Harper RN  Outcome: Progressing     Problem: Cardiovascular - Adult  Goal: Maintains optimal cardiac output and hemodynamic stability  Outcome: Progressing  Flowsheets (Taken 12/8/2022 2000 by Suyapa Morrell RN)  Maintains optimal cardiac output and hemodynamic stability:   Monitor blood pressure and heart rate   Monitor urine output and notify Licensed Independent Practitioner for values outside of normal range   Assess for signs of decreased cardiac output     Problem: Skin/Tissue Integrity - Adult  Goal: Skin integrity remains intact  Outcome: Progressing     Problem: Genitourinary - Adult  Goal: Absence of urinary retention  12/9/2022 0323 by Suyapa Morrell RN  Outcome: Not Progressing     Problem: Metabolic/Fluid and Electrolytes - Adult  Goal: Electrolytes maintained within normal limits  Outcome: Progressing  Flowsheets (Taken 12/8/2022 2000 by Suyapa Morrell RN)  Electrolytes maintained within normal limits:   Monitor labs and assess patient for signs and symptoms of electrolyte imbalances   Administer electrolyte replacement as ordered  Goal: Hemodynamic stability and optimal renal function maintained  Recent Flowsheet Documentation  Taken 12/8/2022 2000 by Emy Lopez RN  Hemodynamic stability and optimal renal function maintained:   Monitor labs and assess for signs and symptoms of volume excess or deficit   Monitor intake, output and patient weight  Goal: Glucose maintained within prescribed range  Recent Flowsheet Documentation  Taken 12/8/2022 2000 by Emy Lopez RN  Glucose maintained within prescribed range: Monitor blood glucose as ordered     Problem: Hematologic - Adult  Goal: Maintains hematologic stability  Outcome: Progressing     Problem: Skin/Tissue Integrity  Goal: Absence of new skin breakdown  Description: 1. Monitor for areas of redness and/or skin breakdown  2. Assess vascular access sites hourly  3. Every 4-6 hours minimum:  Change oxygen saturation probe site  4. Every 4-6 hours:  If on nasal continuous positive airway pressure, respiratory therapy assess nares and determine need for appliance change or resting period. Outcome: Progressing     Problem: Nutrition Deficit:  Goal: Optimize nutritional status  Outcome: Progressing     Problem: Safety - Medical Restraint  Goal: Remains free of injury from restraints (Restraint for Interference with Medical Device)  Description: INTERVENTIONS:  1. Determine that other, less restrictive measures have been tried or would not be effective before applying the restraint  2. Evaluate the patient's condition at the time of restraint application  3. Inform patient/family regarding the reason for restraint  4.  Q2H: Monitor safety, psychosocial status, comfort, nutrition and hydration  12/9/2022 0944 by Cheo Best RN  Outcome: Progressing  Flowsheets  Taken 12/9/2022 0824 by Cheo Best RN  Remains free of injury from restraints (restraint for interference with medical device): Every 2 hours: Monitor safety, psychosocial status, comfort, nutrition and hydration  Taken 12/9/2022 0800 by Tonya Gregorio RN  Remains free of injury from restraints (restraint for interference with medical device): Every 2 hours: Monitor safety, psychosocial status, comfort, nutrition and hydration  Taken 12/9/2022 0600 by Daniel Friedman RN  Remains free of injury from restraints (restraint for interference with medical device): Every 2 hours: Monitor safety, psychosocial status, comfort, nutrition and hydration  Taken 12/9/2022 0400 by Daniel Friedman RN  Remains free of injury from restraints (restraint for interference with medical device): Every 2 hours: Monitor safety, psychosocial status, comfort, nutrition and hydration  12/9/2022 0323 by Daniel Friedman RN  Outcome: Progressing  Flowsheets  Taken 12/9/2022 0200  Remains free of injury from restraints (restraint for interference with medical device): Every 2 hours: Monitor safety, psychosocial status, comfort, nutrition and hydration  Taken 12/9/2022 0000  Remains free of injury from restraints (restraint for interference with medical device): Every 2 hours: Monitor safety, psychosocial status, comfort, nutrition and hydration  Taken 12/8/2022 2200  Remains free of injury from restraints (restraint for interference with medical device): Every 2 hours: Monitor safety, psychosocial status, comfort, nutrition and hydration  Taken 12/8/2022 2000  Remains free of injury from restraints (restraint for interference with medical device): Every 2 hours: Monitor safety, psychosocial status, comfort, nutrition and hydration     Problem: Neurosensory - Adult  Goal: Achieves maximal functionality and self care  12/9/2022 0323 by Daniel Friedman RN  Outcome: Not Progressing     Problem: Genitourinary - Adult  Goal: Absence of urinary retention  12/9/2022 0323 by Daniel Friedman RN  Outcome: Not Progressing

## 2022-12-09 NOTE — FLOWSHEET NOTE
Patient remains confused and intermittently agitated/restless and will pull at lines/tubes despite verbal redirection. Continued use of bilateral wrist restraints for patient's safety.

## 2022-12-09 NOTE — PROGRESS NOTES
Speech Language Pathology      NAME:  Williams Swann  :  10/27/1933  DATE: 2022  ROOM:  [facility]-A    Spoke with RN, Pt remains inappropriate for SLP intervention today. Will re-attempt as able.      A-fib ([facility] [address] [I48.91]  Atrial fibrillation ([facility] [address] [I48.91]

## 2022-12-09 NOTE — PROGRESS NOTES
Surgical Intensive Care Unit   Daily Progress Note     Patient's name:  Robinson Burch  Age/Gender: 80 y.o. male  Date of Admission: 11/24/2022  1:48 PM  Length of Stay: 15    Reason for ICU: Patient waking up from anesthesia. Moves LUE spontaneously. Inappropriate speech. Overnight Events: Increased coreg and started on cardene for increasing blood pressure. Hospital Course: Robinson Burch is a 80year old male who presents to the SICU s/p craniotomy for SDH. Patient initially presented to the hospital on 11/24 for chest pain. At that time, he had a syncopal event and hit his head. Initial CT head was read as negative. He had a pacemaker placed on 11/25 for sick sinus syndrome. Follow-up CT head showed large acute SDH with shift. There were reportedly no neurologic deficits at that time. Over the past two days, patient became more lethargic. 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 He was started on Keppra and Vimpat. Patient was on Eliquis which was reversed. 12/5 went yesterday for bur hole for his increasing subdural hematoma with worsening neurological exam.  Repeat head CT done this morning lessening of midline shift still with left subdural hematoma with a drain in place  12/6 No issues overnight   12/7 patient more somnolent this morning not following commands just saying yes to questions but not appropriate. Sent for stat head CT personally reviewed this appeared stable from the last CT head.   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    Esophagitis-grade 1    AP (angina pectoris) ([facility] [address]    Systolic hypertension    Combined forms of age-related cataract of left eye    Anemia, chronic renal failure, stage 4 (severe) (HCC)    Sepsis secondary to CAP [facility])    Pneumonia of left lung due to infectious organism    A-fib [facility])    Atrial fibrillation (HCC)    Third degree heart block (HCC)    High-grade atrioventricular block    Seizure (HCC)    Subdural hematoma    History of cardiac pacemaker in situ       Surgical/Interventional Procedures:       Vent Settings: Additional Respiratory Assessments  Heart Rate: 70  Resp: 17  SpO2: 98 %  ABG:   Recent Labs     22  1334   PH 7.472*   PCO2 29.5*   PO2 93.6   HCO3 21.1*   BE -1.7   O2SAT 97.1       I/O:  I/O last 3 completed shifts:   In: 2793.5 [I.V.:557.4; NG/GT:1735; IV Piggyback:501.1]  Out: 9770 [Urine:2775; Drains:240]  I/O this shift:  In: 61 [I.V.:61]  Out: 205 [Urine:205]  [REMOVED] Urinary Catheter 22-Output (mL): 45 mL  Urinary Catheter 22 Gtbvx-Wdouqdopcth-Kadbin (mL): 30 mL     Stool (measured) : 0 mL      Drips:   sodium chloride      dextrose         Physical Exam:   BP (!) 159/77   Pulse 70   Temp 98.8 °F (37.1 °C) (Bladder)   Resp 17   Ht 5' 9\" (1.753 m)   Wt 176 lb 5.9 oz (80 kg)   SpO2 98%   BMI 26.05 kg/m²     Average, Min, and Max for last 24 hours Vitals:  Temp:  Temp  Av.3 °F (36.8 °C)  Min: 97.9 °F (36.6 °C)  Max: 98.8 °F (37.1 °C)  RR: Resp  Av.7  Min: 9  Max: 26  HR: Pulse  Av.9  Min: 69  Max: 85  BP:  Systolic (92FQT), TUZ:632 , Min:125 , RGI:936   ; Diastolic (53DBW), BTX:71, Min:64, Max:102    SpO2: SpO2  Av.3 %  Min: 82 %  Max: 99 %              CONSTITUTIONAL: no acute distress, lying in hospital bed, sedated   NEUROLOGIC: PERRL, Subdural drain in place, not following commands   CARDIOVASCULAR: S1 S2, regular rate,   PULMONARY: no rhonchi/rales/wheezes, no use of accessory muscles  RENAL: toure to gravity, clear yellow urine  ABDOMEN: soft, nontender, nondistended, nontympanic, no masses, no organomegaly, normal bowel sounds   MUSCULOSKELETAL: moves all extremities   SKIN/EXTREMITIES: no rashes/ecchymosis, no edema/clubbing, warm/dry, good capillary refill       ASSESSMENT / PLAN:   Assessment   Principal Problem:    A-fib (HCC)  Active Problems:    Atrial fibrillation (HCC)    Third degree heart block (HCC)    High-grade atrioventricular block    Seizures (HCC)    SDH (subdural hematoma)    History of cardiac pacemaker in situ  Resolved Problems:    * No resolved hospital problems.  *        Plan   GI:  Tfs at goal , Senakot  glycolax, dulcolax suppository, start lactulose  Neuro:  Depakote sprinkles stopped, Vimpat, Keppra, Neurontin-100 twice daily, will get a Keppra level prn Tylenol   Neurosurgery following for SDH  , Maintain Normonatremia >140, and All IV infusions and IVPB should be in 0.9% NaCl if available  , CT head improving s/p Bur hole   Renal: Stop IVFs Proscar, monitor Urine Output, Daily CBC,BMP, Mg,Phos, ionized Ca    Musculoskeletal: WBAT all extremities , Spines Clear AM-PAC Score pending  Allopurinol   Pulmonary: Aggressive pulmonary hygiene , brovana , guaifenesin , duonebs, afrin , Monitor RR and Maintain SpO2 > 92%  ID:  Ancef while drains in     Monitor leukocytosis and Monitor Fever Curve  Heme: No indication for Transfusion ,  Monitor Hb   Cardiac: Monitor Hemodynamics- norvasc, coreg, was on cardene drip  Endocrine: Maintain glucose <180 hold Januvia, Amaryl, Prandin 15U lantus SSI      DVT Prophylaxis: PCDs, Hold Chemoprophylaxis until  SD drain removed   Ulcer Prophylaxis: PPI home medication   Tubes and Lines: PIV , NG tube, Stephens-we will allow patient to get a few doses of the prescribed and take Stephens catheter out  Seizure proph:     Keppra/Vimpat  Ancillary consults:   Neurosurgery, Palliative Care, and PT/OT  , Neurology , IM , EP   Family Update:        When available  CODE Status:       DNR-Limited    Dispo: SICU      Electronically signed by Haroldo Farias DO PGY-2 12/9/2022  12:41 PM

## 2022-12-09 NOTE — PROGRESS NOTES
SICU Intensive Care Unit   Daily Progress Note     Date of Admission: 11/24/2022     Reason for ICU: Subdural hematoma after falling, left craniotomy     HPI: Pt is an 80year old male who presented to the SICU s/p craniotomy for SDH. Patient initially came to the hospital on 11/24 for chest pain. Pacemaker placed on 11/25 for sick sinus syndrome. Pt had a syncopal event 11/24 and hit his head. Initial CT head was negative. Follow-up CT head showed large acute SDH with shift. Over the past two days, patient became more lethargic. 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    Esophagitis-grade 1    AP (angina pectoris) (HCC)    Systolic hypertension    Combined forms of age-related cataract of left eye    Anemia, chronic renal failure, stage 4 (severe) (HCC)    Sepsis secondary to CAP ([address]    Pneumonia of left lung due to infectious organism    A-fib [facility])    Atrial fibrillation (HCC)    Third degree heart block (HCC)    High-grade atrioventricular block    Seizures (HCC)    SDH (subdural hematoma)    History of cardiac pacemaker in situ       Overnight Events: Increased coreg and started on cardene for increasing blood pressure. VS:  -HR 86-94  -RR 16-19  -95-97%O2 Sat  -/88   Surgical/Interventional Procedures: Left Craniotomy, cira hole 12/4/2022        Imaging:   -Head CT 12/5/2022 showed reducing Left Subdural hematoma; left dural drainage catheter with no evidence of complication    I/O last 3 completed shifts:   In: 1500 [I.V.:100; NG/GT:1400]  Out: 3555 [Urine:2415; Drains:150]  I/O this shift:  In: 1230.2 [I.V.:440.1; NG/GT:289; IV Piggyback:501.1]  Out: 455 [Urine:455]      Physical Exam:     BP (!) 159/83   Pulse 78   Temp 98.1 °F (36.7 °C) (Bladder)   Resp 24 Ht 5' 9\" (1.753 m)   Wt 176 lb 5.9 oz (80 kg)   SpO2 96%   BMI 26.05 kg/m²       GCS:    3 - Opens eyes to loud noise or command   6 - Follows simple motor commands  4 - Seems confused, disoriented    Pupil size:  Left 3 mm    Right 3 mm    Pupil reaction: Yes    Wiggles fingers: Left Yes Right Yes    Hand grasp:   Left normal       Right normal    Wiggles toes: Left Yes    Right Yes    Plantar flexion: Left normal     Right normal      CONSTITUTIONAL: no acute distress, lying in hospital bed, sedated   NEUROLOGIC: PERRL, oriented x 4  CARDIOVASCULAR: S1 S2, regular rate, regular rhythm, no murmur/gallop/rub  PULMONARY: no rhonchi/rales/wheezes, no use of accessory muscles  RENAL: toure to gravity, clear yellow urine  ABDOMEN: soft, nontender, nondistended, nontympanic, no masses, no organomegaly, normal bowel sounds   MUSCULOSKELETAL: moves all extremities purposefully, 5/5 strength   SKIN/EXTREMITIES: no rashes/ecchymosis, no edema/clubbing, warm/dry, good capillary refill       ASSESSMENT / PLAN:   Neuro:  -SDH s/p craniotomy, drain was placed 12/4             -Drainage showed ~250 mL blood              -Neurosurgery Following              -Ancef for drainage  -Left frontal Lobe Seizures              -Neuro Following              -Repeat EEG 12/7              - IV Keppra 500mg q12     - Depakote Stopped  CV:  -Pacemaker placed for Sick Sinus Syndrome 11/25/22  -HTN              -Lisinopril, HCTZ, Nifedipine, PRN hydralazine, PRN Labetalol  -Hold Eliquis  -Cardio Following  Pulm:   -Acute Hypoxic Repiratory Failure                   -Duoneb, Brovana, Guaifensein  GI:   - Initiate Tfs, Senekot, Dulcolax  -Add Lactulose  Renal:       -CKD Stage 4                    -Monitor CMP daily        -Hx of Urinary Retention                    -Tamsulosin 0.4 mg nightly         - Stop IV Fluids  ID:               -IV Ancef 1000mg IV q8h  Endo:               - Sliding High Scale 15U Lantus  Heme:        -Monitor CBC, Transfuse if Hgb <7     DVT proph: PCDs, hold chemoppx until SD drain is removed  GI proph:  Protonix 40mg BID        Dispo: Continue Care in SICU       [place], [id number] [facility]

## 2022-12-09 NOTE — PROGRESS NOTES
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    Esophagitis-grade 1 K20.90    AP (angina pectoris) ([facility]) N53.7    Systolic hypertension A42    Combined forms of age-related cataract of left eye H25.812    Anemia, chronic renal failure, stage 4 (severe) ([facility]) N18.4, D63.1    Sepsis secondary to CAP ([facility]) A41.9    Pneumonia of left lung due to infectious organism J18.9    A-fib ([facility]) I48.91    Atrial fibrillation ([facility]) I48.91    Third degree heart block ([facility]) I44.2    High-grade atrioventricular block I44.39    Seizure ([facility]) R56.9    Subdural hematoma S06. 5XAA    History of cardiac pacemaker in situ Z95.0       SUBJECTIVE:  Nasir Silva Is certainly lethargic and somewhat somnolent but attempts to answer my questions with both eyelids remain nearly closed. But opens his eyes and makes eye contact. Blood pressure was a bit difficult to control today and medications further adjusted. REVIEW OF SYSTEMS:  Presently unable to accurately obtain due to  patient's profound neurologic state.       SCHEDULED MEDICATIONS:   carvedilol  12.5 mg Oral BID WC    potassium & sodium phosphates  2 packet Oral 4x Daily    lactulose  20 g Oral TID    insulin lispro  0-16 Units SubCUTAneous Q4H    finasteride  5 mg Per NG tube Daily    amLODIPine  10 mg Oral Daily    lacosamide  200 mg Oral BID    levETIRAcetam  500 mg Oral BID    ceFAZolin  1,000 mg IntraVENous Q12H    bisacodyl  10 mg Rectal Daily    lidocaine   Topical Once    insulin glargine  15 Units SubCUTAneous Daily    sennosides-docusate sodium  2 tablet Oral Daily    polyethylene glycol  17 g Oral Daily    guaiFENesin  400 mg Oral TID    sodium chloride flush  5-40 mL IntraVENous 2 times per day    lidocaine  1 patch TransDERmal Daily    allopurinol  300 mg Oral Once per day on Mon Wed Fri    Arformoterol Tartrate  15 mcg Nebulization BID    lisinopril  20 mg Oral Daily    And    hydroCHLOROthiazide  12.5 mg Oral Daily       VITAL SIGNS:                                                                                                                          /66   Pulse 78   Temp 98.6 °F (37 °C) (Bladder)   Resp 10   Ht 5' 9\" (1.753 m)   Wt 176 lb 5.9 oz (80 kg)   SpO2 98%   BMI 26.05 kg/m²   Patient Vitals for the past 96 hrs (Last 3 readings):   Weight   12/08/22 0600 176 lb 5.9 oz (80 kg)   12/07/22 0600 180 lb 8 oz (81.9 kg)   12/06/22 0600 181 lb (82.1 kg)     OBJECTIVE:    HEENT: PERRL, EOM  Intact; sclera non-icteric, conjunctiva pink. Carotids are brisk in upstroke with normal contour. No carotid bruits. Normal jugular venous pulsation at 45°. No palpable cervical nor supraclavicular nodes. Thyroid not palpable. Trachea midline. Chest: Even excursion  Lungs: grossly clear to auscultation bilaterally no expiratory wheezes or rhonchi, no decreased tactile fremitus without inspiratory rales. Heart: Irregular, regular  rhythm; S1 > S2, no gallop grade 2/6 systolic murmur second right intercostal space no clicks, rub, palpable thrills   or heaves. PMI nondisplaced, 5th intercostal space MCL. Abdomen: Soft, nontender, nondistended,  mildly protuberant, no masses or organomegaly. Bowel sounds active. Extremities: Without clubbing, cyanosis or edema. Pulses present 3+ upper extermities bilaterally; present 1+ DP  bilaterally and present 1+ PT bilaterally.      Data:   Scheduled Meds: Reviewed  Continuous Infusions:    sodium chloride      dextrose         Intake/Output Summary (Last 24 hours) at 12/9/2022 1656  Last data filed at 12/9/2022 1300  Gross per 24 hour   Intake 2354.46 ml   Output 1560 ml   Net 794.46 ml     CBC:   Recent Labs     12/07/22  0420 12/08/22  0636 12/09/22  0418   WBC 10.9 10.2 11.9*   HGB 11.0* 10.3* 10.6*   HCT 33.8* 31.7* 33.6*   * 487* 516*     BMP:  Recent Labs 12/07/22  0420 12/08/22  0636 12/09/22  0418    144 140   K 4.0 4.0 3.7    108* 106   CO2 20* 20* 22   BUN 60* 59* 52*   CREATININE 2.0* 2.0* 1.8*   LABGLOM 31 31 36     ABGs:   Lab Results   Component Value Date/Time    PH 7.472 12/08/2022 01:34 PM    PO2 93.6 12/08/2022 01:34 PM    PCO2 29.5 12/08/2022 01:34 PM     INR:   Recent Labs     12/07/22  0420 12/08/22  0636 12/09/22  0418   INR 1.2 1.3 1.3     PRO-BNP:   Lab Results   Component Value Date    PROBNP 8,347 (H) 08/02/2022      TSH:   Lab Results   Component Value Date    TSH 3.970 11/24/2022      Cardiac Injury Profile:   No results for input(s): TROPHS in the last 72 hours. Lipid Profile: No results found for: TRIG, HDL, LDLCALC, CHOL   Hemoglobin A1C: No components found for: HGBA1C      RAD:   CT HEAD WO CONTRAST   Final Result   Interval increased volume and extent of diffuse left cerebral acute subdural   hemorrhage with greatest thickness adjacent to the frontotemporal region now   measuring up to 22 mm in greatest axial thickness. Interval worsening of rightward midline shift at the level of the septum   pellucidum now measuring 8 mm. Mild cerebral white matter chronic microvascular ischemic disease. Mild diffuse cerebral atrophy. Multiple attempts were made by the [facility] core team to page and notify Dr. Monica Perea. Critical results were then called by Dr. Marv Jones to [facility] on 11/26/2022 at 22:23. [facility] reports that neurosurgery has been   consulted about the case. XR CHEST PORTABLE   Final Result   Mild congestive changes. XR CHEST PORTABLE   Final Result   No evidence of pneumothorax or parenchymal contusion. AICD visualized in the   left chest with leads in the heart.          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    Esophagitis-grade 1 K20.90    AP (angina pectoris) ([facility]) X96.3    Systolic hypertension L78    Combined forms of age-related cataract of left eye H25.812    Anemia, chronic renal failure, stage 4 (severe) ([facility]) N18.4, D63.1    Sepsis secondary to CAP ([facility]) A41.9    Pneumonia of left lung due to infectious organism J18.9    A-fib ([facility]) I48.91    Atrial fibrillation ([facility]) I48.91    Third degree heart block ([facility]) I44.2    High-grade atrioventricular block I44.39    Seizure ([facility]) R56.9    Subdural hematoma S06. 5XAA    History of cardiac pacemaker in situ Z95.0       RECOMMENDATIONS:   Mr. Candace Neumann remains quite depressed but neurologic standards however his blood pressure and heart rhythm remained somewhat stable and that he remains in permanent atrial fibrillation with backup pacemaker I am quite concerned however that his neurologic status has not improved much over the past 36 hours. I have spent more than 30 critical care minutes face to face with Aurelio Ochoa  and reviewing notes and laboratory data, with greater than 50% of this time reviewing all data and discussing with Dr. Angel Saenz and ' ICU resident . Continue to carefully monitor renal function and blood pressure as well as rhythm. Akilah Centeno,  FACP,FACC,FSCAI      NOTE:  This report was transcribed using voice recognition software.   Every effort was made to ensure accuracy; however, inadvertent computerized transcription errors may be present

## 2022-12-09 NOTE — PROGRESS NOTES
[facility]  SURGICAL INTENSIVE CARE UNIT (SICU)  ATTENDING PHYSICIAN CRITICAL CARE PROGRESS NOTE     I have examined the patient, reviewed the record,and discussed the case with the APN/  Resident. I have reviewed all relevant labs and imaging data. The following summarizes my clinical findings and independent assessment. Date of admission:  11/24/2022    CC: Patient waking up from anesthesia. Moves LUE spontaneously. Inappropriate speech. HOSPITAL COURSE:   Monet Garnica is a 80year old male who presents to the SICU s/p craniotomy for SDH. Patient initially presented to the hospital on 11/24 for chest pain. At that time, he had a syncopal event and hit his head. Initial CT head was read as negative. He had a pacemaker placed on 11/25 for sick sinus syndrome. Follow-up CT head showed large acute SDH with shift. There were reportedly no neurologic deficits at that time. Over the past two days, patient became more lethargic. 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 He was started on Keppra and Vimpat. Patient was on Eliquis which was reversed. 12/5 went yesterday for bur hole for his increasing subdural hematoma with worsening neurological exam.  Repeat head CT done this morning lessening of midline shift still with left subdural hematoma with a drain in place  12/6 No issues overnight   12/7 patient more somnolent this morning not following commands just saying yes to questions but not appropriate. Sent for stat head CT personally reviewed this appeared stable from the last CT head. Spoke with neurology we will repeat EEG  12/8 much more somnolent today   12/9 patient was excessively somnolent yesterday.   Discussed care with neurology yesterday they decreased Depakote in half they we will stop that today they also stopped gabapentin    New Imaging Reviewed and personally interpreted:    No new imaging    New Labs reviewed sodium 140, potassium 3.7, creatinine 1.8, glucose 234, Phos 2.4, LFTs normal, white blood cell count 11.9, hemoglobin 10.6, platelet count 984    Physical Exam  HENT:      Head: Normocephalic. Comments: Subdural drain in place  Eyes:      Extraocular Movements: Extraocular movements intact. Pupils: Pupils are equal, round, and reactive to light. Cardiovascular:      Rate and Rhythm: Normal rate. Comments: pacer  Pulmonary:      Effort: Pulmonary effort is normal.   Abdominal:      General: Abdomen is flat. There is no distension. Tenderness: There is no abdominal tenderness. Musculoskeletal:         General: Normal range of motion. Cervical back: Neck supple. Comments: Slightly better neuro exam today seems to be somewhat purposeful but still not following commands moving all extremities   Skin:     General: Skin is warm. Neurological:      General: No focal deficit present. Mental Status: He is alert. Psychiatric:         Mood and Affect: Mood normal.       Assessment   Principal Problem:    A-fib ([address]  Active Problems:    Atrial fibrillation (HCC)    Third degree heart block (HCC)    High-grade atrioventricular block    Seizures (HCC)    SDH (subdural hematoma)    History of cardiac pacemaker in situ  Resolved Problems:    * No resolved hospital problems.  *      Plan   GI:  Start TFs  , Senakot  glycolax, dulcolax suppository add lactulose   Neuro:  Depakote sprinkles stopped , Vimpat, Keppra,  Keppra level pending gabapentin stopped, prn Tylenol   Neurosurgery following for SDH  , Maintain Normonatremia >140, and All IV infusions and IVPB should be in 0.9% NaCl if available  , CT head improving s/p Bur hole -stat repeat yesterday subdural hematoma was essentially stable therefore got an EEG-neurology will be reviewing this again  Renal: Stop IVFs , Proscar, monitor Urine Output, Daily CBC,BMP, Mg,Phos, ionized Ca    Musculoskeletal: WBAT all extremities , Spines Clear AM-PAC Score pending  Allopurinol   Pulmonary: Aggressive pulmonary hygiene , brovana , guaifenesin , duonebs, afrin , Monitor RR and Maintain SpO2 > 92%  ID:  Ancef while drains in     Monitor leukocytosis and Monitor Fever Curve  Heme: No indication for Transfusion ,  Monitor Hb   Cardiac: Monitor Hemodynamics lisinopril, hydrochlorothiazide, nifedipine - can't crush now NPO NGT only will need to choose alternate medication continue  norvasc , was started on Cardene last night off now, on  coreg 12.5 mg BID , Hold Eliquis-new pacemaker seems to be malfunctioning  but EP reviewed this they stated there is no issue with that but he is going in A. fib   Endocrine: Maintain glucose <180 hold Januvia, Amaryl, Prandin 15U lantus SSI change to high dose     DVT Prophylaxis: PCDs, Hold Chemoprophylaxis until  SD drain removed   Ulcer Prophylaxis: PPI home medication   Tubes and Lines: PIV , NG tube, SD drain, Stephens-we will allow patient to get a few doses of the prescribed and take Stephens catheter out tomorrow   Seizure proph:     Keppra/Vimpat    Ancillary consults:   Neurosurgery, Palliative Care, and PT/OT  , Neurology , IM , EP   Family Update:   Daily when present or as needed   CODE Status:       DNR-Limited      Dispo: SICU    Fernanda Miller MD    Critical Care: 35 minutes evaluating and managing patient with Risk of neurological decompensation,, requiring frequent and emergent imaging, lab studies, intensive monitoring, data review, and adjusting the clinical plan as well as urgent coordination with multiple specialists. , and At risk for further deterioration  time exclusive of teaching and procedures

## 2022-12-09 NOTE — PROGRESS NOTES
Sara Cheatham is a 80 y.o. right handed male    Patient presented to the hospital on 11/24/2022 for chest pain and shortness of breath. He does have a history of A. fib and was placed on Eliquis in the past.    He has been having worsening dizziness and passed out striking his head. Initial CT was completed demonstrating no acute abnormality however on personal review it does appear to show a small left subdural hematoma. However, given its initial review of being unrevealing he was placed back on his home dose of Eliquis. And a pacemaker placed on 11/25/2022. On 11/26/2022 he had a persistent headache and having increasing more confusion/aphasia repeat CT was completed on 11/26/2022 which demonstrated an increased volume of subdural hematoma over the left frontal temporal region. Eliquis was reversed but unfortunately he became more somnolent and had more bereket aphasia and EEG did demonstrate a left frontal onset seizure activity. He had been placed on Keppra and subsequently Vimpat as well as Depakote for concern of breakthrough seizures. He did undergo bur hole for subdural hematoma Stan Cantu on 12/4. EEG on 12/6 demonstrated moderate to severe encephalopathy however no epileptiform activity. On 12/7/2022 he was increasing more lethargic 2-hour EEG was connected but also demonstrated moderate encephalopathy and no recurrent seizures    Yesterday on 12/8/2022 his Depakote was backed down to 250 mg twice a day.     I did discuss with the son at this time who I know very well  We will hold his Neurontin as well as Depakote at this time    Thankfully there have been no recurrent seizures      Allergies as of 11/24/2022    (No Known Allergies)     Objective:     /77   Pulse 72   Temp 98.1 °F (36.7 °C) (Bladder)   Resp 16   Ht 5' 9\" (1.753 m)   Wt 176 lb 5.9 oz (80 kg)   SpO2 98%   BMI 26.05 kg/m²      General appearance: Awake, appears stated age  Extremities: no cyanosis or edema  Pulses: 2+ and symmetric  Skin: no rashes or lesions    Mental Status: Awake for me today-looking around the room-does not appear lethargic    Nonverbal for me    Not following any commands    Cranial Nerves:  I: smell    II: visual acuity     II: visual fields Bilateral threat   II: pupils ANGELICA   III,VII: ptosis None   III,IV,VI: extraocular muscles  Follows the examiner around the room   V: mastication    V: facial light touch sensation  Normal   V,VII: corneal reflex  Present   VII: facial muscle function - upper     VII: facial muscle function - lower Normal   VIII: hearing Normal   IX: soft palate elevation     IX,X: gag reflex    XI: trapezius strength     XI: sternocleidomastoid strength    XI: neck extension strength     XII: tongue strength       Minimal random movements in all extremities    Grimaces but limited withdraw to noxious stimulation in all extremities    DTR:   No reflexes    Right Babinski and triple flexion response     No Ruiz's     Bilateral palmar grasps  No suck reflex    Laboratory/Radiology:     CBC with Differential:    Lab Results   Component Value Date/Time    WBC 11.9 12/09/2022 04:18 AM    RBC 3.93 12/09/2022 04:18 AM    HGB 10.6 12/09/2022 04:18 AM    HCT 33.6 12/09/2022 04:18 AM     12/09/2022 04:18 AM    MCV 85.5 12/09/2022 04:18 AM    MCH 27.0 12/09/2022 04:18 AM    MCHC 31.5 12/09/2022 04:18 AM    RDW 13.7 12/09/2022 04:18 AM    NRBC 2.8 12/04/2022 04:22 AM    LYMPHOPCT 10.3 12/09/2022 04:18 AM    MONOPCT 8.3 12/09/2022 04:18 AM    BASOPCT 0.7 12/09/2022 04:18 AM    MONOSABS 0.98 12/09/2022 04:18 AM    LYMPHSABS 1.22 12/09/2022 04:18 AM    EOSABS 0.01 12/09/2022 04:18 AM    BASOSABS 0.08 12/09/2022 04:18 AM     CMP:    Lab Results   Component Value Date/Time     12/09/2022 04:18 AM    K 3.7 12/09/2022 04:18 AM    K 4.5 11/24/2022 09:08 AM     12/09/2022 04:18 AM    CO2 22 12/09/2022 04:18 AM    BUN 52 12/09/2022 04:18 AM    CREATININE 1.8 12/09/2022 04:18 AM    GFRAA 30 09/16/2022 08:24 AM    LABGLOM 36 12/09/2022 04:18 AM    GLUCOSE 234 12/09/2022 04:18 AM    PROT 6.2 12/09/2022 04:18 AM    LABALBU 3.2 12/09/2022 04:18 AM    CALCIUM 9.6 12/09/2022 04:18 AM    BILITOT 0.3 12/09/2022 04:18 AM    ALKPHOS 108 12/09/2022 04:18 AM    AST 16 12/09/2022 04:18 AM    ALT <5 12/09/2022 04:18 AM     CT Head: 11/24/22  No acute intracranial abnormality. However - upon person review        CT Head: 12/7/22  Stable left holo hemispheric subdural hematoma with subdural drain and 9 mm  left to right midline shift. Blood is noted to layer on the tentorium  unchanged. I personally reviewed the patient's lab and imaging studies at this time. Assessment:     Patient admitted with falls on Eliquis found to have a large subdural hematoma now status post bur hole and unfortunately left frontal parietal onset seizures. Now well controlled on Keppra 500 mg twice a day due to renal function as well as Vimpat 200 mg twice a day. Thankfully without recurrent seizures    Thankfully with less lethargy today      Plan:      Will hold gabapentin and Depakote at this time    Continue Vimpat and Keppra at its current doses    Consider EEG on Monday and possibly weaning ASM's further if needed    Questions answered at this time    I did also discussed with ICU team    KELLY Ferreira - CNS  10:24 AM  12/9/2022

## 2022-12-09 NOTE — PROGRESS NOTES
OCCUPATIONAL THERAPY    Date:2022  Patient Name: Aurelio Ochoa  MRN: [id number]  : 10/27/1933  Room: [address] [place] [address]-A              Chart reviewed. Attempted OT session this pm. Pt attends to name but not following commands for participation in session at this time. Will re-attempt at later time.     Dede Reed, OTR/L [id number]

## 2022-12-09 NOTE — CARE COORDINATION
12/9 Care Coordination: Pt remains in SICU, Cont with Drain s/p [facility]. Stopped Depakote today. Start TFs  per NG. Cont in Restraints. Family, has [facility] list for [employer]. Also Acute Rehab is following. CM/SW will continue to follow for discharge planning.    Orquidea LOUN,RN-CV-BC  [phone]

## 2022-12-10 PROBLEM — Z51.5 PALLIATIVE CARE ENCOUNTER: Status: ACTIVE | Noted: 2022-01-01

## 2022-12-10 PROBLEM — E88.09 HYPOALBUMINEMIA: Status: ACTIVE | Noted: 2022-01-01

## 2022-12-10 PROBLEM — Z98.890 HISTORY OF BURR HOLE SURGERY: Status: ACTIVE | Noted: 2022-01-01

## 2022-12-10 PROBLEM — E87.8 ELECTROLYTE IMBALANCE: Status: ACTIVE | Noted: 2022-01-01

## 2022-12-10 PROBLEM — E83.52 HYPERCALCEMIA: Status: ACTIVE | Noted: 2022-01-01

## 2022-12-10 NOTE — PROGRESS NOTES
Gerhardt Castleman is a 80 y.o. right handed male    Neurology is following for SDH and seizures    PMH: Hypertension, hyperlipidemia, diabetes, A. fib    Patient presented to the hospital on 11/24/2022 for chest pain and shortness of breath. He does have a history of A. fib and was placed on Eliquis in the past.    He has been having worsening dizziness and passed out striking his head. Initial CT was completed demonstrating no acute abnormality however on personal review it does appear to show a small left subdural hematoma. Unfortunately given the initial read patient was placed back on his home dose of Eliquis and pacemaker was placed on 11/25/2022. On 11/26/2022 he had a persistent headache and having increasing more confusion/aphasia so repeat CT was completed on 11/26/2022 which demonstrated an increased volume of subdural hematoma over the left frontal temporal region. Eliquis was reversed but unfortunately he became more somnolent and had more bereket aphasia and EEG at the time did demonstrate seizure activity from the left frontal lobe. He had been placed on Keppra and subsequently Vimpat as well as Depakote for concern of breakthrough seizures. He underwent bur hole surgery for subdural hematoma on 12/4. EEG on 12/6 demonstrated moderate to severe encephalopathy however no epileptiform activity. On 12/7/2022 he was increasing more lethargic 2-hour EEG was connected but also demonstrated moderate encephalopathy and no recurrent seizures    On 12/8/2022 his Depakote was backed down to 250 mg twice a day. On 12/9 patient was made to hold Neurontin and Depakote due to continued somnolence. Soham Hernandez discussed at length with patient's son    Thankfully there have been no recurrent seizures. Patient is hypertensive and has low-grade fever this morning otherwise stable on room air. Currently there are no family members present at bedside.     ROS is limited due to aphasia    Allergies as of 11/24/2022    (No Known Allergies)     Objective:     BP (!) 163/62   Pulse 74   Temp 99.5 °F (37.5 °C) (Bladder)   Resp 17   Ht 5' 9\" (1.753 m)   Wt 176 lb 5.9 oz (80 kg)   SpO2 94%   BMI 26.05 kg/m²      General appearance: Awake, eyes open, aphasic appears stated age, no obvious distress  Heart: Irregular rate and rhythm, A. fib and V paced on telemetry  Lungs: Shallow breaths on room air  Extremities: no cyanosis or edema  Pulses: 2+ and symmetric  Skin: no rashes or lesions    Mental Status: Awake, has eyes open, does track me when his name is called, will squeeze my hand on the left. Aphasic/nonverbal       Cranial Nerves:  I: smell    II: visual acuity     II: visual fields Bilateral blink to threat   II: pupils PERRL   III,VII: ptosis None   III,IV,VI: extraocular muscles  Follows the examiner around the room   V: mastication    V: facial light touch sensation  Normal   V,VII: corneal reflex  Present   VII: facial muscle function - upper     VII: facial muscle function - lower Normal   VIII: hearing Normal   IX: soft palate elevation     IX,X: gag reflex    XI: trapezius strength     XI: sternocleidomastoid strength    XI: neck extension strength     XII: tongue strength       Motor:   Movement left leg spontaneously  Squeeze my hand to the left to command  Normal bulk and tone  No abnormal movements    Sensory:  Withdraws and grimaces to pain in all    Coordination:  Unable to test due to patient's condition    Gait:   Unable to test due to patient condition    DTR:   +2 throughout    Right Babinski and triple flexion response   No Ruiz's     Bilateral palmar grasps  No suck reflex    Laboratory/Radiology:     CBC with Differential:    Lab Results   Component Value Date/Time    WBC 14.9 12/10/2022 05:18 AM    RBC 4.43 12/10/2022 05:18 AM    HGB 12.1 12/10/2022 05:18 AM    HCT 38.5 12/10/2022 05:18 AM     12/10/2022 05:18 AM    MCV 86.9 12/10/2022 05:18 AM    MCH 27.3 12/10/2022 05:18 AM    MCHC 31.4 12/10/2022 05:18 AM    RDW 13.7 12/10/2022 05:18 AM    NRBC 2.8 12/04/2022 04:22 AM    LYMPHOPCT 9.2 12/10/2022 05:18 AM    MONOPCT 8.7 12/10/2022 05:18 AM    BASOPCT 0.5 12/10/2022 05:18 AM    MONOSABS 1.30 12/10/2022 05:18 AM    LYMPHSABS 1.38 12/10/2022 05:18 AM    EOSABS 0.01 12/10/2022 05:18 AM    BASOSABS 0.08 12/10/2022 05:18 AM     CMP:    Lab Results   Component Value Date/Time     12/10/2022 06:09 AM    K 4.1 12/10/2022 06:09 AM    K 4.5 11/24/2022 09:08 AM     12/10/2022 06:09 AM    CO2 21 12/10/2022 06:09 AM    BUN 52 12/10/2022 06:09 AM    CREATININE 2.1 12/10/2022 06:09 AM    GFRAA 30 09/16/2022 08:24 AM    LABGLOM 30 12/10/2022 06:09 AM    GLUCOSE 260 12/10/2022 06:09 AM    PROT 7.0 12/10/2022 06:09 AM    LABALBU 3.2 12/10/2022 06:09 AM    CALCIUM 10.1 12/10/2022 06:09 AM    BILITOT 0.2 12/10/2022 06:09 AM    ALKPHOS 119 12/10/2022 06:09 AM    AST 21 12/10/2022 06:09 AM    ALT <5 12/10/2022 06:09 AM     CT Head: 11/24/22  No acute intracranial abnormality. However - upon person review        CT Head: 12/7/22  Stable left holo hemispheric subdural hematoma with subdural drain and 9 mm  left to right midline shift. Blood is noted to layer on the tentorium  unchanged. I personally reviewed the patient's lab and imaging studies at this time. Assessment:     Patient admitted with falls on Eliquis found to have a large subdural hematoma   Now status post bur hole and unfortunately left frontal parietal onset seizures. Now well controlled on Keppra 500 mg twice a day due to renal function as well as Vimpat 200 mg twice a day. Thankfully without recurrent seizures in the last 2 days  Patient is awake although he remains aphasic; does not appear to be lethargic today   Will continue to monitor closely.     Plan:     Continue supportive care  Neurochecks every hour  BP goal less than 768 systolically  Continue Vimpat 200 mg twice daily  Continue Keppra 500 mg twice daily  Will hold gabapentin and Depakote at this time as he appears to be more awake  Continue PT/OT/ST as able  SCDs  Seizure precautions    Will consider EEG on Monday and possibly weaning ASM's further if needed      KELLY Modi  8:27 AM  12/10/2022

## 2022-12-10 NOTE — CONSULTS
Palliative Care Department  [phone]  Palliative Care Initial Consult  Provider Orquideapat John, AXSN - [address] [place] [address]  [id number]  Hospital Day: 16  Date of Initial Consult: 12/10/2022  Referring Provider: Dr. Kendra Dewitt was consulted for assistance with: goals of care     HPI:   Flora Aranda is a 80 y.o. with a medical history of atrial fibrillation, diabetes, HLD, HTN who was admitted on 11/24/2022 from home with a CHIEF COMPLAINT of syncopal episode. ASSESSMENT/PLAN:     Pertinent Hospital Diagnoses     Syncope episode 2/2 complete heart block  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 sinus syndrome      Palliative Care Encounter / Counseling Regarding Goals of Care  Please see detailed goals of care discussion as below  At this time, Flora Aranda, Does Not have capacity for medical decision-making. Capacity is time limited and situation/question specific  During encounter Dr. Jeffrey Grace was surrogate medical decision-maker  Outcome of goals of care meeting:   Continue current medical management  Discussion on hospice care  Code status Limited meds only  Advanced Directives: no POA or living will in [facility]  Surrogate/Legal NOK:  Dr. Can Atkinson (child) [phone]  Anjali Padilla (child) [phone]    Spiritual assessment: no spiritual distress identified  Bereavement and grief: to be determined  Referrals to: none today  SUBJECTIVE:     Current medical issues leading to Palliative Medicine involvement include   Active Hospital Problems    Diagnosis Date Noted    History of cira hole surgery [Z98.890] 12/10/2022     Priority: Medium    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 5XAA] 11/30/2022     Priority: Medium    Seizure ([address] [R56.9] 11/28/2022     Priority: Medium    High-grade atrioventricular block [I44.39] 11/26/2022     Priority: Medium    Third degree heart prognosis. Thank you for allowing Palliative Medicine to participate in the care of [facility].

## 2022-12-10 NOTE — PROGRESS NOTES
Hospitalist Progress Note      SYNOPSIS: Patient admitted on 11/24/2022 for A-fib [facility])  80 y.o. male with past medical history of atrial fibrillation, DM hypertension . Patient is a transfer from [facility] . He was seen there due to chest pain . He states that chest pain is located in the sternal area . He states  that he has shortness of breath with chest pain . He states that when chest pain occurred he felt like he was going to pass out . Patient had a syncopal episode and hit his head . Patient had a second syncopal episode  and after that family called ambulance . En route to ED patient was given 324 mg of ASA . In the ED patient was in Afib with slow ventricular rate having long pauses and bradycardic to the 30s . Lab data revealed sodium 135 potassium 4.1 BUN 29 Scr 2.0  glucose 244 Trop 45 >>43  WBC 9.9 HGB 10.7   Resp panel neg CT head cervical spine neg CXR congestive changes . Patient received fentanyl morphine and was placed on isoproterenol infusion. EP consulted-pacemaker placement. Lyme serology was ordered for possible carditis. Patient had an initial CT of the head after the fall which was negative for any intracranial hemorrhage. Repeat CT head was obtained on 11/26/2022 as patient continued to complain of headache and spite of pain medicine. Repeat CT of the head noncontrast was obtained which showed progressive subdural hemorrhage. Eliquis was held, [facility] ordered for Eliquis reversal.  Neurosurgery following. Worsening mental status with aphasia. Found to have two seizures. Started on Keppra and Vimpat. Neurology following. He has been on continuous video EEG. Seizure medications has been adjusted. Last seizure episode 2350 lasted approximately 3 minutes prior to giving valproate and bolus which was completed at 02 100. No further seizures after that. Appreciate neurology's inputs. Continue Vimpat, Keppra and Depacon.  He is status post left frontal cira hole for placement of subdural drain and drainage of some acute subdural hematoma 2022. continue on Ancef for drain. Drain replaced 22, monitor drain output    SUBJECTIVE:  Patient seen and examined, nods head, aphasic, somnolence improved compared to yesterday slightly, gabapentin and depakote held  Records reviewed. Stable overnight. No other overnight issues reported. Temp (24hrs), Av.3 °F (37.4 °C), Min:98.6 °F (37 °C), Max:100 °F (37.8 °C)    DIET: Diet NPO Exceptions are: Sips of Water with Meds  ADULT TUBE FEEDING; Nasogastric; Standard with Fiber; Continuous; 10; Yes; 10; Q 4 hours; 55; 30; Q 1 hour  CODE: Limited    Intake/Output Summary (Last 24 hours) at 12/10/2022 1300  Last data filed at 12/10/2022 0923  Gross per 24 hour   Intake 1410 ml   Output 1120 ml   Net 290 ml       OBJECTIVE:    BP (!) 149/91   Pulse 71   Temp 99.5 °F (37.5 °C) (Bladder)   Resp 21   Ht 5' 9\" (1.753 m)   Wt 176 lb 5.9 oz (80 kg)   SpO2 98%   BMI 26.05 kg/m²     General appearance:  appears stated age and cooperative. HEENT:  Conjunctivae/corneas clear. Left frontal scalp drain with surgical dressing in place. Neck: Supple. No jugular venous distention. Respiratory: Clear to auscultation bilaterally, normal respiratory effort  Cardiovascular: Regular rate rhythm, normal S1-S2  Abdomen: Soft, nontender, nondistended  Musculoskeletal: No clubbing, cyanosis, no bilateral lower extremity edema. Brisk capillary refill.    Skin:  No rashes  on visible skin  Neurologic: awake, alert and follows simple commands, expressive aphasia, decreased strength    ASSESSMENT:  -Third-degree heart block status post pacemaker placement 2022   -Syncope and collapse secondary to third-degree heart block also sick sinus syndrome  -Subdural hematoma  -Status post left frontal cira hole for placement of subdural drain and drainage of some acute subdural hematoma 2022  -Atrial fibrillation Eliquis, Eliquis held due to subdural hematoma   -Seizure disorder   -Hypertension  -CKD stage III  -Diabetes mellitus type 2       PLAN:  - Neurosurgery following appreciate their input- Status post left frontal cira hole for placement of subdural drain and drainage of some acute subdural hematoma 12/4/2022. continue on Ancef for drain. Drain replaced 12/6/22, monitor drain output  - repeat CT head-12/10/2022-shows stable left subdural hematoma with subdural drain and a 8 mm midline shift(improved from 9mm)  - Continue seizure medications per neurology. Vimpat, Depakote and Keppra. EEG-2 hours-shows abnormal prolonged EEG-epileptiform foci noted.   - Appreciate ICU team input.  -Status post pacemaker [third-degree heart block A. fib with RVR]-device interrogated 12/7/2022-overall device function normal    DISPOSITION: continue icu care, per neuro-Consider EEG on Monday and possibly weaning ASM's further if needed    Medications:  REVIEWED DAILY    Infusion Medications    sodium chloride      dextrose       Scheduled Medications    insulin glargine  15 Units SubCUTAneous BID    carvedilol  12.5 mg Oral BID WC    lactulose  20 g Oral TID    insulin lispro  0-16 Units SubCUTAneous Q4H    finasteride  5 mg Per NG tube Daily    amLODIPine  10 mg Oral Daily    lacosamide  200 mg Oral BID    levETIRAcetam  500 mg Oral BID    ceFAZolin  1,000 mg IntraVENous Q12H    bisacodyl  10 mg Rectal Daily    lidocaine   Topical Once    sennosides-docusate sodium  2 tablet Oral Daily    polyethylene glycol  17 g Oral Daily    guaiFENesin  400 mg Oral TID    sodium chloride flush  5-40 mL IntraVENous 2 times per day    lidocaine  1 patch TransDERmal Daily    allopurinol  300 mg Oral Once per day on Mon Wed Fri    Arformoterol Tartrate  15 mcg Nebulization BID    lisinopril  20 mg Oral Daily    And    hydroCHLOROthiazide  12.5 mg Oral Daily     PRN Meds: hydrALAZINE, labetalol, ipratropium-albuterol, perflutren lipid microspheres, trimethobenzamide, sodium chloride flush, sodium chloride, glucose, dextrose bolus **OR** dextrose bolus, glucagon (rDNA), dextrose, potassium chloride **OR** potassium alternative oral replacement **OR** potassium chloride, acetaminophen **OR** acetaminophen, aluminum & magnesium hydroxide-simethicone    Labs:     Recent Labs     12/08/22  0636 12/09/22 0418 12/10/22  0518   WBC 10.2 11.9* 14.9*   HGB 10.3* 10.6* 12.1*   HCT 31.7* 33.6* 38.5   * 516* 561*       Recent Labs     12/08/22  0636 12/09/22  0418 12/10/22  0609    140 145   K 4.0 3.7 4.1   * 106 110*   CO2 20* 22 21*   BUN 59* 52* 52*   CREATININE 2.0* 1.8* 2.1*   CALCIUM 9.3 9.6 10.1   PHOS 3.4 2.4* 2.9       Recent Labs     12/08/22  0636 12/09/22  0418 12/10/22  0609   PROT 6.3* 6.2* 7.0   ALKPHOS 99 108 119   ALT <5 <5 <5   AST 14 16 21   BILITOT 0.3 0.3 0.2       Recent Labs     12/08/22  0636 12/09/22 0418 12/10/22  0518   INR 1.3 1.3 1.2       No results for input(s): Twyla Resendez in the last 72 hours. Chronic labs:    Lab Results   Component Value Date    TSH 3.970 11/24/2022    PSA 3.76 09/16/2022    INR 1.2 12/10/2022    LABA1C 8.5 (H) 08/02/2022       Radiology: REVIEWED DAILY    +++++++++++++++++++++++++++++++++++++++++++++++++  Bethany Jeong MD  [employer] - [address] [facility], [place]  +++++++++++++++++++++++++++++++++++++++++++++++++  NOTE: This report was transcribed using voice recognition software. Every effort was made to ensure accuracy; however, inadvertent computerized transcription errors may be present.

## 2022-12-10 NOTE — PROGRESS NOTES
[facility]  SURGICAL INTENSIVE CARE UNIT (SICU)  ATTENDING PHYSICIAN CRITICAL CARE PROGRESS NOTE     I have examined the patient, reviewed the record, and discussed the case with the APN/ resident. Please refer to the APN/ resident's note. I agree with the assessment and plan. I have reviewed all relevant labs and imaging data. The following summarizes my clinical findings and independent assessment. CC:  critical care management for [facility] Course/Overnight Events:  Kaleb De Jesus is a 80year old male who presents to the SICU s/p craniotomy for SDH. Patient initially presented to the hospital on 11/24 for chest pain. At that time, he had a syncopal event and hit his head. Initial CT head was read as negative. He had a pacemaker placed on 11/25 for sick sinus syndrome. Follow-up CT head showed large acute SDH with shift. There were reportedly no neurologic deficits at that time. Over the past two days, patient became more lethargic. 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 He was started on Keppra and Vimpat. Patient was on Eliquis which was reversed. 12/5 went yesterday for bur hole for his increasing subdural hematoma with worsening neurological exam.  Repeat head CT done this morning lessening of midline shift still with left subdural hematoma with a drain in place  12/6 No issues overnight   12/7 patient more somnolent this morning not following commands just saying yes to questions but not appropriate. Sent for stat head CT personally reviewed this appeared stable from the last CT head. Spoke with neurology we will repeat EEG  12/8 much more somnolent today   12/9 patient was excessively somnolent yesterday.   Discussed care with neurology yesterday they decreased Depakote in half they we will stop that today they also stopped gabapentin  12/10--somnolence persists with periods of agitation at times    Eyes open  Does not follow commands  Heart: Paced  Lungs: Fairly clear bilaterally  Abdomen: Soft; bowel sounds active; nontender/nondistended  Skin: Warm/dry; ecchymosis to pacer insertion site  Extremities: Moves all 4 extremities  Subdural drain in place    Labs personally reviewed  Films personally reviewed/interpreted--subdural drain in place with some persistent SDH and slight midline shift    Patient Active Problem List    Diagnosis Date Noted    History of cira hole surgery 12/10/2022    Palliative care encounter 12/10/2022    History of cardiac pacemaker in situ 12/07/2022    SDH (subdural hematoma) 11/30/2022    Seizure ([address] 11/28/2022    High-grade atrioventricular block 11/26/2022    Third degree heart block ([address] 11/25/2022    A-fib ([address] 11/24/2022    Atrial fibrillation ([address] 11/24/2022    Pneumonia of left lung due to infectious organism 08/04/2022    Sepsis secondary to CAP ([address] 08/02/2022    Anemia, chronic renal failure, stage 4 (severe) ([address] 06/13/2022    Combined forms of age-related cataract of left eye 12/17/2015    AP (angina pectoris) ([address] 80/01/6904    Systolic hypertension 43/55/9570    PUD (peptic ulcer disease) 07/26/2012    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    Esophagitis-grade 1 07/26/2012     SDH--status post cira hole crani with drain placement--monitor drain output; monitor neuro exam  Questionable seizures--on Keppra and Vimpat  Hypoalbuminemia--continue tube feeds  Chronic renal insufficiency--monitor BUN/Cr/UO  Electrolyte imbalance (hypercalcemia)--correct as able  Hyperglycemia--increase lantus to BID  DVT risk--PCDs    Patient is at risk for neurologic/metabolic deterioration for which I am actively managing; requires ongoing ICU care    Lucio Mclean MD, FACS  12/10/2022  2:58 PM    Critical care time exclusive of teaching and procedures = 39 minutes     NOTE: This report was transcribed using voice recognition software. Every effort was made to ensure accuracy; however, inadvertent computerized transcription errors may be present.

## 2022-12-10 NOTE — PROGRESS NOTES
Neurosurg progress note  VITALS:  /64   Pulse 71   Temp 99.5 °F (37.5 °C) (Bladder)   Resp (!) 32   Ht 5' 9\" (1.753 m)   Wt 176 lb 5.9 oz (80 kg)   SpO2 93%   BMI 26.05 kg/m²   24HR PULSE RANGE: Pulse  Av.8  Min: 69  Max: 91  CT Head W/O Contrast    Result Date: 2022  EXAMINATION: CT OF THE HEAD WITHOUT CONTRAST  2022 9:38 am TECHNIQUE: CT of the head was performed without the administration of intravenous contrast. Automated exposure control, iterative reconstruction, and/or weight based adjustment of the mA/kV was utilized to reduce the radiation dose to as low as reasonably achievable. COMPARISON: None. HISTORY: ORDERING SYSTEM PROVIDED HISTORY: LOC + fall + strike to head; concern for bleed TECHNOLOGIST PROVIDED HISTORY: Reason for exam:->LOC + fall + strike to head; concern for bleed Has a \"code stroke\" or \"stroke alert\" been called? ->No Decision Support Exception - unselect if not a suspected or confirmed emergency medical condition->Emergency Medical Condition (MA) FINDINGS: BRAIN/VENTRICLES: No evidence of parenchymal hemorrhages or contusions. No evidence of intra or extra-axial fluid collection is seen. Scattered areas of low attenuation are visualized in the periventricular and subcortical white matter suggestive of chronic microvascular disease. No evidence of acute territorial infarct is seen. Prominence of the ventricles and sulci is visualized suggestive of chronic atrophic brain changes. No evidence of intracranial mass or mass effect, no evidence of midline shift is seen. No evidence of sellar or parasellar mass is visualized. ORBITS: The visualized portion of the orbits demonstrate no acute abnormality. SINUSES: The visualized paranasal sinuses and mastoid air cells demonstrate no acute abnormality. SOFT TISSUES/SKULL:  No acute abnormality of the visualized skull or soft tissues. No acute intracranial abnormality.      CT CSpine W/O Contrast    Result Date: 11/24/2022  EXAMINATION: CT OF THE CERVICAL SPINE WITHOUT CONTRAST 11/24/2022 9:38 am TECHNIQUE: CT of the cervical spine was performed without the administration of intravenous contrast. Multiplanar reformatted images are provided for review. Automated exposure control, iterative reconstruction, and/or weight based adjustment of the mA/kV was utilized to reduce the radiation dose to as low as reasonably achievable. COMPARISON: None. HISTORY: ORDERING SYSTEM PROVIDED HISTORY: LOC + Fall + head strike; Concern for fx TECHNOLOGIST PROVIDED HISTORY: Reason for exam:->LOC + Fall + head strike; Concern for fx Decision Support Exception - unselect if not a suspected or confirmed emergency medical condition->Emergency Medical Condition (MA) FINDINGS: Straightening of the lumbar the columns of the cervical spine is present. No evidence of spondylolisthesis is seen. Multilevel degenerative endplate changes visualized, no evidence of compression deformity of the cervical vertebral bodies. No evidence of fracture or traumatic subluxation is seen. Decreased intervertebral disc height visualized most prominent at C5-C6 and C3-C4. Multilevel degenerative disc osteophyte complex, uncovertebral disease and hypertrophic changes in the facet joints is seen. Calcification visualized within the spinal canal superimposed over the falciform ligament at the level of the C5 vertebral body. Otherwise abnormal density visualized in the cervical spinal canal. The neck soft tissues are unremarkable. Circumferential opacification visualized in the sphenoid sinus. Limited evaluation of the upper lung fields is unremarkable bilaterally. No acute abnormality of the cervical spine. Multilevel degenerative changes of the cervical spine visualized most prominent at C3-C4 and C5-C6. Circumferential opacification visualized in the sphenoid sinus.      XR CHEST PORTABLE    Result Date: 11/25/2022  EXAMINATION: ONE XRAY VIEW OF THE CHEST 11/25/2022 4:57 pm COMPARISON: 11/24/2022. HISTORY: ORDERING SYSTEM PROVIDED HISTORY: Pacemaker placement and rule out pneumothorax TECHNOLOGIST PROVIDED HISTORY: Reason for exam:->Pacemaker placement and rule out pneumothorax What reading provider will be dictating this exam?->CRC FINDINGS: AICD visualized in the left chest with lead in the heart. EKG leads are seen superimposed over the chest. Poor inspiratory effort is seen. The CT of the prominence of the cardiomediastinal silhouette is visualized demonstrates no significant increase in comparison to the prior study. Atherosclerotic calcifications visualized in the area the aortic arch. Mild prominence of the bronchovascular and interstitial lung markings is visualized in bilateral lung fields more prominent in the lateral right mid lung field large and in lung field linear subsegmental atelectatic streaks of visualized bilateral lung fields. No evidence of focal infiltrate or consolidation. No evidence of pneumothorax or pleural effusion. Degenerative bone changes seen. No evidence of pneumothorax or parenchymal contusion. AICD visualized in the left chest with leads in the heart. XR CHEST PORTABLE    Result Date: 11/24/2022  EXAMINATION: ONE XRAY VIEW OF THE CHEST 11/24/2022 9:30 am COMPARISON: 08/08/2022 HISTORY: ORDERING SYSTEM PROVIDED HISTORY: CP + SOB TECHNOLOGIST PROVIDED HISTORY: Reason for exam:->CP + SOB FINDINGS: Poor inspiratory effort. The cardiomediastinal silhouette is slightly prominent but demonstrates no significant change in comparison to the prior study. Prominence of the bronchovascular interstitial lung markings is visualized bilaterally with scattered areas of patchy airspace opacification seen. Linea subsegmental atelectatic streaks are visualized. The costophrenic angles are clear with no evidence of pleural effusion seen. No evidence of pneumothorax or parenchymal lung mass. The osseous structures are without acute process. Congestive changes demonstrate no change.      CBC:   Lab Results   Component Value Date/Time    WBC 14.9 12/10/2022 05:18 AM    RBC 4.43 12/10/2022 05:18 AM    HGB 12.1 12/10/2022 05:18 AM    HCT 38.5 12/10/2022 05:18 AM    MCV 86.9 12/10/2022 05:18 AM    MCH 27.3 12/10/2022 05:18 AM    MCHC 31.4 12/10/2022 05:18 AM    RDW 13.7 12/10/2022 05:18 AM     12/10/2022 05:18 AM    MPV 8.8 12/10/2022 05:18 AM     BMP:    Lab Results   Component Value Date/Time     12/10/2022 06:09 AM    K 4.1 12/10/2022 06:09 AM    K 4.5 11/24/2022 09:08 AM     12/10/2022 06:09 AM    CO2 21 12/10/2022 06:09 AM    BUN 52 12/10/2022 06:09 AM    LABALBU 3.2 12/10/2022 06:09 AM    CREATININE 2.1 12/10/2022 06:09 AM    CALCIUM 10.1 12/10/2022 06:09 AM    GFRAA 30 09/16/2022 08:24 AM    LABGLOM 30 12/10/2022 06:09 AM    GLUCOSE 260 12/10/2022 06:09 AM      insulin glargine  15 Units SubCUTAneous BID    carvedilol  12.5 mg Oral BID WC    lactulose  20 g Oral TID    insulin lispro  0-16 Units SubCUTAneous Q4H    finasteride  5 mg Per NG tube Daily    amLODIPine  10 mg Oral Daily    lacosamide  200 mg Oral BID    levETIRAcetam  500 mg Oral BID    ceFAZolin  1,000 mg IntraVENous Q12H    bisacodyl  10 mg Rectal Daily    lidocaine   Topical Once    sennosides-docusate sodium  2 tablet Oral Daily    polyethylene glycol  17 g Oral Daily    guaiFENesin  400 mg Oral TID    sodium chloride flush  5-40 mL IntraVENous 2 times per day    lidocaine  1 patch TransDERmal Daily    allopurinol  300 mg Oral Once per day on Mon Wed Fri    Arformoterol Tartrate  15 mcg Nebulization BID    lisinopril  20 mg Oral Daily    And    hydroCHLOROthiazide  12.5 mg Oral Daily     Did not open eyes to command pupils equal round reactive nonfocal motor exam drain functioning  Assessment:  Patient Active Problem List   Diagnosis    PUD (peptic ulcer disease)    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    Atrial fibrillation (HCC)    Third degree heart block (HCC)    High-grade atrioventricular block    Seizure (HCC)    SDH (subdural hematoma)    History of cardiac pacemaker in situ    History of cira hole surgery     Plan: Head CT no contrast, limit maximal drainage output 80 cc per shift discussed care with family continue current care  Norma Cesar MD M.D.

## 2022-12-11 NOTE — PROGRESS NOTES
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    Esophagitis-grade 1 K20.90    AP (angina pectoris) ([facility]) V66.0    Systolic hypertension L53    Combined forms of age-related cataract of left eye H25.812    Anemia, chronic renal failure, stage 4 (severe) ([facility]) N18.4, D63.1    Sepsis secondary to CAP ([facility]) A41.9    Pneumonia of left lung due to infectious organism J18.9    A-fib ([facility]) I48.91    Atrial fibrillation ([facility]) I48.91    Third degree heart block ([facility]) I44.2    High-grade atrioventricular block I44.39    Seizure ([facility]) R56.9    SDH (subdural hematoma) S06. 5XAA    History of cardiac pacemaker in situ Z95.0    History of cira hole surgery Z98.890    Palliative care encounter Z51.5    Electrolyte imbalance E87.8    Hypercalcemia E83.52    Hypoalbuminemia E88.09       SUBJECTIVE:  Rafi De Leon Is profoundly somnolent and lethargic presently. He does at least attempt to shake his head to questioning but cannot focus. .  Cardiac monitor demonstrates atrial fibrillation with controlled ventricular response. REVIEW OF SYSTEMS:  Presently unable to accurately obtain due to  patient's profound neurologic state.       SCHEDULED MEDICATIONS:   insulin glargine  25 Units SubCUTAneous BID    carvedilol  12.5 mg Oral BID WC    lactulose  20 g Oral TID    insulin lispro  0-16 Units SubCUTAneous Q4H    finasteride  5 mg Per NG tube Daily    amLODIPine  10 mg Oral Daily    lacosamide  200 mg Oral BID    levETIRAcetam  500 mg Oral BID    ceFAZolin  1,000 mg IntraVENous Q12H    bisacodyl  10 mg Rectal Daily    lidocaine   Topical Once    sennosides-docusate sodium  2 tablet Oral Daily    polyethylene glycol  17 g Oral Daily    guaiFENesin  400 mg Oral TID    sodium chloride flush  5-40 mL IntraVENous 2 times per day    lidocaine  1 patch TransDERmal Daily allopurinol  300 mg Oral Once per day on Mon Wed Fri    Arformoterol Tartrate  15 mcg Nebulization BID    lisinopril  20 mg Oral Daily    And    hydroCHLOROthiazide  12.5 mg Oral Daily       VITAL SIGNS:                                                                                                                          BP (!) 155/68   Pulse 84   Temp 99.7 °F (37.6 °C) (Bladder)   Resp 20   Ht 5' 9\" (1.753 m)   Wt 185 lb 3 oz (84 kg)   SpO2 97%   BMI 27.35 kg/m²   Patient Vitals for the past 96 hrs (Last 3 readings):   Weight   12/11/22 0600 185 lb 3 oz (84 kg)   12/08/22 0600 176 lb 5.9 oz (80 kg)     OBJECTIVE:    HEENT: Sclera non-icteric, conjunctiva pink. Carotids are brisk in upstroke with normal contour. No carotid bruits. Normal jugular venous pulsation at 45°. No palpable cervical nor supraclavicular nodes. Thyroid not palpable. Trachea midline. Chest: Even excursion  Lungs: grossly clear to auscultation bilaterally no expiratory wheezes or rhonchi, no decreased tactile fremitus without inspiratory rales. Heart: Irregular, regular  rhythm; S1 > S2, no gallop grade 2/6 systolic murmur second right intercostal space no clicks, rub, palpable thrills   or heaves. PMI nondisplaced, 5th intercostal space MCL. Abdomen: Soft, nontender, nondistended,  mildly protuberant, no masses or organomegaly. Bowel sounds active. Extremities: Without clubbing, cyanosis or edema. Pulses present 3+ upper extermities bilaterally; present 1+ DP  bilaterally and present 1+ PT bilaterally.      Data:   Scheduled Meds: Reviewed  Continuous Infusions:    sodium chloride      dextrose         Intake/Output Summary (Last 24 hours) at 12/11/2022 1621  Last data filed at 12/11/2022 1300  Gross per 24 hour   Intake 982 ml   Output 745 ml   Net 237 ml     CBC:   Recent Labs     12/09/22  0418 12/10/22  0518 12/11/22  0450   WBC 11.9* 14.9* 19.7*   HGB 10.6* 12.1* 10.7*   HCT 33.6* 38.5 35.1*   * 561* 485* BMP:  Recent Labs     12/09/22  0418 12/10/22  0609 12/11/22  0450    145 152*   K 3.7 4.1 4.0    110* 113*   CO2 22 21* 23   BUN 52* 52* 61*   CREATININE 1.8* 2.1* 2.3*   LABGLOM 36 30 26     ABGs:   Lab Results   Component Value Date/Time    PH 7.472 12/08/2022 01:34 PM    PO2 93.6 12/08/2022 01:34 PM    PCO2 29.5 12/08/2022 01:34 PM     INR:   Recent Labs     12/09/22  0418 12/10/22  0518 12/11/22  0450   INR 1.3 1.2 1.2     PRO-BNP:   Lab Results   Component Value Date    PROBNP 8,347 (H) 08/02/2022      TSH:   Lab Results   Component Value Date    TSH 3.970 11/24/2022      Cardiac Injury Profile:   No results for input(s): TROPHS in the last 72 hours. Lipid Profile: No results found for: TRIG, HDL, LDLCALC, CHOL   Hemoglobin A1C: No components found for: HGBA1C      RAD:   CT HEAD WO CONTRAST   Final Result   Interval increased volume and extent of diffuse left cerebral acute subdural   hemorrhage with greatest thickness adjacent to the frontotemporal region now   measuring up to 22 mm in greatest axial thickness. Interval worsening of rightward midline shift at the level of the septum   pellucidum now measuring 8 mm. Mild cerebral white matter chronic microvascular ischemic disease. Mild diffuse cerebral atrophy. Multiple attempts were made by the [facility] core team to page and notify Dr. Abimbola Medrano. Critical results were then called by Dr. Norma Moreno to OSCAR Lal on 11/26/2022 at 22:23. Autumn Gonzalez reports that neurosurgery has been   consulted about the case. XR CHEST PORTABLE   Final Result   Mild congestive changes. XR CHEST PORTABLE   Final Result   No evidence of pneumothorax or parenchymal contusion. AICD visualized in the   left chest with leads in the heart.          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) M39.5    Systolic hypertension Z06    Combined forms of age-related cataract of left eye H25.812    Anemia, chronic renal failure, stage 4 (severe) (HCC) N18.4, D63.1    Sepsis secondary to CAP (HCC) A41.9    Pneumonia of left lung due to infectious organism J18.9    A-fib (HCC) I48.91    Atrial fibrillation (HCC) I48.91    Third degree heart block (HCC) I44.2    High-grade atrioventricular block I44.39    Seizure (HCC) R56.9    SDH (subdural hematoma) S06. 5XAA    History of cardiac pacemaker in situ Z95.0    History of cira hole surgery Z98.890    Palliative care encounter Z51.5    Electrolyte imbalance E87.8    Hypercalcemia E83.52    Hypoalbuminemia E88.09       RECOMMENDATIONS:   Mr. Jenny Snell does not seem to be making any progress nor has he significantly retrogressed but pretty much is at a standstill. Maintain present blood pressure medications and closely observe. .I have spent more than 30 critical care minutes face to face with Michael Fang  and reviewing notes and laboratory data, with greater than 50% of this time reviewing all data and discussing withMr. Jenny Snell' ICU team member. Continue to carefully monitor renal function and blood pressure as well as rhythm. Cesario Pyle, DO FACP,FACC,FSCAI      NOTE:  This report was transcribed using voice recognition software.   Every effort was made to ensure accuracy; however, inadvertent computerized transcription errors may be present

## 2022-12-11 NOTE — PROGRESS NOTES
[facility]  SURGICAL INTENSIVE CARE UNIT (SICU)  ATTENDING PHYSICIAN CRITICAL CARE PROGRESS NOTE     I have examined the patient, reviewed the record, and discussed the case with the APN/ resident. Please refer to the APN/ resident's note. I agree with the assessment and plan. I have reviewed all relevant labs and imaging data. The following summarizes my clinical findings and independent assessment. CC:  critical care management for [facility] Course/Overnight Events:  Randall Styles is a 80year old male who presents to the SICU s/p craniotomy for SDH. Patient initially presented to the hospital on 11/24 for chest pain. At that time, he had a syncopal event and hit his head. Initial CT head was read as negative. He had a pacemaker placed on 11/25 for sick sinus syndrome. Follow-up CT head showed large acute SDH with shift. There were reportedly no neurologic deficits at that time. Over the past two days, patient became more lethargic. 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 He was started on Keppra and Vimpat. Patient was on Eliquis which was reversed. 12/5 went yesterday for bur hole for his increasing subdural hematoma with worsening neurological exam.  Repeat head CT done this morning lessening of midline shift still with left subdural hematoma with a drain in place  12/6 No issues overnight   12/7 patient more somnolent this morning not following commands just saying yes to questions but not appropriate. Sent for stat head CT personally reviewed this appeared stable from the last CT head. Spoke with neurology we will repeat EEG  12/8 much more somnolent today   12/9 patient was excessively somnolent yesterday.   Discussed care with neurology yesterday they decreased Depakote in half they we will stop that today they also stopped gabapentin  12/10--somnolence persists with periods of agitation at times  12/11--no issues overnight    Eyes open  Does not follow commands  Heart: Paced  Lungs: Fairly clear bilaterally  Abdomen: Soft; bowel sounds active; nontender/nondistended  Skin: Warm/dry; ecchymosis to pacer insertion site  Extremities: Moves all 4 extremities  Subdural drain in place    Labs personally reviewed    Patient Active Problem List    Diagnosis Date Noted    History of cira hole surgery 12/10/2022    Palliative care encounter 12/10/2022    Electrolyte imbalance 12/10/2022    Hypercalcemia 12/10/2022    Hypoalbuminemia 12/10/2022    History of cardiac pacemaker in situ 12/07/2022    SDH (subdural hematoma) 11/30/2022    Seizure ([address] 11/28/2022    High-grade atrioventricular block 11/26/2022    Third degree heart block ([address] 11/25/2022    A-fib ([address] 11/24/2022    Atrial fibrillation ([address] 11/24/2022    Pneumonia of left lung due to infectious organism 08/04/2022    Sepsis secondary to CAP ([address] 08/02/2022    Anemia, chronic renal failure, stage 4 (severe) ([address] 06/13/2022    Combined forms of age-related cataract of left eye 12/17/2015    AP (angina pectoris) ([address] 06/64/0217    Systolic hypertension 80/27/0158    PUD (peptic ulcer disease) 07/26/2012    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    Esophagitis-grade 1 07/26/2012     SDH--status post cira hole crani with drain placement--monitor drain output; monitor neuro exam  Questionable seizures--on Keppra and Vimpat  Hypoalbuminemia--continue tube feeds  Chronic renal insufficiency--monitor BUN/Cr/UO; add free water  Electrolyte imbalance (hypernatremia/hypercalcemia)--correct as able  Hyperglycemia--increase lantus   DVT risk--PCDs    Patient is at risk for neurologic/metabolic deterioration for which I am actively managing; requires ongoing ICU care    Yasmeen Martinez MD, FACS  12/11/2022  7:40 AM    Critical care time exclusive of teaching and procedures = 37 minutes     NOTE: This report was transcribed using voice recognition software. Every effort was made to ensure accuracy; however, inadvertent computerized transcription errors may be present.

## 2022-12-11 NOTE — PLAN OF CARE
Problem: Pain  Goal: Verbalizes/displays adequate comfort level or baseline comfort level  Outcome: Progressing     Problem: Safety - Adult  Goal: Free from fall injury  Outcome: Progressing     Problem: ABCDS Injury Assessment  Goal: Absence of physical injury  Outcome: Progressing     Problem: Neurosensory - Adult  Goal: Achieves stable or improved neurological status  Outcome: Not Progressing  Goal: Absence of seizures  Outcome: Progressing     Problem: Skin/Tissue Integrity - Adult  Goal: Skin integrity remains intact  Outcome: Progressing  Goal: Oral mucous membranes remain intact  Outcome: Progressing     Problem: Safety - Medical Restraint  Goal: Remains free of injury from restraints (Restraint for Interference with Medical Device)  Description: INTERVENTIONS:  1. Determine that other, less restrictive measures have been tried or would not be effective before applying the restraint  2. Evaluate the patient's condition at the time of restraint application  3. Inform patient/family regarding the reason for restraint  4.  Q2H: Monitor safety, psychosocial status, comfort, nutrition and hydration  Outcome: Progressing  Flowsheets  Taken 12/11/2022 1200  Remains free of injury from restraints (restraint for interference with medical device): Every 2 hours: Monitor safety, psychosocial status, comfort, nutrition and hydration  Taken 12/11/2022 1000  Remains free of injury from restraints (restraint for interference with medical device): Every 2 hours: Monitor safety, psychosocial status, comfort, nutrition and hydration  Taken 12/11/2022 0805  Remains free of injury from restraints (restraint for interference with medical device): Every 2 hours: Monitor safety, psychosocial status, comfort, nutrition and hydration  Taken 12/11/2022 0800  Remains free of injury from restraints (restraint for interference with medical device): Every 2 hours: Monitor safety, psychosocial status, comfort, nutrition and hydration Problem: Neurosensory - Adult  Goal: Achieves stable or improved neurological status  Outcome: Not Progressing

## 2022-12-11 NOTE — FLOWSHEET NOTE
Patient will reach at lines and corpak, needing to be redirected much of time. Attempting to provide calm environment and reorientation, but patient still assessed to be a risk of harm to self. Family aware for need of restraints.

## 2022-12-11 NOTE — PROGRESS NOTES
Anastasia Ji is a 80 y.o. right handed male    Neurology is following for SDH and seizures    PMH: Hypertension, hyperlipidemia, diabetes, A. fib    Patient presented to the hospital on 11/24/2022 for chest pain and shortness of breath. He does have a history of A. fib and was placed on Eliquis in the past.    He has been having worsening dizziness and passed out striking his head. Initial CT was completed demonstrating no acute abnormality however on personal review it does appear to show a small left subdural hematoma. Unfortunately given the initial read patient was placed back on his home dose of Eliquis and pacemaker was placed on 11/25/2022. On 11/26/2022 he had a persistent headache and having increasing more confusion/aphasia so repeat CT was completed on 11/26/2022 which demonstrated an increased volume of subdural hematoma over the left frontal temporal region. Eliquis was reversed but unfortunately he became more somnolent and had more bereket aphasia and EEG at the time did demonstrate seizure activity from the left frontal lobe. He had been placed on Keppra and subsequently Vimpat as well as Depakote for concern of breakthrough seizures. He underwent bur hole surgery for subdural hematoma on 12/4. EEG on 12/6 demonstrated moderate to severe encephalopathy however no epileptiform activity. On 12/7/2022 he was increasing more lethargic 2-hour EEG was connected but also demonstrated moderate encephalopathy and no recurrent seizures    On 12/8/2022 his Depakote was backed down to 250 mg twice a day. On 12/9 decision was made to hold Neurontin and Depakote due to continued somnolence. Michela Huff discussed at length with patient's son    No incidents overnight or this morning. Thankfully there have been no recurrent seizures. Patient is hypertensive and has low-grade fever this morning otherwise stable on room air.     Currently there are no family members present at bedside. ROS is limited due to aphasia    Allergies as of 11/24/2022    (No Known Allergies)     Objective:     BP (!) 172/86   Pulse 77   Temp 99.5 °F (37.5 °C) (Bladder)   Resp 21   Ht 5' 9\" (1.753 m)   Wt 185 lb 3 oz (84 kg)   SpO2 96%   BMI 27.35 kg/m²      General appearance: Awake, eyes open, aphasic appears stated age, no obvious distress  Heart: Irregular rate and rhythm, A. fib and V paced on telemetry  Lungs: Shallow breaths on room air  Extremities: no cyanosis or edema  Pulses: 2+ and symmetric  Skin: no rashes or lesions    Mental Status: Awake, has eyes open, does track me when his name is called to the right, will squeeze my hand on the left. Aphasic/nonverbal; moans only      Cranial Nerves:  I: smell    II: visual acuity     II: visual fields Bilateral blink to threat   II: pupils PERRL   III,VII: ptosis None   III,IV,VI: extraocular muscles  Tracks examiner to the right attempts to track to the left   V: mastication    V: facial light touch sensation  Normal   V,VII: corneal reflex  Present   VII: facial muscle function - upper     VII: facial muscle function - lower Normal   VIII: hearing Normal   IX: soft palate elevation     IX,X: gag reflex    XI: trapezius strength     XI: sternocleidomastoid strength    XI: neck extension strength     XII: tongue strength       Motor:   Movement left leg spontaneously  Squeezed my hand to the left to command  Normal bulk and tone  No abnormal movements    Sensory:  Withdraws and grimaces to pain in all    Coordination:  Unable to test due to patient's condition    Gait:   Unable to test due to patient condition    DTR:   +2 throughout    Right Babinski and triple flexion response   No Ruiz's     Bilateral palmar grasps  No suck reflex    Laboratory/Radiology:     CBC with Differential:    Lab Results   Component Value Date/Time    WBC 19.7 12/11/2022 04:50 AM    RBC 3.93 12/11/2022 04:50 AM    HGB 10.7 12/11/2022 04:50 AM    HCT 35.1 12/11/2022 04:50 AM     12/11/2022 04:50 AM    MCV 89.3 12/11/2022 04:50 AM    MCH 27.2 12/11/2022 04:50 AM    MCHC 30.5 12/11/2022 04:50 AM    RDW 13.9 12/11/2022 04:50 AM    NRBC 2.8 12/04/2022 04:22 AM    LYMPHOPCT 7.3 12/11/2022 04:50 AM    MONOPCT 10.1 12/11/2022 04:50 AM    BASOPCT 0.6 12/11/2022 04:50 AM    MONOSABS 1.99 12/11/2022 04:50 AM    LYMPHSABS 1.45 12/11/2022 04:50 AM    EOSABS 0.01 12/11/2022 04:50 AM    BASOSABS 0.11 12/11/2022 04:50 AM     CMP:    Lab Results   Component Value Date/Time     12/11/2022 04:50 AM    K 4.0 12/11/2022 04:50 AM    K 4.5 11/24/2022 09:08 AM     12/11/2022 04:50 AM    CO2 23 12/11/2022 04:50 AM    BUN 61 12/11/2022 04:50 AM    CREATININE 2.3 12/11/2022 04:50 AM    GFRAA 30 09/16/2022 08:24 AM    LABGLOM 26 12/11/2022 04:50 AM    GLUCOSE 214 12/11/2022 04:50 AM    PROT 6.8 12/11/2022 04:50 AM    LABALBU 3.1 12/11/2022 04:50 AM    CALCIUM 10.0 12/11/2022 04:50 AM    BILITOT 0.2 12/11/2022 04:50 AM    ALKPHOS 115 12/11/2022 04:50 AM    AST 32 12/11/2022 04:50 AM    ALT 5 12/11/2022 04:50 AM     CT Head: 11/24/22  No acute intracranial abnormality. However - upon person review        CT Head: 12/7/22  Stable left holo hemispheric subdural hematoma with subdural drain and 9 mm  left to right midline shift. Blood is noted to layer on the tentorium  unchanged. I personally reviewed the patient's lab and imaging studies at this time. Assessment:     Patient admitted with falls on Eliquis found to have a large subdural hematoma   Now status post bur hole and unfortunately left frontal parietal onset seizures. Now well controlled on Keppra 500 mg twice a day due to renal function as well as Vimpat 200 mg twice a day. Thankfully without recurrent seizures in the last 2 days  Patient is awake although he remains aphasic; does not appear to be lethargic today   Will continue to monitor closely.   Per documentation, hospice is being considered as patient has minimally improved over the last few days. Palliative is following.     Plan:     Continue supportive care  Neurochecks every hour  BP goal less than 106 systolically  Continue Vimpat 200 mg twice daily  Continue Keppra 500 mg twice daily  Will hold gabapentin and Depakote at this time as he appears to be more awake  Continue PT/OT/ST as able  SCDs  Seizure precautions    Will consider EEG on Monday and possibly weaning ASM's further if needed      KELLY Guaman  8:32 AM  12/11/2022

## 2022-12-11 NOTE — PROGRESS NOTES
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    Esophagitis-grade 1 K20.90    AP (angina pectoris) ([facility]) F61.9    Systolic hypertension C66    Combined forms of age-related cataract of left eye H25.812    Anemia, chronic renal failure, stage 4 (severe) ([facility]) N18.4, D63.1    Sepsis secondary to CAP ([facility]) A41.9    Pneumonia of left lung due to infectious organism J18.9    A-fib ([facility]) I48.91    Atrial fibrillation ([facility]) I48.91    Third degree heart block ([facility]) I44.2    High-grade atrioventricular block I44.39    Seizure ([facility]) R56.9    SDH (subdural hematoma) S06. 5XAA    History of cardiac pacemaker in situ Z95.0    History of cira hole surgery Z98.890    Palliative care encounter Z51.5    Electrolyte imbalance E87.8    Hypercalcemia E83.52    Hypoalbuminemia E88.09       SUBJECTIVE:  Rafi De Leon Is profoundly somnolent and only reflexively communicates with me. No voiced complaints of chest discomfort nor palpitations. REVIEW OF SYSTEMS:  Presently unable to accurately obtain due to  patient's profound neurologic state.       SCHEDULED MEDICATIONS:   insulin glargine  15 Units SubCUTAneous BID    carvedilol  12.5 mg Oral BID WC    lactulose  20 g Oral TID    insulin lispro  0-16 Units SubCUTAneous Q4H    finasteride  5 mg Per NG tube Daily    amLODIPine  10 mg Oral Daily    lacosamide  200 mg Oral BID    levETIRAcetam  500 mg Oral BID    ceFAZolin  1,000 mg IntraVENous Q12H    bisacodyl  10 mg Rectal Daily    lidocaine   Topical Once    sennosides-docusate sodium  2 tablet Oral Daily    polyethylene glycol  17 g Oral Daily    guaiFENesin  400 mg Oral TID    sodium chloride flush  5-40 mL IntraVENous 2 times per day    lidocaine  1 patch TransDERmal Daily    allopurinol  300 mg Oral Once per day on Mon Wed Fri    Arformoterol Tartrate  15 mcg Nebulization BID    lisinopril  20 mg Oral Daily    And    hydroCHLOROthiazide  12.5 mg Oral Daily       VITAL SIGNS:                                                                                                                          /72   Pulse 81   Temp 100 °F (37.8 °C) (Bladder)   Resp 22   Ht 5' 9\" (1.753 m)   Wt 176 lb 5.9 oz (80 kg)   SpO2 94%   BMI 26.05 kg/m²   Patient Vitals for the past 96 hrs (Last 3 readings):   Weight   12/08/22 0600 176 lb 5.9 oz (80 kg)   12/07/22 0600 180 lb 8 oz (81.9 kg)     OBJECTIVE:    HEENT: PERRL, EOM  Intact; sclera non-icteric, conjunctiva pink. Carotids are brisk in upstroke with normal contour. No carotid bruits. Normal jugular venous pulsation at 45°. No palpable cervical nor supraclavicular nodes. Thyroid not palpable. Trachea midline. Chest: Even excursion  Lungs: grossly clear to auscultation bilaterally no expiratory wheezes or rhonchi, no decreased tactile fremitus without inspiratory rales. Heart: Irregular, regular  rhythm; S1 > S2, no gallop grade 2/6 systolic murmur second right intercostal space no clicks, rub, palpable thrills   or heaves. PMI nondisplaced, 5th intercostal space MCL. Abdomen: Soft, nontender, nondistended,  mildly protuberant, no masses or organomegaly. Bowel sounds active. Extremities: Without clubbing, cyanosis or edema. Pulses present 3+ upper extermities bilaterally; present 1+ DP  bilaterally and present 1+ PT bilaterally.      Data:   Scheduled Meds: Reviewed  Continuous Infusions:    sodium chloride      dextrose         Intake/Output Summary (Last 24 hours) at 12/10/2022 2017  Last data filed at 12/10/2022 1800  Gross per 24 hour   Intake 1380 ml   Output 785 ml   Net 595 ml     CBC:   Recent Labs     12/08/22  0636 12/09/22  0418 12/10/22  0518   WBC 10.2 11.9* 14.9*   HGB 10.3* 10.6* 12.1*   HCT 31.7* 33.6* 38.5   * 516* 561*     BMP:  Recent Labs     12/08/22 0636 12/09/22  0418 12/10/22  0609    140 145   K 4.0 3.7 4.1   * 106 110*   CO2 20* 22 21*   BUN 59* 52* 52*   CREATININE 2.0* 1.8* 2.1*   LABGLOM 31 36 30     ABGs:   Lab Results   Component Value Date/Time    PH 7.472 12/08/2022 01:34 PM    PO2 93.6 12/08/2022 01:34 PM    PCO2 29.5 12/08/2022 01:34 PM     INR:   Recent Labs     12/08/22  0636 12/09/22  0418 12/10/22  0518   INR 1.3 1.3 1.2     PRO-BNP:   Lab Results   Component Value Date    PROBNP 8,347 (H) 08/02/2022      TSH:   Lab Results   Component Value Date    TSH 3.970 11/24/2022      Cardiac Injury Profile:   No results for input(s): TROPHS in the last 72 hours. Lipid Profile: No results found for: TRIG, HDL, LDLCALC, CHOL   Hemoglobin A1C: No components found for: HGBA1C      RAD:   CT HEAD WO CONTRAST   Final Result   Interval increased volume and extent of diffuse left cerebral acute subdural   hemorrhage with greatest thickness adjacent to the frontotemporal region now   measuring up to 22 mm in greatest axial thickness. Interval worsening of rightward midline shift at the level of the septum   pellucidum now measuring 8 mm. Mild cerebral white matter chronic microvascular ischemic disease. Mild diffuse cerebral atrophy. Multiple attempts were made by the [facility] core team to page and notify Dr. Indu Murguia. Critical results were then called by Dr. Karen Davidson to OSCAR Fernandez on 11/26/2022 at 22:23. Pedrito Simon reports that neurosurgery has been   consulted about the case. XR CHEST PORTABLE   Final Result   Mild congestive changes. XR CHEST PORTABLE   Final Result   No evidence of pneumothorax or parenchymal contusion. AICD visualized in the   left chest with leads in the heart.          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    Esophagitis-grade 1 K20.90    AP (angina pectoris) ([facility]) L78.4    Systolic hypertension V90    Combined forms of age-related cataract of left eye H25.812    Anemia, chronic renal failure, stage 4 (severe) ([facility]) N18.4, D63.1    Sepsis secondary to CAP ([facility]) A41.9    Pneumonia of left lung due to infectious organism J18.9    A-fib ([facility]) I48.91    Atrial fibrillation ([facility]) I48.91    Third degree heart block ([facility]) I44.2    High-grade atrioventricular block I44.39    Seizure ([facility]) R56.9    SDH (subdural hematoma) S06. 5XAA    History of cardiac pacemaker in situ Z95.0    History of cira hole surgery Z98.890    Palliative care encounter Z51.5    Electrolyte imbalance E87.8    Hypercalcemia E83.52    Hypoalbuminemia E88.09       RECOMMENDATIONS:   Mr. Lisseth Ellington unfortunately has not made any significant improvement in his overall neurologic status for which he is profoundly somnolent and seems to react only in that he recognizes me but cannot communicate well. Hopefully if some of his medications wear off his sensorium might improve. His underlying subdural hematoma has not appreciably changed in size or position. I have spent more than 30 critical care minutes face to face with Sania Watts  and reviewing notes and laboratory data, with greater than 50% of this time reviewing all data and discussing with Dr. Arianna Thompson and as' ICU team member. Continue to carefully monitor renal function and blood pressure as well as rhythm. Media Nan, DO FACP,FACC,FSCAI      NOTE:  This report was transcribed using voice recognition software.   Every effort was made to ensure accuracy; however, inadvertent computerized transcription errors may be present

## 2022-12-11 NOTE — PROGRESS NOTES
Hospitalist Progress Note      SYNOPSIS: Patient admitted on 11/24/2022 for A-fib [facility])  80 y.o. male with past medical history of atrial fibrillation, DM hypertension . Patient is a transfer from [place] . He was seen there due to chest pain . He states that chest pain is located in the sternal area . He states  that he has shortness of breath with chest pain . He states that when chest pain occurred he felt like he was going to pass out . Patient had a syncopal episode and hit his head . Patient had a second syncopal episode  and after that family called ambulance . En route to ED patient was given 324 mg of ASA . In the ED patient was in Afib with slow ventricular rate having long pauses and bradycardic to the 30s . Lab data revealed sodium 135 potassium 4.1 BUN 29 Scr 2.0  glucose 244 Trop 45 >>43  WBC 9.9 HGB 10.7   Resp panel neg CT head cervical spine neg CXR congestive changes . Patient received fentanyl morphine and was placed on isoproterenol infusion. EP consulted-pacemaker placement. Lyme serology was ordered for possible carditis. Patient had an initial CT of the head after the fall which was negative for any intracranial hemorrhage. Repeat CT head was obtained on 11/26/2022 as patient continued to complain of headache and spite of pain medicine. Repeat CT of the head noncontrast was obtained which showed progressive subdural hemorrhage. Eliquis was held, [facility] and [facility] ordered for Eliquis reversal.  Neurosurgery following. Worsening mental status with aphasia. Found to have two seizures. Started on Keppra and Vimpat. Neurology following. He has been on continuous video EEG. Seizure medications has been adjusted. Last seizure episode 2350 lasted approximately 3 minutes prior to giving valproate and bolus which was completed at 02 100. No further seizures after that. Appreciate neurology's inputs. Continue Vimpat, Keppra and Depacon.  He is status post left frontal cira hole for placement of subdural drain and drainage of some acute subdural hematoma 2022. continue on Ancef for drain. Drain replaced 22, monitor drain output    SUBJECTIVE:  Patient seen and examined, nods head, aphasic, somnolence, does not respond to any commands and has a blank stare, responds to painful stimuli,Records reviewed. Discussed with nursing- family considering considering CC    Stable overnight. No other overnight issues reported. Temp (24hrs), Av.9 °F (37.7 °C), Min:99.5 °F (37.5 °C), Max:100 °F (37.8 °C)    DIET: Diet NPO Exceptions are: Sips of Water with Meds  ADULT TUBE FEEDING; Nasogastric; Standard with Fiber; Continuous; 10; Yes; 10; Q 4 hours; 55; 250; Q 4 hours  CODE: Limited    Intake/Output Summary (Last 24 hours) at 2022 1102  Last data filed at 2022 0700  Gross per 24 hour   Intake 1347 ml   Output 645 ml   Net 702 ml       OBJECTIVE:    BP (!) 172/86   Pulse 72   Temp 99.5 °F (37.5 °C) (Bladder)   Resp 22   Ht 5' 9\" (1.753 m)   Wt 185 lb 3 oz (84 kg)   SpO2 99%   BMI 27.35 kg/m²     General appearance:  appears stated age and cooperative. HEENT:  Conjunctivae/corneas clear. Left frontal scalp drain with surgical dressing in place. Neck: Supple. No jugular venous distention. Respiratory: Clear to auscultation bilaterally, normal respiratory effort  Cardiovascular: Regular rate rhythm, normal S1-S2  Abdomen: Soft, nontender, nondistended  Musculoskeletal: No clubbing, cyanosis, no bilateral lower extremity edema. Brisk capillary refill.    Skin:  No rashes  on visible skin  Neurologic: does not wake up to verbal stimuli, responds to painful stimuli, unable to examine, does not follow commands    ASSESSMENT:  -Third-degree heart block status post pacemaker placement 2022   -Syncope and collapse secondary to third-degree heart block also sick sinus syndrome  -Subdural hematoma  -Status post left frontal cira hole for placement of subdural drain and drainage of some acute subdural hematoma 12/5/2022  -Atrial fibrillation Eliquis, Eliquis held due to subdural hematoma   -Seizure disorder   -Hypertension  -CKD stage III  -Diabetes mellitus type 2       PLAN:  - Neurosurgery following appreciate their input- Status post left frontal cira hole for placement of subdural drain and drainage of some acute subdural hematoma 12/4/2022. continue on Ancef for drain. Drain replaced 12/6/22, monitor drain output  - repeat CT head-12/10/2022-shows stable left subdural hematoma with subdural drain and a 8 mm midline shift(improved from 9mm)  - Continue seizure medications per neurology. Vimpat, Depakote and Keppra. EEG-2 hours-shows abnormal prolonged EEG-epileptiform foci noted.   - Appreciate ICU team input.  -Status post pacemaker [third-degree heart block A. fib with RVR]-device interrogated 12/7/2022-overall device function normal    DISPOSITION: continue icu care, per neuro-Consider EEG on Monday and possibly weaning ASM's further if needed, family considering comfort care    Medications:  REVIEWED DAILY    Infusion Medications    sodium chloride      dextrose       Scheduled Medications    insulin glargine  25 Units SubCUTAneous BID    carvedilol  12.5 mg Oral BID WC    lactulose  20 g Oral TID    insulin lispro  0-16 Units SubCUTAneous Q4H    finasteride  5 mg Per NG tube Daily    amLODIPine  10 mg Oral Daily    lacosamide  200 mg Oral BID    levETIRAcetam  500 mg Oral BID    ceFAZolin  1,000 mg IntraVENous Q12H    bisacodyl  10 mg Rectal Daily    lidocaine   Topical Once    sennosides-docusate sodium  2 tablet Oral Daily    polyethylene glycol  17 g Oral Daily    guaiFENesin  400 mg Oral TID    sodium chloride flush  5-40 mL IntraVENous 2 times per day    lidocaine  1 patch TransDERmal Daily    allopurinol  300 mg Oral Once per day on Mon Wed Fri    Arformoterol Tartrate  15 mcg Nebulization BID    lisinopril  20 mg Oral Daily    And    hydroCHLOROthiazide  12.5 mg Oral Daily     PRN Meds: hydrALAZINE, labetalol, ipratropium-albuterol, perflutren lipid microspheres, trimethobenzamide, sodium chloride flush, sodium chloride, glucose, dextrose bolus **OR** dextrose bolus, glucagon (rDNA), dextrose, potassium chloride **OR** potassium alternative oral replacement **OR** potassium chloride, acetaminophen **OR** acetaminophen, aluminum & magnesium hydroxide-simethicone    Labs:     Recent Labs     12/09/22 0418 12/10/22  0518 12/11/22  0450   WBC 11.9* 14.9* 19.7*   HGB 10.6* 12.1* 10.7*   HCT 33.6* 38.5 35.1*   * 561* 485*       Recent Labs     12/09/22 0418 12/10/22  0609 12/11/22  0450    145 152*   K 3.7 4.1 4.0    110* 113*   CO2 22 21* 23   BUN 52* 52* 61*   CREATININE 1.8* 2.1* 2.3*   CALCIUM 9.6 10.1 10.0   PHOS 2.4* 2.9 3.7       Recent Labs     12/09/22 0418 12/10/22  0609 12/11/22  0450   PROT 6.2* 7.0 6.8   ALKPHOS 108 119 115   ALT <5 <5 5   AST 16 21 32   BILITOT 0.3 0.2 0.2       Recent Labs     12/09/22  0418 12/10/22  0518 12/11/22  0450   INR 1.3 1.2 1.2       No results for input(s): Gunnar Pastor in the last 72 hours. Chronic labs:    Lab Results   Component Value Date    TSH 3.970 11/24/2022    PSA 3.76 09/16/2022    INR 1.2 12/11/2022    LABA1C 8.5 (H) 08/02/2022       Radiology: REVIEWED DAILY    +++++++++++++++++++++++++++++++++++++++++++++++++  Annette Ribeiro MD  [facility] - Hospitalist  [address] [place] [address]  +++++++++++++++++++++++++++++++++++++++++++++++++  NOTE: This report was transcribed using voice recognition software. Every effort was made to ensure accuracy; however, inadvertent computerized transcription errors may be present.

## 2022-12-11 NOTE — PROGRESS NOTES
Neurosurg progress note  VITALS:  BP (!) 155/68   Pulse 84   Temp 99.7 °F (37.6 °C) (Bladder)   Resp 20   Ht 5' 9\" (1.753 m)   Wt 185 lb 3 oz (84 kg)   SpO2 97%   BMI 27.35 kg/m²   24HR INTAKE/OUTPUT:    Intake/Output Summary (Last 24 hours) at 12/11/2022 1549  Last data filed at 12/11/2022 1300  Gross per 24 hour   Intake 982 ml   Output 745 ml   Net 237 ml     CT Head W/O Contrast    Result Date: 11/24/2022  EXAMINATION: CT OF THE HEAD WITHOUT CONTRAST  11/24/2022 9:38 am TECHNIQUE: CT of the head was performed without the administration of intravenous contrast. Automated exposure control, iterative reconstruction, and/or weight based adjustment of the mA/kV was utilized to reduce the radiation dose to as low as reasonably achievable. COMPARISON: None. HISTORY: ORDERING SYSTEM PROVIDED HISTORY: LOC + fall + strike to head; concern for bleed TECHNOLOGIST PROVIDED HISTORY: Reason for exam:->LOC + fall + strike to head; concern for bleed Has a \"code stroke\" or \"stroke alert\" been called? ->No Decision Support Exception - unselect if not a suspected or confirmed emergency medical condition->Emergency Medical Condition (MA) FINDINGS: BRAIN/VENTRICLES: No evidence of parenchymal hemorrhages or contusions. No evidence of intra or extra-axial fluid collection is seen. Scattered areas of low attenuation are visualized in the periventricular and subcortical white matter suggestive of chronic microvascular disease. No evidence of acute territorial infarct is seen. Prominence of the ventricles and sulci is visualized suggestive of chronic atrophic brain changes. No evidence of intracranial mass or mass effect, no evidence of midline shift is seen. No evidence of sellar or parasellar mass is visualized. ORBITS: The visualized portion of the orbits demonstrate no acute abnormality. SINUSES: The visualized paranasal sinuses and mastoid air cells demonstrate no acute abnormality.  SOFT TISSUES/SKULL:  No acute abnormality of the visualized skull or soft tissues. No acute intracranial abnormality. CT CSpine W/O Contrast    Result Date: 11/24/2022  EXAMINATION: CT OF THE CERVICAL SPINE WITHOUT CONTRAST 11/24/2022 9:38 am TECHNIQUE: CT of the cervical spine was performed without the administration of intravenous contrast. Multiplanar reformatted images are provided for review. Automated exposure control, iterative reconstruction, and/or weight based adjustment of the mA/kV was utilized to reduce the radiation dose to as low as reasonably achievable. COMPARISON: None. HISTORY: ORDERING SYSTEM PROVIDED HISTORY: LOC + Fall + head strike; Concern for fx TECHNOLOGIST PROVIDED HISTORY: Reason for exam:->LOC + Fall + head strike; Concern for fx Decision Support Exception - unselect if not a suspected or confirmed emergency medical condition->Emergency Medical Condition (MA) FINDINGS: Straightening of the lumbar the columns of the cervical spine is present. No evidence of spondylolisthesis is seen. Multilevel degenerative endplate changes visualized, no evidence of compression deformity of the cervical vertebral bodies. No evidence of fracture or traumatic subluxation is seen. Decreased intervertebral disc height visualized most prominent at C5-C6 and C3-C4. Multilevel degenerative disc osteophyte complex, uncovertebral disease and hypertrophic changes in the facet joints is seen. Calcification visualized within the spinal canal superimposed over the falciform ligament at the level of the C5 vertebral body. Otherwise abnormal density visualized in the cervical spinal canal. The neck soft tissues are unremarkable. Circumferential opacification visualized in the sphenoid sinus. Limited evaluation of the upper lung fields is unremarkable bilaterally. No acute abnormality of the cervical spine. Multilevel degenerative changes of the cervical spine visualized most prominent at C3-C4 and C5-C6.  Circumferential opacification visualized in the sphenoid sinus. XR CHEST PORTABLE    Result Date: 11/25/2022  EXAMINATION: ONE XRAY VIEW OF THE CHEST 11/25/2022 4:57 pm COMPARISON: 11/24/2022. HISTORY: ORDERING SYSTEM PROVIDED HISTORY: Pacemaker placement and rule out pneumothorax TECHNOLOGIST PROVIDED HISTORY: Reason for exam:->Pacemaker placement and rule out pneumothorax What reading provider will be dictating this exam?->CRC FINDINGS: AICD visualized in the left chest with lead in the heart. EKG leads are seen superimposed over the chest. Poor inspiratory effort is seen. The CT of the prominence of the cardiomediastinal silhouette is visualized demonstrates no significant increase in comparison to the prior study. Atherosclerotic calcifications visualized in the area the aortic arch. Mild prominence of the bronchovascular and interstitial lung markings is visualized in bilateral lung fields more prominent in the lateral right mid lung field large and in lung field linear subsegmental atelectatic streaks of visualized bilateral lung fields. No evidence of focal infiltrate or consolidation. No evidence of pneumothorax or pleural effusion. Degenerative bone changes seen. No evidence of pneumothorax or parenchymal contusion. AICD visualized in the left chest with leads in the heart. XR CHEST PORTABLE    Result Date: 11/24/2022  EXAMINATION: ONE XRAY VIEW OF THE CHEST 11/24/2022 9:30 am COMPARISON: 08/08/2022 HISTORY: ORDERING SYSTEM PROVIDED HISTORY: CP + SOB TECHNOLOGIST PROVIDED HISTORY: Reason for exam:->CP + SOB FINDINGS: Poor inspiratory effort. The cardiomediastinal silhouette is slightly prominent but demonstrates no significant change in comparison to the prior study. Prominence of the bronchovascular interstitial lung markings is visualized bilaterally with scattered areas of patchy airspace opacification seen. Linea subsegmental atelectatic streaks are visualized.   The costophrenic angles are clear with no evidence of pleural effusion seen. No evidence of pneumothorax or parenchymal lung mass. The osseous structures are without acute process. Congestive changes demonstrate no change.      CBC:   Lab Results   Component Value Date/Time    WBC 19.7 12/11/2022 04:50 AM    RBC 3.93 12/11/2022 04:50 AM    HGB 10.7 12/11/2022 04:50 AM    HCT 35.1 12/11/2022 04:50 AM    MCV 89.3 12/11/2022 04:50 AM    MCH 27.2 12/11/2022 04:50 AM    MCHC 30.5 12/11/2022 04:50 AM    RDW 13.9 12/11/2022 04:50 AM     12/11/2022 04:50 AM    MPV 9.0 12/11/2022 04:50 AM     BMP:    Lab Results   Component Value Date/Time     12/11/2022 04:50 AM    K 4.0 12/11/2022 04:50 AM    K 4.5 11/24/2022 09:08 AM     12/11/2022 04:50 AM    CO2 23 12/11/2022 04:50 AM    BUN 61 12/11/2022 04:50 AM    LABALBU 3.1 12/11/2022 04:50 AM    CREATININE 2.3 12/11/2022 04:50 AM    CALCIUM 10.0 12/11/2022 04:50 AM    GFRAA 30 09/16/2022 08:24 AM    LABGLOM 26 12/11/2022 04:50 AM    GLUCOSE 214 12/11/2022 04:50 AM      insulin glargine  25 Units SubCUTAneous BID    carvedilol  12.5 mg Oral BID WC    lactulose  20 g Oral TID    insulin lispro  0-16 Units SubCUTAneous Q4H    finasteride  5 mg Per NG tube Daily    amLODIPine  10 mg Oral Daily    lacosamide  200 mg Oral BID    levETIRAcetam  500 mg Oral BID    ceFAZolin  1,000 mg IntraVENous Q12H    bisacodyl  10 mg Rectal Daily    lidocaine   Topical Once    sennosides-docusate sodium  2 tablet Oral Daily    polyethylene glycol  17 g Oral Daily    guaiFENesin  400 mg Oral TID    sodium chloride flush  5-40 mL IntraVENous 2 times per day    lidocaine  1 patch TransDERmal Daily    allopurinol  300 mg Oral Once per day on Mon Wed Fri    Arformoterol Tartrate  15 mcg Nebulization BID    lisinopril  20 mg Oral Daily    And    hydroCHLOROthiazide  12.5 mg Oral Daily     Opens eyes purposeful with the upper extremities does not follow commands pupils are still equal drain with minimal output  Assessment:  Patient Active Problem List   Diagnosis    PUD (peptic ulcer disease)    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    Esophagitis-grade 1    AP (angina pectoris) (HCC)    Systolic hypertension    Combined forms of age-related cataract of left eye    Anemia, chronic renal failure, stage 4 (severe) (HCC)    Sepsis secondary to CAP (HCC)    Pneumonia of left lung due to infectious organism    A-[facility])    Atrial fibrillation (HCC)    Third degree heart block (HCC)    High-grade atrioventricular block    Seizure (HCC)    SDH (subdural hematoma)    History of cardiac pacemaker in situ    History of cira hole surgery    Palliative care encounter    Electrolyte imbalance    Hypercalcemia    Hypoalbuminemia     Plan: Drain removed Long discussion with the family at the bedside they do not want any further aggressive measures him comfortable which is reasonable  Leida Marie MD M.D.

## 2022-12-12 PROBLEM — Z71.89 GOALS OF CARE, COUNSELING/DISCUSSION: Status: ACTIVE | Noted: 2022-01-01

## 2022-12-12 NOTE — PROGRESS NOTES
Hospitalist Progress Note      SYNOPSIS: Patient admitted on 11/24/2022 for A-fib [facility])  80 y.o. male with past medical history of atrial fibrillation, DM hypertension . Patient is a transfer from [facility] . He was seen there due to chest pain . He states that chest pain is located in the sternal area . He states  that he has shortness of breath with chest pain . He states that when chest pain occurred he felt like he was going to pass out . Patient had a syncopal episode and hit his head . Patient had a second syncopal episode  and after that family called ambulance . En route to ED patient was given 324 mg of ASA . In the ED patient was in Afib with slow ventricular rate having long pauses and bradycardic to the 30s . Lab data revealed sodium 135 potassium 4.1 BUN 29 Scr 2.0  glucose 244 Trop 45 >>43  WBC 9.9 HGB 10.7   Resp panel neg CT head cervical spine neg CXR congestive changes . Patient received fentanyl morphine and was placed on isoproterenol infusion. EP consulted-pacemaker placement. Lyme serology was ordered for possible carditis. Patient had an initial CT of the head after the fall which was negative for any intracranial hemorrhage. Repeat CT head was obtained on 11/26/2022 as patient continued to complain of headache and spite of pain medicine. Repeat CT of the head noncontrast was obtained which showed progressive subdural hemorrhage. Eliquis was held, [facility] and [facility] ordered for Eliquis reversal.  Neurosurgery following. Worsening mental status with aphasia. Found to have two seizures. Started on Keppra and Vimpat. Neurology following. He has been on continuous video EEG. Seizure medications has been adjusted. Last seizure episode 2350 lasted approximately 3 minutes prior to giving valproate and bolus which was completed at 02 100. No further seizures after that. Appreciate neurology's inputs. Continue Vimpat, Keppra and Depacon.  He is status post left frontal cira hole for placement of subdural drain and drainage of some acute subdural hematoma 2022. continue on Ancef for drain. Drain replaced 22, monitor drain output. repeat CT head-12/10/2022-shows stable left subdural hematoma with subdural drain and a 8 mm midline shift. No neurological improvement noted and family decided to go with hospice    SUBJECTIVE:  Patient seen and examined,aphasic, somnolence, does not respond to any commands and has a blank stare, responds to painful stimuli,Records reviewed. Discussed with nursing- family would like to go towards comfort care    Stable overnight. No other overnight issues reported. Temp (24hrs), Av.8 °F (37.7 °C), Min:99.3 °F (37.4 °C), Max:100.4 °F (38 °C)    DIET: Diet NPO Exceptions are: Sips of Water with Meds  ADULT TUBE FEEDING; Nasogastric; Standard with Fiber; Continuous; 10; Yes; 10; Q 4 hours; 55; 250; Q 4 hours  CODE: Limited    Intake/Output Summary (Last 24 hours) at 2022 1145  Last data filed at 2022 1000  Gross per 24 hour   Intake 1747 ml   Output 895 ml   Net 852 ml       OBJECTIVE:    /71   Pulse 72   Temp 99.3 °F (37.4 °C) (Bladder)   Resp 22   Ht 5' 9\" (1.753 m)   Wt 186 lb 4.6 oz (84.5 kg)   SpO2 97%   BMI 27.51 kg/m²     General appearance:  appears stated age and cooperative. HEENT:  Conjunctivae/corneas clear. Neck: Supple. No jugular venous distention. Respiratory: Clear to auscultation bilaterally, normal respiratory effort  Cardiovascular: Regular rate rhythm, normal S1-S2  Abdomen: Soft, nontender, nondistended  Musculoskeletal: No clubbing, cyanosis, no bilateral lower extremity edema. Brisk capillary refill.    Skin:  No rashes  on visible skin  Neurologic: does not wake up to verbal stimuli, responds to painful stimuli, unable to examine, does not follow commands    ASSESSMENT:  -Third-degree heart block status post pacemaker placement 2022   -Syncope and collapse secondary to third-degree heart block also sick sinus syndrome  -Subdural hematoma  -Status post left frontal cira hole for placement of subdural drain and drainage of some acute subdural hematoma 12/5/2022  -Atrial fibrillation Eliquis, Eliquis held due to subdural hematoma   -Seizure disorder   -Hypertension  -CKD stage III  -Diabetes mellitus type 2       PLAN:  - Neurosurgery following appreciate their input- Status post left frontal cira hole for placement of subdural drain and drainage of some acute subdural hematoma 12/4/2022. continue on Ancef for drain. Drain replaced 12/6/22, monitor drain output  - repeat CT head-12/10/2022-shows stable left subdural hematoma with subdural drain and a 8 mm midline shift(improved from 9mm)  - Continue seizure medications per neurology. Vimpat, Depakote and Keppra. EEG-2 hours-shows abnormal prolonged EEG-epileptiform foci noted.   - Appreciate ICU team input.  -Status post pacemaker [third-degree heart block A. fib with RVR]-device interrogated 12/7/2022-overall device function normal    DISPOSITION: hospice/comfort care    Medications:  REVIEWED DAILY    Infusion Medications    sodium chloride      dextrose       Scheduled Medications    heparin (porcine)  5,000 Units SubCUTAneous 3 times per day    lacosamide  150 mg Oral BID    insulin glargine  25 Units SubCUTAneous BID    carvedilol  12.5 mg Oral BID WC    lactulose  20 g Oral TID    insulin lispro  0-16 Units SubCUTAneous Q4H    finasteride  5 mg Per NG tube Daily    amLODIPine  10 mg Oral Daily    levETIRAcetam  500 mg Oral BID    bisacodyl  10 mg Rectal Daily    lidocaine   Topical Once    sennosides-docusate sodium  2 tablet Oral Daily    polyethylene glycol  17 g Oral Daily    guaiFENesin  400 mg Oral TID    sodium chloride flush  5-40 mL IntraVENous 2 times per day    lidocaine  1 patch TransDERmal Daily    allopurinol  300 mg Oral Once per day on Mon Wed Fri    Arformoterol Tartrate  15 mcg Nebulization BID    lisinopril  20 mg Oral Daily    And hydroCHLOROthiazide  12.5 mg Oral Daily     PRN Meds: midazolam, glycopyrrolate, polyvinyl alcohol, hydrALAZINE, labetalol, ipratropium-albuterol, perflutren lipid microspheres, trimethobenzamide, sodium chloride flush, sodium chloride, glucose, dextrose bolus **OR** dextrose bolus, glucagon (rDNA), dextrose, potassium chloride **OR** potassium alternative oral replacement **OR** potassium chloride, acetaminophen **OR** acetaminophen, aluminum & magnesium hydroxide-simethicone    Labs:     Recent Labs     12/10/22  0518 12/11/22  0450 12/12/22 0447   WBC 14.9* 19.7* 19.9*   HGB 12.1* 10.7* 10.5*   HCT 38.5 35.1* 33.9*   * 485* 455*       Recent Labs     12/10/22  0609 12/11/22 0450 12/12/22 0447    152* 150*   K 4.1 4.0 4.0   * 113* 112*   CO2 21* 23 25   BUN 52* 61* 66*   CREATININE 2.1* 2.3* 2.2*   CALCIUM 10.1 10.0 9.8   PHOS 2.9 3.7 2.7       Recent Labs     12/10/22  0609 12/11/22 0450 12/12/22 0447   PROT 7.0 6.8 6.7   ALKPHOS 119 115 109   ALT <5 5 <5   AST 21 32 23   BILITOT 0.2 0.2 0.2       Recent Labs     12/10/22  0518 12/11/22 0450 12/12/22 0447   INR 1.2 1.2 1.4       No results for input(s): Froy Hallman in the last 72 hours. Chronic labs:    Lab Results   Component Value Date    TSH 3.970 11/24/2022    PSA 3.76 09/16/2022    INR 1.4 12/12/2022    LABA1C 8.5 (H) 08/02/2022       Radiology: REVIEWED DAILY    +++++++++++++++++++++++++++++++++++++++++++++++++  MD Cynthia Parsons Physician - Hospitalist  [address], [place]  +++++++++++++++++++++++++++++++++++++++++++++++++  NOTE: This report was transcribed using voice recognition software. Every effort was made to ensure accuracy; however, inadvertent computerized transcription errors may be present.

## 2022-12-12 NOTE — PROGRESS NOTES
Physical Therapy  Physical Therapy Attempt    Name: Monet Garnica  : 10/27/1933  MRN: [id number]      Date of Service: 2022  Chart reviewed. Spoke with RN - awaiting family decision regarding care and possible Hospice. Will re-attempt as able.     Yasir Artis, PT, DPT  [id number]

## 2022-12-12 NOTE — PROGRESS NOTES
Gerhardt Castleman is a 80 y.o. right handed male    Patient presented to the hospital on 11/24/2022 for chest pain and shortness of breath. He does have a history of A. fib and was placed on Eliquis in the past.    He has been having worsening dizziness and passed out striking his head. Initial CT was completed demonstrating no acute abnormality however on personal review it does appear to show a small left subdural hematoma. However, given its initial review of being unrevealing he was placed back on his home dose of Eliquis. And a pacemaker placed on 11/25/2022. On 11/26/2022 he had a persistent headache and having increasing more confusion/aphasia repeat CT was completed on 11/26/2022 which demonstrated an increased volume of subdural hematoma over the left frontal temporal region. Eliquis was reversed but unfortunately he became more somnolent and had more bereket aphasia and EEG did demonstrate a left frontal onset seizure activity. He had been placed on Keppra and subsequently Vimpat as well as Depakote for concern of breakthrough seizures. He did undergo bur hole for subdural hematoma Carol Slay on 12/4. EEG on 12/6 demonstrated moderate to severe encephalopathy however no epileptiform activity.     On 12/7/2022 he was increasing more lethargic 2-hour EEG was connected but also demonstrated moderate encephalopathy and no recurrent seizures    We did pause his Depakote and gabapentin Friday  Discussed with son about lowering other ASMs on Monday - will do so at this time    Discussion about Hospice however this has not been initiated     Thankfully there have been no recurrent seizures    Allergies as of 11/24/2022    (No Known Allergies)     Objective:     BP (!) 152/83   Pulse 94   Temp 99.5 °F (37.5 °C) (Bladder)   Resp 19   Ht 5' 9\" (1.753 m)   Wt 186 lb 4.6 oz (84.5 kg)   SpO2 98%   BMI 27.51 kg/m²      General appearance: Awake, appears stated age  Extremities: no cyanosis or edema  Pulses: 2+ and symmetric  Skin: no rashes or lesions    Mental Status: eyes open - not following commands     Nonverbal for me    Not following any commands    Cranial Nerves:  I: smell    II: visual acuity     II: visual fields Bilateral threat   II: pupils ANGELICA   III,VII: ptosis None   III,IV,VI: extraocular muscles  Attempts to follow examiner around the room    V: mastication    V: facial light touch sensation     V,VII: corneal reflex  Present   VII: facial muscle function - upper     VII: facial muscle function - lower Normal   VIII: hearing Normal   IX: soft palate elevation     IX,X: gag reflex    XI: trapezius strength     XI: sternocleidomastoid strength    XI: neck extension strength     XII: tongue strength       Minimal random movements in all extremities    Grimaces but limited withdraw to noxious stimulation in all extremities    DTR:   No reflexes    Right Babinski    No Ruiz's     Bilateral palmar grasps    Laboratory/Radiology:     CBC with Differential:    Lab Results   Component Value Date/Time    WBC 19.9 12/12/2022 04:47 AM    RBC 3.84 12/12/2022 04:47 AM    HGB 10.5 12/12/2022 04:47 AM    HCT 33.9 12/12/2022 04:47 AM     12/12/2022 04:47 AM    MCV 88.3 12/12/2022 04:47 AM    MCH 27.3 12/12/2022 04:47 AM    MCHC 31.0 12/12/2022 04:47 AM    RDW 14.2 12/12/2022 04:47 AM    NRBC 2.8 12/04/2022 04:22 AM    LYMPHOPCT 6.5 12/12/2022 04:47 AM    MONOPCT 8.0 12/12/2022 04:47 AM    BASOPCT 0.5 12/12/2022 04:47 AM    MONOSABS 1.59 12/12/2022 04:47 AM    LYMPHSABS 1.29 12/12/2022 04:47 AM    EOSABS 0.02 12/12/2022 04:47 AM    BASOSABS 0.09 12/12/2022 04:47 AM     CMP:    Lab Results   Component Value Date/Time     12/12/2022 04:47 AM    K 4.0 12/12/2022 04:47 AM    K 4.5 11/24/2022 09:08 AM     12/12/2022 04:47 AM    CO2 25 12/12/2022 04:47 AM    BUN 66 12/12/2022 04:47 AM    CREATININE 2.2 12/12/2022 04:47 AM    GFRAA 30 09/16/2022 08:24 AM    LABGLOM 28 12/12/2022 04:47 AM GLUCOSE 230 12/12/2022 04:47 AM    PROT 6.7 12/12/2022 04:47 AM    LABALBU 3.0 12/12/2022 04:47 AM    CALCIUM 9.8 12/12/2022 04:47 AM    BILITOT 0.2 12/12/2022 04:47 AM    ALKPHOS 109 12/12/2022 04:47 AM    AST 23 12/12/2022 04:47 AM    ALT <5 12/12/2022 04:47 AM     CT Head: 11/24/22  No acute intracranial abnormality. However - upon person review        CT Head: 12/7/22  Stable left holo hemispheric subdural hematoma with subdural drain and 9 mm  left to right midline shift. Blood is noted to layer on the tentorium  unchanged. I personally reviewed the patient's lab and imaging studies at this time. Assessment:     Patient admitted with falls on Eliquis found to have a large subdural hematoma now status post bur hole and unfortunately left frontal parietal onset seizures. Now well controlled on Keppra 500 mg twice a day due to renal function as well as Vimpat 200 mg twice a day.     Thankfully without recurrent seizures    Plan:     Continue to hold gabapentin and Depakote at this time    Continue Vimpat and Keppra but will lower Vimpat to 150 BID    KELLY Crespo - CNS  10:20 AM  12/12/2022

## 2022-12-12 NOTE — PROGRESS NOTES
Surgical Intensive Care Unit   Daily Progress Note     Patient's name:  Williams Swann  Age/Gender: 80 y.o. male  Date of Admission: 11/24/2022  1:48 PM  Length of Stay: 18    Reason for ICU: Patient waking up from anesthesia. Moves LUE spontaneously. Inappropriate speech. Overnight Events: No acute events overnight    Hospital Course: Williams Swann is a 80year old male who presents to the SICU s/p craniotomy for SDH. Patient initially presented to the hospital on 11/24 for chest pain. At that time, he had a syncopal event and hit his head. Initial CT head was read as negative. He had a pacemaker placed on 11/25 for sick sinus syndrome. Follow-up CT head showed large acute SDH with shift. There were reportedly no neurologic deficits at that time. Over the past two days, patient became more lethargic. 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 He was started on Keppra and Vimpat. Patient was on Eliquis which was reversed. 12/5 went yesterday for bur hole for his increasing subdural hematoma with worsening neurological exam.  Repeat head CT done this morning lessening of midline shift still with left subdural hematoma with a drain in place  12/6 No issues overnight   12/7 patient more somnolent this morning not following commands just saying yes to questions but not appropriate. Sent for stat head CT personally reviewed this appeared stable from the last CT head. Spoke with neurology we will repeat EEG  12/8 much more somnolent today   12/9 patient was excessively somnolent yesterday. Discussed care with neurology yesterday they decreased Depakote in half they we will stop that today they also stopped gabapentin  12/10--somnolence persists with periods of agitation at times  12/11--no issues overnight  12/12- continued somnolence, not following commands.  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    Esophagitis-grade 1    AP (angina pectoris) (HCC)    Systolic hypertension    Combined forms of age-related cataract of left eye    Anemia, chronic renal failure, stage 4 (severe) (HCC)    Sepsis secondary to CAP (HCC)    Pneumonia of left lung due to infectious organism    A-fib [facility])    Atrial fibrillation (HCC)    Third degree heart block (HCC)    High-grade atrioventricular block    Seizure (HCC)    SDH (subdural hematoma)    History of cardiac pacemaker in situ    History of cira hole surgery    Palliative care encounter    Electrolyte imbalance    Hypercalcemia    Hypoalbuminemia       Surgical/Interventional Procedures:       Vent Settings: Additional Respiratory Assessments  Heart Rate: 85  Resp: 26  SpO2: 96 %  ABG:   No results for input(s): PH, PCO2, PO2, HCO3, BE, O2SAT in the last 72 hours. I/O:  I/O last 3 completed shifts:   In: 2079 [NG/GT:2079]  Out: 1165 [Urine:1075; Drains:90]  I/O this shift:  In: 1015 [NG/GT:1015]  Out: 385 [Urine:385]  [REMOVED] Urinary Catheter 22-Output (mL): 45 mL  Urinary Catheter 22 Uwqkz-Jickrssmdfg-Qlcrpa (mL): 60 mL     Stool (measured) : 0 mL      Drips:   sodium chloride      dextrose         Physical Exam:   /78   Pulse 85   Temp 100.4 °F (38 °C) (Bladder)   Resp 26   Ht 5' 9\" (1.753 m)   Wt 186 lb 4.6 oz (84.5 kg)   SpO2 96%   BMI 27.51 kg/m²     Average, Min, and Max for last 24 hours Vitals:  Temp:  Temp  Av.9 °F (37.7 °C)  Min: 99.5 °F (37.5 °C)  Max: 100.4 °F (38 °C)  RR: Resp  Av.4  Min: 12  Max: 28  HR: Pulse  Av.8  Min: 70  Max: 95  BP:  Systolic (97CAD), SCI:099 , Min:119 , RPL:298   ; Diastolic (83XCC), RFS:86, Min:63, Max:104    SpO2: SpO2  Av.3 %  Min: 88 %  Max: 100 %              CONSTITUTIONAL: no acute distress, lying in hospital bed  NEUROLOGIC: PERRL, not following commands CARDIOVASCULAR: S1 S2, regular rate,   PULMONARY: no rhonchi/rales/wheezes, no use of accessory muscles  RENAL: toure to gravity, clear yellow urine  ABDOMEN: soft, nontender, nondistended, nontympanic, no masses, no organomegaly, normal bowel sounds   MUSCULOSKELETAL: moves all extremities   SKIN/EXTREMITIES: no rashes/ecchymosis, no edema/clubbing, warm/dry, good capillary refill       ASSESSMENT / PLAN:   Assessment   Principal Problem:    A-fib (HCC)  Active Problems:    Atrial fibrillation (HCC)    Third degree heart block (HCC)    High-grade atrioventricular block    Seizures (HCC)    SDH (subdural hematoma)    History of cardiac pacemaker in situ  Resolved Problems:    * No resolved hospital problems.  *        Plan   GI:  Tfs at goal , Senakot  glycolax, dulcolax suppository  Neuro:  SDH--status post cira hole crani with drain placement- drains removed  seizures--on Keppra and Vimpat  Renal:  Proscar, monitor Urine Output, Daily CBC,BMP, Mg,Phos, ionized Ca , free water flushes  Musculoskeletal: WBAT all extremities , Spines Clear AM-PAC Score pending  Allopurinol   Pulmonary: Aggressive pulmonary hygiene , brovana , guaifenesin , duonebs, afrin , Monitor RR and Maintain SpO2 > 92%  ID:   Monitor leukocytosis and Monitor Fever Curve, DC Ancef as drains are out  Heme: No indication for Transfusion ,  Monitor Hb   Cardiac: Monitor Hemodynamics- norvasc, coreg, was on cardene drip  Endocrine: Maintain glucose <180 lantus SSI      DVT Prophylaxis: PCDs, Hold Chemoprophylaxis until  SD drain removed   Ulcer Prophylaxis: PPI home medication   Tubes and Lines: PIV , NG tube, Toure  Seizure proph:     Keppra/Vimpat  Ancillary consults:   Neurosurgery, Palliative Care, and PT/OT  , Neurology , IM , EP   Family Update:        When available  CODE Status:       DNR-Limited    Dispo: SICU      Electronically signed by Gerhardt Santos, DO PGY-2 12/12/2022  5:20 AM

## 2022-12-12 NOTE — PROGRESS NOTES
OCCUPATIONAL THERAPY    Date:2022  Patient Name: Gio Jolley  MRN: [id number]  : 10/27/1933  Room: [facility]-A              Chart reviewed. Pt on hold this am; awaiting family/POC. Will re-attempt at later time.      Wild Samuels, OTR/L [id number]

## 2022-12-12 NOTE — PROGRESS NOTES
Comprehensive Nutrition Assessment    Type and Reason for Visit:  Reassess    Nutrition Recommendations/Plan:     Continue NPO, Modify Tube Feeding to Diabetic formula (hx DM, blood sugar >180 in ICU setting)    Glucerna 1.5 @ 55 ml/hr  Will provide: 1320 ml tv, 1980 kcals, 108 gm pro, 1001 ml free water  Regimen meets 100% est calorie & protein needs  Water flushes per neuro management current          Malnutrition Assessment:  Malnutrition Status: At risk for malnutrition (12/12/22 1157)    Context:  Acute Illness     Findings of the 6 clinical characteristics of malnutrition:  Energy Intake:  75% or less of estimated energy requirements for 7 or more days (average/ improving w/ TF)  Weight Loss:  No significant weight loss     Body Fat Loss:  No significant body fat loss     Muscle Mass Loss:  No significant muscle mass loss    Fluid Accumulation:  No significant fluid accumulation     Strength:  Not Performed    Nutrition Assessment:    Pt remains at risk d/t ongoing need for ICU care. Admit from [facility] s/p syncope episode/ fall 2/2 [facility] s/p pacemaker placement. Noted +SDH w/ midline shift s/p crani +drain placement. PMhx DM, CKD, & PUD. Pt failed swallow eval 12/7 & Corpak placed for EN support. Noted pending POC/ possible hospice. Will provide updated TF recs in meantime & monitor.     Nutrition Related Findings:    Pt disoriented/ lethargic, noted aphasia, MAP WNL, +I/O's, +1 edema, hypoactive BS, dysphagia, Corpak w/ TF, hyperglycemia, hypernatremia     Wound Type: Multiple, Surgical Incision (traumatic wound)       Current Nutrition Intake & Therapies:    Average Meal Intake: NPO (.)    Current Tube Feeding (TF) Orders:  Feeding Route: Nasoenteric  Formula: Standard with Fiber  Schedule: Continuous  Feeding Regimen: 55 ml/hr, running at goal  Water Flushes: 50 ml q 4 hr = 300 ml water  Current TF & Flush Orders Provides: 1320 ml tv, 1980 kcals, 84 gm pro, 1003 ml free water, 1303 ml total water w/ flushes    Anthropometric Measures:  Height: 5' 9\" (175.3 cm)  Ideal Body Weight (IBW): 160 lbs (73 kg)    Admission Body Weight: 186 lb (84.4 kg) (11/25 first measured)  Current Body Weight: 186 lb (84.4 kg) (12/12 actual), 115.6 % IBW. Current BMI (kg/m2): 27.5  Usual Body Weight: 183 lb 12.8 oz (83.4 kg) (5/18/22 EMR measured wt from Nephrology visit)  % Weight Change (Calculated): 0.7 wt hx appears stable                     BMI Categories: Overweight (BMI 25.0-29. 9)    Estimated Daily Nutrient Needs:  Energy Requirements Based On: Formula  Weight Used for Energy Requirements: Current  Energy (kcal/day): MSJ 1501 x 1.3 SF= 4095-4021  Weight Used for Protein Requirements: Ideal  Protein (g/day): 1.3-1.5 g/kg IBW;   Fluid (ml/day): per critical care    Nutrition Diagnosis:   Inadequate oral intake related to cognitive or neurological impairment as evidenced by NPO or clear liquid status due to medical condition, nutrition support - enteral nutrition    Nutrition Interventions:   Nutrition Education/Counseling: Education not appropriate  Coordination of Nutrition Care: Continue to monitor while inpatient      Goals:  Previous Goal Met: Progressing toward Goal(s)  Goals: Tolerate nutrition support at goal rate - new goal     Nutrition Monitoring and Evaluation:     Food/Nutrient Intake Outcomes: Enteral Nutrition Intake/Tolerance  Physical Signs/Symptoms Outcomes: Biochemical Data, Nutrition Focused Physical Findings, Skin, Weight, Chewing or Swallowing, GI Status, Fluid Status or Edema, Hemodynamic Status    Discharge Planning:     Too soon to determine     Stefan Woodson RD, LD  Contact: Ext [phone]

## 2022-12-12 NOTE — PROGRESS NOTES
[facility]  SURGICAL INTENSIVE CARE UNIT (SICU)  ATTENDING PHYSICIAN CRITICAL CARE PROGRESS NOTE     I have examined the patient, reviewed the record, and discussed the case with the APN/ resident. Please refer to the APN/ resident's note. I agree with the assessment and plan. I have reviewed all relevant labs and imaging data. The following summarizes my clinical findings and independent assessment. CC:  critical care management for [facility] Course/Overnight Events:  Sherif Antony is a 80year old male who presents to the SICU s/p craniotomy for SDH. Patient initially presented to the hospital on 11/24 for chest pain. At that time, he had a syncopal event and hit his head. Initial CT head was read as negative. He had a pacemaker placed on 11/25 for sick sinus syndrome. Follow-up CT head showed large acute SDH with shift. There were reportedly no neurologic deficits at that time. Over the past two days, patient became more lethargic. 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 He was started on Keppra and Vimpat. Patient was on Eliquis which was reversed. 12/5 went yesterday for bur hole for his increasing subdural hematoma with worsening neurological exam.  Repeat head CT done this morning lessening of midline shift still with left subdural hematoma with a drain in place  12/6 No issues overnight   12/7 patient more somnolent this morning not following commands just saying yes to questions but not appropriate. Sent for stat head CT personally reviewed this appeared stable from the last CT head. Spoke with neurology we will repeat EEG  12/8 much more somnolent today   12/9 patient was excessively somnolent yesterday.   Discussed care with neurology yesterday they decreased Depakote in half they we will stop that today they also stopped gabapentin  12/10--somnolence persists with periods of agitation at times  12/11--no issues overnight  12/12--nothing new overnight    Eyes to voice  Does not follow commands  Heart: intermittently paced  Lungs: Fairly clear bilaterally; periods of apnea  Abdomen: Soft; bowel sounds active; nontender/nondistended  Skin: Warm/dry; ecchymosis to pacer insertion site  Extremities: Moves all 4 extremities    Labs personally reviewed    Patient Active Problem List    Diagnosis Date Noted    History of cira hole surgery 12/10/2022    Palliative care encounter 12/10/2022    Electrolyte imbalance 12/10/2022    Hypercalcemia 12/10/2022    Hypoalbuminemia 12/10/2022    History of cardiac pacemaker in situ 12/07/2022    SDH (subdural hematoma) 11/30/2022    Seizure ([address] 11/28/2022    High-grade atrioventricular block 11/26/2022    Third degree heart block ([address] 11/25/2022    A-fib ([address] 11/24/2022    Atrial fibrillation ([address] 11/24/2022    Pneumonia of left lung due to infectious organism 08/04/2022    Sepsis secondary to CAP ([address] 08/02/2022    Anemia, chronic renal failure, stage 4 (severe) ([address] 06/13/2022    Combined forms of age-related cataract of left eye 12/17/2015    AP (angina pectoris) ([address] 87/77/2948    Systolic hypertension 53/64/5789    PUD (peptic ulcer disease) 07/26/2012    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    Esophagitis-grade 1 07/26/2012     SDH--status post cira hole crani with drain placement--monitor neuro exam  Questionable seizures--on Keppra and Vimpat  Hypoalbuminemia--continue tube feeds  Chronic renal insufficiency--monitor BUN/Cr/UO; cont free water  Electrolyte imbalance (hypernatremia/hypercalcemia)--correct as able  Hyperglycemia--lantus/SSI  DVT risk--PCDs  Palliative care    Patient is at risk for neurologic/metabolic deterioration for which I am actively managing; requires ongoing ICU care    Bethany Pinto MD, FACS  12/12/2022  7:26 AM    Critical care time exclusive of teaching and procedures = 37 minutes     NOTE: This report was transcribed using voice recognition software. Every effort was made to ensure accuracy; however, inadvertent computerized transcription errors may be present.

## 2022-12-12 NOTE — PROGRESS NOTES
Neurosurg progress note  VITALS:  /71   Pulse 72   Temp 99.3 °F (37.4 °C) (Bladder)   Resp 22   Ht 5' 9\" (1.753 m)   Wt 186 lb 4.6 oz (84.5 kg)   SpO2 97%   BMI 27.51 kg/m²   24HR INTAKE/OUTPUT:    Intake/Output Summary (Last 24 hours) at 12/12/2022 1127  Last data filed at 12/12/2022 1000  Gross per 24 hour   Intake 1747 ml   Output 895 ml   Net 852 ml     CT Head W/O Contrast    Result Date: 11/24/2022  EXAMINATION: CT OF THE HEAD WITHOUT CONTRAST  11/24/2022 9:38 am TECHNIQUE: CT of the head was performed without the administration of intravenous contrast. Automated exposure control, iterative reconstruction, and/or weight based adjustment of the mA/kV was utilized to reduce the radiation dose to as low as reasonably achievable. COMPARISON: None. HISTORY: ORDERING SYSTEM PROVIDED HISTORY: LOC + fall + strike to head; concern for bleed TECHNOLOGIST PROVIDED HISTORY: Reason for exam:->LOC + fall + strike to head; concern for bleed Has a \"code stroke\" or \"stroke alert\" been called? ->No Decision Support Exception - unselect if not a suspected or confirmed emergency medical condition->Emergency Medical Condition (MA) FINDINGS: BRAIN/VENTRICLES: No evidence of parenchymal hemorrhages or contusions. No evidence of intra or extra-axial fluid collection is seen. Scattered areas of low attenuation are visualized in the periventricular and subcortical white matter suggestive of chronic microvascular disease. No evidence of acute territorial infarct is seen. Prominence of the ventricles and sulci is visualized suggestive of chronic atrophic brain changes. No evidence of intracranial mass or mass effect, no evidence of midline shift is seen. No evidence of sellar or parasellar mass is visualized. ORBITS: The visualized portion of the orbits demonstrate no acute abnormality. SINUSES: The visualized paranasal sinuses and mastoid air cells demonstrate no acute abnormality.  SOFT TISSUES/SKULL:  No acute abnormality of the visualized skull or soft tissues. No acute intracranial abnormality. CT CSpine W/O Contrast    Result Date: 11/24/2022  EXAMINATION: CT OF THE CERVICAL SPINE WITHOUT CONTRAST 11/24/2022 9:38 am TECHNIQUE: CT of the cervical spine was performed without the administration of intravenous contrast. Multiplanar reformatted images are provided for review. Automated exposure control, iterative reconstruction, and/or weight based adjustment of the mA/kV was utilized to reduce the radiation dose to as low as reasonably achievable. COMPARISON: None. HISTORY: ORDERING SYSTEM PROVIDED HISTORY: LOC + Fall + head strike; Concern for fx TECHNOLOGIST PROVIDED HISTORY: Reason for exam:->LOC + Fall + head strike; Concern for fx Decision Support Exception - unselect if not a suspected or confirmed emergency medical condition->Emergency Medical Condition (MA) FINDINGS: Straightening of the lumbar the columns of the cervical spine is present. No evidence of spondylolisthesis is seen. Multilevel degenerative endplate changes visualized, no evidence of compression deformity of the cervical vertebral bodies. No evidence of fracture or traumatic subluxation is seen. Decreased intervertebral disc height visualized most prominent at C5-C6 and C3-C4. Multilevel degenerative disc osteophyte complex, uncovertebral disease and hypertrophic changes in the facet joints is seen. Calcification visualized within the spinal canal superimposed over the falciform ligament at the level of the C5 vertebral body. Otherwise abnormal density visualized in the cervical spinal canal. The neck soft tissues are unremarkable. Circumferential opacification visualized in the sphenoid sinus. Limited evaluation of the upper lung fields is unremarkable bilaterally. No acute abnormality of the cervical spine. Multilevel degenerative changes of the cervical spine visualized most prominent at C3-C4 and C5-C6.  Circumferential opacification visualized in the sphenoid sinus. XR CHEST PORTABLE    Result Date: 11/25/2022  EXAMINATION: ONE XRAY VIEW OF THE CHEST 11/25/2022 4:57 pm COMPARISON: 11/24/2022. HISTORY: ORDERING SYSTEM PROVIDED HISTORY: Pacemaker placement and rule out pneumothorax TECHNOLOGIST PROVIDED HISTORY: Reason for exam:->Pacemaker placement and rule out pneumothorax What reading provider will be dictating this exam?->CRC FINDINGS: AICD visualized in the left chest with lead in the heart. EKG leads are seen superimposed over the chest. Poor inspiratory effort is seen. The CT of the prominence of the cardiomediastinal silhouette is visualized demonstrates no significant increase in comparison to the prior study. Atherosclerotic calcifications visualized in the area the aortic arch. Mild prominence of the bronchovascular and interstitial lung markings is visualized in bilateral lung fields more prominent in the lateral right mid lung field large and in lung field linear subsegmental atelectatic streaks of visualized bilateral lung fields. No evidence of focal infiltrate or consolidation. No evidence of pneumothorax or pleural effusion. Degenerative bone changes seen. No evidence of pneumothorax or parenchymal contusion. AICD visualized in the left chest with leads in the heart. XR CHEST PORTABLE    Result Date: 11/24/2022  EXAMINATION: ONE XRAY VIEW OF THE CHEST 11/24/2022 9:30 am COMPARISON: 08/08/2022 HISTORY: ORDERING SYSTEM PROVIDED HISTORY: CP + SOB TECHNOLOGIST PROVIDED HISTORY: Reason for exam:->CP + SOB FINDINGS: Poor inspiratory effort. The cardiomediastinal silhouette is slightly prominent but demonstrates no significant change in comparison to the prior study. Prominence of the bronchovascular interstitial lung markings is visualized bilaterally with scattered areas of patchy airspace opacification seen. Linea subsegmental atelectatic streaks are visualized.   The costophrenic angles are clear with no evidence of pleural effusion seen. No evidence of pneumothorax or parenchymal lung mass. The osseous structures are without acute process. Congestive changes demonstrate no change.      CBC:   Lab Results   Component Value Date/Time    WBC 19.9 12/12/2022 04:47 AM    RBC 3.84 12/12/2022 04:47 AM    HGB 10.5 12/12/2022 04:47 AM    HCT 33.9 12/12/2022 04:47 AM    MCV 88.3 12/12/2022 04:47 AM    MCH 27.3 12/12/2022 04:47 AM    MCHC 31.0 12/12/2022 04:47 AM    RDW 14.2 12/12/2022 04:47 AM     12/12/2022 04:47 AM    MPV 9.3 12/12/2022 04:47 AM     CBC with Differential:    Lab Results   Component Value Date/Time    WBC 19.9 12/12/2022 04:47 AM    RBC 3.84 12/12/2022 04:47 AM    HGB 10.5 12/12/2022 04:47 AM    HCT 33.9 12/12/2022 04:47 AM     12/12/2022 04:47 AM    MCV 88.3 12/12/2022 04:47 AM    MCH 27.3 12/12/2022 04:47 AM    MCHC 31.0 12/12/2022 04:47 AM    RDW 14.2 12/12/2022 04:47 AM    NRBC 2.8 12/04/2022 04:22 AM    LYMPHOPCT 6.5 12/12/2022 04:47 AM    MONOPCT 8.0 12/12/2022 04:47 AM    BASOPCT 0.5 12/12/2022 04:47 AM    MONOSABS 1.59 12/12/2022 04:47 AM    LYMPHSABS 1.29 12/12/2022 04:47 AM    EOSABS 0.02 12/12/2022 04:47 AM    BASOSABS 0.09 12/12/2022 04:47 AM     BMP:    Lab Results   Component Value Date/Time     12/12/2022 04:47 AM    K 4.0 12/12/2022 04:47 AM    K 4.5 11/24/2022 09:08 AM     12/12/2022 04:47 AM    CO2 25 12/12/2022 04:47 AM    BUN 66 12/12/2022 04:47 AM    LABALBU 3.0 12/12/2022 04:47 AM    CREATININE 2.2 12/12/2022 04:47 AM    CALCIUM 9.8 12/12/2022 04:47 AM    GFRAA 30 09/16/2022 08:24 AM    LABGLOM 28 12/12/2022 04:47 AM    GLUCOSE 230 12/12/2022 04:47 AM      heparin (porcine)  5,000 Units SubCUTAneous 3 times per day    lacosamide  150 mg Oral BID    insulin glargine  25 Units SubCUTAneous BID    carvedilol  12.5 mg Oral BID WC    lactulose  20 g Oral TID    insulin lispro  0-16 Units SubCUTAneous Q4H    finasteride  5 mg Per NG tube Daily    amLODIPine  10 mg Oral Daily    levETIRAcetam  500 mg Oral BID    bisacodyl  10 mg Rectal Daily    lidocaine   Topical Once    sennosides-docusate sodium  2 tablet Oral Daily    polyethylene glycol  17 g Oral Daily    guaiFENesin  400 mg Oral TID    sodium chloride flush  5-40 mL IntraVENous 2 times per day    lidocaine  1 patch TransDERmal Daily    allopurinol  300 mg Oral Once per day on Mon Wed Fri    Arformoterol Tartrate  15 mcg Nebulization BID    lisinopril  20 mg Oral Daily    And    hydroCHLOROthiazide  12.5 mg Oral Daily     Eyes open perrl does not follow commands localizes pain  Assessment:  Patient Active Problem List   Diagnosis    PUD (peptic ulcer disease)    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    Pneumonia of left lung due to infectious organism    A-fib (HCC)    Atrial fibrillation (HCC)    Third degree heart block (HCC)    High-grade atrioventricular block    Seizure (HCC)    SDH (subdural hematoma)    History of cardiac pacemaker in situ    History of cira hole surgery    Palliative care encounter    Electrolyte imbalance    Hypercalcemia    Hypoalbuminemia     Plan:Continue current care  Mendoza Whitten MD M.D.

## 2022-12-12 NOTE — PLAN OF CARE
Problem: Safety - Adult  Goal: Free from fall injury  12/11/2022 2125 by Fredy Colon RN  Outcome: Progressing     Problem: ABCDS Injury Assessment  Goal: Absence of physical injury  12/11/2022 2125 by Fredy Colon RN  Outcome: Progressing     Problem: Skin/Tissue Integrity - Adult  Goal: Skin integrity remains intact  12/11/2022 2125 by Fredy Colon RN  Outcome: Progressing - - -

## 2022-12-12 NOTE — PROGRESS NOTES
Consult received and chart reviewed. Visit made to the bedside. Jaime Hua and pall med Beau Sandhoff, APRN, CNP present at bedside. Hospice philosophy and services discussed. Spoke with KELLY Terry, VELMA who accepted the pt for GIP at the Hospice house. Advised bedside nurse to leave iv lines, and toure in. Asked that NG tube and drain be removed prior to transfer. Consents signed. Transport with [facility] between 345 and 445. Bedside nurse updated. Jaime Hua updated.      Electronically signed by Fatimah Pearson RN on 12/12/2022 at 2:16 PM

## 2022-12-12 NOTE — PROGRESS NOTES
Palliative Care Department  [phone]  Palliative Care Progress Note  Provider Ole Jose, PTVP - [address] [place] [address]  [phone]  Hospital Day: 23  Date of Initial Consult: 12/10/2022  Referring Provider: Dr. Curt Rosales was consulted for assistance with: Goals of care     HPI:   Monet Garnica is a 80 y.o. with a medical history of atrial fibrillation, diabetes, HLD, HTN who was admitted on 11/24/2022 from home with a CHIEF COMPLAINT of syncopal episode. ASSESSMENT/PLAN:     Pertinent Hospital Diagnoses     Syncope episode 2/2 complete heart block  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 sinus syndrome    Palliative Care Encounter / Counseling Regarding Goals of Care  Please see detailed goals of care discussion as below  At this time, Monet Garnica, Does Not have capacity for medical decision-making.   Capacity is time limited and situation/question specific  During encounter Dr. Lynne Barone was surrogate medical decision-maker  Outcome of goals of care meeting:   Consult hospice   Meet with family at the bedside at 1230p to discuss comfort care plan moving forward  Code status Limited meds only  Advanced Directives: no POA or living will in epic  Surrogate/Legal NOK:  Dr. Isela Mcclure (child) [phone]  Sharad Cordero (child) [phone]    Spiritual assessment: no spiritual distress identified  Bereavement and grief: to be determined  Referrals to: [facility]  SUBJECTIVE:     Current medical issues leading to Palliative Medicine involvement include   Active Hospital Problems    Diagnosis Date Noted    History of cira hole surgery [Z98.890] 12/10/2022     Priority: Medium    Palliative care encounter [Z51.5] 12/10/2022     Priority: Medium    Electrolyte imbalance [E87.8] 12/10/2022     Priority: Medium    Hypercalcemia [E83.52] 12/10/2022     Priority: Medium    Hypoalbuminemia [E88.09] 12/10/2022     Priority: Medium    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 5XAA] 11/30/2022     Priority: Medium    Seizure ([facility] [address] [R56.9] 11/28/2022     Priority: Medium    High-grade atrioventricular block [I44.39] 11/26/2022     Priority: Medium    Third degree heart block ([facility] [address] [I44.2] 11/25/2022     Priority: Medium    A-fib [facility]) [I48.91] 11/24/2022     Priority: Medium    Atrial fibrillation ([facility] [address] [I48.91] 11/24/2022     Priority: Medium       Details of Conversation:    Chart reviewed. Update received from nursing. Patient remains in SICU, obtunded. Unresponsive. No family present at bedside. Spoke with son, Dr. Mauro Shell, on the phone. Reiterated previous palliative medicine discussion regarding goals of care and CODE STATUS option. At this time son states family has made the decision to make their father comfortable. Son goes on to state that he knows his father would not want to be resuscitated in any way and he would not want to live if he were dependent on everyone else for care. After further discussion son states he would like hospice [facility] consulted and he would like to meet at the bedside at 1230 this afternoon to discuss comfort care plans moving forward. Son also states his father does have a pacer and would like to have a discussion regarding shutting it off. We will plan to meet at bedside this afternoon. Emotional support given and all questions addressed. We will continue to follow for ongoing goals of care discussion as well as support for the patient and family. Addendum:  Met with son at bedside. At this time family would like to change CODE STATUS to DNR CC and proceed with comfort care measures. In-depth discussion regarding medications to include morphine, Ativan and Robinul. Family verbalizes understanding. Hospice to set up transport to hospice house later this afternoon.     OBJECTIVE:   Prognosis: Poor and Guarded    Physical Exam:  BP 115/71   Pulse 72   Temp 99.3 °F (37.4 °C) (Bladder)   Resp 22   Ht 5' 9\" (1.753 m)   Wt 186 lb 4.6 oz (84.5 kg)   SpO2 97%   BMI 27.51 kg/m²   Constitutional:  Elderly, somnolent  Eyes: no scleral icterus, normal lids, no discharge  ENMT:  Normocephalic, atraumatic, mucosa moist, EOMI  Neck:  trachea midline, no JVD  Lungs: Diminished  Heart[de-identified] Irregular rhythm, pacemaker  Abd:  Soft, non tender, rounded, hypoactive  Ext:  Moving all extremities, +edema, pulses present  Skin:  Warm and dry, no rashes on visible skin  Psych: non-anxious affect  Neuro: Unresponsive    Objective data reviewed: labs, images, records, medication use, vitals, and chart    Discussed patient and the plan of care with the other IDT members: Floor Nurse and Family    Time/Communication  Greater than 50% of time spent, total 25 minutes in counseling and coordination of care at the bedside regarding goals of care and diagnosis and prognosis. Thank you for allowing Palliative Medicine to participate in the care of [facility].

## 2022-12-12 NOTE — PLAN OF CARE
Problem: Neurosensory - Adult  Goal: Achieves stable or improved neurological status  Outcome: Not Progressing  Flowsheets (Taken 12/12/2022 0800)  Achieves stable or improved neurological status:   Assess for and report changes in neurological status   Initiate measures to prevent increased intracranial pressure   Maintain blood pressure and fluid volume within ordered parameters to optimize cerebral perfusion and minimize risk of hemorrhage   Monitor temperature, glucose, and sodium.  Initiate appropriate interventions as ordered     Problem: Chronic Conditions and Co-morbidities  Goal: Patient's chronic conditions and co-morbidity symptoms are monitored and maintained or improved  Outcome: Progressing  Flowsheets (Taken 12/12/2022 0800)  Care Plan - Patient's Chronic Conditions and Co-Morbidity Symptoms are Monitored and Maintained or Improved:   Monitor and assess patient's chronic conditions and comorbid symptoms for stability, deterioration, or improvement   Collaborate with multidisciplinary team to address chronic and comorbid conditions and prevent exacerbation or deterioration     Problem: Discharge Planning  Goal: Discharge to home or other facility with appropriate resources  Outcome: Progressing  Flowsheets (Taken 12/12/2022 0800)  Discharge to home or other facility with appropriate resources:   Identify barriers to discharge with patient and caregiver   Arrange for needed discharge resources and transportation as appropriate   Identify discharge learning needs (meds, wound care, etc)     Problem: Cardiovascular - Adult  Goal: Maintains optimal cardiac output and hemodynamic stability  Outcome: Progressing  Flowsheets (Taken 12/12/2022 0800)  Maintains optimal cardiac output and hemodynamic stability:   Monitor blood pressure and heart rate   Assess for signs of decreased cardiac output   Administer fluid and/or volume expanders as ordered

## 2022-12-13 NOTE — DISCHARGE SUMMARY
Hospitalist Discharge Summary    Patient ID: Wang Davey   Patient : 10/27/1933  Patient's PCP: Pérez Holder MD    Admit Date: 2022   Admitting Physician: Maria Del Rosario Gore DO    Discharge Date:  2022   Discharge Physician: Angelic Bustillos MD   Discharge Condition: Stable  Discharge Disposition: [facility] course in brief:  (Please refer to daily progress notes for a comprehensive review of the hospitalization by requesting medical records)   Patient admitted on 2022 for A-fib [facility])  80 y.o. male with past medical history of atrial fibrillation, DM hypertension . Patient is a transfer from [facility] . He was seen there due to chest pain . He states that chest pain is located in the sternal area . He states  that he has shortness of breath with chest pain . He states that when chest pain occurred he felt like he was going to pass out . Patient had a syncopal episode and hit his head . Patient had a second syncopal episode  and after that family called ambulance . En route to ED patient was given 324 mg of ASA . In the ED patient was in Afib with slow ventricular rate having long pauses and bradycardic to the 30s . Lab data revealed sodium 135 potassium 4.1 BUN 29 Scr 2.0  glucose 244 Trop 45 >>43  WBC 9.9 HGB 10.7   Resp panel neg CT head cervical spine neg CXR congestive changes . Patient received fentanyl morphine and was placed on isoproterenol infusion. EP consulted-pacemaker placement. Lyme serology was ordered for possible carditis. Patient had an initial CT of the head after the fall which was negative for any intracranial hemorrhage. Repeat CT head was obtained on 2022 as patient continued to complain of headache and spite of pain medicine. Repeat CT of the head noncontrast was obtained which showed progressive subdural hemorrhage. Eliquis was held, [facility] and [facility] ordered for Eliquis reversal.  Neurosurgery following.  Worsening mental status with aphasia. Found to have two seizures. Started on Keppra and Vimpat. Neurology following. He has been on continuous video EEG. Seizure medications has been adjusted. Last seizure episode 2350 lasted approximately 3 minutes prior to giving valproate and bolus which was completed at 02 100. No further seizures after that. Appreciate neurology's inputs. Continue Vimpat, Keppra and Depacon. He is status post left frontal cira hole for placement of subdural drain and drainage of some acute subdural hematoma 12/4/2022. continue on Ancef for drain. Drain replaced 12/6/22, monitor drain output. repeat CT head-12/10/2022-shows stable left subdural hematoma with subdural drain and a 8 mm midline shift. No neurological improvement noted and family decided to go with hospice. 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 Follow-up with consultants as indicated by them. Compliance with medications as prescribed on discharge. No future appointments. The patient's condition is stable. At this time the patient is without objective evidence of an acute process requiring continuing hospitalization or inpatient management. They are stable for discharge with outpatient follow-up.      I have spoken with the patient and discussed the results of the current hospitalization, in addition to providing specific details for the plan of care and counseling regarding the diagnosis and prognosis. The plan has been discussed in detail and they are aware of the specific conditions for emergent return, as well as the importance of follow-up. Their questions are answered at this time and they are agreeable with the plan for discharge to hospice house. Continued appropriate risk factor modification of blood pressure, diabetes and serum lipids will remain essential to reducing risk of future atherosclerotic development    Activity: activity as tolerated    Physical exam:  General appearance: No apparent distress, appears stated age and cooperative. HEENT: Conjunctivae/corneas clear. Mucous membranes moist.  Neck: Supple. No JVD. Respiratory:  Clear to auscultation bilaterally. Normal respiratory effort. Cardiovascular:  RRR. S1, S2 without MRG. PV: Pulses palpable. No edema. Abdomen: Soft, non-tender, non-distended. +BS  Musculoskeletal: No obvious deformities. Skin: Normal skin color. No rashes or lesions. Good turgor. Neurologic:  Grossly non-focal. Awake, alert, following commands.    Psychiatric: Alert and oriented, thought content appropriate, normal insight and judgement    Significant labs:  CBC:   Recent Labs     12/10/22  0518 12/11/22  0450 12/12/22  0447   WBC 14.9* 19.7* 19.9*   RBC 4.43 3.93 3.84   HGB 12.1* 10.7* 10.5*   HCT 38.5 35.1* 33.9*   MCV 86.9 89.3 88.3   RDW 13.7 13.9 14.2   * 485* 455*     BMP:   Recent Labs     12/10/22  0609 12/11/22 0450 12/12/22 0447    152* 150*   K 4.1 4.0 4.0   * 113* 112*   CO2 21* 23 25   BUN 52* 61* 66*   CREATININE 2.1* 2.3* 2.2*   MG 2.4 2.5 2.5   PHOS 2.9 3.7 2.7     LFT:  Recent Labs     12/10/22  0609 12/11/22 0450 12/12/22 0447   PROT 7.0 6.8 6.7   ALKPHOS 119 115 109   ALT <5 5 <5   AST 21 32 23   BILITOT 0.2 0.2 0.2     PT/INR:   Recent Labs     12/10/22  0518 12/11/22  0450 12/12/22  0447   INR 1.2 1.2 1.4     BNP: No results for input(s): BNP in the last 72 hours. Hgb A1C:   Lab Results   Component Value Date    LABA1C 8.5 (H) 08/02/2022     Folate and B12:   Lab Results   Component Value Date    OAK[place]UDEK49 9294 (H) 08/05/2022   ,   Lab Results   Component Value Date    FOLATE >20.0 08/05/2022     Thyroid Studies:   Lab Results   Component Value Date    TSH 3.970 11/24/2022       Urinalysis:    Lab Results   Component Value Date/Time    NITRU Negative 12/04/2022 12:05 PM    [address] 12/04/2022 12:05 PM    BACTERIA FEW 12/04/2022 12:05 PM    RBCUA 2-5 12/04/2022 12:05 PM    BLOODU MODERATE 12/04/2022 12:05 PM    SPECGRAV 1.020 12/04/2022 12:05 PM    GLUCOSEU Negative 12/04/2022 12:05 PM       Imaging:  XR ABDOMEN (KUB) (SINGLE AP VIEW)    Result Date: 12/7/2022  EXAMINATION: ONE SUPINE XRAY VIEW(S) OF THE ABDOMEN 12/7/2022 3:18 pm COMPARISON: 12/07/2022. HISTORY: ORDERING SYSTEM PROVIDED HISTORY: Ng TECHNOLOGIST PROVIDED HISTORY: Reason for exam:->Ng What reading provider will be dictating this exam?->CRC FINDINGS: There is a nasogastric/orogastric feeding tube terminating near the gastroduodenal junction. Redundancy of the tube in the proximal stomach is reduced when compared with the prior exam.  There is gaseous small intestinal dilatation. Gas and stool are present in nondilated colon. NG/OG feeding tube terminating near the gastroduodenal junction with decreased redundancy of the tube in the proximal stomach since earlier the same day. XR ABDOMEN (KUB) (SINGLE AP VIEW)    Result Date: 12/7/2022  EXAMINATION: ONE SUPINE XRAY VIEW(S) OF THE ABDOMEN PORTABLE SUPINE 12/7/2022 2:27 pm COMPARISON: 12/04/2022 HISTORY: ORDERING SYSTEM PROVIDED HISTORY: envue enteric feeding tube placement TECHNOLOGIST PROVIDED HISTORY: Reason for exam:->envue enteric feeding tube placement What reading provider will be dictating this exam?->CRC FINDINGS: Weighted tip feeding tube tip in the region of the distal stomach or D1 duodenal bulb.   The feeding tube forms a loop within the gastric fundus. The stomach and colon are nondilated. Mild gaseous distension of proximal small bowel loops. The feeding tube tip lies in the region of the distal stomach or D1 duodenal bulb. CT HEAD WO CONTRAST    Result Date: 12/10/2022  EXAMINATION: CT OF THE HEAD WITHOUT CONTRAST  12/10/2022 10:24 am TECHNIQUE: CT of the head was performed without the administration of intravenous contrast. Automated exposure control, iterative reconstruction, and/or weight based adjustment of the mA/kV was utilized to reduce the radiation dose to as low as reasonably achievable. COMPARISON: None. HISTORY: ORDERING SYSTEM PROVIDED HISTORY: follow up [facility] TECHNOLOGIST PROVIDED HISTORY: Reason for exam:->follow up [facility] Has a \"code stroke\" or \"stroke alert\" been called? ->No What reading provider will be dictating this exam?->CRC FINDINGS: BRAIN/VENTRICLES: Stable left total hemispheric moderate subdural hematoma with some decrease in density of the subdural blood. Stable subdural hemorrhage. Unchanged intracranial gas. Shift of midline structures measures approximately 8 mm compared with 9 mm. No new intracranial hemorrhage detected. No evidence of hydrocephalus. ORBITS: The visualized portion of the orbits demonstrate no acute abnormality. Bilateral scleral bands. SINUSES: Gas fluid levels in the sphenoid sinuses, mucous retention cyst or nasal polyp in the left maxillary sinus. Mastoid air cells are clear. SOFT TISSUES/SKULL:  No acute abnormality of the visualized skull or soft tissues. Nasogastric tube in place. 1.  Stable left holo hemispheric subdural hematoma without significant change in size. 2.  No new hemorrhage. 3.  8 mm of shift of midline structures from left to right. 4.  Unchanged left parietal subdural drainage catheter.      CT HEAD WO CONTRAST    Result Date: 12/7/2022  EXAMINATION: CT OF THE HEAD WITHOUT CONTRAST  12/7/2022 9:36 am TECHNIQUE: CT of the head was performed without the administration of intravenous contrast. Automated exposure control, iterative reconstruction, and/or weight based adjustment of the mA/kV was utilized to reduce the radiation dose to as low as reasonably achievable. COMPARISON: 12/05/2022 HISTORY: ORDERING SYSTEM PROVIDED HISTORY: Change in mental status TECHNOLOGIST PROVIDED HISTORY: Has a \"code stroke\" or \"stroke alert\" been called? ->No Reason for exam:->Change in mental status What reading provider will be dictating this exam?->CRC FINDINGS: BRAIN/VENTRICLES: Stable left holo hemispheric subdural hematoma with subdural drain and 9 mm of left-to-right midline shift. Blood is noted to layer on the left side of the tentorium. .  Stable partial effacement of the left lateral ventricle and left hemispheric sulci. The gray-white differentiation is maintained without evidence of an acute infarct. There is no evidence of hydrocephalus. ORBITS: The visualized portion of the orbits demonstrate no acute abnormality. SINUSES: Pansinus mucosal thickening. SOFT TISSUES/SKULL:  No acute abnormality of the visualized skull or soft tissues. Stable left holo hemispheric subdural hematoma with subdural drain and 9 mm left to right midline shift. Blood is noted to layer on the tentorium unchanged. CT HEAD WO CONTRAST    Result Date: 12/5/2022  EXAMINATION: CT OF THE HEAD WITHOUT CONTRAST  12/5/2022 5:50 am TECHNIQUE: CT of the head was performed without the administration of intravenous contrast. Automated exposure control, iterative reconstruction, and/or weight based adjustment of the mA/kV was utilized to reduce the radiation dose to as low as reasonably achievable. COMPARISON: 12/04/2022 HISTORY: ORDERING SYSTEM PROVIDED HISTORY: s/p cira hole TECHNOLOGIST PROVIDED HISTORY: Reason for exam:->s/p cira hole Has a \"code stroke\" or \"stroke alert\" been called? ->No What reading provider will be dictating this exam?->CRC FINDINGS: BRAIN/VENTRICLES: Since the prior study, there has been partial surgical drainage of the subdural hematoma. There has been a decrease in shift of midline structures from 7.5 mm to 6.4 mm. Moderate hematoma persists in the inferior temporal region within the subdural space. A subdural drainage catheter is noted extending from the posterior frontal cira hole into the left parietooccipital region. Small amount of intracranial gas. ORBITS: The visualized portion of the orbits demonstrate no acute abnormality. SINUSES: There is a nasogastric catheter in place. Mucous retention cyst or nasal polyp in the inferior left maxillary sinus. Partial fluid opacification of the right ethmoid air cells. Gas fluid levels in the sphenoid sinuses. Mastoid air cells are clear. SOFT TISSUES/SKULL:  Dalila Moulder hole in the posterior left frontal region. Subcutaneous gas is noted near the operative site. A dural catheter extends into the intracranial space. 1.  Reduction in volume of left subdural hematoma. 2.  Left craniotomy with placement of dural drainage catheter, no evidence of complication. CT HEAD WO CONTRAST    Result Date: 12/4/2022  EXAMINATION: CT OF THE HEAD WITHOUT CONTRAST  12/4/2022 11:39 am TECHNIQUE: CT of the head was performed without the administration of intravenous contrast. Automated exposure control, iterative reconstruction, and/or weight based adjustment of the mA/kV was utilized to reduce the radiation dose to as low as reasonably achievable. COMPARISON: 11/27/2022 HISTORY: ORDERING SYSTEM PROVIDED HISTORY: Follow up on subdural hematoma TECHNOLOGIST PROVIDED HISTORY: Reason for exam:->Follow up on subdural hematoma Has a \"code stroke\" or \"stroke alert\" been called? ->No What reading provider will be dictating this exam?->CRC FINDINGS: BRAIN/VENTRICLES: There is a stable evolving large left subdural hematoma. There is mass effect identified on the left hemisphere. There is hemorrhage seen along the falx.   Midline shift to the right of approximately 7-1/2 mm which is not significantly changed in the interval.  Atrophy of the brain. Minimal low-attenuation areas seen in the periventricular and subcortical white matter. ORBITS: The visualized portion of the orbits demonstrate no acute abnormality. SINUSES: The visualized paranasal sinuses and mastoid air cells demonstrate no acute abnormality. SOFT TISSUES/SKULL:  No acute abnormality of the visualized skull or soft tissues. Evolving large left subdural hematoma with no change seen in the surrounding mass effect or midline shift. There is no new hemorrhage visualized. CT HEAD WO CONTRAST    Result Date: 11/27/2022  EXAMINATION: CT OF THE HEAD WITHOUT CONTRAST  11/27/2022 6:39 pm TECHNIQUE: CT of the head was performed without the administration of intravenous contrast. Automated exposure control, iterative reconstruction, and/or weight based adjustment of the mA/kV was utilized to reduce the radiation dose to as low as reasonably achievable. COMPARISON: 11/27/2022 at 09:59 HISTORY: ORDERING SYSTEM PROVIDED HISTORY: neuro changes TECHNOLOGIST PROVIDED HISTORY: Reason for exam:->neuro changes Has a \"code stroke\" or \"stroke alert\" been called? ->No What reading provider will be dictating this exam?->CRC FINDINGS: BRAIN/VENTRICLES: The previously identified large left subdural hemorrhage is stable in size and distribution measuring up to 2.4 cm in thickness. The associated mass effect and midline shift is stable measuring 7 mm, previously 7 mm. Small amount of subdural hemorrhage along the falx is also stable. No discrete new hemorrhage is identified. The gray-white differentiation is maintained without evidence of an acute infarct. There is prominence of the ventricles and sulci due to global parenchymal volume loss. There are nonspecific areas of hypoattenuation within the periventricular and subcortical white matter, which likely represent chronic microvascular ischemic change.  ORBITS: The visualized portion of the orbits demonstrate no acute abnormality. SINUSES: The visualized paranasal sinuses and mastoid air cells demonstrate no acute abnormality. SOFT TISSUES/SKULL: No acute abnormality of the visualized skull or soft tissues. No significant change in large left subdural hemorrhage with stable 7 mm midline shift to the right. CT HEAD WO CONTRAST    Result Date: 11/27/2022  EXAMINATION: CT OF THE HEAD WITHOUT CONTRAST  11/27/2022 10:01 am TECHNIQUE: CT of the head was performed without the administration of intravenous contrast. Automated exposure control, iterative reconstruction, and/or weight based adjustment of the mA/kV was utilized to reduce the radiation dose to as low as reasonably achievable. COMPARISON: None. HISTORY: ORDERING SYSTEM PROVIDED HISTORY: new subdural hematoma TECHNOLOGIST PROVIDED HISTORY: Reason for exam:->new subdural hematoma Has a \"code stroke\" or \"stroke alert\" been called? ->No What reading provider will be dictating this exam?->CRC FINDINGS: BRAIN/VENTRICLES: Again noted is a large left subdural hematoma slightly greater in the frontal region. 1 diameter on coronal views measures 24 mm on image 48 series 601. This compares with 23 mm on the prior examination. There is a approximately 7 mm of shift of midline structures, unchanged when measured in a similar fashion. Minimal subdural hematoma along the falx, not significantly changed. There is unchanged minimal linear subdural hematoma along the tentorium on the left. No acute intraparenchymal hemorrhage. Minimal subarachnoid hemorrhage in the right frontal region. No evidence of hydrocephalus. ORBITS: The visualized portion of the orbits demonstrate no acute abnormality. SINUSES: Small mucous retention cyst or nasal polyp in the inferior left maxillary sinus. There is a gas fluid level within the left sphenoid sinus. Mastoid air cells appear clear.  SOFT TISSUES/SKULL:  No acute abnormality of the visualized skull or soft tissues. 1.  Moderate to large left subdural hematoma with left-to-right midline structure shift of approximately 7 mm, unchanged. 2.  Greatest maximal thickness of the subdural hematoma on coronal views is 24 mm, compared to 23 mm on the prior study. 3.  Gas fluid level in the left sphenoid sinus may reflect acute sinusitis. CT HEAD WO CONTRAST    Result Date: 11/26/2022  EXAMINATION: CT OF THE HEAD WITHOUT CONTRAST  11/26/2022 8:16 pm TECHNIQUE: CT of the head was performed without the administration of intravenous contrast. Automated exposure control, iterative reconstruction, and/or weight based adjustment of the mA/kV was utilized to reduce the radiation dose to as low as reasonably achievable. COMPARISON: CT head scan without contrast November 24, 2022. HISTORY: ORDERING SYSTEM PROVIDED HISTORY: fall prior to admission, family request TECHNOLOGIST PROVIDED HISTORY: Reason for exam:->fall prior to admission, family request Has a \"code stroke\" or \"stroke alert\" been called? ->No What reading provider will be dictating this exam?->CRC FINDINGS: BRAIN/VENTRICLES: Interval increased volume and extent of acute subdural hemorrhage is seen overlying the left cerebral hemisphere and tracking along the left margin of the falx and left supratentorium with greatest thickness adjacent to the frontotemporal region measuring up to 22 mm in greatest axial extent. Associated rightward midline shift is noted at the level of the septum pellucidum which now measures 8 mm. .  No abnormal extra-axial fluid collection. The gray-white differentiation is maintained without evidence of an acute infarct. There is no evidence of hydrocephalus. Mild diffuse decrease in cerebral volume is noted with corresponding prominence of the sulci and ventricles. Scattered ill-defined hypoattenuation is present within cerebral white matter consistent with mild microvascular ischemic disease.  ORBITS: The visualized portion of the orbits demonstrate no acute abnormality. SINUSES: The visualized paranasal sinuses and mastoid air cells demonstrate no acute abnormality. SOFT TISSUES/SKULL:  No acute abnormality of the visualized skull or soft tissues. Interval increased volume and extent of diffuse left cerebral acute subdural hemorrhage with greatest thickness adjacent to the frontotemporal region now measuring up to 22 mm in greatest axial thickness. Interval worsening of rightward midline shift at the level of the septum pellucidum now measuring 8 mm. Mild cerebral white matter chronic microvascular ischemic disease. Mild diffuse cerebral atrophy. Multiple attempts were made by the [facility] core team to page and notify Dr. Tereso Casas. Critical results were then called by Dr. Osman Castillo to OSCAR Mccoy on 11/26/2022 at 22:23. Radha Stovall reports that neurosurgery has been consulted about the case. CT Head W/O Contrast    Result Date: 11/24/2022  EXAMINATION: CT OF THE HEAD WITHOUT CONTRAST  11/24/2022 9:38 am TECHNIQUE: CT of the head was performed without the administration of intravenous contrast. Automated exposure control, iterative reconstruction, and/or weight based adjustment of the mA/kV was utilized to reduce the radiation dose to as low as reasonably achievable. COMPARISON: None. HISTORY: ORDERING SYSTEM PROVIDED HISTORY: LOC + fall + strike to head; concern for bleed TECHNOLOGIST PROVIDED HISTORY: Reason for exam:->LOC + fall + strike to head; concern for bleed Has a \"code stroke\" or \"stroke alert\" been called? ->No Decision Support Exception - unselect if not a suspected or confirmed emergency medical condition->Emergency Medical Condition (MA) FINDINGS: BRAIN/VENTRICLES: No evidence of parenchymal hemorrhages or contusions. No evidence of intra or extra-axial fluid collection is seen.  Scattered areas of low attenuation are visualized in the periventricular and subcortical white matter suggestive of chronic microvascular disease. No evidence of acute territorial infarct is seen. Prominence of the ventricles and sulci is visualized suggestive of chronic atrophic brain changes. No evidence of intracranial mass or mass effect, no evidence of midline shift is seen. No evidence of sellar or parasellar mass is visualized. ORBITS: The visualized portion of the orbits demonstrate no acute abnormality. SINUSES: The visualized paranasal sinuses and mastoid air cells demonstrate no acute abnormality. SOFT TISSUES/SKULL:  No acute abnormality of the visualized skull or soft tissues. No acute intracranial abnormality. CT CSpine W/O Contrast    Result Date: 11/24/2022  EXAMINATION: CT OF THE CERVICAL SPINE WITHOUT CONTRAST 11/24/2022 9:38 am TECHNIQUE: CT of the cervical spine was performed without the administration of intravenous contrast. Multiplanar reformatted images are provided for review. Automated exposure control, iterative reconstruction, and/or weight based adjustment of the mA/kV was utilized to reduce the radiation dose to as low as reasonably achievable. COMPARISON: None. HISTORY: ORDERING SYSTEM PROVIDED HISTORY: LOC + Fall + head strike; Concern for fx TECHNOLOGIST PROVIDED HISTORY: Reason for exam:->LOC + Fall + head strike; Concern for fx Decision Support Exception - unselect if not a suspected or confirmed emergency medical condition->Emergency Medical Condition (MA) FINDINGS: Straightening of the lumbar the columns of the cervical spine is present. No evidence of spondylolisthesis is seen. Multilevel degenerative endplate changes visualized, no evidence of compression deformity of the cervical vertebral bodies. No evidence of fracture or traumatic subluxation is seen. Decreased intervertebral disc height visualized most prominent at C5-C6 and C3-C4. Multilevel degenerative disc osteophyte complex, uncovertebral disease and hypertrophic changes in the facet joints is seen.  Calcification visualized within the spinal canal superimposed over the falciform ligament at the level of the C5 vertebral body. Otherwise abnormal density visualized in the cervical spinal canal. The neck soft tissues are unremarkable. Circumferential opacification visualized in the sphenoid sinus. Limited evaluation of the upper lung fields is unremarkable bilaterally. No acute abnormality of the cervical spine. Multilevel degenerative changes of the cervical spine visualized most prominent at C3-C4 and C5-C6. Circumferential opacification visualized in the sphenoid sinus. XR CHEST PORTABLE    Result Date: 11/26/2022  EXAMINATION: ONE XRAY VIEW OF THE CHEST 11/26/2022 8:06 am COMPARISON: 11/25/2022 HISTORY: ORDERING SYSTEM PROVIDED HISTORY: pacemaker TECHNOLOGIST PROVIDED HISTORY: Reason for exam:->pacemaker What reading provider will be dictating this exam?->CRC FINDINGS: AICD visualized in the left chest with 2 leads in the heart. EKG leads are seen superimposed over the chest. Poor inspiratory effort is seen. The cardiomediastinal silhouette is slightly prominent in size. Atherosclerotic calcifications visualized in the aortic arch. Mild prominence of the bronchovascular and interstitial lung markings is seen. Peribronchial cuffing bilateral hilar prominence is seen. Mild haziness overlying bilateral costophrenic angles is seen. No evidence of pneumothorax or parenchymal lung mass. The osseous structures are without acute process. Mild congestive changes. XR CHEST PORTABLE    Result Date: 11/25/2022  EXAMINATION: ONE XRAY VIEW OF THE CHEST 11/25/2022 4:57 pm COMPARISON: 11/24/2022. HISTORY: ORDERING SYSTEM PROVIDED HISTORY: Pacemaker placement and rule out pneumothorax TECHNOLOGIST PROVIDED HISTORY: Reason for exam:->Pacemaker placement and rule out pneumothorax What reading provider will be dictating this exam?->CRC FINDINGS: AICD visualized in the left chest with lead in the heart.  EKG leads are seen superimposed over the chest. Poor inspiratory effort is seen. The CT of the prominence of the cardiomediastinal silhouette is visualized demonstrates no significant increase in comparison to the prior study. Atherosclerotic calcifications visualized in the area the aortic arch. Mild prominence of the bronchovascular and interstitial lung markings is visualized in bilateral lung fields more prominent in the lateral right mid lung field large and in lung field linear subsegmental atelectatic streaks of visualized bilateral lung fields. No evidence of focal infiltrate or consolidation. No evidence of pneumothorax or pleural effusion. Degenerative bone changes seen. No evidence of pneumothorax or parenchymal contusion. AICD visualized in the left chest with leads in the heart. XR CHEST PORTABLE    Result Date: 11/24/2022  EXAMINATION: ONE XRAY VIEW OF THE CHEST 11/24/2022 9:30 am COMPARISON: 08/08/2022 HISTORY: ORDERING SYSTEM PROVIDED HISTORY: CP + SOB TECHNOLOGIST PROVIDED HISTORY: Reason for exam:->CP + SOB FINDINGS: Poor inspiratory effort. The cardiomediastinal silhouette is slightly prominent but demonstrates no significant change in comparison to the prior study. Prominence of the bronchovascular interstitial lung markings is visualized bilaterally with scattered areas of patchy airspace opacification seen. Linea subsegmental atelectatic streaks are visualized. The costophrenic angles are clear with no evidence of pleural effusion seen. No evidence of pneumothorax or parenchymal lung mass. The osseous structures are without acute process. Congestive changes demonstrate no change. XR ABDOMEN FOR NG/OG/NE TUBE PLACEMENT    Result Date: 12/4/2022  EXAMINATION: ONE SUPINE XRAY VIEW(S) OF THE ABDOMEN 12/4/2022 7:20 pm COMPARISON: None.  HISTORY: ORDERING SYSTEM PROVIDED HISTORY: Confirmation of course of NG/OG/NE tube and location of tip of tube TECHNOLOGIST PROVIDED HISTORY: Reason for exam:->Confirmation of course of NG/OG/NE tube and location of tip of tube Portable? ->Yes What reading provider will be dictating this exam?->CRC FINDINGS: Enteric tube terminates in the gastric body. Nonobstructive visualized bowel gas pattern. No free air or pneumatosis in the field of view. Partially imaged pulmonary opacities. Cardiomegaly. Partially imaged pacemaker wires. Enteric tube terminates in the gastric body. Discharge Medications:      Medication List        ASK your doctor about these medications      albuterol sulfate  (90 Base) MCG/ACT inhaler  Commonly known as: Proventil HFA  Inhale 2 puffs into the lungs every 6 hours as needed for Wheezing     allopurinol 100 MG tablet  Commonly known as: ZYLOPRIM     amLODIPine 5 MG tablet  Commonly known as: NORVASC  Take 1 tablet by mouth in the morning. apixaban 2.5 MG Tabs tablet  Commonly known as: ELIQUIS     colchicine-probenecid 0.5-500 MG per tablet     dextran 70-hypromellose 0.1-0.3 % Soln opthalmic solution  Commonly known as: TEARS NATURALE     docusate 100 MG Caps  Commonly known as: COLACE, DULCOLAX  Take 100 mg by mouth in the morning and 100 mg before bedtime. epoetin samantha-epbx 3000 UNIT/ML Soln injection  Commonly known as: RETACRIT  Inject 1 mL into the skin three times a week     gabapentin 100 MG capsule  Commonly known as: NEURONTIN  Take 1 capsule by mouth in the morning and 1 capsule before bedtime. Do all this for 30 days.      glimepiride 4 MG tablet  Commonly known as: AMARYL     lisinopril-hydroCHLOROthiazide 20-12.5 MG per tablet  Commonly known as: PRINZIDE;ZESTORETIC     magnesium gluconate 500 MG tablet  Commonly known as: MAGONATE     magnesium oxide 400 MG tablet  Commonly known as: MAG-OX     metoprolol succinate 25 MG extended release tablet  Commonly known as: TOPROL XL  Take 1 tablet by mouth at bedtime     repaglinide 1 MG tablet  Commonly known as: PRANDIN     SITagliptin 100 MG tablet  Commonly known as: JANUVIA  Take 1 tablet by mouth in the morning. tamsulosin 0.4 MG capsule  Commonly known as: FLOMAX  Take 1 capsule by mouth in the morning and 1 capsule before bedtime. torsemide 10 MG tablet  Commonly known as: DEMADEX     VITAMIN D3 PO     zinc 50 MG Caps              Time Spent on discharge is more than 45 minutes in the examination, evaluation, counseling and review of medications and discharge plan.    +++++++++++++++++++++++++++++++++++++++++++++++++  Abby Whitlock MD  [address]  +++++++++++++++++++++++++++++++++++++++++++++++++  NOTE: This report was transcribed using voice recognition software. Every effort was made to ensure accuracy; however, inadvertent computerized transcription errors may be present.

## 2024-07-18 NOTE — PROGRESS NOTES
[facility]  SURGICAL INTENSIVE CARE UNIT (SICU)  ATTENDING PHYSICIAN CRITICAL CARE PROGRESS NOTE     I have examined the patient, reviewed the record,and discussed the case with the APN/  Resident. I have reviewed all relevant labs and imaging data. The following summarizes my clinical findings and independent assessment. Date of admission:  11/24/2022    CC: Patient waking up from anesthesia. Moves LUE spontaneously. Inappropriate speech. HOSPITAL COURSE:   Williams Swann is a 80year old male who presents to the SICU s/p craniotomy for SDH. Patient initially presented to the hospital on 11/24 for chest pain. At that time, he had a syncopal event and hit his head. Initial CT head was read as negative. He had a pacemaker placed on 11/25 for sick sinus syndrome. Follow-up CT head showed large acute SDH with shift. There were reportedly no neurologic deficits at that time. Over the past two days, patient became more lethargic. 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 He was started on Keppra and Vimpat. Patient was on Eliquis which was reversed. 12/5 went yesterday for bur hole for his increasing subdural hematoma with worsening neurological exam.  Repeat head CT done this morning lessening of midline shift still with left subdural hematoma with a drain in place    New Imaging Reviewed and personally interpreted:    CT head left subdural hematoma with subdural drain in place    New Labs reviewed sodium 139, creatinine 2.1, glucose 197, potassium 4.9, Phos 5.2, LFTs normal, white blood cell count 12.3, hemoglobin 10.6, platelet count 495, INR 1.2      Physical Exam  HENT:      Head: Normocephalic. Comments: Subdural drain in place  Eyes:      Extraocular Movements: Extraocular movements intact. Pupils: Pupils are equal, round, and reactive to light. Cardiovascular:      Rate and Rhythm: Normal rate.  Deric Boyce   is seen today for a follow-up visit.     
br   Still experiencing intermittent globus sensation, which is disruptive enough to cause him to feel nauseated occasionally.
br Also has intermittent episodes of loose stool still.  Pulmonary:      Effort: Pulmonary effort is normal.   Abdominal:      General: Abdomen is flat. There is no distension. Tenderness: There is no abdominal tenderness. Musculoskeletal:         General: Normal range of motion. Cervical back: Neck supple. Comments: 5 out of 5 strength upper and lower extremities   Skin:     General: Skin is warm. Neurological:      General: No focal deficit present. Mental Status: He is alert. Psychiatric:         Mood and Affect: Mood normal.       Assessment   Principal Problem:    A-fib ([address]  Active Problems:    Atrial fibrillation (HCC)    Third degree heart block (HCC)    High-grade atrioventricular block    Seizure (HCC)    Subdural hematoma  Resolved Problems:    * No resolved hospital problems.  *      Plan   GI:  Bedside swallow , Senakot  glycolax  Neuro:  Depakote sprinkles , Vimpat, Keppra, Neurontin prn Tylenol   Neurosurgery following for SDH  , Maintain Normonatremia >140, and All IV infusions and IVPB should be in 0.9% NaCl if available  , CT head improving s/p Bur hole   Renal: Hep lock IV, Flomax, monitor Urine Output, Daily CBC,BMP, Mg,Phos, ionized Ca  Musculoskeletal: WBAT all extremities , Spines Clear AM-PAC Score pending  Allopurinol   Pulmonary: Aggressive pulmonary hygiene , brovana , guaifenesin r, Monitor RR and Maintain SpO2 > 92%  ID:  Ancef while drain in     Monitor leukocytosis and Monitor Fever Curve  Heme: No indication for Transfusion ,   Monitor Hb   Cardiac: Monitor Hemodynamics  lisinopril, hydrochlorothiazide, nifedipine hold Eliquis  Endocrine: Maintain glucose <180 hold Januvia, Amaryl, Prandin    DVT Prophylaxis: PCDs, Hold Chemoprophylaxis until  SD drain removed   Ulcer Prophylaxis:  Stop Protonix at home and  Tubes and Lines: PIV and Remove Stephens   Seizure proph:     Keppra  Ancillary consults:   Neurosurgery, Palliative Care, and PT/OT  , Neurology , IM , EP   Family Update:         As available   CODE Status: DNR-Limited      Dispo: KASHIF Santana MD    Critical Care: 35 minutes evaluating and managing patient with Risk of neurological decompensation,, requiring frequent and emergent imaging, lab studies, intensive monitoring, data review, and adjusting the clinical plan as well as urgent coordination with multiple specialists. , and At risk for further deterioration  time exclusive of teaching and procedures

## (undated) DEVICE — Device

## (undated) DEVICE — SOLUTION IV IRRIG WATER 1000ML POUR BRL 2F7114

## (undated) DEVICE — GOWN,SIRUS,NONRNF,SETINSLV,XL,20/CS: Brand: MEDLINE

## (undated) DEVICE — TUBING, SUCTION, 1/4" X 10', STRAIGHT: Brand: MEDLINE

## (undated) DEVICE — 3M™ TEGADERM™ TRANSPARENT FILM DRESSING FRAME STYLE, 1626W, 4 IN X 4-3/4 IN (10 CM X 12 CM), 50/CT 4CT/CASE: Brand: 3M™ TEGADERM™

## (undated) DEVICE — COVER,LIGHT HANDLE,FLX,1/PK: Brand: MEDLINE INDUSTRIES, INC.

## (undated) DEVICE — CATHETER URETH 10FR RED RUB INTMIT ALL PURP 12 PER CA

## (undated) DEVICE — STERILE SURGICAL LUBRICANT, METAL TUBE: Brand: SURGILUBE

## (undated) DEVICE — DRAINAGE ACCESSORY KIT: 500 ML DRAINAGE BAG FOR INTRAOPERATIVE DRAINAGE OF CEREBROSPINAL FLUID.: Brand: DRAINAGE ACCESSORY KIT

## (undated) DEVICE — SPONGE GZ W2XL2IN NONWOVEN 4 PLY FASTER WICKING ABIL AVANT

## (undated) DEVICE — SOLUTION IV 1000ML LAC RINGERS PH 6.5 INJ USP VIAFLX PLAS

## (undated) DEVICE — GLOVE SURG 8.5 PF POLYMER WHT STRL SIGN LTX ESSENTIAL LTX

## (undated) DEVICE — BAG DRAINAGE CONTAINER 15 LT FLUID COLLCTN

## (undated) DEVICE — GLOVE ORANGE PI 7 1/2   MSG9075

## (undated) DEVICE — DRESSING TRNSPAR W4XL55IN ACRYL SUP FLM W ADH WTRPRF OPSITE

## (undated) DEVICE — TOWEL,OR,DSP,ST,BLUE,STD,6/PK,12PK/CS: Brand: MEDLINE

## (undated) DEVICE — SYRINGE,TOOMEY,IRRIGATION,70CC,STERILE: Brand: MEDLINE

## (undated) DEVICE — CRANI: Brand: MEDLINE INDUSTRIES, INC.

## (undated) DEVICE — DECANTER BAG 9": Brand: MEDLINE INDUSTRIES, INC.

## (undated) DEVICE — APPLICATOR PREP 26ML 0.7% IOD POVACRYLEX 74% ISO ALC ST

## (undated) DEVICE — HERMETIC™ LARGE-STYLE VENTRICULAR CATHETER SET: Brand: HERMETIC™

## (undated) DEVICE — BASIC SINGLE BASIN 1-LF: Brand: MEDLINE INDUSTRIES, INC.

## (undated) DEVICE — TAPE ADH W2INXL10YD WHT PAPR GENTLE BRTH FLX COMFORTABLE

## (undated) DEVICE — STANDARD HYPODERMIC NEEDLE,ALUMINUM HUB: Brand: MONOJECT

## (undated) DEVICE — ELECTRODE PT RET AD L9FT HI MOIST COND ADH HYDRGEL CORDED

## (undated) DEVICE — DISPOSABLE NEEDLE: Brand: DISPOSABLE NEEDLE

## (undated) DEVICE — CATHETER URET 5FR L70CM TIP 8FR OPN END CONE TIP INJ HUB

## (undated) DEVICE — GARMENT,MEDLINE,DVT,INT,CALF,MED, GEN2: Brand: MEDLINE

## (undated) DEVICE — BAG DRNGE COMB PK

## (undated) DEVICE — BLADE CLP TAPR HD WET DRY CAPABILITY GTT IN CHARGING USE

## (undated) DEVICE — Z INACTIVE USE 2635503 SOLUTION IRRIG 3000ML ST H2O USP UROMATIC PLAS CONT

## (undated) DEVICE — SYRINGE MED 50ML LUERLOCK TIP

## (undated) DEVICE — GOWN,SIRUS,FABRNF,2XL,18/CS: Brand: MEDLINE

## (undated) DEVICE — GOWN,SIRUS,FABRNF,L,20/CS: Brand: MEDLINE

## (undated) DEVICE — SYRINGE 20ML LL S/C 50

## (undated) DEVICE — GOWN,PREVENTION PLUS,XLN/2XL,ST,22/CS: Brand: MEDLINE

## (undated) DEVICE — CYSTO PACK: Brand: MEDLINE INDUSTRIES, INC.

## (undated) DEVICE — DRAPE WARMER SLUSH 66X44IN